# Patient Record
Sex: MALE | Race: WHITE | NOT HISPANIC OR LATINO | URBAN - METROPOLITAN AREA
[De-identification: names, ages, dates, MRNs, and addresses within clinical notes are randomized per-mention and may not be internally consistent; named-entity substitution may affect disease eponyms.]

---

## 2018-06-21 ENCOUNTER — HOSPITAL ENCOUNTER (EMERGENCY)
Facility: HOSPITAL | Age: 63
Discharge: HOME | End: 2018-06-21
Attending: EMERGENCY MEDICINE
Payer: MEDICAID

## 2018-06-21 VITALS
HEART RATE: 90 BPM | HEIGHT: 72 IN | OXYGEN SATURATION: 97 % | BODY MASS INDEX: 27.09 KG/M2 | TEMPERATURE: 98 F | WEIGHT: 200 LBS | DIASTOLIC BLOOD PRESSURE: 69 MMHG | SYSTOLIC BLOOD PRESSURE: 128 MMHG | RESPIRATION RATE: 18 BRPM

## 2018-06-21 DIAGNOSIS — D64.9 ANEMIA, UNSPECIFIED TYPE: ICD-10-CM

## 2018-06-21 DIAGNOSIS — N17.9 ACUTE KIDNEY INJURY: ICD-10-CM

## 2018-06-21 DIAGNOSIS — K92.2 GASTROINTESTINAL HEMORRHAGE, UNSPECIFIED GASTROINTESTINAL HEMORRHAGE TYPE: ICD-10-CM

## 2018-06-21 DIAGNOSIS — J40 BRONCHITIS: ICD-10-CM

## 2018-06-21 DIAGNOSIS — R42 DIZZINESS: ICD-10-CM

## 2018-06-21 DIAGNOSIS — I95.1 ORTHOSTATIC HYPOTENSION: Primary | ICD-10-CM

## 2018-06-21 DIAGNOSIS — Z72.0 TOBACCO ABUSE: ICD-10-CM

## 2018-06-21 DIAGNOSIS — R07.9 CHEST PAIN: ICD-10-CM

## 2018-06-21 LAB
ALBUMIN SERPL BCP-MCNC: 2.9 G/DL
ALP SERPL-CCNC: 90 U/L
ALT SERPL W/O P-5'-P-CCNC: 18 U/L
ANION GAP SERPL CALC-SCNC: 10 MMOL/L
AST SERPL-CCNC: 41 U/L
BASOPHILS # BLD AUTO: 0.05 K/UL
BASOPHILS NFR BLD: 0.9 %
BILIRUB SERPL-MCNC: 2.3 MG/DL
BILIRUB UR QL STRIP: NEGATIVE
BNP SERPL-MCNC: 222 PG/ML
BUN SERPL-MCNC: 22 MG/DL
CALCIUM SERPL-MCNC: 8.9 MG/DL
CHLORIDE SERPL-SCNC: 105 MMOL/L
CLARITY UR: CLEAR
CO2 SERPL-SCNC: 19 MMOL/L
COLOR UR: ABNORMAL
CREAT SERPL-MCNC: 2 MG/DL
DIFFERENTIAL METHOD: ABNORMAL
EOSINOPHIL # BLD AUTO: 0.1 K/UL
EOSINOPHIL NFR BLD: 2.2 %
ERYTHROCYTE [DISTWIDTH] IN BLOOD BY AUTOMATED COUNT: 19.4 %
EST. GFR  (AFRICAN AMERICAN): 40 ML/MIN/1.73 M^2
EST. GFR  (NON AFRICAN AMERICAN): 34 ML/MIN/1.73 M^2
GLUCOSE SERPL-MCNC: 100 MG/DL
GLUCOSE UR QL STRIP: NEGATIVE
HCT VFR BLD AUTO: 30.7 %
HGB BLD-MCNC: 9.8 G/DL
HGB UR QL STRIP: NEGATIVE
KETONES UR QL STRIP: NEGATIVE
LEUKOCYTE ESTERASE UR QL STRIP: NEGATIVE
LYMPHOCYTES # BLD AUTO: 0.9 K/UL
LYMPHOCYTES NFR BLD: 16.5 %
MCH RBC QN AUTO: 26.6 PG
MCHC RBC AUTO-ENTMCNC: 31.9 G/DL
MCV RBC AUTO: 83 FL
MONOCYTES # BLD AUTO: 0.9 K/UL
MONOCYTES NFR BLD: 16.2 %
NEUTROPHILS # BLD AUTO: 3.5 K/UL
NEUTROPHILS NFR BLD: 64.2 %
NITRITE UR QL STRIP: NEGATIVE
PH UR STRIP: 6 [PH] (ref 5–8)
PLATELET # BLD AUTO: ABNORMAL K/UL
PMV BLD AUTO: ABNORMAL FL
POTASSIUM SERPL-SCNC: 4.1 MMOL/L
PROT SERPL-MCNC: 7.9 G/DL
PROT UR QL STRIP: NEGATIVE
RBC # BLD AUTO: 3.68 M/UL
SODIUM SERPL-SCNC: 134 MMOL/L
SP GR UR STRIP: 1.01 (ref 1–1.03)
TROPONIN I SERPL DL<=0.01 NG/ML-MCNC: 0.03 NG/ML
URN SPEC COLLECT METH UR: ABNORMAL
UROBILINOGEN UR STRIP-ACNC: ABNORMAL EU/DL
WBC # BLD AUTO: 5.51 K/UL

## 2018-06-21 PROCEDURE — 82272 OCCULT BLD FECES 1-3 TESTS: CPT

## 2018-06-21 PROCEDURE — 96360 HYDRATION IV INFUSION INIT: CPT

## 2018-06-21 PROCEDURE — 94640 AIRWAY INHALATION TREATMENT: CPT

## 2018-06-21 PROCEDURE — 99284 EMERGENCY DEPT VISIT MOD MDM: CPT | Mod: 25

## 2018-06-21 PROCEDURE — 80053 COMPREHEN METABOLIC PANEL: CPT

## 2018-06-21 PROCEDURE — 25000242 PHARM REV CODE 250 ALT 637 W/ HCPCS: Performed by: EMERGENCY MEDICINE

## 2018-06-21 PROCEDURE — 85025 COMPLETE CBC W/AUTO DIFF WBC: CPT

## 2018-06-21 PROCEDURE — 81003 URINALYSIS AUTO W/O SCOPE: CPT

## 2018-06-21 PROCEDURE — 96361 HYDRATE IV INFUSION ADD-ON: CPT

## 2018-06-21 PROCEDURE — 93010 ELECTROCARDIOGRAM REPORT: CPT | Mod: ,,, | Performed by: INTERNAL MEDICINE

## 2018-06-21 PROCEDURE — 25000003 PHARM REV CODE 250: Performed by: EMERGENCY MEDICINE

## 2018-06-21 PROCEDURE — 83880 ASSAY OF NATRIURETIC PEPTIDE: CPT

## 2018-06-21 PROCEDURE — 94761 N-INVAS EAR/PLS OXIMETRY MLT: CPT

## 2018-06-21 PROCEDURE — 84484 ASSAY OF TROPONIN QUANT: CPT

## 2018-06-21 RX ORDER — ASPIRIN 325 MG
325 TABLET ORAL
Status: DISCONTINUED | OUTPATIENT
Start: 2018-06-21 | End: 2018-06-21

## 2018-06-21 RX ORDER — TAMSULOSIN HYDROCHLORIDE 0.4 MG/1
0.4 CAPSULE ORAL DAILY
COMMUNITY
End: 2021-10-28

## 2018-06-21 RX ORDER — LISINOPRIL 20 MG/1
20 TABLET ORAL DAILY
COMMUNITY
End: 2018-06-21 | Stop reason: HOSPADM

## 2018-06-21 RX ORDER — IPRATROPIUM BROMIDE AND ALBUTEROL SULFATE 2.5; .5 MG/3ML; MG/3ML
3 SOLUTION RESPIRATORY (INHALATION)
Status: COMPLETED | OUTPATIENT
Start: 2018-06-21 | End: 2018-06-21

## 2018-06-21 RX ORDER — ACAMPROSATE CALCIUM 333 MG/1
666 TABLET, DELAYED RELEASE ORAL 3 TIMES DAILY
COMMUNITY
End: 2018-09-14 | Stop reason: ALTCHOICE

## 2018-06-21 RX ADMIN — SODIUM CHLORIDE 1000 ML: 0.9 INJECTION, SOLUTION INTRAVENOUS at 10:06

## 2018-06-21 RX ADMIN — IPRATROPIUM BROMIDE AND ALBUTEROL SULFATE 3 ML: .5; 2.5 SOLUTION RESPIRATORY (INHALATION) at 10:06

## 2018-06-21 RX ADMIN — SODIUM CHLORIDE 1000 ML: 0.9 INJECTION, SOLUTION INTRAVENOUS at 11:06

## 2018-06-21 NOTE — ED NOTES
"Pt ambulated in hallway without assistance with steady gait; pt stated "I feel so much better now."  "

## 2018-06-21 NOTE — ED TRIAGE NOTES
"Pt arrived via personal transportation from home. CC of SOB. Pt stated "My blood pressure medication was recently changed and I have been feeling bad since."pt presents with dizziness, and shortness of breath since Tuesday. Pt reports lisinopril dosage was recently increased to 20 mg daily and since he has increased the dosage, he has felt short of breath and dizzy. Pt reports he was previously on 10mg daily and his pharmacy called him and told him he was actually prescribed 20mg. Pt denies N/V/F/D/SOB. NAD at this time  "

## 2018-06-21 NOTE — ED PROVIDER NOTES
Encounter Date: 6/21/2018    SCRIBE #1 NOTE: I, Meghan Gould, am scribing for, and in the presence of, Gael Riojas MD.      This is an initial triage evaluation of Fransico Montalvo, a 62 y.o., male  that presents to the Emergency Department with c/o weak and dizzy.      Pertinent exam findings:  Pale, diaphoretic, 95/64    Orders Pending : iv/ fluids/ cardiac labs    Destination: [facility]    I have evaluated and provided a medical screening exam with initial orders placed, if indicated, to expedite care. The patient is stable to return to the waiting area and will be placed in a treatment area when one is available. Care will be transferred to an alternate provider when patient is roomed from the lobby for full assessment including: history, physical exam, additional orders, and final disposition.      BALTAZAR Fonseca-JARRET     History     Chief Complaint   Patient presents with    Shortness of Breath     dizzy and light headed started yesterday worsened this morning started a new BP medication on 6/19/18      CC: Shortness of Breath    HPI: This 62 y.o male, smoker, who has HTN, BPH, and Alcohol abuse presents to the ED for an evaluation of acute onset, constant dizziness and lightheadedness that began yesterday.  Patient also reports of mild shortness of breath that began today with standing. Patient denies fever, chills, chest pain, abdominal pain, cough, nausea, emesis, diarrhea, hematuria, or any other associated symptoms.  Patient reports he was placed on Lisinopril 2 days ago and reports starting Flomax 6 days ago.  No prior tx.  No alleviating factors.       The history is provided by the patient. No  was used.     Review of patient's allergies indicates:  Allergies not on file  No past medical history on file.  No past surgical history on file.  No family history on file.  Social History   Substance Use Topics    Smoking status: Not on file    Smokeless tobacco: Not on file    Alcohol use  Not on file     Review of Systems   Constitutional: Negative for chills and fever.   HENT: Negative for ear pain and sore throat.    Eyes: Negative for pain.   Respiratory: Positive for shortness of breath. Negative for cough.    Cardiovascular: Negative for chest pain.   Gastrointestinal: Negative for abdominal pain, diarrhea, nausea and vomiting.   Genitourinary: Negative for dysuria.   Musculoskeletal: Negative for back pain.        (-) arm or leg problems   Skin: Negative for rash.   Neurological: Positive for dizziness and light-headedness. Negative for headaches.       Physical Exam     Initial Vitals [06/21/18 0943]   BP Pulse Resp Temp SpO2   95/64 88 (!) 22 98.6 °F (37 °C) 98 %      MAP       --         Physical Exam    Nursing note and vitals reviewed.  Constitutional: Vital signs are normal. He appears well-developed and well-nourished. He is active.  Non-toxic appearance. No distress.   HENT:   Head: Normocephalic and atraumatic.   Eyes: EOM are normal.   Neck: Trachea normal. Neck supple.   Cardiovascular: Normal rate and regular rhythm.   Pulmonary/Chest: No respiratory distress. He has wheezes (minimal; end expiratory wheezes at bilateral bases).   Abdominal: Soft. Normal appearance and bowel sounds are normal. He exhibits no distension. There is no tenderness.   Musculoskeletal: Normal range of motion. He exhibits no edema.   Neurological: He is alert and oriented to person, place, and time. He has normal strength. No cranial nerve deficit or sensory deficit.   Skin: Skin is warm, dry and intact. No rash noted.   Psychiatric: He has a normal mood and affect.         ED Course   Procedures  Labs Reviewed   CBC W/ AUTO DIFFERENTIAL - Abnormal; Notable for the following:        Result Value    RBC 3.68 (*)     Hemoglobin 9.8 (*)     Hematocrit 30.7 (*)     MCH 26.6 (*)     MCHC 31.9 (*)     RDW 19.4 (*)     Lymph # 0.9 (*)     Lymph% 16.5 (*)     Mono% 16.2 (*)     All other components within normal  limits   COMPREHENSIVE METABOLIC PANEL - Abnormal; Notable for the following:     Sodium 134 (*)     CO2 19 (*)     Creatinine 2.0 (*)     Albumin 2.9 (*)     Total Bilirubin 2.3 (*)     AST 41 (*)     eGFR if  40 (*)     eGFR if non  34 (*)     All other components within normal limits   B-TYPE NATRIURETIC PEPTIDE - Abnormal; Notable for the following:      (*)     All other components within normal limits   URINALYSIS - Abnormal; Notable for the following:     Urobilinogen, UA 4.0-6.0 (*)     All other components within normal limits   TROPONIN I     EKG Readings: (Independently Interpreted)   Initial Reading: No STEMI. Rhythm: Normal Sinus Rhythm. Heart Rate: 84. Ectopy: PVCs. Clinical Impression: Normal Sinus Rhythm       Imaging Results          X-Ray Chest AP Portable (Final result)  Result time 06/21/18 10:28:55    Final result by Elmer Giron MD (06/21/18 10:28:55)                 Impression:      No acute chest disease identified.      Electronically signed by: Elmer Giron MD  Date:    06/21/2018  Time:    10:28             Narrative:    EXAMINATION:  XR CHEST AP PORTABLE    CLINICAL HISTORY:  Chest Pain;    TECHNIQUE:  Single frontal view of the chest was performed.    COMPARISON:  None    FINDINGS:  The heart is not enlarged.  Superior mediastinal structures are unremarkable.  Pulmonary vasculature is within normal limits.  The lungs are well aerated and free of focal consolidations.  There is no evidence for pneumothorax or large pleural effusions.  Bony structures appear intact.                                 Medical Decision Making:   History:   Old Medical Records: I decided to obtain old medical records.  Initial Assessment:   62 y.o male, smoker, who has HTN, BPH, and Alcohol abuse presents to the ED for an evaluation of acute onset, constant dizziness and lightheadedness that began yesterday.  Patient also reports of mild shortness of breath that began  today with standing.   Clinical Tests:   Lab Tests: Ordered and Reviewed  Radiological Study: Ordered and Reviewed  Medical Tests: Ordered  ED Management:  1152: labs reveal [place] and moderate anemia.  Rectal exam shows brown stool faintly guiac positive.  History of etoh abuse now in rehab and currently sober.  Patient was profound orthostatic since starting lisinopril.  Pt also on flomax which commonly causes orthostasis however pt has significant BPH types sxs and prefers not to stop this.  Lisinopril was started Tuesday that when dizziness started sxs worse today.  Patient hydrated with 2L NS.  Patient will need repeat Cr and H&H.  Recommend stay hydrated, stop lisinopril, follow up with PCP for repeat labs.            Scribe Attestation:   Scribe #1: I performed the above scribed service and the documentation accurately describes the services I performed. I attest to the accuracy of the note.    Attending Attestation:           Physician Attestation for Scribe:  Physician Attestation Statement for Scribe #1: I, Gael Riojas MD, reviewed documentation, as scribed by Meghan Gould in my presence, and it is both accurate and complete.                    Clinical Impression:   The primary encounter diagnosis was Orthostatic hypotension. Diagnoses of Chest pain, Dizziness, Bronchitis, Tobacco abuse, Acute kidney injury, Anemia, unspecified type, and Gastrointestinal hemorrhage, unspecified gastrointestinal hemorrhage type were also pertinent to this visit.                             Gael Riojas MD  06/21/18 6020       Gael Riojas MD  06/21/18 2517

## 2018-06-25 ENCOUNTER — CLINICAL SUPPORT (OUTPATIENT)
Dept: OCCUPATIONAL MEDICINE | Facility: CLINIC | Age: 63
End: 2018-06-25

## 2018-06-25 DIAGNOSIS — Z11.1 PPD SCREENING TEST: Primary | ICD-10-CM

## 2018-06-25 PROCEDURE — 86580 TB INTRADERMAL TEST: CPT | Mod: S$GLB,,, | Performed by: FAMILY MEDICINE

## 2018-06-27 LAB
TB INDURATION - 48 HR READ: 0 MM
TB INDURATION - 72 HR READ: NORMAL MM
TB SKIN TEST - 48 HR READ: NEGATIVE
TB SKIN TEST - 72 HR READ: NORMAL

## 2018-06-29 ENCOUNTER — HOSPITAL ENCOUNTER (EMERGENCY)
Facility: HOSPITAL | Age: 63
Discharge: HOME | End: 2018-06-29
Attending: EMERGENCY MEDICINE
Payer: MEDICAID

## 2018-06-29 VITALS
OXYGEN SATURATION: 97 % | SYSTOLIC BLOOD PRESSURE: 164 MMHG | HEIGHT: 72 IN | RESPIRATION RATE: 18 BRPM | BODY MASS INDEX: 27.09 KG/M2 | TEMPERATURE: 98 F | HEART RATE: 80 BPM | DIASTOLIC BLOOD PRESSURE: 89 MMHG | WEIGHT: 200 LBS

## 2018-06-29 DIAGNOSIS — R10.9 ABDOMINAL PAIN, UNSPECIFIED ABDOMINAL LOCATION: ICD-10-CM

## 2018-06-29 DIAGNOSIS — L28.2 PRURITIC RASH: Primary | ICD-10-CM

## 2018-06-29 DIAGNOSIS — K80.20 CALCULUS OF GALLBLADDER WITHOUT CHOLECYSTITIS WITHOUT OBSTRUCTION: ICD-10-CM

## 2018-06-29 DIAGNOSIS — R91.1 PULMONARY NODULE, LEFT: ICD-10-CM

## 2018-06-29 LAB
ALBUMIN SERPL BCP-MCNC: 3 G/DL
ALP SERPL-CCNC: 116 U/L
ALT SERPL W/O P-5'-P-CCNC: 42 U/L
ANION GAP SERPL CALC-SCNC: 10 MMOL/L
AST SERPL-CCNC: 91 U/L
BASOPHILS # BLD AUTO: 0.05 K/UL
BASOPHILS NFR BLD: 0.7 %
BILIRUB SERPL-MCNC: 2.1 MG/DL
BILIRUB UR QL STRIP: NEGATIVE
BUN SERPL-MCNC: 14 MG/DL
CALCIUM SERPL-MCNC: 9.1 MG/DL
CHLORIDE SERPL-SCNC: 102 MMOL/L
CLARITY UR: CLEAR
CO2 SERPL-SCNC: 20 MMOL/L
COLOR UR: YELLOW
CREAT SERPL-MCNC: 1.6 MG/DL
DIFFERENTIAL METHOD: ABNORMAL
EOSINOPHIL # BLD AUTO: 0.2 K/UL
EOSINOPHIL NFR BLD: 2.7 %
ERYTHROCYTE [DISTWIDTH] IN BLOOD BY AUTOMATED COUNT: 18.4 %
EST. GFR  (AFRICAN AMERICAN): 52 ML/MIN/1.73 M^2
EST. GFR  (NON AFRICAN AMERICAN): 45 ML/MIN/1.73 M^2
ETHANOL SERPL-MCNC: <10 MG/DL
GLUCOSE SERPL-MCNC: 113 MG/DL
GLUCOSE UR QL STRIP: NEGATIVE
HCT VFR BLD AUTO: 32.7 %
HGB BLD-MCNC: 10.6 G/DL
HGB UR QL STRIP: NEGATIVE
KETONES UR QL STRIP: NEGATIVE
LEUKOCYTE ESTERASE UR QL STRIP: NEGATIVE
LIPASE SERPL-CCNC: 37 U/L
LYMPHOCYTES # BLD AUTO: 0.7 K/UL
LYMPHOCYTES NFR BLD: 10 %
MCH RBC QN AUTO: 27.1 PG
MCHC RBC AUTO-ENTMCNC: 32.4 G/DL
MCV RBC AUTO: 84 FL
MONOCYTES # BLD AUTO: 0.5 K/UL
MONOCYTES NFR BLD: 6.7 %
NEUTROPHILS # BLD AUTO: 5.8 K/UL
NEUTROPHILS NFR BLD: 79.8 %
NITRITE UR QL STRIP: NEGATIVE
PH UR STRIP: 5 [PH] (ref 5–8)
PLATELET # BLD AUTO: 68 K/UL
PMV BLD AUTO: 11.6 FL
POTASSIUM SERPL-SCNC: 3.9 MMOL/L
PROT SERPL-MCNC: 8.1 G/DL
PROT UR QL STRIP: NEGATIVE
RBC # BLD AUTO: 3.91 M/UL
SODIUM SERPL-SCNC: 132 MMOL/L
SP GR UR STRIP: 1.01 (ref 1–1.03)
URN SPEC COLLECT METH UR: ABNORMAL
UROBILINOGEN UR STRIP-ACNC: ABNORMAL EU/DL
WBC # BLD AUTO: 7.32 K/UL

## 2018-06-29 PROCEDURE — 83690 ASSAY OF LIPASE: CPT

## 2018-06-29 PROCEDURE — 81003 URINALYSIS AUTO W/O SCOPE: CPT

## 2018-06-29 PROCEDURE — 96360 HYDRATION IV INFUSION INIT: CPT

## 2018-06-29 PROCEDURE — 80320 DRUG SCREEN QUANTALCOHOLS: CPT

## 2018-06-29 PROCEDURE — 80053 COMPREHEN METABOLIC PANEL: CPT

## 2018-06-29 PROCEDURE — 85025 COMPLETE CBC W/AUTO DIFF WBC: CPT

## 2018-06-29 PROCEDURE — 99285 EMERGENCY DEPT VISIT HI MDM: CPT | Mod: 25

## 2018-06-29 PROCEDURE — 86592 SYPHILIS TEST NON-TREP QUAL: CPT

## 2018-06-29 PROCEDURE — 25000003 PHARM REV CODE 250: Performed by: EMERGENCY MEDICINE

## 2018-06-29 RX ORDER — FAMOTIDINE 20 MG/1
20 TABLET, FILM COATED ORAL
Status: COMPLETED | OUTPATIENT
Start: 2018-06-29 | End: 2018-06-29

## 2018-06-29 RX ORDER — FAMOTIDINE 20 MG/1
20 TABLET, FILM COATED ORAL 2 TIMES DAILY
Qty: 60 TABLET | Refills: 11 | Status: SHIPPED | OUTPATIENT
Start: 2018-06-29 | End: 2018-08-09 | Stop reason: DRUGHIGH

## 2018-06-29 RX ORDER — SUCRALFATE 1 G/10ML
1 SUSPENSION ORAL 4 TIMES DAILY
Qty: 414 ML | Refills: 1 | Status: ON HOLD | OUTPATIENT
Start: 2018-06-29 | End: 2019-08-02 | Stop reason: HOSPADM

## 2018-06-29 RX ORDER — TRIAMCINOLONE ACETONIDE 1 MG/G
CREAM TOPICAL 2 TIMES DAILY
Qty: 45 G | Refills: 0 | Status: SHIPPED | OUTPATIENT
Start: 2018-06-29 | End: 2018-09-14 | Stop reason: ALTCHOICE

## 2018-06-29 RX ADMIN — SODIUM CHLORIDE 1000 ML: 0.9 INJECTION, SOLUTION INTRAVENOUS at 09:06

## 2018-06-29 RX ADMIN — LIDOCAINE HYDROCHLORIDE: 20 SOLUTION ORAL; TOPICAL at 09:06

## 2018-06-29 RX ADMIN — FAMOTIDINE 20 MG: 20 TABLET ORAL at 09:06

## 2018-06-29 NOTE — ED PROVIDER NOTES
"Encounter Date: 6/29/2018    SCRIBE #1 NOTE: I, Indio Hollins, am scribing for, and in the presence of,  Anders Wilson MD. I have scribed the following portions of the note - Other sections scribed: HPI, ROS, PE.       History     Chief Complaint   Patient presents with    Abdominal Pain     Pt. arrives to ED via EMS, reports abdominal pain for x3 days pt. states "my stomach is killing me, feels like my stomach is on fire." Denies vomiting or diarrhea, reports nausea. Pt. also complains of rash on arms and hands, rates pain 9/10    Rash     CC: Abdominal Pain    HPI: 63 y/o male with medical hx of alcohol abuse, BPH, and HTN presents to the ED via EMS transportation from his rehab facility c/o acute, intermittent, severe (9/10) epigastric abdominal pain that began x3 days ago. Pt describes his symptoms as "sharp stomach" pain. Pt reports that x3 days ago he drank some milk and attributed his symptoms to potentially being lactose intolerant. However, upon ingesting anything since then causes him pain. Pt notes that attempting to belch, vomit, or flatulate alleviates his pain. Pt is also c/o constipation. He reports that his BMs have been "weird drips" requiring a lot of straining to produce "a little solid stool".     Pt is lastly c/o x3 day hx of bilateral rash to the palm and fingers. He reports taking Cortizone 10 with, which worsened the rash. Pt denies tenderness and discharge. Pt denies N/V/D, chest pain, SOB, HA, weakness, numbness, or any other associated symptoms. No modifying factors or tx PTA.       The history is provided by the patient. No  was used.     Review of patient's allergies indicates:  No Known Allergies  Past Medical History:   Diagnosis Date    Alcohol abuse     BPH (benign prostatic hyperplasia)     Cirrhosis 06/21/2018    pt states that he was diagnosed a week ago during his last hospital visit    Hypertension     Renal disorder      History reviewed. No pertinent " surgical history.  History reviewed. No pertinent family history.  Social History   Substance Use Topics    Smoking status: Current Every Day Smoker     Packs/day: 1.00     Types: Cigarettes    Smokeless tobacco: Never Used    Alcohol use No      Comment: former drinker     Review of Systems   Constitutional: Negative for chills and fever.   HENT: Negative for congestion, ear pain, rhinorrhea and sore throat.    Eyes: Negative for pain and redness.   Respiratory: Negative for cough and shortness of breath.    Cardiovascular: Negative for chest pain.   Gastrointestinal: Positive for abdominal pain and constipation. Negative for diarrhea, nausea and vomiting.   Genitourinary: Negative for dysuria, flank pain, frequency, hematuria and urgency.   Musculoskeletal: Negative for back pain and neck pain.   Skin: Negative for rash.   Neurological: Negative for weakness, light-headedness, numbness and headaches.   All other systems reviewed and are negative.      Physical Exam     Initial Vitals [06/29/18 0922]   BP Pulse Resp Temp SpO2   113/76 92 18 98.2 °F (36.8 °C) 99 %      MAP       --         Physical Exam    Nursing note and vitals reviewed.  Constitutional: He appears well-developed and well-nourished. He is not diaphoretic. No distress.   HENT:   Head: Normocephalic and atraumatic.   Nose: Nose normal.   Eyes: Conjunctivae and EOM are normal. Pupils are equal, round, and reactive to light.   Neck: Normal range of motion. Neck supple.   Cardiovascular: Normal rate, regular rhythm and normal heart sounds. Exam reveals no gallop and no friction rub.    No murmur heard.  Pulmonary/Chest: Breath sounds normal. No stridor. No respiratory distress. He has no wheezes. He has no rhonchi. He has no rales.   Abdominal: Soft. Bowel sounds are normal. There is tenderness (Pt has mild epigastric tenderness.). There is no rebound and no guarding.   Musculoskeletal: Normal range of motion. He exhibits no edema or tenderness.    Neurological: He is alert and oriented to person, place, and time.   Skin: Skin is warm and dry. Rash (Bilateral, small, clear, cirrcular rash on the palm and fingers. No tenderness or dischrage. Appears to be herpetic xander. ) noted. No erythema.   Psychiatric: He has a normal mood and affect.         ED Course   Procedures  Labs Reviewed   CBC W/ AUTO DIFFERENTIAL - Abnormal; Notable for the following:        Result Value    RBC 3.91 (*)     Hemoglobin 10.6 (*)     Hematocrit 32.7 (*)     RDW 18.4 (*)     Platelets 68 (*)     Lymph # 0.7 (*)     Gran% 79.8 (*)     Lymph% 10.0 (*)     All other components within normal limits   COMPREHENSIVE METABOLIC PANEL - Abnormal; Notable for the following:     Sodium 132 (*)     CO2 20 (*)     Glucose 113 (*)     Creatinine 1.6 (*)     Albumin 3.0 (*)     Total Bilirubin 2.1 (*)     AST 91 (*)     eGFR if  52 (*)     eGFR if non  45 (*)     All other components within normal limits   URINALYSIS - Abnormal; Notable for the following:     Urobilinogen, UA 2.0-3.0 (*)     All other components within normal limits   LIPASE   ALCOHOL,MEDICAL (ETHANOL)   RPR        Imaging Results          CT Abdomen Pelvis  Without Contrast (Final result)  Result time 06/29/18 11:27:24   Procedure changed from CT Abdomen Pelvis With Contrast     Final result by Timi Macias MD (06/29/18 11:27:24)                 Impression:      1. Suspected cirrhosis with splenomegaly.  2. Cholelithiasis.  3. Cystic structure along the duodenum, possibly duplication cyst.  Consider further evaluation with endoscopic ultrasound.  4. Nodular opacity in the left lower lobe.  Recommend further evaluation with nonemergent CT chest.  5. Punctate nonobstructing left renal calculi.  6. Healing fracture of left posterior 9th rib.      Electronically signed by: Timi Macias MD  Date:    06/29/2018  Time:    11:27             Narrative:    EXAMINATION:  CT ABDOMEN PELVIS WITHOUT  CONTRAST    CLINICAL HISTORY:  Abdominal distension;    TECHNIQUE:  Low dose axial images, sagittal and coronal reformations were obtained from the lung bases to the pubic symphysis.  Oral contrast was not administered.    COMPARISON:  None    FINDINGS:  There is a 1.4 cm nodular opacity in the left lower lobe (series 2, image 7).  No pericardial or pleural effusion.  Note made of healing fracture of the left posterior 9th rib.  Coronary artery calcifications are present.    Liver demonstrates a nodular contour suspicious for cirrhosis.  Calcified stones are seen within the gallbladder.  No biliary dilatation.  Pancreas and adrenal glands are unremarkable.  Spleen is enlarged, measuring 15.7 cm AP.    GI tract demonstrates no evidence for obstruction or inflammation.  Note made of 4.9 x 2.4 x 2.8 cm cystic structure along the medial aspect of the proximal duodenum.    There are bilateral renal cysts.  There is no hydronephrosis.  Several punctate nonobstructing calculi are noted in the left lower pole.    Prostate is enlarged.  Urinary bladder is unremarkable.    No retroperitoneal lymphadenopathy.  There is trace ascites.  Abdominal aorta is normal caliber noting severe calcified plaque.    Regional osseous structures demonstrate no aggressive appearing lytic or blastic lesions.                                 Medical Decision Making:   Clinical Tests:   Lab Tests: Ordered  Radiological Study: Ordered  ED Management:  62-year-old male alcoholic, currently in rehab for 2 weeks complaint of epigastric pain for the past 3 days worsening with eating, and improving with both being gagging and passing gas.  My abdominal epigastric tenderness on exam with no rebound or guarding, nontoxic appearance.  With a basic abdominal labs, abdominal pain treated with GI cocktail Pepcid.  No acute finding on CT and labs.  CT show cholelithiasis without cholecystitis, or biliary dilatation.  Bilirubin is decreased 2.1 today from 2.3 1  week ago.  None specific nodule opacity in left lower lobe recommend nonemergent CT chest follow-up.  Patient also complained of bilateral pleuritic papular vesicular hand rash that started 3 days ago, RPR sent, unlikely hyperbaric with low due to bilateral and generalized involvement.  Topical triamcinolone started and number to refer symptomatology.  Patient stable for discharge            Scribe Attestation:   Scribe #1: I performed the above scribed service and the documentation accurately describes the services I performed. I attest to the accuracy of the note.    Attending Attestation:           Physician Attestation for Scribe:  Physician Attestation Statement for Scribe #1: I, Anders Wilson MD, reviewed documentation, as scribed by Indio Hollins in my presence, and it is both accurate and complete.                    Clinical Impression:   The primary encounter diagnosis was Pruritic rash. Diagnoses of Abdominal pain, unspecified abdominal location, Calculus of gallbladder without cholecystitis without obstruction, and Pulmonary nodule, left were also pertinent to this visit.      Disposition:   Disposition: Discharged                        Anders Wilson MD  06/29/18 [id number]

## 2018-06-29 NOTE — DISCHARGE INSTRUCTIONS
Follow up with PCP in 2-3 days.  Seek medical care if you have fever of 100.4°F (38°C) or higher, your abdominal pain worsens, pain with passing urine; blood in the urine; black or tarry stools, severe bad back, side, chest, or belly pain; severe dizziness or passing out, upset stomach with throwing up, of if you have any concerns.  Also follow up with your PCP for Outpatient ultrasound for evaluation of your abdominal pain. Also inform you PCP to do a nonemergent CT chest for further evaluation of the pulmonary nodules on the left side fell on CT today..  Follow-up with Dermatology for your rash as instructed

## 2018-06-29 NOTE — ED NOTES
Pt states that abdominal pain has decreased to a 6 or 7 since GI cocktail. Patient states that pain is currently intermittent

## 2018-06-29 NOTE — ED TRIAGE NOTES
Pt arrived via EMS with complaint of abdominal pain that began 2 days ago. Abdominal area is tender upon palpation. Patient also reports constipation and dizziness. Patient has rash on hands that has been present for 2 days. No other complaints reported.

## 2018-06-30 LAB — RPR SER QL: NORMAL

## 2018-07-26 ENCOUNTER — HOSPITAL ENCOUNTER (EMERGENCY)
Facility: HOSPITAL | Age: 63
Discharge: HOME | End: 2018-07-26
Attending: EMERGENCY MEDICINE
Payer: MEDICAID

## 2018-07-26 VITALS
WEIGHT: 195 LBS | OXYGEN SATURATION: 100 % | HEART RATE: 104 BPM | TEMPERATURE: 98 F | SYSTOLIC BLOOD PRESSURE: 97 MMHG | DIASTOLIC BLOOD PRESSURE: 62 MMHG | BODY MASS INDEX: 26.41 KG/M2 | RESPIRATION RATE: 20 BRPM | HEIGHT: 72 IN

## 2018-07-26 DIAGNOSIS — T46.4X1A: ICD-10-CM

## 2018-07-26 DIAGNOSIS — I95.9 HYPOTENSION, UNSPECIFIED HYPOTENSION TYPE: Primary | ICD-10-CM

## 2018-07-26 LAB
ALBUMIN SERPL BCP-MCNC: 2.9 G/DL
ALP SERPL-CCNC: 120 U/L
ALT SERPL W/O P-5'-P-CCNC: 12 U/L
ANION GAP SERPL CALC-SCNC: 13 MMOL/L
AST SERPL-CCNC: 29 U/L
BASOPHILS # BLD AUTO: 0.04 K/UL
BASOPHILS NFR BLD: 0.9 %
BILIRUB SERPL-MCNC: 1.3 MG/DL
BILIRUB UR QL STRIP: NEGATIVE
BUN SERPL-MCNC: 14 MG/DL
CALCIUM SERPL-MCNC: 9 MG/DL
CHLORIDE SERPL-SCNC: 102 MMOL/L
CLARITY UR: CLEAR
CO2 SERPL-SCNC: 21 MMOL/L
COLOR UR: YELLOW
CREAT SERPL-MCNC: 2.1 MG/DL
DIFFERENTIAL METHOD: ABNORMAL
EOSINOPHIL # BLD AUTO: 0.2 K/UL
EOSINOPHIL NFR BLD: 4.9 %
ERYTHROCYTE [DISTWIDTH] IN BLOOD BY AUTOMATED COUNT: 17.1 %
EST. GFR  (AFRICAN AMERICAN): 38 ML/MIN/1.73 M^2
EST. GFR  (NON AFRICAN AMERICAN): 33 ML/MIN/1.73 M^2
GLUCOSE SERPL-MCNC: 105 MG/DL
GLUCOSE UR QL STRIP: NEGATIVE
HCT VFR BLD AUTO: 30.4 %
HGB BLD-MCNC: 9.8 G/DL
HGB UR QL STRIP: NEGATIVE
KETONES UR QL STRIP: NEGATIVE
LEUKOCYTE ESTERASE UR QL STRIP: NEGATIVE
LYMPHOCYTES # BLD AUTO: 0.9 K/UL
LYMPHOCYTES NFR BLD: 19.5 %
MAGNESIUM SERPL-MCNC: 1.6 MG/DL
MCH RBC QN AUTO: 25.6 PG
MCHC RBC AUTO-ENTMCNC: 32.2 G/DL
MCV RBC AUTO: 79 FL
MONOCYTES # BLD AUTO: 0.5 K/UL
MONOCYTES NFR BLD: 10.4 %
NEUTROPHILS # BLD AUTO: 2.9 K/UL
NEUTROPHILS NFR BLD: 64.3 %
NITRITE UR QL STRIP: NEGATIVE
PH UR STRIP: 6 [PH] (ref 5–8)
PLATELET # BLD AUTO: 57 K/UL
PMV BLD AUTO: 11.1 FL
POTASSIUM SERPL-SCNC: 3.2 MMOL/L
PROT SERPL-MCNC: 7.8 G/DL
PROT UR QL STRIP: NEGATIVE
RBC # BLD AUTO: 3.83 M/UL
SODIUM SERPL-SCNC: 136 MMOL/L
SP GR UR STRIP: 1 (ref 1–1.03)
URN SPEC COLLECT METH UR: NORMAL
UROBILINOGEN UR STRIP-ACNC: NEGATIVE EU/DL
WBC # BLD AUTO: 4.51 K/UL

## 2018-07-26 PROCEDURE — 80053 COMPREHEN METABOLIC PANEL: CPT

## 2018-07-26 PROCEDURE — 25000003 PHARM REV CODE 250: Performed by: EMERGENCY MEDICINE

## 2018-07-26 PROCEDURE — 93005 ELECTROCARDIOGRAM TRACING: CPT

## 2018-07-26 PROCEDURE — 93010 ELECTROCARDIOGRAM REPORT: CPT | Mod: ,,, | Performed by: INTERNAL MEDICINE

## 2018-07-26 PROCEDURE — 85025 COMPLETE CBC W/AUTO DIFF WBC: CPT

## 2018-07-26 PROCEDURE — 96361 HYDRATE IV INFUSION ADD-ON: CPT

## 2018-07-26 PROCEDURE — 99284 EMERGENCY DEPT VISIT MOD MDM: CPT | Mod: 25

## 2018-07-26 PROCEDURE — 96360 HYDRATION IV INFUSION INIT: CPT

## 2018-07-26 PROCEDURE — 83735 ASSAY OF MAGNESIUM: CPT

## 2018-07-26 PROCEDURE — 81003 URINALYSIS AUTO W/O SCOPE: CPT

## 2018-07-26 RX ORDER — ZOLPIDEM TARTRATE 5 MG/1
5 TABLET ORAL NIGHTLY PRN
Qty: 20 TABLET | Refills: 0 | Status: SHIPPED | OUTPATIENT
Start: 2018-07-26 | End: 2018-07-29

## 2018-07-26 RX ORDER — LISINOPRIL 20 MG/1
20 TABLET ORAL DAILY
COMMUNITY
End: 2018-07-26 | Stop reason: ALTCHOICE

## 2018-07-26 RX ORDER — POTASSIUM CHLORIDE 20 MEQ/15ML
40 SOLUTION ORAL ONCE
Status: COMPLETED | OUTPATIENT
Start: 2018-07-26 | End: 2018-07-26

## 2018-07-26 RX ADMIN — SODIUM CHLORIDE 2000 ML: 0.9 INJECTION, SOLUTION INTRAVENOUS at 09:07

## 2018-07-26 RX ADMIN — POTASSIUM CHLORIDE 40 MEQ: 20 SOLUTION ORAL at 11:07

## 2018-07-26 NOTE — ED PROVIDER NOTES
"Encounter Date: 7/26/2018    SCRIBE #1 NOTE: I, Марина Mora, am scribing for, and in the presence of,  José Holland MD. I have scribed the following portions of the note - Other sections scribed: HPI, ROS.       History     Chief Complaint   Patient presents with    Dizziness     Pt. reports feeling dizzy since this morning, states "At one point it felt I was about to pass out, I got dazed for a second." Pt. reports taking 40mg of lisinopril this morning, reports not suppose to be taking medication any more but was anxious and decided to take medication. Pt. AAOx3, not complaining of any pain in NAD.    Hypotension     CC: Lightheadedness    63 y/o male with BPH, cirrhosis, HTN, and renal disorder presents to the ED via EMS for emergent evaluation of lightheadedness that he noticed this morning. The patient states that for the past 4 days he hasn't been able to sleep. He's also noncompliant with his HTN medication, Lisinopril, for the past 3 wks. The patient usually takes 20 mg lisinopril daily; however, he took 40 mg yesterday because he thought it would lower his blood pressure and allow him to sleep. Unfortunately, he states it didn't help. He reports grabbing coffee this morning; however, he suddenly felt lightheaded and dropped the coffee cup. The patient is also c/o weakness and rash to LLE and back with associated itchiness. He thinks the rash is due to mosquito bites. The patient is prescribed lisinopril and flomax. The patient presented to this facility on 6/29/18, where he was dx with cholelithiasis. He reports endoscopy since then and has another appt with GI on 8/13/18. The patient smokes cigarettes but denies drinking EtOH or illicit drug abuse. The patient denies hx of surgeries. The patient denies fever, chills, HA, sore throat, otalgia, dysuria, or arm/leg trouble. No other symptoms reported.      The history is provided by the patient. No  was used.     Review of " patient's allergies indicates:  No Known Allergies  Past Medical History:   Diagnosis Date    Alcohol abuse     BPH (benign prostatic hyperplasia)     Cirrhosis 06/21/2018    pt states that he was diagnosed a week ago during his last hospital visit    Hypertension     Renal disorder      History reviewed. No pertinent surgical history.  Family History   Problem Relation Age of Onset    Hypertension Father     Heart disease Father     Heart disease Brother      Social History   Substance Use Topics    Smoking status: Current Every Day Smoker     Packs/day: 1.00     Types: Cigarettes    Smokeless tobacco: Never Used    Alcohol use No      Comment: former drinker     Review of Systems   Constitutional: Negative for chills and fever.   HENT: Negative for congestion, ear pain, rhinorrhea and sore throat.    Eyes: Negative for redness.   Respiratory: Negative for cough and shortness of breath.    Cardiovascular: Negative for chest pain.   Gastrointestinal: Negative for abdominal pain, diarrhea, nausea and vomiting.   Genitourinary: Negative for decreased urine volume, difficulty urinating, dysuria, frequency, hematuria and urgency.   Musculoskeletal: Negative for back pain and neck pain.        (-) arm/leg trouble   Skin: Positive for rash (to LLE and back with associated itchiness).   Neurological: Positive for weakness and light-headedness. Negative for headaches.       Physical Exam     Initial Vitals [07/26/18 0904]   BP Pulse Resp Temp SpO2   (!) 71/48 93 18 98.1 °F (36.7 °C) 97 %      MAP       --         Physical Exam  The patient was examined specifically for the following:   General:No significant distress, Good color, Warm and dry. Head and neck:Scalp atraumatic, Neck supple. Neurological:Appropriate conversation, Gross motor deficits. Eyes:Conjugate gaze, Clear corneas. ENT: No epistaxis. Cardiac: Regular rate and rhythm, Grossly normal heart tones. Pulmonary: Wheezing, Rales. Gastrointestinal:  Abdominal tenderness, Abdominal distention. Musculoskeletal: Extremity deformity, Apparent pain with range of motion of the joints. Skin: Rash.   The findings on examination were normal except for the following:  Patient's blood pressure 71/48.  The heart rate is 93.  The lungs are clear.  The heart tones are normal.  The abdomen is soft.  The patient has some nonspecific excoriated skin lesions on the back and on the left lower extremity distally.  I see no significant petechiae or purpura.  ED Course   Procedures  Labs Reviewed   COMPREHENSIVE METABOLIC PANEL - Abnormal; Notable for the following:        Result Value    Potassium 3.2 (*)     CO2 21 (*)     Creatinine 2.1 (*)     Albumin 2.9 (*)     Total Bilirubin 1.3 (*)     eGFR if  38 (*)     eGFR if non  33 (*)     All other components within normal limits   CBC W/ AUTO DIFFERENTIAL - Abnormal; Notable for the following:     RBC 3.83 (*)     Hemoglobin 9.8 (*)     Hematocrit 30.4 (*)     MCV 79 (*)     MCH 25.6 (*)     RDW 17.1 (*)     Platelets 57 (*)     Lymph # 0.9 (*)     All other components within normal limits   MAGNESIUM   URINALYSIS, REFLEX TO URINE CULTURE    Narrative:     Preferred Collection Type->Urine, Clean Catch     EKG Readings: (Independently Interpreted)   This patient is in a sinus rhythm with a heart rate of 94.  The p.r. and QRS intervals are normal.  The patient has incomplete right bundle branch block.  There are nonspecific ST segment and T-wave changes.  There is poor R-wave progression across precordium.  There is no definite evidence of acute myocardial infarction or malignant arrhythmia.       Imaging Results    None       Medical decision making:  This patient double up on his blood pressure medicine last night.  He had not taken it for weeks.  He presents this morning with weakness and dizziness in the low blood pressure.  The patient was treated with IV fluid in the emergency room.  He was  treated with 2 L of crystalloid and made about 500 cc in urine.  His supine systolic blood pressure edith to 127.  The patient feels well with standing now.  I offered the patient admission to the hospital for observation.  He declines he understands that we would try to wait for his blood pressure to recover as his medicines wear off.  I did consider sepsis GI bleeding.  The patient has a stable chronic anemia.  He does have some renal insufficiency this is a little  worse than his last visit.  The patient has 130 appointment with his dentist.  He wishes to be discharged.  The risks and benefits were discussed in detail.  The patient seems awake alert and capable of making intelligent decisions.                Scribe Attestation:   Scribe #1: I performed the above scribed service and the documentation accurately describes the services I performed. I attest to the accuracy of the note.    Attending Attestation:           Physician Attestation for Scribe:  Physician Attestation Statement for Scribe #1: I, José Holland MD, reviewed documentation, as scribed by Марина Mora in my presence, and it is both accurate and complete.                    Clinical Impression:   The primary encounter diagnosis was Hypotension, unspecified hypotension type. A diagnosis of Accidental lisinopril ingestion, initial encounter was also pertinent to this visit.                             José Munoz MD  07/26/18 [id number]

## 2018-07-26 NOTE — ED TRIAGE NOTES
Patient  took a double dose of his blood pressure medication Lisinopril last night. Now today, patient  felt dizzy like he was about to pass out.  has not slept for four days.

## 2018-07-26 NOTE — DISCHARGE INSTRUCTIONS
Lots of liquids.  Regular meals.  Rest today.   Please return to the emergency room if you get week dizzy or if new problems develop.  Please follow-up with her primary care doctor this week.  Please stop all of your blood pressure medicines.

## 2018-07-29 ENCOUNTER — HOSPITAL ENCOUNTER (OUTPATIENT)
Facility: HOSPITAL | Age: 63
Discharge: HOME | End: 2018-07-30
Attending: EMERGENCY MEDICINE | Admitting: EMERGENCY MEDICINE
Payer: MEDICAID

## 2018-07-29 DIAGNOSIS — E86.0 DEHYDRATION: ICD-10-CM

## 2018-07-29 DIAGNOSIS — N18.30 CKD (CHRONIC KIDNEY DISEASE) STAGE 3, GFR 30-59 ML/MIN: Chronic | ICD-10-CM

## 2018-07-29 DIAGNOSIS — N40.0 BENIGN PROSTATIC HYPERPLASIA, UNSPECIFIED WHETHER LOWER URINARY TRACT SYMPTOMS PRESENT: Chronic | ICD-10-CM

## 2018-07-29 DIAGNOSIS — R10.13 EPIGASTRIC PAIN: ICD-10-CM

## 2018-07-29 DIAGNOSIS — K29.70 GASTRITIS, PRESENCE OF BLEEDING UNSPECIFIED, UNSPECIFIED CHRONICITY, UNSPECIFIED GASTRITIS TYPE: Primary | ICD-10-CM

## 2018-07-29 DIAGNOSIS — R79.89 ELEVATED TROPONIN: ICD-10-CM

## 2018-07-29 DIAGNOSIS — R10.9 ABDOMINAL PAIN: ICD-10-CM

## 2018-07-29 DIAGNOSIS — R07.9 ACUTE CHEST PAIN: ICD-10-CM

## 2018-07-29 DIAGNOSIS — I35.9 NONRHEUMATIC AORTIC VALVE DISORDER: ICD-10-CM

## 2018-07-29 DIAGNOSIS — Z72.0 TOBACCO ABUSE: Chronic | ICD-10-CM

## 2018-07-29 LAB
ALBUMIN SERPL BCP-MCNC: 2.9 G/DL
ALP SERPL-CCNC: 122 U/L
ALT SERPL W/O P-5'-P-CCNC: 10 U/L
ANION GAP SERPL CALC-SCNC: 11 MMOL/L
AST SERPL-CCNC: 25 U/L
BASOPHILS # BLD AUTO: 0.03 K/UL
BASOPHILS NFR BLD: 0.8 %
BILIRUB SERPL-MCNC: 1.6 MG/DL
BILIRUB UR QL STRIP: NEGATIVE
BUN SERPL-MCNC: 11 MG/DL
CALCIUM SERPL-MCNC: 8.7 MG/DL
CHLORIDE SERPL-SCNC: 102 MMOL/L
CLARITY UR: ABNORMAL
CO2 SERPL-SCNC: 23 MMOL/L
COLOR UR: YELLOW
CREAT SERPL-MCNC: 1.5 MG/DL
DIFFERENTIAL METHOD: ABNORMAL
EOSINOPHIL # BLD AUTO: 0.1 K/UL
EOSINOPHIL NFR BLD: 2.8 %
ERYTHROCYTE [DISTWIDTH] IN BLOOD BY AUTOMATED COUNT: 16.9 %
EST. GFR  (AFRICAN AMERICAN): 56 ML/MIN/1.73 M^2
EST. GFR  (NON AFRICAN AMERICAN): 49 ML/MIN/1.73 M^2
GLUCOSE SERPL-MCNC: 117 MG/DL
GLUCOSE UR QL STRIP: NEGATIVE
HCT VFR BLD AUTO: 30.1 %
HGB BLD-MCNC: 9.8 G/DL
HGB UR QL STRIP: NEGATIVE
KETONES UR QL STRIP: NEGATIVE
LEUKOCYTE ESTERASE UR QL STRIP: NEGATIVE
LIPASE SERPL-CCNC: 40 U/L
LYMPHOCYTES # BLD AUTO: 0.6 K/UL
LYMPHOCYTES NFR BLD: 15.9 %
MCH RBC QN AUTO: 25.6 PG
MCHC RBC AUTO-ENTMCNC: 32.6 G/DL
MCV RBC AUTO: 79 FL
MONOCYTES # BLD AUTO: 0.4 K/UL
MONOCYTES NFR BLD: 9.3 %
NEUTROPHILS # BLD AUTO: 2.8 K/UL
NEUTROPHILS NFR BLD: 70.9 %
NITRITE UR QL STRIP: NEGATIVE
PH UR STRIP: 7 [PH] (ref 5–8)
PLATELET # BLD AUTO: 60 K/UL
PMV BLD AUTO: ABNORMAL FL
POTASSIUM SERPL-SCNC: 3.1 MMOL/L
PROT SERPL-MCNC: 8 G/DL
PROT UR QL STRIP: NEGATIVE
RBC # BLD AUTO: 3.83 M/UL
SODIUM SERPL-SCNC: 136 MMOL/L
SP GR UR STRIP: 1.01 (ref 1–1.03)
TROPONIN I SERPL DL<=0.01 NG/ML-MCNC: 0.03 NG/ML
URN SPEC COLLECT METH UR: ABNORMAL
UROBILINOGEN UR STRIP-ACNC: NEGATIVE EU/DL
WBC # BLD AUTO: 3.89 K/UL

## 2018-07-29 PROCEDURE — 96375 TX/PRO/DX INJ NEW DRUG ADDON: CPT

## 2018-07-29 PROCEDURE — G0378 HOSPITAL OBSERVATION PER HR: HCPCS

## 2018-07-29 PROCEDURE — 83690 ASSAY OF LIPASE: CPT

## 2018-07-29 PROCEDURE — 85025 COMPLETE CBC W/AUTO DIFF WBC: CPT

## 2018-07-29 PROCEDURE — 25000003 PHARM REV CODE 250: Performed by: EMERGENCY MEDICINE

## 2018-07-29 PROCEDURE — 93005 ELECTROCARDIOGRAM TRACING: CPT

## 2018-07-29 PROCEDURE — 80053 COMPREHEN METABOLIC PANEL: CPT

## 2018-07-29 PROCEDURE — 96361 HYDRATE IV INFUSION ADD-ON: CPT

## 2018-07-29 PROCEDURE — 81003 URINALYSIS AUTO W/O SCOPE: CPT

## 2018-07-29 PROCEDURE — 93010 ELECTROCARDIOGRAM REPORT: CPT | Mod: ,,, | Performed by: INTERNAL MEDICINE

## 2018-07-29 PROCEDURE — 96374 THER/PROPH/DIAG INJ IV PUSH: CPT

## 2018-07-29 PROCEDURE — 63600175 PHARM REV CODE 636 W HCPCS: Performed by: EMERGENCY MEDICINE

## 2018-07-29 PROCEDURE — 99285 EMERGENCY DEPT VISIT HI MDM: CPT | Mod: 25

## 2018-07-29 PROCEDURE — 84484 ASSAY OF TROPONIN QUANT: CPT

## 2018-07-29 RX ORDER — NAPROXEN SODIUM 220 MG/1
162 TABLET, FILM COATED ORAL
Status: COMPLETED | OUTPATIENT
Start: 2018-07-29 | End: 2018-07-29

## 2018-07-29 RX ORDER — METOCLOPRAMIDE HYDROCHLORIDE 5 MG/ML
10 INJECTION INTRAMUSCULAR; INTRAVENOUS
Status: COMPLETED | OUTPATIENT
Start: 2018-07-29 | End: 2018-07-29

## 2018-07-29 RX ORDER — SODIUM CHLORIDE, SODIUM LACTATE, POTASSIUM CHLORIDE, CALCIUM CHLORIDE 600; 310; 30; 20 MG/100ML; MG/100ML; MG/100ML; MG/100ML
INJECTION, SOLUTION INTRAVENOUS CONTINUOUS
Status: DISCONTINUED | OUTPATIENT
Start: 2018-07-29 | End: 2018-07-30

## 2018-07-29 RX ORDER — ONDANSETRON 2 MG/ML
4 INJECTION INTRAMUSCULAR; INTRAVENOUS
Status: COMPLETED | OUTPATIENT
Start: 2018-07-29 | End: 2018-07-29

## 2018-07-29 RX ADMIN — ASPIRIN 81 MG 162 MG: 81 TABLET ORAL at 07:07

## 2018-07-29 RX ADMIN — METOCLOPRAMIDE 10 MG: 5 INJECTION, SOLUTION INTRAMUSCULAR; INTRAVENOUS at 05:07

## 2018-07-29 RX ADMIN — SODIUM CHLORIDE 1000 ML: 0.9 INJECTION, SOLUTION INTRAVENOUS at 05:07

## 2018-07-29 RX ADMIN — LIDOCAINE HYDROCHLORIDE: 20 SOLUTION ORAL; TOPICAL at 05:07

## 2018-07-29 RX ADMIN — ONDANSETRON 4 MG: 2 INJECTION INTRAMUSCULAR; INTRAVENOUS at 05:07

## 2018-07-29 RX ADMIN — SODIUM CHLORIDE, SODIUM LACTATE, POTASSIUM CHLORIDE, AND CALCIUM CHLORIDE: .6; .31; .03; .02 INJECTION, SOLUTION INTRAVENOUS at 09:07

## 2018-07-29 NOTE — ED TRIAGE NOTES
"Pt c/o n/v/d with epigastric abd pain. Pt states "I was feeling constipated so I took a laxative last night, and then started with diarrhea. Today I started having stomach pains and it makes me throw up." abd pain is constant and described as sharp and twisting. Denies any other symptoms. States he has an appt with GI in 2 weeks  "

## 2018-07-29 NOTE — ED PROVIDER NOTES
"Encounter Date: 7/29/2018       History     Chief Complaint   Patient presents with    Abdominal Pain     Pt arrived via ems c/o epigastric "sharp" abdominal pain x1 mos. Worsened the past 2 days with N/V/D. Pt reports he has an appt with GI on Aug 13th. EMS reports pt also currently in rehab for ETOH. OTC meds w/o relief     61 yo male pmh etoh abuse, gastritis, cirrhosis, htn, renal disorder presenting 2/2 "I have gastritis". States it gets flared up sometimes and exactly like his gastritis before. No fevers/chills. Having nausea. Took a laxative last night and had bowel movement today. Denies any urinary complaints, testicular pain or swelling, discharge. No bleeding. Has vomited due to him gagging himself to get rid of the stomach acid. No chest pain or sob. Hasn't been taking any of his medications. No other complaints. Has Gi appointment next month. No bleeding per patient in vomiting or bowel movements    ROS: A 10 point review of systems was performed and reviewed and otherwise negative unless stated in HPI.             Review of patient's allergies indicates:  No Known Allergies  Past Medical History:   Diagnosis Date    Alcohol abuse     BPH (benign prostatic hyperplasia)     Cirrhosis 06/21/2018    pt states that he was diagnosed a week ago during his last hospital visit    Hypertension     Renal disorder      History reviewed. No pertinent surgical history.  Family History   Problem Relation Age of Onset    Hypertension Father     Heart disease Father     Heart disease Brother      Social History   Substance Use Topics    Smoking status: Current Every Day Smoker     Packs/day: 1.00     Types: Cigarettes    Smokeless tobacco: Never Used    Alcohol use No      Comment: former drinker     Review of Systems    Physical Exam     Initial Vitals [07/29/18 1715]   BP Pulse Resp Temp SpO2   (!) 181/93 102 16 97.9 °F (36.6 °C) 100 %      MAP       --         Physical Exam    Nursing note and vitals " reviewed.  Constitutional: He appears well-developed and well-nourished.   HENT:   Head: Normocephalic and atraumatic.   Tongue dry   Eyes: EOM are normal. Pupils are equal, round, and reactive to light.   Neck: Normal range of motion.   Cardiovascular: Normal rate and regular rhythm.   Pulmonary/Chest: Breath sounds normal. No stridor. No respiratory distress. He has no wheezes.   Abdominal: Soft. Bowel sounds are normal. He exhibits no distension. There is no tenderness. There is no rebound.   No tenderness of epigastrum on distraction   Musculoskeletal: Normal range of motion. He exhibits no edema or tenderness.   Neurological: He is alert and oriented to person, place, and time.   Skin: Skin is warm and dry. Capillary refill takes less than 2 seconds.   Scratch marks on torso, arms and legs. States he scratches hard with a back scratcher   Psychiatric: He has a normal mood and affect. Thought content normal.         ED Course   Procedures  Labs Reviewed   CBC W/ AUTO DIFFERENTIAL   COMPREHENSIVE METABOLIC PANEL   LIPASE   URINALYSIS   TROPONIN I     EKG Readings: (Independently Interpreted)   Ekg.   17:25  nsr with sinus arrhythmia and pvc  Lad, lafb, iRBB, no major t wave abnormalities. Similar to previous       Imaging Results    None          Medical Decision Making:   History:   Old Medical Records: I decided to obtain old medical records.  Old Records Summarized: records from clinic visits.       <> Summary of Records: Diagnosed with gastritis. Recent evaluation for taking too much of his lisinopril and feeling weak  Clinical Tests:   Lab Tests: Reviewed and Ordered  ED Management:  62 year old patient presenting 2/2 abdominal pain likely 2/2 gastritis. Patient is non toxic in appearance with normal vitals. Pain is able to be controlled with ed medications and abdominal exam is not impressive. Clinical some dehydration. Giving reglan, IV fluids, zofran, gi cocktail for symptoms. Htn likely 2/2 non  compliance with medications and pain. Less likely htn emergency    Also considered but less likely:     AAA: no pulsatile massess  Cholecystitis: location inconsistent, negative feliz's  SBO: bm today. Vomiting only due to patient causing himself to vomit  Appendicitis: location inconsistent, no fever, no rebound/guarding  Mesenteric ischemia: HPI inconsistent, other dx more likely  Kidney stone: no radiation to back or cva tenderness, no dysuria, no hematuria  Pyelonephritis: no cva tenderness, no dysuria, no fever  Pancreatitis: ordered lipase. Abdominal exam not impressive  Diverticulitis: location not most common, no history of diverticulitis, no fever,  Epididymitis: no reported testicular swelling or redness  UTI: UA negative, no dysuria or increased frequency of urination  Testicular torsion: no testicular pain/swelling.   Acs: ekg unchanged. Pending troponin  Pneumonia: exam and hpi less likely    Labs showed slightly elevated troponin (compared to previous) and bili (has elevated bili in past). cxr looking for any free air/pneumonia/pneumothorax showed nothing acute. Symptoms improved with interventions in ed. Patient not taking his home medications. Due to intermittent epigastric pain, htn, smoking hx has higher heart score of 4. Will admit for stress test and observation. Patient agreeable to plan.     7:52 PM  Spoke with Dr Mora and admitted to Dr Schroeder. Trending troponins.   Catarino Peter                        Clinical Impression:   The primary encounter diagnosis was Gastritis, presence of bleeding unspecified, unspecified chronicity, unspecified gastritis type. Diagnoses of Abdominal pain, Epigastric pain, Dehydration, and Elevated troponin were also pertinent to this visit.                             Catarino Peter MD  07/29/18 1953

## 2018-07-30 VITALS
OXYGEN SATURATION: 97 % | TEMPERATURE: 98 F | RESPIRATION RATE: 18 BRPM | WEIGHT: 195.31 LBS | HEART RATE: 124 BPM | HEIGHT: 72 IN | SYSTOLIC BLOOD PRESSURE: 149 MMHG | BODY MASS INDEX: 26.45 KG/M2 | DIASTOLIC BLOOD PRESSURE: 90 MMHG

## 2018-07-30 PROBLEM — D63.8 ANEMIA OF CHRONIC DISEASE: Chronic | Status: ACTIVE | Noted: 2018-07-30

## 2018-07-30 PROBLEM — Z72.0 TOBACCO ABUSE: Chronic | Status: ACTIVE | Noted: 2018-07-30

## 2018-07-30 PROBLEM — R79.89 ELEVATED TROPONIN: Status: ACTIVE | Noted: 2018-07-29

## 2018-07-30 PROBLEM — N18.30 CKD (CHRONIC KIDNEY DISEASE) STAGE 3, GFR 30-59 ML/MIN: Chronic | Status: ACTIVE | Noted: 2018-07-30

## 2018-07-30 LAB
ALBUMIN SERPL BCP-MCNC: 2.6 G/DL
ALP SERPL-CCNC: 110 U/L
ALT SERPL W/O P-5'-P-CCNC: 8 U/L
ANION GAP SERPL CALC-SCNC: 8 MMOL/L
AST SERPL-CCNC: 22 U/L
BASOPHILS # BLD AUTO: 0.02 K/UL
BASOPHILS NFR BLD: 0.4 %
BILIRUB SERPL-MCNC: 1.1 MG/DL
BUN SERPL-MCNC: 11 MG/DL
CALCIUM SERPL-MCNC: 8.5 MG/DL
CHLORIDE SERPL-SCNC: 106 MMOL/L
CHOLEST SERPL-MCNC: 102 MG/DL
CHOLEST/HDLC SERPL: 4.4 {RATIO}
CO2 SERPL-SCNC: 23 MMOL/L
CREAT SERPL-MCNC: 1.4 MG/DL
DIASTOLIC DYSFUNCTION: NO
DIASTOLIC DYSFUNCTION: NO
DIFFERENTIAL METHOD: ABNORMAL
EOSINOPHIL # BLD AUTO: 0.2 K/UL
EOSINOPHIL NFR BLD: 5.2 %
ERYTHROCYTE [DISTWIDTH] IN BLOOD BY AUTOMATED COUNT: 16.9 %
EST. GFR  (AFRICAN AMERICAN): >60 ML/MIN/1.73 M^2
EST. GFR  (NON AFRICAN AMERICAN): 53 ML/MIN/1.73 M^2
ESTIMATED PA SYSTOLIC PRESSURE: 29.21
GLUCOSE SERPL-MCNC: 92 MG/DL
HCT VFR BLD AUTO: 27.7 %
HDLC SERPL-MCNC: 23 MG/DL
HDLC SERPL: 22.5 %
HGB BLD-MCNC: 9 G/DL
LDLC SERPL CALC-MCNC: 65.4 MG/DL
LYMPHOCYTES # BLD AUTO: 1 K/UL
LYMPHOCYTES NFR BLD: 22.3 %
MCH RBC QN AUTO: 25.8 PG
MCHC RBC AUTO-ENTMCNC: 32.5 G/DL
MCV RBC AUTO: 79 FL
MITRAL VALVE REGURGITATION: NORMAL
MONOCYTES # BLD AUTO: 0.4 K/UL
MONOCYTES NFR BLD: 9.6 %
NEUTROPHILS # BLD AUTO: 2.9 K/UL
NEUTROPHILS NFR BLD: 62.5 %
NONHDLC SERPL-MCNC: 79 MG/DL
PLATELET # BLD AUTO: 155 K/UL
PLATELET BLD QL SMEAR: ABNORMAL
PMV BLD AUTO: 9.9 FL
POTASSIUM SERPL-SCNC: 3.4 MMOL/L
PROT SERPL-MCNC: 7.1 G/DL
RBC # BLD AUTO: 3.49 M/UL
RETIRED EF AND QEF - SEE NOTES: 55 (ref 55–65)
SODIUM SERPL-SCNC: 137 MMOL/L
TRICUSPID VALVE REGURGITATION: NORMAL
TRIGL SERPL-MCNC: 68 MG/DL
TROPONIN I SERPL DL<=0.01 NG/ML-MCNC: 0.09 NG/ML
TROPONIN I SERPL DL<=0.01 NG/ML-MCNC: 0.09 NG/ML
WBC # BLD AUTO: 4.58 K/UL

## 2018-07-30 PROCEDURE — 84484 ASSAY OF TROPONIN QUANT: CPT

## 2018-07-30 PROCEDURE — 93306 TTE W/DOPPLER COMPLETE: CPT | Mod: 26,,, | Performed by: INTERNAL MEDICINE

## 2018-07-30 PROCEDURE — 93010 ELECTROCARDIOGRAM REPORT: CPT | Mod: ,,, | Performed by: INTERNAL MEDICINE

## 2018-07-30 PROCEDURE — 25000003 PHARM REV CODE 250: Performed by: INTERNAL MEDICINE

## 2018-07-30 PROCEDURE — 93005 ELECTROCARDIOGRAM TRACING: CPT | Mod: 59

## 2018-07-30 PROCEDURE — 78452 HT MUSCLE IMAGE SPECT MULT: CPT | Mod: 26,,, | Performed by: INTERNAL MEDICINE

## 2018-07-30 PROCEDURE — S4991 NICOTINE PATCH NONLEGEND: HCPCS | Performed by: INTERNAL MEDICINE

## 2018-07-30 PROCEDURE — 93306 TTE W/DOPPLER COMPLETE: CPT

## 2018-07-30 PROCEDURE — 93016 CV STRESS TEST SUPVJ ONLY: CPT | Mod: ,,, | Performed by: INTERNAL MEDICINE

## 2018-07-30 PROCEDURE — 80053 COMPREHEN METABOLIC PANEL: CPT

## 2018-07-30 PROCEDURE — 63600175 PHARM REV CODE 636 W HCPCS

## 2018-07-30 PROCEDURE — 85025 COMPLETE CBC W/AUTO DIFF WBC: CPT

## 2018-07-30 PROCEDURE — G0378 HOSPITAL OBSERVATION PER HR: HCPCS

## 2018-07-30 PROCEDURE — 36415 COLL VENOUS BLD VENIPUNCTURE: CPT

## 2018-07-30 PROCEDURE — 99220 PR INITIAL OBSERVATION CARE,LEVL III: CPT | Mod: 25,,, | Performed by: INTERNAL MEDICINE

## 2018-07-30 PROCEDURE — 80061 LIPID PANEL: CPT

## 2018-07-30 PROCEDURE — 93018 CV STRESS TEST I&R ONLY: CPT | Mod: ,,, | Performed by: INTERNAL MEDICINE

## 2018-07-30 PROCEDURE — 93017 CV STRESS TEST TRACING ONLY: CPT

## 2018-07-30 RX ORDER — ASPIRIN 325 MG
325 TABLET ORAL DAILY
Status: DISCONTINUED | OUTPATIENT
Start: 2018-07-30 | End: 2018-07-30 | Stop reason: HOSPADM

## 2018-07-30 RX ORDER — IBUPROFEN 200 MG
1 TABLET ORAL DAILY
Status: DISCONTINUED | OUTPATIENT
Start: 2018-07-30 | End: 2018-07-30 | Stop reason: HOSPADM

## 2018-07-30 RX ORDER — RAMELTEON 8 MG/1
8 TABLET ORAL NIGHTLY PRN
Status: DISCONTINUED | OUTPATIENT
Start: 2018-07-30 | End: 2018-07-30 | Stop reason: HOSPADM

## 2018-07-30 RX ORDER — ONDANSETRON 2 MG/ML
8 INJECTION INTRAMUSCULAR; INTRAVENOUS EVERY 8 HOURS PRN
Status: DISCONTINUED | OUTPATIENT
Start: 2018-07-30 | End: 2018-07-30 | Stop reason: HOSPADM

## 2018-07-30 RX ORDER — ENOXAPARIN SODIUM 100 MG/ML
40 INJECTION SUBCUTANEOUS EVERY 24 HOURS
Status: DISCONTINUED | OUTPATIENT
Start: 2018-07-30 | End: 2018-07-30 | Stop reason: HOSPADM

## 2018-07-30 RX ORDER — TAMSULOSIN HYDROCHLORIDE 0.4 MG/1
0.4 CAPSULE ORAL DAILY
Status: DISCONTINUED | OUTPATIENT
Start: 2018-07-30 | End: 2018-07-30 | Stop reason: HOSPADM

## 2018-07-30 RX ORDER — CLONIDINE HYDROCHLORIDE 0.1 MG/1
0.1 TABLET ORAL 3 TIMES DAILY PRN
Status: DISCONTINUED | OUTPATIENT
Start: 2018-07-30 | End: 2018-07-30 | Stop reason: HOSPADM

## 2018-07-30 RX ORDER — FAMOTIDINE 20 MG/1
20 TABLET, FILM COATED ORAL DAILY
Status: DISCONTINUED | OUTPATIENT
Start: 2018-07-30 | End: 2018-07-30 | Stop reason: HOSPADM

## 2018-07-30 RX ORDER — FAMOTIDINE 20 MG/1
20 TABLET, FILM COATED ORAL 2 TIMES DAILY
Status: DISCONTINUED | OUTPATIENT
Start: 2018-07-30 | End: 2018-07-30

## 2018-07-30 RX ORDER — REGADENOSON 0.08 MG/ML
INJECTION, SOLUTION INTRAVENOUS
Status: DISCONTINUED
Start: 2018-07-30 | End: 2018-07-30 | Stop reason: HOSPADM

## 2018-07-30 RX ORDER — AMOXICILLIN 250 MG
1 CAPSULE ORAL 2 TIMES DAILY PRN
Status: DISCONTINUED | OUTPATIENT
Start: 2018-07-30 | End: 2018-07-30 | Stop reason: HOSPADM

## 2018-07-30 RX ORDER — ACETAMINOPHEN 500 MG
500 TABLET ORAL EVERY 6 HOURS PRN
Status: DISCONTINUED | OUTPATIENT
Start: 2018-07-30 | End: 2018-07-30 | Stop reason: HOSPADM

## 2018-07-30 RX ADMIN — ASPIRIN 325 MG ORAL TABLET 325 MG: 325 PILL ORAL at 12:07

## 2018-07-30 RX ADMIN — NICOTINE 1 PATCH: 21 PATCH, EXTENDED RELEASE TRANSDERMAL at 12:07

## 2018-07-30 RX ADMIN — FAMOTIDINE 20 MG: 20 TABLET ORAL at 12:07

## 2018-07-30 RX ADMIN — TAMSULOSIN HYDROCHLORIDE 0.4 MG: 0.4 CAPSULE ORAL at 12:07

## 2018-07-30 NOTE — HPI
HPI: Mr. Fransico Montalvo is a 62 y.o. male with BPH, anemia of chronic disease, and tobacco abuse who presents to [facility] ED with complaints of abdominal pain for two days.  This pain has been intermittent for the past six months but has worsened in the last couple days.  The pain is mid-epigastric, is crampy in quality, and is 7/10 in severity at its worst.  He does get nauseous for which he induced vomiting to relieve the pain.  He feels as though he is constipated and would get some temporary relief after taking laxatives.  He denies any fevers, chills, diarrhea, hematochezia, nor any melena.  He also denies any chest pain, shortness of breath, nor any palpitations.    Denies CP or prior CAD  EKG NSR prolonged QT. No acute STT changes  Troponin peak 0.08

## 2018-07-30 NOTE — PLAN OF CARE
Problem: Fall Risk (Adult)  Intervention: Reduce Risk/Promote Restraint Free Environment   18   Safety Interventions   Environmental Safety Modification clutter free environment maintained;lighting adjusted;room near unit station;room organization consistent   Prevent [place] Drop/Fall   Safety/Security Measures bed alarm set     Intervention: Review Medications/Identify Contributors to Fall Risk   18   Safety Interventions   Medication Review/Management medications reviewed     Intervention: Patient Rounds   18   Safety Interventions   Patient Rounds bed in low position;bed wheels locked;call light in reach;clutter free environment maintained;ID band on;placement of personal items at bedside;toileting offered;visualized patient     Intervention: Safety Promotion/Fall Prevention   18   Safety Interventions   Safety Promotion/Fall Prevention bed alarm set;Fall Risk reviewed with patient/family;lighting adjusted;medications reviewed;room near unit station;side rails raised x 2       Goal: Identify Related Risk Factors and Signs and Symptoms  Related risk factors and signs and symptoms are identified upon initiation of Human Response Clinical Practice Guideline (CPG)    18   Fall Risk   Related Risk Factors (Fall Risk) gait/mobility problems   Signs and Symptoms (Fall Risk) presence of risk factors     Goal: Absence of Falls  Patient will demonstrate the desired outcomes by discharge/transition of care.    18   Fall Risk (Adult)   Absence of Falls making progress toward outcome       Problem: Patient Care Overview  Goal: Plan of Care Review   18   Coping/Psychosocial   Plan Of Care Reviewed With patient       Problem: Pain, Acute (Adult)  Intervention: Monitor/Manage Analgesia   18   Safety Interventions   Medication Review/Management medications reviewed     Intervention: Mutually Develop/Implement Acute Pain Management Plan    07/30/18 0625   Pain/Comfort/Sleep Interventions   Pain Management Interventions pain management plan reviewed with patient/caregiver   Cognitive Interventions   Sensory Stimulation Regulation care clustered;lighting decreased;music/television provided for relaxation     Intervention: Support/Optimize Psychosocial Response to Acute Pain   07/30/18 0625   Coping/Psychosocial Interventions   Supportive Measures verbalization of feelings encouraged   Psychosocial Support   Family/Support System Care involvement promoted;presence promoted;self-care encouraged   Trust Relationship/Rapport care explained;choices provided;questions answered;questions encouraged;thoughts/feelings acknowledged       Goal: Identify Related Risk Factors and Signs and Symptoms  Related risk factors and signs and symptoms are identified upon initiation of Human Response Clinical Practice Guideline (CPG)   07/30/18 0625   Pain, Acute   Signs and Symptoms (Acute Pain) verbalization of pain descriptors     Goal: Acceptable Pain Control/Comfort Level  Patient will demonstrate the desired outcomes by discharge/transition of care.   07/30/18 0625   Pain, Acute (Adult)   Acceptable Pain Control/Comfort Level making progress toward outcome

## 2018-07-30 NOTE — PLAN OF CARE
"SW met with patient to complete discharge needs assessment. SW reviewed with patient contents of "[facility] Packet" including "help at home", "things patient responsible for to manage his health at home" and "preferences". Patient was able to verbalize his help at home is limited due to being at [facility], counselors and transportation available to provide some help. FERNANDO discussed with patient the things he's responsible for to manage his health at home would be by going on doctor appointments, taking medications as prescribed, and getting prescriptions filled. SW wrote name and phone number on white communication board. Patient prefers mid-morning doctor appointments. Patient reports he's about to graduate from [facility] this Saturday, patient [place] until he start receiving disability benefits and will then stay in hotels until he can find a place to stay. Patient reports his PCP is Dr. Pope with [facility] in [place]. Patient reports his psychiatrist also with [facility] and already scheduled for an appointment August 10th. FERNANDO spoke to Belen with [facility] that reports patient will need to walk-in [facility] 1 to 2 days after being discharged from the hospital and will need to bring discharge paperwork. Belen confirmed patient scheduled to see Dr. Juany Rice (psychiatry) with [facility] on 8/10/18 @ 2:00pm.         07/30/18 1254   Discharge Assessment   Assessment Type Discharge Planning Assessment   Confirmed/corrected address and phone number on facesheet? Yes   Assessment information obtained from? Patient   Communicated expected length of stay with patient/caregiver yes   Prior to hospitilization cognitive status: Alert/Oriented;No Deficits   Prior to hospitalization functional status: Independent   Current cognitive status: Alert/Oriented;No Deficits   Current Functional Status: Independent   Facility Arrived From: Home "   Lives With other (see comments)  (Roommates)   Able to Return to Prior Arrangements yes   Is patient able to care for self after discharge? Yes   Who are your caregiver(s) and their phone number(s)? Ina Ingrid (friend) [phone], staff at [facility]   Patient's perception of discharge disposition other (comments)   Readmission Within The Last 30 Days no previous admission in last 30 days   Patient currently being followed by outpatient case management? No   Patient currently receives any other outside agency services? Yes   Name and contact number of agency or person providing outside services ([facility] [phone])   Is it the patient/care giver preference to resume care with the current outside agency? Yes   Equipment Currently Used at Home none   Do you have any problems affording any of your prescribed medications? Yes   If yes, what medications? (Patient currently without any income)   Is the patient taking medications as prescribed? (Questionable if patient compliant with medications)   Does the patient have transportation home? No  (CM will have to arrange transportation)   Does the patient receive services at the [facility]? No   Discharge Plan A Other  (Return to [facility])   Discharge Plan B Other   Patient/Family In Agreement With Plan yes   Does the patient have transportation to healthcare appointments? No

## 2018-07-30 NOTE — HPI
Mr. Fransico Montalvo is a 62 y.o. male with BPH, anemia of chronic disease, and tobacco abuse who presents to [facility] ED with complaints of abdominal pain for two days.  This pain has been intermittent for the past six months but has worsened in the last couple days.  The pain is mid-epigastric, is crampy in quality, and is 7/10 in severity at its worst.  He does get nauseous for which he induced vomiting to relieve the pain.  He feels as though he is constipated and would get some temporary relief after taking laxatives.  He denies any fevers, chills, diarrhea, hematochezia, nor any melena.  He also denies any chest pain, shortness of breath, nor any palpitations.

## 2018-07-30 NOTE — PLAN OF CARE
SW attempted to complete discharge needs assessment, patient off unit for stress test.       07/30/18 1130   Discharge Assessment   Assessment Type Discharge Planning Assessment

## 2018-07-30 NOTE — SUBJECTIVE & OBJECTIVE
Past Medical History:   Diagnosis Date    Alcohol abuse     BPH (benign prostatic hyperplasia)     Cirrhosis 06/21/2018    pt states that he was diagnosed a week ago during his last hospital visit    Hypertension     Renal disorder        History reviewed. No pertinent surgical history.    Review of patient's allergies indicates:  No Known Allergies    No current facility-administered medications on file prior to encounter.      Current Outpatient Prescriptions on File Prior to Encounter   Medication Sig    acamprosate (CAMPRAL) 333 mg tablet Take 666 mg by mouth 3 (three) times daily.    famotidine (PEPCID) 20 MG tablet Take 1 tablet (20 mg total) by mouth 2 (two) times daily.    sucralfate (CARAFATE) 100 mg/mL suspension Take 10 mLs (1 g total) by mouth 4 (four) times daily.    tamsulosin (FLOMAX) 0.4 mg Cp24 Take 0.4 mg by mouth once daily.    triamcinolone acetonide 0.1% (KENALOG) 0.1 % cream Apply topically 2 (two) times daily. Apply topically twice a day to hand/affected area 10 days. for 10 days     Family History     Problem Relation (Age of Onset)    Heart disease Father, Brother    Hypertension Father        Social History Main Topics    Smoking status: Current Every Day Smoker     Packs/day: 1.00     Types: Cigarettes    Smokeless tobacco: Never Used    Alcohol use No      Comment: former drinker; 2months sober    Drug use: No    Sexual activity: Not on file     Review of Systems   Constitutional: Negative for activity change, appetite change, chills, diaphoresis, fatigue, fever and unexpected weight change.   HENT: Negative.    Eyes: Negative.    Respiratory: Negative for cough, chest tightness, shortness of breath and wheezing.    Cardiovascular: Negative for chest pain, palpitations and leg swelling.   Gastrointestinal: Positive for abdominal pain, constipation, nausea and vomiting. Negative for abdominal distention, blood in stool and diarrhea.   Genitourinary: Negative for dysuria and  hematuria.   Musculoskeletal: Negative.    Skin: Negative.    Neurological: Negative for dizziness, seizures, syncope, weakness and light-headedness.   Psychiatric/Behavioral: Negative.      Objective:     Vital Signs (Most Recent):  Temp: 99.1 °F (37.3 °C) (07/30/18 0409)  Pulse: 96 (07/30/18 0514)  Resp: 18 (07/30/18 0409)  BP: (!) 156/95 (07/30/18 0514)  SpO2: 96 % (07/30/18 0409) Vital Signs (24h Range):  Temp:  [97.9 °F (36.6 °C)-99.1 °F (37.3 °C)] 99.1 °F (37.3 °C)  Pulse:  [] 96  Resp:  [14-18] 18  SpO2:  [95 %-100 %] 96 %  BP: (128-185)/() 156/95     Weight: 88.6 kg (195 lb 5.2 oz)  Body mass index is 26.49 kg/m².    Physical Exam   Constitutional: He is oriented to person, place, and time. He appears well-developed and well-nourished. No distress.   HENT:   Head: Normocephalic and atraumatic.   Right Ear: External ear normal.   Left Ear: External ear normal.   Nose: Nose normal.   Eyes: Right eye exhibits no discharge. Left eye exhibits no discharge.   Neck: Normal range of motion.   Cardiovascular: Normal rate, regular rhythm, normal heart sounds and intact distal pulses.  Exam reveals no gallop and no friction rub.    No murmur heard.  Pulmonary/Chest: Effort normal and breath sounds normal. No respiratory distress. He has no wheezes. He has no rales. He exhibits no tenderness.   Abdominal: Soft. Bowel sounds are normal. He exhibits no distension. There is no tenderness. There is no rebound and no guarding.   Musculoskeletal: Normal range of motion. He exhibits no edema.   Neurological: He is alert and oriented to person, place, and time.   Skin: Skin is warm and dry. He is not diaphoretic. No erythema.   Psychiatric: He has a normal mood and affect. His behavior is normal. Judgment and thought content normal.   Nursing note and vitals reviewed.          Significant Labs: All pertinent labs within the past 24 hours have been reviewed.    Significant Imaging: I have reviewed and interpreted all  pertinent imaging results/findings within the past 24 hours.

## 2018-07-30 NOTE — PROGRESS NOTES
Follow-up Information     Aman Olson MD In 2 weeks.    Specialty:  Cardiology  Why:  Call the office to schedule appointment, For outpatient follow-up/post hospitalizaton, If symptoms worsen  Contact information:  [address]  [address]  [place] [address]  [phone]             Go to [facility].    Specialties:  Behavioral Health, Psychiatry, Psychology  Why:  Outpatient Services, PCP Follow-up Appointment, Please walk-in clinic within 1-2 days from discharge, Please bring ID, medication bottles, discharge paperwork  Contact information:  [address]  [address]  [place] [address]  [phone]             [facility]. Go on 8/10/2018.    Specialties:  Behavioral Health, Psychiatry, Psychology  Why:  Outpatient Services, Psychiatry Appointment, Please arrive to clinic for 2:00PM, You will see Dr. Juany Rice  Contact information:  [address]  [address]  [place] [address]  [phone]                   [facility]    WRITTEN HEALTHCARE AND DISCHARGE INFORMATION                        Help at Home           [phone]  After discharge for assistance [facility] On Call Nurse Care Line 24/7  Assistance    Things You are responsible For To Manage Your Care At Home:  1.    Getting your prescriptions filled   2.    Taking your medications as directed, DO NOT MISS ANY DOSES!  3.    Going to your follow-up doctor appointment. This is important because it  allow the doctor to monitor your progress and determine if  any changes need to made to your treatment plan.     Thank you for choosing [facility] for your care.  Please answer any calls you may receive from [facility] we want to continue to support you as you manage your healthcare needs. [facility] is happy to have the opportunity to serve you.     Sincerely,  Your [facility] Team,  [facility]   II  [phone]

## 2018-07-30 NOTE — PROGRESS NOTES
FERNANDO contacted [facility] @ [phone] to schedule cardiology F/U, FERNANDO spoke to Karishma that due to patient having medicaid patient can have 1 follow-up appointment which will have to be a month out. Patient will see Dr. Olson on 9/14/18 @ 1:00pm.

## 2018-07-30 NOTE — CONSULTS
[facility]  Cardiology  Consult Note    Patient Name: Fransico Montalvo  MRN: [id number]  Admission Date: 7/29/2018  Hospital Length of Stay: 0 days  Code Status: Full Code   Attending Provider: Hawk Schroeder MD   Consulting Provider: Letty Olson MD  Primary Care Physician: To Obtain Unable  Principal Problem:Elevated troponin    Patient information was obtained from patient and ER records.     Inpatient consult to Cardiology  Consult performed by: LETTY OLSON  Consult ordered by: SIS ESTES  Reason for consult: elevated troponin             Subjective:     Chief Complaint:        HPI: Mr. Fransico Montalvo is a 62 y.o. male with BPH, anemia of chronic disease, and tobacco abuse who presents to [facility] ED with complaints of abdominal pain for two days.  This pain has been intermittent for the past six months but has worsened in the last couple days.  The pain is mid-epigastric, is crampy in quality, and is 7/10 in severity at its worst.  He does get nauseous for which he induced vomiting to relieve the pain.  He feels as though he is constipated and would get some temporary relief after taking laxatives.  He denies any fevers, chills, diarrhea, hematochezia, nor any melena.  He also denies any chest pain, shortness of breath, nor any palpitations.    Denies CP or prior CAD  EKG NSR prolonged QT. No acute STT changes  Troponin peak 0.08    Past Medical History:   Diagnosis Date    Alcohol abuse     BPH (benign prostatic hyperplasia)     Cirrhosis 06/21/2018    pt states that he was diagnosed a week ago during his last hospital visit    Hypertension     Renal disorder        History reviewed. No pertinent surgical history.    Review of patient's allergies indicates:  No Known Allergies    No current facility-administered medications on file prior to encounter.      Current Outpatient Prescriptions on File Prior to Encounter   Medication Sig    acamprosate (CAMPRAL) 333 mg tablet Take  666 mg by mouth 3 (three) times daily.    famotidine (PEPCID) 20 MG tablet Take 1 tablet (20 mg total) by mouth 2 (two) times daily.    sucralfate (CARAFATE) 100 mg/mL suspension Take 10 mLs (1 g total) by mouth 4 (four) times daily.    tamsulosin (FLOMAX) 0.4 mg Cp24 Take 0.4 mg by mouth once daily.    triamcinolone acetonide 0.1% (KENALOG) 0.1 % cream Apply topically 2 (two) times daily. Apply topically twice a day to hand/affected area 10 days. for 10 days     Family History     Problem Relation (Age of Onset)    Heart disease Father, Brother    Hypertension Father        Social History Main Topics    Smoking status: Current Every Day Smoker     Packs/day: 1.00     Types: Cigarettes    Smokeless tobacco: Never Used    Alcohol use No      Comment: former drinker; 2months sober    Drug use: No    Sexual activity: Not on file     Review of Systems   Constitution: Negative for decreased appetite.   HENT: Negative for ear discharge.    Eyes: Negative for blurred vision.   Endocrine: Negative for polyphagia.   Skin: Negative for nail changes.   Neurological: Negative for aphonia.   Psychiatric/Behavioral: Negative for hallucinations.     Objective:     Vital Signs (Most Recent):  Temp: 99.1 °F (37.3 °C) (07/30/18 0709)  Pulse: 97 (07/30/18 0709)  Resp: 20 (07/30/18 0709)  BP: (!) 146/83 (07/30/18 0709)  SpO2: (!) 94 % (07/30/18 0709) Vital Signs (24h Range):  Temp:  [97.9 °F (36.6 °C)-99.1 °F (37.3 °C)] 99.1 °F (37.3 °C)  Pulse:  [] 97  Resp:  [14-20] 20  SpO2:  [94 %-100 %] 94 %  BP: (128-185)/() 146/83     Weight: 88.6 kg (195 lb 5.2 oz)  Body mass index is 26.49 kg/m².    SpO2: (!) 94 %  O2 Device (Oxygen Therapy): room air      Intake/Output Summary (Last 24 hours) at 07/30/18 0927  Last data filed at 07/30/18 0600   Gross per 24 hour   Intake             1480 ml   Output              750 ml   Net              730 ml       Lines/Drains/Airways     Peripheral Intravenous Line                  Peripheral IV - Single Lumen 07/29/18 1718 Left Forearm less than 1 day                Physical Exam   Constitutional: He is oriented to person, place, and time. He appears well-developed and well-nourished.   HENT:   Head: Normocephalic and atraumatic.   Eyes: Conjunctivae are normal. Pupils are equal, round, and reactive to light.   Neck: Normal range of motion. Neck supple.   Cardiovascular: Normal rate, normal heart sounds and intact distal pulses.    Pulmonary/Chest: Effort normal and breath sounds normal.   Abdominal: Soft. Bowel sounds are normal.   Musculoskeletal: Normal range of motion.   Neurological: He is alert and oriented to person, place, and time.   Skin: Skin is warm and dry.       Significant Labs: All pertinent lab results from the last 24 hours have been reviewed.    Significant Imaging: Echocardiogram: 2D echo with color flow doppler: No results found for this or any previous visit.    Assessment and Plan:     * Elevated troponin    Denies CP. EKG without acute changes. Doubt ACS. Echo and stress test today - ok for d/c if negative        Gastritis    Per primary        CKD Stage 3             Anemia of chronic disease             Tobacco abuse    counseled            VTE Risk Mitigation         Ordered     enoxaparin injection 40 mg  Daily      07/30/18 8100          Thank you for your consult. I will follow-up with patient. Please contact us if you have any additional questions.    Aman Olson MD  Cardiology   [facility]

## 2018-07-30 NOTE — H&P
[facility] Hospital Medicine  History & Physical    Patient Name: Fransico Montalvo  MRN: [id number]  Admission Date: 7/29/2018  Attending Physician: Hawk Schroeder MD   Primary Care Provider: To Obtain Unable         Patient information was obtained from patient.     Subjective:     Principal Problem:Elevated troponin    Chief Complaint: Abdominal pain for two days.    HPI: Mr. Fransico Montalvo is a 62 y.o. male with BPH, anemia of chronic disease, and tobacco abuse who presents to [facility] ED with complaints of abdominal pain for two days.  This pain has been intermittent for the past six months but has worsened in the last couple days.  The pain is mid-epigastric, is crampy in quality, and is 7/10 in severity at its worst.  He does get nauseous for which he induced vomiting to relieve the pain.  He feels as though he is constipated and would get some temporary relief after taking laxatives.  He denies any fevers, chills, diarrhea, hematochezia, nor any melena.  He also denies any chest pain, shortness of breath, nor any palpitations.    Chart Review:  Patient has not had any recent hospitalizations or outpatient clinic visits within the system.    Past Medical History:   Diagnosis Date    Alcohol abuse     BPH (benign prostatic hyperplasia)     Cirrhosis 06/21/2018    pt states that he was diagnosed a week ago during his last hospital visit    Hypertension     Renal disorder        History reviewed. No pertinent surgical history.    Review of patient's allergies indicates:  No Known Allergies    No current facility-administered medications on file prior to encounter.      Current Outpatient Prescriptions on File Prior to Encounter   Medication Sig    acamprosate (CAMPRAL) 333 mg tablet Take 666 mg by mouth 3 (three) times daily.    famotidine (PEPCID) 20 MG tablet Take 1 tablet (20 mg total) by mouth 2 (two) times daily.    sucralfate (CARAFATE) 100 mg/mL suspension Take 10 mLs (1 g total) by  mouth 4 (four) times daily.    tamsulosin (FLOMAX) 0.4 mg Cp24 Take 0.4 mg by mouth once daily.    triamcinolone acetonide 0.1% (KENALOG) 0.1 % cream Apply topically 2 (two) times daily. Apply topically twice a day to hand/affected area 10 days. for 10 days     Family History     Problem Relation (Age of Onset)    Heart disease Father, Brother    Hypertension Father        Social History Main Topics    Smoking status: Current Every Day Smoker     Packs/day: 1.00     Types: Cigarettes    Smokeless tobacco: Never Used    Alcohol use No      Comment: former drinker; 2months sober    Drug use: No    Sexual activity: Not on file     Review of Systems   Constitutional: Negative for activity change, appetite change, chills, diaphoresis, fatigue, fever and unexpected weight change.   HENT: Negative.    Eyes: Negative.    Respiratory: Negative for cough, chest tightness, shortness of breath and wheezing.    Cardiovascular: Negative for chest pain, palpitations and leg swelling.   Gastrointestinal: Positive for abdominal pain, constipation, nausea and vomiting. Negative for abdominal distention, blood in stool and diarrhea.   Genitourinary: Negative for dysuria and hematuria.   Musculoskeletal: Negative.    Skin: Negative.    Neurological: Negative for dizziness, seizures, syncope, weakness and light-headedness.   Psychiatric/Behavioral: Negative.      Objective:     Vital Signs (Most Recent):  Temp: 99.1 °F (37.3 °C) (07/30/18 0409)  Pulse: 96 (07/30/18 0514)  Resp: 18 (07/30/18 0409)  BP: (!) 156/95 (07/30/18 0514)  SpO2: 96 % (07/30/18 0409) Vital Signs (24h Range):  Temp:  [97.9 °F (36.6 °C)-99.1 °F (37.3 °C)] 99.1 °F (37.3 °C)  Pulse:  [] 96  Resp:  [14-18] 18  SpO2:  [95 %-100 %] 96 %  BP: (128-185)/() 156/95     Weight: 88.6 kg (195 lb 5.2 oz)  Body mass index is 26.49 kg/m².    Physical Exam   Constitutional: He is oriented to person, place, and time. He appears well-developed and well-nourished. No  distress.   HENT:   Head: Normocephalic and atraumatic.   Right Ear: External ear normal.   Left Ear: External ear normal.   Nose: Nose normal.   Eyes: Right eye exhibits no discharge. Left eye exhibits no discharge.   Neck: Normal range of motion.   Cardiovascular: Normal rate, regular rhythm, normal heart sounds and intact distal pulses.  Exam reveals no gallop and no friction rub.    No murmur heard.  Pulmonary/Chest: Effort normal and breath sounds normal. No respiratory distress. He has no wheezes. He has no rales. He exhibits no tenderness.   Abdominal: Soft. Bowel sounds are normal. He exhibits no distension. There is no tenderness. There is no rebound and no guarding.   Musculoskeletal: Normal range of motion. He exhibits no edema.   Neurological: He is alert and oriented to person, place, and time.   Skin: Skin is warm and dry. He is not diaphoretic. No erythema.   Psychiatric: He has a normal mood and affect. His behavior is normal. Judgment and thought content normal.   Nursing note and vitals reviewed.          Significant Labs: All pertinent labs within the past 24 hours have been reviewed.    Significant Imaging: I have reviewed and interpreted all pertinent imaging results/findings within the past 24 hours.    Assessment/Plan:     * Elevated troponin    He never had chest pain but did have some gastric symptoms--it's unclear if this is related but doubtful given the progression.  His initial troponin was minimally elevated at 0.031 with a repeat of 0.088.  I have reviewed the EKG and it reveals normal sinus rhythm without evidence of acute ischemia.  I have reviewed the chest X-ray and it reveals no focal infiltrates or pleural effusions.  Will monitor on the Observation Unit with serial cardiac markers and telemetry monitoring.        CKD Stage 3    His renal function appears to be at his baseline; there are no acute issues.        BPH (benign prostatic hyperplasia)    Stable; will continue his home  regimen of tamsulosin.        Anemia of chronic disease    The patient's H/H is stable and consistent with previous laboratory measurements, and the patient exhibits no signs or symptoms of acute bleeding; there is no indication for transfusion.  Will continue to monitor.        Tobacco abuse    Patient was counseled on smoking cessation and he will be provided a nicotine transdermal patch applied while inpatient.  Will provide additional smoking cessation counseling prior to discharge.          VTE Risk Mitigation         Ordered     IP VTE LOW RISK PATIENT  Once      07/29/18 8217           Juan Francisco Mora M.D.  Staff [facility] Hospitalist  Department of [facility] Medicine  [facility]  Pager: [phone]

## 2018-07-30 NOTE — HOSPITAL COURSE
Patient placed in observation for evaluation of chest pain. Patient had bump in troponin 1 levels (peaked 0.88); EKG is non ischemic. BNP normal. CXR unimpressive. 2D Echo performed showing LVEF 55-60% % without DD . Due to risk factors cardiology consulted and NST performed and is without evidence of myocardial ischemia. Cardiology consulted and after evaluation has cleared the patient for discharge from a CV standpoint. Symptoms likely related to chronic gastritis. Stable for discharge.

## 2018-07-30 NOTE — PROGRESS NOTES
Follow-up Information     Go to [facility].    Specialties:  Behavioral Health, Psychiatry, Psychology  Why:  Outpatient Services, PCP Follow-up Appointment, Please walk-in clinic within 1-2 days from discharge, Please bring ID, medication bottles, discharge paperwork  Contact information:  [address]  [address]  [place] [address]  [phone]             [facility]. Go on 8/10/2018.    Specialties:  Behavioral Health, Psychiatry, Psychology  Why:  Outpatient Services, Psychiatry Appointment, Please arrive to clinic for 2:00PM, You will see Dr. Juany Rice  Contact information:  [address]  [address]  [place] [address]  [phone]             Aman Olson MD. Go on 9/14/2018.    Specialty:  Cardiology  Why:  Outpatient Services, Cardiology Follow-up Appointment, Please arrive to clinic for 1:00PM  Contact information:  [address]  [address]  [place] [address]  [phone]                   [facility]    WRITTEN HEALTHCARE AND DISCHARGE INFORMATION                        Help at Home           [phone]  After discharge for assistance [facility] On Call Nurse Care Line 24/7  Assistance    Things You are responsible For To Manage Your Care At Home:  1.    Getting your prescriptions filled   2.    Taking your medications as directed, DO NOT MISS ANY DOSES!  3.    Going to your follow-up doctor appointment. This is important because it  allow the doctor to monitor your progress and determine if  any changes need to made to your treatment plan.     Thank you for choosing [facility] for your care.  Please answer any calls you may receive from [facility] we want to continue to support you as you manage your healthcare needs. [facility] is happy to have the opportunity to serve you.     Sincerely,  Your [facility] Team,  [facility]   II  [phone]

## 2018-07-30 NOTE — ASSESSMENT & PLAN NOTE
Denies CP. EKG without acute changes. Doubt ACS. Echo and stress test today - ok for d/c if negative

## 2018-07-30 NOTE — DISCHARGE SUMMARY
[facility] Hospital Medicine  Discharge Summary      Patient Name: Fransico Montalvo  MRN: [id number]  Admission Date: 7/29/2018  Hospital Length of Stay: 0 days  Discharge Date and Time:  07/30/2018 12:04 PM  Attending Physician: Hawk Schroeder MD   Discharging Provider: BALTAZAR Santiago  Primary Care Provider: To Obtain Unable      HPI:   Mr. Fransico Montalvo is a 62 y.o. male with BPH, anemia of chronic disease, and tobacco abuse who presents to [facility] ED with complaints of abdominal pain for two days.  This pain has been intermittent for the past six months but has worsened in the last couple days.  The pain is mid-epigastric, is crampy in quality, and is 7/10 in severity at its worst.  He does get nauseous for which he induced vomiting to relieve the pain.  He feels as though he is constipated and would get some temporary relief after taking laxatives.  He denies any fevers, chills, diarrhea, hematochezia, nor any melena.  He also denies any chest pain, shortness of breath, nor any palpitations.    * No surgery found *      Hospital Course:   Patient placed in observation for evaluation of chest pain. Patient had bump in troponin 1 levels (peaked 0.88); EKG is non ischemic. BNP normal. CXR unimpressive. 2D Echo performed showing LVEF XXX% with/without? DD . Due to risk factors cardiology consulted and NST performed and is without evidence of myocardial ischemia. Cardiology consulted and after evaluation has cleared the patient for discharge from a CV standpoint. Symptoms likely related to chronic gastritis. Stable for discharge.     Consults:   Consults         Status Ordering Provider     Inpatient consult to Cardiology  Once     Provider:  (Not yet assigned)    Completed SIS ESTES          No new Assessment & Plan notes have been filed under this hospital service since the last note was generated.  Service: Hospital Medicine    Final Active Diagnoses:    Diagnosis Date Noted POA     PRINCIPAL PROBLEM:  Elevated troponin [R74.8] 07/29/2018 Yes    Tobacco abuse [Z72.0] 07/30/2018 Yes     Chronic    Anemia of chronic disease [D63.8] 07/30/2018 Yes     Chronic    CKD Stage 3 [N18.3] 07/30/2018 Yes     Chronic    Gastritis [K29.70]  Yes    BPH (benign prostatic hyperplasia) [N40.0]  Yes     Chronic      Problems Resolved During this Admission:    Diagnosis Date Noted Date Resolved POA       Discharged Condition: stable    Disposition: Home or Self Care    Follow Up:  Follow-up Information     Aman Olson MD In 2 weeks.    Specialty:  Cardiology  Why:  Call the office to schedule appointment, For outpatient follow-up/post hospitalizaton, If symptoms worsen  Contact information:  [address] [place], [place] [id number] [phone]                 Patient Instructions:     Diet Cardiac     Activity as tolerated         Significant Diagnostic Studies: Labs:   CMP   Recent Labs  Lab 07/29/18  1801 07/30/18  0431    137   K 3.1* 3.4*    106   CO2 23 23   * 92   BUN 11 11   CREATININE 1.5* 1.4   CALCIUM 8.7 8.5*   PROT 8.0 7.1   ALBUMIN 2.9* 2.6*   BILITOT 1.6* 1.1*   ALKPHOS 122 110   AST 25 22   ALT 10 8*   ANIONGAP 11 8   ESTGFRAFRICA 56* >60   EGFRNONAA 49* 53*   , CBC   Recent Labs  Lab 07/29/18  1801 07/30/18  0431   WBC 3.89* 4.58   HGB 9.8* 9.0*   HCT 30.1* 27.7*   PLT 60* 155   , INR No results found for: INR, PROTIME, Lipid Panel   Lab Results   Component Value Date    CHOL 102 (L) 07/30/2018    HDL 23 (L) 07/30/2018    LDLCALC 65.4 07/30/2018    TRIG 68 07/30/2018    CHOLHDL 22.5 07/30/2018   , Troponin   Recent Labs  Lab 07/30/18  0022   TROPONINI 0.088*  0.088*    and A1C: No results for input(s): HGBA1C in the last 4320 hours.  Medications:  Reconciled Home Medications:      Medication List      CONTINUE taking these medications    acamprosate 333 mg tablet  Commonly known as:  CAMPRAL  Take 666 mg by mouth 3 (three) times daily.     famotidine 20 MG  tablet  Commonly known as:  PEPCID  Take 1 tablet (20 mg total) by mouth 2 (two) times daily.     FLOMAX 0.4 mg Cap  Generic drug:  tamsulosin  Take 0.4 mg by mouth once daily.     sucralfate 100 mg/mL suspension  Commonly known as:  CARAFATE  Take 10 mLs (1 g total) by mouth 4 (four) times daily.     triamcinolone acetonide 0.1% 0.1 % cream  Commonly known as:  KENALOG  Apply topically 2 (two) times daily. Apply topically twice a day to hand/affected area 10 days. for 10 days            Indwelling Lines/Drains at time of discharge:   Lines/Drains/Airways          No matching active lines, drains, or airways          Time spent on the discharge of patient: 35 minutes  Patient was seen and examined on the date of discharge and determined to be suitable for discharge.         MAGDIEL Easton, FNP-C  Hospitalist - Department of Hospital Medicine  [address] [place] [address]  Office [phone]; Pager [phone]

## 2018-07-30 NOTE — ED NOTES
Bedside report by JYOTI Zepeda. Pt resting in bed. AAOx4. Pleasant mood. Denies any pain or discomfort. Only requesting a sleeping aid. MD made aware by day nurse. Updated on plan of care. VSS. SR x 2. No acute distress apparent.    @2112 - sandwich tray provided.

## 2018-07-30 NOTE — PLAN OF CARE
"SW met with patient to provide discharge follow-up instructions. SW reviewed with patient contents of "[facility]" including "help at home", "things patient responsible for to manage his health at home" and "preferences". Patient was able to verbalize how he will manage his health at home is by going on his doctor appointments.  FERNANDO informed nurse Jackson bach completed all discharge planning for patient and she could complete her nursing education discharge with patient.     Addendum: FERNANDO contacted [facility] and spoke to [employer] to report patient is ready to be picked up from hospital.           07/30/18 1436   Final Note   Assessment Type Final Discharge Note   Discharge Disposition Home  ([facility] )   Hospital Follow Up  Appt(s) scheduled? Yes   Right Care Referral Info   Post Acute Recommendation No Care     "

## 2018-07-30 NOTE — MEDICAL/APP STUDENT
"[facility]  History & Physical    Subjective:      Chief Complaint/Reason for Admission: "stomach pain"    Fransico Montalvo is a 62 y.o. male with PMHx of alcohol abuse, cirrhosis, HTN, and BPH, who presents to the ED via ambulance around 1700 7/29/18 with c/o severe "stomach pain" that states is "in the middle of his chest" and describes as a constant, sharp, pressure like pain that began one month ago and has been intermittent since. Pt reports high fat diet, though he reports only having ate 1/2 a subway sandwich earlier that morning. Pt reports smoking 1.5 packs of cigarettes per day "all his life," and reports medication compliance. Pt endorses associated symptoms of nausea, self-induced vomiting, weakness, and mild headache. Denies chest tightness, SOB, diaphoresis, shoulder pain, numbness/tingling, dizziness, dysuria, hematochezia/ melena, hematemesis.    Past Medical History:   Diagnosis Date    Alcohol abuse     BPH (benign prostatic hyperplasia)     Cirrhosis 06/21/2018    pt states that he was diagnosed a week ago during his last hospital visit    Hypertension     Renal disorder      History reviewed. No pertinent surgical history.  Family History   Problem Relation Age of Onset    Hypertension Father     Heart disease Father     Heart disease Brother      Social History   Substance Use Topics    Smoking status: Current Every Day Smoker     Packs/day: 1.00     Types: Cigarettes    Smokeless tobacco: Never Used    Alcohol use No      Comment: former drinker; 2months sober       PTA Medications   Medication Sig    acamprosate (CAMPRAL) 333 mg tablet Take 666 mg by mouth 3 (three) times daily.    famotidine (PEPCID) 20 MG tablet Take 1 tablet (20 mg total) by mouth 2 (two) times daily.    sucralfate (CARAFATE) 100 mg/mL suspension Take 10 mLs (1 g total) by mouth 4 (four) times daily.    tamsulosin (FLOMAX) 0.4 mg Cp24 Take 0.4 mg by mouth once daily.    triamcinolone " acetonide 0.1% (KENALOG) 0.1 % cream Apply topically 2 (two) times daily. Apply topically twice a day to hand/affected area 10 days. for 10 days     Review of patient's allergies indicates:  No Known Allergies     Review of Systems   Constitutional: Positive for malaise/fatigue. Negative for chills and fever.   Eyes: Negative for blurred vision and double vision.   Respiratory: Negative for shortness of breath.    Cardiovascular: Negative for chest pain and palpitations.   Gastrointestinal: Positive for abdominal pain and nausea.        Abdominal pain around epigastric area   Genitourinary: Positive for frequency and urgency. Negative for dysuria and hematuria.   Musculoskeletal: Negative for myalgias and neck pain.   Skin: Positive for itching.        Excoriations noted to BLLE. Pt states he has had rash for years, and scratches excessively.    Neurological: Positive for headaches. Negative for dizziness.       Objective:      Vital Signs (Most Recent)  Temp: 99.1 °F (37.3 °C) (07/30/18 0409)  Pulse: 96 (07/30/18 0514)  Resp: 18 (07/30/18 0409)  BP: (!) 156/95 (07/30/18 0514)  SpO2: 96 % (07/30/18 0409)    Vital Signs Range (Last 24H):  Temp:  [97.9 °F (36.6 °C)-99.1 °F (37.3 °C)]   Pulse:  []   Resp:  [14-18]   BP: (128-185)/()   SpO2:  [95 %-100 %]     Physical Exam   Constitutional: He is oriented to person, place, and time. He appears well-developed and well-nourished.   HENT:   Head: Normocephalic and atraumatic.   Mouth/Throat: Oropharynx is clear and moist.   Eyes: Conjunctivae and EOM are normal. Pupils are equal, round, and reactive to light. No scleral icterus.   Neck: Neck supple. No JVD present.   Cardiovascular: Normal rate, regular rhythm, normal heart sounds and intact distal pulses.    No palpable heaves or thrills noted to precordium  No tenderness noted to chest are   Pulmonary/Chest: Effort normal and breath sounds normal.   Abdominal: Soft. Bowel sounds are normal. There is no  tenderness. There is no rebound and no guarding.   Musculoskeletal: He exhibits no edema.   Neurological: He is alert and oriented to person, place, and time. No cranial nerve deficit.   Skin: Skin is warm and dry. He is not diaphoretic.       Data Review:    CBC:   Lab Results   Component Value Date    WBC 3.89 (L) 07/29/2018    RBC 3.83 (L) 07/29/2018    HGB 9.8 (L) 07/29/2018    HCT 30.1 (L) 07/29/2018    PLT 60 (L) 07/29/2018     BMP:   Lab Results   Component Value Date    GLU 92 07/30/2018     07/30/2018    K 3.4 (L) 07/30/2018     07/30/2018    CO2 23 07/30/2018    BUN 11 07/30/2018    CREATININE 1.4 07/30/2018    CALCIUM 8.5 (L) 07/30/2018        Ref. Range 6/21/2018 09:56 7/29/2018 18:01 7/30/2018 00:22 7/30/2018 00:22   Troponin I Latest Ref Range: 0.000 - 0.026 ng/mL 0.026 0.031 (H) 0.088 (H) 0.088 (H)     ECG: reviewed    Assessment:      Active Hospital Problems    Diagnosis  POA    *Elevated troponin [R74.8]  Yes    Tobacco abuse [Z72.0]  Yes     Chronic    Anemia of chronic disease [D63.8]  Yes     Chronic    CKD Stage 3 [N18.3]  Yes     Chronic    BPH (benign prostatic hyperplasia) [N40.0]  Yes     Chronic      Resolved Hospital Problems    Diagnosis Date Resolved POA   No resolved problems to display.       Plan:      Stomach Pain  - likely d/t chronic gastritis hx    Elevated troponins  - reorder q4hrs  - schedule stress EKG to R/O unstable angina, NSTEMI, or STEMI  - aspirin QD    Anemia  -  Baseline H&H 9.30 and 30.7  - 7/30/18 h&H 9.0, 27.7, MCV 79  - microcytic, normochromic  -may be Fe deficiency, anemia of chronic disease  -Order Iron studies: serum Fe, transferrin, TIBC, ferritin  - consider iron supplementation     HTN  -  patient on importance of medication compliance, diet, exercise   - encourage smoking cessation   - encourage continuance of alcohol consumption cessation

## 2018-07-30 NOTE — SUBJECTIVE & OBJECTIVE
Past Medical History:   Diagnosis Date    Alcohol abuse     BPH (benign prostatic hyperplasia)     Cirrhosis 06/21/2018    pt states that he was diagnosed a week ago during his last hospital visit    Hypertension     Renal disorder        History reviewed. No pertinent surgical history.    Review of patient's allergies indicates:  No Known Allergies    No current facility-administered medications on file prior to encounter.      Current Outpatient Prescriptions on File Prior to Encounter   Medication Sig    acamprosate (CAMPRAL) 333 mg tablet Take 666 mg by mouth 3 (three) times daily.    famotidine (PEPCID) 20 MG tablet Take 1 tablet (20 mg total) by mouth 2 (two) times daily.    sucralfate (CARAFATE) 100 mg/mL suspension Take 10 mLs (1 g total) by mouth 4 (four) times daily.    tamsulosin (FLOMAX) 0.4 mg Cp24 Take 0.4 mg by mouth once daily.    triamcinolone acetonide 0.1% (KENALOG) 0.1 % cream Apply topically 2 (two) times daily. Apply topically twice a day to hand/affected area 10 days. for 10 days     Family History     Problem Relation (Age of Onset)    Heart disease Father, Brother    Hypertension Father        Social History Main Topics    Smoking status: Current Every Day Smoker     Packs/day: 1.00     Types: Cigarettes    Smokeless tobacco: Never Used    Alcohol use No      Comment: former drinker; 2months sober    Drug use: No    Sexual activity: Not on file     Review of Systems   Constitution: Negative for decreased appetite.   HENT: Negative for ear discharge.    Eyes: Negative for blurred vision.   Endocrine: Negative for polyphagia.   Skin: Negative for nail changes.   Neurological: Negative for aphonia.   Psychiatric/Behavioral: Negative for hallucinations.     Objective:     Vital Signs (Most Recent):  Temp: 99.1 °F (37.3 °C) (07/30/18 0709)  Pulse: 97 (07/30/18 0709)  Resp: 20 (07/30/18 0709)  BP: (!) 146/83 (07/30/18 0709)  SpO2: (!) 94 % (07/30/18 0709) Vital Signs (24h  Range):  Temp:  [97.9 °F (36.6 °C)-99.1 °F (37.3 °C)] 99.1 °F (37.3 °C)  Pulse:  [] 97  Resp:  [14-20] 20  SpO2:  [94 %-100 %] 94 %  BP: (128-185)/() 146/83     Weight: 88.6 kg (195 lb 5.2 oz)  Body mass index is 26.49 kg/m².    SpO2: (!) 94 %  O2 Device (Oxygen Therapy): room air      Intake/Output Summary (Last 24 hours) at 07/30/18 0927  Last data filed at 07/30/18 0600   Gross per 24 hour   Intake             1480 ml   Output              750 ml   Net              730 ml       Lines/Drains/Airways     Peripheral Intravenous Line                 Peripheral IV - Single Lumen 07/29/18 1718 Left Forearm less than 1 day                Physical Exam   Constitutional: He is oriented to person, place, and time. He appears well-developed and well-nourished.   HENT:   Head: Normocephalic and atraumatic.   Eyes: Conjunctivae are normal. Pupils are equal, round, and reactive to light.   Neck: Normal range of motion. Neck supple.   Cardiovascular: Normal rate, normal heart sounds and intact distal pulses.    Pulmonary/Chest: Effort normal and breath sounds normal.   Abdominal: Soft. Bowel sounds are normal.   Musculoskeletal: Normal range of motion.   Neurological: He is alert and oriented to person, place, and time.   Skin: Skin is warm and dry.       Significant Labs: All pertinent lab results from the last 24 hours have been reviewed.    Significant Imaging: Echocardiogram: 2D echo with color flow doppler: No results found for this or any previous visit.

## 2018-07-30 NOTE — NURSING
Patient arrived to the unit via stretcher accompanied by transport personnel with cardiac monitoring in place. Patient is AAOx4. Patient assisted to bed from stretcher and noted to be wobbly. Vital signs found in flow sheets with complete patient assessment. Skin dry with a 20g LFA PIV with lactated ringers noted infusing. Patient looks unkempt. No complaints of pain and no signs of respiratory distress noted. Patient updated on plan of care and verbalized understanding. Call light in reach and patient instructed to inform the nurse if anything is needed. Bed alarm activated to maintain patient safety. Patient stable and will continue to be monitored.

## 2018-07-31 PROBLEM — R19.06 EPIGASTRIC MASS: Status: ACTIVE | Noted: 2018-07-31

## 2018-07-31 PROBLEM — D69.6 THROMBOCYTOPENIA: Chronic | Status: ACTIVE | Noted: 2018-07-31

## 2018-07-31 PROBLEM — K70.30 ALCOHOLIC CIRRHOSIS: Chronic | Status: ACTIVE | Noted: 2018-07-31

## 2018-08-09 ENCOUNTER — HOSPITAL ENCOUNTER (EMERGENCY)
Facility: HOSPITAL | Age: 63
Discharge: HOME | End: 2018-08-09
Attending: EMERGENCY MEDICINE
Payer: MEDICAID

## 2018-08-09 VITALS
OXYGEN SATURATION: 98 % | HEART RATE: 85 BPM | SYSTOLIC BLOOD PRESSURE: 135 MMHG | BODY MASS INDEX: 26.45 KG/M2 | RESPIRATION RATE: 20 BRPM | DIASTOLIC BLOOD PRESSURE: 80 MMHG | TEMPERATURE: 98 F | WEIGHT: 195 LBS

## 2018-08-09 DIAGNOSIS — K86.3 PANCREATIC PSEUDOCYST: Primary | ICD-10-CM

## 2018-08-09 DIAGNOSIS — R10.9 ABDOMINAL PAIN, UNSPECIFIED ABDOMINAL LOCATION: ICD-10-CM

## 2018-08-09 LAB
ALBUMIN SERPL BCP-MCNC: 3.1 G/DL
ALP SERPL-CCNC: 148 U/L
ALT SERPL W/O P-5'-P-CCNC: 11 U/L
ANION GAP SERPL CALC-SCNC: 9 MMOL/L
ANISOCYTOSIS BLD QL SMEAR: SLIGHT
AST SERPL-CCNC: 25 U/L
BASOPHILS # BLD AUTO: 0.04 K/UL
BASOPHILS NFR BLD: 1 %
BILIRUB SERPL-MCNC: 1.8 MG/DL
BUN SERPL-MCNC: 18 MG/DL
CALCIUM SERPL-MCNC: 9 MG/DL
CHLORIDE SERPL-SCNC: 105 MMOL/L
CO2 SERPL-SCNC: 22 MMOL/L
CREAT SERPL-MCNC: 1.8 MG/DL
DIFFERENTIAL METHOD: ABNORMAL
EOSINOPHIL # BLD AUTO: 0.1 K/UL
EOSINOPHIL NFR BLD: 2.4 %
ERYTHROCYTE [DISTWIDTH] IN BLOOD BY AUTOMATED COUNT: 17 %
EST. GFR  (AFRICAN AMERICAN): 45.3 ML/MIN/1.73 M^2
EST. GFR  (NON AFRICAN AMERICAN): 39.2 ML/MIN/1.73 M^2
GLUCOSE SERPL-MCNC: 117 MG/DL
HCT VFR BLD AUTO: 30.7 %
HGB BLD-MCNC: 9.3 G/DL
IMM GRANULOCYTES # BLD AUTO: 0.02 K/UL
IMM GRANULOCYTES NFR BLD AUTO: 0.5 %
LIPASE SERPL-CCNC: 42 U/L
LYMPHOCYTES # BLD AUTO: 0.9 K/UL
LYMPHOCYTES NFR BLD: 21.7 %
MCH RBC QN AUTO: 24.5 PG
MCHC RBC AUTO-ENTMCNC: 30.3 G/DL
MCV RBC AUTO: 81 FL
MONOCYTES # BLD AUTO: 0.4 K/UL
MONOCYTES NFR BLD: 9.5 %
NEUTROPHILS # BLD AUTO: 2.7 K/UL
NEUTROPHILS NFR BLD: 64.9 %
NRBC BLD-RTO: 0 /100 WBC
PLATELET # BLD AUTO: ABNORMAL K/UL
PLATELET BLD QL SMEAR: ABNORMAL
PMV BLD AUTO: ABNORMAL FL
POTASSIUM SERPL-SCNC: 3.5 MMOL/L
PROT SERPL-MCNC: 8.1 G/DL
RBC # BLD AUTO: 3.8 M/UL
SODIUM SERPL-SCNC: 136 MMOL/L
WBC # BLD AUTO: 4.11 K/UL

## 2018-08-09 PROCEDURE — 96375 TX/PRO/DX INJ NEW DRUG ADDON: CPT

## 2018-08-09 PROCEDURE — 80053 COMPREHEN METABOLIC PANEL: CPT

## 2018-08-09 PROCEDURE — 99284 EMERGENCY DEPT VISIT MOD MDM: CPT | Mod: 25

## 2018-08-09 PROCEDURE — 83690 ASSAY OF LIPASE: CPT

## 2018-08-09 PROCEDURE — 85025 COMPLETE CBC W/AUTO DIFF WBC: CPT

## 2018-08-09 PROCEDURE — 99285 EMERGENCY DEPT VISIT HI MDM: CPT | Mod: ,,, | Performed by: EMERGENCY MEDICINE

## 2018-08-09 PROCEDURE — 96374 THER/PROPH/DIAG INJ IV PUSH: CPT

## 2018-08-09 PROCEDURE — 63600175 PHARM REV CODE 636 W HCPCS: Performed by: STUDENT IN AN ORGANIZED HEALTH CARE EDUCATION/TRAINING PROGRAM

## 2018-08-09 PROCEDURE — 25000003 PHARM REV CODE 250: Performed by: STUDENT IN AN ORGANIZED HEALTH CARE EDUCATION/TRAINING PROGRAM

## 2018-08-09 RX ORDER — HYDROCODONE BITARTRATE AND ACETAMINOPHEN 10; 325 MG/1; MG/1
1 TABLET ORAL
Status: COMPLETED | OUTPATIENT
Start: 2018-08-09 | End: 2018-08-09

## 2018-08-09 RX ORDER — HYDROCODONE BITARTRATE AND ACETAMINOPHEN 10; 325 MG/1; MG/1
1 TABLET ORAL
COMMUNITY
End: 2018-08-09 | Stop reason: DRUGHIGH

## 2018-08-09 RX ORDER — FAMOTIDINE 20 MG/1
20 TABLET, FILM COATED ORAL 2 TIMES DAILY
Qty: 30 TABLET | Refills: 0 | Status: ON HOLD | OUTPATIENT
Start: 2018-08-09 | End: 2019-08-02 | Stop reason: HOSPADM

## 2018-08-09 RX ORDER — ONDANSETRON 2 MG/ML
4 INJECTION INTRAMUSCULAR; INTRAVENOUS
Status: COMPLETED | OUTPATIENT
Start: 2018-08-09 | End: 2018-08-09

## 2018-08-09 RX ORDER — HYDROMORPHONE HYDROCHLORIDE 1 MG/ML
0.5 INJECTION, SOLUTION INTRAMUSCULAR; INTRAVENOUS; SUBCUTANEOUS
Status: DISCONTINUED | OUTPATIENT
Start: 2018-08-09 | End: 2018-08-09

## 2018-08-09 RX ORDER — HYDROCODONE BITARTRATE AND ACETAMINOPHEN 5; 325 MG/1; MG/1
1 TABLET ORAL EVERY 4 HOURS PRN
Qty: 18 TABLET | Refills: 0 | OUTPATIENT
Start: 2018-08-09 | End: 2019-01-30

## 2018-08-09 RX ORDER — ONDANSETRON 4 MG/1
4 TABLET, FILM COATED ORAL EVERY 6 HOURS
Qty: 12 TABLET | Refills: 0 | Status: SHIPPED | OUTPATIENT
Start: 2018-08-09 | End: 2018-09-14 | Stop reason: ALTCHOICE

## 2018-08-09 RX ORDER — HYDROMORPHONE HYDROCHLORIDE 1 MG/ML
1 INJECTION, SOLUTION INTRAMUSCULAR; INTRAVENOUS; SUBCUTANEOUS
Status: COMPLETED | OUTPATIENT
Start: 2018-08-09 | End: 2018-08-09

## 2018-08-09 RX ADMIN — HYDROMORPHONE HYDROCHLORIDE 1 MG: 1 INJECTION, SOLUTION INTRAMUSCULAR; INTRAVENOUS; SUBCUTANEOUS at 11:08

## 2018-08-09 RX ADMIN — HYDROCODONE BITARTRATE AND ACETAMINOPHEN 1 TABLET: 10; 325 TABLET ORAL at 11:08

## 2018-08-09 RX ADMIN — ONDANSETRON 4 MG: 2 INJECTION, SOLUTION INTRAMUSCULAR; INTRAVENOUS at 11:08

## 2018-08-09 RX ADMIN — ALUMINUM HYDROXIDE, MAGNESIUM HYDROXIDE, AND SIMETHICONE 50 ML: 200; 200; 20 SUSPENSION ORAL at 11:08

## 2018-08-09 NOTE — ED PROVIDER NOTES
Encounter Date: 8/9/2018    SCRIBE #1 NOTE: I, Ismael Mora, am scribing for, and in the presence of,  Dr. Farrell. I have scribed the following portions of the note - the Resident attestation.   SCRIBE #2 NOTE: I, José Givens, am scribing for, and in the presence of,  Jamie Farrell MD. I have scribed the remaining portions of the note not scribed by Scribe #1.     History     Chief Complaint   Patient presents with    Abdominal Pain     admitted twice recently for same reason; n/v      Mr Montalvo is 62 y.o male with pmx of pancreatic pseudocyst, Alcoholic Cirrhosis, and CKD III present to ED with 8/10 mid epigastric abdominal pain. Patient had recent admission due to abdominal pain which was diagnosed with pancreatic pseudocyst. He is scheduled for drainage of pseudocyst on the 23rd of August. He described waxing and waning sharp pain at his epigastric area. He was discharged with Spur but since then he ran out of Spur and he doesn't have any medication to control his pain. Patient denied fever, chill, diaphoresis, vomiting, constipation, and diarrhea.           Review of patient's allergies indicates:  No Known Allergies  Past Medical History:   Diagnosis Date    Alcohol abuse     BPH (benign prostatic hyperplasia)     Cirrhosis 06/21/2018    pt states that he was diagnosed a week ago during his last hospital visit    Gastritis     Hypertension     Renal disorder      History reviewed. No pertinent surgical history.  Family History   Problem Relation Age of Onset    Hypertension Father     Heart disease Father     Heart disease Brother      Social History   Substance Use Topics    Smoking status: Current Every Day Smoker     Packs/day: 1.00     Types: Cigarettes    Smokeless tobacco: Never Used    Alcohol use No      Comment: former drinker; 2months sober     Review of Systems   Constitutional: Negative for activity change, chills, diaphoresis and fever.   Respiratory: Negative for cough, chest  tightness and shortness of breath.    Cardiovascular: Negative for chest pain and palpitations.   Gastrointestinal: Positive for abdominal pain and nausea. Negative for abdominal distention, blood in stool, constipation, diarrhea and vomiting.   Genitourinary: Negative for difficulty urinating and dysuria.   Musculoskeletal: Negative for back pain and myalgias.   Neurological: Negative for dizziness, weakness, light-headedness, numbness and headaches.   Psychiatric/Behavioral: Negative for agitation. The patient is not nervous/anxious.        Physical Exam     Initial Vitals [08/09/18 0930]   BP Pulse Resp Temp SpO2   (!) 165/81 100 18 98.2 °F (36.8 °C) 100 %      MAP       --         Physical Exam    Constitutional: He appears well-developed and well-nourished. No distress.   HENT:   Head: Normocephalic and atraumatic.   Eyes: EOM are normal. Pupils are equal, round, and reactive to light.   Neck: Normal range of motion. Neck supple.   Cardiovascular: Normal rate, regular rhythm, normal heart sounds and intact distal pulses.   No murmur heard.  Pulmonary/Chest: Breath sounds normal. No respiratory distress. He has no wheezes.   Abdominal: Soft. He exhibits mass. He exhibits no distension. There is tenderness. There is no rebound and no guarding.   Hyperactive bowel sound noticed, 8/10 pain at mid epigastric pain   Musculoskeletal: Normal range of motion.   Neurological: He is alert and oriented to person, place, and time.   Skin: Skin is warm and dry. Capillary refill takes less than 2 seconds.   Psychiatric: He has a normal mood and affect. His behavior is normal. Judgment and thought content normal.         ED Course   Procedures  Labs Reviewed   CBC W/ AUTO DIFFERENTIAL - Abnormal; Notable for the following:        Result Value    RBC 3.80 (*)     Hemoglobin 9.3 (*)     Hematocrit 30.7 (*)     MCV 81 (*)     MCH 24.5 (*)     MCHC 30.3 (*)     RDW 17.0 (*)     Lymph # 0.9 (*)     Platelet Estimate Clumped (*)      All other components within normal limits   COMPREHENSIVE METABOLIC PANEL - Abnormal; Notable for the following:     CO2 22 (*)     Glucose 117 (*)     Creatinine 1.8 (*)     Albumin 3.1 (*)     Total Bilirubin 1.8 (*)     Alkaline Phosphatase 148 (*)     eGFR if  45.3 (*)     eGFR if non  39.2 (*)     All other components within normal limits   LIPASE          Imaging Results    None          Medical Decision Making:   Initial Assessment:   Mr Montalvo is 62 y.o male with pmx of pancreatic pseudocyst, Alcoholic Cirrhosis, and CKD III present to ED with 8/10 mid epigastric abdominal pain. We ordered CBC, CMP and Lipase. Our ddx are but not limited to acute exacerbation of pancreatic pseudocyst, cholelithiasis, and SBP.  ED Management:  2:17 PM  - CBC showed no elevated white count.   - CMP showed Cr level of 1.8, elevated total chan of 1.8. But this is his baseline.  - Normal lipase level of 42 was reported.   - Patient had discussion with Dr. Farrell about his condition and understood.   - Patient was discharged with 3 days of Norco, Zofran and Pepcid.   - Patient was asked to follow up with GI and Surgeon to drain the pseudocyst.             Scribe Attestation:   Scribe #1: I performed the above scribed service and the documentation accurately describes the services I performed. I attest to the accuracy of the note.    Attending Attestation:   Physician Attestation Statement for Resident:  As the supervising MD   Physician Attestation Statement: I have personally seen and examined this patient.   I agree with the above history. -: 62 y.o. male with recent diagnosis of pancreatic pseudocyst presents with abdominal pain similar in quality to similar episode but he ran out of Norco provided at discharge on August 2nd. He has since seen his PCP but they wouldn't refill his medication. I had discussion at length with patient about opioid use and symptom control. He expressed significant  relief of symptoms with medicines in the department. He was provided with a short course of analgesics, antimetics and H2 blockers. Provided with outpatient follow up as scheduled. He voices compliance of plan.  Given soft abdomen, improvement with conservative management, in addition to reassuring blood work, I doubt pancreatic pseudocyst rupture or other intra-abdominal complication.   As the supervising MD I agree with the above PE.    As the supervising MD I agree with the above treatment, course, plan, and disposition.          Physician Attestation for Scribe:      Comments: I, Dr. Jamie Farrell, personally performed the services described in this documentation. All medical record entries made by the scribe were at my direction and in my presence.  I have reviewed the chart and agree that the record reflects my personal performance and is accurate and complete. Jamie Farrell MD.  2:41 PM 08/09/2018                 Clinical Impression:   The primary encounter diagnosis was Pancreatic pseudocyst. A diagnosis of Abdominal pain, unspecified abdominal location was also pertinent to this visit.                             Angy Glasgow MD  Resident  08/09/18 1428       Jamie Farrell MD  08/09/18 0487

## 2018-08-09 NOTE — ED NOTES
Patient identifiers verified and correct for Fransico Montalvo.   LOC: The patient is awake, alert and aware of environment with an appropriate affect, the patient is oriented x 3 and speaking appropriately.   APPEARANCE: Patient appears comfortable and in no acute distress, patient is clean and well groomed.  SKIN: The skin is warm and dry, color consistent with ethnicity, patient has normal skin turgor and moist mucus membranes, skin intact, no breakdown or bruising noted.   MUSCULOSKELETAL: Patient moving all extremities spontaneously, no swelling noted.  RESPIRATORY: Airway is open and patent, respirations are spontaneous, patient has a normal effort and rate, no accessory muscle use noted, pt placed on continuous pulse ox with O2 sats noted at 97% on room air.  CARDIAC: Pt placed on cardiac monitor. Patient has a normal rate and regular rhythm, no edema noted, capillary refill < 3 seconds.   GASTRO: Soft and non tender to palpation, no distention noted, normoactive bowel sounds present in all four quadrants. Pt states bowel movements have been regular. Pt reports abdominal pain x10 days.  : Pt denies any pain or frequency with urination.  NEURO: Pt opens eyes spontaneously, behavior appropriate to situation, follows commands, facial expression symmetrical, bilateral hand grasp equal and even, purposeful motor response noted, normal sensation in all extremities when touched with a finger.

## 2018-08-09 NOTE — ED TRIAGE NOTES
Abdominal pain x10 days. Pt went to hospital 7 days ago and was discharged. Pt states abdominal pain has increased since then. Pt denies blood in stool or emesis.

## 2018-08-14 ENCOUNTER — TELEPHONE (OUTPATIENT)
Dept: GASTROENTEROLOGY | Facility: CLINIC | Age: 63
End: 2018-08-14

## 2018-08-14 NOTE — TELEPHONE ENCOUNTER
Patient seen at ED on [facility] [place], [place]. Has appointment with Dr Leiva at [facility]. Provided him with the phone number for that office

## 2018-08-28 ENCOUNTER — HOSPITAL ENCOUNTER (EMERGENCY)
Facility: OTHER | Age: 63
Discharge: HOME | End: 2018-08-28
Attending: EMERGENCY MEDICINE
Payer: MEDICAID

## 2018-08-28 VITALS
TEMPERATURE: 98 F | HEART RATE: 77 BPM | DIASTOLIC BLOOD PRESSURE: 97 MMHG | BODY MASS INDEX: 25.73 KG/M2 | RESPIRATION RATE: 18 BRPM | OXYGEN SATURATION: 99 % | WEIGHT: 190 LBS | HEIGHT: 72 IN | SYSTOLIC BLOOD PRESSURE: 184 MMHG

## 2018-08-28 DIAGNOSIS — S29.9XXA CHEST WALL INJURY, INITIAL ENCOUNTER: Primary | ICD-10-CM

## 2018-08-28 DIAGNOSIS — R10.9 LEFT FLANK PAIN: ICD-10-CM

## 2018-08-28 LAB
BILIRUB UR QL STRIP: NEGATIVE
CLARITY UR: CLEAR
COLOR UR: YELLOW
GLUCOSE UR QL STRIP: NEGATIVE
HGB UR QL STRIP: NEGATIVE
KETONES UR QL STRIP: NEGATIVE
LEUKOCYTE ESTERASE UR QL STRIP: NEGATIVE
NITRITE UR QL STRIP: NEGATIVE
PH UR STRIP: 6 [PH] (ref 5–8)
PROT UR QL STRIP: NEGATIVE
SP GR UR STRIP: 1.02 (ref 1–1.03)
URN SPEC COLLECT METH UR: NORMAL
UROBILINOGEN UR STRIP-ACNC: 1 EU/DL

## 2018-08-28 PROCEDURE — 81003 URINALYSIS AUTO W/O SCOPE: CPT

## 2018-08-28 PROCEDURE — 99284 EMERGENCY DEPT VISIT MOD MDM: CPT | Mod: 25

## 2018-08-28 NOTE — ED TRIAGE NOTES
Pt reports he slipped and fell three days ago while walking down steps. He is c/o left anterior lateral  Rib pain 2/10 that worsens to 5/10 with deep inspiration or palpation.

## 2018-08-28 NOTE — ED PROVIDER NOTES
Encounter Date: 8/28/2018    SCRIBE #1 NOTE: I, Tali Zamora, am scribing for, and in the presence of, Dr. Calvo.       History     Chief Complaint   Patient presents with    Flank Pain     pt with left side reproducable side pain . pt states fell 2 days ago on chain link fence on that side and yesterday climbed up on bunk beds and hwd increased pain today.     Time seen by provider: 12:02 PM    This is a 62 y.o. male who presents with complaint of left sided chest wall pain that began three days ago. The pain is rated a 1/10 and is worsened to a 5/10 with deep breaths. The patient states the pain is indescribable. He reports onset began after a fall. He states he was walking out of a store when he slipped on the wet steps. There was a chain link fence in front of him. He states he reached out to grab the fence but ran into it, hitting the left side of his chest. He reports scratches to left arm and mild SOB. He denies fever, vomiting, abdominal pain, or back pain. He denies any head injury or LOC.      The history is provided by the patient.     Review of patient's allergies indicates:  No Known Allergies  Past Medical History:   Diagnosis Date    Alcohol abuse     BPH (benign prostatic hyperplasia)     Cirrhosis 06/21/2018    pt states that he was diagnosed a week ago during his last hospital visit    Gastritis     Hypertension     Renal disorder      No past surgical history on file.  Family History   Problem Relation Age of Onset    Hypertension Father     Heart disease Father     Heart disease Brother      Social History     Tobacco Use    Smoking status: Current Every Day Smoker     Packs/day: 1.00     Types: Cigarettes    Smokeless tobacco: Never Used   Substance Use Topics    Alcohol use: No     Comment: former drinker; 2months sober    Drug use: No     Review of Systems   Constitutional: Negative for fever.   HENT: Negative for sore throat.    Eyes: Negative for visual disturbance.    Respiratory: Positive for shortness of breath (Secondary to pain).    Cardiovascular: Positive for chest pain. Negative for palpitations.   Gastrointestinal: Negative for abdominal pain and vomiting.   Endocrine: Negative.    Genitourinary: Negative for dysuria.   Musculoskeletal: Negative for back pain and neck pain.        Positive for chest wall pain.   Skin: Positive for wound.   Neurological: Negative for headaches.   Psychiatric/Behavioral: Negative for confusion.       Physical Exam     Initial Vitals [08/28/18 1147]   BP Pulse Resp Temp SpO2   (!) 184/97 77 18 98.3 °F (36.8 °C) 99 %      MAP       --         Physical Exam    Nursing note and vitals reviewed.  Constitutional: He appears well-developed and well-nourished. No distress.   HENT:   Head: Normocephalic and atraumatic.   Right Ear: External ear normal.   Left Ear: External ear normal.   Eyes: Pupils are equal, round, and reactive to light.   Neck: Normal range of motion.   Cardiovascular: Normal rate, regular rhythm and normal heart sounds.   No murmur heard.  Pulmonary/Chest: Breath sounds normal. No respiratory distress. He has no wheezes.   Left anterior lateral chest tender to palpation. No overlying skin changes.   Abdominal: Soft. There is no tenderness.   Musculoskeletal: Normal range of motion.   All other joints were aggressively palpated and ranged without tenderness or decreased ROM except as otherwise mentioned.  There is no midline tenderness of the cervical spine.    There is no midline tenderness of the thoracic spine.    There is no midline tenderness of the lumbar spine.    There is no tenderness over the sacrum.   Neurological: He is alert and oriented to person, place, and time.   Skin: Skin is warm and dry. No rash noted.   Psychiatric: He has a normal mood and affect. His behavior is normal. Judgment and thought content normal.         ED Course   Procedures  Labs Reviewed   URINALYSIS          Imaging Results          X-Ray  Ribs 2 View Left (Final result)  Result time 08/28/18 12:45:59    Final result by Orestes Hurtado MD (08/28/18 12:45:59)                 Impression:      Partially healed posterior left 9th rib fracture, correlating with the 07/31/2018 CT exam.  Osteopenia noted.      Electronically signed by: Orestes Hurtado MD  Date:    08/28/2018  Time:    12:45             Narrative:    EXAMINATION:  XR RIBS 2 VIEW LEFT    CLINICAL HISTORY:  Unspecified injury of thorax, initial encounter    TECHNIQUE:  Two views of the left ribs were performed.    COMPARISON:  07/31/2018 CT exam    FINDINGS:  Partially healed left posterior 9th rib fracture, noting persistent fracture line with callus formation.  Cortical thickening the left lateral 8th rib suggesting old fracture.  Osteopenia noted.  Left lung is clear.                                 Medical Decision Making:   Clinical Tests:   Lab Tests: Ordered and Reviewed  Radiological Study: Ordered and Reviewed  ED Management:  Emergent evaluation of 62-year-old male with complaint of left chest wall pain after a fall 3 days ago.  Vital signs reveal hypertension, afebrile.  Physical exam revealed tenderness of the chest wall without overlying skin change, no decreased breath sounds.  Rib x-rays show old rib fractures but no new, no pneumothorax.  Urinalysis is clear, I do not suspect a renal injury or pyelonephritis.  Patient was discharged in good condition and advised to take over-the-counter ibuprofen or Tylenol and follow up with his PCP or return for new or worsening symptoms.            Scribe Attestation:   Scribe #1: I performed the above scribed service and the documentation accurately describes the services I performed. I attest to the accuracy of the note.    Attending Attestation:           Physician Attestation for Scribe:  Physician Attestation Statement for Scribe #1: I, Dr. Calvo, reviewed documentation, as scribed by Tali Zamora in my presence, and it is both accurate and  complete.                    Clinical Impression:     1. Chest wall injury, initial encounter    2. Left flank pain                                 Martina Calvo MD  08/28/18 3180

## 2018-08-28 NOTE — ED NOTES
Patient Identifiers for Fransico Montalvo checked and correct  LOC: The patient is awake, alert and aware of environment with an appropriate affect, the patient is oriented x 3 and speaking appropriate.  APPEARANCE: Patient resting comfortably and in no acute distress. The patient is clean and well groomed. The patient's clothing is properly fastened.  SKIN: The skin is warm and dry. The patient has normal skin turgor and moist mucus membranes. No rashes or lesions upon observation. Skin Intact , no breakdown noted.  Musculoskeletal :  Normal range of motion noted. Moves all extremities well,left lateral anterior rib pain tender to palpation.   RESPIRATORY: Airway is open and patent, respirations are spontaneous, patient has a normal effort and rate. Breath sounds are clear & equal, bilaterally.  CARDIAC: Patient has a normal rate and rhythm, no peripheral edema noted, capillary refill < 3 seconds.   ABDOMEN: Soft and non tender to palpation, no distention observed. Bowels Sounds are WNL all quads.  PULSES: 2+ radial & pedal pulses, symmetrical in all extremities.  NEUROLOGIC: PERRL, Pupils 3mm and reacts briskly to light. Motor strength 5/5 all extremities.  The pt's facial expression is symmetrical, patient moving all extremities, normal sensation in all extremities when touched with a finger.The patient is awake, alert and cooperative with a calm affect, patient is aware of environment.      Will continue to monitor

## 2018-09-14 ENCOUNTER — OFFICE VISIT (OUTPATIENT)
Dept: CARDIOLOGY | Facility: CLINIC | Age: 63
End: 2018-09-14
Payer: MEDICAID

## 2018-09-14 VITALS
HEIGHT: 72 IN | BODY MASS INDEX: 26.04 KG/M2 | HEART RATE: 76 BPM | DIASTOLIC BLOOD PRESSURE: 74 MMHG | OXYGEN SATURATION: 99 % | WEIGHT: 192.25 LBS | SYSTOLIC BLOOD PRESSURE: 106 MMHG

## 2018-09-14 DIAGNOSIS — K70.30 ALCOHOLIC CIRRHOSIS OF LIVER WITHOUT ASCITES: Chronic | ICD-10-CM

## 2018-09-14 DIAGNOSIS — R19.06 EPIGASTRIC MASS: ICD-10-CM

## 2018-09-14 DIAGNOSIS — R79.89 ELEVATED TROPONIN: Primary | ICD-10-CM

## 2018-09-14 DIAGNOSIS — R06.02 SOB (SHORTNESS OF BREATH): ICD-10-CM

## 2018-09-14 PROCEDURE — 99213 OFFICE O/P EST LOW 20 MIN: CPT | Mod: PBBFAC | Performed by: INTERNAL MEDICINE

## 2018-09-14 PROCEDURE — 99214 OFFICE O/P EST MOD 30 MIN: CPT | Mod: S$PBB,,, | Performed by: INTERNAL MEDICINE

## 2018-09-14 PROCEDURE — 99999 PR PBB SHADOW E&M-EST. PATIENT-LVL III: CPT | Mod: PBBFAC,,, | Performed by: INTERNAL MEDICINE

## 2018-09-14 PROCEDURE — 93005 ELECTROCARDIOGRAM TRACING: CPT | Mod: PBBFAC | Performed by: INTERNAL MEDICINE

## 2018-09-14 PROCEDURE — 93010 ELECTROCARDIOGRAM REPORT: CPT | Mod: S$PBB,,, | Performed by: INTERNAL MEDICINE

## 2018-09-14 NOTE — PROGRESS NOTES
Subjective:    Patient ID:  Fransico Montalvo is a 63 y.o. male who presents for follow-up of Hospital Follow Up      HPI     Admitted 7/30/18  Mr. Montalvo is a 61 yo man with chronic alcohol use (last drink 2 months ago), presumed alcoholic cirrhosis, h/o PUD and esophagitis on EGD 3/2018, thrombocytopenia, tobacco abuse, CKD3, BPH, and anemia of chronic disease who presented for abdominal pain. He reports chronic abdominal pain that began 5-6 months ago. The pain is associated with intermitted constipation and diarrhea. He tries to induce vomiting to alleviate the pain to little avail.   In March of this year he was admitted to [facility] where he was hospitalized for hematemesis. He underwent EGD at that time which revealed the ulcers and esophagitis. He was started on PPI but has been non-compliant. He presented again to our ER one month ago for abdominal pain. CT abdomen at that time showed non-obstructive cholelithiasis and a cyst of the duodenum - he was discharged home from the ER. He returned to our ER again 7/29/2018 for abdominal pain, was found to have a mildly elevated troponin, and was admitted to observation. NST was normal and he was sent home only to return a few hours later for persistent abdominal pain. Upon his return, CT abdomen 7/30 showed the cystic structure had enlarged and appeared to have blood products within the mass. He was admitted to medicine and surgery and GI were consulted.     * Epigastric mass     7.8 x 5.6 cm proximal midline paraduodenal mass. Surgery and GI consulted. His pain has been persistent for months and existed prior to the recently noted changes of the cyst compared to imaging one month ago. I suspect his pain is more related to persistent esophagitis/PUD as he has been non-compliant with his medications and reports relief with GI cocktail given this hospitalization. Resumed PPI and started on sucralfate. The cyst is more likely a  pancreatic pseudocyst given his history of chronic alcoholism and pancreatitis. He needs an EUS in the near future, but was feeling improved and tolerating PO and was stable to discharge with follow up in GI clinic. He was provided a prescription for Vicodin at discharge for the abdominal pain. Per  review he has not fill a prescription for a schedule 2 substance in [place] for at least two years.          Alcoholic cirrhosis     Mr. Montalvo has a history of alcohol abuse and evidence of cirrhosis on imaging and labs. He was reportedly hepatitis negative on a previous admission at an OSH. Hep panel to verify negative.          Thrombocytopenia     Chronic. 2/2 cirrhosis.           CKD Stage 3     @ baseline Cr ~1.5. No acute issue.          Anemia of chronic disease     Stable. No indication for transfusion.          Tobacco abuse     Counseled on tobacco cessation. Nicotine patch offered during admiss     Echo 7/30/18    1 - Normal left ventricular systolic function (EF 55-60%).     2 - Trivial mitral regurgitation.     3 - Trivial tricuspid regurgitation.     Stress test 7/30/18  LVEF: 61 %  Impression: NORMAL MYOCARDIAL PERFUSION  1. The perfusion scan is free of evidence for myocardial ischemia or injury.   2. There is a mild intensity fixed defect in the inferior wall of the left ventricle, secondary to diaphragm attenuation.   3. Resting wall motion is physiologic.   4. Resting LV function is normal.     Scheduled for upcoming EGD  Denies CP or SOB  EKG NSR NSSTT changes - similar to previous EKG          Review of Systems   Constitution: Negative for decreased appetite.   HENT: Negative for ear discharge.    Eyes: Negative for blurred vision.   Endocrine: Negative for polyphagia.   Skin: Negative for nail changes.   Neurological: Negative for aphonia.   Psychiatric/Behavioral: Negative for hallucinations.        Objective:    Physical Exam   Constitutional: He is oriented to person, place, and time. He appears  well-developed and well-nourished.   HENT:   Head: Normocephalic and atraumatic.   Eyes: Conjunctivae are normal. Pupils are equal, round, and reactive to light.   Neck: Normal range of motion. Neck supple.   Cardiovascular: Normal rate, normal heart sounds and intact distal pulses.   Pulmonary/Chest: Effort normal and breath sounds normal.   Abdominal: Soft. Bowel sounds are normal.   Musculoskeletal: Normal range of motion.   Neurological: He is alert and oriented to person, place, and time.   Skin: Skin is warm and dry.         Assessment:       1. Elevated troponin    2. Epigastric mass    3. Alcoholic cirrhosis of liver without ascites         Plan:       Cardiac stable  Will f/u prn

## 2019-01-30 ENCOUNTER — HOSPITAL ENCOUNTER (EMERGENCY)
Facility: HOSPITAL | Age: 64
Discharge: HOME | End: 2019-01-30
Attending: EMERGENCY MEDICINE
Payer: MEDICAID

## 2019-01-30 VITALS
WEIGHT: 195 LBS | DIASTOLIC BLOOD PRESSURE: 87 MMHG | HEIGHT: 72 IN | SYSTOLIC BLOOD PRESSURE: 143 MMHG | RESPIRATION RATE: 20 BRPM | TEMPERATURE: 98 F | HEART RATE: 102 BPM | OXYGEN SATURATION: 98 % | BODY MASS INDEX: 26.41 KG/M2

## 2019-01-30 DIAGNOSIS — S42.255A CLOSED NONDISPLACED FRACTURE OF GREATER TUBEROSITY OF LEFT HUMERUS, INITIAL ENCOUNTER: Primary | ICD-10-CM

## 2019-01-30 DIAGNOSIS — S22.42XA CLOSED FRACTURE OF MULTIPLE RIBS OF LEFT SIDE, INITIAL ENCOUNTER: ICD-10-CM
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
## 2019-01-30 DIAGNOSIS — S22.31XA CLOSED FRACTURE OF ONE RIB OF RIGHT SIDE, INITIAL ENCOUNTER: ICD-10-CM

## 2019-01-30 DIAGNOSIS — S42.215A CLOSED NONDISPLACED FRACTURE OF SURGICAL NECK OF LEFT HUMERUS, UNSPECIFIED FRACTURE MORPHOLOGY, INITIAL ENCOUNTER: ICD-10-CM

## 2019-01-30 LAB
ALBUMIN SERPL BCP-MCNC: 3.7 G/DL
ALP SERPL-CCNC: 138 U/L
ALT SERPL W/O P-5'-P-CCNC: 11 U/L
ANION GAP SERPL CALC-SCNC: 13 MMOL/L
AST SERPL-CCNC: 30 U/L
BASOPHILS # BLD AUTO: 0.03 K/UL
BASOPHILS NFR BLD: 0.6 %
BILIRUB SERPL-MCNC: 2.5 MG/DL
BUN SERPL-MCNC: 30 MG/DL
CALCIUM SERPL-MCNC: 9.3 MG/DL
CHLORIDE SERPL-SCNC: 104 MMOL/L
CO2 SERPL-SCNC: 21 MMOL/L
CREAT SERPL-MCNC: 2.3 MG/DL
DIFFERENTIAL METHOD: ABNORMAL
EOSINOPHIL # BLD AUTO: 0 K/UL
EOSINOPHIL NFR BLD: 0.6 %
ERYTHROCYTE [DISTWIDTH] IN BLOOD BY AUTOMATED COUNT: 20.2 %
EST. GFR  (AFRICAN AMERICAN): 33.4 ML/MIN/1.73 M^2
EST. GFR  (NON AFRICAN AMERICAN): 28.9 ML/MIN/1.73 M^2
GLUCOSE SERPL-MCNC: 100 MG/DL
HCT VFR BLD AUTO: 33.7 %
HGB BLD-MCNC: 10.7 G/DL
IMM GRANULOCYTES # BLD AUTO: 0.04 K/UL
IMM GRANULOCYTES NFR BLD AUTO: 0.8 %
LYMPHOCYTES # BLD AUTO: 0.6 K/UL
LYMPHOCYTES NFR BLD: 10.7 %
MCH RBC QN AUTO: 26 PG
MCHC RBC AUTO-ENTMCNC: 31.8 G/DL
MCV RBC AUTO: 82 FL
MONOCYTES # BLD AUTO: 0.5 K/UL
MONOCYTES NFR BLD: 8.6 %
NEUTROPHILS # BLD AUTO: 4.1 K/UL
NEUTROPHILS NFR BLD: 78.7 %
NRBC BLD-RTO: 0 /100 WBC
PLATELET # BLD AUTO: 96 K/UL
PMV BLD AUTO: 10.7 FL
POTASSIUM SERPL-SCNC: 4.3 MMOL/L
PROT SERPL-MCNC: 9.1 G/DL
RBC # BLD AUTO: 4.11 M/UL
SODIUM SERPL-SCNC: 138 MMOL/L
WBC # BLD AUTO: 5.23 K/UL

## 2019-01-30 PROCEDURE — 94799 UNLISTED PULMONARY SVC/PX: CPT

## 2019-01-30 PROCEDURE — 25000003 PHARM REV CODE 250: Performed by: PHYSICIAN ASSISTANT

## 2019-01-30 PROCEDURE — 99900035 HC TECH TIME PER 15 MIN (STAT)

## 2019-01-30 PROCEDURE — 99285 EMERGENCY DEPT VISIT HI MDM: CPT | Mod: 25

## 2019-01-30 PROCEDURE — 96376 TX/PRO/DX INJ SAME DRUG ADON: CPT

## 2019-01-30 PROCEDURE — 93010 ELECTROCARDIOGRAM REPORT: CPT | Mod: ,,, | Performed by: INTERNAL MEDICINE

## 2019-01-30 PROCEDURE — 63600175 PHARM REV CODE 636 W HCPCS: Performed by: PHYSICIAN ASSISTANT

## 2019-01-30 PROCEDURE — 80053 COMPREHEN METABOLIC PANEL: CPT

## 2019-01-30 PROCEDURE — 96374 THER/PROPH/DIAG INJ IV PUSH: CPT

## 2019-01-30 PROCEDURE — 96375 TX/PRO/DX INJ NEW DRUG ADDON: CPT

## 2019-01-30 PROCEDURE — 85025 COMPLETE CBC W/AUTO DIFF WBC: CPT

## 2019-01-30 PROCEDURE — 99285 PR EMERGENCY DEPT VISIT,LEVEL V: ICD-10-PCS | Mod: ,,, | Performed by: EMERGENCY MEDICINE

## 2019-01-30 PROCEDURE — 99285 EMERGENCY DEPT VISIT HI MDM: CPT | Mod: ,,, | Performed by: EMERGENCY MEDICINE

## 2019-01-30 PROCEDURE — 93010 EKG 12-LEAD: ICD-10-PCS | Mod: ,,, | Performed by: INTERNAL MEDICINE

## 2019-01-30 PROCEDURE — 96361 HYDRATE IV INFUSION ADD-ON: CPT

## 2019-01-30 PROCEDURE — 93005 ELECTROCARDIOGRAM TRACING: CPT

## 2019-01-30 RX ORDER — ONDANSETRON 2 MG/ML
4 INJECTION INTRAMUSCULAR; INTRAVENOUS
Status: COMPLETED | OUTPATIENT
Start: 2019-01-30 | End: 2019-01-30

## 2019-01-30 RX ORDER — HYDROCODONE BITARTRATE AND ACETAMINOPHEN 7.5; 325 MG/1; MG/1
1 TABLET ORAL EVERY 6 HOURS PRN
Qty: 18 TABLET | Refills: 0 | Status: ON HOLD | OUTPATIENT
Start: 2019-01-30 | End: 2019-08-02 | Stop reason: HOSPADM

## 2019-01-30 RX ORDER — ACETAMINOPHEN 325 MG/1
650 TABLET ORAL
Status: COMPLETED | OUTPATIENT
Start: 2019-01-30 | End: 2019-01-30

## 2019-01-30 RX ORDER — MORPHINE SULFATE 4 MG/ML
4 INJECTION, SOLUTION INTRAMUSCULAR; INTRAVENOUS
Status: COMPLETED | OUTPATIENT
Start: 2019-01-30 | End: 2019-01-30

## 2019-01-30 RX ADMIN — ONDANSETRON 4 MG: 2 INJECTION INTRAMUSCULAR; INTRAVENOUS at 11:01

## 2019-01-30 RX ADMIN — SODIUM CHLORIDE 1000 ML: 0.9 INJECTION, SOLUTION INTRAVENOUS at 09:01

## 2019-01-30 RX ADMIN — ACETAMINOPHEN 650 MG: 325 TABLET ORAL at 09:01

## 2019-01-30 RX ADMIN — MORPHINE SULFATE 4 MG: 4 INJECTION INTRAVENOUS at 11:01

## 2019-01-30 RX ADMIN — MORPHINE SULFATE 4 MG: 4 INJECTION INTRAVENOUS at 01:01

## 2019-01-30 NOTE — ED TRIAGE NOTES
Pt was taking a shower this morning when he had an episode of dizziness, fell down and landed on his left side. Pt c/o left sided intercostal pain and left shoulder pain. Pt denies head trauma.

## 2019-01-30 NOTE — ED NOTES
Pt states he took 40 mg of lisinopril this morning which is double what he normally takes. Pt was hypotensive upon EMS arrval.

## 2019-01-30 NOTE — ED NOTES
Bed: 08  Expected date: 1/30/19  Expected time: 10:25 AM  Means of arrival:   Comments:  EMS Stroke

## 2019-01-30 NOTE — ED NOTES
Hourly rounding performed at this time. Patient is in bed awake and alert, resting, reports pain has decreased to 7/10. Assessed for need of repositioning and given urinal for toileting. Provided pillow for comfort.  Updated patient on current status, discussed care plan, patient denies any additional needs at this time and has no complaints or questions. Room assessed for safety measures and cleanliness, no action needed at this time. The bed is in low, locked position with side rails up x 2. Personal belongings and call light in reach. Patient is oriented to call light use. Patient verbalizes will call nurse if assistance is needed. Will continue to monitor.

## 2019-01-30 NOTE — ED PROVIDER NOTES
Encounter Date: 1/30/2019    SCRIBE #1 NOTE: I, Hema Hernández, am scribing for, and in the presence of, Dr. Wilson. the EKG reading and the APC attestation.       History     Chief Complaint   Patient presents with    Fall     Pt slipped and fell in the shower. Pt c/o left shoulder pain and left rib pain. No head trauma reported by pt.      Mr Montalvo is a 64 yo  male patient with PMHx of HTN, alcohol abuse, cirrhosis, BPH, anxiety, depression that presents to the ED with left shoulder pain, bilateral rib pain following a slip and fall in the shower at home. Pt also reports taking double his dose of BP medication this morning and feeling mildly light-headed. Pt attributes the fall to slipping on the bathroom floor because there was no bath mat. Denies any head trauma or LOC. Pt reports hitting his left shoulder and left rib on the toilet during the fall. Pt denies any headache, changes in vision, neck pain, abdominal pain, N/V/D, fevers, chills, myalgias.           Review of patient's allergies indicates:  No Known Allergies  Past Medical History:   Diagnosis Date    Alcohol abuse     BPH (benign prostatic hyperplasia)     Cirrhosis 06/21/2018    pt states that he was diagnosed a week ago during his last hospital visit    Gastritis     Hypertension     Renal disorder      History reviewed. No pertinent surgical history.  Family History   Problem Relation Age of Onset    Hypertension Father     Heart disease Father     Heart disease Brother      Social History     Tobacco Use    Smoking status: Current Every Day Smoker     Packs/day: 1.00     Types: Cigarettes    Smokeless tobacco: Never Used   Substance Use Topics    Alcohol use: No     Comment: former drinker; 2months sober    Drug use: No     Review of Systems   Constitutional: Negative for chills and fever.   HENT: Negative for congestion, rhinorrhea, sinus pressure, sinus pain and sore throat.    Eyes: Negative for pain and visual  disturbance.   Respiratory: Negative for cough and shortness of breath.    Cardiovascular: Positive for chest pain (bilateral rib pain).   Gastrointestinal: Negative for abdominal pain, diarrhea, nausea and vomiting.   Genitourinary: Negative for dysuria, flank pain and frequency.   Musculoskeletal: Positive for arthralgias (left shoulder). Negative for neck pain and neck stiffness.   Skin: Negative for pallor and rash.   Neurological: Positive for light-headedness. Negative for dizziness, syncope, weakness and headaches.   Psychiatric/Behavioral: Negative for confusion.       Physical Exam     Initial Vitals [01/30/19 0925]   BP Pulse Resp Temp SpO2   90/64 100 16 98.2 °F (36.8 °C) 99 %      MAP       --         Physical Exam    Constitutional: He appears well-developed and well-nourished. He is cooperative. He does not appear ill. No distress.   HENT:   Head: Normocephalic and atraumatic. Head is without abrasion, without contusion and without laceration.   Neck: Normal range of motion. Neck supple. No spinous process tenderness and no muscular tenderness present.   Cardiovascular: Normal rate. Exam reveals no gallop and no friction rub.    No murmur heard.  Pulmonary/Chest: Effort normal. No respiratory distress. He has no wheezes. He has no rhonchi. He has no rales. He exhibits tenderness (left ribs, mild tenderness on right side rib).   Abdominal: Soft. There is no tenderness.   Musculoskeletal:        Left shoulder: He exhibits decreased range of motion, tenderness, bony tenderness and pain. He exhibits no swelling and no deformity.   Sensation and distal pulses intact   Neurological: He is alert and oriented to person, place, and time. No cranial nerve deficit or sensory deficit.         ED Course   Procedures  Labs Reviewed   CBC W/ AUTO DIFFERENTIAL - Abnormal; Notable for the following components:       Result Value    RBC 4.11 (*)     Hemoglobin 10.7 (*)     Hematocrit 33.7 (*)     MCH 26.0 (*)     MCHC  31.8 (*)     RDW 20.2 (*)     Platelets 96 (*)     Immature Granulocytes 0.8 (*)     Lymph # 0.6 (*)     Gran% 78.7 (*)     Lymph% 10.7 (*)     All other components within normal limits   COMPREHENSIVE METABOLIC PANEL - Abnormal; Notable for the following components:    CO2 21 (*)     BUN, Bld 30 (*)     Creatinine 2.3 (*)     Total Protein 9.1 (*)     Total Bilirubin 2.5 (*)     Alkaline Phosphatase 138 (*)     eGFR if  33.4 (*)     eGFR if non  28.9 (*)     All other components within normal limits     EKG Readings: (Independently Interpreted)   Initial Reading: No STEMI. Previous EKG: Compared with most recent EKG Previous EKG Date: 9/14/18. Rhythm: Normal Sinus Rhythm. Heart Rate: 95.   Occasional PVC's, mildly prolonged QTC on left axis.  Non specific ST abnormalities.   Compared to previous no significant abnormalities.     ECG Results          EKG 12-lead (Final result)  Result time 01/30/19 14:50:57    Final result by Interface, Lab In [facility] (01/30/19 14:50:57)                 Narrative:    Test Reason : (Not Selected)    Vent. Rate : 095 BPM     Atrial Rate : 095 BPM     P-R Int : 176 ms          QRS Dur : 096 ms      QT Int : 402 ms       P-R-T Axes : 043 -52 070 degrees     QTc Int : 505 ms    Sinus rhythm with occasional Premature ventricular complexes  Left anterior fascicular block  Prolonged QT interval  Abnormal ECG  When compared with ECG of 14-SEP-2018 12:21,  Premature ventricular complexes are now Present  Prolonged QT interval Now present  Confirmed by Jocelyn Hill MD ([id number]) on 1/30/2019 2:50:49 PM    Referred By: [id number]   SELF           Confirmed By:Jocelyn Hill MD                            Imaging Results          CT Head Without Contrast (Final result)  Result time 01/30/19 12:29:49    Final result by Hilario Perez MD (01/30/19 12:29:49)                 Impression:      No acute intracranial abnormality identified.    Generalized cerebral volume loss,  somewhat advanced for age, and supratentorial white matter mild chronic small vessel ischemic change.    Partial empty sella configuration, nonspecific but can be seen with benign intracranial hypertension.      Electronically signed by: Hilario Perez MD  Date:    01/30/2019  Time:    12:29             Narrative:    EXAMINATION:  CT HEAD WITHOUT CONTRAST    CLINICAL HISTORY:  Fall;    TECHNIQUE:  Low dose axial CT images obtained throughout the head without intravenous contrast. Sagittal and coronal reconstructions were performed.    COMPARISON:  None.    FINDINGS:  Intracranial compartment:    Mild to moderate degree of generalized cerebral volume loss, somewhat advanced for age.  Ventricles are midline without distortion by mass effect or acute hydrocephalus.  No extra-axial blood or fluid collections.  Partial empty sella configuration, nonspecific.    Mild degree of periventricular white matter hypoattenuation suggesting sequela of chronic small vessel ischemic change.  No parenchymal mass, hemorrhage, edema or major vascular distribution infarct.  Skull base atherosclerotic vascular calcifications noted.    Skull/extracranial contents (limited evaluation): No fracture. Mastoid air cells and paranasal sinuses are essentially clear.  Visualized portions of the orbits are within normal limits.                               X-Ray Ribs 3 Views Bilateral (Final result)  Result time 01/30/19 10:51:03   Procedure changed from X-Ray Ribs 2 View Left     Final result by Arpit Zambrano III, MD (01/30/19 10:51:03)                 Impression:      See above    Multiple fractures as above.      Electronically signed by: Arpit Zambrano MD  Date:    01/30/2019  Time:    10:51             Narrative:    EXAMINATION:  XR RIBS 3 VIEWS BILATERAL    CLINICAL HISTORY:  Fall;  Unspecified fall, initial encounter    FINDINGS:  Heart size is normal lungs are clear.  There is a fracture of the greater tuberosity left humerus and a  possible fracture of the surgical neck of left humerus.  There are multiple left lower rib fractures.  No pneumothorax pleural fluid or lung contusion seen.  There is also a fracture 1 the right lower ribs.                               X-Ray Shoulder Trauma Left (Final result)  Result time 01/30/19 10:49:52    Final result by Arpit Zambrano III, MD (01/30/19 10:49:52)                 Impression:      See above      Electronically signed by: Arpit Zambrano MD  Date:    01/30/2019  Time:    10:49             Narrative:    EXAMINATION:  XR SHOULDER TRAUMA 3 VIEW LEFT    FINDINGS:  There is a fracture of the greater tuberosity of the humerus and a possible fracture of the surgical neck.  There is baseline DJD.                               X-Ray Chest 1 View (Final result)  Result time 01/30/19 10:49:30   Procedure changed from X-Ray Chest PA And Lateral     Final result by Arpit Zambrano III, MD (01/30/19 10:49:30)                 Impression:      See above    No acute process seen.      Electronically signed by: Arpit Zambrano MD  Date:    01/30/2019  Time:    10:49             Narrative:    EXAMINATION:  XR CHEST 1 VIEW    CLINICAL HISTORY:  Trauma;    FINDINGS:  Heart size is normal.  Lungs are clear and the bones showed DJD.                                 Medical Decision Making:   Initial Assessment:   Mr Montalvo is a 64 yo  male patient with PMHx of HTN, alcohol abuse, cirrhosis, BPH, anxiety, depression that presents to the ED with left shoulder pain, bilateral rib pain following a slip and fall in the shower at home. Pt also reports taking double his dose of BP medication this morning and feeling mildly light-headed. Pt attributes the fall to slipping on the bathroom floor because there was no bath mat. Denies any head trauma or LOC. Pt reports hitting his left shoulder and left rib on the toilet during the fall.  Differential Diagnosis:   Fracture  Dislocation  ICH  MSK strain  Contusion  Clinical  Tests:   Lab Tests: Ordered  Radiological Study: Ordered  ED Management:  Pt hypotensive at 90/64 on arrival to ED, but patient is pleasant, conversational and in no acute distress. 12 lead, labs, Xrays, CT head ordered. Pt given 1 L bolus of NS. Tylenol for pain. Xrays reveal fracture of the greater tuberosity left humerus and a possible fracture of the surgical neck of left humerus.  There are multiple left lower rib fractures. There is also a fracture 1 the right lower ribs. Orthopedic Surgery consulted. Discussed patient with Eva of Ortho Surgery and recommends discharging patient with sling and having him follow up in Orthopedic Clinic in one week. Discussed results and plan with patient who is understanding and agreeable. [facility] has followed up with patient at his home yesterday and will follow up with him after discharge. He was discharged with prescription of Norco.  He will follow up with Ortho Clinic.  All of the patient's questions were answered.  I reviewed the patient's chart, labs, and imaging and discussed the case with my supervising physician.               Attending Attestation:   Physician Attestation Statement for Resident:  As the supervising MD   Physician Attestation Statement: I have personally seen and examined this patient.   I agree with the above history. -:   Physician Attestation Statement for NP/PA:   I have conducted a face to face encounter with this patient in addition to the NP/PA, due to Medical Complexity    Other NP/PA Attestation Additions:    History of Present Illness: Pt currently stable, non toxic appearing, appears comfortable.   Pt lives with roommate but states he does not have social support. Pt suspects he will have difficulties with bathing and cooking, as well as pain control. Will plan to prescribe pt with analgesia, consult social work to see if pt can qualify for assistance. Pt to follow up with orthopedics and PCP.                       Clinical  Impression:         Disposition:   Disposition: Discharged  Condition: Stable                        Janell Wilson DO  01/30/19 1527       Roscoe Sharma PA-C  01/30/19 [id number]

## 2019-01-30 NOTE — ED NOTES
Patient waiting for transportation home in waiting room and approaches me to inform me that he is feeling dizzy and faint every time he stands up. I relayed this to Roscoe the PA that was assuming his care while in the ER. Roscoe came to the waiting room to talk with the patient and the patient informed that he has not eaten all day so Roscoe suggest giving him a sandwhich and juice to see if this resolves his symptoms. Pt provided with sand which and orange juice and will check on patient.

## 2019-01-30 NOTE — ED NOTES
Assumed care, received report from Keli Lebron RN. Patient is in bed awake, alert, and oriented x 4, cooperative, VSS, airway open and patent, respirations spontaneous, even and unlabored, with normal rate and rhythm, skin warm, CDI, moves all extremities well except LUE, weakness observed in LUE, hurts too much to move, isolated in sling. No edema observed. R Forearm PIV, intact, patent, and currently infusing NS Bolus, site is WNL, no erythema, pain or edema to site, dressing is CDI, no pain or apparent distress reported or observed. Assessed for need of repositioning and given urinal for toileting. Provided pillow for comfort.  Updated patient on current status, discussed care plan, patient denies any additional needs at this time and has no complaints or questions. Room assessed for safety measures and cleanliness, no action needed at this time. The bed is in low, locked position with side rails up x 2. Personal belongings and call light in reach. Patient is oriented to call light use. Patient verbalizes will call nurse if assistance is needed. Will continue to monitor.

## 2019-01-30 NOTE — ED NOTES
Hourly rounding performed at this time. Patient is in bed awake and alert, resting, reports pain is still  7/10. Pain med to be admin. Assessed for need of repositioning and given urinal for toileting. Provided pillow for comfort.  Updated patient on current status, discussed care plan, patient denies any additional needs at this time and has no complaints or questions. Room assessed for safety measures and cleanliness, no action needed at this time. The bed is in low, locked position with side rails up x 2. Personal belongings and call light in reach. Patient is oriented to call light use. Patient verbalizes will call nurse if assistance is needed. Will continue to monitor.

## 2019-01-30 NOTE — ED NOTES
Pt's first and last name and birthday confirmed.   LOC: The patient is awake, alert and aware of environment with an appropriate affect, the patient is oriented x 3 and speaking appropriately.  APPEARANCE: Patient resting comfortably and in no acute distress, patient is clean and well groomed.  SKIN: The skin is warm and dry, patient has normal skin turgor and moist mucus membranes, skin intact, no breakdown or brusing noted.  MUSKULOSKELETAL: Patient unable to raise left arm, no obvious swelling or deformities noted. Tenderness and bruising noted to the left rib cage.   RESPIRATORY: Airway is open and patent, respirations are spontaneous, patient has a normal effort and rate. Breath sounds are clear and equal bilaterally.  CARDIAC: Normal heart sounds. No peripheral edema.  ABDOMEN: Soft and non tender to palpation, no distention noted. Bowel sounds present.   NEURO: No neuro deficits, hand grasp equal, no drift noted, no facial droop noted. Speech is clear.

## 2019-07-28 ENCOUNTER — ANESTHESIA EVENT (OUTPATIENT)
Dept: SURGERY | Facility: HOSPITAL | Age: 64
DRG: 481 | End: 2019-07-28
Payer: MEDICAID

## 2019-07-28 ENCOUNTER — HOSPITAL ENCOUNTER (INPATIENT)
Facility: HOSPITAL | Age: 64
LOS: 5 days | Discharge: NURSING, REHAB OR LONG-TERM CARE | DRG: 481 | End: 2019-08-02
Attending: EMERGENCY MEDICINE | Admitting: HOSPITALIST
Payer: MEDICAID

## 2019-07-28 DIAGNOSIS — S72.001A CLOSED FRACTURE OF NECK OF RIGHT FEMUR, INITIAL ENCOUNTER: Primary | ICD-10-CM

## 2019-07-28 DIAGNOSIS — K70.30 ALCOHOLIC CIRRHOSIS OF LIVER WITHOUT ASCITES: Chronic | ICD-10-CM

## 2019-07-28 DIAGNOSIS — S72.001A CLOSED DISPLACED FRACTURE OF RIGHT FEMORAL NECK: ICD-10-CM
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
## 2019-07-28 DIAGNOSIS — E80.6 HYPERBILIRUBINEMIA: ICD-10-CM

## 2019-07-28 DIAGNOSIS — Z74.09 IMPAIRED FUNCTIONAL MOBILITY AND ENDURANCE: ICD-10-CM

## 2019-07-28 DIAGNOSIS — R10.31 GROIN PAIN, RIGHT: ICD-10-CM

## 2019-07-28 DIAGNOSIS — Z01.810 HIGH RISK SURGERY, PRE-OPERATIVE CARDIOVASCULAR EXAMINATION: ICD-10-CM

## 2019-07-28 DIAGNOSIS — S72.001D CLOSED FRACTURE OF NECK OF RIGHT FEMUR WITH ROUTINE HEALING, SUBSEQUENT ENCOUNTER: ICD-10-CM

## 2019-07-28 PROBLEM — D68.9 COAGULOPATHY: Status: ACTIVE | Noted: 2019-07-28

## 2019-07-28 PROBLEM — I13.10 BENIGN HYPERTENSIVE HEART AND KIDNEY DISEASE WITH CKD: Status: ACTIVE | Noted: 2019-07-28

## 2019-07-28 PROBLEM — I10 ESSENTIAL HYPERTENSION: Status: ACTIVE | Noted: 2019-07-28

## 2019-07-28 PROBLEM — F41.9 ANXIETY AND DEPRESSION: Status: ACTIVE | Noted: 2019-07-28

## 2019-07-28 PROBLEM — F32.A ANXIETY AND DEPRESSION: Status: ACTIVE | Noted: 2019-07-28

## 2019-07-28 PROBLEM — I85.00 ESOPHAGEAL VARICES WITHOUT BLEEDING: Status: ACTIVE | Noted: 2019-07-28

## 2019-07-28 PROBLEM — F10.939 ALCOHOL WITHDRAWAL: Status: ACTIVE | Noted: 2019-07-28

## 2019-07-28 LAB
ABO + RH BLD: NORMAL
ALBUMIN SERPL BCP-MCNC: 3.4 G/DL (ref 3.5–5.2)
ALP SERPL-CCNC: 110 U/L (ref 55–135)
ALT SERPL W/O P-5'-P-CCNC: 32 U/L (ref 10–44)
ANION GAP SERPL CALC-SCNC: 14 MMOL/L (ref 8–16)
APTT BLDCRRT: 26.8 SEC (ref 21–32)
AST SERPL-CCNC: 71 U/L (ref 10–40)
BACTERIA #/AREA URNS AUTO: ABNORMAL /HPF
BASOPHILS # BLD AUTO: 0.06 K/UL (ref 0–0.2)
BASOPHILS NFR BLD: 1.1 % (ref 0–1.9)
BILIRUB DIRECT SERPL-MCNC: 2.9 MG/DL (ref 0.1–0.3)
BILIRUB SERPL-MCNC: 4.4 MG/DL (ref 0.1–1)
BILIRUB UR QL STRIP: NEGATIVE
BLD GP AB SCN CELLS X3 SERPL QL: NORMAL
BUN SERPL-MCNC: 24 MG/DL (ref 8–23)
CALCIUM SERPL-MCNC: 9.1 MG/DL (ref 8.7–10.5)
CHLORIDE SERPL-SCNC: 97 MMOL/L (ref 95–110)
CLARITY UR REFRACT.AUTO: CLEAR
CO2 SERPL-SCNC: 23 MMOL/L (ref 23–29)
COLOR UR AUTO: YELLOW
CREAT SERPL-MCNC: 3.8 MG/DL (ref 0.5–1.4)
CREAT UR-MCNC: 104 MG/DL (ref 23–375)
CREAT UR-MCNC: 104 MG/DL (ref 23–375)
DIFFERENTIAL METHOD: ABNORMAL
EOSINOPHIL # BLD AUTO: 0.1 K/UL (ref 0–0.5)
EOSINOPHIL NFR BLD: 2.1 % (ref 0–8)
EOSINOPHIL URNS QL WRIGHT STN: NORMAL
ERYTHROCYTE [DISTWIDTH] IN BLOOD BY AUTOMATED COUNT: 17.5 % (ref 11.5–14.5)
EST. GFR  (AFRICAN AMERICAN): 18.2 ML/MIN/1.73 M^2
EST. GFR  (NON AFRICAN AMERICAN): 15.8 ML/MIN/1.73 M^2
ESTIMATED AVG GLUCOSE: 74 MG/DL (ref 68–131)
GLUCOSE SERPL-MCNC: 94 MG/DL (ref 70–110)
GLUCOSE UR QL STRIP: NEGATIVE
HBA1C MFR BLD HPLC: 4.2 % (ref 4–5.6)
HCT VFR BLD AUTO: 34.8 % (ref 40–54)
HGB BLD-MCNC: 11.8 G/DL (ref 14–18)
HGB UR QL STRIP: ABNORMAL
HYALINE CASTS UR QL AUTO: 1 /LPF
IMM GRANULOCYTES # BLD AUTO: 0.03 K/UL (ref 0–0.04)
IMM GRANULOCYTES NFR BLD AUTO: 0.5 % (ref 0–0.5)
INR PPP: 1.4 (ref 0.8–1.2)
KETONES UR QL STRIP: NEGATIVE
LEUKOCYTE ESTERASE UR QL STRIP: NEGATIVE
LYMPHOCYTES # BLD AUTO: 1.1 K/UL (ref 1–4.8)
LYMPHOCYTES NFR BLD: 18.9 % (ref 18–48)
MCH RBC QN AUTO: 32.6 PG (ref 27–31)
MCHC RBC AUTO-ENTMCNC: 33.9 G/DL (ref 32–36)
MCV RBC AUTO: 96 FL (ref 82–98)
MICROSCOPIC COMMENT: ABNORMAL
MONOCYTES # BLD AUTO: 0.6 K/UL (ref 0.3–1)
MONOCYTES NFR BLD: 9.7 % (ref 4–15)
NEUTROPHILS # BLD AUTO: 3.8 K/UL (ref 1.8–7.7)
NEUTROPHILS NFR BLD: 67.7 % (ref 38–73)
NITRITE UR QL STRIP: NEGATIVE
NRBC BLD-RTO: 0 /100 WBC
PH UR STRIP: 5 [PH] (ref 5–8)
PLATELET # BLD AUTO: 82 K/UL (ref 150–350)
PMV BLD AUTO: 11.7 FL (ref 9.2–12.9)
POTASSIUM SERPL-SCNC: 4.3 MMOL/L (ref 3.5–5.1)
PREALB SERPL-MCNC: 14 MG/DL (ref 20–43)
PROT SERPL-MCNC: 8.2 G/DL (ref 6–8.4)
PROT UR QL STRIP: NEGATIVE
PROT UR-MCNC: 16 MG/DL (ref 0–15)
PROT/CREAT UR: 0.15 MG/G{CREAT} (ref 0–0.2)
PROTHROMBIN TIME: 14 SEC (ref 9–12.5)
RBC # BLD AUTO: 3.62 M/UL (ref 4.6–6.2)
RBC #/AREA URNS AUTO: 5 /HPF (ref 0–4)
SODIUM SERPL-SCNC: 134 MMOL/L (ref 136–145)
SODIUM UR-SCNC: 25 MMOL/L (ref 20–250)
SP GR UR STRIP: 1 (ref 1–1.03)
SQUAMOUS #/AREA URNS AUTO: 0 /HPF
TRANSFERRIN SERPL-MCNC: 222 MG/DL (ref 200–375)
URN SPEC COLLECT METH UR: ABNORMAL
UUN UR-MCNC: 168 MG/DL (ref 140–1050)
WBC # BLD AUTO: 5.67 K/UL (ref 3.9–12.7)

## 2019-07-28 PROCEDURE — 99223 PR INITIAL HOSPITAL CARE,LEVL III: ICD-10-PCS | Mod: ,,, | Performed by: HOSPITALIST

## 2019-07-28 PROCEDURE — 63600175 PHARM REV CODE 636 W HCPCS: Performed by: HOSPITALIST

## 2019-07-28 PROCEDURE — 84134 ASSAY OF PREALBUMIN: CPT

## 2019-07-28 PROCEDURE — 63600175 PHARM REV CODE 636 W HCPCS: Performed by: PHYSICIAN ASSISTANT

## 2019-07-28 PROCEDURE — 93010 ELECTROCARDIOGRAM REPORT: CPT | Mod: ,,, | Performed by: INTERNAL MEDICINE

## 2019-07-28 PROCEDURE — 83036 HEMOGLOBIN GLYCOSYLATED A1C: CPT

## 2019-07-28 PROCEDURE — 99285 PR EMERGENCY DEPT VISIT,LEVEL V: ICD-10-PCS | Mod: ,,, | Performed by: EMERGENCY MEDICINE

## 2019-07-28 PROCEDURE — 99285 EMERGENCY DEPT VISIT HI MDM: CPT

## 2019-07-28 PROCEDURE — 25000003 PHARM REV CODE 250: Performed by: HOSPITALIST

## 2019-07-28 PROCEDURE — 82570 ASSAY OF URINE CREATININE: CPT

## 2019-07-28 PROCEDURE — 84300 ASSAY OF URINE SODIUM: CPT

## 2019-07-28 PROCEDURE — 80053 COMPREHEN METABOLIC PANEL: CPT

## 2019-07-28 PROCEDURE — 85610 PROTHROMBIN TIME: CPT

## 2019-07-28 PROCEDURE — 81001 URINALYSIS AUTO W/SCOPE: CPT

## 2019-07-28 PROCEDURE — 85025 COMPLETE CBC W/AUTO DIFF WBC: CPT

## 2019-07-28 PROCEDURE — 11000001 HC ACUTE MED/SURG PRIVATE ROOM

## 2019-07-28 PROCEDURE — 84466 ASSAY OF TRANSFERRIN: CPT

## 2019-07-28 PROCEDURE — 93010 EKG 12-LEAD: ICD-10-PCS | Mod: ,,, | Performed by: INTERNAL MEDICINE

## 2019-07-28 PROCEDURE — 86850 RBC ANTIBODY SCREEN: CPT

## 2019-07-28 PROCEDURE — 93005 ELECTROCARDIOGRAM TRACING: CPT

## 2019-07-28 PROCEDURE — 99285 EMERGENCY DEPT VISIT HI MDM: CPT | Mod: ,,, | Performed by: EMERGENCY MEDICINE

## 2019-07-28 PROCEDURE — 99223 1ST HOSP IP/OBS HIGH 75: CPT | Mod: ,,, | Performed by: HOSPITALIST

## 2019-07-28 PROCEDURE — 82248 BILIRUBIN DIRECT: CPT

## 2019-07-28 PROCEDURE — 87205 SMEAR GRAM STAIN: CPT

## 2019-07-28 PROCEDURE — 85730 THROMBOPLASTIN TIME PARTIAL: CPT

## 2019-07-28 PROCEDURE — 84540 ASSAY OF URINE/UREA-N: CPT

## 2019-07-28 PROCEDURE — 96360 HYDRATION IV INFUSION INIT: CPT

## 2019-07-28 RX ORDER — BISACODYL 10 MG
10 SUPPOSITORY, RECTAL RECTAL DAILY PRN
Status: DISCONTINUED | OUTPATIENT
Start: 2019-07-28 | End: 2019-08-02 | Stop reason: HOSPADM

## 2019-07-28 RX ORDER — IBUPROFEN 200 MG
24 TABLET ORAL
Status: DISCONTINUED | OUTPATIENT
Start: 2019-07-28 | End: 2019-08-02 | Stop reason: HOSPADM

## 2019-07-28 RX ORDER — OXYCODONE HYDROCHLORIDE 5 MG/1
10 TABLET ORAL
Status: DISCONTINUED | OUTPATIENT
Start: 2019-07-28 | End: 2019-07-28

## 2019-07-28 RX ORDER — GLUCAGON 1 MG
1 KIT INJECTION
Status: DISCONTINUED | OUTPATIENT
Start: 2019-07-28 | End: 2019-08-02 | Stop reason: HOSPADM

## 2019-07-28 RX ORDER — OXYCODONE HYDROCHLORIDE 5 MG/1
5 TABLET ORAL EVERY 6 HOURS PRN
Status: DISCONTINUED | OUTPATIENT
Start: 2019-07-28 | End: 2019-07-29

## 2019-07-28 RX ORDER — SODIUM CHLORIDE 0.9 % (FLUSH) 0.9 %
5 SYRINGE (ML) INJECTION
Status: DISCONTINUED | OUTPATIENT
Start: 2019-07-28 | End: 2019-08-01

## 2019-07-28 RX ORDER — PANTOPRAZOLE SODIUM 40 MG/1
40 TABLET, DELAYED RELEASE ORAL DAILY
Status: DISCONTINUED | OUTPATIENT
Start: 2019-07-29 | End: 2019-08-02 | Stop reason: HOSPADM

## 2019-07-28 RX ORDER — MORPHINE SULFATE 4 MG/ML
4 INJECTION, SOLUTION INTRAMUSCULAR; INTRAVENOUS EVERY 4 HOURS PRN
Status: DISCONTINUED | OUTPATIENT
Start: 2019-07-28 | End: 2019-07-28

## 2019-07-28 RX ORDER — ONDANSETRON 2 MG/ML
4 INJECTION INTRAMUSCULAR; INTRAVENOUS EVERY 12 HOURS PRN
Status: DISCONTINUED | OUTPATIENT
Start: 2019-07-28 | End: 2019-08-02 | Stop reason: HOSPADM

## 2019-07-28 RX ORDER — TAMSULOSIN HYDROCHLORIDE 0.4 MG/1
0.4 CAPSULE ORAL DAILY
Status: DISCONTINUED | OUTPATIENT
Start: 2019-07-29 | End: 2019-08-02 | Stop reason: HOSPADM

## 2019-07-28 RX ORDER — IBUPROFEN 200 MG
16 TABLET ORAL
Status: DISCONTINUED | OUTPATIENT
Start: 2019-07-28 | End: 2019-08-02 | Stop reason: HOSPADM

## 2019-07-28 RX ORDER — SODIUM CHLORIDE 9 MG/ML
INJECTION, SOLUTION INTRAVENOUS CONTINUOUS
Status: DISCONTINUED | OUTPATIENT
Start: 2019-07-28 | End: 2019-07-29

## 2019-07-28 RX ORDER — PROPRANOLOL HYDROCHLORIDE 10 MG/1
10 TABLET ORAL 2 TIMES DAILY
Status: DISCONTINUED | OUTPATIENT
Start: 2019-07-28 | End: 2019-08-01

## 2019-07-28 RX ORDER — NIFEDIPINE 30 MG/1
30 TABLET, EXTENDED RELEASE ORAL DAILY
Status: DISCONTINUED | OUTPATIENT
Start: 2019-07-29 | End: 2019-07-28

## 2019-07-28 RX ORDER — PREGABALIN 75 MG/1
75 CAPSULE ORAL NIGHTLY
Status: DISCONTINUED | OUTPATIENT
Start: 2019-07-28 | End: 2019-08-02 | Stop reason: HOSPADM

## 2019-07-28 RX ORDER — HYDROXYZINE PAMOATE 25 MG/1
25 CAPSULE ORAL 2 TIMES DAILY
Status: DISCONTINUED | OUTPATIENT
Start: 2019-07-28 | End: 2019-07-30

## 2019-07-28 RX ORDER — VENLAFAXINE HYDROCHLORIDE 75 MG/1
CAPSULE, EXTENDED RELEASE ORAL
Status: ON HOLD | COMMUNITY
Start: 2019-06-28 | End: 2019-08-02 | Stop reason: HOSPADM

## 2019-07-28 RX ORDER — OXYCODONE HYDROCHLORIDE 5 MG/1
5 TABLET ORAL
Status: DISCONTINUED | OUTPATIENT
Start: 2019-07-28 | End: 2019-07-28

## 2019-07-28 RX ORDER — MIRTAZAPINE 15 MG/1
TABLET, FILM COATED ORAL NIGHTLY
COMMUNITY
Start: 2019-06-28 | End: 2023-07-19

## 2019-07-28 RX ORDER — SODIUM CHLORIDE 0.9 % (FLUSH) 0.9 %
10 SYRINGE (ML) INJECTION
Status: DISCONTINUED | OUTPATIENT
Start: 2019-07-28 | End: 2019-08-02 | Stop reason: HOSPADM

## 2019-07-28 RX ORDER — HYDROXYZINE PAMOATE 25 MG/1
CAPSULE ORAL 2 TIMES DAILY
Status: ON HOLD | COMMUNITY
Start: 2019-06-28 | End: 2019-08-02 | Stop reason: HOSPADM

## 2019-07-28 RX ORDER — MIRTAZAPINE 15 MG/1
15 TABLET, FILM COATED ORAL NIGHTLY
Status: DISCONTINUED | OUTPATIENT
Start: 2019-07-28 | End: 2019-08-02 | Stop reason: HOSPADM

## 2019-07-28 RX ORDER — FOLIC ACID 1 MG/1
1 TABLET ORAL DAILY
Status: DISCONTINUED | OUTPATIENT
Start: 2019-07-29 | End: 2019-08-02 | Stop reason: HOSPADM

## 2019-07-28 RX ORDER — ACETAMINOPHEN 500 MG
1000 TABLET ORAL EVERY 8 HOURS
Status: DISCONTINUED | OUTPATIENT
Start: 2019-07-28 | End: 2019-07-29

## 2019-07-28 RX ORDER — VENLAFAXINE HYDROCHLORIDE 75 MG/1
75 CAPSULE, EXTENDED RELEASE ORAL DAILY
Status: DISCONTINUED | OUTPATIENT
Start: 2019-07-28 | End: 2019-07-29

## 2019-07-28 RX ORDER — HYDROMORPHONE HYDROCHLORIDE 1 MG/ML
0.5 INJECTION, SOLUTION INTRAMUSCULAR; INTRAVENOUS; SUBCUTANEOUS EVERY 4 HOURS PRN
Status: DISCONTINUED | OUTPATIENT
Start: 2019-07-28 | End: 2019-07-29

## 2019-07-28 RX ORDER — OXYCODONE HYDROCHLORIDE 5 MG/1
10 TABLET ORAL EVERY 6 HOURS PRN
Status: DISCONTINUED | OUTPATIENT
Start: 2019-07-28 | End: 2019-07-29

## 2019-07-28 RX ORDER — DIAZEPAM 5 MG/1
10 TABLET ORAL EVERY 8 HOURS
Status: DISCONTINUED | OUTPATIENT
Start: 2019-07-28 | End: 2019-07-29

## 2019-07-28 RX ADMIN — HYDROXYZINE PAMOATE 25 MG: 25 CAPSULE ORAL at 09:07

## 2019-07-28 RX ADMIN — SODIUM CHLORIDE 1000 ML: 0.9 INJECTION, SOLUTION INTRAVENOUS at 02:07

## 2019-07-28 RX ADMIN — OXYCODONE HYDROCHLORIDE 10 MG: 5 TABLET ORAL at 05:07

## 2019-07-28 RX ADMIN — VENLAFAXINE HYDROCHLORIDE 75 MG: 75 CAPSULE, EXTENDED RELEASE ORAL at 05:07

## 2019-07-28 RX ADMIN — PREGABALIN 75 MG: 75 CAPSULE ORAL at 09:07

## 2019-07-28 RX ADMIN — PROPRANOLOL HYDROCHLORIDE 10 MG: 10 TABLET ORAL at 09:07

## 2019-07-28 RX ADMIN — HYDROMORPHONE HYDROCHLORIDE 0.5 MG: 1 INJECTION, SOLUTION INTRAMUSCULAR; INTRAVENOUS; SUBCUTANEOUS at 11:07

## 2019-07-28 RX ADMIN — SODIUM CHLORIDE: 0.9 INJECTION, SOLUTION INTRAVENOUS at 04:07

## 2019-07-28 RX ADMIN — FOLIC ACID: 5 INJECTION, SOLUTION INTRAMUSCULAR; INTRAVENOUS; SUBCUTANEOUS at 04:07

## 2019-07-28 RX ADMIN — DIAZEPAM 10 MG: 5 TABLET ORAL at 04:07

## 2019-07-28 RX ADMIN — MIRTAZAPINE 15 MG: 15 TABLET, FILM COATED ORAL at 09:07

## 2019-07-28 RX ADMIN — DIAZEPAM 10 MG: 5 TABLET ORAL at 09:07

## 2019-07-28 RX ADMIN — ACETAMINOPHEN 1000 MG: 500 TABLET ORAL at 10:07

## 2019-07-28 RX ADMIN — MORPHINE SULFATE 4 MG: 4 INJECTION INTRAVENOUS at 06:07

## 2019-07-28 NOTE — ED TRIAGE NOTES
"Fransico Montalvo, a 63 y.o. male presents to the ED w/ complaint of groin and L elbow injury with fall yesterday. Denies hitting head or LOC. Pt had low BP in triage and reports feeling "wonky" within the last week after BP dosage change. Pt reports being outside in the heat yesterday.    Triage note:  Chief Complaint   Patient presents with    Fall     Fall yesterday states hurt groin and L elbow     Review of patient's allergies indicates:  No Known Allergies  Past Medical History:   Diagnosis Date    Alcohol abuse     BPH (benign prostatic hyperplasia)     Cirrhosis 06/21/2018    pt states that he was diagnosed a week ago during his last hospital visit    Gastritis     Hypertension     Renal disorder      Adult Physical Assessment  LOC: Fransico Montalvo, 63 y.o. male verified via two identifiers.  The patient is awake, alert, oriented and speaking appropriately at this time.  APPEARANCE: Patient resting comfortably and appears to be in no acute distress at this time. Patient is clean and well groomed, patient's clothing is properly fastened.  SKIN:The skin is warm and dry, color consistent with ethnicity, patient has normal skin turgor and moist mucus membranes, skin intact, no breakdown or brusing noted.  MUSCULOSKELETAL: Patient moving all extremities well, no obvious swelling or deformities noted. Left elbow and right groin pain. Pedal pulses 2+.  RESPIRATORY: Airway is open and patent, respirations are spontaneous, patient has a normal effort and rate, no accessory muscle use noted.  CARDIAC: Patient has a normal rate and rhythm, no periphreal edema noted in any extremity, capillary refill < 3 seconds in all extremities  ABDOMEN: Soft and non tender to palpation, no abdominal distention noted. Bowel sounds present in all four quadrants.  NEUROLOGIC: Eyes open spontaneously, behavior appropriate to situation, follows commands, facial expression symmetrical, bilateral hand grasp equal and even, purposeful motor " response noted, normal sensation in all extremities when touched with a finger.
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
## 2019-07-28 NOTE — ED NOTES
Called war room again to check on tele box. They are still trying to collect unused boxes from the floor. Should be sending soon.
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
## 2019-07-28 NOTE — ED NOTES
"Tele box requested. War room states "it might be a while. Our runner is on the floor trying to collect boxes".  "

## 2019-07-28 NOTE — SUBJECTIVE & OBJECTIVE
Past Medical History:   Diagnosis Date    Alcohol abuse     BPH (benign prostatic hyperplasia)     Cirrhosis 06/21/2018    pt states that he was diagnosed a week ago during his last hospital visit    Gastritis     Hypertension     Renal disorder        History reviewed. No pertinent surgical history.    Review of patient's allergies indicates:  No Known Allergies    Current Facility-Administered Medications   Medication    0.9%  NaCl infusion    acetaminophen tablet 1,000 mg    bisacodyl suppository 10 mg    dextrose 10% (D10W) Bolus    dextrose 10% (D10W) Bolus    diazePAM tablet 10 mg    [START ON 7/29/2019] folic acid tablet 1 mg    glucagon (human recombinant) injection 1 mg    glucose chewable tablet 16 g    glucose chewable tablet 24 g    hydrOXYzine pamoate capsule 25 mg    mirtazapine tablet 15 mg    [START ON 7/29/2019] multivitamin tablet 1 tablet    ondansetron injection 4 mg    oxyCODONE immediate release tablet 10 mg    oxyCODONE immediate release tablet 5 mg    [START ON 7/29/2019] pantoprazole EC tablet 40 mg    pregabalin capsule 75 mg    promethazine (PHENERGAN) 6.25 mg in dextrose 5 % 50 mL IVPB    propranolol tablet 10 mg    sodium chloride 0.9% flush 10 mL    sodium chloride 0.9% flush 5 mL    [START ON 7/29/2019] tamsulosin 24 hr capsule 0.4 mg    venlafaxine 24 hr capsule 75 mg     Current Outpatient Medications   Medication Sig    hydrOXYzine pamoate (VISTARIL) 25 MG Cap 2 (two) times daily. 1 cap daily, and 1 cap nightly with remeron for sleep    mirtazapine (REMERON) 15 MG tablet every evening.     omeprazole (PRILOSEC) 20 MG capsule Take 2 capsules (40 mg total) by mouth once daily. (Patient taking differently: Take 40 mg by mouth as needed. )    polyethylene glycol (GLYCOLAX) 17 gram PwPk Take by mouth daily as needed.    tamsulosin (FLOMAX) 0.4 mg Cap Take 0.4 mg by mouth once daily.    venlafaxine (EFFEXOR-XR) 75 MG 24 hr capsule     famotidine  (PEPCID) 20 MG tablet Take 1 tablet (20 mg total) by mouth 2 (two) times daily.    HYDROcodone-acetaminophen (NORCO) 7.5-325 mg per tablet Take 1 tablet by mouth every 6 (six) hours as needed for Pain.    sucralfate (CARAFATE) 100 mg/mL suspension Take 10 mLs (1 g total) by mouth 4 (four) times daily.     Family History     Problem Relation (Age of Onset)    Heart disease Father, Brother    Hypertension Father        Tobacco Use    Smoking status: Current Every Day Smoker     Packs/day: 1.00     Types: Cigarettes    Smokeless tobacco: Never Used   Substance and Sexual Activity    Alcohol use: Yes     Comment: (former drinker; 2months sober) this visit admits drinking a couple beers today    Drug use: No    Sexual activity: Not on file     Review of Systems     Objective:     Vital Signs (Most Recent):  Temp: 98.4 °F (36.9 °C) (07/28/19 1307)  Pulse: 78 (07/28/19 1655)  Resp: 17 (07/28/19 1655)  BP: (!) 166/97 (07/28/19 1655)  SpO2: 98 % (07/28/19 1655) Vital Signs (24h Range):  Temp:  [98.4 °F (36.9 °C)] 98.4 °F (36.9 °C)  Pulse:  [73-81] 78  Resp:  [15-17] 17  SpO2:  [96 %-100 %] 98 %  BP: ()/() 166/97     Weight: 90.7 kg (200 lb)  Height: 6' (182.9 cm)  Body mass index is 27.12 kg/m².    PE:  Gen:  No acute distress  CV:  Peripherally well-perfused.    Lungs:  Normal respiratory effort.  Head/Neck:  Normocephalic.  Atraumatic.     Ortho/SPM Exam   LLE:  Small abrasion over elbow  No bony TTP   Painless FROM shoulder, elbow and wrist  SILT M/U/R/axillary  Motor intact AIN/PIN/M/U/R   Cap refill < 2s  2+ RP      RLE:  Extremity slightly shortened and externally rotated  Skin intact  No edema/erythema/signs of infection  TTP about R groin/hip  Compartments soft  ROM limited by pain  SILT Sa/Bridges/DP/SP/T  Motor intact EHL/FHL/TA/Gastroc  2+ DP, 2+ PT      All other joints (shoulder/elbow/wrist/hip/knee/ankle) were examined and had full ROM and were non-tender to palpation.      Significant Labs:    CBC:   Recent Labs   Lab 07/28/19  1401   WBC 5.67   HGB 11.8*   HCT 34.8*   PLT 82*     CMP:   Recent Labs   Lab 07/28/19  1401   *   K 4.3   CL 97   CO2 23   GLU 94   BUN 24*   CREATININE 3.8*   CALCIUM 9.1   PROT 8.2   ALBUMIN 3.4*   BILITOT 4.4*   ALKPHOS 110   AST 71*   ALT 32   ANIONGAP 14   EGFRNONAA 15.8*     Coagulation:   Recent Labs   Lab 07/28/19  1406   LABPROT 14.0*   INR 1.4*   APTT 26.8     Prealbumin:   Recent Labs   Lab 07/28/19  1401   PREALBUMIN 14*     Urine Studies:   Recent Labs   Lab 07/28/19  1656 07/28/19  1657   COLORU Yellow  --    APPEARANCEUA Clear  --    PHUR 5.0  --    SPECGRAV 1.005  --    PROTEINUA Negative  --    GLUCUA Negative  --    KETONESU Negative  --    BILIRUBINUA Negative  --    OCCULTUA 1+*  --    NITRITE Negative  --    LEUKOCYTESUR Negative  --    RBCUA  --  5*   BACTERIA  --  Rare   SQUAMEPITHEL  --  0   HYALINECASTS  --  1     All pertinent labs within the past 24 hours have been reviewed.    Significant Imaging: X-Ray: I have reviewed all pertinent results/findings and my personal findings are:  right femoral neck fracture     R valgus impacted FNFx

## 2019-07-28 NOTE — ASSESSMENT & PLAN NOTE
Fransico Montalvo is a 63 y.o. male with a Right femoral neck fracture    - Admit to Medicine hip fracture service for pre-operative clearance and medical optimization  -will plan for operative fixation of R femoral neck fracture with closed reduction and percutaneous pinning  -Consents obtained from patient  -Pre-operative labs and imaging obtained in ED (UA, Type and Cross, PT-INR, CBC, BMP, PreAlbumin, CXR, EKG)   - INR 1.4, Plt 82, Hg 11.8, BUN 24, Cr 3.8  - patient consented for blood products- FFP, pRBC, platelets held   - perioperative ancef ordered  - Patient refused austin   -Bed rest, NPO at midnight      Risks and complications were discussed including but not limited to the risks of anesthetic complications, infection, wound healing complications, non-union, mal-union, hardware failure, pain, stiffness, avascular necrosis of femoral head, need for further surgery,  DVT, pulmonary embolism, perioperative medical risks (cardiac, pulmonary, renal, neurologic), and death among others were discussed. No guarantees were made and the patient and family elect to proceed. They fully understand the reported 30% mortality risk within the first year of surgery.
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
## 2019-07-29 ENCOUNTER — ANESTHESIA (OUTPATIENT)
Dept: SURGERY | Facility: HOSPITAL | Age: 64
DRG: 481 | End: 2019-07-29
Payer: MEDICAID

## 2019-07-29 PROBLEM — K80.20 CHOLELITHIASES: Status: ACTIVE | Noted: 2019-07-29

## 2019-07-29 PROBLEM — R74.01 TRANSAMINITIS: Status: ACTIVE | Noted: 2019-07-29

## 2019-07-29 PROBLEM — D53.9 MACROCYTIC ANEMIA: Status: ACTIVE | Noted: 2019-07-29

## 2019-07-29 PROBLEM — D62 ACUTE BLOOD LOSS ANEMIA: Status: ACTIVE | Noted: 2019-07-29

## 2019-07-29 PROBLEM — N17.9 AKI (ACUTE KIDNEY INJURY): Status: ACTIVE | Noted: 2019-07-29

## 2019-07-29 PROBLEM — R17 ELEVATED BILIRUBIN: Status: ACTIVE | Noted: 2019-07-29

## 2019-07-29 PROBLEM — S72.001A CLOSED FRACTURE OF NECK OF RIGHT FEMUR: Status: ACTIVE | Noted: 2019-07-29

## 2019-07-29 PROBLEM — E83.42 HYPOMAGNESEMIA: Status: ACTIVE | Noted: 2019-07-29

## 2019-07-29 PROBLEM — Q61.02 MULTIPLE RENAL CYSTS: Status: ACTIVE | Noted: 2019-07-29

## 2019-07-29 LAB
ALBUMIN SERPL BCP-MCNC: 2.8 G/DL (ref 3.5–5.2)
ALP SERPL-CCNC: 91 U/L (ref 55–135)
ALT SERPL W/O P-5'-P-CCNC: 25 U/L (ref 10–44)
AMMONIA PLAS-SCNC: 45 UMOL/L (ref 10–50)
ANION GAP SERPL CALC-SCNC: 10 MMOL/L (ref 8–16)
ANION GAP SERPL CALC-SCNC: 8 MMOL/L (ref 8–16)
AST SERPL-CCNC: 54 U/L (ref 10–40)
BASOPHILS # BLD AUTO: 0.04 K/UL (ref 0–0.2)
BASOPHILS # BLD AUTO: 0.04 K/UL (ref 0–0.2)
BASOPHILS # BLD AUTO: 0.05 K/UL (ref 0–0.2)
BASOPHILS NFR BLD: 0.8 % (ref 0–1.9)
BASOPHILS NFR BLD: 0.8 % (ref 0–1.9)
BASOPHILS NFR BLD: 1 % (ref 0–1.9)
BILIRUB SERPL-MCNC: 2.3 MG/DL (ref 0.1–1)
BLD PROD TYP BPU: NORMAL
BLOOD UNIT EXPIRATION DATE: NORMAL
BLOOD UNIT TYPE CODE: 6200
BLOOD UNIT TYPE: NORMAL
BUN SERPL-MCNC: 26 MG/DL (ref 8–23)
BUN SERPL-MCNC: 28 MG/DL (ref 8–23)
CALCIUM SERPL-MCNC: 7.8 MG/DL (ref 8.7–10.5)
CALCIUM SERPL-MCNC: 8 MG/DL (ref 8.7–10.5)
CHLORIDE SERPL-SCNC: 102 MMOL/L (ref 95–110)
CHLORIDE SERPL-SCNC: 104 MMOL/L (ref 95–110)
CO2 SERPL-SCNC: 22 MMOL/L (ref 23–29)
CO2 SERPL-SCNC: 24 MMOL/L (ref 23–29)
CODING SYSTEM: NORMAL
CREAT SERPL-MCNC: 2.9 MG/DL (ref 0.5–1.4)
CREAT SERPL-MCNC: 3.3 MG/DL (ref 0.5–1.4)
DIFFERENTIAL METHOD: ABNORMAL
DISPENSE STATUS: NORMAL
EOSINOPHIL # BLD AUTO: 0.3 K/UL (ref 0–0.5)
EOSINOPHIL NFR BLD: 5 % (ref 0–8)
EOSINOPHIL NFR BLD: 5 % (ref 0–8)
EOSINOPHIL NFR BLD: 5.4 % (ref 0–8)
ERYTHROCYTE [DISTWIDTH] IN BLOOD BY AUTOMATED COUNT: 17.4 % (ref 11.5–14.5)
EST. GFR  (AFRICAN AMERICAN): 21.6 ML/MIN/1.73 M^2
EST. GFR  (AFRICAN AMERICAN): 25.3 ML/MIN/1.73 M^2
EST. GFR  (NON AFRICAN AMERICAN): 18.7 ML/MIN/1.73 M^2
EST. GFR  (NON AFRICAN AMERICAN): 21.9 ML/MIN/1.73 M^2
FERRITIN SERPL-MCNC: 250 NG/ML (ref 20–300)
FOLATE SERPL-MCNC: 5.8 NG/ML (ref 4–24)
GLUCOSE SERPL-MCNC: 93 MG/DL (ref 70–110)
GLUCOSE SERPL-MCNC: 94 MG/DL (ref 70–110)
HAV IGM SERPL QL IA: NEGATIVE
HBV CORE IGM SERPL QL IA: NEGATIVE
HBV SURFACE AG SERPL QL IA: NEGATIVE
HCT VFR BLD AUTO: 30.5 % (ref 40–54)
HCT VFR BLD AUTO: 34.7 % (ref 40–54)
HCT VFR BLD AUTO: 34.7 % (ref 40–54)
HCV AB SERPL QL IA: NEGATIVE
HGB BLD-MCNC: 10.1 G/DL (ref 14–18)
HGB BLD-MCNC: 11.1 G/DL (ref 14–18)
HGB BLD-MCNC: 11.1 G/DL (ref 14–18)
IMM GRANULOCYTES # BLD AUTO: 0.02 K/UL (ref 0–0.04)
IMM GRANULOCYTES NFR BLD AUTO: 0.4 % (ref 0–0.5)
INR PPP: 1.4 (ref 0.8–1.2)
IRON SERPL-MCNC: 73 UG/DL (ref 45–160)
LYMPHOCYTES # BLD AUTO: 1.4 K/UL (ref 1–4.8)
LYMPHOCYTES # BLD AUTO: 1.4 K/UL (ref 1–4.8)
LYMPHOCYTES # BLD AUTO: 1.6 K/UL (ref 1–4.8)
LYMPHOCYTES NFR BLD: 27.3 % (ref 18–48)
LYMPHOCYTES NFR BLD: 27.3 % (ref 18–48)
LYMPHOCYTES NFR BLD: 31.2 % (ref 18–48)
MAGNESIUM SERPL-MCNC: 1.2 MG/DL (ref 1.6–2.6)
MCH RBC QN AUTO: 33 PG (ref 27–31)
MCH RBC QN AUTO: 33.1 PG (ref 27–31)
MCH RBC QN AUTO: 33.1 PG (ref 27–31)
MCHC RBC AUTO-ENTMCNC: 32 G/DL (ref 32–36)
MCHC RBC AUTO-ENTMCNC: 32 G/DL (ref 32–36)
MCHC RBC AUTO-ENTMCNC: 33.1 G/DL (ref 32–36)
MCV RBC AUTO: 100 FL (ref 82–98)
MCV RBC AUTO: 104 FL (ref 82–98)
MCV RBC AUTO: 104 FL (ref 82–98)
MONOCYTES # BLD AUTO: 0.5 K/UL (ref 0.3–1)
MONOCYTES NFR BLD: 9.1 % (ref 4–15)
MONOCYTES NFR BLD: 9.1 % (ref 4–15)
MONOCYTES NFR BLD: 9.9 % (ref 4–15)
NEUTROPHILS # BLD AUTO: 2.7 K/UL (ref 1.8–7.7)
NEUTROPHILS # BLD AUTO: 3 K/UL (ref 1.8–7.7)
NEUTROPHILS # BLD AUTO: 3 K/UL (ref 1.8–7.7)
NEUTROPHILS NFR BLD: 52.1 % (ref 38–73)
NEUTROPHILS NFR BLD: 57.4 % (ref 38–73)
NEUTROPHILS NFR BLD: 57.4 % (ref 38–73)
NRBC BLD-RTO: 0 /100 WBC
PHOSPHATE SERPL-MCNC: 3.3 MG/DL (ref 2.7–4.5)
PLATELET # BLD AUTO: 62 K/UL (ref 150–350)
PLATELET # BLD AUTO: 97 K/UL (ref 150–350)
PLATELET # BLD AUTO: 97 K/UL (ref 150–350)
PMV BLD AUTO: 10.7 FL (ref 9.2–12.9)
PMV BLD AUTO: 10.7 FL (ref 9.2–12.9)
PMV BLD AUTO: 11.4 FL (ref 9.2–12.9)
POTASSIUM SERPL-SCNC: 4.1 MMOL/L (ref 3.5–5.1)
POTASSIUM SERPL-SCNC: 4.6 MMOL/L (ref 3.5–5.1)
PROT SERPL-MCNC: 6.7 G/DL (ref 6–8.4)
PROTHROMBIN TIME: 13.5 SEC (ref 9–12.5)
RBC # BLD AUTO: 3.06 M/UL (ref 4.6–6.2)
RBC # BLD AUTO: 3.35 M/UL (ref 4.6–6.2)
RBC # BLD AUTO: 3.35 M/UL (ref 4.6–6.2)
SATURATED IRON: 26 % (ref 20–50)
SODIUM SERPL-SCNC: 134 MMOL/L (ref 136–145)
SODIUM SERPL-SCNC: 136 MMOL/L (ref 136–145)
TOTAL IRON BINDING CAPACITY: 286 UG/DL (ref 250–450)
TRANS PLATPHERESIS VOL PATIENT: NORMAL ML
TRANSFERRIN SERPL-MCNC: 193 MG/DL (ref 200–375)
VIT B12 SERPL-MCNC: 826 PG/ML (ref 210–950)
WBC # BLD AUTO: 5.16 K/UL (ref 3.9–12.7)

## 2019-07-29 PROCEDURE — 37000008 HC ANESTHESIA 1ST 15 MINUTES: Performed by: ORTHOPAEDIC SURGERY

## 2019-07-29 PROCEDURE — 63600175 PHARM REV CODE 636 W HCPCS: Performed by: STUDENT IN AN ORGANIZED HEALTH CARE EDUCATION/TRAINING PROGRAM

## 2019-07-29 PROCEDURE — 25000003 PHARM REV CODE 250: Performed by: HOSPITALIST

## 2019-07-29 PROCEDURE — D9220A PRA ANESTHESIA: Mod: CRNA,,, | Performed by: NURSE ANESTHETIST, CERTIFIED REGISTERED

## 2019-07-29 PROCEDURE — 25000003 PHARM REV CODE 250: Performed by: ANESTHESIOLOGY

## 2019-07-29 PROCEDURE — 80321 ALCOHOLS BIOMARKERS 1OR 2: CPT

## 2019-07-29 PROCEDURE — 85025 COMPLETE CBC W/AUTO DIFF WBC: CPT

## 2019-07-29 PROCEDURE — 64999 SUPRAINGUINAL FASCIA ILIACA CATHETER: ICD-10-PCS | Mod: 59,RT,, | Performed by: ANESTHESIOLOGY

## 2019-07-29 PROCEDURE — 99233 PR SUBSEQUENT HOSPITAL CARE,LEVL III: ICD-10-PCS | Mod: 57,,, | Performed by: ORTHOPAEDIC SURGERY

## 2019-07-29 PROCEDURE — 82746 ASSAY OF FOLIC ACID SERUM: CPT

## 2019-07-29 PROCEDURE — 99232 SBSQ HOSP IP/OBS MODERATE 35: CPT | Mod: ,,, | Performed by: HOSPITALIST

## 2019-07-29 PROCEDURE — 76942 ECHO GUIDE FOR BIOPSY: CPT | Performed by: STUDENT IN AN ORGANIZED HEALTH CARE EDUCATION/TRAINING PROGRAM

## 2019-07-29 PROCEDURE — 36000710: Performed by: ORTHOPAEDIC SURGERY

## 2019-07-29 PROCEDURE — 27235 PR PERCUT FIX PROX/NECK FEMUR FX: ICD-10-PCS | Mod: RT,,, | Performed by: ORTHOPAEDIC SURGERY

## 2019-07-29 PROCEDURE — 25000003 PHARM REV CODE 250: Performed by: STUDENT IN AN ORGANIZED HEALTH CARE EDUCATION/TRAINING PROGRAM

## 2019-07-29 PROCEDURE — 83540 ASSAY OF IRON: CPT

## 2019-07-29 PROCEDURE — 64999 UNLISTED PX NERVOUS SYSTEM: CPT | Mod: 59,RT,, | Performed by: ANESTHESIOLOGY

## 2019-07-29 PROCEDURE — 99232 PR SUBSEQUENT HOSPITAL CARE,LEVL II: ICD-10-PCS | Mod: ,,, | Performed by: HOSPITALIST

## 2019-07-29 PROCEDURE — D9220A PRA ANESTHESIA: Mod: ANES,,, | Performed by: ANESTHESIOLOGY

## 2019-07-29 PROCEDURE — 27036 PR RELEASE HIP FLEXION CONTRACTURE: ICD-10-PCS | Mod: 51,RT,, | Performed by: ORTHOPAEDIC SURGERY

## 2019-07-29 PROCEDURE — 82140 ASSAY OF AMMONIA: CPT

## 2019-07-29 PROCEDURE — 94761 N-INVAS EAR/PLS OXIMETRY MLT: CPT

## 2019-07-29 PROCEDURE — 80074 ACUTE HEPATITIS PANEL: CPT

## 2019-07-29 PROCEDURE — 99233 SBSQ HOSP IP/OBS HIGH 50: CPT | Mod: 57,,, | Performed by: ORTHOPAEDIC SURGERY

## 2019-07-29 PROCEDURE — 63600175 PHARM REV CODE 636 W HCPCS: Performed by: HOSPITALIST

## 2019-07-29 PROCEDURE — 82607 VITAMIN B-12: CPT

## 2019-07-29 PROCEDURE — 84100 ASSAY OF PHOSPHORUS: CPT

## 2019-07-29 PROCEDURE — 63600175 PHARM REV CODE 636 W HCPCS: Performed by: NURSE ANESTHETIST, CERTIFIED REGISTERED

## 2019-07-29 PROCEDURE — 71000039 HC RECOVERY, EACH ADD'L HOUR: Performed by: ORTHOPAEDIC SURGERY

## 2019-07-29 PROCEDURE — 71000033 HC RECOVERY, INTIAL HOUR: Performed by: ORTHOPAEDIC SURGERY

## 2019-07-29 PROCEDURE — 76942 SUPRAINGUINAL FASCIA ILIACA CATHETER: ICD-10-PCS | Mod: 26,,, | Performed by: ANESTHESIOLOGY

## 2019-07-29 PROCEDURE — 11000001 HC ACUTE MED/SURG PRIVATE ROOM

## 2019-07-29 PROCEDURE — 63600175 PHARM REV CODE 636 W HCPCS: Performed by: PHYSICIAN ASSISTANT

## 2019-07-29 PROCEDURE — 80053 COMPREHEN METABOLIC PANEL: CPT

## 2019-07-29 PROCEDURE — 27000221 HC OXYGEN, UP TO 24 HOURS

## 2019-07-29 PROCEDURE — 76942 ECHO GUIDE FOR BIOPSY: CPT | Mod: 26,,, | Performed by: ANESTHESIOLOGY

## 2019-07-29 PROCEDURE — 36415 COLL VENOUS BLD VENIPUNCTURE: CPT

## 2019-07-29 PROCEDURE — 64448 NJX AA&/STRD FEM NRV NFS IMG: CPT | Performed by: STUDENT IN AN ORGANIZED HEALTH CARE EDUCATION/TRAINING PROGRAM

## 2019-07-29 PROCEDURE — 37000009 HC ANESTHESIA EA ADD 15 MINS: Performed by: ORTHOPAEDIC SURGERY

## 2019-07-29 PROCEDURE — D9220A PRA ANESTHESIA: ICD-10-PCS | Mod: CRNA,,, | Performed by: NURSE ANESTHETIST, CERTIFIED REGISTERED

## 2019-07-29 PROCEDURE — P9035 PLATELET PHERES LEUKOREDUCED: HCPCS

## 2019-07-29 PROCEDURE — D9220A PRA ANESTHESIA: ICD-10-PCS | Mod: ANES,,, | Performed by: ANESTHESIOLOGY

## 2019-07-29 PROCEDURE — 85610 PROTHROMBIN TIME: CPT

## 2019-07-29 PROCEDURE — 94799 UNLISTED PULMONARY SVC/PX: CPT

## 2019-07-29 PROCEDURE — 83735 ASSAY OF MAGNESIUM: CPT

## 2019-07-29 PROCEDURE — C1769 GUIDE WIRE: HCPCS | Performed by: ORTHOPAEDIC SURGERY

## 2019-07-29 PROCEDURE — 82728 ASSAY OF FERRITIN: CPT

## 2019-07-29 PROCEDURE — C1713 ANCHOR/SCREW BN/BN,TIS/BN: HCPCS | Performed by: ORTHOPAEDIC SURGERY

## 2019-07-29 PROCEDURE — 27036 CAPSULECTOMY/CAPSULOTOMY HIP: CPT | Mod: 51,RT,, | Performed by: ORTHOPAEDIC SURGERY

## 2019-07-29 PROCEDURE — 36000711: Performed by: ORTHOPAEDIC SURGERY

## 2019-07-29 PROCEDURE — 25000003 PHARM REV CODE 250: Performed by: NURSE ANESTHETIST, CERTIFIED REGISTERED

## 2019-07-29 PROCEDURE — 27235 TREAT THIGH FRACTURE: CPT | Mod: RT,,, | Performed by: ORTHOPAEDIC SURGERY

## 2019-07-29 PROCEDURE — 80048 BASIC METABOLIC PNL TOTAL CA: CPT

## 2019-07-29 DEVICE — IMPLANTABLE DEVICE: Type: IMPLANTABLE DEVICE | Site: FEMUR | Status: FUNCTIONAL

## 2019-07-29 DEVICE — SCREW CAN 16TH 7.3X100: Type: IMPLANTABLE DEVICE | Site: FEMUR | Status: FUNCTIONAL

## 2019-07-29 DEVICE — WIRE DRILL TIP: Type: IMPLANTABLE DEVICE | Site: FEMUR | Status: FUNCTIONAL

## 2019-07-29 RX ORDER — POLYETHYLENE GLYCOL 3350 17 G/17G
17 POWDER, FOR SOLUTION ORAL DAILY
Status: DISCONTINUED | OUTPATIENT
Start: 2019-07-29 | End: 2019-07-30

## 2019-07-29 RX ORDER — CEFAZOLIN SODIUM 1 G/3ML
2 INJECTION, POWDER, FOR SOLUTION INTRAMUSCULAR; INTRAVENOUS ONCE
Status: COMPLETED | OUTPATIENT
Start: 2019-07-29 | End: 2019-07-29

## 2019-07-29 RX ORDER — ACETAMINOPHEN 10 MG/ML
1000 INJECTION, SOLUTION INTRAVENOUS ONCE
Status: COMPLETED | OUTPATIENT
Start: 2019-07-29 | End: 2019-07-29

## 2019-07-29 RX ORDER — TRANEXAMIC ACID 100 MG/ML
INJECTION, SOLUTION INTRAVENOUS
Status: DISCONTINUED | OUTPATIENT
Start: 2019-07-29 | End: 2019-07-29

## 2019-07-29 RX ORDER — ACETAMINOPHEN 500 MG
1000 TABLET ORAL EVERY 6 HOURS
Status: DISPENSED | OUTPATIENT
Start: 2019-07-29 | End: 2019-07-31

## 2019-07-29 RX ORDER — AMOXICILLIN 250 MG
1 CAPSULE ORAL 2 TIMES DAILY
Status: DISCONTINUED | OUTPATIENT
Start: 2019-07-29 | End: 2019-08-02 | Stop reason: HOSPADM

## 2019-07-29 RX ORDER — MUPIROCIN 20 MG/G
1 OINTMENT TOPICAL 2 TIMES DAILY
Status: DISCONTINUED | OUTPATIENT
Start: 2019-07-29 | End: 2019-08-02 | Stop reason: HOSPADM

## 2019-07-29 RX ORDER — CEFAZOLIN SODIUM 1 G/3ML
2 INJECTION, POWDER, FOR SOLUTION INTRAMUSCULAR; INTRAVENOUS
Status: COMPLETED | OUTPATIENT
Start: 2019-07-29 | End: 2019-07-30

## 2019-07-29 RX ORDER — OXYCODONE HYDROCHLORIDE 5 MG/1
5 TABLET ORAL
Status: DISCONTINUED | OUTPATIENT
Start: 2019-07-29 | End: 2019-07-29

## 2019-07-29 RX ORDER — TRAMADOL HYDROCHLORIDE 50 MG/1
50 TABLET ORAL EVERY 6 HOURS PRN
Status: DISCONTINUED | OUTPATIENT
Start: 2019-07-29 | End: 2019-08-02 | Stop reason: HOSPADM

## 2019-07-29 RX ORDER — GLYCOPYRROLATE 0.2 MG/ML
INJECTION INTRAMUSCULAR; INTRAVENOUS
Status: DISCONTINUED | OUTPATIENT
Start: 2019-07-29 | End: 2019-07-29

## 2019-07-29 RX ORDER — VASOPRESSIN 20 [USP'U]/ML
INJECTION, SOLUTION INTRAMUSCULAR; SUBCUTANEOUS
Status: DISCONTINUED | OUTPATIENT
Start: 2019-07-29 | End: 2019-07-29

## 2019-07-29 RX ORDER — HYDROCODONE BITARTRATE AND ACETAMINOPHEN 500; 5 MG/1; MG/1
TABLET ORAL
Status: DISCONTINUED | OUTPATIENT
Start: 2019-07-29 | End: 2019-08-01

## 2019-07-29 RX ORDER — MAGNESIUM SULFATE HEPTAHYDRATE 40 MG/ML
2 INJECTION, SOLUTION INTRAVENOUS ONCE
Status: COMPLETED | OUTPATIENT
Start: 2019-07-29 | End: 2019-07-29

## 2019-07-29 RX ORDER — NALOXONE HCL 0.4 MG/ML
0.4 VIAL (ML) INJECTION
Status: DISCONTINUED | OUTPATIENT
Start: 2019-07-29 | End: 2019-08-02 | Stop reason: HOSPADM

## 2019-07-29 RX ORDER — OXYCODONE HYDROCHLORIDE 5 MG/1
10 TABLET ORAL
Status: DISCONTINUED | OUTPATIENT
Start: 2019-07-29 | End: 2019-07-29

## 2019-07-29 RX ORDER — PHENYLEPHRINE HYDROCHLORIDE 10 MG/ML
INJECTION INTRAVENOUS
Status: DISCONTINUED | OUTPATIENT
Start: 2019-07-29 | End: 2019-07-29

## 2019-07-29 RX ORDER — POLYETHYLENE GLYCOL 3350 17 G/17G
17 POWDER, FOR SOLUTION ORAL DAILY
Status: DISCONTINUED | OUTPATIENT
Start: 2019-07-29 | End: 2019-07-29

## 2019-07-29 RX ORDER — FENTANYL CITRATE 50 UG/ML
25 INJECTION, SOLUTION INTRAMUSCULAR; INTRAVENOUS EVERY 5 MIN PRN
Status: DISCONTINUED | OUTPATIENT
Start: 2019-07-29 | End: 2019-07-29

## 2019-07-29 RX ORDER — DIAZEPAM 5 MG/1
5 TABLET ORAL EVERY 12 HOURS
Status: DISCONTINUED | OUTPATIENT
Start: 2019-07-29 | End: 2019-07-31

## 2019-07-29 RX ORDER — CEFAZOLIN SODIUM 1 G/3ML
2 INJECTION, POWDER, FOR SOLUTION INTRAMUSCULAR; INTRAVENOUS
Status: DISCONTINUED | OUTPATIENT
Start: 2019-07-29 | End: 2019-07-29

## 2019-07-29 RX ORDER — SODIUM CHLORIDE 9 MG/ML
INJECTION, SOLUTION INTRAVENOUS CONTINUOUS
Status: DISCONTINUED | OUTPATIENT
Start: 2019-07-29 | End: 2019-08-01

## 2019-07-29 RX ORDER — ROPIVACAINE HYDROCHLORIDE 2 MG/ML
10 INJECTION, SOLUTION EPIDURAL; INFILTRATION; PERINEURAL CONTINUOUS
Status: DISCONTINUED | OUTPATIENT
Start: 2019-07-29 | End: 2019-08-01

## 2019-07-29 RX ORDER — DOCUSATE SODIUM 100 MG/1
100 CAPSULE, LIQUID FILLED ORAL 3 TIMES DAILY
Status: DISCONTINUED | OUTPATIENT
Start: 2019-07-29 | End: 2019-07-29

## 2019-07-29 RX ORDER — SODIUM CHLORIDE 9 MG/ML
INJECTION, SOLUTION INTRAVENOUS CONTINUOUS PRN
Status: DISCONTINUED | OUTPATIENT
Start: 2019-07-29 | End: 2019-07-29

## 2019-07-29 RX ORDER — HYDROMORPHONE HYDROCHLORIDE 1 MG/ML
0.5 INJECTION, SOLUTION INTRAMUSCULAR; INTRAVENOUS; SUBCUTANEOUS EVERY 6 HOURS PRN
Status: DISCONTINUED | OUTPATIENT
Start: 2019-07-29 | End: 2019-07-30

## 2019-07-29 RX ORDER — ROCURONIUM BROMIDE 10 MG/ML
INJECTION, SOLUTION INTRAVENOUS
Status: DISCONTINUED | OUTPATIENT
Start: 2019-07-29 | End: 2019-07-29

## 2019-07-29 RX ORDER — LIDOCAINE HYDROCHLORIDE 10 MG/ML
1 INJECTION, SOLUTION EPIDURAL; INFILTRATION; INTRACAUDAL; PERINEURAL
Status: DISCONTINUED | OUTPATIENT
Start: 2019-07-29 | End: 2019-07-29

## 2019-07-29 RX ORDER — ONDANSETRON 2 MG/ML
INJECTION INTRAMUSCULAR; INTRAVENOUS
Status: DISCONTINUED | OUTPATIENT
Start: 2019-07-29 | End: 2019-07-29

## 2019-07-29 RX ORDER — BUPIVACAINE HYDROCHLORIDE AND EPINEPHRINE 2.5; 5 MG/ML; UG/ML
INJECTION, SOLUTION EPIDURAL; INFILTRATION; INTRACAUDAL; PERINEURAL
Status: COMPLETED | OUTPATIENT
Start: 2019-07-29 | End: 2019-07-29

## 2019-07-29 RX ORDER — MUPIROCIN 20 MG/G
OINTMENT TOPICAL 2 TIMES DAILY
Status: DISCONTINUED | OUTPATIENT
Start: 2019-07-29 | End: 2019-07-29

## 2019-07-29 RX ORDER — HYDROMORPHONE HYDROCHLORIDE 1 MG/ML
0.2 INJECTION, SOLUTION INTRAMUSCULAR; INTRAVENOUS; SUBCUTANEOUS EVERY 5 MIN PRN
Status: DISCONTINUED | OUTPATIENT
Start: 2019-07-29 | End: 2019-07-29 | Stop reason: HOSPADM

## 2019-07-29 RX ORDER — METHOCARBAMOL 500 MG/1
500 TABLET, FILM COATED ORAL EVERY 6 HOURS PRN
Status: DISCONTINUED | OUTPATIENT
Start: 2019-07-29 | End: 2019-08-02 | Stop reason: HOSPADM

## 2019-07-29 RX ORDER — CEFAZOLIN SODIUM 1 G/3ML
INJECTION, POWDER, FOR SOLUTION INTRAMUSCULAR; INTRAVENOUS
Status: DISCONTINUED | OUTPATIENT
Start: 2019-07-29 | End: 2019-07-29

## 2019-07-29 RX ORDER — METHOCARBAMOL 500 MG/1
1000 TABLET, FILM COATED ORAL EVERY 6 HOURS PRN
Status: DISCONTINUED | OUTPATIENT
Start: 2019-07-29 | End: 2019-07-29

## 2019-07-29 RX ORDER — HEPARIN SODIUM 5000 [USP'U]/ML
5000 INJECTION, SOLUTION INTRAVENOUS; SUBCUTANEOUS EVERY 8 HOURS
Status: DISCONTINUED | OUTPATIENT
Start: 2019-07-29 | End: 2019-08-02 | Stop reason: HOSPADM

## 2019-07-29 RX ORDER — NEOSTIGMINE METHYLSULFATE 1 MG/ML
INJECTION, SOLUTION INTRAVENOUS
Status: DISCONTINUED | OUTPATIENT
Start: 2019-07-29 | End: 2019-07-29

## 2019-07-29 RX ORDER — MUPIROCIN 20 MG/G
1 OINTMENT TOPICAL
Status: COMPLETED | OUTPATIENT
Start: 2019-07-29 | End: 2019-07-29

## 2019-07-29 RX ORDER — MIDAZOLAM HYDROCHLORIDE 1 MG/ML
1 INJECTION INTRAMUSCULAR; INTRAVENOUS EVERY 5 MIN PRN
Status: DISCONTINUED | OUTPATIENT
Start: 2019-07-29 | End: 2019-07-29

## 2019-07-29 RX ADMIN — ACETAMINOPHEN 1000 MG: 500 TABLET ORAL at 05:07

## 2019-07-29 RX ADMIN — GLYCOPYRROLATE 0.4 MG: 0.2 INJECTION, SOLUTION INTRAMUSCULAR; INTRAVENOUS at 12:07

## 2019-07-29 RX ADMIN — DIAZEPAM 5 MG: 5 TABLET ORAL at 09:07

## 2019-07-29 RX ADMIN — PREGABALIN 75 MG: 75 CAPSULE ORAL at 09:07

## 2019-07-29 RX ADMIN — FENTANYL CITRATE 50 MCG: 50 INJECTION, SOLUTION INTRAMUSCULAR; INTRAVENOUS at 11:07

## 2019-07-29 RX ADMIN — ROCURONIUM BROMIDE 20 MG: 10 INJECTION, SOLUTION INTRAVENOUS at 11:07

## 2019-07-29 RX ADMIN — MUPIROCIN 1 G: 20 OINTMENT TOPICAL at 10:07

## 2019-07-29 RX ADMIN — HYDROMORPHONE HYDROCHLORIDE 0.5 MG: 1 INJECTION, SOLUTION INTRAMUSCULAR; INTRAVENOUS; SUBCUTANEOUS at 04:07

## 2019-07-29 RX ADMIN — ROPIVACAINE HYDROCHLORIDE 10 ML/HR: 2 INJECTION, SOLUTION EPIDURAL; INFILTRATION at 07:07

## 2019-07-29 RX ADMIN — DIAZEPAM 10 MG: 5 TABLET ORAL at 05:07

## 2019-07-29 RX ADMIN — PROPRANOLOL HYDROCHLORIDE 10 MG: 10 TABLET ORAL at 09:07

## 2019-07-29 RX ADMIN — ONDANSETRON 4 MG: 2 INJECTION INTRAMUSCULAR; INTRAVENOUS at 12:07

## 2019-07-29 RX ADMIN — ACETAMINOPHEN 1000 MG: 500 TABLET ORAL at 06:07

## 2019-07-29 RX ADMIN — MUPIROCIN 1 G: 20 OINTMENT TOPICAL at 09:07

## 2019-07-29 RX ADMIN — VASOPRESSIN 1 UNITS: 20 INJECTION INTRAVENOUS at 12:07

## 2019-07-29 RX ADMIN — SENNOSIDES,DOCUSATE SODIUM 1 TABLET: 8.6; 5 TABLET, FILM COATED ORAL at 09:07

## 2019-07-29 RX ADMIN — SENNOSIDES,DOCUSATE SODIUM 1 TABLET: 8.6; 5 TABLET, FILM COATED ORAL at 01:07

## 2019-07-29 RX ADMIN — METHOCARBAMOL TABLETS 500 MG: 500 TABLET, COATED ORAL at 04:07

## 2019-07-29 RX ADMIN — SODIUM CHLORIDE: 0.9 INJECTION, SOLUTION INTRAVENOUS at 06:07

## 2019-07-29 RX ADMIN — HYDROXYZINE PAMOATE 25 MG: 25 CAPSULE ORAL at 09:07

## 2019-07-29 RX ADMIN — CEFAZOLIN 2 G: 1 INJECTION, POWDER, FOR SOLUTION INTRAMUSCULAR; INTRAVENOUS at 05:07

## 2019-07-29 RX ADMIN — HYDROMORPHONE HYDROCHLORIDE 0.5 MG: 1 INJECTION, SOLUTION INTRAMUSCULAR; INTRAVENOUS; SUBCUTANEOUS at 09:07

## 2019-07-29 RX ADMIN — MIRTAZAPINE 15 MG: 15 TABLET, FILM COATED ORAL at 09:07

## 2019-07-29 RX ADMIN — ROPIVACAINE HYDROCHLORIDE 2 ML/HR: 2 INJECTION, SOLUTION EPIDURAL; INFILTRATION at 01:07

## 2019-07-29 RX ADMIN — HEPARIN SODIUM 5000 UNITS: 5000 INJECTION, SOLUTION INTRAVENOUS; SUBCUTANEOUS at 09:07

## 2019-07-29 RX ADMIN — CEFAZOLIN 2 G: 1 INJECTION, POWDER, FOR SOLUTION INTRAMUSCULAR; INTRAVENOUS at 06:07

## 2019-07-29 RX ADMIN — SODIUM CHLORIDE: 0.9 INJECTION, SOLUTION INTRAVENOUS at 11:07

## 2019-07-29 RX ADMIN — ACETAMINOPHEN 1000 MG: 500 TABLET ORAL at 11:07

## 2019-07-29 RX ADMIN — PHENYLEPHRINE HYDROCHLORIDE 200 MCG: 10 INJECTION INTRAVENOUS at 11:07

## 2019-07-29 RX ADMIN — BUPIVACAINE HYDROCHLORIDE AND EPINEPHRINE BITARTRATE 40 ML: 2.5; .0091 INJECTION, SOLUTION EPIDURAL; INFILTRATION; INTRACAUDAL; PERINEURAL at 12:07

## 2019-07-29 RX ADMIN — DIAZEPAM 10 MG: 5 TABLET ORAL at 01:07

## 2019-07-29 RX ADMIN — CEFAZOLIN 2 G: 330 INJECTION, POWDER, FOR SOLUTION INTRAMUSCULAR; INTRAVENOUS at 11:07

## 2019-07-29 RX ADMIN — SODIUM CHLORIDE, SODIUM GLUCONATE, SODIUM ACETATE, POTASSIUM CHLORIDE, MAGNESIUM CHLORIDE, SODIUM PHOSPHATE, DIBASIC, AND POTASSIUM PHOSPHATE: .53; .5; .37; .037; .03; .012; .00082 INJECTION, SOLUTION INTRAVENOUS at 12:07

## 2019-07-29 RX ADMIN — SODIUM CHLORIDE: 0.9 INJECTION, SOLUTION INTRAVENOUS at 03:07

## 2019-07-29 RX ADMIN — HEPARIN SODIUM 5000 UNITS: 5000 INJECTION, SOLUTION INTRAVENOUS; SUBCUTANEOUS at 03:07

## 2019-07-29 RX ADMIN — ACETAMINOPHEN 1000 MG: 10 INJECTION, SOLUTION INTRAVENOUS at 02:07

## 2019-07-29 RX ADMIN — MAGNESIUM SULFATE IN WATER 2 G: 40 INJECTION, SOLUTION INTRAVENOUS at 10:07

## 2019-07-29 RX ADMIN — TRAMADOL HYDROCHLORIDE 50 MG: 50 TABLET ORAL at 03:07

## 2019-07-29 RX ADMIN — POLYETHYLENE GLYCOL 3350 17 G: 17 POWDER, FOR SOLUTION ORAL at 01:07

## 2019-07-29 RX ADMIN — NEOSTIGMINE METHYLSULFATE 5 MG: 1 INJECTION INTRAVENOUS at 12:07

## 2019-07-29 RX ADMIN — TRANEXAMIC ACID 1000 MG: 100 INJECTION, SOLUTION INTRAVENOUS at 11:07

## 2019-07-29 RX ADMIN — MIDAZOLAM HYDROCHLORIDE 2 MG: 1 INJECTION, SOLUTION INTRAMUSCULAR; INTRAVENOUS at 09:07
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
## 2019-07-29 NOTE — SUBJECTIVE & OBJECTIVE
Principal Problem:Fracture of femoral neck, right, closed    Principal Orthopedic Problem: same    Interval History: Patient seen and examined at bedside.  No acute events overnight.  Pain controlled.       Review of patient's allergies indicates:  No Known Allergies    Current Facility-Administered Medications   Medication    0.9%  NaCl infusion    acetaminophen tablet 1,000 mg    bisacodyl suppository 10 mg    dextrose 10% (D10W) Bolus    dextrose 10% (D10W) Bolus    diazePAM tablet 10 mg    fentaNYL injection 25 mcg    folic acid tablet 1 mg    glucagon (human recombinant) injection 1 mg    glucose chewable tablet 16 g    glucose chewable tablet 24 g    HYDROmorphone injection 0.5 mg    hydrOXYzine pamoate capsule 25 mg    lidocaine (PF) 10 mg/ml (1%) injection 10 mg    midazolam (VERSED) 1 mg/mL injection 1 mg    mirtazapine tablet 15 mg    multivitamin tablet 1 tablet    mupirocin 2 % ointment 1 g    ondansetron injection 4 mg    oxyCODONE immediate release tablet 10 mg    oxyCODONE immediate release tablet 5 mg    pantoprazole EC tablet 40 mg    pregabalin capsule 75 mg    promethazine (PHENERGAN) 6.25 mg in dextrose 5 % 50 mL IVPB    propranolol tablet 10 mg    sodium chloride 0.9% flush 10 mL    sodium chloride 0.9% flush 5 mL    tamsulosin 24 hr capsule 0.4 mg    tranexamic acid (CYKLOKAPRON) 3,000 mg in sodium chloride 0.9% 100 mL    venlafaxine 24 hr capsule 75 mg     Objective:     Vital Signs (Most Recent):  Temp: 98 °F (36.7 °C) (07/29/19 0541)  Pulse: 90 (07/29/19 0541)  Resp: 18 (07/29/19 0541)  BP: 115/86 (07/29/19 0541)  SpO2: 98 % (07/29/19 0541) Vital Signs (24h Range):  Temp:  [96.1 °F (35.6 °C)-98.4 °F (36.9 °C)] 98 °F (36.7 °C)  Pulse:  [73-90] 90  Resp:  [15-21] 18  SpO2:  [94 %-100 %] 98 %  BP: ()/() 115/86     Weight: 90.7 kg (200 lb)  Height: 6' (182.9 cm)  Body mass index is 27.12 kg/m².      Intake/Output Summary (Last 24 hours) at 7/29/2019  0614  Last data filed at 7/28/2019 2336  Gross per 24 hour   Intake --   Output 100 ml   Net -100 ml       Ortho/SPM Exam  AAOx4  NAD  Reg rate  No increased WOB  SILT T/SP/DP/Bridges/Sa  Motor intact T/SP/DP  WWP extremities  FCDs in place and functioning    Significant Labs:   CBC:   Recent Labs   Lab 07/28/19  1401 07/29/19  0330   WBC 5.67 5.16   HGB 11.8* 10.1*   HCT 34.8* 30.5*   PLT 82* 62*     CMP:   Recent Labs   Lab 07/28/19  1401 07/29/19  0330   * 134*   K 4.3 4.1   CL 97 102   CO2 23 22*   GLU 94 93   BUN 24* 28*   CREATININE 3.8* 3.3*   CALCIUM 9.1 7.8*   PROT 8.2 6.7   ALBUMIN 3.4* 2.8*   BILITOT 4.4* 2.3*   ALKPHOS 110 91   AST 71* 54*   ALT 32 25   ANIONGAP 14 10   EGFRNONAA 15.8* 18.7*     Coagulation:   Recent Labs   Lab 07/28/19  1406 07/29/19  0330   LABPROT 14.0* 13.5*   INR 1.4* 1.4*   APTT 26.8  --      All pertinent labs within the past 24 hours have been reviewed.    Significant Imaging: I have reviewed all pertinent imaging results/findings.

## 2019-07-29 NOTE — NURSING
Pt currently in room talking calmly on phone, states that pain is 12/10 and requested dilaudid, paged MD.

## 2019-07-29 NOTE — ANESTHESIA POSTPROCEDURE EVALUATION
Anesthesia Post Evaluation    Patient: Fransico Montalvo    Procedure(s) Performed: Procedure(s) (LRB):  PINNING, HIP, PERCUTANEOUS- synthes cannulated screws- hana table- C arm door side- (Right)  CAPSULOTOMY, JOINT (Right)    Final Anesthesia Type: general  Patient location during evaluation: PACU  Patient participation: Yes- Able to Participate  Level of consciousness: awake and alert and oriented  Post-procedure vital signs: reviewed and stable  Pain management: adequate  Airway patency: patent  PONV status at discharge: No PONV  Anesthetic complications: no      Cardiovascular status: blood pressure returned to baseline and hemodynamically stable  Respiratory status: unassisted and spontaneous ventilation  Hydration status: euvolemic  Follow-up not needed.          Vitals Value Taken Time   /82 7/29/2019  1:46 PM   Temp 36.3 °C (97.3 °F) 7/29/2019  1:10 PM   Pulse 81 7/29/2019  1:52 PM   Resp 18 7/29/2019  1:52 PM   SpO2 95 % 7/29/2019  1:52 PM   Vitals shown include unvalidated device data.      No case tracking events are documented in the log.      Pain/Margareth Score: Pain Rating Prior to Med Admin: 10 (7/29/2019  5:37 AM)  Margareth Score: 9 (7/29/2019  1:30 PM)

## 2019-07-29 NOTE — PLAN OF CARE
Problem: Adult Inpatient Plan of Care  Goal: Plan of Care Review  Outcome: Ongoing (interventions implemented as appropriate)  Pt arrived on floor, c/o 3/10 pain, VS as charted, PNC c/d/i, dressing c/d/i, rounding Q2, no falls noted.

## 2019-07-29 NOTE — CONSULTS
Consult acknowledged. Please see full consult note to follow.    Mary Shipley MD  Nephrology PGY-4

## 2019-07-29 NOTE — ASSESSMENT & PLAN NOTE
Fransico Montalvo is a 63 y.o. male with a Right femoral neck fracture    - Admitted to Medicine hip fracture service for pre-operative clearance and medical optimization  -will plan for operative fixation of R femoral neck fracture with closed reduction and percutaneous pinning  -Consents obtained from patient  -Pre-operative labs and imaging obtained in ED (UA, Type and Cross, PT-INR, CBC, BMP, PreAlbumin, CXR, EKG)   - INR 1.4, Plt 82, Hg 11.8, BUN 24, Cr 3.8  - patient consented for blood products- FFP, pRBC, platelets held   - perioperative ancef ordered  - Patient refused austin   -Bed rest, NPO     Risks and complications were discussed including but not limited to the risks of anesthetic complications, infection, wound healing complications, non-union, mal-union, hardware failure, pain, stiffness, avascular necrosis of femoral head, need for further surgery,  DVT, pulmonary embolism, perioperative medical risks (cardiac, pulmonary, renal, neurologic), and death among others were discussed. Patient is at higher risk 2/2 cirrhosis.  No guarantees were made and the patient and family elect to proceed. They fully understand the reported 30% mortality risk within the first year of surgery.

## 2019-07-29 NOTE — PLAN OF CARE
This CM to follow patient post-acute need. Patient in surgery today.       07/29/19 1346   Discharge Assessment   Assessment Type Discharge Planning Assessment     Elke Headley RN/BSN/CM  [facility]  [phone]  Ortho/IMK/IM

## 2019-07-29 NOTE — TRANSFER OF CARE
Anesthesia Transfer of Care Note    Patient: Fransico Montalvo    Procedure(s) Performed: Procedure(s) (LRB):  PINNING, HIP, PERCUTANEOUS- synthes cannulated screws- hana table- C arm door side- (Right)  CAPSULOTOMY, JOINT (Right)    Patient location: PACU    Anesthesia Type: general    Transport from OR: Transported from OR on 6-10 L/min O2 by face mask with adequate spontaneous ventilation    Post pain: adequate analgesia    Post assessment: no apparent anesthetic complications and tolerated procedure well    Post vital signs: stable    Level of consciousness: awake and alert    Nausea/Vomiting: no nausea/vomiting    Complications: none    Transfer of care protocol was followed      Last vitals:   Visit Vitals  BP (!) 136/92   Pulse 91   Temp 36.3 °C (97.3 °F) (Temporal)   Resp 12   Ht 6' (1.829 m)   Wt 90.7 kg (199 lb 15.3 oz)   SpO2 100%   BMI 27.12 kg/m²

## 2019-07-29 NOTE — PLAN OF CARE
07/29/19 1147   Post-Acute Status   Post-Acute Authorization Placement;Other   Other Status See Comments     Patient is having surgery today, will possibly need inpatient rehab placement post discharge. SW sent referrals will continue to follow up with post discharge needs.    Leidy Cifuentes LMSW  [facility]   [id number]

## 2019-07-29 NOTE — ANESTHESIA PREPROCEDURE EVALUATION
"                                                                                                             07/29/2019  Fransico Montalvo is a 63 y.o., male smoker, hx of ETOH abuse, cirrhosis, BPH, HTN here for hip pinning.    Past Medical History:   Diagnosis Date    Alcohol abuse     BPH (benign prostatic hyperplasia)     Cirrhosis 06/21/2018    pt states that he was diagnosed a week ago during his last hospital visit    Gastritis     Hypertension     Renal disorder      Lab Results   Component Value Date    WBC 5.16 07/29/2019    HGB 10.1 (L) 07/29/2019    HCT 30.5 (L) 07/29/2019     (H) 07/29/2019    PLT 62 (L) 07/29/2019       Chemistry        Component Value Date/Time     (L) 07/29/2019 0330    K 4.1 07/29/2019 0330     07/29/2019 0330    CO2 22 (L) 07/29/2019 0330    BUN 28 (H) 07/29/2019 0330    CREATININE 3.3 (H) 07/29/2019 0330    GLU 93 07/29/2019 0330        Component Value Date/Time    CALCIUM 7.8 (L) 07/29/2019 0330    ALKPHOS 91 07/29/2019 0330    AST 54 (H) 07/29/2019 0330    ALT 25 07/29/2019 0330    BILITOT 2.3 (H) 07/29/2019 0330    ESTGFRAFRICA 21.6 (A) 07/29/2019 0330    EGFRNONAA 18.7 (A) 07/29/2019 0330            Anesthesia Evaluation    I have reviewed the Patient Summary Reports.     I have reviewed the Medications.     Review of Systems  Anesthesia Hx:  No problems with previous Anesthesia    Social:  Alcohol Use, Smoker Pt states he drinks " a few days/week" last had 2 beers yesterday   Cardiovascular:   Hypertension  Denies Angina.    Pulmonary:   Denies Shortness of breath.    Renal/:   Chronic Renal Disease, CRI    Hepatic/GI:   Liver Disease,        Physical Exam  General:  Well nourished    Airway/Jaw/Neck:  Airway Findings: Mouth Opening: Normal Tongue: Normal  General Airway Assessment: Adult  Mallampati: II  Jaw/Neck Findings:  Neck ROM: Normal ROM      Dental:  Dental Findings: Upper partial dentures, Periodontal disease, Mild   Chest/Lungs:  Chest/Lungs " Findings: Clear to auscultation, Normal Respiratory Rate     Heart/Vascular:  Heart Findings: Rate: Normal  Rhythm: Regular Rhythm  Sounds: Normal        Mental Status:  Mental Status Findings:  Cooperative, Alert and Oriented         Anesthesia Plan  Type of Anesthesia, risks & benefits discussed:  Anesthesia Type:  general  Patient's Preference:   Intra-op Monitoring Plan: standard ASA monitors  Intra-op Monitoring Plan Comments:   Post Op Pain Control Plan: multimodal analgesia and peripheral nerve block  Post Op Pain Control Plan Comments:   Induction:   IV  Beta Blocker:         Informed Consent: Patient understands risks and agrees with Anesthesia plan.  Questions answered. Anesthesia consent signed with patient.  ASA Score: 2     Day of Surgery Review of History & Physical:    H&P update referred to the surgeon.         Ready For Surgery From Anesthesia Perspective.

## 2019-07-29 NOTE — ANESTHESIA PROCEDURE NOTES
Suprainguinal Fascia Iliaca Catheter    Patient location during procedure: pre-op   Block not for primary anesthetic.  Reason for block: at surgeon's request and post-op pain management   Post-op Pain Location: R hip pain  Start time: 7/29/2019 9:37 AM  Timeout: 7/29/2019 9:36 AM   End time: 7/29/2019 9:55 AM    Staffing  Authorizing Provider: Lisa Lisa MD  Performing Provider: Jerri Davies MD    Preanesthetic Checklist  Completed: patient identified, site marked, surgical consent, pre-op evaluation, timeout performed, IV checked, risks and benefits discussed and monitors and equipment checked  Peripheral Block  Patient position: supine  Prep: ChloraPrep and site prepped and draped  Patient monitoring: heart rate, cardiac monitor, continuous pulse ox, continuous capnometry and frequent blood pressure checks  Block type: fascia iliaca (Suprainguinal fascia iliaca)  Laterality: right  Injection technique: continuous  Needle  Needle type: Tuohy   Needle gauge: 17 G  Needle length: 3.5 in  Needle localization: anatomical landmarks and ultrasound guidance  Catheter type: spring wound  Catheter size: 19 G  Test dose: lidocaine 1.5% with Epi 1-to-200,000 and negative   -ultrasound image captured on disc.  Assessment  Injection assessment: negative aspiration, negative parasthesia and local visualized surrounding nerve  Paresthesia pain: none  Heart rate change: no  Slow fractionated injection: yes  Additional Notes  VSS.  [facility] RN monitoring vitals throughout procedure.  Patient tolerated procedure well.
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
## 2019-07-29 NOTE — PROGRESS NOTES
[facility]  Orthopedics  Progress Note    Patient Name: Fransico Montalvo  MRN: [id number]  Admission Date: 7/28/2019  Hospital Length of Stay: 1 days  Attending Provider: Allison Butterfield MD  Primary Care Provider: Jessica  Follow-up For: Procedure(s) (LRB):  PINNING, HIP, PERCUTANEOUS- synthes cannulated screws- hana table- C arm door side- (Right)    Post-Operative Day:    Subjective:     Principal Problem:Fracture of femoral neck, right, closed    Principal Orthopedic Problem: same    Interval History: Patient seen and examined at bedside.  No acute events overnight.  Pain controlled.       Review of patient's allergies indicates:  No Known Allergies    Current Facility-Administered Medications   Medication    0.9%  NaCl infusion    acetaminophen tablet 1,000 mg    bisacodyl suppository 10 mg    dextrose 10% (D10W) Bolus    dextrose 10% (D10W) Bolus    diazePAM tablet 10 mg    fentaNYL injection 25 mcg    folic acid tablet 1 mg    glucagon (human recombinant) injection 1 mg    glucose chewable tablet 16 g    glucose chewable tablet 24 g    HYDROmorphone injection 0.5 mg    hydrOXYzine pamoate capsule 25 mg    lidocaine (PF) 10 mg/ml (1%) injection 10 mg    midazolam (VERSED) 1 mg/mL injection 1 mg    mirtazapine tablet 15 mg    multivitamin tablet 1 tablet    mupirocin 2 % ointment 1 g    ondansetron injection 4 mg    oxyCODONE immediate release tablet 10 mg    oxyCODONE immediate release tablet 5 mg    pantoprazole EC tablet 40 mg    pregabalin capsule 75 mg    promethazine (PHENERGAN) 6.25 mg in dextrose 5 % 50 mL IVPB    propranolol tablet 10 mg    sodium chloride 0.9% flush 10 mL    sodium chloride 0.9% flush 5 mL    tamsulosin 24 hr capsule 0.4 mg    tranexamic acid (CYKLOKAPRON) 3,000 mg in sodium chloride 0.9% 100 mL    venlafaxine 24 hr capsule 75 mg     Objective:     Vital Signs (Most Recent):  Temp: 98 °F (36.7 °C) (07/29/19 0541)  Pulse: 90 (07/29/19  0541)  Resp: 18 (07/29/19 0541)  BP: 115/86 (07/29/19 0541)  SpO2: 98 % (07/29/19 0541) Vital Signs (24h Range):  Temp:  [96.1 °F (35.6 °C)-98.4 °F (36.9 °C)] 98 °F (36.7 °C)  Pulse:  [73-90] 90  Resp:  [15-21] 18  SpO2:  [94 %-100 %] 98 %  BP: ()/() 115/86     Weight: 90.7 kg (200 lb)  Height: 6' (182.9 cm)  Body mass index is 27.12 kg/m².      Intake/Output Summary (Last 24 hours) at 7/29/2019 0614  Last data filed at 7/28/2019 2336  Gross per 24 hour   Intake --   Output 100 ml   Net -100 ml       Ortho/SPM Exam  AAOx4  NAD  Reg rate  No increased WOB  SILT T/SP/DP/Bridges/Sa  Motor intact T/SP/DP  WWP extremities  FCDs in place and functioning    Significant Labs:   CBC:   Recent Labs   Lab 07/28/19 1401 07/29/19 0330   WBC 5.67 5.16   HGB 11.8* 10.1*   HCT 34.8* 30.5*   PLT 82* 62*     CMP:   Recent Labs   Lab 07/28/19 1401 07/29/19  0330   * 134*   K 4.3 4.1   CL 97 102   CO2 23 22*   GLU 94 93   BUN 24* 28*   CREATININE 3.8* 3.3*   CALCIUM 9.1 7.8*   PROT 8.2 6.7   ALBUMIN 3.4* 2.8*   BILITOT 4.4* 2.3*   ALKPHOS 110 91   AST 71* 54*   ALT 32 25   ANIONGAP 14 10   EGFRNONAA 15.8* 18.7*     Coagulation:   Recent Labs   Lab 07/28/19 1406 07/29/19  0330   LABPROT 14.0* 13.5*   INR 1.4* 1.4*   APTT 26.8  --      All pertinent labs within the past 24 hours have been reviewed.    Significant Imaging: I have reviewed all pertinent imaging results/findings.    Assessment/Plan:     * Fracture of femoral neck, right, closed  Fransico Montalvo is a 63 y.o. male with a Right femoral neck fracture    - Admitted to Medicine hip fracture service for pre-operative clearance and medical optimization - rec proceed without further workup  -will plan for operative fixation of R femoral neck fracture with closed reduction and percutaneous pinning  -Consents obtained from patient  -Pre-operative labs and imaging obtained in ED (UA, Type and Cross, PT-INR, CBC, BMP, PreAlbumin, CXR, EKG)   - INR 1.4, Plt 62, Hg 10.1, Cr  3.3  - patient consented for blood products- FFP, pRBC, platelets held   - perioperative ancef ordered  - Patient refused austin   -Bed rest, NPO     Risks and complications were discussed including but not limited to the risks of anesthetic complications, infection, wound healing complications, non-union, mal-union, hardware failure, pain, stiffness, avascular necrosis of femoral head, need for further surgery,  DVT, pulmonary embolism, perioperative medical risks (cardiac, pulmonary, renal, neurologic), and death among others were discussed. Patient is at higher risk 2/2 cirrhosis, CKD.  No guarantees were made and the patient and family elect to proceed. They fully understand the reported 30% mortality risk within the first year of surgery.           Link Brown MD  Orthopedics  [facility]

## 2019-07-30 PROBLEM — G89.29 CHRONIC PAIN OF RIGHT KNEE: Status: ACTIVE | Noted: 2019-07-30

## 2019-07-30 PROBLEM — M25.561 CHRONIC PAIN OF RIGHT KNEE: Status: ACTIVE | Noted: 2019-07-30

## 2019-07-30 LAB
25(OH)D3+25(OH)D2 SERPL-MCNC: 14 NG/ML (ref 30–96)
ALBUMIN SERPL BCP-MCNC: 2.8 G/DL (ref 3.5–5.2)
ALP SERPL-CCNC: 120 U/L (ref 55–135)
ALT SERPL W/O P-5'-P-CCNC: 19 U/L (ref 10–44)
ANION GAP SERPL CALC-SCNC: 10 MMOL/L (ref 8–16)
ANISOCYTOSIS BLD QL SMEAR: SLIGHT
AST SERPL-CCNC: 56 U/L (ref 10–40)
BASOPHILS # BLD AUTO: 0.09 K/UL (ref 0–0.2)
BASOPHILS NFR BLD: 1.4 % (ref 0–1.9)
BILIRUB SERPL-MCNC: 1.4 MG/DL (ref 0.1–1)
BUN SERPL-MCNC: 29 MG/DL (ref 8–23)
CALCIUM SERPL-MCNC: 7.7 MG/DL (ref 8.7–10.5)
CHLORIDE SERPL-SCNC: 106 MMOL/L (ref 95–110)
CO2 SERPL-SCNC: 20 MMOL/L (ref 23–29)
CREAT SERPL-MCNC: 2.6 MG/DL (ref 0.5–1.4)
DIFFERENTIAL METHOD: ABNORMAL
EOSINOPHIL # BLD AUTO: 0.4 K/UL (ref 0–0.5)
EOSINOPHIL NFR BLD: 5.6 % (ref 0–8)
ERYTHROCYTE [DISTWIDTH] IN BLOOD BY AUTOMATED COUNT: 17.8 % (ref 11.5–14.5)
EST. GFR  (AFRICAN AMERICAN): 28.8 ML/MIN/1.73 M^2
EST. GFR  (NON AFRICAN AMERICAN): 24.9 ML/MIN/1.73 M^2
GLUCOSE SERPL-MCNC: 88 MG/DL (ref 70–110)
HCT VFR BLD AUTO: 29.3 % (ref 40–54)
HGB BLD-MCNC: 9.7 G/DL (ref 14–18)
IMM GRANULOCYTES # BLD AUTO: 0.02 K/UL (ref 0–0.04)
IMM GRANULOCYTES NFR BLD AUTO: 0.3 % (ref 0–0.5)
INR PPP: 1.3 (ref 0.8–1.2)
LYMPHOCYTES # BLD AUTO: 1.8 K/UL (ref 1–4.8)
LYMPHOCYTES NFR BLD: 26.4 % (ref 18–48)
MAGNESIUM SERPL-MCNC: 1.4 MG/DL (ref 1.6–2.6)
MCH RBC QN AUTO: 33.2 PG (ref 27–31)
MCHC RBC AUTO-ENTMCNC: 33.1 G/DL (ref 32–36)
MCV RBC AUTO: 100 FL (ref 82–98)
MONOCYTES # BLD AUTO: 0.7 K/UL (ref 0.3–1)
MONOCYTES NFR BLD: 10.5 % (ref 4–15)
NEUTROPHILS # BLD AUTO: 3.7 K/UL (ref 1.8–7.7)
NEUTROPHILS NFR BLD: 55.8 % (ref 38–73)
NRBC BLD-RTO: 0 /100 WBC
PHOSPHATE SERPL-MCNC: 3.3 MG/DL (ref 2.7–4.5)
PLATELET # BLD AUTO: 82 K/UL (ref 150–350)
PLATELET BLD QL SMEAR: ABNORMAL
PMV BLD AUTO: 11.5 FL (ref 9.2–12.9)
POLYCHROMASIA BLD QL SMEAR: ABNORMAL
POTASSIUM SERPL-SCNC: 4.5 MMOL/L (ref 3.5–5.1)
PROT SERPL-MCNC: 6.9 G/DL (ref 6–8.4)
PROTHROMBIN TIME: 12.9 SEC (ref 9–12.5)
RBC # BLD AUTO: 2.92 M/UL (ref 4.6–6.2)
SODIUM SERPL-SCNC: 136 MMOL/L (ref 136–145)
WBC # BLD AUTO: 6.64 K/UL (ref 3.9–12.7)

## 2019-07-30 PROCEDURE — 85025 COMPLETE CBC W/AUTO DIFF WBC: CPT

## 2019-07-30 PROCEDURE — 97162 PT EVAL MOD COMPLEX 30 MIN: CPT

## 2019-07-30 PROCEDURE — 63600175 PHARM REV CODE 636 W HCPCS: Performed by: PHYSICIAN ASSISTANT

## 2019-07-30 PROCEDURE — 97165 OT EVAL LOW COMPLEX 30 MIN: CPT

## 2019-07-30 PROCEDURE — 99232 SBSQ HOSP IP/OBS MODERATE 35: CPT | Mod: ,,, | Performed by: INTERNAL MEDICINE

## 2019-07-30 PROCEDURE — 97530 THERAPEUTIC ACTIVITIES: CPT

## 2019-07-30 PROCEDURE — 25000003 PHARM REV CODE 250: Performed by: STUDENT IN AN ORGANIZED HEALTH CARE EDUCATION/TRAINING PROGRAM

## 2019-07-30 PROCEDURE — 63600175 PHARM REV CODE 636 W HCPCS: Performed by: STUDENT IN AN ORGANIZED HEALTH CARE EDUCATION/TRAINING PROGRAM

## 2019-07-30 PROCEDURE — 36415 COLL VENOUS BLD VENIPUNCTURE: CPT

## 2019-07-30 PROCEDURE — 82306 VITAMIN D 25 HYDROXY: CPT

## 2019-07-30 PROCEDURE — 11000001 HC ACUTE MED/SURG PRIVATE ROOM

## 2019-07-30 PROCEDURE — 25000003 PHARM REV CODE 250: Performed by: INTERNAL MEDICINE

## 2019-07-30 PROCEDURE — 85610 PROTHROMBIN TIME: CPT

## 2019-07-30 PROCEDURE — 80053 COMPREHEN METABOLIC PANEL: CPT

## 2019-07-30 PROCEDURE — 99232 SBSQ HOSP IP/OBS MODERATE 35: CPT | Mod: ,,, | Performed by: NURSE PRACTITIONER

## 2019-07-30 PROCEDURE — 25000003 PHARM REV CODE 250: Performed by: ANESTHESIOLOGY

## 2019-07-30 PROCEDURE — 25000003 PHARM REV CODE 250: Performed by: HOSPITALIST

## 2019-07-30 PROCEDURE — 63600175 PHARM REV CODE 636 W HCPCS: Performed by: INTERNAL MEDICINE

## 2019-07-30 PROCEDURE — 97116 GAIT TRAINING THERAPY: CPT

## 2019-07-30 PROCEDURE — 99231 SBSQ HOSP IP/OBS SF/LOW 25: CPT | Mod: ,,, | Performed by: ANESTHESIOLOGY

## 2019-07-30 PROCEDURE — 84100 ASSAY OF PHOSPHORUS: CPT

## 2019-07-30 PROCEDURE — 83735 ASSAY OF MAGNESIUM: CPT

## 2019-07-30 PROCEDURE — 99231 PR SUBSEQUENT HOSPITAL CARE,LEVL I: ICD-10-PCS | Mod: ,,, | Performed by: ANESTHESIOLOGY

## 2019-07-30 PROCEDURE — 99232 PR SUBSEQUENT HOSPITAL CARE,LEVL II: ICD-10-PCS | Mod: ,,, | Performed by: INTERNAL MEDICINE

## 2019-07-30 PROCEDURE — 99232 PR SUBSEQUENT HOSPITAL CARE,LEVL II: ICD-10-PCS | Mod: ,,, | Performed by: NURSE PRACTITIONER

## 2019-07-30 PROCEDURE — 63600175 PHARM REV CODE 636 W HCPCS: Performed by: HOSPITALIST

## 2019-07-30 RX ORDER — LIDOCAINE 50 MG/G
2 PATCH TOPICAL
Status: DISCONTINUED | OUTPATIENT
Start: 2019-07-30 | End: 2019-08-02 | Stop reason: HOSPADM

## 2019-07-30 RX ORDER — HYDROXYZINE PAMOATE 25 MG/1
25 CAPSULE ORAL NIGHTLY
Status: DISCONTINUED | OUTPATIENT
Start: 2019-07-31 | End: 2019-08-02 | Stop reason: HOSPADM

## 2019-07-30 RX ORDER — CHOLECALCIFEROL (VITAMIN D3) 25 MCG
2000 TABLET ORAL DAILY
Status: DISCONTINUED | OUTPATIENT
Start: 2019-07-31 | End: 2019-08-02 | Stop reason: HOSPADM

## 2019-07-30 RX ORDER — POLYETHYLENE GLYCOL 3350 17 G/17G
17 POWDER, FOR SOLUTION ORAL 2 TIMES DAILY PRN
Status: DISCONTINUED | OUTPATIENT
Start: 2019-07-30 | End: 2019-08-02 | Stop reason: HOSPADM

## 2019-07-30 RX ORDER — LACTULOSE 10 G/15ML
20 SOLUTION ORAL NIGHTLY
Status: DISCONTINUED | OUTPATIENT
Start: 2019-07-30 | End: 2019-07-31

## 2019-07-30 RX ORDER — MAGNESIUM SULFATE HEPTAHYDRATE 40 MG/ML
2 INJECTION, SOLUTION INTRAVENOUS ONCE
Status: COMPLETED | OUTPATIENT
Start: 2019-07-30 | End: 2019-07-30

## 2019-07-30 RX ORDER — FENTANYL CITRATE 50 UG/ML
INJECTION, SOLUTION INTRAMUSCULAR; INTRAVENOUS
Status: DISCONTINUED | OUTPATIENT
Start: 2019-07-29 | End: 2019-07-30

## 2019-07-30 RX ORDER — SUCRALFATE 1 G/10ML
1 SUSPENSION ORAL 3 TIMES DAILY PRN
Status: DISCONTINUED | OUTPATIENT
Start: 2019-07-30 | End: 2019-08-02 | Stop reason: HOSPADM

## 2019-07-30 RX ORDER — HYDROMORPHONE HYDROCHLORIDE 1 MG/ML
0.2 INJECTION, SOLUTION INTRAMUSCULAR; INTRAVENOUS; SUBCUTANEOUS EVERY 6 HOURS PRN
Status: DISCONTINUED | OUTPATIENT
Start: 2019-07-30 | End: 2019-07-31

## 2019-07-30 RX ADMIN — ROPIVACAINE HYDROCHLORIDE 10 ML/HR: 2 INJECTION, SOLUTION EPIDURAL; INFILTRATION at 11:07

## 2019-07-30 RX ADMIN — MUPIROCIN 1 G: 20 OINTMENT TOPICAL at 09:07

## 2019-07-30 RX ADMIN — TAMSULOSIN HYDROCHLORIDE 0.4 MG: 0.4 CAPSULE ORAL at 09:07

## 2019-07-30 RX ADMIN — DIAZEPAM 5 MG: 5 TABLET ORAL at 08:07

## 2019-07-30 RX ADMIN — TRAMADOL HYDROCHLORIDE 50 MG: 50 TABLET ORAL at 02:07

## 2019-07-30 RX ADMIN — SENNOSIDES,DOCUSATE SODIUM 1 TABLET: 8.6; 5 TABLET, FILM COATED ORAL at 09:07

## 2019-07-30 RX ADMIN — CEFAZOLIN 2 G: 1 INJECTION, POWDER, FOR SOLUTION INTRAMUSCULAR; INTRAVENOUS at 05:07

## 2019-07-30 RX ADMIN — METHOCARBAMOL TABLETS 500 MG: 500 TABLET, COATED ORAL at 06:07

## 2019-07-30 RX ADMIN — ROPIVACAINE HYDROCHLORIDE 10 ML/HR: 2 INJECTION, SOLUTION EPIDURAL; INFILTRATION at 02:07

## 2019-07-30 RX ADMIN — POLYETHYLENE GLYCOL 3350 17 G: 17 POWDER, FOR SOLUTION ORAL at 09:07

## 2019-07-30 RX ADMIN — PROPRANOLOL HYDROCHLORIDE 10 MG: 10 TABLET ORAL at 08:07

## 2019-07-30 RX ADMIN — HEPARIN SODIUM 5000 UNITS: 5000 INJECTION, SOLUTION INTRAVENOUS; SUBCUTANEOUS at 03:07

## 2019-07-30 RX ADMIN — FOLIC ACID 1 MG: 1 TABLET ORAL at 09:07

## 2019-07-30 RX ADMIN — HYDROMORPHONE HYDROCHLORIDE 0.2 MG: 1 INJECTION, SOLUTION INTRAMUSCULAR; INTRAVENOUS; SUBCUTANEOUS at 11:07

## 2019-07-30 RX ADMIN — ROPIVACAINE HYDROCHLORIDE 10 ML/HR: 2 INJECTION, SOLUTION EPIDURAL; INFILTRATION at 07:07

## 2019-07-30 RX ADMIN — SODIUM CHLORIDE: 0.9 INJECTION, SOLUTION INTRAVENOUS at 06:07

## 2019-07-30 RX ADMIN — TRAMADOL HYDROCHLORIDE 50 MG: 50 TABLET ORAL at 08:07

## 2019-07-30 RX ADMIN — ACETAMINOPHEN 1000 MG: 500 TABLET ORAL at 05:07

## 2019-07-30 RX ADMIN — HYDROMORPHONE HYDROCHLORIDE 0.2 MG: 1 INJECTION, SOLUTION INTRAMUSCULAR; INTRAVENOUS; SUBCUTANEOUS at 05:07

## 2019-07-30 RX ADMIN — PANTOPRAZOLE SODIUM 40 MG: 40 TABLET, DELAYED RELEASE ORAL at 09:07

## 2019-07-30 RX ADMIN — MAGNESIUM SULFATE IN WATER 2 G: 40 INJECTION, SOLUTION INTRAVENOUS at 07:07

## 2019-07-30 RX ADMIN — ACETAMINOPHEN 1000 MG: 500 TABLET ORAL at 11:07

## 2019-07-30 RX ADMIN — HYDROXYZINE PAMOATE 25 MG: 25 CAPSULE ORAL at 09:07

## 2019-07-30 RX ADMIN — TRAMADOL HYDROCHLORIDE 50 MG: 50 TABLET ORAL at 03:07

## 2019-07-30 RX ADMIN — HEPARIN SODIUM 5000 UNITS: 5000 INJECTION, SOLUTION INTRAVENOUS; SUBCUTANEOUS at 09:07

## 2019-07-30 RX ADMIN — MIRTAZAPINE 15 MG: 15 TABLET, FILM COATED ORAL at 09:07

## 2019-07-30 RX ADMIN — PREGABALIN 75 MG: 75 CAPSULE ORAL at 09:07

## 2019-07-30 RX ADMIN — LIDOCAINE 2 PATCH: 50 PATCH TOPICAL at 07:07

## 2019-07-30 RX ADMIN — HEPARIN SODIUM 5000 UNITS: 5000 INJECTION, SOLUTION INTRAVENOUS; SUBCUTANEOUS at 05:07

## 2019-07-30 RX ADMIN — SUCRALFATE 1 G: 1 SUSPENSION ORAL at 07:07

## 2019-07-30 RX ADMIN — PROPRANOLOL HYDROCHLORIDE 10 MG: 10 TABLET ORAL at 09:07

## 2019-07-30 RX ADMIN — TRAMADOL HYDROCHLORIDE 50 MG: 50 TABLET ORAL at 09:07

## 2019-07-30 RX ADMIN — THERA TABS 1 TABLET: TAB at 09:07

## 2019-07-30 RX ADMIN — ACETAMINOPHEN 1000 MG: 500 TABLET ORAL at 07:07

## 2019-07-30 RX ADMIN — LACTULOSE 20 G: 20 SOLUTION ORAL at 09:07

## 2019-07-30 RX ADMIN — HYDROMORPHONE HYDROCHLORIDE 0.5 MG: 1 INJECTION, SOLUTION INTRAMUSCULAR; INTRAVENOUS; SUBCUTANEOUS at 02:07

## 2019-07-30 RX ADMIN — SODIUM CHLORIDE: 0.9 INJECTION, SOLUTION INTRAVENOUS at 11:07

## 2019-07-30 RX ADMIN — SODIUM CHLORIDE: 0.9 INJECTION, SOLUTION INTRAVENOUS at 03:07

## 2019-07-30 RX ADMIN — HYDROMORPHONE HYDROCHLORIDE 0.5 MG: 1 INJECTION, SOLUTION INTRAMUSCULAR; INTRAVENOUS; SUBCUTANEOUS at 11:07

## 2019-07-30 RX ADMIN — DIAZEPAM 5 MG: 5 TABLET ORAL at 09:07

## 2019-07-30 RX ADMIN — ACETAMINOPHEN 1000 MG: 500 TABLET ORAL at 03:07

## 2019-07-30 NOTE — PT/OT/SLP EVAL
"Occupational Therapy   Evaluation    Name: Fransico Montalvo  MRN: [id number]  Admitting Diagnosis:  Fracture of femoral neck, right, closed 1 Day Post-Op    Recommendations:     Discharge Recommendations: rehabilitation facility  Discharge Equipment Recommendations:  commode, walker, rolling  Barriers to discharge:  Inaccessible home environment, Decreased caregiver support    Assessment:     Fransico Montalvo is a 63 y.o. male with a medical diagnosis of Fracture of femoral neck, right, closed.  He presents with the following Performance deficits affecting function: weakness, impaired endurance, impaired self care skills, impaired functional mobilty, gait instability, impaired balance, decreased lower extremity function, decreased safety awareness, pain, orthopedic precautions.      Patient agreeable to therapy session today.  He was unable to follow TTWB precaution on R LE when standing. He stated he felt a "crack" when standing but no increase in pain. Pt. Demonstrated a decreased safety awareness throughout therapy session. He would benefit from inpatient rehab at discharge in order to get back to PLOF.     Rehab Prognosis: Good; patient would benefit from acute skilled OT services to address these deficits and reach maximum level of function.       Plan:     Patient to be seen daily to address the above listed problems via self-care/home management, therapeutic activities, therapeutic exercises  · Plan of Care Expires: 08/30/19  · Plan of Care Reviewed with: patient    Subjective     Chief Complaint: pain  Patient/Family Comments/goals: n/a    Occupational Profile:  Living Environment: Pt lives in 2  with 15 steps to go to second floor. Pt.'s bedroom/bathroom is on 2nd floor.  Previous level of function: Independent  Equipment Used at Home:  none  Assistance upon Discharge: Pt. Lives in house with 6 other people. Pt. Will nhave assistance from friend.    Pain/Comfort:  · Pain Rating 1: 10/10  · Location - Side 1: " "Right  · Location 1: hip  · Pain Addressed 1: Pre-medicate for activity, Reposition, Cessation of Activity, Nurse notified    Patients cultural, spiritual, Christianity conflicts given the current situation: no    Objective:     Communicated with: RN prior to session.  Patient found supine with perineural catheter, telemetry, peripheral IV upon OT entry to room.    General Precautions: Standard, fall   Orthopedic Precautions:RLE toe touch weight bearing   Braces:       Occupational Performance:    Bed Mobility:    · Patient completed Supine to Sit with contact guard assistance  · Patient completed Sit to Supine with contact guard assistance    Functional Mobility/Transfers:  · Patient completed Sit <> Stand Transfer with moderate assistance  with  rolling walker   Functional Mobility: Pt was moderate assistance with rolling walker x3 trials from edge of bed with max verbal/tactile cues to maintain TTWB precautions. Patient visibly shaking during standing trials. He was only able to maintain standing ~30 seconds due to feeling "woozy".  ·     Activities of Daily Living:   ·   Cognitive/Visual Perceptual:  Cognitive/Psychosocial Skills:     -       Oriented to: Person, Place, Time and Situation   -       Safety awareness/insight to disability: intact     Physical Exam:  Upper Extremity Range of Motion:     -       Right Upper Extremity: WFL  -       Left Upper Extremity: WFL  Upper Extremity Strength:    -       Right Upper Extremity: WFL  -       Left Upper Extremity: WFL   Strength:    -       Right Upper Extremity: WFL  -       Left Upper Extremity: WFL    AMPAC 6 Click ADL:  AMPAC Total Score: 17    Treatment & Education:  - OT/POC-  - Importance of mobility to maximize recovery.  - safety with transfers and RW    Education:    Patient left supine with all lines intact, call button in reach and RN notified    GOALS:   Multidisciplinary Problems     Occupational Therapy Goals        Problem: Occupational Therapy " Goal    Goal Priority Disciplines Outcome Interventions   Occupational Therapy Goal     OT, PT/OT Ongoing (interventions implemented as appropriate)    Description:  Goals to be met by: 8/30/2019     Patient will increase functional independence with ADLs by performing:    UE Dressing with Stand-by Assistance.  LE Dressing with Stand-by Assistance.  Grooming while standing with Stand-by Assistance.  Toileting from toilet with Stand-by Assistance for hygiene and clothing management.     Mary Matta OT  7/30/2019                        History:     Past Medical History:   Diagnosis Date    Alcohol abuse     BPH (benign prostatic hyperplasia)     Cirrhosis 06/21/2018    pt states that he was diagnosed a week ago during his last hospital visit    Gastritis     Hypertension     Renal disorder        History reviewed. No pertinent surgical history.    Time Tracking:     OT Date of Treatment: 07/30/19  OT Start Time: 0955  OT Stop Time: 1027  OT Total Time (min): 32 min    Billable Minutes:Evaluation 10  Therapeutic Activity 22    Mary Matta OT  7/30/2019

## 2019-07-30 NOTE — ADDENDUM NOTE
Addendum  created 07/30/19 1043 by Thi Ballard MD    Charge Capture section accepted, Cosign clinical note with attestation

## 2019-07-30 NOTE — ASSESSMENT & PLAN NOTE
63 y.o. male POD1 s/p R FN perc screw fixation    Pain control: multimodal  PT/OT: TTWB RLE  DVT PPx: heparin, FCDs at all times when not ambulating  Abx: postop Ancef  Labs: pending  Drain: none  Sauer: none    Dispo: f/u PT recs

## 2019-07-30 NOTE — HOSPITAL COURSE
07/30/2019: Bed mobility CGA.  Sit to stand ModA & RW.  Hopped x 2 trials ModA & RW.  No ADLs.   07/31/2019: Bed mobility SBA .  Sit to stand ModA & RW x 4 trials.  Ambulated 4 small steps MaxA.  LBD SBA.

## 2019-07-30 NOTE — PROGRESS NOTES
[facility] Hospital Medicine  Progress Note    Team: [facility] HOSP MED H   Attending MD: Sabine Sanderson MD  Admit Date: 7/28/2019  ARTURO 8/2/2019  Length of Stay:  LOS: 2 days   Code status: Full Code    Principal Problem: Fracture of femoral neck, right, closed    Interval hx: patient rated pain as 3/10 but then complained of excruciating pain; he does not recall getting IV pain med earlier.  + right knee pain with hx of previous surgery    ROS:  Constitutional: no fever or chills  Respiratory: no cough or shortness of breath  Cardiovascular: no chest pain or palpitations  Gastrointestinal: no nausea or vomiting, no abdominal pain; last BM: 7/28  Genitourinary: no hematuria or dysuria  Integument/Breast: no rash or pruritis  Musculoskeletal: + right knee pain; states right thigh feels numb      Physical Exam  Temp:  [96.6 °F (35.9 °C)-98.6 °F (37 °C)] 97.7 °F (36.5 °C)  Pulse:  [] 90  Resp:  [14-18] 17  SpO2:  [88 %-95 %] 95 %  BP: (109-146)/(60-94) 117/76      Intake/Output Summary (Last 24 hours) at 7/30/2019 1609  Last data filed at 7/30/2019 1517  Gross per 24 hour   Intake 120 ml   Output 625 ml   Net -505 ml       Wt Readings from Last 1 Encounters:   07/29/19 0918 90.7 kg (199 lb 15.3 oz)   07/28/19 1307 90.7 kg (200 lb)        Estimated body mass index is 27.12 kg/m² as calculated from the following:    Height as of this encounter: 6' (1.829 m).    Weight as of this encounter: 90.7 kg (199 lb 15.3 oz).    General: well developed, well nourished, no distress  Lungs:  clear to auscultation bilaterally and normal respiratory effort.  Cardiovascular: Heart: regular rate and rhythm, S1, S2 normal, no murmur, click, rub or gallop. Chest Wall: no tenderness. Extremities: no cyanosis, 1+ right thigh edema, no clubbing.   Abdomen/Rectal: Abdomen: soft, non-tender non-distended; bowel sounds normal; no masses,  no organomegaly.   Skin: Skin color, texture, turgor normal. No rashes or  lesions.  Neurologic: Normal strength and tone. No focal numbness or weakness.  Psych/Behavioral:  Alert and oriented, appropriate affect.      Recent Results (from the past 24 hour(s))   Ferritin    Collection Time: 07/29/19  4:43 PM   Result Value Ref Range    Ferritin 250 20.0 - 300.0 ng/mL   Iron and TIBC    Collection Time: 07/29/19  4:43 PM   Result Value Ref Range    Iron 73 45 - 160 ug/dL    Transferrin 193 (L) 200 - 375 mg/dL    TIBC 286 250 - 450 ug/dL    Saturated Iron 26 20 - 50 %   Vitamin B12    Collection Time: 07/29/19  4:43 PM   Result Value Ref Range    Vitamin B-12 826 210 - 950 pg/mL   Folate    Collection Time: 07/29/19  4:43 PM   Result Value Ref Range    Folate 5.8 4.0 - 24.0 ng/mL   Comprehensive Metabolic Panel (CMP)    Collection Time: 07/30/19  4:08 AM   Result Value Ref Range    Sodium 136 136 - 145 mmol/L    Potassium 4.5 3.5 - 5.1 mmol/L    Chloride 106 95 - 110 mmol/L    CO2 20 (L) 23 - 29 mmol/L    Glucose 88 70 - 110 mg/dL    BUN, Bld 29 (H) 8 - 23 mg/dL    Creatinine 2.6 (H) 0.5 - 1.4 mg/dL    Calcium 7.7 (L) 8.7 - 10.5 mg/dL    Total Protein 6.9 6.0 - 8.4 g/dL    Albumin 2.8 (L) 3.5 - 5.2 g/dL    Total Bilirubin 1.4 (H) 0.1 - 1.0 mg/dL    Alkaline Phosphatase 120 55 - 135 U/L    AST 56 (H) 10 - 40 U/L    ALT 19 10 - 44 U/L    Anion Gap 10 8 - 16 mmol/L    eGFR if African American 28.8 (A) >60 mL/min/1.73 m^2    eGFR if non African American 24.9 (A) >60 mL/min/1.73 m^2   Magnesium    Collection Time: 07/30/19  4:08 AM   Result Value Ref Range    Magnesium 1.4 (L) 1.6 - 2.6 mg/dL   Phosphorus    Collection Time: 07/30/19  4:08 AM   Result Value Ref Range    Phosphorus 3.3 2.7 - 4.5 mg/dL   CBC with Automated Differential    Collection Time: 07/30/19  4:08 AM   Result Value Ref Range    WBC 6.64 3.90 - 12.70 K/uL    RBC 2.92 (L) 4.60 - 6.20 M/uL    Hemoglobin 9.7 (L) 14.0 - 18.0 g/dL    Hematocrit 29.3 (L) 40.0 - 54.0 %    Mean Corpuscular Volume 100 (H) 82 - 98 fL    Mean Corpuscular  Hemoglobin 33.2 (H) 27.0 - 31.0 pg    Mean Corpuscular Hemoglobin Conc 33.1 32.0 - 36.0 g/dL    RDW 17.8 (H) 11.5 - 14.5 %    Platelets 82 (L) 150 - 350 K/uL    MPV 11.5 9.2 - 12.9 fL    Immature Granulocytes 0.3 0.0 - 0.5 %    Gran # (ANC) 3.7 1.8 - 7.7 K/uL    Immature Grans (Abs) 0.02 0.00 - 0.04 K/uL    Lymph # 1.8 1.0 - 4.8 K/uL    Mono # 0.7 0.3 - 1.0 K/uL    Eos # 0.4 0.0 - 0.5 K/uL    Baso # 0.09 0.00 - 0.20 K/uL    nRBC 0 0 /100 WBC    Gran% 55.8 38.0 - 73.0 %    Lymph% 26.4 18.0 - 48.0 %    Mono% 10.5 4.0 - 15.0 %    Eosinophil% 5.6 0.0 - 8.0 %    Basophil% 1.4 0.0 - 1.9 %    Platelet Estimate Decreased (A)     Aniso Slight     Poly Occasional     Differential Method Automated    PT/INR    Collection Time: 07/30/19  4:08 AM   Result Value Ref Range    Prothrombin Time 12.9 (H) 9.0 - 12.5 sec    INR 1.3 (H) 0.8 - 1.2   Vitamin D    Collection Time: 07/30/19  4:08 AM   Result Value Ref Range    Vit D, 25-Hydroxy 14 (L) 30 - 96 ng/mL       Recent Labs   Lab 07/29/19  0330 07/29/19  1328 07/30/19  0408   WBC 5.16 5.16  5.16 6.64   HGB 10.1* 11.1*  11.1* 9.7*   HCT 30.5* 34.7*  34.7* 29.3*   PLT 62* 97*  97* 82*       Recent Labs   Lab 07/29/19  0330 07/29/19  1328 07/30/19  0408   * 136 136   K 4.1 4.6 4.5    104 106   CO2 22* 24 20*   BUN 28* 26* 29*   CREATININE 3.3* 2.9* 2.6*   GLU 93 94 88   CALCIUM 7.8* 8.0* 7.7*   MG 1.2*  --  1.4*   PHOS 3.3  --  3.3       Recent Labs   Lab 07/28/19  1401 07/28/19  1406 07/29/19  0330 07/30/19  0408   ALKPHOS 110  --  91 120   ALT 32  --  25 19   AST 71*  --  54* 56*   ALBUMIN 3.4*  --  2.8* 2.8*   PROT 8.2  --  6.7 6.9   BILITOT 4.4*  --  2.3* 1.4*   INR  --  1.4* 1.4* 1.3*         Hemoglobin A1C   Date Value Ref Range Status   07/28/2019 4.2 4.0 - 5.6 % Final     Comment:     ADA Screening Guidelines:  5.7-6.4%  Consistent with prediabetes  >or=6.5%  Consistent with diabetes  High levels of fetal hemoglobin interfere with the HbA1C  assay. Heterozygous  hemoglobin variants (HbS, HgC, etc)do  not significantly interfere with this assay.   However, presence of multiple variants may affect accuracy.         Scheduled Meds:   acetaminophen  1,000 mg Oral Q6H    diazePAM  5 mg Oral Q12H    folic acid  1 mg Oral Daily    heparin (porcine)  5,000 Units Subcutaneous Q8H    [START ON 7/31/2019] hydrOXYzine pamoate  25 mg Oral QHS    mirtazapine  15 mg Oral QHS    multivitamin  1 tablet Oral Daily    mupirocin  1 g Nasal BID    pantoprazole  40 mg Oral Daily    polyethylene glycol  17 g Oral Daily    pregabalin  75 mg Oral QHS    propranolol  10 mg Oral BID    senna-docusate 8.6-50 mg  1 tablet Oral BID    tamsulosin  0.4 mg Oral Daily       Continuous Infusions:   sodium chloride 0.9% 125 mL/hr at 07/30/19 1517    ropivacaine (PF) 2 mg/ml (0.2%) 10 mL/hr (07/30/19 1129)       As Needed: sodium chloride, sodium chloride, bisacodyl, Dextrose 10% Bolus, Dextrose 10% Bolus, glucagon (human recombinant), glucose, glucose, HYDROmorphone, methocarbamol, naloxone, ondansetron, promethazine (PHENERGAN) IVPB, sodium chloride 0.9%, sodium chloride 0.9%, traMADol    Active Hospital Problems    Diagnosis  POA    *Fracture of femoral neck, right, closed [S72.001A]  Yes    Chronic pain of right knee [M25.561, G89.29]  Yes    Hypomagnesemia [E83.42]  Yes    ISRAEL (acute kidney injury) [N17.9]  Yes    Transaminitis [R74.0]  Yes    Elevated bilirubin [R17]  Yes    Acute blood loss anemia [D62]  Yes    Macrocytic anemia [D53.9]  Yes    Closed fracture of neck of right femur s/p screw fixation on 7/29 by Dr. 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       Overview:    Assessment and Plan    Closed Displaced Fracture of Right Femoral Neck  · Hip Fracture Pathway initiated  · Orthopedics consulted, s/p screw fixation and capsulotomy on 7/29 by Dr. Grossman. Heparin (due to kidney function and ISRAEL) to start 12 hours post op for 28 days.  · Toe touch weight beraing for 4-6 weeks - patient attempting to comply  · PT/OT to start on POD 1, will consider inpatient rehab as has no SNF or  benefits. HG 11 and BP stable post op. Transfused platelets, FFP in surgery  · Sauer to be removed on POD 1  · Pain control:  Pregabalin, Oxy, and scheduled Tylenol (2 grams maximum due to liver issues) - monitor with high MELD  · Patient has refused oxycodone yesterday and morphine relative contraindication with renal issues, anesthesia ordered tramadol prn and night coverage added low dose dilaudid for pain control; anesthesia pain service to be called by nursing for pain control issues  · Vitamin D 14 so supplement ordered        Alcoholic Cirrhosis  MELD-Na score: 21 at 7/30/2019  4:08 AM  MELD score: 20 at 7/30/2019  4:08 AM  Calculated from:  Serum Creatinine: 2.6 mg/dL at 7/30/2019  4:08 AM  Serum Sodium: 136 mmol/L at 7/30/2019  4:08 AM  Total Bilirubin: 1.4 mg/dL at 7/30/2019  4:08 AM  INR(ratio): 1.3 at 7/30/2019  4:08 AM  · Age: 63 years  · Reports last drink the day of admission  · PETH pending Hep panel, Ammonia 45 - start chronic lactulose for constipation  · Liver U/S showing fatty liver and likely cirrhosis, cholelithiasis as well as sluggish portal flow  · Stigmata of cirrhosis including  thrombocytopenia,  elevated tbili, liver enzymes, elevated INR.  · Hepatology consult discontinued as patient has had improvement in labs today- no signs of alcoholic hepatitis now, ASTS 71-->54, Tbili 4.4-->2.3 and Cr also improving. They advise to continue meds on now- PPI, propanolol, and avoid diuretics based on chart check. I agree as patient peripherally down with ISRAEL and I don't need hepatology consultants at this time to assist as has improved and not decompensated after surgery. Will re-consult hepatology for any worsening.     Alcohol Withdrawal  · Last drink on 7/28  · MVI, thiamine, folic acid  · No signs of withdrawal now, he denies history of this. Is oversedated with valium 10 and vitals have been stable. Change from valium 10 mg q8 to 5 mg q12 now. Monitor for signs of withdrawal. peth pending as above - patient admits to drinking up to day of admit        Essential HTN  · Chronic issue  · Reports has been on Lisinopril 20mg previously - will hold with CKD  · Propranolol 10mg PO BID started on admit. BP stable post op, trend now post op and consider norvasc or hydralazine if needed for management consider kidney injury these should be okay and not worsen Cr  · Pain control as above     CKD Stage 3  · Creatinine 3.8 on admit, baseline unknown - as renal function has been continuing to worsen but in the past has been between 1.5 and 2.2  · Possibly a component of ATN from fluctuating BP (reports BP drops quickly, has been on Lisinopril) vs worsening CKD as well as volume down  · He states he been hydrating with beer not water, that seems likely to be a big part of it  · Cr improved to 3.3, urine Na low,  1gram protein and improving with volume resuscitation. Will give gentle IVF in post op period also. U/S showing complicated renal cysts- will need close monitoring as outpatient which will probably be difficult given noncompliance  · Can discontinue nephro consult since his Cr is improving and making urine. If worsens  will reconsult     Benign Hypertensive Heart and Kidney Disease with CKD  · As above     Thrombocytopenia  · 2/2 liver disease  · Monitor for bleeding  · Platelets 82 today. Given platelets and FFP prior to surgery to prevent bleeding.     Esophageal Varices  · Seen on EGD in Care Everywhere  · S/p banding March 2018  · Propranolol as above with PPI     Anemia of Chronic Disease  · Hemoglobin 11. Ferrtin/b12/folate pending. Post op Hg stable  · Monitor for bleeding     Coagulopathy  · 2/2 liver disease, INR 1.4 given FFP pre op for bleeding risk.        Anxiety and Depression  · Chronic and stable  · Continue Effexor 75mg PO daily  · Continue Remeron 15mg PO qHS  · Continue Atarax QHS for sleep     Hypomagnesemia  · Supplemented; monitor K+ for hyperkalemia with CKD    Chronic right knee pain  Start lidoderm patch to right knee    Diet:  Renal diet until kidneys recover  GI PPx:  pantoprazole  DVT PPx: heparin  Goals of Care:  Full Code    High Risk Conditions  Patient is currently receiving parenteral controlled substances: Dilaudid    Diet: Diet renal   GI PPx: pantoprazole  DVT PPx:   VTE Risk Mitigation (From admission, onward)        Ordered     heparin (porcine) injection 5,000 Units  Every 8 hours      07/29/19 1258     IP VTE LOW RISK PATIENT  Once      07/28/19 1554        L/D/A: PNC; PIV  Wounds: right hip  Goals of Care: curative, full code  Discharge plan: inpatient rehab      Sabnie Sanderson MD  Staff Hospitalist  [phone]

## 2019-07-30 NOTE — PLAN OF CARE
POD # 3 s/p operative fixation with IMN. This CM met with patient at bedside. Patient states he has no family and he lives with 6 other resident in a home. States it is not a group home and there is no director over the home, says a lady allow them to stay there.  Discharge plan Rehab vs outpatient therapy, patient has Medicaid.  Patient will need transport when discharge.    PCP [facility]  [address]  [facility] [address]  [phone]  Patient receives his medication at [facility] pharmacy.    Payor: MEDICAID / Plan: [facility] COMMUNITY PLAN [place] MEDICA[place] / Product Type: Managed Medicaid /         07/30/19 0907   Discharge Assessment   Assessment Type Discharge Planning Assessment   Confirmed/corrected address and phone number on facesheet? Yes   Assessment information obtained from? Patient   Expected Length of Stay (days) 3   Communicated expected length of stay with patient/caregiver yes   Prior to hospitilization cognitive status: Alert/Oriented   Prior to hospitalization functional status: Independent   Current cognitive status: Alert/Oriented   Current Functional Status: Assistive Equipment   Facility Arrived From:   (brought to hospital by friend)   Lives With other (see comments)   Able to Return to Prior Arrangements yes   Is patient able to care for self after discharge? Yes   Who are your caregiver(s) and their phone number(s)?   (friend Ina [phone])   Patient's perception of discharge disposition home or selfcare   Readmission Within the Last 30 Days no previous admission in last 30 days   Patient currently being followed by outpatient case management? No   Patient currently receives any other outside agency services? No   Equipment Currently Used at Home none   Do you have any problems affording any of your prescribed medications? No   Is the patient taking medications as prescribed? yes   Does the patient have transportation home? Yes   Transportation Anticipated family or  friend will provide   Does the patient receive services at the [facility]mad[facility] Clinic? No   Discharge Plan A Rehab   DME Needed Upon Discharge  none   Patient/Family in Agreement with Plan yes     Elke Headley RN/BSN/CM  [facility]  [phone]  Ortho/[facility]K/[facility]

## 2019-07-30 NOTE — CONSULTS
Inpatient consult to Physical Medicine Rehab  Consult performed by: Britney Merchant NP  Consult ordered by: Sabine Sanderson MD  Reason for consult: assess rehab needs        Reviewed patient history and current admission.  Rehab team following.  Full consult to follow.    MAGDIEL Lockett, FNP-C  Physical Medicine & Rehabilitation   07/30/2019

## 2019-07-30 NOTE — ANESTHESIA POST-OP PAIN MANAGEMENT
Acute Pain Service Progress Note    Fransico Montalvo is a 63 y.o., male, [id number].    Surgery:    Follow-up For: Procedure(s) (LRB):  PINNING, HIP, PERCUTANEOUS- synthes cannulated screws- hana table- C arm door side- (Right)  CAPSULOTOMY, JOINT (Right)    Post Op Day #: 1    Catheter type: perineural  SIFI    Infusion type: Ropivacaine 0.2%  2 basal, 10 IB    Problem List:    Active Hospital Problems    Diagnosis  POA    *Fracture of femoral neck, right, closed [S72.001A]  Yes    Hypomagnesemia [E83.42]  Yes    ISRAEL (acute kidney injury) [N17.9]  Yes    Transaminitis [R74.0]  Yes    Elevated bilirubin [R17]  Yes    Acute blood loss anemia [D62]  Yes    Macrocytic anemia [D53.9]  Yes    Closed fracture of neck of right femur [S72.001A]  Yes    Multiple renal cysts [Q61.02]  Not Applicable    Cholelithiases [K80.20]  Yes    Fall [W19.XXXA]  Yes    Coagulopathy [D68.9]  Yes    Anxiety and depression [F41.9, F32.9]  Yes    Essential hypertension [I10]  Yes    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       Subjective:     General appearance of alert, oriented, no complaints   Pain with rest: 2    Numbers   Pain with movement: 9    Numbers   Side Effects    1. Pruritis No    2. Nausea No    3. Motor Blockade No, 0=Ability to raise lower extremities off bed    4. Sedation No, 1=awake and alert    Objective:     Catheter site clean, dry, intact    Vitals   Vitals:    07/30/19 0800   BP:    Pulse: 98   Resp:    Temp:         Labs    Admission on 07/28/2019   No results displayed  because visit has over 200 results.           Meds   Current Facility-Administered Medications   Medication Dose Route Frequency Provider Last Rate Last Dose    0.9%  NaCl infusion (for blood administration)   Intravenous Q24H PRN Link Brown MD        0.9%  NaCl infusion (for blood administration)   Intravenous Q24H PRN Link Brown MD        0.9%  NaCl infusion   Intravenous Continuous Allison Butterfield  mL/hr at 07/30/19 0606      acetaminophen tablet 1,000 mg  1,000 mg Oral Q6H Link Brown MD   1,000 mg at 07/30/19 0554    bisacodyl suppository 10 mg  10 mg Rectal Daily PRN Link Brown MD        dextrose 10% (D10W) Bolus  12.5 g Intravenous PRN Link Brown MD        dextrose 10% (D10W) Bolus  25 g Intravenous PRN Link Brown MD        diazePAM tablet 5 mg  5 mg Oral Q12H Allison Butterfield MD   5 mg at 07/29/19 2113    folic acid tablet 1 mg  1 mg Oral Daily Link Brown MD        glucagon (human recombinant) injection 1 mg  1 mg Intramuscular PRN Link Brown MD        glucose chewable tablet 16 g  16 g Oral PRN Link Brown MD        glucose chewable tablet 24 g  24 g Oral PRN Link Brown MD        heparin (porcine) injection 5,000 Units  5,000 Units Subcutaneous Q8H Link Brown MD   5,000 Units at 07/30/19 0555    HYDROmorphone injection 0.5 mg  0.5 mg Intravenous Q6H PRN Samy Larsen PA-C   0.5 mg at 07/30/19 0252    hydrOXYzine pamoate capsule 25 mg  25 mg Oral BID Link Brown MD   25 mg at 07/29/19 2113    methocarbamol tablet 500 mg  500 mg Oral Q6H PRN Erica Camp MD   500 mg at 07/30/19 0604    mirtazapine tablet 15 mg  15 mg Oral QHS Link Brown MD   15 mg at 07/29/19 2113    multivitamin tablet 1 tablet  1 tablet Oral Daily Link Brown MD        mupirocin 2 % ointment 1 g  1 g Nasal BID Link Brown MD   1 g at 07/29/19 2113    naloxone 0.4 mg/mL injection 0.4  mg  0.4 mg Intravenous PRN Samy Larsen PA-C        ondansetron injection 4 mg  4 mg Intravenous Q12H PRN Link Brown MD        pantoprazole EC tablet 40 mg  40 mg Oral Daily Link Brown MD        polyethylene glycol packet 17 g  17 g Oral Daily Link Brown MD   17 g at 07/29/19 1300    pregabalin capsule 75 mg  75 mg Oral QHS Link Brown MD   75 mg at 07/29/19 2112    promethazine (PHENERGAN) 6.25 mg in dextrose 5 % 50 mL IVPB  6.25 mg Intravenous Q6H PRN Link Brown MD        propranolol tablet 10 mg  10 mg Oral BID Link Brown MD   10 mg at 07/29/19 2113    ropivacaine (PF) 2 mg/ml (0.2%) infusion  10 mL/hr Perineural Continuous Link Brown MD 10 mL/hr at 07/30/19 0205 10 mL/hr at 07/30/19 0205    senna-docusate 8.6-50 mg per tablet 1 tablet  1 tablet Oral BID Link Brown MD   1 tablet at 07/29/19 2113    sodium chloride 0.9% flush 10 mL  10 mL Intravenous PRN Link Brown MD        sodium chloride 0.9% flush 5 mL  5 mL Intravenous PRN Link Brown MD        tamsulosin 24 hr capsule 0.4 mg  0.4 mg Oral Daily Link Brown MD        traMADol tablet 50 mg  50 mg Oral Q6H PRN Erica Camp MD   50 mg at 07/30/19 0210       Assessment:     Pain control adequate    Plan:     Patient doing well, continue present treatment.     Patient required tramadol and dilaudid x2 overnight/this AM. States pain is well controlled and comfortable as long as he doesn't move. Will continue PNC for now and pause and pull prior to discharge. Dispo pending work with PT    Evaluator Erica Camp

## 2019-07-30 NOTE — SUBJECTIVE & OBJECTIVE
Principal Problem:Fracture of femoral neck, right, closed    Principal Orthopedic Problem: same    Interval History: Patient seen and examined at bedside.  No acute events overnight.  Pain much improved.  Surgery yesterday.      Review of patient's allergies indicates:  No Known Allergies    Current Facility-Administered Medications   Medication    0.9%  NaCl infusion (for blood administration)    0.9%  NaCl infusion (for blood administration)    0.9%  NaCl infusion    acetaminophen tablet 1,000 mg    bisacodyl suppository 10 mg    ceFAZolin injection 2 g    dextrose 10% (D10W) Bolus    dextrose 10% (D10W) Bolus    diazePAM tablet 5 mg    folic acid tablet 1 mg    glucagon (human recombinant) injection 1 mg    glucose chewable tablet 16 g    glucose chewable tablet 24 g    heparin (porcine) injection 5,000 Units    HYDROmorphone injection 0.5 mg    hydrOXYzine pamoate capsule 25 mg    methocarbamol tablet 500 mg    mirtazapine tablet 15 mg    multivitamin tablet 1 tablet    mupirocin 2 % ointment 1 g    naloxone 0.4 mg/mL injection 0.4 mg    ondansetron injection 4 mg    pantoprazole EC tablet 40 mg    polyethylene glycol packet 17 g    pregabalin capsule 75 mg    promethazine (PHENERGAN) 6.25 mg in dextrose 5 % 50 mL IVPB    propranolol tablet 10 mg    ropivacaine (PF) 2 mg/ml (0.2%) infusion    senna-docusate 8.6-50 mg per tablet 1 tablet    sodium chloride 0.9% flush 10 mL    sodium chloride 0.9% flush 5 mL    tamsulosin 24 hr capsule 0.4 mg    traMADol tablet 50 mg     Objective:     Vital Signs (Most Recent):  Temp: 97.5 °F (36.4 °C) (07/30/19 0456)  Pulse: 101 (07/30/19 0456)  Resp: 16 (07/30/19 0456)  BP: 109/67 (07/30/19 0456)  SpO2: (!) 93 % (07/30/19 0456) Vital Signs (24h Range):  Temp:  [97.2 °F (36.2 °C)-98.6 °F (37 °C)] 97.5 °F (36.4 °C)  Pulse:  [] 101  Resp:  [10-22] 16  SpO2:  [89 %-100 %] 93 %  BP: ()/() 109/67     Weight: 90.7 kg (199 lb 15.3  oz)  Height: 6' (182.9 cm)  Body mass index is 27.12 kg/m².      Intake/Output Summary (Last 24 hours) at 7/30/2019 0543  Last data filed at 7/30/2019 0217  Gross per 24 hour   Intake 1366 ml   Output 425 ml   Net 941 ml       Ortho/SPM Exam    AAOx4  NAD  Reg rate  No increased WOB  Dressing c/d/i  SILT T/SP/DP/Bridges/Sa  Motor intact T/SP/DP  WWP extremities  FCDs in place and functioning    Significant Labs:   CBC:   Recent Labs   Lab 07/28/19  1401 07/29/19  0330 07/29/19  1328   WBC 5.67 5.16 5.16  5.16   HGB 11.8* 10.1* 11.1*  11.1*   HCT 34.8* 30.5* 34.7*  34.7*   PLT 82* 62* 97*  97*     CMP:   Recent Labs   Lab 07/28/19  1401 07/29/19  0330 07/29/19  1328   * 134* 136   K 4.3 4.1 4.6   CL 97 102 104   CO2 23 22* 24   GLU 94 93 94   BUN 24* 28* 26*   CREATININE 3.8* 3.3* 2.9*   CALCIUM 9.1 7.8* 8.0*   PROT 8.2 6.7  --    ALBUMIN 3.4* 2.8*  --    BILITOT 4.4* 2.3*  --    ALKPHOS 110 91  --    AST 71* 54*  --    ALT 32 25  --    ANIONGAP 14 10 8   EGFRNONAA 15.8* 18.7* 21.9*     Coagulation:   Recent Labs   Lab 07/28/19  1406 07/29/19  0330   LABPROT 14.0* 13.5*   INR 1.4* 1.4*   APTT 26.8  --      All pertinent labs within the past 24 hours have been reviewed.    Significant Imaging: I have reviewed all pertinent imaging results/findings.
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
## 2019-07-30 NOTE — NURSING
Pt denies recieving documented 1118 dose of hydromorphone.  Pt seems to be confused.  MD is aware.

## 2019-07-30 NOTE — NURSING
Pt is AAOx4 and PERRLA was noted.  Pt seems slightly anxious but is calm and cooperative and follows commands.  Will continue to monitor.
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
## 2019-07-30 NOTE — PLAN OF CARE
"Problem: Occupational Therapy Goal  Goal: Occupational Therapy Goal  Goals to be met by: 8/30/2019     Patient will increase functional independence with ADLs by performing:    UE Dressing with Stand-by Assistance.  LE Dressing with Stand-by Assistance.  Grooming while standing with Stand-by Assistance.  Toileting from toilet with Stand-by Assistance for hygiene and clothing management.     Mary Matta, OT  7/30/2019      Outcome: Ongoing (interventions implemented as appropriate)  Acute OT eval complete. Goals established. Pt was unable to follow TTWB precaution on R LE when standing. He stated he heard a "snap" when standing but did not report an increase in pain. He would benefit from inpatient rehab at d/c in order to get back to PLOF.     Mary Matta, OT  7/30/2019        "

## 2019-07-30 NOTE — PLAN OF CARE
"Problem: Physical Therapy Goal  Goal: Physical Therapy Goal  Goals to be met by: 19    Patient will increase functional independence with mobility by performin. Supine to sit with Modified [place]  2. Sit to stand transfer with Stand-by Assistance  3. Bed to chair transfer with Stand-by Assistance using Rolling Walker  4. Gait x20 feet with Stand-by Assistance using Rolling Walker  5. Ascend/descend 5 stairs with bilateral Handrails Minimal Assistance   6. Lower extremity exercise program x10 reps per handout, with supervision      Patient tolerated PT evaluation fairly today. He was unable to follow TTWB precaution on R LE when standing. He stated he felt a "crack" when standing but no increase in pain. He would benefit from inpatient rehab at discharge in order to get back to PLOF.       "

## 2019-07-30 NOTE — PT/OT/SLP EVAL
"Physical Therapy Evaluation and Treatment    Patient Name:  Fransico Montalvo   MRN:  [id number]    Recommendations:     Discharge Recommendations:  rehabilitation facility   Discharge Equipment Recommendations: commode, walker, rolling   Barriers to discharge: Inaccessible home (bedroom on 2nd floor with 15 steps and left handrail) and Decreased caregiver support (lives with 5 roommates who do not assist patient)    Assessment:     Fransico Montalvo is a 63 y.o. male admitted with a medical diagnosis of Fracture of femoral neck, right, closed.  He presents with the following impairments/functional limitations:  weakness, impaired functional mobilty, gait instability, impaired balance, decreased lower extremity function, decreased ROM, impaired joint extensibility, pain, impaired skin, orthopedic precautions. Patient tolerated PT evaluation fairly today. He was unable to follow TTWB precaution on R LE when standing and needed constant verbal/tactile cues to attempt to maintain for all functional mobility. He stated he felt a "crack" when standing but no increase in pain. MD and nurse notified. Patient unsafe throughout PT evaluation and needed verbal/tactile cues for hand placement with standing trials and RW management. He would benefit from inpatient rehab at discharge in order to get back to OF.     Rehab Prognosis: Good; patient would benefit from acute skilled PT services to address these deficits and reach maximum level of function.    Recent Surgery: Procedure(s) (LRB):  PINNING, HIP, PERCUTANEOUS- synthes cannulated screws- hana table- C arm door side- (Right)  CAPSULOTOMY, JOINT (Right) 1 Day Post-Op    Plan:     During this hospitalization, patient to be seen daily to address the identified rehab impairments via gait training, therapeutic activities, therapeutic exercises and progress toward the following goals:    · Plan of Care Expires:  08/30/19    Subjective     Chief Complaint: Pain in right hip.   Patient/Family " "Comments/goals: To get back to being independent.   Pain/Comfort:  · Pain Rating 1: 10/10  · Location - Side 1: Right  · Location 1: hip  · Pain Addressed 1: Pre-medicate for activity, Reposition, Cessation of Activity, Nurse notified  · Pain Rating Post-Intervention 1: (12/10)    Living Environment:  Patient lives in a house with 5 roommates. There is 1 step in and 15 steps with a left handrail to get to 2nd floor bedroom. Prior to admission, patients level of function was independent.  Equipment used at home: none. Upon discharge, patient will have assistance from friend.    Objective:     Communicated with RN prior to session.  Patient found supine with perineural catheter, telemetry, peripheral IV  upon PT entry to room.    General Precautions: Standard, fall   Orthopedic Precautions:RLE toe touch weight bearing   Braces: N/A     Exams:  · Cognitive Exam:  Patient is oriented to Person, Place, Time and Situation  · Sensation:    · -       Intact  · RLE ROM: WFL except limited at hip due to pain  · RLE Strength: WFL except limited at hip due to pain  · LLE ROM: WFL  · LLE Strength: WFL    Functional Mobility:  · Bed Mobility:     · Scooting: contact guard assistance  · Supine to Sit: contact guard assistance  · Sit to Supine: contact guard assistance  · Transfers:     · Sit to Stand:  moderate assistance with rolling walker x3 trials from edge of bed with constant verbal/tactile cues to maintain TTWB precautions. Patient visibly shaking during standing trials. He was only able to maintain standing ~30 seconds due to feeling "woozy".  · Gait:   Patient able to hop x2 trials along edge of bed with RW and moderate assistance. Patient unable to follow TTWB precautions despite constant verbal/tactile cues.       Therapeutic Activities and Exercises:  Patient educated in:  -PT role and POC  -safety with transfers including hand placement  -gait sequencing and RW management  -OOB activity to maximize recovery   -TTWB " precautions    AM-PAC 6 CLICK MOBILITY  Total Score:9     Patient left supine with all lines intact, call button in reach and RN notified.    GOALS:   Multidisciplinary Problems     Physical Therapy Goals        Problem: Physical Therapy Goal    Goal Priority Disciplines Outcome Goal Variances Interventions   Physical Therapy Goal     PT, PT/OT      Description:  Goals to be met by: 19    Patient will increase functional independence with mobility by performin. Supine to sit with Modified [place]  2. Sit to stand transfer with Stand-by Assistance  3. Bed to chair transfer with Stand-by Assistance using Rolling Walker  4. Gait x20 feet with Stand-by Assistance using Rolling Walker  5. Ascend/descend 5 stairs with bilateral Handrails Minimal Assistance   6. Lower extremity exercise program x10 reps per handout, with supervision                      History:     Past Medical History:   Diagnosis Date    Alcohol abuse     BPH (benign prostatic hyperplasia)     Cirrhosis 2018    pt states that he was diagnosed a week ago during his last hospital visit    Gastritis     Hypertension     Renal disorder        History reviewed. No pertinent surgical history.    Time Tracking:     PT Received On: 19  PT Start Time: 0954     PT Stop Time: 1027  PT Total Time (min): 33 min     Billable Minutes: Evaluation 10 and Gait Training 23    Cheryle Gillis, PT  2019

## 2019-07-30 NOTE — ASSESSMENT & PLAN NOTE
-Hospital medicine consulted  - last drink 7/28,  statse not withdrawing but oversedated with Valium with recs to decrease Valium 10 mg TID to 5 mg BID   -Hepatology consulted canceled 2/2 improvement in labs

## 2019-07-30 NOTE — PROGRESS NOTES
[facility]  Orthopedics  Progress Note    Patient Name: Fransico Montalvo  MRN: [id number]  Admission Date: 7/28/2019  Hospital Length of Stay: 2 days  Attending Provider: Allison Butterfield MD  Primary Care Provider: Jessica  Follow-up For: Procedure(s) (LRB):  PINNING, HIP, PERCUTANEOUS- synthes cannulated screws- hana table- C arm door side- (Right)  CAPSULOTOMY, JOINT (Right)    Post-Operative Day: 1 Day Post-Op  Subjective:     Principal Problem:Fracture of femoral neck, right, closed    Principal Orthopedic Problem: same    Interval History: Patient seen and examined at bedside.  No acute events overnight.  Pain much improved.  Surgery yesterday.      Review of patient's allergies indicates:  No Known Allergies    Current Facility-Administered Medications   Medication    0.9%  NaCl infusion (for blood administration)    0.9%  NaCl infusion (for blood administration)    0.9%  NaCl infusion    acetaminophen tablet 1,000 mg    bisacodyl suppository 10 mg    ceFAZolin injection 2 g    dextrose 10% (D10W) Bolus    dextrose 10% (D10W) Bolus    diazePAM tablet 5 mg    folic acid tablet 1 mg    glucagon (human recombinant) injection 1 mg    glucose chewable tablet 16 g    glucose chewable tablet 24 g    heparin (porcine) injection 5,000 Units    HYDROmorphone injection 0.5 mg    hydrOXYzine pamoate capsule 25 mg    methocarbamol tablet 500 mg    mirtazapine tablet 15 mg    multivitamin tablet 1 tablet    mupirocin 2 % ointment 1 g    naloxone 0.4 mg/mL injection 0.4 mg    ondansetron injection 4 mg    pantoprazole EC tablet 40 mg    polyethylene glycol packet 17 g    pregabalin capsule 75 mg    promethazine (PHENERGAN) 6.25 mg in dextrose 5 % 50 mL IVPB    propranolol tablet 10 mg    ropivacaine (PF) 2 mg/ml (0.2%) infusion    senna-docusate 8.6-50 mg per tablet 1 tablet    sodium chloride 0.9% flush 10 mL    sodium chloride 0.9% flush 5 mL    tamsulosin 24 hr capsule 0.4 mg     traMADol tablet 50 mg     Objective:     Vital Signs (Most Recent):  Temp: 97.5 °F (36.4 °C) (07/30/19 0456)  Pulse: 101 (07/30/19 0456)  Resp: 16 (07/30/19 0456)  BP: 109/67 (07/30/19 0456)  SpO2: (!) 93 % (07/30/19 0456) Vital Signs (24h Range):  Temp:  [97.2 °F (36.2 °C)-98.6 °F (37 °C)] 97.5 °F (36.4 °C)  Pulse:  [] 101  Resp:  [10-22] 16  SpO2:  [89 %-100 %] 93 %  BP: ()/() 109/67     Weight: 90.7 kg (199 lb 15.3 oz)  Height: 6' (182.9 cm)  Body mass index is 27.12 kg/m².      Intake/Output Summary (Last 24 hours) at 7/30/2019 0543  Last data filed at 7/30/2019 0217  Gross per 24 hour   Intake 1366 ml   Output 425 ml   Net 941 ml       Ortho/SPM Exam    AAOx4  NAD  Reg rate  No increased WOB  Dressing c/d/i  SILT T/SP/DP/Bridges/Sa  Motor intact T/SP/DP  WWP extremities  FCDs in place and functioning    Significant Labs:   CBC:   Recent Labs   Lab 07/28/19  1401 07/29/19  0330 07/29/19  1328   WBC 5.67 5.16 5.16  5.16   HGB 11.8* 10.1* 11.1*  11.1*   HCT 34.8* 30.5* 34.7*  34.7*   PLT 82* 62* 97*  97*     CMP:   Recent Labs   Lab 07/28/19  1401 07/29/19  0330 07/29/19  1328   * 134* 136   K 4.3 4.1 4.6   CL 97 102 104   CO2 23 22* 24   GLU 94 93 94   BUN 24* 28* 26*   CREATININE 3.8* 3.3* 2.9*   CALCIUM 9.1 7.8* 8.0*   PROT 8.2 6.7  --    ALBUMIN 3.4* 2.8*  --    BILITOT 4.4* 2.3*  --    ALKPHOS 110 91  --    AST 71* 54*  --    ALT 32 25  --    ANIONGAP 14 10 8   EGFRNONAA 15.8* 18.7* 21.9*     Coagulation:   Recent Labs   Lab 07/28/19  1406 07/29/19  0330   LABPROT 14.0* 13.5*   INR 1.4* 1.4*   APTT 26.8  --      All pertinent labs within the past 24 hours have been reviewed.    Significant Imaging: I have reviewed all pertinent imaging results/findings.    Assessment/Plan:     * Fracture of femoral neck, right, closed  63 y.o. male POD1 s/p R FN perc screw fixation    Pain control: multimodal  PT/OT: TTWB RLE  DVT PPx: heparin, FCDs at all times when not ambulating  Abx: postop  Ancef  Labs: pending  Drain: none  [place]: none    Dispo: f/u PT recs          Link Brown MD  Orthopedics  [facility]

## 2019-07-30 NOTE — ADDENDUM NOTE
Addendum  created 07/30/19 1048 by Geno Carlisle CRNA    Intraprocedure Meds edited, Orders acknowledged in Narrator
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
## 2019-07-30 NOTE — SUBJECTIVE & OBJECTIVE
Past Medical History:   Diagnosis Date    Alcohol abuse     BPH (benign prostatic hyperplasia)     Cirrhosis 06/21/2018    pt states that he was diagnosed a week ago during his last hospital visit    Gastritis     Hypertension     Renal disorder      History reviewed. No pertinent surgical history.  Review of patient's allergies indicates:  No Known Allergies    Scheduled Medications:    acetaminophen  1,000 mg Oral Q6H    diazePAM  5 mg Oral Q12H    folic acid  1 mg Oral Daily    heparin (porcine)  5,000 Units Subcutaneous Q8H    hydrOXYzine pamoate  25 mg Oral BID    mirtazapine  15 mg Oral QHS    multivitamin  1 tablet Oral Daily    mupirocin  1 g Nasal BID    pantoprazole  40 mg Oral Daily    polyethylene glycol  17 g Oral Daily    pregabalin  75 mg Oral QHS    propranolol  10 mg Oral BID    senna-docusate 8.6-50 mg  1 tablet Oral BID    tamsulosin  0.4 mg Oral Daily       PRN Medications: sodium chloride, sodium chloride, bisacodyl, Dextrose 10% Bolus, Dextrose 10% Bolus, glucagon (human recombinant), glucose, glucose, HYDROmorphone, methocarbamol, naloxone, ondansetron, promethazine (PHENERGAN) IVPB, sodium chloride 0.9%, sodium chloride 0.9%, traMADol    Family History     Problem Relation (Age of Onset)    Heart disease Father, Brother    Hypertension Father        Tobacco Use    Smoking status: Current Every Day Smoker     Packs/day: 1.00     Types: Cigarettes    Smokeless tobacco: Never Used   Substance and Sexual Activity    Alcohol use: Yes     Comment: (former drinker; 2months sober) this visit admits drinking a couple beers today    Drug use: No    Sexual activity: Not on file     Review of Systems   Constitutional: Positive for activity change. Negative for fatigue and fever.   HENT: Negative for trouble swallowing and voice change.    Eyes: Negative for photophobia and visual disturbance.   Respiratory: Negative for cough and shortness of breath.    Cardiovascular: Negative  for chest pain and palpitations.   Gastrointestinal: Negative for abdominal distention, nausea and vomiting.   Genitourinary: Negative for difficulty urinating and flank pain.   Musculoskeletal: Positive for arthralgias, gait problem and myalgias.   Skin: Positive for wound. Negative for color change.   Neurological: Positive for weakness. Negative for numbness.   Psychiatric/Behavioral: Negative for agitation and confusion.     Objective:     Vital Signs (Most Recent):  Temp: 96.6 °F (35.9 °C) (07/30/19 0739)  Pulse: 93 (07/30/19 1154)  Resp: 17 (07/30/19 0739)  BP: 133/60 (07/30/19 1154)  SpO2: (!) 88 % (07/30/19 1154)    Vital Signs (24h Range):  Temp:  [96.6 °F (35.9 °C)-98.6 °F (37 °C)] 96.6 °F (35.9 °C)  Pulse:  [] 93  Resp:  [10-22] 17  SpO2:  [88 %-100 %] 88 %  BP: ()/(60-99) 133/60     Body mass index is 27.12 kg/m².    Physical Exam   Constitutional: He is oriented to person, place, and time. He appears well-developed and well-nourished.   HENT:   Head: Normocephalic and atraumatic.   Eyes: Right eye exhibits no discharge. Left eye exhibits no discharge.   Neck: Neck supple.   Cardiovascular: Normal rate and intact distal pulses.   Pulmonary/Chest: Effort normal. No respiratory distress.   Abdominal: Soft. He exhibits no distension.   Musculoskeletal: He exhibits no edema or deformity.        Right hip: He exhibits decreased range of motion, decreased strength and tenderness.        Right knee: Tenderness found.   Neurological: He is alert and oriented to person, place, and time.   Follows commands    Skin: Skin is warm and dry.   Psychiatric: He has a normal mood and affect. His behavior is normal.     NEUROLOGICAL EXAMINATION:     MENTAL STATUS   Oriented to person, place, and time.       Diagnostic Results:   Labs: Reviewed  ECG: Reviewed  X-Ray: Reviewed

## 2019-07-31 LAB
ALBUMIN SERPL BCP-MCNC: 2.9 G/DL (ref 3.5–5.2)
ALP SERPL-CCNC: 116 U/L (ref 55–135)
ALT SERPL W/O P-5'-P-CCNC: 10 U/L (ref 10–44)
ANION GAP SERPL CALC-SCNC: 10 MMOL/L (ref 8–16)
AST SERPL-CCNC: 70 U/L (ref 10–40)
BASOPHILS # BLD AUTO: 0.06 K/UL (ref 0–0.2)
BASOPHILS NFR BLD: 0.9 % (ref 0–1.9)
BILIRUB SERPL-MCNC: 1.6 MG/DL (ref 0.1–1)
BLD PROD TYP BPU: NORMAL
BLD PROD TYP BPU: NORMAL
BLOOD UNIT EXPIRATION DATE: NORMAL
BLOOD UNIT EXPIRATION DATE: NORMAL
BLOOD UNIT TYPE CODE: 5100
BLOOD UNIT TYPE CODE: 5100
BLOOD UNIT TYPE: NORMAL
BLOOD UNIT TYPE: NORMAL
BUN SERPL-MCNC: 34 MG/DL (ref 8–23)
CALCIUM SERPL-MCNC: 8.5 MG/DL (ref 8.7–10.5)
CHLORIDE SERPL-SCNC: 108 MMOL/L (ref 95–110)
CO2 SERPL-SCNC: 19 MMOL/L (ref 23–29)
CODING SYSTEM: NORMAL
CODING SYSTEM: NORMAL
CREAT SERPL-MCNC: 2.5 MG/DL (ref 0.5–1.4)
DIFFERENTIAL METHOD: ABNORMAL
DISPENSE STATUS: NORMAL
DISPENSE STATUS: NORMAL
EOSINOPHIL # BLD AUTO: 0.2 K/UL (ref 0–0.5)
EOSINOPHIL NFR BLD: 3.2 % (ref 0–8)
ERYTHROCYTE [DISTWIDTH] IN BLOOD BY AUTOMATED COUNT: 18.2 % (ref 11.5–14.5)
EST. GFR  (AFRICAN AMERICAN): 30.2 ML/MIN/1.73 M^2
EST. GFR  (NON AFRICAN AMERICAN): 26.2 ML/MIN/1.73 M^2
GLUCOSE SERPL-MCNC: 98 MG/DL (ref 70–110)
HCT VFR BLD AUTO: 28.9 % (ref 40–54)
HGB BLD-MCNC: 9.3 G/DL (ref 14–18)
IMM GRANULOCYTES # BLD AUTO: 0.03 K/UL (ref 0–0.04)
IMM GRANULOCYTES NFR BLD AUTO: 0.5 % (ref 0–0.5)
INR PPP: 1.3 (ref 0.8–1.2)
LYMPHOCYTES # BLD AUTO: 1.8 K/UL (ref 1–4.8)
LYMPHOCYTES NFR BLD: 27 % (ref 18–48)
MAGNESIUM SERPL-MCNC: 1.9 MG/DL (ref 1.6–2.6)
MCH RBC QN AUTO: 33.1 PG (ref 27–31)
MCHC RBC AUTO-ENTMCNC: 32.2 G/DL (ref 32–36)
MCV RBC AUTO: 103 FL (ref 82–98)
MONOCYTES # BLD AUTO: 0.9 K/UL (ref 0.3–1)
MONOCYTES NFR BLD: 12.8 % (ref 4–15)
NEUTROPHILS # BLD AUTO: 3.7 K/UL (ref 1.8–7.7)
NEUTROPHILS NFR BLD: 55.6 % (ref 38–73)
NRBC BLD-RTO: 0 /100 WBC
NUM UNITS TRANS FFP: NORMAL
NUM UNITS TRANS FFP: NORMAL
PHOSPHATE SERPL-MCNC: 3.4 MG/DL (ref 2.7–4.5)
PLATELET # BLD AUTO: 76 K/UL (ref 150–350)
PLATELET BLD QL SMEAR: ABNORMAL
PMV BLD AUTO: 11.8 FL (ref 9.2–12.9)
POTASSIUM SERPL-SCNC: 4.6 MMOL/L (ref 3.5–5.1)
POTASSIUM SERPL-SCNC: 5.4 MMOL/L (ref 3.5–5.1)
PROT SERPL-MCNC: 7.2 G/DL (ref 6–8.4)
PROTHROMBIN TIME: 12.7 SEC (ref 9–12.5)
RBC # BLD AUTO: 2.81 M/UL (ref 4.6–6.2)
SODIUM SERPL-SCNC: 137 MMOL/L (ref 136–145)
WBC # BLD AUTO: 6.62 K/UL (ref 3.9–12.7)

## 2019-07-31 PROCEDURE — 25000003 PHARM REV CODE 250: Performed by: ANESTHESIOLOGY

## 2019-07-31 PROCEDURE — 25000003 PHARM REV CODE 250: Performed by: STUDENT IN AN ORGANIZED HEALTH CARE EDUCATION/TRAINING PROGRAM

## 2019-07-31 PROCEDURE — 97116 GAIT TRAINING THERAPY: CPT

## 2019-07-31 PROCEDURE — 99232 SBSQ HOSP IP/OBS MODERATE 35: CPT | Mod: ,,, | Performed by: INTERNAL MEDICINE

## 2019-07-31 PROCEDURE — 25000003 PHARM REV CODE 250: Performed by: INTERNAL MEDICINE

## 2019-07-31 PROCEDURE — 63600175 PHARM REV CODE 636 W HCPCS: Performed by: STUDENT IN AN ORGANIZED HEALTH CARE EDUCATION/TRAINING PROGRAM

## 2019-07-31 PROCEDURE — 11000001 HC ACUTE MED/SURG PRIVATE ROOM

## 2019-07-31 PROCEDURE — 63600175 PHARM REV CODE 636 W HCPCS: Performed by: INTERNAL MEDICINE

## 2019-07-31 PROCEDURE — 84132 ASSAY OF SERUM POTASSIUM: CPT

## 2019-07-31 PROCEDURE — 99231 PR SUBSEQUENT HOSPITAL CARE,LEVL I: ICD-10-PCS | Mod: ,,, | Performed by: ANESTHESIOLOGY

## 2019-07-31 PROCEDURE — 36415 COLL VENOUS BLD VENIPUNCTURE: CPT

## 2019-07-31 PROCEDURE — 97530 THERAPEUTIC ACTIVITIES: CPT

## 2019-07-31 PROCEDURE — 85025 COMPLETE CBC W/AUTO DIFF WBC: CPT

## 2019-07-31 PROCEDURE — 63600175 PHARM REV CODE 636 W HCPCS: Performed by: HOSPITALIST

## 2019-07-31 PROCEDURE — 99232 PR SUBSEQUENT HOSPITAL CARE,LEVL II: ICD-10-PCS | Mod: ,,, | Performed by: INTERNAL MEDICINE

## 2019-07-31 PROCEDURE — 80053 COMPREHEN METABOLIC PANEL: CPT

## 2019-07-31 PROCEDURE — 99231 SBSQ HOSP IP/OBS SF/LOW 25: CPT | Mod: ,,, | Performed by: ANESTHESIOLOGY

## 2019-07-31 PROCEDURE — 84100 ASSAY OF PHOSPHORUS: CPT

## 2019-07-31 PROCEDURE — 25000003 PHARM REV CODE 250: Performed by: HOSPITALIST

## 2019-07-31 PROCEDURE — 83735 ASSAY OF MAGNESIUM: CPT

## 2019-07-31 PROCEDURE — 85610 PROTHROMBIN TIME: CPT

## 2019-07-31 RX ORDER — LACTULOSE 10 G/15ML
20 SOLUTION ORAL 2 TIMES DAILY
Status: DISCONTINUED | OUTPATIENT
Start: 2019-07-31 | End: 2019-08-01

## 2019-07-31 RX ORDER — DIAZEPAM 2 MG/1
2 TABLET ORAL EVERY 12 HOURS
Status: DISCONTINUED | OUTPATIENT
Start: 2019-07-31 | End: 2019-08-02 | Stop reason: HOSPADM

## 2019-07-31 RX ORDER — HYDROXYZINE HYDROCHLORIDE 25 MG/1
25 TABLET, FILM COATED ORAL 2 TIMES DAILY PRN
Status: DISCONTINUED | OUTPATIENT
Start: 2019-07-31 | End: 2019-08-02 | Stop reason: HOSPADM

## 2019-07-31 RX ADMIN — MUPIROCIN 1 G: 20 OINTMENT TOPICAL at 08:07

## 2019-07-31 RX ADMIN — HYDROXYZINE PAMOATE 25 MG: 25 CAPSULE ORAL at 08:07

## 2019-07-31 RX ADMIN — PREGABALIN 75 MG: 75 CAPSULE ORAL at 08:07

## 2019-07-31 RX ADMIN — LACTULOSE 20 G: 20 SOLUTION ORAL at 08:07

## 2019-07-31 RX ADMIN — SENNOSIDES,DOCUSATE SODIUM 1 TABLET: 8.6; 5 TABLET, FILM COATED ORAL at 08:07

## 2019-07-31 RX ADMIN — TRAMADOL HYDROCHLORIDE 50 MG: 50 TABLET ORAL at 08:07

## 2019-07-31 RX ADMIN — METHOCARBAMOL TABLETS 500 MG: 500 TABLET, COATED ORAL at 01:07

## 2019-07-31 RX ADMIN — HEPARIN SODIUM 5000 UNITS: 5000 INJECTION, SOLUTION INTRAVENOUS; SUBCUTANEOUS at 01:07

## 2019-07-31 RX ADMIN — DIAZEPAM 5 MG: 5 TABLET ORAL at 08:07

## 2019-07-31 RX ADMIN — TAMSULOSIN HYDROCHLORIDE 0.4 MG: 0.4 CAPSULE ORAL at 08:07

## 2019-07-31 RX ADMIN — FOLIC ACID 1 MG: 1 TABLET ORAL at 08:07

## 2019-07-31 RX ADMIN — HYDROMORPHONE HYDROCHLORIDE 0.2 MG: 1 INJECTION, SOLUTION INTRAMUSCULAR; INTRAVENOUS; SUBCUTANEOUS at 05:07

## 2019-07-31 RX ADMIN — MIRTAZAPINE 15 MG: 15 TABLET, FILM COATED ORAL at 08:07

## 2019-07-31 RX ADMIN — HEPARIN SODIUM 5000 UNITS: 5000 INJECTION, SOLUTION INTRAVENOUS; SUBCUTANEOUS at 09:07

## 2019-07-31 RX ADMIN — LIDOCAINE 2 PATCH: 50 PATCH TOPICAL at 08:07

## 2019-07-31 RX ADMIN — PROPRANOLOL HYDROCHLORIDE 10 MG: 10 TABLET ORAL at 08:07

## 2019-07-31 RX ADMIN — ROPIVACAINE HYDROCHLORIDE 10 ML/HR: 2 INJECTION, SOLUTION EPIDURAL; INFILTRATION at 03:07

## 2019-07-31 RX ADMIN — TRAMADOL HYDROCHLORIDE 50 MG: 50 TABLET ORAL at 03:07

## 2019-07-31 RX ADMIN — HEPARIN SODIUM 5000 UNITS: 5000 INJECTION, SOLUTION INTRAVENOUS; SUBCUTANEOUS at 05:07

## 2019-07-31 RX ADMIN — ROPIVACAINE HYDROCHLORIDE 10 ML/HR: 2 INJECTION, SOLUTION EPIDURAL; INFILTRATION at 01:07

## 2019-07-31 RX ADMIN — THERA TABS 1 TABLET: TAB at 08:07

## 2019-07-31 RX ADMIN — SODIUM CHLORIDE: 0.9 INJECTION, SOLUTION INTRAVENOUS at 09:07

## 2019-07-31 RX ADMIN — DIAZEPAM 2 MG: 2 TABLET ORAL at 08:07

## 2019-07-31 RX ADMIN — TRAMADOL HYDROCHLORIDE 50 MG: 50 TABLET ORAL at 01:07

## 2019-07-31 RX ADMIN — PANTOPRAZOLE SODIUM 40 MG: 40 TABLET, DELAYED RELEASE ORAL at 08:07

## 2019-07-31 RX ADMIN — MELATONIN 2000 UNITS: at 08:07

## 2019-07-31 RX ADMIN — ROPIVACAINE HYDROCHLORIDE 10 ML/HR: 2 INJECTION, SOLUTION EPIDURAL; INFILTRATION at 08:07

## 2019-07-31 NOTE — PT/OT/SLP PROGRESS
Occupational Therapy   Treatment    Name: Fransico Montalvo  MRN: [id number]  Admitting Diagnosis:  Fracture of femoral neck, right, closed  2 Days Post-Op    Recommendations:     Discharge Recommendations: rehabilitation facility  Discharge Equipment Recommendations:  commode, walker, rolling  Barriers to discharge:  Inaccessible home environment, Decreased caregiver support    Assessment:     Fransico Montalvo is a 63 y.o. male with a medical diagnosis of Fracture of femoral neck, right, closed.  He presents with the following Performance deficits affecting function are weakness, impaired endurance, impaired self care skills, gait instability, impaired functional mobilty, impaired balance, decreased coordination, decreased lower extremity function, decreased safety awareness, pain, orthopedic precautions.     Pt. Agreeable to therapy session today. Pt. Is SBA in bed mobility and mod assist in sit<>stand x 4 trials. Pt. Able to complete 4 side steps with Max A. Pt. Requires Mod v/c's for weightbearing status with decreased balance and safety awareness throughout session. Pt. Stated that he attempted to stand up earlier and fell back into bed. Nursing notified. Pt. Not safe to leave up in chair at this time.     Rehab Prognosis:  Good; patient would benefit from acute skilled OT services to address these deficits and reach maximum level of function.       Plan:     Patient to be seen daily to address the above listed problems via self-care/home management, therapeutic activities, therapeutic exercises  · Plan of Care Expires: 08/30/19  · Plan of Care Reviewed with: patient    Subjective     Pain/Comfort:  · Pain Rating 1: 0/10    Objective:     Communicated with: RN prior to session.  Patient found supine with perineural catheter, peripheral IV upon OT entry to room.    General Precautions: Standard, fall   Orthopedic Precautions:RLE toe touch weight bearing   Braces:       Occupational Performance:     Bed Mobility:    · Patient  completed Rolling/Turning to Left with  stand by assistance  · Patient completed Supine to Sit with stand by assistance     Functional Mobility/Transfers:  · Patient completed Sit <> Stand Transfer with moderate assistance  with  rolling walker x 4 trials  · Functional Mobility: Pt. Performed sit<>stand x4 trials at Mod A. Pt. Able to side step 4 steps with Max A and v/c's for weightbearing status.     Activities of Daily Living:  · Lower Body Dressing: stand by assistance marina patel      [facility] 6 Click ADL: 17    Treatment & Education:  - OT/POC-  - Importance of mobility to maximize recovery.  TTWB precautions  - Call for assistance if wanting to get out of bed      Patient left supine with all lines intact, call button in reach and RN notifiedEducation:      GOALS:   Multidisciplinary Problems     Occupational Therapy Goals        Problem: Occupational Therapy Goal    Goal Priority Disciplines Outcome Interventions   Occupational Therapy Goal     OT, PT/OT Ongoing (interventions implemented as appropriate)    Description:  Goals to be met by: 8/30/2019     Patient will increase functional independence with ADLs by performing:    UE Dressing with Stand-by Assistance.  LE Dressing with Stand-by Assistance.  Grooming while standing with Stand-by Assistance.  Toileting from toilet with Stand-by Assistance for hygiene and clothing management.     Mary Matta OT  7/30/2019                        Time Tracking:     OT Date of Treatment: 07/31/19  OT Start Time: 1407  OT Stop Time: 1435  OT Total Time (min): 28 min    Billable Minutes:Therapeutic Activity 28    Mary Matta OT  7/31/2019

## 2019-07-31 NOTE — PLAN OF CARE
Problem: Occupational Therapy Goal  Goal: Occupational Therapy Goal  Goals to be met by: 8/30/2019     Patient will increase functional independence with ADLs by performing:    UE Dressing with Stand-by Assistance.  LE Dressing with Stand-by Assistance.  Grooming while standing with Stand-by Assistance.  Toileting from toilet with Stand-by Assistance for hygiene and clothing management.     Mary Matta OT  7/30/2019       Outcome: Ongoing (interventions implemented as appropriate)  Pt. Agreeable to therapy session today. Pt. Is SBA in bed mobility and mod assist in sit<>stand x 4 trials. Pt. Able to complete 4 side steps with Max A. Pt. Requires Mod v/c's for weightbearing status with decreased balance and safety awareness throughout session. Pt. Stated that he attempted to stand up earlier and fell back into bed. Nursing notified. Pt. Not safe to leave up in chair at this time. Continue OT POC.    Mary Matta OT  7/31/2019

## 2019-07-31 NOTE — SUBJECTIVE & OBJECTIVE
Principal Problem:Fracture of femoral neck, right, closed    Principal Orthopedic Problem: same    Interval History: Patient seen and examined at bedside.  No acute events overnight.  Pain much improved.  Transfers with PT yesterday.      Review of patient's allergies indicates:  No Known Allergies    Current Facility-Administered Medications   Medication    0.9%  NaCl infusion (for blood administration)    0.9%  NaCl infusion (for blood administration)    0.9%  NaCl infusion    acetaminophen tablet 1,000 mg    bisacodyl suppository 10 mg    dextrose 10% (D10W) Bolus    dextrose 10% (D10W) Bolus    diazePAM tablet 5 mg    folic acid tablet 1 mg    glucagon (human recombinant) injection 1 mg    glucose chewable tablet 16 g    glucose chewable tablet 24 g    heparin (porcine) injection 5,000 Units    HYDROmorphone injection 0.2 mg    hydrOXYzine pamoate capsule 25 mg    lactulose 20 gram/30 mL solution Soln 20 g    lidocaine 5 % patch 2 patch    methocarbamol tablet 500 mg    mirtazapine tablet 15 mg    multivitamin tablet 1 tablet    mupirocin 2 % ointment 1 g    naloxone 0.4 mg/mL injection 0.4 mg    ondansetron injection 4 mg    pantoprazole EC tablet 40 mg    polyethylene glycol packet 17 g    pregabalin capsule 75 mg    promethazine (PHENERGAN) 6.25 mg in dextrose 5 % 50 mL IVPB    propranolol tablet 10 mg    ropivacaine (PF) 2 mg/ml (0.2%) infusion    senna-docusate 8.6-50 mg per tablet 1 tablet    sodium chloride 0.9% flush 10 mL    sodium chloride 0.9% flush 5 mL    sucralfate 100 mg/mL suspension 1 g    tamsulosin 24 hr capsule 0.4 mg    traMADol tablet 50 mg    vitamin D 1000 units tablet 2,000 Units     Objective:     Vital Signs (Most Recent):  Temp: 96.2 °F (35.7 °C) (07/31/19 0453)  Pulse: 89 (07/31/19 0453)  Resp: 18 (07/31/19 0453)  BP: 124/80 (07/31/19 0453)  SpO2: (!) 92 % (07/31/19 0453) Vital Signs (24h Range):  Temp:  [96.2 °F (35.7 °C)-98.2 °F (36.8 °C)] 96.2  °F (35.7 °C)  Pulse:  [83-98] 89  Resp:  [14-18] 18  SpO2:  [70 %-95 %] 92 %  BP: (101-133)/(60-80) 124/80     Weight: 90.7 kg (199 lb 15.3 oz)  Height: 6' (182.9 cm)  Body mass index is 27.12 kg/m².      Intake/Output Summary (Last 24 hours) at 7/31/2019 0546  Last data filed at 7/30/2019 1517  Gross per 24 hour   Intake --   Output 200 ml   Net -200 ml       Ortho/SPM Exam    AAOx4  NAD  Reg rate  No increased WOB  Dressing c/d/i  SILT T/SP/DP/Bridges/Sa  Motor intact T/SP/DP  WWP extremities  FCDs in place and functioning    Significant Labs:   CBC:   Recent Labs   Lab 07/29/19  1328 07/30/19 0408   WBC 5.16  5.16 6.64   HGB 11.1*  11.1* 9.7*   HCT 34.7*  34.7* 29.3*   PLT 97*  97* 82*     CMP:   Recent Labs   Lab 07/29/19  1328 07/30/19  0408 07/31/19 0347    136 137   K 4.6 4.5 5.4*    106 108   CO2 24 20* 19*   GLU 94 88 98   BUN 26* 29* 34*   CREATININE 2.9* 2.6* 2.5*   CALCIUM 8.0* 7.7* 8.5*   PROT  --  6.9 7.2   ALBUMIN  --  2.8* 2.9*   BILITOT  --  1.4* 1.6*   ALKPHOS  --  120 116   AST  --  56* 70*   ALT  --  19 10   ANIONGAP 8 10 10   EGFRNONAA 21.9* 24.9* 26.2*     Coagulation:   Recent Labs   Lab 07/30/19  0408 07/31/19 0347   LABPROT 12.9* 12.7*   INR 1.3* 1.3*     All pertinent labs within the past 24 hours have been reviewed.    Significant Imaging: I have reviewed all pertinent imaging results/findings.

## 2019-07-31 NOTE — PT/OT/SLP PROGRESS
Physical Therapy Treatment    Patient Name:  Fransico Montalvo   MRN:  [id number]    Recommendations:     Discharge Recommendations:  rehabilitation facility   Discharge Equipment Recommendations: commode, walker, rolling   Barriers to discharge: Inaccessible home and decreased caregiver support    Assessment:     Fransico Montalvo is a 63 y.o. male admitted with a medical diagnosis of Fracture of femoral neck, right, closed.  He presents with the following impairments/functional limitations:  weakness, impaired endurance, impaired functional mobilty, gait instability, impaired balance, decreased lower extremity function, orthopedic precautions, impaired skin, pain, impaired joint extensibility, decreased safety awareness. Patient tolerated PT session fairly today. He performed 4 standing trials with mod A and RW. He was visibly fatigued after all 4 standing trials and needed verbal cues for pursed lip breathing and increased time to recover. Patient unsafe throughout standing trials and was unable to follow TTWB on R LE. Per patient, he attempted to stand on the side of the bed this AM without a walker or assistance this and fell back into the bed. RN notified.     Rehab Prognosis: Good; patient would benefit from acute skilled PT services to address these deficits and reach maximum level of function.    Recent Surgery: Procedure(s) (LRB):  PINNING, HIP, PERCUTANEOUS- synthes cannulated screws- hana table- C arm door side- (Right)  CAPSULOTOMY, JOINT (Right) 2 Days Post-Op    Plan:     During this hospitalization, patient to be seen daily to address the identified rehab impairments via gait training, therapeutic activities, therapeutic exercises and progress toward the following goals:    · Plan of Care Expires:  08/30/19    Subjective     Chief Complaint: Depressed that he is not doing better.   Patient/Family Comments/goals: To get back to being independent.   Pain/Comfort:  · Pain Rating 1: (did not rate)  · Location - Side  1: Right  · Location 1: hip  · Pain Addressed 1: Pre-medicate for activity, Reposition, Cessation of Activity, Nurse notified      Objective:     Communicated with RN prior to session.  Patient found supine with perineural catheter, peripheral IV upon PT entry to room.     General Precautions: Standard, fall   Orthopedic Precautions:RLE toe touch weight bearing   Braces: N/A     Functional Mobility:  · Bed Mobility:     · Scooting: stand by assistance  · Rolling left: stand by assistance  · Supine to Sit: stand by assistance  · Sit to Supine: stand by assistance  · Transfers:     · Sit to Stand:  moderate assistance with rolling walker x4 trials with verbal cues for technique and safety  · Gait: Patient able to take 4 small side steps with maximal assistance and rolling walker. Patient visibly shaking in B UE's throughout ambulation trial. Patient needed constant verbal/tactile cues to try and maintain TTWB to R LE however he was unsuccessful and put increased weight on R LE.       AM-PAC 6 CLICK MOBILITY  Turning over in bed (including adjusting bedclothes, sheets and blankets)?: 2  Sitting down on and standing up from a chair with arms (e.g., wheelchair, bedside commode, etc.): 2  Moving from lying on back to sitting on the side of the bed?: 2  Moving to and from a bed to a chair (including a wheelchair)?: 1  Need to walk in hospital room?: 1  Climbing 3-5 steps with a railing?: 1  Basic Mobility Total Score: 9       Therapeutic Activities and Exercises:  Patient tolerated sitting on edge of bed ~23 minutes with supervision.     Patient educated in:  -PT role and POC  -safety with transfers including hand placement  -gait sequencing and RW management  -TTWB on R LE       Patient left supine with all lines intact, call button in reach, bed alarm on and RN notified.    GOALS:   Multidisciplinary Problems     Physical Therapy Goals        Problem: Physical Therapy Goal    Goal Priority Disciplines Outcome Goal  Variances Interventions   Physical Therapy Goal     PT, PT/OT Ongoing (interventions implemented as appropriate)     Description:  Goals to be met by: 19    Patient will increase functional independence with mobility by performin. Supine to sit with Modified [place]  2. Sit to stand transfer with Stand-by Assistance  3. Bed to chair transfer with Stand-by Assistance using Rolling Walker  4. Gait x20 feet with Stand-by Assistance using Rolling Walker  5. Ascend/descend 5 stairs with bilateral Handrails Minimal Assistance   6. Lower extremity exercise program x10 reps per handout, with supervision                      Time Tracking:     PT Received On: 19  PT Start Time: 1407     PT Stop Time: 1435  PT Total Time (min): 28 min     Billable Minutes: Gait Training 28    Treatment Type: Treatment  PT/PTA: PT           Cheryle Gillis, PT  2019

## 2019-07-31 NOTE — ANESTHESIA POST-OP PAIN MANAGEMENT
Acute Pain Service Progress Note    Fransico Montalvo is a 63 y.o., male, [id number].    Surgery:    Follow-up For: Procedure(s) (LRB):  PINNING, HIP, PERCUTANEOUS- synthes cannulated screws- hana table- C arm door side- (Right)  CAPSULOTOMY, JOINT (Right)     Post Op Day #: 2     Catheter type: perineural  SIFI     Infusion type: Ropivacaine 0.2%  2 basal, 10 IB    Problem List:    Active Hospital Problems    Diagnosis  POA    *Fracture of femoral neck, right, closed [S72.001A]  Yes    Chronic pain of right knee [M25.561, G89.29]  Yes    Hypomagnesemia [E83.42]  Yes    [place] (acute kidney injury) [N17.9]  Yes    Transaminitis [R74.0]  Yes    Elevated bilirubin [R17]  Yes    Acute blood loss anemia [D62]  Yes    Macrocytic anemia [D53.9]  Yes    Closed fracture of neck of right femur s/p screw fixation on 7/29 by Dr. 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       Subjective:                 General appearance of alert, oriented, no complaints              Pain with rest: 2    Numbers              Pain with movement: 9    Numbers              Side Effects                          1. Pruritis No                          2. Nausea No                          3. Motor Blockade No, 0=Ability to raise lower extremities off bed                           4. Sedation No, 1=awake and alert     Objective:                 Catheter site clean, dry, intact       Vitals   Vitals:    07/31/19 0754   BP:    Pulse: 83   Resp:    Temp:         Labs    Admission on 07/28/2019   No results displayed because visit has over 200 results.           Meds   Current Facility-Administered Medications   Medication Dose Route Frequency Provider Last Rate Last Dose    0.9%  NaCl infusion (for blood administration)   Intravenous Q24H PRN Link Brown MD        0.9%  NaCl infusion (for blood administration)   Intravenous Q24H PRN Link Brown MD        0.9%  NaCl infusion   Intravenous Continuous Allison Butterfield  mL/hr at 07/30/19 2326      acetaminophen tablet 1,000 mg  1,000 mg Oral Q6H Link Brown MD   1,000 mg at 07/30/19 2313    bisacodyl suppository 10 mg  10 mg Rectal Daily PRN Link Brown MD        dextrose 10% (D10W) Bolus  12.5 g Intravenous PRN Link Brown MD        dextrose 10% (D10W) Bolus  25 g Intravenous PRN Link Brown MD        diazePAM tablet 5 mg  5 mg Oral Q12H Allison Butterfield MD   5 mg at 07/30/19 2111    folic acid tablet 1 mg  1 mg Oral Daily Link Brown MD   1 mg at 07/30/19 0903    glucagon (human recombinant) injection 1 mg  1 mg Intramuscular PRN Link Brown MD        glucose chewable tablet 16 g  16 g Oral PRN Link Brown MD        glucose chewable tablet 24 g  24 g Oral PRN Link Brown MD        heparin (porcine) injection 5,000 Units  5,000 Units Subcutaneous Q8H Link Brown MD   5,000 Units at 07/31/19 0547    hydrOXYzine pamoate capsule 25 mg  25 mg Oral QHS Sabine Sanderson MD        lactulose 20 gram/30 mL solution Soln 20 g  20 g Oral QHS Sabine Sanderson MD   20 g at 07/30/19 2111    lidocaine 5 % patch 2 patch  2 patch Transdermal Q24H Sabine Sanderson MD   2 patch at 07/30/19 1906    methocarbamol tablet 500 mg  500 mg Oral Q6H PRN Erica  Kassy Camp MD   500 mg at 07/30/19 0604    mirtazapine tablet 15 mg  15 mg Oral QHS Link Brown MD   15 mg at 07/30/19 2111    multivitamin tablet 1 tablet  1 tablet Oral Daily Link Brown MD   1 tablet at 07/30/19 0903    mupirocin 2 % ointment 1 g  1 g Nasal BID Link Brown MD   1 g at 07/30/19 2111    naloxone 0.4 mg/mL injection 0.4 mg  0.4 mg Intravenous PRN Samy Larsen PA-C        ondansetron injection 4 mg  4 mg Intravenous Q12H PRN Link Brown MD        pantoprazole EC tablet 40 mg  40 mg Oral Daily Link Brown MD   40 mg at 07/30/19 0901    polyethylene glycol packet 17 g  17 g Oral BID PRN Sabine Sanderson MD        pregabalin capsule 75 mg  75 mg Oral QHS Link Brown MD   75 mg at 07/30/19 2111    promethazine (PHENERGAN) 6.25 mg in dextrose 5 % 50 mL IVPB  6.25 mg Intravenous Q6H PRN Link Brown MD        propranolol tablet 10 mg  10 mg Oral BID Link Brown MD   10 mg at 07/30/19 2111    ropivacaine (PF) 2 mg/ml (0.2%) infusion  10 mL/hr Perineural Continuous Link Brown MD 10 mL/hr at 07/31/19 0356 10 mL/hr at 07/31/19 0356    senna-docusate 8.6-50 mg per tablet 1 tablet  1 tablet Oral BID Link Brown MD   1 tablet at 07/30/19 2111    sodium chloride 0.9% flush 10 mL  10 mL Intravenous PRN Link Brown MD        sodium chloride 0.9% flush 5 mL  5 mL Intravenous PRN Link Brown MD        sucralfate 100 mg/mL suspension 1 g  1 g Oral TID PRN Sabine Sanderson MD   1 g at 07/30/19 1904    tamsulosin 24 hr capsule 0.4 mg  0.4 mg Oral Daily Link Brown MD   0.4 mg at 07/30/19 0903    traMADol tablet 50 mg  50 mg Oral Q6H PRN Erica Camp MD   50 mg at 07/31/19 0330    vitamin D 1000 units tablet 2,000 Units  2,000 Units Oral Daily Sabine Sanderson MD         .    Assessment:     Pain control adequate    Plan:     Patient doing well, continue present treatment.     Patient with increased  confusion yesterday afternoon and this morning, likely multifactorial. IV Hydromorphone discontinued. Would recommend delirium precautions and CIWA monitoring for withdrawal. Pain controlled at this time. Will plan to continue PNC today, possibly pause and pull tomorrow (POD 3) pending dispo.    Evaluator Erica Camp

## 2019-07-31 NOTE — PROGRESS NOTES
[facility]  Orthopedics  Progress Note    Patient Name: Fransico Montalvo  MRN: [id number]  Admission Date: 7/28/2019  Hospital Length of Stay: 3 days  Attending Provider: Sabine Sanderson MD  Primary Care Provider: Jessica  Follow-up For: Procedure(s) (LRB):  PINNING, HIP, PERCUTANEOUS- synthes cannulated screws- hana table- C arm door side- (Right)  CAPSULOTOMY, JOINT (Right)    Post-Operative Day: 2 Days Post-Op  Subjective:     Principal Problem:Fracture of femoral neck, right, closed    Principal Orthopedic Problem: same    Interval History: Patient seen and examined at bedside.  No acute events overnight.  Pain controlled.  Transfers with PT yesterday.      Review of patient's allergies indicates:  No Known Allergies    Current Facility-Administered Medications   Medication    0.9%  NaCl infusion (for blood administration)    0.9%  NaCl infusion (for blood administration)    0.9%  NaCl infusion    acetaminophen tablet 1,000 mg    bisacodyl suppository 10 mg    dextrose 10% (D10W) Bolus    dextrose 10% (D10W) Bolus    diazePAM tablet 5 mg    folic acid tablet 1 mg    glucagon (human recombinant) injection 1 mg    glucose chewable tablet 16 g    glucose chewable tablet 24 g    heparin (porcine) injection 5,000 Units    HYDROmorphone injection 0.2 mg    hydrOXYzine pamoate capsule 25 mg    lactulose 20 gram/30 mL solution Soln 20 g    lidocaine 5 % patch 2 patch    methocarbamol tablet 500 mg    mirtazapine tablet 15 mg    multivitamin tablet 1 tablet    mupirocin 2 % ointment 1 g    naloxone 0.4 mg/mL injection 0.4 mg    ondansetron injection 4 mg    pantoprazole EC tablet 40 mg    polyethylene glycol packet 17 g    pregabalin capsule 75 mg    promethazine (PHENERGAN) 6.25 mg in dextrose 5 % 50 mL IVPB    propranolol tablet 10 mg    ropivacaine (PF) 2 mg/ml (0.2%) infusion    senna-docusate 8.6-50 mg per tablet 1 tablet    sodium chloride 0.9% flush 10 mL    sodium  chloride 0.9% flush 5 mL    sucralfate 100 mg/mL suspension 1 g    tamsulosin 24 hr capsule 0.4 mg    traMADol tablet 50 mg    vitamin D 1000 units tablet 2,000 Units     Objective:     Vital Signs (Most Recent):  Temp: 96.2 °F (35.7 °C) (07/31/19 0453)  Pulse: 89 (07/31/19 0453)  Resp: 18 (07/31/19 0453)  BP: 124/80 (07/31/19 0453)  SpO2: (!) 92 % (07/31/19 0453) Vital Signs (24h Range):  Temp:  [96.2 °F (35.7 °C)-98.2 °F (36.8 °C)] 96.2 °F (35.7 °C)  Pulse:  [83-98] 89  Resp:  [14-18] 18  SpO2:  [70 %-95 %] 92 %  BP: (101-133)/(60-80) 124/80     Weight: 90.7 kg (199 lb 15.3 oz)  Height: 6' (182.9 cm)  Body mass index is 27.12 kg/m².      Intake/Output Summary (Last 24 hours) at 7/31/2019 0546  Last data filed at 7/30/2019 1517  Gross per 24 hour   Intake --   Output 200 ml   Net -200 ml       Ortho/SPM Exam    AAOx4  NAD  Reg rate  No increased WOB  Dressing c/d/i  SILT T/SP/DP/Bridges/Sa  Motor intact T/SP/DP  WWP extremities  FCDs in place and functioning    Significant Labs:   CBC:   Recent Labs   Lab 07/29/19  1328 07/30/19  0408   WBC 5.16  5.16 6.64   HGB 11.1*  11.1* 9.7*   HCT 34.7*  34.7* 29.3*   PLT 97*  97* 82*     CMP:   Recent Labs   Lab 07/29/19  1328 07/30/19  0408 07/31/19  0347    136 137   K 4.6 4.5 5.4*    106 108   CO2 24 20* 19*   GLU 94 88 98   BUN 26* 29* 34*   CREATININE 2.9* 2.6* 2.5*   CALCIUM 8.0* 7.7* 8.5*   PROT  --  6.9 7.2   ALBUMIN  --  2.8* 2.9*   BILITOT  --  1.4* 1.6*   ALKPHOS  --  120 116   AST  --  56* 70*   ALT  --  19 10   ANIONGAP 8 10 10   EGFRNONAA 21.9* 24.9* 26.2*     Coagulation:   Recent Labs   Lab 07/30/19  0408 07/31/19  0347   LABPROT 12.9* 12.7*   INR 1.3* 1.3*     All pertinent labs within the past 24 hours have been reviewed.    Significant Imaging: I have reviewed all pertinent imaging results/findings.    Assessment/Plan:     * Fracture of femoral neck, right, closed  63 y.o. male POD2 s/p R FN perc screw fixation    Pain control:  multimodal  PT/OT: TTWB RLE  DVT PPx: heparin, FCDs at all times when not ambulating  Abx: postop Ancef complete  Labs: CBC pending; INR 1.3, BG 98, Cr 2.5, K 5.4  Drain: none  Sauer: none    Dispo: rehab facility when accepted; OK for DC from orthopedic standpoint          Link Brown MD  Orthopedics  [facility]    Attg Note:  I agree with the resident's assessment and plan.    Raj Grossman MD

## 2019-07-31 NOTE — ADDENDUM NOTE
Addendum  created 07/31/19 1022 by Thi Ballard MD    Charge Capture section accepted, Cosign clinical note with attestation

## 2019-07-31 NOTE — PLAN OF CARE
Problem: Adult Inpatient Plan of Care  Goal: Plan of Care Review  Outcome: Ongoing (interventions implemented as appropriate)  PT resting in bed. IV site intact, clean, dry. fall precautions maintained no falls noted, call light in reach, bed locked, non skid socks on while out of bed pt instructed to call for assistance, skin integrity maintained pt independent with positioning, c/o pain managed with prn meds. Purpose and function of PNC pump explained to pt. no other complaints or concerns, will cont to follow careplan

## 2019-07-31 NOTE — PLAN OF CARE
Problem: Physical Therapy Goal  Goal: Physical Therapy Goal  Goals to be met by: 19    Patient will increase functional independence with mobility by performin. Supine to sit with Modified [place]  2. Sit to stand transfer with Stand-by Assistance  3. Bed to chair transfer with Stand-by Assistance using Rolling Walker  4. Gait x20 feet with Stand-by Assistance using Rolling Walker  5. Ascend/descend 5 stairs with bilateral Handrails Minimal Assistance   6. Lower extremity exercise program x10 reps per handout, with supervision     Outcome: Ongoing (interventions implemented as appropriate)    Patient tolerated PT session fairly today. He performed 4 standing trials with mod A and RW. He was visibly fatigued after all 4 standing trials and needed verbal cues for pursed lip breathing and increased time to recover.

## 2019-07-31 NOTE — ASSESSMENT & PLAN NOTE
63 y.o. male POD2 s/p R FN perc screw fixation    Pain control: multimodal  PT/OT: TTWB RLE  DVT PPx: heparin, FCDs at all times when not ambulating  Abx: postop Ancef complete  Labs: CBC pending; INR 1.3, BG 98, Cr 2.5, K 5.4  Drain: none  Sauer: none    Dispo: rehab facility when accepted; OK for DC from orthopedic standpoint

## 2019-07-31 NOTE — PROGRESS NOTES
[facility] Hospital Medicine  Progress Note    Team: [facility] HOSP MED H   Attending MD: Sabine Sanderson MD  Admit Date: 7/28/2019  ARTURO 8/2/2019  Length of Stay:  LOS: 3 days   Code status: Full Code    Principal Problem: Fracture of femoral neck, right, closed    Interval hx: knee not hurting today but complains of more right hip pain at 7/10; refuses suppository/enema for BM    ROS:  Constitutional: no fever or chills  Respiratory: no cough or shortness of breath  Cardiovascular: no chest pain or palpitations  Gastrointestinal: no nausea or vomiting, no abdominal pain; last BM: 7/28  Genitourinary: no hematuria or dysuria  Integument/Breast: no rash or pruritis  Musculoskeletal: + right knee pain; states right thigh feels numb      Physical Exam  Temp:  [96.2 °F (35.7 °C)-98.6 °F (37 °C)] 97.5 °F (36.4 °C)  Pulse:  [81-98] 81  Resp:  [14-18] 18  SpO2:  [92 %-95 %] 92 %  BP: (109-151)/(66-82) 118/72    No intake or output data in the 24 hours ending 07/31/19 1857    Wt Readings from Last 1 Encounters:   07/29/19 0918 90.7 kg (199 lb 15.3 oz)   07/28/19 1307 90.7 kg (200 lb)        Estimated body mass index is 27.12 kg/m² as calculated from the following:    Height as of this encounter: 6' (1.829 m).    Weight as of this encounter: 90.7 kg (199 lb 15.3 oz).    General: well developed, well nourished, no distress  Lungs:  clear to auscultation bilaterally and normal respiratory effort.  Cardiovascular: Heart: regular rate and rhythm, S1, S2 normal, no murmur, click, rub or gallop. Chest Wall: no tenderness. Extremities: no cyanosis, 1+ right thigh edema, no clubbing.   Abdomen/Rectal: Abdomen: soft, non-tender non-distended; bowel sounds normal; no masses,  no organomegaly.   Skin: Skin color, texture, turgor normal. No rashes or lesions.  Neurologic: Normal strength and tone. No focal numbness or weakness.  Psych/Behavioral:  Alert and oriented, appropriate affect.      Recent Results (from the  past 24 hour(s))   Comprehensive Metabolic Panel (CMP)    Collection Time: 07/31/19  3:47 AM   Result Value Ref Range    Sodium 137 136 - 145 mmol/L    Potassium 5.4 (H) 3.5 - 5.1 mmol/L    Chloride 108 95 - 110 mmol/L    CO2 19 (L) 23 - 29 mmol/L    Glucose 98 70 - 110 mg/dL    BUN, Bld 34 (H) 8 - 23 mg/dL    Creatinine 2.5 (H) 0.5 - 1.4 mg/dL    Calcium 8.5 (L) 8.7 - 10.5 mg/dL    Total Protein 7.2 6.0 - 8.4 g/dL    Albumin 2.9 (L) 3.5 - 5.2 g/dL    Total Bilirubin 1.6 (H) 0.1 - 1.0 mg/dL    Alkaline Phosphatase 116 55 - 135 U/L    AST 70 (H) 10 - 40 U/L    ALT 10 10 - 44 U/L    Anion Gap 10 8 - 16 mmol/L    eGFR if African American 30.2 (A) >60 mL/min/1.73 m^2    eGFR if non  26.2 (A) >60 mL/min/1.73 m^2   Magnesium    Collection Time: 07/31/19  3:47 AM   Result Value Ref Range    Magnesium 1.9 1.6 - 2.6 mg/dL   Phosphorus    Collection Time: 07/31/19  3:47 AM   Result Value Ref Range    Phosphorus 3.4 2.7 - 4.5 mg/dL   CBC with Automated Differential    Collection Time: 07/31/19  3:47 AM   Result Value Ref Range    WBC 6.62 3.90 - 12.70 K/uL    RBC 2.81 (L) 4.60 - 6.20 M/uL    Hemoglobin 9.3 (L) 14.0 - 18.0 g/dL    Hematocrit 28.9 (L) 40.0 - 54.0 %    Mean Corpuscular Volume 103 (H) 82 - 98 fL    Mean Corpuscular Hemoglobin 33.1 (H) 27.0 - 31.0 pg    Mean Corpuscular Hemoglobin Conc 32.2 32.0 - 36.0 g/dL    RDW 18.2 (H) 11.5 - 14.5 %    Platelets 76 (L) 150 - 350 K/uL    MPV 11.8 9.2 - 12.9 fL    Immature Granulocytes 0.5 0.0 - 0.5 %    Gran # (ANC) 3.7 1.8 - 7.7 K/uL    Immature Grans (Abs) 0.03 0.00 - 0.04 K/uL    Lymph # 1.8 1.0 - 4.8 K/uL    Mono # 0.9 0.3 - 1.0 K/uL    Eos # 0.2 0.0 - 0.5 K/uL    Baso # 0.06 0.00 - 0.20 K/uL    nRBC 0 0 /100 WBC    Gran% 55.6 38.0 - 73.0 %    Lymph% 27.0 18.0 - 48.0 %    Mono% 12.8 4.0 - 15.0 %    Eosinophil% 3.2 0.0 - 8.0 %    Basophil% 0.9 0.0 - 1.9 %    Platelet Estimate Decreased (A)     Differential Method Automated    PT/INR    Collection Time:  07/31/19  3:47 AM   Result Value Ref Range    Prothrombin Time 12.7 (H) 9.0 - 12.5 sec    INR 1.3 (H) 0.8 - 1.2   Potassium    Collection Time: 07/31/19  9:39 AM   Result Value Ref Range    Potassium 4.6 3.5 - 5.1 mmol/L       Recent Labs   Lab 07/29/19  1328 07/30/19  0408 07/31/19  0347   WBC 5.16  5.16 6.64 6.62   HGB 11.1*  11.1* 9.7* 9.3*   HCT 34.7*  34.7* 29.3* 28.9*   PLT 97*  97* 82* 76*       Recent Labs   Lab 07/29/19  0330 07/29/19  1328 07/30/19  0408 07/31/19  0347 07/31/19  0939   * 136 136 137  --    K 4.1 4.6 4.5 5.4* 4.6    104 106 108  --    CO2 22* 24 20* 19*  --    BUN 28* 26* 29* 34*  --    CREATININE 3.3* 2.9* 2.6* 2.5*  --    GLU 93 94 88 98  --    CALCIUM 7.8* 8.0* 7.7* 8.5*  --    MG 1.2*  --  1.4* 1.9  --    PHOS 3.3  --  3.3 3.4  --        Recent Labs   Lab 07/29/19  0330 07/30/19  0408 07/31/19  0347   ALKPHOS 91 120 116   ALT 25 19 10   AST 54* 56* 70*   ALBUMIN 2.8* 2.8* 2.9*   PROT 6.7 6.9 7.2   BILITOT 2.3* 1.4* 1.6*   INR 1.4* 1.3* 1.3*         Hemoglobin A1C   Date Value Ref Range Status   07/28/2019 4.2 4.0 - 5.6 % Final     Comment:     ADA Screening Guidelines:  5.7-6.4%  Consistent with prediabetes  >or=6.5%  Consistent with diabetes  High levels of fetal hemoglobin interfere with the HbA1C  assay. Heterozygous hemoglobin variants (HbS, HgC, etc)do  not significantly interfere with this assay.   However, presence of multiple variants may affect accuracy.         Scheduled Meds:   diazePAM  5 mg Oral Q12H    folic acid  1 mg Oral Daily    heparin (porcine)  5,000 Units Subcutaneous Q8H    hydrOXYzine pamoate  25 mg Oral QHS    lactulose  20 g Oral QHS    lidocaine  2 patch Transdermal Q24H    mirtazapine  15 mg Oral QHS    multivitamin  1 tablet Oral Daily    mupirocin  1 g Nasal BID    pantoprazole  40 mg Oral Daily    pregabalin  75 mg Oral QHS    propranolol  10 mg Oral BID    senna-docusate 8.6-50 mg  1 tablet Oral BID    tamsulosin  0.4 mg Oral  Daily    vitamin D  2,000 Units Oral Daily       Continuous Infusions:   sodium chloride 0.9% 125 mL/hr at 07/30/19 2326    ropivacaine (PF) 2 mg/ml (0.2%) 10 mL/hr (07/31/19 1304)       As Needed: sodium chloride, sodium chloride, bisacodyl, Dextrose 10% Bolus, Dextrose 10% Bolus, glucagon (human recombinant), glucose, glucose, methocarbamol, naloxone, ondansetron, polyethylene glycol, promethazine (PHENERGAN) IVPB, sodium chloride 0.9%, sodium chloride 0.9%, sucralfate, traMADol    Active Hospital Problems    Diagnosis  POA    *Fracture of femoral neck, right, closed [S72.001A]  Yes    Chronic pain of right knee [M25.561, G89.29]  Yes    Hypomagnesemia [E83.42]  Yes    ISRAEL (acute kidney injury) [N17.9]  Yes    Transaminitis [R74.0]  Yes    Elevated bilirubin [R17]  Yes    Acute blood loss anemia [D62]  Yes    Macrocytic anemia [D53.9]  Yes    Closed fracture of neck of right femur s/p screw fixation on 7/29 by Dr. 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       Overview:    Assessment and Plan    Closed Displaced Fracture of Right Femoral Neck  · Hip Fracture Pathway initiated  · Orthopedics consulted, s/p screw fixation and capsulotomy on 7/29 by Dr. Grossman. Heparin (due to kidney function and ISRAEL) to start 12 hours post  op for 28 days.  · Toe touch weight bearing for 4-6 weeks - patient attempting to comply  · PT/OT to start on POD 1, will consider inpatient rehab as has no SNF or  benefits. HG 11 and BP stable post op. Transfused platelets, FFP in surgery  · Sauer to be removed on POD 1  · Pain control:  Pregabalin, Oxy, and scheduled Tylenol (2 grams maximum due to liver issues) - monitor with high MELD  · Patient has refused oxycodone yesterday and morphine relative contraindication with renal issues, anesthesia ordered tramadol prn and night coverage added low dose dilaudid for pain control; anesthesia pain service to be called by nursing for pain control issues  · Vitamin D 14 so supplement ordered          Alcoholic Cirrhosis  MELD-Na score: 20 at 7/31/2019  3:47 AM  MELD score: 20 at 7/31/2019  3:47 AM  Calculated from:  Serum Creatinine: 2.5 mg/dL at 7/31/2019  3:47 AM  Serum Sodium: 137 mmol/L at 7/31/2019  3:47 AM  Total Bilirubin: 1.6 mg/dL at 7/31/2019  3:47 AM  INR(ratio): 1.3 at 7/31/2019  3:47 AM  Age: 63 years  · Reports last drink the day of admission  · PETH pending Hep panel, Ammonia 45 - start chronic lactulose for constipation  · Liver U/S showing fatty liver and likely cirrhosis, cholelithiasis as well as sluggish portal flow  · Stigmata of cirrhosis including  thrombocytopenia, elevated tbili, liver enzymes, elevated INR.  · Hepatology consult discontinued as patient has had improvement in labs today- no signs of alcoholic hepatitis now, ASTS 71-->54, Tbili 4.4-->2.3 and Cr also improving. They advise to continue meds on now- PPI, propanolol, and avoid diuretics based on chart check. I agree as patient peripherally down with ISRAEL and I don't need hepatology consultants at this time to assist as has improved and not decompensated after surgery. Will re-consult hepatology for any worsening.     Alcohol Withdrawal  · Last drink on 7/28  · MVI, thiamine, folic acid  · No signs of withdrawal now, he denies  history of this. Is oversedated with valium 10 and vitals have been stable. Change from valium 10 mg q8 to 5 mg q12 now. Monitor for signs of withdrawal. peth pending as above - patient admits to drinking up to day of admit  · Wean valium as contributing to confusion        Essential HTN  · Chronic issue  · Reports has been on Lisinopril 20mg previously - will hold with CKD  · Propranolol 10mg PO BID started on admit. BP stable post op, trend now post op and consider norvasc or hydralazine if needed for management consider kidney injury these should be okay and not worsen Cr  · Pain control as above     CKD Stage 3  · Creatinine 3.8 on admit, baseline unknown - as renal function has been continuing to worsen but in the past has been between 1.5 and 2.2  · Possibly a component of ATN from fluctuating BP (reports BP drops quickly, has been on Lisinopril) vs worsening CKD as well as volume down  · He states he been hydrating with beer not water, that seems likely to be a big part of it  · Cr improving to 2.5, urine Na low,  1gram protein and improving with volume resuscitation. Will give gentle IVF in post op period also. U/S showing complicated renal cysts- will need close monitoring as outpatient which will probably be difficult given noncompliance  · Can discontinue nephro consult since his Cr is improving and making urine. If worsens will reconsult     Benign Hypertensive Heart and Kidney Disease with CKD  · As above     Thrombocytopenia  · 2/2 liver disease  · Monitor for bleeding  · Platelets 76 today. Given platelets and FFP prior to surgery to prevent bleeding.     Esophageal Varices  · Seen on EGD in Care Everywhere  · S/p banding March 2018  · Propranolol as above with PPI     Anemia of Chronic Disease  · Hemoglobin 11. Ferrtin/b12/folate pending. Post op Hg stable  · Monitor for bleeding     Coagulopathy  · 2/2 liver disease, INR 1.4 given FFP pre op for bleeding risk.        Anxiety and  Depression  · Chronic and stable  · Continue Effexor 75mg PO daily  · Continue Remeron 15mg PO qHS  · Continue Atarax QHS for sleep and prn anxiety     Hypomagnesemia  · Supplemented; monitor K+ for hyperkalemia with CKD    Chronic right knee pain  Start lidoderm patch to right knee    Diet:  Renal diet until kidneys recover  GI PPx:  pantoprazole  DVT PPx: heparin  Goals of Care:  Full Code    High Risk Conditions  Patient is currently receiving parenteral controlled substances: Dilaudid    Diet: Diet renal   GI PPx: pantoprazole  DVT PPx:   VTE Risk Mitigation (From admission, onward)        Ordered     heparin (porcine) injection 5,000 Units  Every 8 hours      07/29/19 1258     IP VTE LOW RISK PATIENT  Once      07/28/19 1554        L/D/A: PNC; PIV  Wounds: right hip  Goals of Care: curative, full code  Discharge plan: inpatient rehab      Sabine Sanderson MD  Staff Hospitalist  [phone]

## 2019-08-01 PROBLEM — Z74.09 IMPAIRED FUNCTIONAL MOBILITY AND ENDURANCE: Status: ACTIVE | Noted: 2019-08-01

## 2019-08-01 LAB
ALBUMIN SERPL BCP-MCNC: 2.9 G/DL (ref 3.5–5.2)
ALP SERPL-CCNC: 139 U/L (ref 55–135)
ALT SERPL W/O P-5'-P-CCNC: 8 U/L (ref 10–44)
ANION GAP SERPL CALC-SCNC: 8 MMOL/L (ref 8–16)
ANISOCYTOSIS BLD QL SMEAR: SLIGHT
AST SERPL-CCNC: 72 U/L (ref 10–40)
BASOPHILS # BLD AUTO: 0.04 K/UL (ref 0–0.2)
BASOPHILS NFR BLD: 0.9 % (ref 0–1.9)
BILIRUB SERPL-MCNC: 1.4 MG/DL (ref 0.1–1)
BUN SERPL-MCNC: 27 MG/DL (ref 8–23)
CALCIUM SERPL-MCNC: 8.3 MG/DL (ref 8.7–10.5)
CHLORIDE SERPL-SCNC: 109 MMOL/L (ref 95–110)
CO2 SERPL-SCNC: 18 MMOL/L (ref 23–29)
CREAT SERPL-MCNC: 1.8 MG/DL (ref 0.5–1.4)
DIFFERENTIAL METHOD: ABNORMAL
EOSINOPHIL # BLD AUTO: 0.2 K/UL (ref 0–0.5)
EOSINOPHIL NFR BLD: 3.7 % (ref 0–8)
ERYTHROCYTE [DISTWIDTH] IN BLOOD BY AUTOMATED COUNT: 18.6 % (ref 11.5–14.5)
EST. GFR  (AFRICAN AMERICAN): 45 ML/MIN/1.73 M^2
EST. GFR  (NON AFRICAN AMERICAN): 38.9 ML/MIN/1.73 M^2
GLUCOSE SERPL-MCNC: 91 MG/DL (ref 70–110)
HCT VFR BLD AUTO: 27.3 % (ref 40–54)
HGB BLD-MCNC: 8.9 G/DL (ref 14–18)
HYPOCHROMIA BLD QL SMEAR: ABNORMAL
IMM GRANULOCYTES # BLD AUTO: 0.01 K/UL (ref 0–0.04)
IMM GRANULOCYTES NFR BLD AUTO: 0.2 % (ref 0–0.5)
INR PPP: 1.3 (ref 0.8–1.2)
LYMPHOCYTES # BLD AUTO: 1.5 K/UL (ref 1–4.8)
LYMPHOCYTES NFR BLD: 32.8 % (ref 18–48)
MAGNESIUM SERPL-MCNC: 1.5 MG/DL (ref 1.6–2.6)
MCH RBC QN AUTO: 33.2 PG (ref 27–31)
MCHC RBC AUTO-ENTMCNC: 32.6 G/DL (ref 32–36)
MCV RBC AUTO: 102 FL (ref 82–98)
MONOCYTES # BLD AUTO: 0.6 K/UL (ref 0.3–1)
MONOCYTES NFR BLD: 12.5 % (ref 4–15)
NEUTROPHILS # BLD AUTO: 2.3 K/UL (ref 1.8–7.7)
NEUTROPHILS NFR BLD: 49.9 % (ref 38–73)
NRBC BLD-RTO: 0 /100 WBC
PHOSPHATE SERPL-MCNC: 2.1 MG/DL (ref 2.7–4.5)
PLATELET # BLD AUTO: 72 K/UL (ref 150–350)
PLATELET BLD QL SMEAR: ABNORMAL
PMV BLD AUTO: 11.8 FL (ref 9.2–12.9)
POTASSIUM SERPL-SCNC: 4.8 MMOL/L (ref 3.5–5.1)
PROT SERPL-MCNC: 7.1 G/DL (ref 6–8.4)
PROTHROMBIN TIME: 13 SEC (ref 9–12.5)
RBC # BLD AUTO: 2.68 M/UL (ref 4.6–6.2)
SODIUM SERPL-SCNC: 135 MMOL/L (ref 136–145)
WBC # BLD AUTO: 4.64 K/UL (ref 3.9–12.7)

## 2019-08-01 PROCEDURE — 84100 ASSAY OF PHOSPHORUS: CPT

## 2019-08-01 PROCEDURE — 97530 THERAPEUTIC ACTIVITIES: CPT

## 2019-08-01 PROCEDURE — 97535 SELF CARE MNGMENT TRAINING: CPT

## 2019-08-01 PROCEDURE — 63600175 PHARM REV CODE 636 W HCPCS: Performed by: INTERNAL MEDICINE

## 2019-08-01 PROCEDURE — 99232 PR SUBSEQUENT HOSPITAL CARE,LEVL II: ICD-10-PCS | Mod: ,,, | Performed by: INTERNAL MEDICINE

## 2019-08-01 PROCEDURE — 63600175 PHARM REV CODE 636 W HCPCS: Performed by: STUDENT IN AN ORGANIZED HEALTH CARE EDUCATION/TRAINING PROGRAM

## 2019-08-01 PROCEDURE — 25000003 PHARM REV CODE 250: Performed by: STUDENT IN AN ORGANIZED HEALTH CARE EDUCATION/TRAINING PROGRAM

## 2019-08-01 PROCEDURE — 36415 COLL VENOUS BLD VENIPUNCTURE: CPT

## 2019-08-01 PROCEDURE — 25000003 PHARM REV CODE 250: Performed by: ANESTHESIOLOGY

## 2019-08-01 PROCEDURE — 85025 COMPLETE CBC W/AUTO DIFF WBC: CPT

## 2019-08-01 PROCEDURE — 97116 GAIT TRAINING THERAPY: CPT

## 2019-08-01 PROCEDURE — 25000242 PHARM REV CODE 250 ALT 637 W/ HCPCS: Performed by: INTERNAL MEDICINE

## 2019-08-01 PROCEDURE — 97110 THERAPEUTIC EXERCISES: CPT

## 2019-08-01 PROCEDURE — 99232 PR SUBSEQUENT HOSPITAL CARE,LEVL II: ICD-10-PCS | Mod: ,,, | Performed by: NURSE PRACTITIONER

## 2019-08-01 PROCEDURE — 99232 SBSQ HOSP IP/OBS MODERATE 35: CPT | Mod: ,,, | Performed by: INTERNAL MEDICINE

## 2019-08-01 PROCEDURE — 94799 UNLISTED PULMONARY SVC/PX: CPT

## 2019-08-01 PROCEDURE — 94761 N-INVAS EAR/PLS OXIMETRY MLT: CPT

## 2019-08-01 PROCEDURE — 94640 AIRWAY INHALATION TREATMENT: CPT

## 2019-08-01 PROCEDURE — 99232 SBSQ HOSP IP/OBS MODERATE 35: CPT | Mod: ,,, | Performed by: NURSE PRACTITIONER

## 2019-08-01 PROCEDURE — 11000001 HC ACUTE MED/SURG PRIVATE ROOM

## 2019-08-01 PROCEDURE — 85610 PROTHROMBIN TIME: CPT

## 2019-08-01 PROCEDURE — 25000003 PHARM REV CODE 250: Performed by: INTERNAL MEDICINE

## 2019-08-01 PROCEDURE — 63600175 PHARM REV CODE 636 W HCPCS: Performed by: HOSPITALIST

## 2019-08-01 PROCEDURE — 80053 COMPREHEN METABOLIC PANEL: CPT

## 2019-08-01 PROCEDURE — 83735 ASSAY OF MAGNESIUM: CPT

## 2019-08-01 RX ORDER — PROPRANOLOL HYDROCHLORIDE 20 MG/1
40 TABLET ORAL 2 TIMES DAILY
Status: DISCONTINUED | OUTPATIENT
Start: 2019-08-01 | End: 2019-08-02 | Stop reason: HOSPADM

## 2019-08-01 RX ORDER — LACTULOSE 10 G/15ML
20 SOLUTION ORAL 3 TIMES DAILY
Status: DISCONTINUED | OUTPATIENT
Start: 2019-08-01 | End: 2019-08-02

## 2019-08-01 RX ORDER — THIAMINE HCL 100 MG
100 TABLET ORAL DAILY
Status: DISCONTINUED | OUTPATIENT
Start: 2019-08-01 | End: 2019-08-02 | Stop reason: HOSPADM

## 2019-08-01 RX ORDER — HYDRALAZINE HYDROCHLORIDE 25 MG/1
25 TABLET, FILM COATED ORAL EVERY 8 HOURS PRN
Status: DISCONTINUED | OUTPATIENT
Start: 2019-08-01 | End: 2019-08-02 | Stop reason: HOSPADM

## 2019-08-01 RX ORDER — ALBUTEROL SULFATE 2.5 MG/.5ML
2.5 SOLUTION RESPIRATORY (INHALATION)
Status: DISCONTINUED | OUTPATIENT
Start: 2019-08-01 | End: 2019-08-02 | Stop reason: HOSPADM

## 2019-08-01 RX ADMIN — MELATONIN 2000 UNITS: at 10:08

## 2019-08-01 RX ADMIN — MUPIROCIN 1 G: 20 OINTMENT TOPICAL at 09:08

## 2019-08-01 RX ADMIN — TRAMADOL HYDROCHLORIDE 50 MG: 50 TABLET ORAL at 01:08

## 2019-08-01 RX ADMIN — PREGABALIN 75 MG: 75 CAPSULE ORAL at 09:08

## 2019-08-01 RX ADMIN — SENNOSIDES,DOCUSATE SODIUM 1 TABLET: 8.6; 5 TABLET, FILM COATED ORAL at 10:08

## 2019-08-01 RX ADMIN — HYDROXYZINE HYDROCHLORIDE 25 MG: 25 TABLET, FILM COATED ORAL at 04:08

## 2019-08-01 RX ADMIN — LIDOCAINE 2 PATCH: 50 PATCH TOPICAL at 06:08

## 2019-08-01 RX ADMIN — METHOCARBAMOL TABLETS 500 MG: 500 TABLET, COATED ORAL at 10:08

## 2019-08-01 RX ADMIN — THERA TABS 1 TABLET: TAB at 10:08

## 2019-08-01 RX ADMIN — HEPARIN SODIUM 5000 UNITS: 5000 INJECTION, SOLUTION INTRAVENOUS; SUBCUTANEOUS at 02:08

## 2019-08-01 RX ADMIN — ALBUTEROL SULFATE 2.5 MG: 2.5 SOLUTION RESPIRATORY (INHALATION) at 11:08

## 2019-08-01 RX ADMIN — SODIUM CHLORIDE: 0.9 INJECTION, SOLUTION INTRAVENOUS at 05:08

## 2019-08-01 RX ADMIN — MUPIROCIN 1 G: 20 OINTMENT TOPICAL at 10:08

## 2019-08-01 RX ADMIN — PROPRANOLOL HYDROCHLORIDE 40 MG: 20 TABLET ORAL at 09:08

## 2019-08-01 RX ADMIN — TRAMADOL HYDROCHLORIDE 50 MG: 50 TABLET ORAL at 05:08

## 2019-08-01 RX ADMIN — MIRTAZAPINE 15 MG: 15 TABLET, FILM COATED ORAL at 09:08

## 2019-08-01 RX ADMIN — TAMSULOSIN HYDROCHLORIDE 0.4 MG: 0.4 CAPSULE ORAL at 09:08

## 2019-08-01 RX ADMIN — FOLIC ACID 1 MG: 1 TABLET ORAL at 10:08

## 2019-08-01 RX ADMIN — ROPIVACAINE HYDROCHLORIDE 10 ML/HR: 2 INJECTION, SOLUTION EPIDURAL; INFILTRATION at 05:08

## 2019-08-01 RX ADMIN — SENNOSIDES,DOCUSATE SODIUM 1 TABLET: 8.6; 5 TABLET, FILM COATED ORAL at 09:08

## 2019-08-01 RX ADMIN — LACTULOSE 20 G: 20 SOLUTION ORAL at 03:08

## 2019-08-01 RX ADMIN — HYDROXYZINE PAMOATE 25 MG: 25 CAPSULE ORAL at 09:08

## 2019-08-01 RX ADMIN — DIAZEPAM 2 MG: 2 TABLET ORAL at 09:08

## 2019-08-01 RX ADMIN — PANTOPRAZOLE SODIUM 40 MG: 40 TABLET, DELAYED RELEASE ORAL at 10:08

## 2019-08-01 RX ADMIN — HYDRALAZINE HYDROCHLORIDE 25 MG: 25 TABLET, FILM COATED ORAL at 04:08

## 2019-08-01 RX ADMIN — HEPARIN SODIUM 5000 UNITS: 5000 INJECTION, SOLUTION INTRAVENOUS; SUBCUTANEOUS at 10:08

## 2019-08-01 RX ADMIN — METHOCARBAMOL TABLETS 500 MG: 500 TABLET, COATED ORAL at 04:08

## 2019-08-01 RX ADMIN — DIAZEPAM 2 MG: 2 TABLET ORAL at 10:08

## 2019-08-01 RX ADMIN — HEPARIN SODIUM 5000 UNITS: 5000 INJECTION, SOLUTION INTRAVENOUS; SUBCUTANEOUS at 05:08

## 2019-08-01 RX ADMIN — HYDRALAZINE HYDROCHLORIDE 25 MG: 25 TABLET, FILM COATED ORAL at 11:08

## 2019-08-01 RX ADMIN — ALBUTEROL SULFATE 2.5 MG: 2.5 SOLUTION RESPIRATORY (INHALATION) at 07:08

## 2019-08-01 RX ADMIN — LACTULOSE 20 G: 20 SOLUTION ORAL at 10:08

## 2019-08-01 RX ADMIN — PROPRANOLOL HYDROCHLORIDE 10 MG: 10 TABLET ORAL at 10:08

## 2019-08-01 RX ADMIN — LACTULOSE 20 G: 20 SOLUTION ORAL at 09:08

## 2019-08-01 RX ADMIN — TRAMADOL HYDROCHLORIDE 50 MG: 50 TABLET ORAL at 07:08

## 2019-08-01 RX ADMIN — Medication 100 MG: at 03:08

## 2019-08-01 RX ADMIN — SODIUM PHOSPHATE, MONOBASIC, MONOHYDRATE 20.01 MMOL: 276; 142 INJECTION, SOLUTION INTRAVENOUS at 03:08

## 2019-08-01 NOTE — ASSESSMENT & PLAN NOTE
63 y.o. male POD3 s/p R FN perc screw fixation    Pain control: multimodal  PT/OT: TTWB RLE  DVT PPx: heparin, FCDs at all times when not ambulating  Abx: postop Ancef complete  Labs: Hb 8.9, Plt 72, INR 1.3, BG 91, Cr 1.8  Drain: none  Sauer: none    Dispo: rehab facility when accepted; OK for DC from orthopedic standpoint

## 2019-08-01 NOTE — PLAN OF CARE
Patient medically ready for placement. Patient has been accepted by [facility] pending auth.       08/01/19 125   Discharge Reassessment   Assessment Type Discharge Planning Reassessment   Provided patient/caregiver education on the expected discharge date and the discharge plan No   Do you have any problems affording any of your prescribed medications? No   Discharge Plan A Rehab   DME Needed Upon Discharge  none   Patient choice form signed by patient/caregiver No   Anticipated Discharge Disposition Rehab   Can the patient answer the patient profile reliably? Yes, cognitively intact   How does the patient rate their overall health at the present time? Good   Describe the patient's ability to walk at the present time. Walks with the help of equipment   How often would a person be available to care for the patient? Never   Number of comorbid conditions (as recorded on the chart) Three   During the past month, has the patient often been bothered by feeling down, depressed or hopeless? No   During the past month, has the patient often been bothered by little interest or pleasure in doing things? No   Post-Acute Status   Post-Acute Authorization Placement   Post-Acute Placement Status Pending Post-Acute Medical Review     Elke Headley RN/BSN/DEBBIE  [facility]  [phone]  Ortho/IMK/IM

## 2019-08-01 NOTE — PLAN OF CARE
Problem: Occupational Therapy Goal  Goal: Occupational Therapy Goal  Goals to be met by: 8/30/2019     Patient will increase functional independence with ADLs by performing:    UE Dressing with Stand-by Assistance.  LE Dressing with Stand-by Assistance.  Grooming while standing with Stand-by Assistance.  Toileting from toilet with Stand-by Assistance for hygiene and clothing management.     Mary Matta OT  7/30/2019       Outcome: Ongoing (interventions implemented as appropriate)  Pt. Tolerated therapy session well. Pt. Is SBA in bed mobility and Mod A with sit<>stand x 3 trials. Pt. Required Max A v/c's to side step and move RW. Pt. Wheezing upon activity. Nursing notified.  Continue OT POC.    Mary Matta OT  8/1/2019

## 2019-08-01 NOTE — ADDENDUM NOTE
Addendum  created 08/01/19 1238 by Monet Randolph RN    Intraprocedure Event edited, LDA properties accepted, Order list changed, Sign clinical note

## 2019-08-01 NOTE — ADDENDUM NOTE
Addendum  created 08/01/19 1035 by Thi Ballard MD    Charge Capture section accepted, Sign clinical note

## 2019-08-01 NOTE — PLAN OF CARE
Problem: Physical Therapy Goal  Goal: Physical Therapy Goal  Goals to be met by: 19    Patient will increase functional independence with mobility by performin. Supine to sit with Modified [place]  2. Sit to stand transfer with Stand-by Assistance  3. Bed to chair transfer with Stand-by Assistance using Rolling Walker  4. Gait x20 feet with Stand-by Assistance using Rolling Walker  5. Ascend/descend 5 stairs with bilateral Handrails Minimal Assistance   6. Lower extremity exercise program x10 reps per handout, with supervision     Outcome: Ongoing (interventions implemented as appropriate)    Patient tolerated PT session fairly today. He demonstrated increased ability to maintain R LE TTWB in standing with RW. He was able to take 6 side steps then 4 side steps with maximal assistance and RW.

## 2019-08-01 NOTE — PLAN OF CARE
08/01/19 1223   Post-Acute Status   Post-Acute Authorization Placement   Post-Acute Placement Status Pending Payor Review     FERNANDO spoke with José with [facility] and he reports that patient is accepted and insurance AUTH is pending. FERNANDO will continue to follow up.    Leidy Cifuentes LMSW  [facility]   [id number]

## 2019-08-01 NOTE — PT/OT/SLP PROGRESS
Physical Therapy Treatment    Patient Name:  Fransico Montalvo   MRN:  [id number]    Recommendations:     Discharge Recommendations:  rehabilitation facility   Discharge Equipment Recommendations: walker, rolling, commode   Barriers to discharge: Inaccessible home and Decreased caregiver support    Assessment:     Fransico Montalvo is a 63 y.o. male admitted with a medical diagnosis of Fracture of femoral neck, right, closed.  He presents with the following impairments/functional limitations:  weakness, impaired endurance, impaired functional mobilty, gait instability, impaired balance, decreased lower extremity function, impaired cognition, pain, impaired joint extensibility, decreased ROM, orthopedic precautions, impaired skin, decreased safety awareness. Patient tolerated PT session fairly today. He demonstrated increased ability to maintain R LE TTWB in standing with RW. He was able to take 6 side steps then 4 side steps with maximal assistance and RW.     Rehab Prognosis: Good; patient would benefit from acute skilled PT services to address these deficits and reach maximum level of function.    Recent Surgery: Procedure(s) (LRB):  PINNING, HIP, PERCUTANEOUS- synthes cannulated screws- hana table- C arm door side- (Right)  CAPSULOTOMY, JOINT (Right) 3 Days Post-Op    Plan:     During this hospitalization, patient to be seen daily to address the identified rehab impairments via gait training, therapeutic activities, therapeutic exercises and progress toward the following goals:    · Plan of Care Expires:  08/30/19    Subjective     Chief Complaint: Cannot believe he broke his hip.   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   Pain/Comfort:  · Pain Rating 1: (did not rate)  · Location - Side 1: Right  · Location 1: hip  · Pain Addressed 1: Pre-medicate for activity, Reposition, Cessation of Activity, Nurse notified      Objective:     Communicated with Rn prior to session.  Patient found supine with  peripheral IV upon PT entry to room.     General Precautions: Standard, fall   Orthopedic Precautions:RLE toe touch weight bearing   Braces: N/A     Functional Mobility: Wheezing noted throughout functional mobility.   · Bed Mobility:     · Scooting: stand by assistance  · Supine to Sit: stand by assistance  · Sit to Supine: stand by assistance  · Transfers:     · Sit to Stand:  moderate assistance with rolling walker x3 trials from edge of bed with verbal/tactile cues for RW management, TTWB technique, and safety   · Gait:  On second trial patient able to take 6 side steps and on third trial he was able to take 4 side steps along edge of bed with maximal assistance and rolling walker. He needed verbal/tactile cues for RW management, TTWB technique, and safety. He was limited in ambulation distance by B UE shaking and fatigue.       AM-PAC 6 CLICK MOBILITY  Turning over in bed (including adjusting bedclothes, sheets and blankets)?: 3  Sitting down on and standing up from a chair with arms (e.g., wheelchair, bedside commode, etc.): 2  Moving from lying on back to sitting on the side of the bed?: 3  Moving to and from a bed to a chair (including a wheelchair)?: 2  Need to walk in hospital room?: 2  Climbing 3-5 steps with a railing?: 1  Basic Mobility Total Score: 13       Therapeutic Activities and Exercises:  Patient educated in and performed R LE exercises x10 reps for A/P, quad set, and glute set. Patient provided with handout for HEP at home.     He tolerated sitting on edge of bed ~20 minutes with stand by assistance.       Patient educated in:  -PT role and POC  -safety with transfers including hand placement  -gait sequencing and RW management  -OOB activity to maximize recovery   -HEP for therex at home      Patient left supine with all lines intact, call button in reach, RN notified and [employer].    GOALS:   Multidisciplinary Problems     Physical Therapy Goals        Problem: Physical Therapy Goal    Goal  Priority Disciplines Outcome Goal Variances Interventions   Physical Therapy Goal     PT, PT/OT Ongoing (interventions implemented as appropriate)     Description:  Goals to be met by: 19    Patient will increase functional independence with mobility by performin. Supine to sit with Modified Rowe  2. Sit to stand transfer with Stand-by Assistance  3. Bed to chair transfer with Stand-by Assistance using Rolling Walker  4. Gait x20 feet with Stand-by Assistance using Rolling Walker  5. Ascend/descend 5 stairs with bilateral Handrails Minimal Assistance   6. Lower extremity exercise program x10 reps per handout, with supervision                      Time Tracking:     PT Received On: 19  PT Start Time: 1535     PT Stop Time: 1604  PT Total Time (min): 29 min     Billable Minutes: Gait Training 19 and Therapeutic Exercise 10    Treatment Type: Treatment  PT/PTA: PT       Cheryle Gillis, PT  2019

## 2019-08-01 NOTE — SUBJECTIVE & OBJECTIVE
Principal Problem:Fracture of femoral neck, right, closed    Principal Orthopedic Problem: same    Interval History: Patient seen and examined at bedside.  No acute events overnight.  Pain controlled.  Walked 4 steps with PT yesterday.      Review of patient's allergies indicates:  No Known Allergies    Current Facility-Administered Medications   Medication    0.9%  NaCl infusion (for blood administration)    0.9%  NaCl infusion (for blood administration)    0.9%  NaCl infusion    bisacodyl suppository 10 mg    dextrose 10% (D10W) Bolus    dextrose 10% (D10W) Bolus    diazePAM tablet 2 mg    folic acid tablet 1 mg    glucagon (human recombinant) injection 1 mg    glucose chewable tablet 16 g    glucose chewable tablet 24 g    heparin (porcine) injection 5,000 Units    hydrOXYzine HCl tablet 25 mg    hydrOXYzine pamoate capsule 25 mg    lactulose 20 gram/30 mL solution Soln 20 g    lidocaine 5 % patch 2 patch    methocarbamol tablet 500 mg    mirtazapine tablet 15 mg    multivitamin tablet 1 tablet    mupirocin 2 % ointment 1 g    naloxone 0.4 mg/mL injection 0.4 mg    ondansetron injection 4 mg    pantoprazole EC tablet 40 mg    polyethylene glycol packet 17 g    pregabalin capsule 75 mg    promethazine (PHENERGAN) 6.25 mg in dextrose 5 % 50 mL IVPB    propranolol tablet 10 mg    ropivacaine (PF) 2 mg/ml (0.2%) infusion    senna-docusate 8.6-50 mg per tablet 1 tablet    sodium chloride 0.9% flush 10 mL    sodium chloride 0.9% flush 5 mL    sucralfate 100 mg/mL suspension 1 g    tamsulosin 24 hr capsule 0.4 mg    traMADol tablet 50 mg    vitamin D 1000 units tablet 2,000 Units     Objective:     Vital Signs (Most Recent):  Temp: 98.1 °F (36.7 °C) (08/01/19 0504)  Pulse: 80 (08/01/19 0504)  Resp: 18 (08/01/19 0504)  BP: (!) 155/93 (08/01/19 0504)  SpO2: 97 % (08/01/19 0504) Vital Signs (24h Range):  Temp:  [97.2 °F (36.2 °C)-98.9 °F (37.2 °C)] 98.1 °F (36.7 °C)  Pulse:  [78-90]  80  Resp:  [14-19] 18  SpO2:  [92 %-98 %] 97 %  BP: (118-155)/(66-93) 155/93     Weight: 90.7 kg (199 lb 15.3 oz)  Height: 6' (182.9 cm)  Body mass index is 27.12 kg/m².        Ortho/SPM Exam    AAOx4  NAD  Reg rate  No increased WOB  Dressing c/d/i  SILT T/SP/DP/Bridges/Sa  Motor intact T/SP/DP  WWP extremities  FCDs in place and functioning    Significant Labs:   CBC:   Recent Labs   Lab 07/31/19 0347 08/01/19 0353   WBC 6.62 4.64   HGB 9.3* 8.9*   HCT 28.9* 27.3*   PLT 76* 72*     CMP:   Recent Labs   Lab 07/31/19 0347 07/31/19  0939 08/01/19 0353     --  135*   K 5.4* 4.6 4.8     --  109   CO2 19*  --  18*   GLU 98  --  91   BUN 34*  --  27*   CREATININE 2.5*  --  1.8*   CALCIUM 8.5*  --  8.3*   PROT 7.2  --  7.1   ALBUMIN 2.9*  --  2.9*   BILITOT 1.6*  --  1.4*   ALKPHOS 116  --  139*   AST 70*  --  72*   ALT 10  --  8*   ANIONGAP 10  --  8   EGFRNONAA 26.2*  --  38.9*     Coagulation:   Recent Labs   Lab 07/31/19 0347 08/01/19 0353   LABPROT 12.7* 13.0*   INR 1.3* 1.3*     All pertinent labs within the past 24 hours have been reviewed.    Significant Imaging: I have reviewed all pertinent imaging results/findings.

## 2019-08-01 NOTE — SUBJECTIVE & OBJECTIVE
Interval History 8/1/2019:  Patient is seen for follow-up rehab evaluation and recommendations: Audible wheezing on exam. Pain still moderate-severe. PNA catheter infusing.    HPI, Past Medical, Family, and Social History remains the same as documented in the initial encounter.    Scheduled Medications:    diazePAM  2 mg Oral Q12H    folic acid  1 mg Oral Daily    heparin (porcine)  5,000 Units Subcutaneous Q8H    hydrOXYzine pamoate  25 mg Oral QHS    lactulose  20 g Oral BID    lidocaine  2 patch Transdermal Q24H    mirtazapine  15 mg Oral QHS    multivitamin  1 tablet Oral Daily    mupirocin  1 g Nasal BID    pantoprazole  40 mg Oral Daily    pregabalin  75 mg Oral QHS    propranolol  10 mg Oral BID    senna-docusate 8.6-50 mg  1 tablet Oral BID    tamsulosin  0.4 mg Oral Daily    vitamin D  2,000 Units Oral Daily       Diagnostic Results: Labs: Reviewed    PRN Medications: sodium chloride, sodium chloride, bisacodyl, Dextrose 10% Bolus, Dextrose 10% Bolus, glucagon (human recombinant), glucose, glucose, hydrOXYzine HCl, methocarbamol, naloxone, ondansetron, polyethylene glycol, promethazine (PHENERGAN) IVPB, sodium chloride 0.9%, sodium chloride 0.9%, sucralfate, traMADol    Review of Systems   Constitutional: Positive for activity change. Negative for fatigue and fever.   HENT: Negative for trouble swallowing and voice change.    Respiratory: Positive for cough and wheezing. Negative for shortness of breath.    Cardiovascular: Negative for chest pain and palpitations.   Gastrointestinal: Positive for constipation. Negative for nausea and vomiting.   Musculoskeletal: Positive for arthralgias and gait problem.   Skin: Positive for wound. Negative for color change.   Neurological: Positive for weakness. Negative for numbness.   Psychiatric/Behavioral: Positive for confusion. Negative for agitation.     Objective:     Vital Signs (Most Recent):  Temp: 98.2 °F (36.8 °C) (08/01/19 0749)  Pulse: 80  (08/01/19 0749)  Resp: 17 (08/01/19 0749)  BP: (!) 141/96 (08/01/19 0749)  SpO2: 96 % (08/01/19 0749)    Vital Signs (24h Range):  Temp:  [97.2 °F (36.2 °C)-98.9 °F (37.2 °C)] 98.2 °F (36.8 °C)  Pulse:  [76-90] 80  Resp:  [14-19] 17  SpO2:  [92 %-98 %] 96 %  BP: (118-155)/(66-96) 141/96     Physical Exam   Constitutional: He is oriented to person, place, and time. He appears well-developed and well-nourished.   HENT:   Head: Normocephalic and atraumatic.   Eyes: Right eye exhibits no discharge. Left eye exhibits no discharge.   Neck: Neck supple.   Cardiovascular: Normal rate and intact distal pulses.   Pulmonary/Chest:   Audible wheezing, on RA    Abdominal: Soft. He exhibits no distension.   Musculoskeletal: He exhibits no edema or deformity.        Right hip: He exhibits decreased range of motion, decreased strength and tenderness.        Right knee: Tenderness found.   Neurological: He is alert and oriented to person, place, and time.   Follows commands   Confused    Skin: Skin is warm and dry.   Psychiatric: He has a normal mood and affect. His behavior is normal.     NEUROLOGICAL EXAMINATION:     MENTAL STATUS   Oriented to person, place, and time.

## 2019-08-01 NOTE — PROGRESS NOTES
Pt resting comfortably.  Right SIFI PNC has been paused. Dressing CDI.  Catheter discontinued, tip intact.  Pt tolerated well.  Educated regarding continued pain management.  Understanding verbalized.
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
## 2019-08-01 NOTE — ASSESSMENT & PLAN NOTE
-Hospital medicine consulted  - last drink 7/28, HM stated not withdrawing but oversedated with Valium with recs to decrease Valium 10 mg TID to 5 mg BID   -Hepatology consulted canceled 2/2 improvement in labs  -confused on exam with camera sitter at bedside

## 2019-08-01 NOTE — PROGRESS NOTES
[facility] Hospital Medicine  Progress Note    Team: [facility]   Attending MD: Sabine Sanderson MD  Admit Date: 7/28/2019  ARTURO 8/2/2019  Length of Stay:  LOS: 4 days   Code status: Full Code    Principal Problem: Fracture of femoral neck, right, closed    Interval hx:  Acknowledging being confused after tramadol and methocarbamol; scheduled lactulose until BM as refuses suppository and enema    ROS:  Constitutional: no fever or chills  Respiratory: no cough or shortness of breath  Cardiovascular: no chest pain or palpitations  Gastrointestinal: no nausea or vomiting, no abdominal pain; last BM: 7/28  Genitourinary: no hematuria or dysuria  Integument/Breast: no rash or pruritis  Musculoskeletal: + right knee pain      Physical Exam  Temp:  [97.5 °F (36.4 °C)-98.9 °F (37.2 °C)] 98.3 °F (36.8 °C)  Pulse:  [76-90] 82  Resp:  [14-19] 18  SpO2:  [92 %-98 %] 98 %  BP: (118-173)/(72-96) 173/89      Intake/Output Summary (Last 24 hours) at 8/1/2019 1434  Last data filed at 8/1/2019 1407  Gross per 24 hour   Intake --   Output 240 ml   Net -240 ml       Wt Readings from Last 1 Encounters:   07/29/19 0918 90.7 kg (199 lb 15.3 oz)   07/28/19 1307 90.7 kg (200 lb)        Estimated body mass index is 27.12 kg/m² as calculated from the following:    Height as of this encounter: 6' (1.829 m).    Weight as of this encounter: 90.7 kg (199 lb 15.3 oz).    General: well developed, well nourished, no distress  Lungs:  clear to auscultation bilaterally and normal respiratory effort.  Cardiovascular: Heart: regular rate and rhythm, S1, S2 normal, no murmur, click, rub or gallop. Chest Wall: no tenderness. Extremities: no cyanosis, 1+ right thigh edema, no clubbing.   Abdomen/Rectal: Abdomen: soft, non-tender non-distended; bowel sounds normal; no masses,  no organomegaly.   Skin: Skin color, texture, turgor normal. No rashes or lesions.  Neurologic: Normal strength and tone. No focal numbness or  weakness.  Psych/Behavioral:  Alert and oriented to person, place, appropriate affect.      Recent Results (from the past 24 hour(s))   Comprehensive Metabolic Panel (CMP)    Collection Time: 08/01/19  3:53 AM   Result Value Ref Range    Sodium 135 (L) 136 - 145 mmol/L    Potassium 4.8 3.5 - 5.1 mmol/L    Chloride 109 95 - 110 mmol/L    CO2 18 (L) 23 - 29 mmol/L    Glucose 91 70 - 110 mg/dL    BUN, Bld 27 (H) 8 - 23 mg/dL    Creatinine 1.8 (H) 0.5 - 1.4 mg/dL    Calcium 8.3 (L) 8.7 - 10.5 mg/dL    Total Protein 7.1 6.0 - 8.4 g/dL    Albumin 2.9 (L) 3.5 - 5.2 g/dL    Total Bilirubin 1.4 (H) 0.1 - 1.0 mg/dL    Alkaline Phosphatase 139 (H) 55 - 135 U/L    AST 72 (H) 10 - 40 U/L    ALT 8 (L) 10 - 44 U/L    Anion Gap 8 8 - 16 mmol/L    eGFR if African American 45.0 (A) >60 mL/min/1.73 m^2    eGFR if non  38.9 (A) >60 mL/min/1.73 m^2   Magnesium    Collection Time: 08/01/19  3:53 AM   Result Value Ref Range    Magnesium 1.5 (L) 1.6 - 2.6 mg/dL   Phosphorus    Collection Time: 08/01/19  3:53 AM   Result Value Ref Range    Phosphorus 2.1 (L) 2.7 - 4.5 mg/dL   CBC with Automated Differential    Collection Time: 08/01/19  3:53 AM   Result Value Ref Range    WBC 4.64 3.90 - 12.70 K/uL    RBC 2.68 (L) 4.60 - 6.20 M/uL    Hemoglobin 8.9 (L) 14.0 - 18.0 g/dL    Hematocrit 27.3 (L) 40.0 - 54.0 %    Mean Corpuscular Volume 102 (H) 82 - 98 fL    Mean Corpuscular Hemoglobin 33.2 (H) 27.0 - 31.0 pg    Mean Corpuscular Hemoglobin Conc 32.6 32.0 - 36.0 g/dL    RDW 18.6 (H) 11.5 - 14.5 %    Platelets 72 (L) 150 - 350 K/uL    MPV 11.8 9.2 - 12.9 fL    Immature Granulocytes 0.2 0.0 - 0.5 %    Gran # (ANC) 2.3 1.8 - 7.7 K/uL    Immature Grans (Abs) 0.01 0.00 - 0.04 K/uL    Lymph # 1.5 1.0 - 4.8 K/uL    Mono # 0.6 0.3 - 1.0 K/uL    Eos # 0.2 0.0 - 0.5 K/uL    Baso # 0.04 0.00 - 0.20 K/uL    nRBC 0 0 /100 WBC    Gran% 49.9 38.0 - 73.0 %    Lymph% 32.8 18.0 - 48.0 %    Mono% 12.5 4.0 - 15.0 %    Eosinophil% 3.7 0.0 - 8.0 %     Basophil% 0.9 0.0 - 1.9 %    Platelet Estimate Decreased (A)     Aniso Slight     Hypo Occasional     Differential Method Automated    PT/INR    Collection Time: 08/01/19  3:53 AM   Result Value Ref Range    Prothrombin Time 13.0 (H) 9.0 - 12.5 sec    INR 1.3 (H) 0.8 - 1.2       Recent Labs   Lab 07/30/19 0408 07/31/19 0347 08/01/19  0353   WBC 6.64 6.62 4.64   HGB 9.7* 9.3* 8.9*   HCT 29.3* 28.9* 27.3*   PLT 82* 76* 72*       Recent Labs   Lab 07/30/19 0408 07/31/19 0347 07/31/19  0939 08/01/19 0353    137  --  135*   K 4.5 5.4* 4.6 4.8    108  --  109   CO2 20* 19*  --  18*   BUN 29* 34*  --  27*   CREATININE 2.6* 2.5*  --  1.8*   GLU 88 98  --  91   CALCIUM 7.7* 8.5*  --  8.3*   MG 1.4* 1.9  --  1.5*   PHOS 3.3 3.4  --  2.1*       Recent Labs   Lab 07/30/19 0408 07/31/19 0347 08/01/19  0353   ALKPHOS 120 116 139*   ALT 19 10 8*   AST 56* 70* 72*   ALBUMIN 2.8* 2.9* 2.9*   PROT 6.9 7.2 7.1   BILITOT 1.4* 1.6* 1.4*   INR 1.3* 1.3* 1.3*         Hemoglobin A1C   Date Value Ref Range Status   07/28/2019 4.2 4.0 - 5.6 % Final     Comment:     ADA Screening Guidelines:  5.7-6.4%  Consistent with prediabetes  >or=6.5%  Consistent with diabetes  High levels of fetal hemoglobin interfere with the HbA1C  assay. Heterozygous hemoglobin variants (HbS, HgC, etc)do  not significantly interfere with this assay.   However, presence of multiple variants may affect accuracy.         Scheduled Meds:   albuterol sulfate  2.5 mg Nebulization Q6H WAKE    diazePAM  2 mg Oral Q12H    folic acid  1 mg Oral Daily    heparin (porcine)  5,000 Units Subcutaneous Q8H    hydrOXYzine pamoate  25 mg Oral QHS    lactulose  20 g Oral BID    lidocaine  2 patch Transdermal Q24H    mirtazapine  15 mg Oral QHS    multivitamin  1 tablet Oral Daily    mupirocin  1 g Nasal BID    pantoprazole  40 mg Oral Daily    pregabalin  75 mg Oral QHS    propranolol  10 mg Oral BID    senna-docusate 8.6-50 mg  1 tablet Oral BID     tamsulosin  0.4 mg Oral Daily    vitamin D  2,000 Units Oral Daily       Continuous Infusions:      As Needed: sodium chloride, sodium chloride, bisacodyl, Dextrose 10% Bolus, Dextrose 10% Bolus, glucagon (human recombinant), glucose, glucose, hydrOXYzine HCl, methocarbamol, naloxone, ondansetron, polyethylene glycol, promethazine (PHENERGAN) IVPB, sodium chloride 0.9%, sodium chloride 0.9%, sucralfate, traMADol    Active Hospital Problems    Diagnosis  POA    *Fracture of femoral neck, right, closed [S72.001A]  Yes    Impaired functional mobility and endurance [Z74.09]  Unknown    Chronic pain of right knee [M25.561, G89.29]  Yes    Hypomagnesemia [E83.42]  Yes    ISRAEL (acute kidney injury) [N17.9]  Yes    Transaminitis [R74.0]  Yes    Elevated bilirubin [R17]  Yes    Acute blood loss anemia [D62]  Yes    Macrocytic anemia [D53.9]  Yes    Closed fracture of neck of right femur s/p screw fixation on 7/29 by Dr. 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       Overview:    Assessment and Plan    Closed Displaced Fracture of Right Femoral Neck  · Hip Fracture Pathway initiated  · Orthopedics consulted, s/p screw fixation and capsulotomy on 7/29 by Dr. Grossman. Heparin (due to kidney function and ISRAEL) to start 12 hours post op for  28 days.  · Toe touch weight bearing for 4-6 weeks - patient attempting to comply  · PT/OT to start on POD 1, will consider inpatient rehab as has no SNF or  benefits. HG 11 and BP stable post op. Transfused platelets, FFP in surgery  · Sauer to be removed on POD 1  · Pain control:  Pregabalin, Oxy, and scheduled Tylenol (2 grams maximum due to liver issues) - monitor with high MELD  · Patient has refused oxycodone yesterday and morphine relative contraindication with renal issues, anesthesia ordered tramadol prn and night coverage added low dose dilaudid for pain control; anesthesia pain service to be called by nursing for pain control issues  · Vitamin D is 14 so supplement ordered  · Rehab has accepted awaiting insurance approval         Alcoholic Cirrhosis  MELD-Na score: 18 at 8/1/2019  3:53 AM  MELD score: 16 at 8/1/2019  3:53 AM  Calculated from:  Serum Creatinine: 1.8 mg/dL at 8/1/2019  3:53 AM  Serum Sodium: 135 mmol/L at 8/1/2019  3:53 AM  Total Bilirubin: 1.4 mg/dL at 8/1/2019  3:53 AM  INR(ratio): 1.3 at 8/1/2019  3:53 AM  Age: 63 years  · Reports last drink the day of admission  · PETH pending Hep panel, Ammonia 45 - start chronic lactulose for constipation  · Liver U/S showing fatty liver and likely cirrhosis, cholelithiasis as well as sluggish portal flow  · Stigmata of cirrhosis including  thrombocytopenia, elevated tbili, liver enzymes, elevated INR.  · Hepatology consult discontinued as patient has had improvement in labs today- no signs of alcoholic hepatitis now, ASTS 71-->54, Tbili 4.4-->2.3 and Cr also improving. They advise to continue meds on now- PPI, propanolol, and avoid diuretics based on chart check. I agree as patient peripherally down with ISRAEL and I don't need hepatology consultants at this time to assist as has improved and not decompensated after surgery. Will re-consult hepatology for any worsening.     Alcohol Withdrawal  · Last drink on 7/28  · MVI, thiamine, folic acid  · No  signs of withdrawal now, he denies history of this. Is oversedated with valium 10 and vitals have been stable. Change from valium 10 mg q8 to 5 mg q12 now. Monitor for signs of withdrawal. peth pending as above - patient admits to drinking up to day of admit  · Wean valium as contributing to confusion; suspect component of baseline cognitive decline from chronic ETOH use        Essential HTN  · Chronic issue  · Reports has been on Lisinopril 20 mg previously - will hold with CKD  · Propranolol 10mg PO BID started on admit. BP stable post op, trend now post op and consider norvasc or hydralazine if needed for management consider kidney injury these should be okay and not worsen Cr  · Pain control as above  · 7/1: increase propranolol due to rising BP; will add nifedipine if remains elecated; hydralazine prn SBP > 160     CKD Stage 3  · Creatinine 3.8 on admit, baseline unknown - as renal function has been continuing to worsen but in the past has been between 1.5 and 2.2  · Possibly a component of ATN from fluctuating BP (reports BP drops quickly, has been on Lisinopril) vs worsening CKD as well as volume down  · He states he been hydrating with beer not water, that seems likely to be a big part of it  ·  urine Na low, 1gram protein and improving with volume resuscitation. Will give gentle IVF in post op period also. U/S showing complicated renal cysts- will need close monitoring as outpatient which will probably be difficult given noncompliance  · Can discontinue nephro consult since his Cr is improving and making urine. If worsens will reconsult  · Cr improving to 1.8 which is his baseline    Benign Hypertensive Heart and Kidney Disease with CKD  · As above     Thrombocytopenia  · 2/2 liver disease  · Monitor for bleeding  · Platelets 72  today. Given platelets and FFP prior to surgery to prevent bleeding.     Esophageal Varices  · Seen on EGD in Care Everywhere  · S/p banding March 2018  · Propranolol as above with  PPI     Anemia of Chronic Disease  · Hemoglobin 11. Ferrtin/b12/folate WNL. Post op Hg stable  · Monitor for bleeding     Coagulopathy  · 2/2 liver disease, INR 1.4 given FFP pre op for bleeding risk.     Anxiety and Depression  · Chronic and stable  · Continue Effexor 75mg PO daily  · Continue Remeron 15mg PO qHS  · Continue Atarax QHS for sleep and prn anxiety     Hypomagnesemia  hypophosphatemia  · Supplemented Mg with subsequent hyperkalemia; monitor K+ for hyperkalemia with CKD  · Replete phos with supplement for goal of 3 - discontinue renal diet    Chronic right knee pain  Start lidoderm patch to right knee      Diet: Diet Low Sodium, 2gm [facility]; Low Potassium   GI PPx: pantoprazole  DVT PPx:   VTE Risk Mitigation (From admission, onward)        Ordered     heparin (porcine) injection 5,000 Units  Every 8 hours      07/29/19 1258     IP VTE LOW RISK PATIENT  Once      07/28/19 1554        L/D/A:  PIV  Wounds: right hip  Goals of Care: curative, full code  Discharge plan: inpatient rehab      Sabine Sanderson MD  Staff Hospitalist  [phone]

## 2019-08-01 NOTE — PROGRESS NOTES
[facility]  Orthopedics  Progress Note    Patient Name: Fransico Montalvo  MRN: [id number]  Admission Date: 7/28/2019  Hospital Length of Stay: 4 days  Attending Provider: Sabine Sanderson MD  Primary Care Provider: Jessica  Follow-up For: Procedure(s) (LRB):  PINNING, HIP, PERCUTANEOUS- synthes cannulated screws- hana table- C arm door side- (Right)  CAPSULOTOMY, JOINT (Right)    Post-Operative Day: 3 Days Post-Op  Subjective:     Principal Problem:Fracture of femoral neck, right, closed    Principal Orthopedic Problem: same    Interval History: Patient seen and examined at bedside.  No acute events overnight.  Pain controlled.  Walked 4 steps with PT yesterday.      Review of patient's allergies indicates:  No Known Allergies    Current Facility-Administered Medications   Medication    0.9%  NaCl infusion (for blood administration)    0.9%  NaCl infusion (for blood administration)    0.9%  NaCl infusion    bisacodyl suppository 10 mg    dextrose 10% (D10W) Bolus    dextrose 10% (D10W) Bolus    diazePAM tablet 2 mg    folic acid tablet 1 mg    glucagon (human recombinant) injection 1 mg    glucose chewable tablet 16 g    glucose chewable tablet 24 g    heparin (porcine) injection 5,000 Units    hydrOXYzine HCl tablet 25 mg    hydrOXYzine pamoate capsule 25 mg    lactulose 20 gram/30 mL solution Soln 20 g    lidocaine 5 % patch 2 patch    methocarbamol tablet 500 mg    mirtazapine tablet 15 mg    multivitamin tablet 1 tablet    mupirocin 2 % ointment 1 g    naloxone 0.4 mg/mL injection 0.4 mg    ondansetron injection 4 mg    pantoprazole EC tablet 40 mg    polyethylene glycol packet 17 g    pregabalin capsule 75 mg    promethazine (PHENERGAN) 6.25 mg in dextrose 5 % 50 mL IVPB    propranolol tablet 10 mg    ropivacaine (PF) 2 mg/ml (0.2%) infusion    senna-docusate 8.6-50 mg per tablet 1 tablet    sodium chloride 0.9% flush 10 mL    sodium chloride 0.9% flush 5 mL     sucralfate 100 mg/mL suspension 1 g    tamsulosin 24 hr capsule 0.4 mg    traMADol tablet 50 mg    vitamin D 1000 units tablet 2,000 Units     Objective:     Vital Signs (Most Recent):  Temp: 98.1 °F (36.7 °C) (08/01/19 0504)  Pulse: 80 (08/01/19 0504)  Resp: 18 (08/01/19 0504)  BP: (!) 155/93 (08/01/19 0504)  SpO2: 97 % (08/01/19 0504) Vital Signs (24h Range):  Temp:  [97.2 °F (36.2 °C)-98.9 °F (37.2 °C)] 98.1 °F (36.7 °C)  Pulse:  [78-90] 80  Resp:  [14-19] 18  SpO2:  [92 %-98 %] 97 %  BP: (118-155)/(66-93) 155/93     Weight: 90.7 kg (199 lb 15.3 oz)  Height: 6' (182.9 cm)  Body mass index is 27.12 kg/m².        Ortho/SPM Exam    AAOx4  NAD  Reg rate  No increased WOB  Dressing c/d/i  SILT T/SP/DP/Bridges/Sa  Motor intact T/SP/DP  WWP extremities  FCDs in place and functioning    Significant Labs:   CBC:   Recent Labs   Lab 07/31/19 0347 08/01/19 0353   WBC 6.62 4.64   HGB 9.3* 8.9*   HCT 28.9* 27.3*   PLT 76* 72*     CMP:   Recent Labs   Lab 07/31/19 0347 07/31/19  0939 08/01/19 0353     --  135*   K 5.4* 4.6 4.8     --  109   CO2 19*  --  18*   GLU 98  --  91   BUN 34*  --  27*   CREATININE 2.5*  --  1.8*   CALCIUM 8.5*  --  8.3*   PROT 7.2  --  7.1   ALBUMIN 2.9*  --  2.9*   BILITOT 1.6*  --  1.4*   ALKPHOS 116  --  139*   AST 70*  --  72*   ALT 10  --  8*   ANIONGAP 10  --  8   EGFRNONAA 26.2*  --  38.9*     Coagulation:   Recent Labs   Lab 07/31/19  0347 08/01/19  0353   LABPROT 12.7* 13.0*   INR 1.3* 1.3*     All pertinent labs within the past 24 hours have been reviewed.    Significant Imaging: I have reviewed all pertinent imaging results/findings.    Assessment/Plan:     * Fracture of femoral neck, right, closed  63 y.o. male POD3 s/p R FN perc screw fixation    Pain control: multimodal  PT/OT: TTWB RLE  DVT PPx: heparin, FCDs at all times when not ambulating  Abx: postop Ancef complete  Labs: Hb 8.9, Plt 72, INR 1.3, BG 91, Cr 1.8  Drain: none  Sauer: none    Dispo: rehab facility when  accepted; OK for DC from orthopedic standpoint          Link Brown MD  Orthopedics  [facility] Note:  I agree with the resident's assessment and plan.    Raj Grossman MD

## 2019-08-01 NOTE — PT/OT/SLP PROGRESS
Occupational Therapy   Treatment    Name: Fransico Montalvo  MRN: [id number]  Admitting Diagnosis:  Fracture of femoral neck, right, closed  3 Days Post-Op    Recommendations:     Discharge Recommendations: rehabilitation facility  Discharge Equipment Recommendations:  commode, walker, rolling  Barriers to discharge:  Inaccessible home environment, Decreased caregiver support    Assessment:     Fransico Montalvo is a 63 y.o. male with a medical diagnosis of Fracture of femoral neck, right, closed.  He presents with the following Performance deficits affecting function are weakness, impaired endurance, impaired self care skills, impaired functional mobilty, impaired balance, impaired cognition, decreased lower extremity function, decreased safety awareness, pain.     Pt. Tolerated therapy session well. Pt. Is SBA in bed mobility and requires Mod A sit<>stand x3 trials. Pt. Required Max A with v/cs for side steps and RW management. Pt. Required seated rest breaks between trials due to SOB and fatigue, Pt. Wheezing upon activity. Nursing notified.        Rehab Prognosis:  Good; patient would benefit from acute skilled OT services to address these deficits and reach maximum level of function.       Plan:     Patient to be seen daily to address the above listed problems via self-care/home management, therapeutic activities, therapeutic exercises  · Plan of Care Expires: 08/30/19  · Plan of Care Reviewed with: patient    Subjective     Pain/Comfort:  · Pain Rating 1: 0/10    Objective:     Communicated with: Rn prior to session.  Patient found supine with peripheral IV upon OT entry to room.    General Precautions: Standard, fall   Orthopedic Precautions:RLE toe touch weight bearing   Braces:       Occupational Performance:     Bed Mobility:    · Patient completed Rolling/Turning to Left with  stand by assistance  · Patient completed Supine to Sit with stand by assistance     Functional Mobility/Transfers:  · Patient completed Sit <>  Stand Transfer with moderate assistance  with  rolling walker   · Functional Mobility: Pt. Is Mod A sit<>stand x3 trials and Max A for side steps and rolling walker management.    Activities of Daily Living:  · Grooming: stand by assistance teeth sitting EOB with setup      [facility] 6 Click ADL: 17    Treatment & Education:  - OT/POC-  - Importance of mobility to maximize recovery.  - Pt. Requires Mod v/c's for TTWB precautions.  - Pt. Advised to call nursing for assistance      Patient left supine with all lines intact, call button in reach and RN notifiedEducation:      GOALS:   Multidisciplinary Problems     Occupational Therapy Goals        Problem: Occupational Therapy Goal    Goal Priority Disciplines Outcome Interventions   Occupational Therapy Goal     OT, PT/OT Ongoing (interventions implemented as appropriate)    Description:  Goals to be met by: 8/30/2019     Patient will increase functional independence with ADLs by performing:    UE Dressing with Stand-by Assistance.  LE Dressing with Stand-by Assistance.  Grooming while standing with Stand-by Assistance.  Toileting from toilet with Stand-by Assistance for hygiene and clothing management.     Mary Matta OT  7/30/2019                        Time Tracking:     OT Date of Treatment: 08/01/19  OT Start Time: 1535  OT Stop Time: 1604  OT Total Time (min): 29 min    Billable Minutes:Self Care/Home Management 15  Therapeutic Activity 14    Mary Matta OT  8/1/2019

## 2019-08-02 VITALS
HEART RATE: 74 BPM | OXYGEN SATURATION: 98 % | WEIGHT: 199.94 LBS | RESPIRATION RATE: 17 BRPM | DIASTOLIC BLOOD PRESSURE: 89 MMHG | HEIGHT: 72 IN | BODY MASS INDEX: 27.08 KG/M2 | SYSTOLIC BLOOD PRESSURE: 173 MMHG | TEMPERATURE: 100 F

## 2019-08-02 LAB
ALBUMIN SERPL BCP-MCNC: 2.6 G/DL (ref 3.5–5.2)
ALP SERPL-CCNC: 121 U/L (ref 55–135)
ALT SERPL W/O P-5'-P-CCNC: 7 U/L (ref 10–44)
ANION GAP SERPL CALC-SCNC: 7 MMOL/L (ref 8–16)
AST SERPL-CCNC: 61 U/L (ref 10–40)
BASOPHILS # BLD AUTO: 0.04 K/UL (ref 0–0.2)
BASOPHILS NFR BLD: 1.1 % (ref 0–1.9)
BILIRUB SERPL-MCNC: 1.5 MG/DL (ref 0.1–1)
BUN SERPL-MCNC: 20 MG/DL (ref 8–23)
CALCIUM SERPL-MCNC: 8.4 MG/DL (ref 8.7–10.5)
CHLORIDE SERPL-SCNC: 110 MMOL/L (ref 95–110)
CO2 SERPL-SCNC: 20 MMOL/L (ref 23–29)
CREAT SERPL-MCNC: 1.5 MG/DL (ref 0.5–1.4)
DIFFERENTIAL METHOD: ABNORMAL
EOSINOPHIL # BLD AUTO: 0.1 K/UL (ref 0–0.5)
EOSINOPHIL NFR BLD: 3.8 % (ref 0–8)
ERYTHROCYTE [DISTWIDTH] IN BLOOD BY AUTOMATED COUNT: 19.2 % (ref 11.5–14.5)
EST. GFR  (AFRICAN AMERICAN): 56.1 ML/MIN/1.73 M^2
EST. GFR  (NON AFRICAN AMERICAN): 48.5 ML/MIN/1.73 M^2
GLUCOSE SERPL-MCNC: 94 MG/DL (ref 70–110)
HCT VFR BLD AUTO: 28.2 % (ref 40–54)
HGB BLD-MCNC: 9 G/DL (ref 14–18)
IMM GRANULOCYTES # BLD AUTO: 0.02 K/UL (ref 0–0.04)
IMM GRANULOCYTES NFR BLD AUTO: 0.5 % (ref 0–0.5)
INR PPP: 1.2 (ref 0.8–1.2)
LYMPHOCYTES # BLD AUTO: 1.1 K/UL (ref 1–4.8)
LYMPHOCYTES NFR BLD: 29.9 % (ref 18–48)
MAGNESIUM SERPL-MCNC: 1.2 MG/DL (ref 1.6–2.6)
MCH RBC QN AUTO: 33.1 PG (ref 27–31)
MCHC RBC AUTO-ENTMCNC: 31.9 G/DL (ref 32–36)
MCV RBC AUTO: 104 FL (ref 82–98)
MONOCYTES # BLD AUTO: 0.5 K/UL (ref 0.3–1)
MONOCYTES NFR BLD: 12.9 % (ref 4–15)
NEUTROPHILS # BLD AUTO: 1.9 K/UL (ref 1.8–7.7)
NEUTROPHILS NFR BLD: 51.8 % (ref 38–73)
NRBC BLD-RTO: 0 /100 WBC
PHOSPHATE SERPL-MCNC: 2.5 MG/DL (ref 2.7–4.5)
PHOSPHATIDYLETHANOL (PETH): 577 NG/ML
PLATELET # BLD AUTO: 70 K/UL (ref 150–350)
PMV BLD AUTO: 11.9 FL (ref 9.2–12.9)
POTASSIUM SERPL-SCNC: 4.4 MMOL/L (ref 3.5–5.1)
PROT SERPL-MCNC: 6.7 G/DL (ref 6–8.4)
PROTHROMBIN TIME: 12.4 SEC (ref 9–12.5)
RBC # BLD AUTO: 2.72 M/UL (ref 4.6–6.2)
SODIUM SERPL-SCNC: 137 MMOL/L (ref 136–145)
WBC # BLD AUTO: 3.64 K/UL (ref 3.9–12.7)

## 2019-08-02 PROCEDURE — 25000003 PHARM REV CODE 250: Performed by: ANESTHESIOLOGY

## 2019-08-02 PROCEDURE — 85025 COMPLETE CBC W/AUTO DIFF WBC: CPT

## 2019-08-02 PROCEDURE — 97530 THERAPEUTIC ACTIVITIES: CPT

## 2019-08-02 PROCEDURE — 85610 PROTHROMBIN TIME: CPT

## 2019-08-02 PROCEDURE — 99239 HOSP IP/OBS DSCHRG MGMT >30: CPT | Mod: ,,, | Performed by: INTERNAL MEDICINE

## 2019-08-02 PROCEDURE — 63600175 PHARM REV CODE 636 W HCPCS: Performed by: STUDENT IN AN ORGANIZED HEALTH CARE EDUCATION/TRAINING PROGRAM

## 2019-08-02 PROCEDURE — 25000242 PHARM REV CODE 250 ALT 637 W/ HCPCS: Performed by: INTERNAL MEDICINE

## 2019-08-02 PROCEDURE — 84100 ASSAY OF PHOSPHORUS: CPT

## 2019-08-02 PROCEDURE — 94640 AIRWAY INHALATION TREATMENT: CPT

## 2019-08-02 PROCEDURE — 27000221 HC OXYGEN, UP TO 24 HOURS

## 2019-08-02 PROCEDURE — 83735 ASSAY OF MAGNESIUM: CPT

## 2019-08-02 PROCEDURE — 25000003 PHARM REV CODE 250: Performed by: INTERNAL MEDICINE

## 2019-08-02 PROCEDURE — 36415 COLL VENOUS BLD VENIPUNCTURE: CPT

## 2019-08-02 PROCEDURE — 94761 N-INVAS EAR/PLS OXIMETRY MLT: CPT

## 2019-08-02 PROCEDURE — 99239 PR HOSPITAL DISCHARGE DAY,>30 MIN: ICD-10-PCS | Mod: ,,, | Performed by: INTERNAL MEDICINE

## 2019-08-02 PROCEDURE — 25000003 PHARM REV CODE 250: Performed by: STUDENT IN AN ORGANIZED HEALTH CARE EDUCATION/TRAINING PROGRAM

## 2019-08-02 PROCEDURE — 80053 COMPREHEN METABOLIC PANEL: CPT

## 2019-08-02 RX ORDER — CHOLECALCIFEROL (VITAMIN D3) 50 MCG
2000 TABLET ORAL DAILY
COMMUNITY
Start: 2019-08-03 | End: 2023-07-19

## 2019-08-02 RX ORDER — PROPRANOLOL HYDROCHLORIDE 40 MG/1
20 TABLET ORAL 2 TIMES DAILY
Qty: 60 TABLET | Refills: 11
Start: 2019-08-02 | End: 2023-07-19

## 2019-08-02 RX ORDER — HYDROXYZINE HYDROCHLORIDE 25 MG/1
25 TABLET, FILM COATED ORAL 2 TIMES DAILY PRN
Status: ON HOLD
Start: 2019-08-02 | End: 2022-11-03 | Stop reason: HOSPADM

## 2019-08-02 RX ORDER — TRAMADOL HYDROCHLORIDE 50 MG/1
50 TABLET ORAL EVERY 6 HOURS PRN
Qty: 30 TABLET | Refills: 0 | Status: ON HOLD | OUTPATIENT
Start: 2019-08-02 | End: 2022-11-03 | Stop reason: HOSPADM

## 2019-08-02 RX ORDER — ALBUTEROL SULFATE 2.5 MG/.5ML
2.5 SOLUTION RESPIRATORY (INHALATION)
Qty: 225 MG | Refills: 11
Start: 2019-08-02 | End: 2023-07-19

## 2019-08-02 RX ORDER — HYDRALAZINE HYDROCHLORIDE 25 MG/1
25 TABLET, FILM COATED ORAL EVERY 8 HOURS PRN
Start: 2019-08-02 | End: 2023-07-19

## 2019-08-02 RX ORDER — LIDOCAINE 50 MG/G
2 PATCH TOPICAL DAILY
Refills: 0
Start: 2019-08-02 | End: 2023-07-19

## 2019-08-02 RX ORDER — PREGABALIN 75 MG/1
75 CAPSULE ORAL NIGHTLY
Qty: 30 CAPSULE | Refills: 0 | Status: SHIPPED | OUTPATIENT
Start: 2019-08-02 | End: 2019-09-01

## 2019-08-02 RX ORDER — HYDROXYZINE PAMOATE 25 MG/1
25 CAPSULE ORAL NIGHTLY
Qty: 30 CAPSULE | Refills: 0 | Status: SHIPPED | OUTPATIENT
Start: 2019-08-02 | End: 2023-07-19

## 2019-08-02 RX ORDER — METHOCARBAMOL 500 MG/1
500 TABLET, FILM COATED ORAL EVERY 6 HOURS PRN
Qty: 20 TABLET | Refills: 0 | Status: SHIPPED | OUTPATIENT
Start: 2019-08-02 | End: 2019-08-12

## 2019-08-02 RX ORDER — LACTULOSE 10 G/15ML
20 SOLUTION ORAL DAILY
Status: DISCONTINUED | OUTPATIENT
Start: 2019-08-03 | End: 2019-08-02 | Stop reason: HOSPADM

## 2019-08-02 RX ORDER — LANOLIN ALCOHOL/MO/W.PET/CERES
400 CREAM (GRAM) TOPICAL 2 TIMES DAILY
Status: DISCONTINUED | OUTPATIENT
Start: 2019-08-02 | End: 2019-08-02 | Stop reason: HOSPADM

## 2019-08-02 RX ORDER — FOLIC ACID 1 MG/1
1 TABLET ORAL DAILY
Refills: 0
Start: 2019-08-03 | End: 2023-07-19

## 2019-08-02 RX ORDER — POLYETHYLENE GLYCOL 3350 17 G/17G
17 POWDER, FOR SOLUTION ORAL 2 TIMES DAILY PRN
Refills: 0 | Status: ON HOLD
Start: 2019-08-02 | End: 2023-10-25 | Stop reason: ALTCHOICE

## 2019-08-02 RX ORDER — AMOXICILLIN 250 MG
1 CAPSULE ORAL 2 TIMES DAILY
COMMUNITY
Start: 2019-08-02 | End: 2023-07-19

## 2019-08-02 RX ORDER — ENOXAPARIN SODIUM 100 MG/ML
40 INJECTION SUBCUTANEOUS DAILY
Qty: 9.6 ML | Refills: 0
Start: 2019-08-02 | End: 2019-08-26

## 2019-08-02 RX ORDER — LACTULOSE 10 G/15ML
20 SOLUTION ORAL DAILY
Qty: 300 ML | Refills: 0
Start: 2019-08-03 | End: 2019-08-13

## 2019-08-02 RX ORDER — LANOLIN ALCOHOL/MO/W.PET/CERES
400 CREAM (GRAM) TOPICAL 2 TIMES DAILY
Refills: 0 | COMMUNITY
Start: 2019-08-02 | End: 2019-08-06

## 2019-08-02 RX ORDER — LANOLIN ALCOHOL/MO/W.PET/CERES
100 CREAM (GRAM) TOPICAL DAILY
COMMUNITY
Start: 2019-08-03 | End: 2023-07-19

## 2019-08-02 RX ORDER — SUCRALFATE 1 G/10ML
1 SUSPENSION ORAL 3 TIMES DAILY PRN
Start: 2019-08-02 | End: 2023-07-19

## 2019-08-02 RX ORDER — BISACODYL 10 MG
10 SUPPOSITORY, RECTAL RECTAL DAILY PRN
Refills: 0
Start: 2019-08-02 | End: 2023-07-19

## 2019-08-02 RX ADMIN — METHOCARBAMOL TABLETS 500 MG: 500 TABLET, COATED ORAL at 09:08

## 2019-08-02 RX ADMIN — HEPARIN SODIUM 5000 UNITS: 5000 INJECTION, SOLUTION INTRAVENOUS; SUBCUTANEOUS at 06:08

## 2019-08-02 RX ADMIN — PANTOPRAZOLE SODIUM 40 MG: 40 TABLET, DELAYED RELEASE ORAL at 09:08

## 2019-08-02 RX ADMIN — THERA TABS 1 TABLET: TAB at 09:08

## 2019-08-02 RX ADMIN — SENNOSIDES,DOCUSATE SODIUM 1 TABLET: 8.6; 5 TABLET, FILM COATED ORAL at 09:08

## 2019-08-02 RX ADMIN — MAGNESIUM OXIDE TAB 400 MG (241.3 MG ELEMENTAL MG) 400 MG: 400 (241.3 MG) TAB at 09:08

## 2019-08-02 RX ADMIN — TRAMADOL HYDROCHLORIDE 50 MG: 50 TABLET ORAL at 02:08

## 2019-08-02 RX ADMIN — DIAZEPAM 2 MG: 2 TABLET ORAL at 09:08

## 2019-08-02 RX ADMIN — LACTULOSE 20 G: 20 SOLUTION ORAL at 09:08

## 2019-08-02 RX ADMIN — TAMSULOSIN HYDROCHLORIDE 0.4 MG: 0.4 CAPSULE ORAL at 09:08

## 2019-08-02 RX ADMIN — ALBUTEROL SULFATE 2.5 MG: 2.5 SOLUTION RESPIRATORY (INHALATION) at 08:08

## 2019-08-02 RX ADMIN — MELATONIN 2000 UNITS: at 09:08

## 2019-08-02 RX ADMIN — PROPRANOLOL HYDROCHLORIDE 40 MG: 20 TABLET ORAL at 09:08

## 2019-08-02 RX ADMIN — TRAMADOL HYDROCHLORIDE 50 MG: 50 TABLET ORAL at 05:08

## 2019-08-02 RX ADMIN — FOLIC ACID 1 MG: 1 TABLET ORAL at 09:08

## 2019-08-02 RX ADMIN — HEPARIN SODIUM 5000 UNITS: 5000 INJECTION, SOLUTION INTRAVENOUS; SUBCUTANEOUS at 02:08

## 2019-08-02 RX ADMIN — MUPIROCIN 1 G: 20 OINTMENT TOPICAL at 09:08

## 2019-08-02 RX ADMIN — Medication 100 MG: at 09:08

## 2019-08-02 NOTE — PLAN OF CARE
Patient to discharge to [facility] on today.   Future Appointments   Date Time Provider Department Center   8/12/2019 11:00 AM Lianna Smith PA-C [facility]     Payor: MEDICAID / Plan: [facility] COMMUNITY PLAN [facility] ([place] MEDICAID) / Product Type: Managed Medicaid /         08/02/19 1501   Final Note   Assessment Type Final Discharge Note   Anticipated Discharge Disposition Rehab   What phone number can be called within the next 1-3 days to see how you are doing after discharge?   (friend Ina [phone])   Hospital Follow Up  Appt(s) scheduled? Yes   Discharge plans and expectations educations in teach back method with documentation complete? Yes  (per staff nurse)   Right Care Referral Info   Post Acute Recommendation SNF / Sub-Acute Rehab   Referral Type   (rehab)   Facility Name   ([facility])     Elke Headley RN/BSN/CM  [facility]  [phone]  [facility]/IM

## 2019-08-02 NOTE — PLAN OF CARE
Problem: Occupational Therapy Goal  Goal: Occupational Therapy Goal  Goals to be met by: 8/30/2019     Patient will increase functional independence with ADLs by performing:    UE Dressing with Stand-by Assistance.  LE Dressing with Stand-by Assistance.  Grooming while standing with Stand-by Assistance.  Toileting from toilet with Stand-by Assistance for hygiene and clothing management.     Mary Matta OT  7/30/2019       Outcome: Ongoing (interventions implemented as appropriate)  Pt. Tolerated therapy session well today. Pt. With noted improvement in disposition while in conversation. Pt. Is SBA in bed mobility and Min A in sit<>stand. Pt. Required Mod A  With tactile/verbal cues for side steps. Pt. Able to take 4 side steps, turn and sit in chair with Mod A. Pt. Required seated rest breaks between trials due to fatigue and SOB. Continue OT POC.    Mary Matta OT  8/2/2019

## 2019-08-02 NOTE — PT/OT/SLP PROGRESS
Occupational Therapy   Treatment    Name: Fransico Montalvo  MRN: [id number]  Admitting Diagnosis:  Fracture of femoral neck, right, closed  4 Days Post-Op    Recommendations:     Discharge Recommendations: rehabilitation facility  Discharge Equipment Recommendations:  walker, rolling, commode  Barriers to discharge:  Inaccessible home environment, Decreased caregiver support    Assessment:     Fransico Montalvo is a 63 y.o. male with a medical diagnosis of Fracture of femoral neck, right, closed.  He presents with the following Performance deficits affecting function are weakness, impaired endurance, impaired self care skills, gait instability, impaired functional mobilty, impaired balance, decreased safety awareness, pain.     Pt. Tolerated therapy session well today. Pt. With noted improvement in disposition while in conversation. Pt. Is SBA in bed mobility and Min A in sit<>stand. Pt. Required Mod A  With tactile/verbal cues for side steps and TTWB precautions. Pt. Able to take 4 side steps, turn and sit in chair with Mod A. Pt. Required seated rest breaks between trials due to fatigue and SOB.     Rehab Prognosis:  Good; patient would benefit from acute skilled OT services to address these deficits and reach maximum level of function.       Plan:     Patient to be seen daily to address the above listed problems via self-care/home management, therapeutic activities, therapeutic exercises  · Plan of Care Expires: 08/30/19  · Plan of Care Reviewed with: patient    Subjective     Pain/Comfort:  · Pain Rating 1: 0/10    Objective:     Communicated with: RN prior to session.  Patient found supine with peripheral IV, SCD upon OT entry to room.    General Precautions: Standard, fall   Orthopedic Precautions:RLE toe touch weight bearing   Braces:       Occupational Performance:     Bed Mobility:    · Patient completed Rolling/Turning to Left with  stand by assistance     Functional Mobility/Transfers:  · Patient completed Sit <>  Stand Transfer with moderate assistance  with  rolling walker   · Patient completed Bed <> Chair Transfer using Step Transfer technique with moderate assistance with rolling walker  Functional Mobility: Pt. Is SBA in bed mobility and Min A in sit<>stand. Pt. Required Mod A  With tactile/verbal cues for side steps and TTWB. Pt. Able to take 4 side steps, turn and sit in chair with Mod A. Pt. Required seated rest breaks between trials due to fatigue and SOB.      Activities of Daily Living:  · Grooming: stand by assistance seated      [facility] 6 Click ADL: 17    Treatment & Education:  - OT/POC-  - Importance of mobility to maximize recovery.  -Requires Mod v/c's for TTWB precautions.  - Frequent reminders to call nurse for assistance        Patient left up in chair with all lines intact, call button in reach and RN notifiedEducation:      GOALS:   Multidisciplinary Problems     Occupational Therapy Goals        Problem: Occupational Therapy Goal    Goal Priority Disciplines Outcome Interventions   Occupational Therapy Goal     OT, PT/OT Ongoing (interventions implemented as appropriate)    Description:  Goals to be met by: 8/30/2019     Patient will increase functional independence with ADLs by performing:    UE Dressing with Stand-by Assistance.  LE Dressing with Stand-by Assistance.  Grooming while standing with Stand-by Assistance.  Toileting from toilet with Stand-by Assistance for hygiene and clothing management.     Mary Matta OT  7/30/2019                        Time Tracking:     OT Date of Treatment: 08/02/19  OT Start Time: 1156  OT Stop Time: 1231  OT Total Time (min): 35 min    Billable Minutes:Therapeutic Activity 35    Mary Matta OT  8/2/2019

## 2019-08-02 NOTE — PLAN OF CARE
Problem: Physical Therapy Goal  Goal: Physical Therapy Goal  Goals to be met by: 19    Patient will increase functional independence with mobility by performin. Supine to sit with Modified [place]  2. Sit to stand transfer with Stand-by Assistance  3. Bed to chair transfer with Stand-by Assistance using Rolling Walker  4. Gait x20 feet with Stand-by Assistance using Rolling Walker  5. Ascend/descend 5 stairs with bilateral Handrails Minimal Assistance   6. Lower extremity exercise program x10 reps per handout, with supervision     Outcome: Ongoing (interventions implemented as appropriate)    Patient mod A to take 6 steps from bedside chair to bed with RW. He needed constant verbal/tactile cues to maintain TTWB on R LE.

## 2019-08-02 NOTE — PROGRESS NOTES
[facility]  Orthopedics  Progress Note    Patient Name: Fransico Montalvo  MRN: [id number]  Admission Date: 7/28/2019  Hospital Length of Stay: 5 days  Attending Provider: Sabine Sanderson MD  Primary Care Provider: Jessica  Follow-up For: Procedure(s) (LRB):  PINNING, HIP, PERCUTANEOUS- synthes cannulated screws- hana table- C arm door side- (Right)  CAPSULOTOMY, JOINT (Right)    Post-Operative Day: 4 Days Post-Op  Subjective:     Principal Problem:Fracture of femoral neck, right, closed    Principal Orthopedic Problem: same    Interval History: Patient seen and examined at bedside.  No acute events overnight.  Pain controlled.  Walked 10 steps with PT yesterday.  Accepted by IPR.  Auth pending.      Review of patient's allergies indicates:  No Known Allergies    Current Facility-Administered Medications   Medication    albuterol sulfate nebulizer solution 2.5 mg    bisacodyl suppository 10 mg    dextrose 10% (D10W) Bolus    dextrose 10% (D10W) Bolus    diazePAM tablet 2 mg    folic acid tablet 1 mg    glucagon (human recombinant) injection 1 mg    glucose chewable tablet 16 g    glucose chewable tablet 24 g    heparin (porcine) injection 5,000 Units    hydrALAZINE tablet 25 mg    hydrOXYzine HCl tablet 25 mg    hydrOXYzine pamoate capsule 25 mg    lactulose 20 gram/30 mL solution Soln 20 g    lidocaine 5 % patch 2 patch    methocarbamol tablet 500 mg    mirtazapine tablet 15 mg    multivitamin tablet 1 tablet    mupirocin 2 % ointment 1 g    naloxone 0.4 mg/mL injection 0.4 mg    ondansetron injection 4 mg    pantoprazole EC tablet 40 mg    polyethylene glycol packet 17 g    pregabalin capsule 75 mg    promethazine (PHENERGAN) 6.25 mg in dextrose 5 % 50 mL IVPB    propranolol tablet 40 mg    senna-docusate 8.6-50 mg per tablet 1 tablet    sodium chloride 0.9% flush 10 mL    sucralfate 100 mg/mL suspension 1 g    tamsulosin 24 hr capsule 0.4 mg    thiamine tablet 100 mg     traMADol tablet 50 mg    vitamin D 1000 units tablet 2,000 Units     Objective:     Vital Signs (Most Recent):  Temp: 97.6 °F (36.4 °C) (08/02/19 0417)  Pulse: 75 (08/02/19 0417)  Resp: 18 (08/02/19 0417)  BP: (!) 158/89 (08/02/19 0417)  SpO2: 97 % (08/02/19 0417) Vital Signs (24h Range):  Temp:  [97.6 °F (36.4 °C)-98.5 °F (36.9 °C)] 97.6 °F (36.4 °C)  Pulse:  [73-93] 75  Resp:  [14-18] 18  SpO2:  [92 %-100 %] 97 %  BP: (138-177)/(76-96) 158/89     Weight: 90.7 kg (199 lb 15.3 oz)  Height: 6' (182.9 cm)  Body mass index is 27.12 kg/m².        Ortho/SPM Exam    AAOx4  NAD  Reg rate  No increased WOB  Dressing c/d/i  SILT T/SP/DP/Bridges/Sa  Motor intact T/SP/DP  WWP extremities  FCDs in place and functioning    Significant Labs:   CBC:   Recent Labs   Lab 08/01/19 0353 08/02/19 0434   WBC 4.64 3.64*   HGB 8.9* 9.0*   HCT 27.3* 28.2*   PLT 72* 70*     CMP:   Recent Labs   Lab 07/31/19  0939 08/01/19 0353   NA  --  135*   K 4.6 4.8   CL  --  109   CO2  --  18*   GLU  --  91   BUN  --  27*   CREATININE  --  1.8*   CALCIUM  --  8.3*   PROT  --  7.1   ALBUMIN  --  2.9*   BILITOT  --  1.4*   ALKPHOS  --  139*   AST  --  72*   ALT  --  8*   ANIONGAP  --  8   EGFRNONAA  --  38.9*     Coagulation:   Recent Labs   Lab 08/01/19 0353 08/02/19 0434   LABPROT 13.0* 12.4   INR 1.3* 1.2     All pertinent labs within the past 24 hours have been reviewed.    Significant Imaging: I have reviewed all pertinent imaging results/findings.    Assessment/Plan:     * Fracture of femoral neck, right, closed  63 y.o. male POD4 s/p R FN perc screw fixation    Pain control: multimodal  PT/OT: TTWB RLE  DVT PPx: heparin, FCDs at all times when not ambulating  Abx: postop Ancef complete  Labs: Hb 9, Plt 70, INR 1.2, CMP pending  Drain: none  Sauer: none    Dispo: rehab facility when insurance approved; OK for DC from orthopedic standpoint          Link Brown MD  Orthopedics  [facility]    Attg Note:  I agree with the  resident's assessment and plan.    Raj Grossman MD

## 2019-08-02 NOTE — SUBJECTIVE & OBJECTIVE
Principal Problem:Fracture of femoral neck, right, closed    Principal Orthopedic Problem: same    Interval History: Patient seen and examined at bedside.  No acute events overnight.  Pain controlled.  Walked 10 steps with PT yesterday.  Accepted by IPR.  Auth pending.      Review of patient's allergies indicates:  No Known Allergies    Current Facility-Administered Medications   Medication    albuterol sulfate nebulizer solution 2.5 mg    bisacodyl suppository 10 mg    dextrose 10% (D10W) Bolus    dextrose 10% (D10W) Bolus    diazePAM tablet 2 mg    folic acid tablet 1 mg    glucagon (human recombinant) injection 1 mg    glucose chewable tablet 16 g    glucose chewable tablet 24 g    heparin (porcine) injection 5,000 Units    hydrALAZINE tablet 25 mg    hydrOXYzine HCl tablet 25 mg    hydrOXYzine pamoate capsule 25 mg    lactulose 20 gram/30 mL solution Soln 20 g    lidocaine 5 % patch 2 patch    methocarbamol tablet 500 mg    mirtazapine tablet 15 mg    multivitamin tablet 1 tablet    mupirocin 2 % ointment 1 g    naloxone 0.4 mg/mL injection 0.4 mg    ondansetron injection 4 mg    pantoprazole EC tablet 40 mg    polyethylene glycol packet 17 g    pregabalin capsule 75 mg    promethazine (PHENERGAN) 6.25 mg in dextrose 5 % 50 mL IVPB    propranolol tablet 40 mg    senna-docusate 8.6-50 mg per tablet 1 tablet    sodium chloride 0.9% flush 10 mL    sucralfate 100 mg/mL suspension 1 g    tamsulosin 24 hr capsule 0.4 mg    thiamine tablet 100 mg    traMADol tablet 50 mg    vitamin D 1000 units tablet 2,000 Units     Objective:     Vital Signs (Most Recent):  Temp: 97.6 °F (36.4 °C) (08/02/19 0417)  Pulse: 75 (08/02/19 0417)  Resp: 18 (08/02/19 0417)  BP: (!) 158/89 (08/02/19 0417)  SpO2: 97 % (08/02/19 0417) Vital Signs (24h Range):  Temp:  [97.6 °F (36.4 °C)-98.5 °F (36.9 °C)] 97.6 °F (36.4 °C)  Pulse:  [73-93] 75  Resp:  [14-18] 18  SpO2:  [92 %-100 %] 97 %  BP: (138-177)/(76-96)  158/89     Weight: 90.7 kg (199 lb 15.3 oz)  Height: 6' (182.9 cm)  Body mass index is 27.12 kg/m².        Ortho/SPM Exam    AAOx4  NAD  Reg rate  No increased WOB  Dressing c/d/i  SILT T/SP/DP/Bridges/Sa  Motor intact T/SP/DP  WWP extremities  FCDs in place and functioning    Significant Labs:   CBC:   Recent Labs   Lab 08/01/19 0353 08/02/19 0434   WBC 4.64 3.64*   HGB 8.9* 9.0*   HCT 27.3* 28.2*   PLT 72* 70*     CMP:   Recent Labs   Lab 07/31/19  0939 08/01/19 0353   NA  --  135*   K 4.6 4.8   CL  --  109   CO2  --  18*   GLU  --  91   BUN  --  27*   CREATININE  --  1.8*   CALCIUM  --  8.3*   PROT  --  7.1   ALBUMIN  --  2.9*   BILITOT  --  1.4*   ALKPHOS  --  139*   AST  --  72*   ALT  --  8*   ANIONGAP  --  8   EGFRNONAA  --  38.9*     Coagulation:   Recent Labs   Lab 08/01/19 0353 08/02/19 0434   LABPROT 13.0* 12.4   INR 1.3* 1.2     All pertinent labs within the past 24 hours have been reviewed.    Significant Imaging: I have reviewed all pertinent imaging results/findings.

## 2019-08-02 NOTE — ASSESSMENT & PLAN NOTE
63 y.o. male POD4 s/p R FN perc screw fixation    Pain control: multimodal  PT/OT: TTWB RLE  DVT PPx: heparin, FCDs at all times when not ambulating  Abx: postop Ancef complete  Labs: Hb 9, Plt 70, INR 1.2, CMP pending  Drain: none  Sauer: none    Dispo: rehab facility when insurance approved; OK for DC from orthopedic standpoint

## 2019-08-02 NOTE — PT/OT/SLP PROGRESS
Physical Therapy Treatment    Patient Name:  Fransico Montalvo   MRN:  [id number]    Recommendations:     Discharge Recommendations:  rehabilitation facility   Discharge Equipment Recommendations: commode, walker, rolling   Barriers to discharge: Inaccessible home and Decreased caregiver support    Assessment:     Fransico Montalvo is a 63 y.o. male admitted with a medical diagnosis of Fracture of femoral neck, right, closed.  He presents with the following impairments/functional limitations:  weakness, impaired endurance, impaired functional mobilty, gait instability, impaired balance, decreased lower extremity function, orthopedic precautions, impaired skin, pain, impaired joint extensibility.  Patient was able to tolerate sitting in bedside chair ~1.5 hours today. He was mod A to take 6 steps from bedside chair to bed with RW. He needed constant verbal/tactile cues to maintain TTWB on R LE. He was limited in ambulation today due to R LE pain and fatigue from sitting in bedside chair.     Rehab Prognosis: Good; patient would benefit from acute skilled PT services to address these deficits and reach maximum level of function.    Recent Surgery: Procedure(s) (LRB):  PINNING, HIP, PERCUTANEOUS- synthes cannulated screws- hana table- C arm door side- (Right)  CAPSULOTOMY, JOINT (Right) 4 Days Post-Op    Plan:     During this hospitalization, patient to be seen 6 x/week to address the identified rehab impairments via gait training, therapeutic activities, therapeutic exercises and progress toward the following goals:    · Plan of Care Expires:  08/30/19    Subjective     Chief Complaint: Needs to get back in bed.   Patient/Family Comments/goals: To be independent.   Pain/Comfort:  · Pain Rating 1: 9/10  · Location - Side 1: Left  · Location 1: hip  · Pain Addressed 1: Pre-medicate for activity, Cessation of Activity, Nurse notified  · Pain Rating Post-Intervention 1: 10/10      Objective:     Communicated with RN prior to session.   Patient found up in chair with peripheral IV upon PT entry to room.     General Precautions: Standard, fall   Orthopedic Precautions:RLE toe touch weight bearing   Braces: N/A     Functional Mobility:  · Bed Mobility:     · Scooting: stand by assistance  · Sit to Supine: stand by assistance  · Transfers:     · Sit to Stand:  moderate assistance of 2 persons with rolling walker from low bedside chair with verbal/tactile cues for technique and safety   · Gait:  Patient able to take 6 steps from bedside chair to bed with rolling walker and moderate assistance. He needed constant verbal/tactile cues to maintain TTWB on R LE.       AM-PAC 6 CLICK MOBILITY  Turning over in bed (including adjusting bedclothes, sheets and blankets)?: 3  Sitting down on and standing up from a chair with arms (e.g., wheelchair, bedside commode, etc.): 2  Moving from lying on back to sitting on the side of the bed?: 3  Moving to and from a bed to a chair (including a wheelchair)?: 2  Need to walk in hospital room?: 2  Climbing 3-5 steps with a railing?: 1  Basic Mobility Total Score: 13       Therapeutic Activities and Exercises:   Patient educated in:  -PT role and POC  -safety with transfers including hand placement  -gait sequencing and RW management  -OOB activity to maximize recovery     Patient left supine with all lines intact, call button in reach, bed alarm on, RN notified and [employer].    GOALS:   Multidisciplinary Problems     Physical Therapy Goals        Problem: Physical Therapy Goal    Goal Priority Disciplines Outcome Goal Variances Interventions   Physical Therapy Goal     PT, PT/OT Ongoing (interventions implemented as appropriate)     Description:  Goals to be met by: 19    Patient will increase functional independence with mobility by performin. Supine to sit with Modified [place]  2. Sit to stand transfer with Stand-by Assistance  3. Bed to chair transfer with Stand-by Assistance using Rolling Walker  4.  Gait x20 feet with Stand-by Assistance using Rolling Walker  5. Ascend/descend 5 stairs with bilateral Handrails Minimal Assistance   6. Lower extremity exercise program x10 reps per handout, with supervision                      Time Tracking:     PT Received On: 08/02/19  PT Start Time: 1400     PT Stop Time: 1413  PT Total Time (min): 13 min     Billable Minutes: Therapeutic Activity 13    Treatment Type: Treatment  PT/PTA: PT       Cheryle Gillis, PT  08/02/2019

## 2019-08-02 NOTE — PLAN OF CARE
08/02/19 1607   Post-Acute Status   Post-Acute Authorization Placement   Post-Acute Placement Status Set-up Complete     Patient is discharging to [facility] today. SW set up transportation via [facility] and patient will transport via wheelchair. Nurse call report to [phone].    Leidy Cifuentes LMSW  [facility]   [phone]

## 2019-08-02 NOTE — PLAN OF CARE
08/02/19 0852   Post-Acute Status   Post-Acute Authorization Placement;Other   Other Status See Comments     SW left a voicemail with admissions and will follow up.    Leidy Cifuentes LMSW  [facility]   [phone]

## 2019-08-02 NOTE — PLAN OF CARE
08/02/19 1500   Post-Acute Status   Post-Acute Authorization Placement   Post-Acute Placement Status Authorization Obtained     Insurance Auth obtained, patient will discharge to [facility] today.

## 2019-08-02 NOTE — PLAN OF CARE
[facility] Facility Transfer Orders        Admit to: Inpatient rehab    Diagnoses:   Active Hospital Problems    Diagnosis  POA    *Fracture of femoral neck, right, closed [S72.001A]  Yes    Impaired functional mobility and endurance [Z74.09]  Unknown    Chronic pain of right knee [M25.561, G89.29]  Yes    Hypomagnesemia [E83.42]  Yes    [place] (acute kidney injury) [N17.9]  Yes    Transaminitis [R74.0]  Yes    Elevated bilirubin [R17]  Yes    Acute blood loss anemia [D62]  Yes    Macrocytic anemia [D53.9]  Yes    Closed fracture of neck of right femur s/p screw fixation on 7/29 by Dr. 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     Allergies: Review of patient's allergies indicates:  No Known Allergies    Code Status: full    Vitals: Routine       Diet: 2 gram sodium diet and low potassium diet    Activity: Activity as tolerated and Weight bearing status: toe touch weight bearing: right leg    Nursing Precautions: Aspiration , Fall and Pressure ulcer prevention      Consults: PT to evaluate and treat   OT to evaluate and treat  Nutrition to evaluate and recommend diet            Labs: CBC, CMP, Mg, Phos twice weekly  Pending Diagnostic Studies:     None          Miscellaneous Care:   Wound Care: keep dressing  in place to right hip; Ortho will remove on discharge       Medications: Discontinue all previous medication orders, if any. See new list below.  Current Discharge Medication List      START taking these medications    Details   albuterol sulfate 2.5 mg/0.5 mL Nebu Take 2.5 mg by nebulization every 6 (six) hours while awake. Rescue  Qty: 225 mg, Refills: 11      bisacodyl (DULCOLAX) 10 mg Supp Place 1 suppository (10 mg total) rectally daily as needed (Until bowel movement if patient has no bowel movement for 2 days).  Refills: 0      enoxaparin (LOVENOX) 40 mg/0.4 mL Syrg Inject 0.4 mLs (40 mg total) into the skin once daily. for 24 days  Qty: 9.6 mL, Refills: 0      folic acid (FOLVITE) 1 MG tablet Take 1 tablet (1 mg total) by mouth once daily.  Refills: 0      hydrALAZINE (APRESOLINE) 25 MG tablet Take 1 tablet (25 mg total) by mouth every 8 (eight) hours as needed (SBP > 160).      hydrOXYzine HCl (ATARAX) 25 MG tablet Take 1 tablet (25 mg total) by mouth 2 (two) times daily as needed for Anxiety.      lactulose (CHRONULAC) 20 gram/30 mL Soln Take 30 mLs (20 g total) by mouth once daily. for 10 days  Qty: 300 mL, Refills: 0      lidocaine (LIDODERM) 5 % Place 2 patches onto the skin once daily. Remove & Discard patch within 12 hours or as directed by MD; apply to right knee  Refills: 0      magnesium oxide (MAG-OX) 400 mg (241.3 mg magnesium) tablet Take 1 tablet (400 mg total) by mouth 2 (two) times daily. for 4 days  Refills: 0      methocarbamol (ROBAXIN) 500 MG Tab Take 1 tablet (500 mg total) by mouth every 6 (six) hours as needed (muscle spasms; pain 1-4/10 pain scale).  Qty: 20 tablet, Refills: 0      multivitamin (THERAGRAN) tablet Take 1 tablet by mouth once daily.      pregabalin (LYRICA) 75 MG capsule Take 1 capsule (75 mg total) by mouth every evening.  Qty: 30 capsule, Refills: 0      propranolol (INDERAL) 40 MG tablet Take 0.5 tablets (20 mg total) by mouth 2 (two) times daily.  Qty: 60 tablet,  Refills: 11      senna-docusate 8.6-50 mg (PERICOLACE) 8.6-50 mg per tablet Take 1 tablet by mouth 2 (two) times daily.      thiamine 100 MG tablet Take 1 tablet (100 mg total) by mouth once daily.      traMADol (ULTRAM) 50 mg tablet Take 1 tablet (50 mg total) by mouth every 6 (six) hours as needed for Pain.  Qty: 30 tablet, Refills: 0      vitamin D (VITAMIN D3) 2,000 unit Tab Take 1 tablet (2,000 Units total) by mouth once daily.         CONTINUE these medications which have CHANGED    Details   hydrOXYzine pamoate (VISTARIL) 25 MG Cap Take 1 capsule (25 mg total) by mouth every evening.  Qty: 30 capsule, Refills: 0      polyethylene glycol (GLYCOLAX) 17 gram PwPk Take 17 g by mouth 2 (two) times daily as needed (constipation).  Refills: 0      sucralfate (CARAFATE) 100 mg/mL suspension Take 10 mLs (1 g total) by mouth 3 (three) times daily as needed (indigestion; heartburn).         CONTINUE these medications which have NOT CHANGED    Details   mirtazapine (REMERON) 15 MG tablet every evening.       omeprazole (PRILOSEC) 20 MG capsule Take 2 capsules (40 mg total) by mouth once daily.  Qty: 30 capsule, Refills: 1      tamsulosin (FLOMAX) 0.4 mg Cap Take 0.4 mg by mouth once daily.         STOP taking these medications       venlafaxine (EFFEXOR-XR) 75 MG 24 hr capsule Comments:   Reason for Stopping:         famotidine (PEPCID) 20 MG tablet Comments:   Reason for Stopping:         HYDROcodone-acetaminophen (NORCO) 7.5-325 mg per tablet Comments:   Reason for Stopping:             Follow up:    Future Appointments   Date Time Provider Department Center   8/12/2019 11:00 AM Lianna Smith PA-C [facility]       Sabine Sanderson MD  [facility] Medicine  [phone]

## 2019-08-02 NOTE — PLAN OF CARE
08/02/19 1443   Post-Acute Status   Post-Acute Authorization Placement   Post-Acute Placement Status Pending Payor Review     FERNANDO contacted admissions department with [facility] and insurance AUTH is still pending at this time. FERNANDO will continue to follow up.    Leidy Cifuentes LMSW  [facility]   [phone]
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
## 2019-08-15 ENCOUNTER — TELEPHONE (OUTPATIENT)
Dept: ORTHOPEDICS | Facility: CLINIC | Age: 64
End: 2019-08-15

## 2019-08-15 NOTE — TELEPHONE ENCOUNTER
Spoke with Sheila at [facility] [phone].   Gave verbal orders for Chastity Geller NP to evaluate and perform suture removal if appropriate.   Pt missed his 2 wk post op appointment for suture removal due to being inpatient at [facility].   Sheila verbalized understanding and will given order to Chastity Geller NP.    Sheila will call me back with any questions or concerns.   Pt scheduled for a 6 wk post op appointment with Lianna Smith PA-C.

## 2019-09-09 ENCOUNTER — HOSPITAL ENCOUNTER (OUTPATIENT)
Dept: RADIOLOGY | Facility: HOSPITAL | Age: 64
Discharge: HOME | End: 2019-09-09
Attending: PHYSICIAN ASSISTANT
Payer: MEDICARE

## 2019-09-09 ENCOUNTER — OFFICE VISIT (OUTPATIENT)
Dept: ORTHOPEDICS | Facility: CLINIC | Age: 64
End: 2019-09-09
Payer: MEDICAID

## 2019-09-09 VITALS — HEART RATE: 66 BPM | RESPIRATION RATE: 18 BRPM | TEMPERATURE: 98 F

## 2019-09-09 DIAGNOSIS — S72.001D CLOSED FRACTURE OF NECK OF RIGHT FEMUR WITH ROUTINE HEALING, SUBSEQUENT ENCOUNTER: ICD-10-CM

## 2019-09-09 DIAGNOSIS — S72.001D CLOSED FRACTURE OF NECK OF RIGHT FEMUR WITH ROUTINE HEALING, SUBSEQUENT ENCOUNTER: Primary | ICD-10-CM

## 2019-09-09 PROCEDURE — 99215 OFFICE O/P EST HI 40 MIN: CPT | Mod: PBBFAC,25 | Performed by: PHYSICIAN ASSISTANT

## 2019-09-09 PROCEDURE — 99024 POSTOP FOLLOW-UP VISIT: CPT | Mod: ,,, | Performed by: PHYSICIAN ASSISTANT

## 2019-09-09 PROCEDURE — 99999 PR PBB SHADOW E&M-EST. PATIENT-LVL V: ICD-10-PCS | Mod: PBBFAC,,, | Performed by: PHYSICIAN ASSISTANT

## 2019-09-09 PROCEDURE — 99024 PR POST-OP FOLLOW-UP VISIT: ICD-10-PCS | Mod: ,,, | Performed by: PHYSICIAN ASSISTANT

## 2019-09-09 PROCEDURE — 73502 XR HIP 2 VIEW RIGHT: ICD-10-PCS | Mod: 26,RT,, | Performed by: INTERNAL MEDICINE

## 2019-09-09 PROCEDURE — 73502 X-RAY EXAM HIP UNI 2-3 VIEWS: CPT | Mod: 26,RT,, | Performed by: INTERNAL MEDICINE

## 2019-09-09 PROCEDURE — 73502 X-RAY EXAM HIP UNI 2-3 VIEWS: CPT | Mod: TC,RT

## 2019-09-09 PROCEDURE — 99999 PR PBB SHADOW E&M-EST. PATIENT-LVL V: CPT | Mod: PBBFAC,,, | Performed by: PHYSICIAN ASSISTANT

## 2019-09-09 RX ORDER — OXYCODONE AND ACETAMINOPHEN 5; 325 MG/1; MG/1
1 TABLET ORAL
Qty: 42 TABLET | Refills: 0 | Status: SHIPPED | OUTPATIENT
Start: 2019-09-09 | End: 2019-09-09

## 2019-09-09 RX ORDER — OXYCODONE AND ACETAMINOPHEN 5; 325 MG/1; MG/1
1 TABLET ORAL
Qty: 42 TABLET | Refills: 0 | Status: SHIPPED | OUTPATIENT
Start: 2019-09-09 | End: 2023-07-22 | Stop reason: SDUPTHER
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
## 2019-09-18 DIAGNOSIS — S72.001D CLOSED FRACTURE OF NECK OF RIGHT FEMUR WITH ROUTINE HEALING, SUBSEQUENT ENCOUNTER: Primary | ICD-10-CM

## 2019-09-23 ENCOUNTER — HOSPITAL ENCOUNTER (OUTPATIENT)
Dept: RADIOLOGY | Facility: HOSPITAL | Age: 64
Discharge: HOME | End: 2019-09-23
Attending: PHYSICIAN ASSISTANT
Payer: MEDICARE

## 2019-09-23 ENCOUNTER — OFFICE VISIT (OUTPATIENT)
Dept: ORTHOPEDICS | Facility: CLINIC | Age: 64
End: 2019-09-23
Payer: MEDICAID

## 2019-09-23 VITALS — HEIGHT: 72 IN | WEIGHT: 199.94 LBS | BODY MASS INDEX: 27.08 KG/M2

## 2019-09-23 DIAGNOSIS — S72.001D CLOSED FRACTURE OF NECK OF RIGHT FEMUR WITH ROUTINE HEALING, SUBSEQUENT ENCOUNTER: ICD-10-CM

## 2019-09-23 DIAGNOSIS — S72.001D CLOSED FRACTURE OF NECK OF RIGHT FEMUR WITH ROUTINE HEALING, SUBSEQUENT ENCOUNTER: Primary | ICD-10-CM

## 2019-09-23 PROCEDURE — 99214 OFFICE O/P EST MOD 30 MIN: CPT | Mod: PBBFAC,25 | Performed by: PHYSICIAN ASSISTANT

## 2019-09-23 PROCEDURE — 99999 PR PBB SHADOW E&M-EST. PATIENT-LVL IV: CPT | Mod: PBBFAC,,, | Performed by: PHYSICIAN ASSISTANT

## 2019-09-23 PROCEDURE — 99024 POSTOP FOLLOW-UP VISIT: CPT | Mod: ,,, | Performed by: PHYSICIAN ASSISTANT

## 2019-09-23 PROCEDURE — 73502 X-RAY EXAM HIP UNI 2-3 VIEWS: CPT | Mod: 26,RT,, | Performed by: RADIOLOGY

## 2019-09-23 PROCEDURE — 99024 PR POST-OP FOLLOW-UP VISIT: ICD-10-PCS | Mod: ,,, | Performed by: PHYSICIAN ASSISTANT

## 2019-09-23 PROCEDURE — 73502 X-RAY EXAM HIP UNI 2-3 VIEWS: CPT | Mod: TC,RT

## 2019-09-23 PROCEDURE — 73502 XR HIP 2 VIEW RIGHT: ICD-10-PCS | Mod: 26,RT,, | Performed by: RADIOLOGY

## 2019-09-23 PROCEDURE — 99999 PR PBB SHADOW E&M-EST. PATIENT-LVL IV: ICD-10-PCS | Mod: PBBFAC,,, | Performed by: PHYSICIAN ASSISTANT

## 2019-09-23 RX ORDER — HYDROCODONE BITARTRATE AND ACETAMINOPHEN 10; 325 MG/1; MG/1
1 TABLET ORAL
Qty: 42 TABLET | Refills: 0 | OUTPATIENT
Start: 2019-09-23 | End: 2019-10-09

## 2019-09-24 ENCOUNTER — TELEPHONE (OUTPATIENT)
Dept: ORTHOPEDICS | Facility: CLINIC | Age: 64
End: 2019-09-24
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
## 2019-09-24 NOTE — TELEPHONE ENCOUNTER
Left message for patient to return call.  Regarding pt insurance. Pt need to call his health insurance, and ask about home health and therapy locations.

## 2019-09-25 ENCOUNTER — TELEPHONE (OUTPATIENT)
Dept: ORTHOPEDICS | Facility: CLINIC | Age: 64
End: 2019-09-25

## 2019-09-25 NOTE — TELEPHONE ENCOUNTER
Unable to reach pt. Left voice message on [phone]; regarding Home Health Orders. Provider need to know, who will be coming out to pt home. So home health orders can be faxed.

## 2019-10-09 ENCOUNTER — HOSPITAL ENCOUNTER (EMERGENCY)
Facility: HOSPITAL | Age: 64
Discharge: HOME | End: 2019-10-09
Attending: EMERGENCY MEDICINE
Payer: MEDICARE

## 2019-10-09 VITALS
HEART RATE: 92 BPM | BODY MASS INDEX: 27.09 KG/M2 | OXYGEN SATURATION: 98 % | WEIGHT: 200 LBS | DIASTOLIC BLOOD PRESSURE: 100 MMHG | HEIGHT: 72 IN | SYSTOLIC BLOOD PRESSURE: 159 MMHG | RESPIRATION RATE: 16 BRPM | TEMPERATURE: 98 F

## 2019-10-09 DIAGNOSIS — R10.9 ABDOMINAL PAIN: ICD-10-CM

## 2019-10-09 DIAGNOSIS — R10.13 EPIGASTRIC PAIN: ICD-10-CM

## 2019-10-09 DIAGNOSIS — K85.20 ALCOHOL-INDUCED ACUTE PANCREATITIS, UNSPECIFIED COMPLICATION STATUS: Primary | ICD-10-CM

## 2019-10-09 DIAGNOSIS — F10.10 ALCOHOL ABUSE: ICD-10-CM

## 2019-10-09 LAB
ALBUMIN SERPL BCP-MCNC: 4 G/DL (ref 3.5–5.2)
ALP SERPL-CCNC: 133 U/L (ref 55–135)
ALT SERPL W/O P-5'-P-CCNC: 22 U/L (ref 10–44)
ANION GAP SERPL CALC-SCNC: 14 MMOL/L (ref 8–16)
AST SERPL-CCNC: 77 U/L (ref 10–40)
BACTERIA #/AREA URNS AUTO: NORMAL /HPF
BASOPHILS # BLD AUTO: 0.05 K/UL (ref 0–0.2)
BASOPHILS NFR BLD: 0.6 % (ref 0–1.9)
BILIRUB SERPL-MCNC: 1.7 MG/DL (ref 0.1–1)
BILIRUB UR QL STRIP: NEGATIVE
BUN SERPL-MCNC: 10 MG/DL (ref 8–23)
CALCIUM SERPL-MCNC: 10.1 MG/DL (ref 8.7–10.5)
CHLORIDE SERPL-SCNC: 99 MMOL/L (ref 95–110)
CLARITY UR REFRACT.AUTO: CLEAR
CO2 SERPL-SCNC: 24 MMOL/L (ref 23–29)
COLOR UR AUTO: YELLOW
CREAT SERPL-MCNC: 1.4 MG/DL (ref 0.5–1.4)
DIFFERENTIAL METHOD: ABNORMAL
EOSINOPHIL # BLD AUTO: 0 K/UL (ref 0–0.5)
EOSINOPHIL NFR BLD: 0.5 % (ref 0–8)
ERYTHROCYTE [DISTWIDTH] IN BLOOD BY AUTOMATED COUNT: 16.7 % (ref 11.5–14.5)
EST. GFR  (AFRICAN AMERICAN): >60 ML/MIN/1.73 M^2
EST. GFR  (NON AFRICAN AMERICAN): 52.4 ML/MIN/1.73 M^2
GLUCOSE SERPL-MCNC: 109 MG/DL (ref 70–110)
GLUCOSE UR QL STRIP: NEGATIVE
HCT VFR BLD AUTO: 40.4 % (ref 40–54)
HGB BLD-MCNC: 12.3 G/DL (ref 14–18)
HGB UR QL STRIP: ABNORMAL
IMM GRANULOCYTES # BLD AUTO: 0.03 K/UL (ref 0–0.04)
IMM GRANULOCYTES NFR BLD AUTO: 0.4 % (ref 0–0.5)
KETONES UR QL STRIP: NEGATIVE
LACTATE SERPL-SCNC: 2.9 MMOL/L (ref 0.5–2.2)
LEUKOCYTE ESTERASE UR QL STRIP: NEGATIVE
LIPASE SERPL-CCNC: 310 U/L (ref 4–60)
LYMPHOCYTES # BLD AUTO: 0.8 K/UL (ref 1–4.8)
LYMPHOCYTES NFR BLD: 10.2 % (ref 18–48)
MCH RBC QN AUTO: 28.3 PG (ref 27–31)
MCHC RBC AUTO-ENTMCNC: 30.4 G/DL (ref 32–36)
MCV RBC AUTO: 93 FL (ref 82–98)
MICROSCOPIC COMMENT: NORMAL
MONOCYTES # BLD AUTO: 0.6 K/UL (ref 0.3–1)
MONOCYTES NFR BLD: 7.2 % (ref 4–15)
NEUTROPHILS # BLD AUTO: 6.3 K/UL (ref 1.8–7.7)
NEUTROPHILS NFR BLD: 81.1 % (ref 38–73)
NITRITE UR QL STRIP: NEGATIVE
NRBC BLD-RTO: 0 /100 WBC
PH UR STRIP: 6 [PH] (ref 5–8)
PLATELET # BLD AUTO: 43 K/UL (ref 150–350)
PMV BLD AUTO: 12.6 FL (ref 9.2–12.9)
POTASSIUM SERPL-SCNC: 3.5 MMOL/L (ref 3.5–5.1)
PROT SERPL-MCNC: 9.5 G/DL (ref 6–8.4)
PROT UR QL STRIP: NEGATIVE
RBC # BLD AUTO: 4.35 M/UL (ref 4.6–6.2)
RBC #/AREA URNS AUTO: 2 /HPF (ref 0–4)
SODIUM SERPL-SCNC: 137 MMOL/L (ref 136–145)
SP GR UR STRIP: 1.01 (ref 1–1.03)
SQUAMOUS #/AREA URNS AUTO: 0 /HPF
TROPONIN I SERPL DL<=0.01 NG/ML-MCNC: 0.01 NG/ML (ref 0–0.03)
URN SPEC COLLECT METH UR: ABNORMAL
WBC # BLD AUTO: 7.77 K/UL (ref 3.9–12.7)
WBC #/AREA URNS AUTO: 0 /HPF (ref 0–5)

## 2019-10-09 PROCEDURE — 93010 ELECTROCARDIOGRAM REPORT: CPT | Mod: HCNC,,, | Performed by: INTERNAL MEDICINE

## 2019-10-09 PROCEDURE — 83605 ASSAY OF LACTIC ACID: CPT | Mod: HCNC

## 2019-10-09 PROCEDURE — 96374 THER/PROPH/DIAG INJ IV PUSH: CPT | Mod: HCNC,59

## 2019-10-09 PROCEDURE — 99285 PR EMERGENCY DEPT VISIT,LEVEL V: ICD-10-PCS | Mod: ,,, | Performed by: EMERGENCY MEDICINE

## 2019-10-09 PROCEDURE — 85025 COMPLETE CBC W/AUTO DIFF WBC: CPT | Mod: HCNC

## 2019-10-09 PROCEDURE — 96375 TX/PRO/DX INJ NEW DRUG ADDON: CPT | Mod: HCNC

## 2019-10-09 PROCEDURE — 96361 HYDRATE IV INFUSION ADD-ON: CPT | Mod: HCNC

## 2019-10-09 PROCEDURE — 99285 EMERGENCY DEPT VISIT HI MDM: CPT | Mod: ,,, | Performed by: EMERGENCY MEDICINE

## 2019-10-09 PROCEDURE — 80053 COMPREHEN METABOLIC PANEL: CPT | Mod: HCNC

## 2019-10-09 PROCEDURE — 25000003 PHARM REV CODE 250: Mod: HCNC | Performed by: EMERGENCY MEDICINE

## 2019-10-09 PROCEDURE — 25500020 PHARM REV CODE 255: Mod: HCNC | Performed by: EMERGENCY MEDICINE

## 2019-10-09 PROCEDURE — 83690 ASSAY OF LIPASE: CPT | Mod: HCNC

## 2019-10-09 PROCEDURE — 93005 ELECTROCARDIOGRAM TRACING: CPT | Mod: HCNC

## 2019-10-09 PROCEDURE — 63600175 PHARM REV CODE 636 W HCPCS: Mod: HCNC | Performed by: EMERGENCY MEDICINE

## 2019-10-09 PROCEDURE — 84484 ASSAY OF TROPONIN QUANT: CPT | Mod: HCNC

## 2019-10-09 PROCEDURE — 81001 URINALYSIS AUTO W/SCOPE: CPT | Mod: HCNC

## 2019-10-09 PROCEDURE — 93010 EKG 12-LEAD: ICD-10-PCS | Mod: HCNC,,, | Performed by: INTERNAL MEDICINE

## 2019-10-09 PROCEDURE — 99285 EMERGENCY DEPT VISIT HI MDM: CPT | Mod: 25,HCNC

## 2019-10-09 RX ORDER — ONDANSETRON 2 MG/ML
4 INJECTION INTRAMUSCULAR; INTRAVENOUS
Status: COMPLETED | OUTPATIENT
Start: 2019-10-09 | End: 2019-10-09

## 2019-10-09 RX ORDER — HYDROCODONE BITARTRATE AND ACETAMINOPHEN 7.5; 325 MG/1; MG/1
1 TABLET ORAL EVERY 6 HOURS PRN
Qty: 12 TABLET | Refills: 0 | Status: ON HOLD | OUTPATIENT
Start: 2019-10-09 | End: 2022-11-03 | Stop reason: HOSPADM

## 2019-10-09 RX ORDER — ONDANSETRON 4 MG/1
4 TABLET, ORALLY DISINTEGRATING ORAL EVERY 6 HOURS PRN
Qty: 12 TABLET | Refills: 0 | Status: SHIPPED | OUTPATIENT
Start: 2019-10-09 | End: 2023-07-19

## 2019-10-09 RX ORDER — MORPHINE SULFATE 4 MG/ML
4 INJECTION, SOLUTION INTRAMUSCULAR; INTRAVENOUS
Status: COMPLETED | OUTPATIENT
Start: 2019-10-09 | End: 2019-10-09

## 2019-10-09 RX ORDER — FAMOTIDINE 20 MG/1
20 TABLET, FILM COATED ORAL 2 TIMES DAILY PRN
Qty: 20 TABLET | Refills: 0 | Status: SHIPPED | OUTPATIENT
Start: 2019-10-09 | End: 2023-07-19

## 2019-10-09 RX ADMIN — ALUMINUM HYDROXIDE, MAGNESIUM HYDROXIDE, AND SIMETHICONE 50 ML: 200; 200; 20 SUSPENSION ORAL at 09:10

## 2019-10-09 RX ADMIN — SODIUM CHLORIDE 1000 ML: 0.9 INJECTION, SOLUTION INTRAVENOUS at 09:10

## 2019-10-09 RX ADMIN — IOHEXOL 100 ML: 350 INJECTION, SOLUTION INTRAVENOUS at 11:10

## 2019-10-09 RX ADMIN — ONDANSETRON 4 MG: 2 INJECTION INTRAMUSCULAR; INTRAVENOUS at 09:10

## 2019-10-09 RX ADMIN — MORPHINE SULFATE 4 MG: 4 INJECTION, SOLUTION INTRAMUSCULAR; INTRAVENOUS at 09:10

## 2019-10-09 NOTE — ED PROVIDER NOTES
"Encounter Date: 10/9/2019    SCRIBE #1 NOTE: I, Hema Hernández, am scribing for, and in the presence of, Dr. Hua.       History     Chief Complaint   Patient presents with    Abdominal Pain     epigastric pain since last night, got worse at 4am today. Hx of pancretitis. Last drink last night.      9:17am    64 y.o. Man with PMHx of HTN and alcohol abuse presents with a chief complaint of abdominal pain. Pt states since 3:00am he has had the worst abdominal pain he's ever had. Pt states the pain is in the middle of his abdomen, non radiating. He states he's felt this pain before with pancreatitis. Pt endorses drinking and states he drank last night. Pt reports he also might have overeaten rice and beans last night. States he feels "clogged up". Pt had a bowel movement yesterday morning. Pt has not taken any medication for it. Denies vomiting but states he is nauseous. Pt states a year ago he was in the hospital for gastritis. Pain is non radiating. Denies blood in the urine.         Review of patient's allergies indicates:  No Known Allergies  Past Medical History:   Diagnosis Date    Alcohol abuse     BPH (benign prostatic hyperplasia)     Cirrhosis 06/21/2018    pt states that he was diagnosed a week ago during his last hospital visit    Gastritis     Hypertension     Renal disorder      Past Surgical History:   Procedure Laterality Date    CAPSULOTOMY OF JOINT Right 7/29/2019    Procedure: CAPSULOTOMY, JOINT;  Surgeon: Raj Grossman MD;  Location: [address];  Service: Orthopedics;  Laterality: Right;    PERCUTANEOUS PINNING OF HIP Right 7/29/2019    Procedure: PINNING, H[facility] PERCUTANEOUS[facility];  Surgeon: Raj Grossman MD;  Location: [address];  Service: Orthopedics;  Laterality: Right;     Family History   Problem Relation Age of Onset    Hypertension Father     Heart disease Father     Heart disease Brother      Social History "     Tobacco Use    Smoking status: Current Every Day Smoker     Packs/day: 1.00     Types: Cigarettes    Smokeless tobacco: Never Used   Substance Use Topics    Alcohol use: Yes     Comment: (former drinker; 2months sober) this visit admits drinking a couple beers today    Drug use: No     Review of Systems   Constitutional: Negative for fatigue and fever.   HENT: Negative for sore throat.    Respiratory: Negative for shortness of breath.    Cardiovascular: Negative for chest pain.   Gastrointestinal: Positive for abdominal pain and nausea. Negative for vomiting.   Genitourinary: Negative for dysuria.   Musculoskeletal: Negative for back pain.   Skin: Negative for rash.   Neurological: Negative for weakness.   Hematological: Does not bruise/bleed easily.   All other systems reviewed and are negative.      Physical Exam     Initial Vitals [10/09/19 0844]   BP Pulse Resp Temp SpO2   (!) 160/90 82 17 98.4 °F (36.9 °C) 98 %      MAP       --         Vitals:    10/09/19 0844 10/09/19 1157 10/09/19 1233   BP: (!) 160/90 (!) 155/93 (!) 159/100   Pulse: 82 95 92   Resp: 17 16 16   Temp: 98.4 °F (36.9 °C)     TempSrc: Oral     SpO2: 98% 97% 98%   Weight: 90.7 kg (200 lb)     Height: 6' (1.829 m)       Physical Exam    Nursing note and vitals reviewed.  Constitutional: He appears well-developed and well-nourished.   HENT:   Head: Normocephalic and atraumatic.   Mouth/Throat: Oropharynx is clear and moist.   Eyes: EOM are normal. Pupils are equal, round, and reactive to light.   Neck: Normal range of motion. Neck supple.   Cardiovascular: Normal rate, regular rhythm, normal heart sounds and intact distal pulses.   Pulmonary/Chest: Breath sounds normal. No respiratory distress. He has no wheezes. He has no rhonchi.   Abdominal: Soft. Bowel sounds are normal. There is tenderness in the epigastric area. There is no rigidity, no rebound, no guarding and negative Herman's sign.   Mild epigastric tenderness to palpation.    Musculoskeletal: Normal range of motion. He exhibits no edema.   Neurological: He is alert and oriented to person, place, and time. He has normal strength. No cranial nerve deficit or sensory deficit.   Skin: Skin is warm and dry. Capillary refill takes less than 2 seconds. No rash noted.   Psychiatric: He has a normal mood and affect. His behavior is normal. Judgment and thought content normal.         ED Course   Procedures  Labs Reviewed   CBC W/ AUTO DIFFERENTIAL - Abnormal; Notable for the following components:       Result Value    RBC 4.35 (*)     Hemoglobin 12.3 (*)     Mean Corpuscular Hemoglobin Conc 30.4 (*)     RDW 16.7 (*)     Platelets 43 (*)     Lymph # 0.8 (*)     Gran% 81.1 (*)     Lymph% 10.2 (*)     All other components within normal limits   COMPREHENSIVE METABOLIC PANEL - Abnormal; Notable for the following components:    Total Protein 9.5 (*)     Total Bilirubin 1.7 (*)     AST 77 (*)     eGFR if non  52.4 (*)     All other components within normal limits   LIPASE - Abnormal; Notable for the following components:    Lipase 310 (*)     All other components within normal limits   URINALYSIS, REFLEX TO URINE CULTURE - Abnormal; Notable for the following components:    Occult Blood UA 1+ (*)     All other components within normal limits    Narrative:     Preferred Collection Type->Urine, Clean Catch   LACTIC ACID, PLASMA - Abnormal; Notable for the following components:    Lactate (Lactic Acid) 2.9 (*)     All other components within normal limits   TROPONIN I   URINALYSIS MICROSCOPIC    Narrative:     Preferred Collection Type->Urine, Clean Catch     EKG Readings: (Independently Interpreted)   Rhythm: Normal Sinus Rhythm.   NSR at 99 with occasional PVC's. Incomplete RBBB. Non specific ST abnormalities. No STEMI.     ECG Results          EKG 12-lead (Final result)  Result time 10/09/19 13:20:47    Final result by Interface, Lab In [facility] (10/09/19 13:20:47)                  Narrative:    Test Reason : R10.9,    Vent. Rate : 099 BPM     Atrial Rate : 099 BPM     P-R Int : 184 ms          QRS Dur : 096 ms      QT Int : 354 ms       P-R-T Axes : 067 -66 089 degrees     QTc Int : 454 ms    Sinus rhythm with occasional Premature ventricular complexes  Incomplete right bundle branch block  Left anterior fascicular block  Nonspecific ST and T wave abnormality  Abnormal ECG  When compared with ECG of 28-JUL-2019 18:00,  Premature ventricular complexes are now Present  T wave amplitude has decreased in Anterior leads  Confirmed by RANGEL FRASER MD ([id number]) on 10/9/2019 1:20:38 PM    Referred By: [email or web address]   SELF           Confirmed By:RANGEL FRASER MD                            Imaging Results          CT Abdomen Pelvis With Contrast (Final result)  Result time 10/09/19 11:56:28    Final result by Connor Almaguer MD (10/09/19 11:56:28)                 Impression:      No focal fluid collections in the abdomen or pelvis to suggest abscess.    Cholelithiasis.  No CT evidence of acute cholecystitis.    Findings suggest hepatic steatosis.  There is also mild splenomegaly.    Stable trace free fluid adjacent to the right hepatic lobe with small amount of nonspecific dependent fluid layering within the pelvis.    T12 compression fracture with more height loss when compared to prior study of 2018.  Postoperative changes of right hip.      Electronically signed by: Connor Almaguer MD  Date:    10/09/2019  Time:    11:56             Narrative:    EXAMINATION:  CT ABDOMEN PELVIS WITH CONTRAST    CLINICAL HISTORY:  Abd pain, fever, abscess suspected;    TECHNIQUE:  Low dose axial images, sagittal and coronal reformations were obtained from the lung bases to the pubic symphysis following the IV administration of 100 mL of Omnipaque 350 .  Oral contrast was not given.    COMPARISON:  07/31/2018    FINDINGS:  There is extensive coronary atherosclerosis.  Heart is not enlarged.  Minimal bandlike atelectasis at  the left lung base adjacent to the aorta.  Otherwise, lung bases demonstrate no significant abnormalities.    Diffuse low-attenuation of the liver could represent hepatic steatosis.  Subcentimeter enhancing focus along the periphery of the right hepatic lobe segment 5 could represent a small area of arterial portal shunting or a flash fill hemangioma.  Otherwise, liver demonstrates no focal lesions.  There is trace free fluid adjacent to the right hepatic lobe.  There is cholelithiasis.  No gallbladder wall thickening.  Portal vein is patent.  No intrahepatic biliary ductal dilatation.  Common bile duct is normal in caliber.    Stomach, pancreas and adrenal glands demonstrate no significant abnormality.  Spleen is mildly enlarged at 15 cm.    ParaDuodenal cystic mass noted on prior exam no longer visualized.    Kidneys show multiple hypodensities likely representing cysts.  No hydronephrosis.  Urinary bladder is incompletely distended.  Prostate gland is unremarkable.    Small and large bowel show no evidence of obstruction.  No free air.  Appendix not definitely visualized.  No inflammatory changes in the region of the appendix.    Extensive atherosclerosis of the aorta and branch vessels.  Aorta is normal in caliber.  Bones demonstrate degenerative changes and postoperative changes of the right hip.  There is a T12 compression fracture which demonstrates more height loss when compared to prior exam of 07/31/2018.  Remote left rib fracture.                               X-Ray Abdomen Flat And Erect (Final result)  Result time 10/09/19 10:16:07    Final result by Felice Narvaez Jr., MD (10/09/19 10:16:07)                 Impression:      Nonobstructive bowel gas pattern.      Electronically signed by: Felice Narvaez MD  Date:    10/09/2019  Time:    10:16             Narrative:    EXAMINATION:  XR ABDOMEN FLAT AND ERECT    CLINICAL HISTORY:  Abdominal Pain;    TECHNIQUE:  Flat and erect AP views of the abdomen were  performed.    COMPARISON:  None    FINDINGS:  Scattered stool and bowel gas in both small and large bowel.  Healing left rib fractures.  Postop change right femur.  Vascular calcifications.                                 Medical Decision Making:   Clinical Tests:   Lab Tests: Ordered and Reviewed       <> Summary of Lab: Elevated Lipase c/w pancreatitis  Radiological Study: Ordered and Reviewed  ED Management:  12:19pm    Pt has pancreatitis, states he feels much better after tx in ED. He is ready to leave and does not want any further eval/tx (including admission to hospital) today despite elevated lipase and sx/s of pancreatitis. He would like to go try some home medication and get some rest. He states he will stop drinking.  He will return to the ED for any worsening sx/s.    I discussed with patient and/or family/caretaker that evaluation in the ED does not suggest any emergent or life threatening medical conditions requiring immediate intervention beyond what was provided in the ED, and I believe patient is safe for discharge.  Regardless, an unremarkable evaluation in the ED does not preclude the development or presence of a serious of life threatening condition. As such, patient was instructed to return immediately for any worsening or change in current symptoms.            Scribe Attestation:   Scribe #1: I performed the above scribed service and the documentation accurately describes the services I performed. I attest to the accuracy of the note.               Clinical Impression:       ICD-10-CM ICD-9-CM   1. Alcohol-induced acute pancreatitis, unspecified complication status K85.20 577.0   2. Abdominal pain R10.9 789.00   3. Epigastric pain R10.13 789.06   4. Alcohol abuse F10.10 305.00         Disposition:   Disposition: Discharged  Condition: Stable                        Santy Hua MD  10/11/19 [id number]

## 2019-10-09 NOTE — ED NOTES
Patient identifiers verified and correct for Fransico Montalvo  LOC: The patient is awake, alert and aware of environment with an appropriate affect, the patient is oriented x 3 and speaking appropriately.   APPEARANCE: Patient appears comfortable and in no acute distress, patient is clean and well groomed.  SKIN: The skin is warm and dry, color consistent with ethnicity, patient has normal skin turgor and moist mucus membranes, skin intact, no breakdown or bruising noted.   MUSCULOSKELETAL: Patient moving all extremities spontaneously, no swelling noted.  RESPIRATORY: Airway is open and patent, respirations are spontaneous, patient has a normal effort and rate, O2 sats noted at 98% on room air.  CARDIAC: Denies chest pain capillary refill < 3 seconds.   GASTRO: middle abdominal pain nausea and vomiting  : Pt denies any pain or frequency with urination.  NEURO: Pt opens eyes spontaneously, behavior appropriate to situation, follows commands, facial expression symmetrical, bilateral hand grasp equal and even, purposeful motor response noted, normal sensation in all extremities when touched with a finger.

## 2019-10-09 NOTE — ED TRIAGE NOTES
Patient reports to the ED today with reports of abdominal pain onset this morning. Patient endorses nausea and vomiting.

## 2019-11-04 ENCOUNTER — HOSPITAL ENCOUNTER (OUTPATIENT)
Dept: RADIOLOGY | Facility: HOSPITAL | Age: 64
Discharge: HOME | End: 2019-11-04
Attending: PHYSICIAN ASSISTANT
Payer: MEDICARE

## 2019-11-04 ENCOUNTER — OFFICE VISIT (OUTPATIENT)
Dept: ORTHOPEDICS | Facility: CLINIC | Age: 64
End: 2019-11-04
Payer: MEDICARE

## 2019-11-04 VITALS
HEART RATE: 96 BPM | RESPIRATION RATE: 18 BRPM | DIASTOLIC BLOOD PRESSURE: 96 MMHG | SYSTOLIC BLOOD PRESSURE: 158 MMHG | TEMPERATURE: 97 F

## 2019-11-04 DIAGNOSIS — S72.001D CLOSED FRACTURE OF NECK OF RIGHT FEMUR WITH ROUTINE HEALING, SUBSEQUENT ENCOUNTER: ICD-10-CM

## 2019-11-04 DIAGNOSIS — S72.001D CLOSED FRACTURE OF NECK OF RIGHT FEMUR WITH ROUTINE HEALING, SUBSEQUENT ENCOUNTER: Primary | ICD-10-CM

## 2019-11-04 PROCEDURE — 99213 OFFICE O/P EST LOW 20 MIN: CPT | Mod: S$GLB,,, | Performed by: PHYSICIAN ASSISTANT

## 2019-11-04 PROCEDURE — 73502 XR HIP 2 VIEW RIGHT: ICD-10-PCS | Mod: 26,HCNC,RT, | Performed by: RADIOLOGY

## 2019-11-04 PROCEDURE — 99999 PR PBB SHADOW E&M-EST. PATIENT-LVL V: CPT | Mod: PBBFAC,,, | Performed by: PHYSICIAN ASSISTANT

## 2019-11-04 PROCEDURE — 99213 PR OFFICE/OUTPT VISIT, EST, LEVL III, 20-29 MIN: ICD-10-PCS | Mod: S$GLB,,, | Performed by: PHYSICIAN ASSISTANT

## 2019-11-04 PROCEDURE — 73502 X-RAY EXAM HIP UNI 2-3 VIEWS: CPT | Mod: 26,HCNC,RT, | Performed by: RADIOLOGY

## 2019-11-04 PROCEDURE — 99999 PR PBB SHADOW E&M-EST. PATIENT-LVL V: ICD-10-PCS | Mod: PBBFAC,,, | Performed by: PHYSICIAN ASSISTANT

## 2019-11-04 PROCEDURE — 73502 X-RAY EXAM HIP UNI 2-3 VIEWS: CPT | Mod: TC,HCNC,RT

## 2019-11-04 PROCEDURE — 99215 OFFICE O/P EST HI 40 MIN: CPT | Mod: PBBFAC,25 | Performed by: PHYSICIAN ASSISTANT

## 2020-01-27 ENCOUNTER — HOSPITAL ENCOUNTER (OUTPATIENT)
Dept: RADIOLOGY | Facility: HOSPITAL | Age: 65
Discharge: HOME | End: 2020-01-27
Attending: PHYSICIAN ASSISTANT
Payer: MEDICARE

## 2020-01-27 ENCOUNTER — OFFICE VISIT (OUTPATIENT)
Dept: ORTHOPEDICS | Facility: CLINIC | Age: 65
End: 2020-01-27
Payer: MEDICARE

## 2020-01-27 VITALS — BODY MASS INDEX: 25.56 KG/M2 | HEIGHT: 72 IN | WEIGHT: 188.69 LBS

## 2020-01-27 DIAGNOSIS — M25.551 RIGHT HIP PAIN: Primary | ICD-10-CM

## 2020-01-27 DIAGNOSIS — M25.551 RIGHT HIP PAIN: ICD-10-CM

## 2020-01-27 DIAGNOSIS — S72.001D CLOSED FRACTURE OF NECK OF RIGHT FEMUR WITH ROUTINE HEALING, SUBSEQUENT ENCOUNTER: ICD-10-CM

## 2020-01-27 PROCEDURE — 73502 X-RAY EXAM HIP UNI 2-3 VIEWS: CPT | Mod: TC,HCNC,RT

## 2020-01-27 PROCEDURE — 3008F PR BODY MASS INDEX (BMI) DOCUMENTED: ICD-10-PCS | Mod: HCNC,CPTII,S$GLB, | Performed by: PHYSICIAN ASSISTANT

## 2020-01-27 PROCEDURE — 3008F BODY MASS INDEX DOCD: CPT | Mod: HCNC,CPTII,S$GLB, | Performed by: PHYSICIAN ASSISTANT

## 2020-01-27 PROCEDURE — 99999 PR PBB SHADOW E&M-EST. PATIENT-LVL V: CPT | Mod: PBBFAC,HCNC,, | Performed by: PHYSICIAN ASSISTANT

## 2020-01-27 PROCEDURE — 73502 XR HIP 2 VIEW RIGHT: ICD-10-PCS | Mod: 26,HCNC,RT, | Performed by: RADIOLOGY

## 2020-01-27 PROCEDURE — 73502 X-RAY EXAM HIP UNI 2-3 VIEWS: CPT | Mod: 26,HCNC,RT, | Performed by: RADIOLOGY

## 2020-01-27 PROCEDURE — 99999 PR PBB SHADOW E&M-EST. PATIENT-LVL V: ICD-10-PCS | Mod: PBBFAC,HCNC,, | Performed by: PHYSICIAN ASSISTANT

## 2020-01-27 PROCEDURE — 99213 OFFICE O/P EST LOW 20 MIN: CPT | Mod: HCNC,S$GLB,, | Performed by: PHYSICIAN ASSISTANT

## 2020-01-27 PROCEDURE — 99213 PR OFFICE/OUTPT VISIT, EST, LEVL III, 20-29 MIN: ICD-10-PCS | Mod: HCNC,S$GLB,, | Performed by: PHYSICIAN ASSISTANT

## 2020-01-27 RX ORDER — MELOXICAM 15 MG/1
15 TABLET ORAL DAILY
Qty: 30 TABLET | Refills: 0 | Status: SHIPPED | OUTPATIENT
Start: 2020-01-27 | End: 2020-01-27

## 2020-01-27 RX ORDER — MELOXICAM 15 MG/1
15 TABLET ORAL DAILY
Qty: 30 TABLET | Refills: 0 | Status: SHIPPED | OUTPATIENT
Start: 2020-01-27 | End: 2020-02-26

## 2020-01-31 NOTE — PROGRESS NOTES
Principal Orthopedic Problem: Minimally displaced right femoral neck fracture     Relevant Medical History: HTN, CKD, alcoholic cirrhosis, left proximal humerus fx treated nonop in Jan 2018    Not on anticoagulation. Community ambulator at baseline.    HPI: Mr. Montalvo is 63 year old male who presented to the ED with right hip pain s/p fall. RADS: right femoral neck fracture.  Patient was treated with percutaneous screw fixation on 07/29/2019    Mr. Montalvo is here today for a post-operative visit after a     1.  Closed reduction and percutaneous screw fixation right femoral neck fracture    2.  Capsulotomy right hip joint  by Dr. Grossman on 07/29/2019.    Implants:Synthes 7.3 mm cannulated screws x3    Interval History:  he reports that he is doing ok.   he is at home. he is participating in PT/OT.    He has been weight bearing as tolerated. He reports that he has a achy throbbing pain mainly in his groin area. He also reports some leg weakness.   Pain is controlled.  he is  taking pain medication.    he denies fever, chills, and sweats since the time of the surgery.     Physical exam:  Walked into clinic unassisted  full range of motion, some pain with end points of motion. Increased pain, pulling in his groin area with full extension.     RADS: reviewed by myself   Postoperative changes of internal fixation of the right hip, pinning of the right femoral neck fracture identified as before. Stable compared to previous exams, though has had some collapse.     Assessment:  Post-op visit ( 26 weeks)    Plan:  Current care, treatment plan, precautions, activity level/ modifications, limitations, rehabilitation exercises and proposed future treatment were discussed with the patient. We discussed the need to monitor for changes in symptoms and condition and report them to the physician.  Discussed importance of compliance with all appointments and follow up examinations.   -PT/OT, [facility] , Patient is responsible to  establish and continue care   -range of motion as tolerated    - weight bearing as tolerated,   - he understands importance of PT and stretching to regain function  - He understands that his previous non-compliance can lead to further surgical intervention. He stated that he is not interested in any surgery at this time. Explained that this may me required and concern for AVN.   - pain medication:  Not refilled.    Pain medication refill policy provided to patient for review.   - Patient is to return to clinic in 6 weeks with   At time x-ray of his hip is needed     If there are any questions prior to scheduled follow up, the patient was instructed to contact the office

## 2020-03-23 ENCOUNTER — TELEPHONE (OUTPATIENT)
Dept: ORTHOPEDICS | Facility: CLINIC | Age: 65
End: 2020-03-23

## 2020-03-23 NOTE — TELEPHONE ENCOUNTER
"Unable to reach pt regarding rescheduling his appointment with Dr Grossman for 3/24/2020 due to the current COVID-19 situation.   Recording states " call cannot be completed at this time."  "

## 2020-05-14 ENCOUNTER — TELEPHONE (OUTPATIENT)
Dept: ORTHOPEDICS | Facility: CLINIC | Age: 65
End: 2020-05-14

## 2020-05-14 DIAGNOSIS — S72.001D CLOSED FRACTURE OF NECK OF RIGHT FEMUR WITH ROUTINE HEALING, SUBSEQUENT ENCOUNTER: Primary | ICD-10-CM

## 2020-05-14 NOTE — TELEPHONE ENCOUNTER
Called and Informed patient of appointment on 5/19/20 at 8:15 am and mailed.  Patient states verbal understanding and has no further questions.

## 2020-05-14 NOTE — TELEPHONE ENCOUNTER
----- Message from Siddharth Sifuentes sent at 5/14/2020  2:02 PM CDT -----  Contact: Patient  Patient called in and wanted to speak with the office regarding scheduling an appointment. He would like a call back from the office and can be reached at    [phone]

## 2020-06-01 ENCOUNTER — TELEPHONE (OUTPATIENT)
Dept: ORTHOPEDICS | Facility: CLINIC | Age: 65
End: 2020-06-01

## 2020-06-01 DIAGNOSIS — M25.511 ACUTE PAIN OF RIGHT SHOULDER: Primary | ICD-10-CM

## 2020-06-01 NOTE — TELEPHONE ENCOUNTER
----- Message from Anjelica Shaw sent at 5/29/2020  5:54 PM CDT -----  Contact: Pt   Pt is requesting a call back to get an order submitted to have an x-ray completed on right shoulder   Pt stated he fell out of his wheelchair     Pt can be reached at [phone]

## 2020-06-16 ENCOUNTER — HOSPITAL ENCOUNTER (OUTPATIENT)
Dept: RADIOLOGY | Facility: HOSPITAL | Age: 65
Discharge: HOME | End: 2020-06-16
Attending: PHYSICIAN ASSISTANT
Payer: MEDICARE

## 2020-06-16 ENCOUNTER — OFFICE VISIT (OUTPATIENT)
Dept: ORTHOPEDICS | Facility: CLINIC | Age: 65
End: 2020-06-16
Payer: MEDICARE

## 2020-06-16 VITALS
WEIGHT: 193.31 LBS | DIASTOLIC BLOOD PRESSURE: 87 MMHG | BODY MASS INDEX: 26.18 KG/M2 | SYSTOLIC BLOOD PRESSURE: 138 MMHG | HEART RATE: 95 BPM | HEIGHT: 72 IN

## 2020-06-16 DIAGNOSIS — M25.511 ACUTE PAIN OF RIGHT SHOULDER: Primary | ICD-10-CM

## 2020-06-16 DIAGNOSIS — M25.551 PAIN OF RIGHT HIP JOINT: ICD-10-CM

## 2020-06-16 DIAGNOSIS — M25.511 ACUTE PAIN OF RIGHT SHOULDER: ICD-10-CM

## 2020-06-16 PROCEDURE — 99999 PR PBB SHADOW E&M-EST. PATIENT-LVL V: ICD-10-PCS | Mod: PBBFAC,HCNC,, | Performed by: PHYSICIAN ASSISTANT

## 2020-06-16 PROCEDURE — 1101F PT FALLS ASSESS-DOCD LE1/YR: CPT | Mod: HCNC,CPTII,S$GLB, | Performed by: PHYSICIAN ASSISTANT

## 2020-06-16 PROCEDURE — 99213 PR OFFICE/OUTPT VISIT, EST, LEVL III, 20-29 MIN: ICD-10-PCS | Mod: HCNC,S$GLB,, | Performed by: PHYSICIAN ASSISTANT

## 2020-06-16 PROCEDURE — 73030 X-RAY EXAM OF SHOULDER: CPT | Mod: 26,HCNC,RT, | Performed by: RADIOLOGY

## 2020-06-16 PROCEDURE — 99499 RISK ADDL DX/OHS AUDIT: ICD-10-PCS | Mod: S$GLB,,, | Performed by: PHYSICIAN ASSISTANT

## 2020-06-16 PROCEDURE — 3008F BODY MASS INDEX DOCD: CPT | Mod: HCNC,CPTII,S$GLB, | Performed by: PHYSICIAN ASSISTANT

## 2020-06-16 PROCEDURE — 1101F PR PT FALLS ASSESS DOC 0-1 FALLS W/OUT INJ PAST YR: ICD-10-PCS | Mod: HCNC,CPTII,S$GLB, | Performed by: PHYSICIAN ASSISTANT

## 2020-06-16 PROCEDURE — 73030 XR SHOULDER TRAUMA 3 VIEW RIGHT: ICD-10-PCS | Mod: 26,HCNC,RT, | Performed by: RADIOLOGY

## 2020-06-16 PROCEDURE — 73502 X-RAY EXAM HIP UNI 2-3 VIEWS: CPT | Mod: 26,HCNC,RT, | Performed by: RADIOLOGY

## 2020-06-16 PROCEDURE — 99213 OFFICE O/P EST LOW 20 MIN: CPT | Mod: HCNC,S$GLB,, | Performed by: PHYSICIAN ASSISTANT

## 2020-06-16 PROCEDURE — 3008F PR BODY MASS INDEX (BMI) DOCUMENTED: ICD-10-PCS | Mod: HCNC,CPTII,S$GLB, | Performed by: PHYSICIAN ASSISTANT

## 2020-06-16 PROCEDURE — 73502 X-RAY EXAM HIP UNI 2-3 VIEWS: CPT | Mod: TC,HCNC,RT

## 2020-06-16 PROCEDURE — 99999 PR PBB SHADOW E&M-EST. PATIENT-LVL V: CPT | Mod: PBBFAC,HCNC,, | Performed by: PHYSICIAN ASSISTANT

## 2020-06-16 PROCEDURE — 99499 UNLISTED E&M SERVICE: CPT | Mod: S$GLB,,, | Performed by: PHYSICIAN ASSISTANT

## 2020-06-16 PROCEDURE — 73030 X-RAY EXAM OF SHOULDER: CPT | Mod: TC,HCNC,RT

## 2020-06-16 PROCEDURE — 73502 XR HIP 2 VIEW RIGHT: ICD-10-PCS | Mod: 26,HCNC,RT, | Performed by: RADIOLOGY

## 2020-06-16 NOTE — PROGRESS NOTES
SUBJECTIVE:     Chief Complaint & History of Present Illness:  Fransico Montalvo is a Established patient 64 y.o. male who is seen here today with a complaint of  right hip and shoulder pain .  He has patient well-known to us is status post right femoral neck fracture as well as right clavicle fracture over 2 years ago.  Was last seen treated the clinic for these conditions 01/27/2020.  Returns today with concerns he may have re-injured his right hip and or shoulder following a few falls at his home he is having some decreased range of motion of the shoulder and is complaining of intermittent paresthesias in the bilateral feet and occasionally hands  On a scale of 1-10, with 10 being worst pain imaginable, he rates this pain as 2 on good days and 5 on bad days.  he describes the pain as tender and sore.      Past Medical History:   Diagnosis Date    Alcohol abuse     BPH (benign prostatic hyperplasia)     Cirrhosis 06/21/2018    pt states that he was diagnosed a week ago during his last hospital visit    Gastritis     Hypertension     Renal disorder        Past Surgical History:   Procedure Laterality Date    CAPSULOTOMY OF JOINT Right 7/29/2019    Procedure: CAPSULOTOMY, JOINT;  Surgeon: Raj Grossman MD;  Location: [facility] OR [address] [place] [address];  Service: Orthopedics;  Laterality: Right;    PERCUTANEOUS PINNING OF HIP Right 7/29/2019    Procedure: PINNING, HIP, PERCUTANEOUS- synthes cannulated screws- hana table- C arm door side-;  Surgeon: Raj Grossman MD;  Location: [facility] OR [address] [place] [address];  Service: Orthopedics;  Laterality: Right;       Family History   Problem Relation Age of Onset    Hypertension Father     Heart disease Father     Heart disease Brother        Review of patient's allergies indicates:  No Known Allergies      Current Outpatient Medications:     albuterol sulfate 2.5 mg/0.5 mL Nebu, Take 2.5 mg by nebulization every 6 (six) hours while awake. Rescue, Disp: 225 mg, Rfl: 11    bisacodyl (DULCOLAX)  10 mg Supp, Place 1 suppository (10 mg total) rectally daily as needed (Until bowel movement if patient has no bowel movement for 2 days)., Disp: , Rfl: 0    famotidine (PEPCID) 20 MG tablet, Take 1 tablet (20 mg total) by mouth 2 (two) times daily as needed for Heartburn., Disp: 20 tablet, Rfl: 0    folic acid (FOLVITE) 1 MG tablet, Take 1 tablet (1 mg total) by mouth once daily., Disp: , Rfl: 0    hydrALAZINE (APRESOLINE) 25 MG tablet, Take 1 tablet (25 mg total) by mouth every 8 (eight) hours as needed (SBP > 160)., Disp: , Rfl:     HYDROcodone-acetaminophen (NORCO) 7.5-325 mg per tablet, Take 1 tablet by mouth every 6 (six) hours as needed for Pain., Disp: 12 tablet, Rfl: 0    hydrOXYzine HCl (ATARAX) 25 MG tablet, Take 1 tablet (25 mg total) by mouth 2 (two) times daily as needed for Anxiety., Disp: , Rfl:     hydrOXYzine pamoate (VISTARIL) 25 MG Cap, Take 1 capsule (25 mg total) by mouth every evening., Disp: 30 capsule, Rfl: 0    lidocaine (LIDODERM) 5 %, Place 2 patches onto the skin once daily. Remove & Discard patch within 12 hours or as directed by MD; apply to right knee, Disp: , Rfl: 0    mirtazapine (REMERON) 15 MG tablet, every evening. , Disp: , Rfl:     multivitamin (THERAGRAN) tablet, Take 1 tablet by mouth once daily., Disp: , Rfl:     omeprazole (PRILOSEC) 20 MG capsule, Take 2 capsules (40 mg total) by mouth once daily. (Patient taking differently: Take 40 mg by mouth as needed. ), Disp: 30 capsule, Rfl: 1    ondansetron (ZOFRAN-ODT) 4 MG TbDL, Take 1 tablet (4 mg total) by mouth every 6 (six) hours as needed (nausea)., Disp: 12 tablet, Rfl: 0    oxyCODONE-acetaminophen (PERCOCET) 5-325 mg per tablet, Take 1 tablet by mouth every 4 to 6 hours as needed for Pain., Disp: 42 tablet, Rfl: 0    polyethylene glycol (GLYCOLAX) 17 gram PwPk, Take 17 g by mouth 2 (two) times daily as needed (constipation)., Disp: , Rfl: 0    pregabalin (LYRICA) 75 MG capsule, Take 1 capsule by mouth every  evening, Disp: 30 capsule, Rfl: 0    propranolol (INDERAL) 40 MG tablet, Take 0.5 tablets (20 mg total) by mouth 2 (two) times daily., Disp: 60 tablet, Rfl: 11    senna-docusate 8.6-50 mg (PERICOLACE) 8.6-50 mg per tablet, Take 1 tablet by mouth 2 (two) times daily., Disp: , Rfl:     sucralfate (CARAFATE) 100 mg/mL suspension, Take 10 mLs (1 g total) by mouth 3 (three) times daily as needed (indigestion; heartburn)., Disp: , Rfl:     tamsulosin (FLOMAX) 0.4 mg Cap, Take 0.4 mg by mouth once daily., Disp: , Rfl:     thiamine 100 MG tablet, Take 1 tablet (100 mg total) by mouth once daily., Disp: , Rfl:     traMADol (ULTRAM) 50 mg tablet, Take 1 tablet (50 mg total) by mouth every 6 (six) hours as needed for Pain., Disp: 30 tablet, Rfl: 0    traMADol (ULTRAM) 50 mg tablet, Take 1 tablet by mouth every 6 hours as needed, Disp: 30 tablet, Rfl: 0    vitamin D (VITAMIN D3) 2,000 unit Tab, Take 1 tablet (2,000 Units total) by mouth once daily., Disp: , Rfl:     Review of Systems:  ROS:  Constitutional: no fever or chills  Eyes: no visual changes  ENT: no nasal congestion or sore throat  Respiratory: no cough or shortness of breath  Cardiovascular: no chest pain or palpitations  Gastrointestinal: no nausea or vomiting, tolerating diet, Positive for alcoholic cirrhosis, gastritis, epigastric mass, cholelithiasis  Genitourinary: no hematuria or dysuria, CKD stage 3, hypo magnesium  Integument/Breast: no rash or pruritis  Hematologic/Lymphatic: no easy bruising or lymphadenopathy, Positive thrombocytopenia, coagulopathy  Musculoskeletal: no arthralgias or myalgias, History of femoral neck fracture, clavicle fracture chronic right knee pain  Neurological: no seizures or tremors  Behavioral/Psych: no auditory or visual hallucinations, Positive for anxiety and depression, alcohol abuse  Endocrine: no heat or cold intolerance      PE:  There were no vitals taken for this visit.  General: Pleasant, cooperative, NAD   HEENT:  NCAT, sclera nonicteric   Lungs: Respirations are equal and unlabored.   Abdomen: Soft and non-tender.  CV: 2+ bilateral upper and lower extremity pulses.   Skin: Intact throughout LE with no rashes, erythema, or lesions  Extremities: No LE edema, NVI lower extremities        Hip Exam:   rightpositives: decreased abduction and decreased flexion and negatives: no pain with heel impact  pulses full    90 degrees flexion   0 degrees extension   15 degrees internal rotation  10 degrees external rotation  25 degrees abduction  0 degrees adduction   0 flexion contracture      RADIOGRAPHS:  X-rays the hip taken today films reviewed by me demonstrate well-fixed well-aligned screws within the hip unchanged from previous x-rays joint spaces are maintained  X-rays the shoulder taken today films reviewed by me demonstrate good callus formation at the fracture site at the distal clavicle on change in angulation or rotation    ASSESSMENT/PLAN:       ICD-10-CM ICD-9-CM   1. Acute pain of right shoulder  M25.511 719.41   2. Pain of right hip joint  M25.551 719.45       Plan:  Patient is very relieved after review of x-rays he has no new mechanical issues in or about the shoulder or the hip.  His concerns regarding the neuropathy is any extremities he was deferred to his primary care for further evaluation is also showing some early signs of possible venous stasis changes in lower extremities as well as dry skin

## 2021-03-09 ENCOUNTER — PES CALL (OUTPATIENT)
Dept: ADMINISTRATIVE | Facility: CLINIC | Age: 66
End: 2021-03-09

## 2021-04-05 ENCOUNTER — PES CALL (OUTPATIENT)
Dept: ADMINISTRATIVE | Facility: CLINIC | Age: 66
End: 2021-04-05

## 2021-10-28 ENCOUNTER — HOSPITAL ENCOUNTER (EMERGENCY)
Facility: HOSPITAL | Age: 66
Discharge: HOME | End: 2021-10-28
Attending: EMERGENCY MEDICINE
Payer: MEDICARE

## 2021-10-28 VITALS
WEIGHT: 185 LBS | RESPIRATION RATE: 18 BRPM | HEIGHT: 72 IN | OXYGEN SATURATION: 99 % | HEART RATE: 89 BPM | BODY MASS INDEX: 25.06 KG/M2 | DIASTOLIC BLOOD PRESSURE: 56 MMHG | SYSTOLIC BLOOD PRESSURE: 108 MMHG | TEMPERATURE: 98 F

## 2021-10-28 DIAGNOSIS — N40.0 BENIGN PROSTATIC HYPERPLASIA, UNSPECIFIED WHETHER LOWER URINARY TRACT SYMPTOMS PRESENT: Primary | ICD-10-CM

## 2021-10-28 DIAGNOSIS — R39.198 DIFFICULTY URINATING: ICD-10-CM

## 2021-10-28 LAB
ALBUMIN SERPL BCP-MCNC: 3.5 G/DL (ref 3.5–5.2)
ALP SERPL-CCNC: 92 U/L (ref 55–135)
ALT SERPL W/O P-5'-P-CCNC: 9 U/L (ref 10–44)
ANION GAP SERPL CALC-SCNC: 14 MMOL/L (ref 8–16)
AST SERPL-CCNC: 30 U/L (ref 10–40)
BASOPHILS # BLD AUTO: 0.06 K/UL (ref 0–0.2)
BASOPHILS NFR BLD: 1.2 % (ref 0–1.9)
BILIRUB SERPL-MCNC: 1.4 MG/DL (ref 0.1–1)
BILIRUB UR QL STRIP: NEGATIVE
BUN SERPL-MCNC: 18 MG/DL (ref 8–23)
CALCIUM SERPL-MCNC: 10.2 MG/DL (ref 8.7–10.5)
CHLORIDE SERPL-SCNC: 104 MMOL/L (ref 95–110)
CLARITY UR REFRACT.AUTO: CLEAR
CO2 SERPL-SCNC: 21 MMOL/L (ref 23–29)
COLOR UR AUTO: YELLOW
CREAT SERPL-MCNC: 1.4 MG/DL (ref 0.5–1.4)
DIFFERENTIAL METHOD: ABNORMAL
EOSINOPHIL # BLD AUTO: 0.2 K/UL (ref 0–0.5)
EOSINOPHIL NFR BLD: 3.7 % (ref 0–8)
ERYTHROCYTE [DISTWIDTH] IN BLOOD BY AUTOMATED COUNT: 14.2 % (ref 11.5–14.5)
EST. GFR  (AFRICAN AMERICAN): 59.7 ML/MIN/1.73 M^2
EST. GFR  (NON AFRICAN AMERICAN): 51.6 ML/MIN/1.73 M^2
GLUCOSE SERPL-MCNC: 82 MG/DL (ref 70–110)
GLUCOSE UR QL STRIP: NEGATIVE
HCT VFR BLD AUTO: 40.4 % (ref 40–54)
HGB BLD-MCNC: 13.4 G/DL (ref 14–18)
HGB UR QL STRIP: NEGATIVE
IMM GRANULOCYTES # BLD AUTO: 0 K/UL (ref 0–0.04)
IMM GRANULOCYTES NFR BLD AUTO: 0 % (ref 0–0.5)
KETONES UR QL STRIP: NEGATIVE
LACTATE SERPL-SCNC: 1.9 MMOL/L (ref 0.5–2.2)
LEUKOCYTE ESTERASE UR QL STRIP: NEGATIVE
LYMPHOCYTES # BLD AUTO: 1.4 K/UL (ref 1–4.8)
LYMPHOCYTES NFR BLD: 27.1 % (ref 18–48)
MCH RBC QN AUTO: 33.6 PG (ref 27–31)
MCHC RBC AUTO-ENTMCNC: 33.2 G/DL (ref 32–36)
MCV RBC AUTO: 101 FL (ref 82–98)
MONOCYTES # BLD AUTO: 0.4 K/UL (ref 0.3–1)
MONOCYTES NFR BLD: 7.3 % (ref 4–15)
NEUTROPHILS # BLD AUTO: 3.1 K/UL (ref 1.8–7.7)
NEUTROPHILS NFR BLD: 60.7 % (ref 38–73)
NITRITE UR QL STRIP: NEGATIVE
NRBC BLD-RTO: 0 /100 WBC
PH UR STRIP: 5 [PH] (ref 5–8)
PLATELET # BLD AUTO: 45 K/UL (ref 150–450)
PMV BLD AUTO: 12.7 FL (ref 9.2–12.9)
POTASSIUM SERPL-SCNC: 4.1 MMOL/L (ref 3.5–5.1)
PROT SERPL-MCNC: 8.8 G/DL (ref 6–8.4)
PROT UR QL STRIP: NEGATIVE
RBC # BLD AUTO: 3.99 M/UL (ref 4.6–6.2)
SODIUM SERPL-SCNC: 139 MMOL/L (ref 136–145)
SP GR UR STRIP: 1.01 (ref 1–1.03)
URN SPEC COLLECT METH UR: NORMAL
WBC # BLD AUTO: 5.1 K/UL (ref 3.9–12.7)

## 2021-10-28 PROCEDURE — 99284 PR EMERGENCY DEPT VISIT,LEVEL IV: ICD-10-PCS | Mod: ,,, | Performed by: PHYSICIAN ASSISTANT

## 2021-10-28 PROCEDURE — 81003 URINALYSIS AUTO W/O SCOPE: CPT | Mod: HCNC | Performed by: PHYSICIAN ASSISTANT

## 2021-10-28 PROCEDURE — 83605 ASSAY OF LACTIC ACID: CPT | Mod: HCNC | Performed by: PHYSICIAN ASSISTANT

## 2021-10-28 PROCEDURE — 96361 HYDRATE IV INFUSION ADD-ON: CPT | Mod: HCNC

## 2021-10-28 PROCEDURE — 96360 HYDRATION IV INFUSION INIT: CPT | Mod: HCNC

## 2021-10-28 PROCEDURE — 99284 EMERGENCY DEPT VISIT MOD MDM: CPT | Mod: ,,, | Performed by: PHYSICIAN ASSISTANT

## 2021-10-28 PROCEDURE — 85025 COMPLETE CBC W/AUTO DIFF WBC: CPT | Mod: HCNC | Performed by: PHYSICIAN ASSISTANT

## 2021-10-28 PROCEDURE — 63600175 PHARM REV CODE 636 W HCPCS: Mod: HCNC | Performed by: PHYSICIAN ASSISTANT

## 2021-10-28 PROCEDURE — 80053 COMPREHEN METABOLIC PANEL: CPT | Mod: HCNC | Performed by: PHYSICIAN ASSISTANT

## 2021-10-28 PROCEDURE — 99284 EMERGENCY DEPT VISIT MOD MDM: CPT | Mod: 25,HCNC

## 2021-10-28 RX ORDER — TAMSULOSIN HYDROCHLORIDE 0.4 MG/1
0.4 CAPSULE ORAL DAILY
Qty: 30 CAPSULE | Refills: 0 | Status: ON HOLD | OUTPATIENT
Start: 2021-10-28 | End: 2021-11-27

## 2021-10-28 RX ORDER — TAMSULOSIN HYDROCHLORIDE 0.4 MG/1
0.4 CAPSULE ORAL DAILY
Qty: 30 CAPSULE | Refills: 0 | Status: SHIPPED | OUTPATIENT
Start: 2021-10-28 | End: 2021-10-28 | Stop reason: SDUPTHER

## 2021-10-28 RX ADMIN — SODIUM CHLORIDE, SODIUM LACTATE, POTASSIUM CHLORIDE, AND CALCIUM CHLORIDE 1000 ML: .6; .31; .03; .02 INJECTION, SOLUTION INTRAVENOUS at 02:10

## 2022-04-21 ENCOUNTER — PES CALL (OUTPATIENT)
Dept: ADMINISTRATIVE | Facility: CLINIC | Age: 67
End: 2022-04-21
Payer: MEDICARE

## 2022-09-21 ENCOUNTER — PATIENT OUTREACH (OUTPATIENT)
Dept: ADMINISTRATIVE | Facility: HOSPITAL | Age: 67
End: 2022-09-21
Payer: MEDICARE

## 2022-11-02 ENCOUNTER — HOSPITAL ENCOUNTER (OUTPATIENT)
Facility: HOSPITAL | Age: 67
Discharge: HOME | End: 2022-11-03
Attending: EMERGENCY MEDICINE | Admitting: STUDENT IN AN ORGANIZED HEALTH CARE EDUCATION/TRAINING PROGRAM
Payer: MEDICARE

## 2022-11-02 DIAGNOSIS — R07.9 CHEST PAIN: ICD-10-CM

## 2022-11-02 DIAGNOSIS — R55 VASOVAGAL SYNCOPE: ICD-10-CM

## 2022-11-02 DIAGNOSIS — R55 SYNCOPE: Primary | ICD-10-CM

## 2022-11-02 LAB
ALBUMIN SERPL BCP-MCNC: 3.6 G/DL (ref 3.5–5.2)
ALP SERPL-CCNC: 84 U/L (ref 55–135)
ALT SERPL W/O P-5'-P-CCNC: 28 U/L (ref 10–44)
ANION GAP SERPL CALC-SCNC: 13 MMOL/L (ref 8–16)
AST SERPL-CCNC: 59 U/L (ref 10–40)
BASOPHILS # BLD AUTO: 0.05 K/UL (ref 0–0.2)
BASOPHILS NFR BLD: 1 % (ref 0–1.9)
BILIRUB SERPL-MCNC: 1 MG/DL (ref 0.1–1)
BUN SERPL-MCNC: 17 MG/DL (ref 8–23)
CALCIUM SERPL-MCNC: 8.9 MG/DL (ref 8.7–10.5)
CHLORIDE SERPL-SCNC: 105 MMOL/L (ref 95–110)
CO2 SERPL-SCNC: 18 MMOL/L (ref 23–29)
CREAT SERPL-MCNC: 1.4 MG/DL (ref 0.5–1.4)
DIFFERENTIAL METHOD: ABNORMAL
EOSINOPHIL # BLD AUTO: 0.1 K/UL (ref 0–0.5)
EOSINOPHIL NFR BLD: 1.9 % (ref 0–8)
ERYTHROCYTE [DISTWIDTH] IN BLOOD BY AUTOMATED COUNT: 19.3 % (ref 11.5–14.5)
EST. GFR  (NO RACE VARIABLE): 54.7 ML/MIN/1.73 M^2
GLUCOSE SERPL-MCNC: 94 MG/DL (ref 70–110)
HCT VFR BLD AUTO: 34.8 % (ref 40–54)
HGB BLD-MCNC: 11.1 G/DL (ref 14–18)
HIV 1+2 AB+HIV1 P24 AG SERPL QL IA: NORMAL
IMM GRANULOCYTES # BLD AUTO: 0.01 K/UL (ref 0–0.04)
IMM GRANULOCYTES NFR BLD AUTO: 0.2 % (ref 0–0.5)
LYMPHOCYTES # BLD AUTO: 1.1 K/UL (ref 1–4.8)
LYMPHOCYTES NFR BLD: 20.6 % (ref 18–48)
MCH RBC QN AUTO: 27.8 PG (ref 27–31)
MCHC RBC AUTO-ENTMCNC: 31.9 G/DL (ref 32–36)
MCV RBC AUTO: 87 FL (ref 82–98)
MONOCYTES # BLD AUTO: 0.6 K/UL (ref 0.3–1)
MONOCYTES NFR BLD: 10.7 % (ref 4–15)
NEUTROPHILS # BLD AUTO: 3.4 K/UL (ref 1.8–7.7)
NEUTROPHILS NFR BLD: 65.6 % (ref 38–73)
NRBC BLD-RTO: 0 /100 WBC
PLATELET # BLD AUTO: 100 K/UL (ref 150–450)
PMV BLD AUTO: 11.4 FL (ref 9.2–12.9)
POTASSIUM SERPL-SCNC: 3.9 MMOL/L (ref 3.5–5.1)
PROT SERPL-MCNC: 8.2 G/DL (ref 6–8.4)
RBC # BLD AUTO: 3.99 M/UL (ref 4.6–6.2)
SODIUM SERPL-SCNC: 136 MMOL/L (ref 136–145)
WBC # BLD AUTO: 5.24 K/UL (ref 3.9–12.7)

## 2022-11-02 PROCEDURE — 93010 ELECTROCARDIOGRAM REPORT: CPT | Mod: ,,, | Performed by: INTERNAL MEDICINE

## 2022-11-02 PROCEDURE — 99284 PR EMERGENCY DEPT VISIT,LEVEL IV: ICD-10-PCS | Mod: GC,,, | Performed by: EMERGENCY MEDICINE

## 2022-11-02 PROCEDURE — 93005 ELECTROCARDIOGRAM TRACING: CPT

## 2022-11-02 PROCEDURE — 99285 EMERGENCY DEPT VISIT HI MDM: CPT | Mod: 25

## 2022-11-02 PROCEDURE — 80053 COMPREHEN METABOLIC PANEL: CPT | Performed by: EMERGENCY MEDICINE

## 2022-11-02 PROCEDURE — 85025 COMPLETE CBC W/AUTO DIFF WBC: CPT | Performed by: EMERGENCY MEDICINE

## 2022-11-02 PROCEDURE — 87389 HIV-1 AG W/HIV-1&-2 AB AG IA: CPT | Performed by: PHYSICIAN ASSISTANT

## 2022-11-02 PROCEDURE — 86803 HEPATITIS C AB TEST: CPT | Performed by: PHYSICIAN ASSISTANT

## 2022-11-02 PROCEDURE — 99284 EMERGENCY DEPT VISIT MOD MDM: CPT | Mod: GC,,, | Performed by: EMERGENCY MEDICINE

## 2022-11-02 PROCEDURE — 93010 EKG 12-LEAD: ICD-10-PCS | Mod: ,,, | Performed by: INTERNAL MEDICINE

## 2022-11-02 RX ORDER — ACETAMINOPHEN 325 MG/1
650 TABLET ORAL
Status: COMPLETED | OUTPATIENT
Start: 2022-11-03 | End: 2022-11-03

## 2022-11-02 RX ORDER — LISINOPRIL 20 MG/1
20 TABLET ORAL
Status: COMPLETED | OUTPATIENT
Start: 2022-11-03 | End: 2022-11-03

## 2022-11-03 VITALS
DIASTOLIC BLOOD PRESSURE: 92 MMHG | WEIGHT: 178 LBS | HEIGHT: 72 IN | BODY MASS INDEX: 24.11 KG/M2 | OXYGEN SATURATION: 96 % | HEART RATE: 71 BPM | RESPIRATION RATE: 18 BRPM | SYSTOLIC BLOOD PRESSURE: 142 MMHG | TEMPERATURE: 98 F

## 2022-11-03 PROBLEM — I10 ESSENTIAL HYPERTENSION: Chronic | Status: ACTIVE | Noted: 2019-07-28

## 2022-11-03 PROBLEM — S22.49XA RIB FRACTURES: Status: ACTIVE | Noted: 2022-11-03

## 2022-11-03 PROBLEM — R55 SYNCOPE: Status: ACTIVE | Noted: 2022-11-03

## 2022-11-03 PROBLEM — F32.A ANXIETY AND DEPRESSION: Chronic | Status: ACTIVE | Noted: 2019-07-28

## 2022-11-03 PROBLEM — F41.9 ANXIETY AND DEPRESSION: Chronic | Status: ACTIVE | Noted: 2019-07-28

## 2022-11-03 LAB
ALBUMIN SERPL BCP-MCNC: 3.8 G/DL (ref 3.5–5.2)
ALP SERPL-CCNC: 91 U/L (ref 55–135)
ALT SERPL W/O P-5'-P-CCNC: 29 U/L (ref 10–44)
AMPHET+METHAMPHET UR QL: NEGATIVE
ANION GAP SERPL CALC-SCNC: 14 MMOL/L (ref 8–16)
AST SERPL-CCNC: 47 U/L (ref 10–40)
AV INDEX (PROSTH): 0.6
AV MEAN GRADIENT: 3 MMHG
AV PEAK GRADIENT: 5 MMHG
AV VALVE AREA: 2.58 CM2
AV VELOCITY RATIO: 0.65
BARBITURATES UR QL SCN>200 NG/ML: NEGATIVE
BASOPHILS # BLD AUTO: 0.05 K/UL (ref 0–0.2)
BASOPHILS # BLD AUTO: 0.06 K/UL (ref 0–0.2)
BASOPHILS NFR BLD: 1.2 % (ref 0–1.9)
BASOPHILS NFR BLD: 1.3 % (ref 0–1.9)
BENZODIAZ UR QL SCN>200 NG/ML: NEGATIVE
BILIRUB SERPL-MCNC: 1.2 MG/DL (ref 0.1–1)
BILIRUB UR QL STRIP: NEGATIVE
BSA FOR ECHO PROCEDURE: 2.03 M2
BUN SERPL-MCNC: 19 MG/DL (ref 8–23)
BZE UR QL SCN: NEGATIVE
CALCIUM SERPL-MCNC: 9.6 MG/DL (ref 8.7–10.5)
CANNABINOIDS UR QL SCN: ABNORMAL
CHLORIDE SERPL-SCNC: 104 MMOL/L (ref 95–110)
CK SERPL-CCNC: 112 U/L (ref 20–200)
CLARITY UR REFRACT.AUTO: CLEAR
CO2 SERPL-SCNC: 19 MMOL/L (ref 23–29)
COLOR UR AUTO: YELLOW
CREAT SERPL-MCNC: 1.5 MG/DL (ref 0.5–1.4)
CREAT UR-MCNC: 155 MG/DL (ref 23–375)
CV ECHO LV RWT: 0.33 CM
DIFFERENTIAL METHOD: ABNORMAL
DIFFERENTIAL METHOD: ABNORMAL
DOP CALC AO PEAK VEL: 1.15 M/S
DOP CALC AO VTI: 19.09 CM
DOP CALC LVOT AREA: 4.3 CM2
DOP CALC LVOT DIAMETER: 2.34 CM
DOP CALC LVOT PEAK VEL: 0.75 M/S
DOP CALC LVOT STROKE VOLUME: 49.22 CM3
DOP CALCLVOT PEAK VEL VTI: 11.45 CM
E WAVE DECELERATION TIME: 285.49 MSEC
E/A RATIO: 0.58
E/E' RATIO: 5.82 M/S
ECHO LV POSTERIOR WALL: 0.88 CM (ref 0.6–1.1)
EJECTION FRACTION: 60 %
EOSINOPHIL # BLD AUTO: 0.1 K/UL (ref 0–0.5)
EOSINOPHIL # BLD AUTO: 0.1 K/UL (ref 0–0.5)
EOSINOPHIL NFR BLD: 1.8 % (ref 0–8)
EOSINOPHIL NFR BLD: 2 % (ref 0–8)
ERYTHROCYTE [DISTWIDTH] IN BLOOD BY AUTOMATED COUNT: 19.1 % (ref 11.5–14.5)
ERYTHROCYTE [DISTWIDTH] IN BLOOD BY AUTOMATED COUNT: 19.4 % (ref 11.5–14.5)
EST. GFR  (NO RACE VARIABLE): 50.4 ML/MIN/1.73 M^2
ETHANOL SERPL-MCNC: <10 MG/DL
FRACTIONAL SHORTENING: 32 % (ref 28–44)
GLUCOSE SERPL-MCNC: 97 MG/DL (ref 70–110)
GLUCOSE UR QL STRIP: NEGATIVE
HCT VFR BLD AUTO: 35.6 % (ref 40–54)
HCT VFR BLD AUTO: 39.9 % (ref 40–54)
HCV AB SERPL QL IA: NORMAL
HGB BLD-MCNC: 11.2 G/DL (ref 14–18)
HGB BLD-MCNC: 12.7 G/DL (ref 14–18)
HGB UR QL STRIP: ABNORMAL
IMM GRANULOCYTES # BLD AUTO: 0.01 K/UL (ref 0–0.04)
IMM GRANULOCYTES # BLD AUTO: 0.04 K/UL (ref 0–0.04)
IMM GRANULOCYTES NFR BLD AUTO: 0.3 % (ref 0–0.5)
IMM GRANULOCYTES NFR BLD AUTO: 0.8 % (ref 0–0.5)
INR PPP: 1.1 (ref 0.8–1.2)
INTERVENTRICULAR SEPTUM: 1.03 CM (ref 0.6–1.1)
KETONES UR QL STRIP: ABNORMAL
LA MAJOR: 5.19 CM
LA MINOR: 5.61 CM
LA WIDTH: 4.04 CM
LACTATE SERPL-SCNC: 1 MMOL/L (ref 0.5–2.2)
LEFT ATRIUM SIZE: 4.05 CM
LEFT ATRIUM VOLUME INDEX MOD: 31.7 ML/M2
LEFT ATRIUM VOLUME INDEX: 36.9 ML/M2
LEFT ATRIUM VOLUME MOD: 64.33 CM3
LEFT ATRIUM VOLUME: 74.99 CM3
LEFT INTERNAL DIMENSION IN SYSTOLE: 3.65 CM (ref 2.1–4)
LEFT VENTRICLE DIASTOLIC VOLUME INDEX: 74.38 ML/M2
LEFT VENTRICLE DIASTOLIC VOLUME: 150.99 ML
LEFT VENTRICLE MASS INDEX: 96 G/M2
LEFT VENTRICLE SYSTOLIC VOLUME INDEX: 27.8 ML/M2
LEFT VENTRICLE SYSTOLIC VOLUME: 56.36 ML
LEFT VENTRICULAR INTERNAL DIMENSION IN DIASTOLE: 5.4 CM (ref 3.5–6)
LEFT VENTRICULAR MASS: 194.58 G
LEUKOCYTE ESTERASE UR QL STRIP: NEGATIVE
LV LATERAL E/E' RATIO: 5.33 M/S
LV SEPTAL E/E' RATIO: 6.4 M/S
LYMPHOCYTES # BLD AUTO: 1 K/UL (ref 1–4.8)
LYMPHOCYTES # BLD AUTO: 1.4 K/UL (ref 1–4.8)
LYMPHOCYTES NFR BLD: 25.5 % (ref 18–48)
LYMPHOCYTES NFR BLD: 28.7 % (ref 18–48)
MAGNESIUM SERPL-MCNC: 1.5 MG/DL (ref 1.6–2.6)
MAGNESIUM SERPL-MCNC: 2.1 MG/DL (ref 1.6–2.6)
MCH RBC QN AUTO: 27.9 PG (ref 27–31)
MCH RBC QN AUTO: 28.1 PG (ref 27–31)
MCHC RBC AUTO-ENTMCNC: 31.5 G/DL (ref 32–36)
MCHC RBC AUTO-ENTMCNC: 31.8 G/DL (ref 32–36)
MCV RBC AUTO: 88 FL (ref 82–98)
MCV RBC AUTO: 89 FL (ref 82–98)
METHADONE UR QL SCN>300 NG/ML: NEGATIVE
MONOCYTES # BLD AUTO: 0.5 K/UL (ref 0.3–1)
MONOCYTES # BLD AUTO: 0.6 K/UL (ref 0.3–1)
MONOCYTES NFR BLD: 10.6 % (ref 4–15)
MONOCYTES NFR BLD: 14.4 % (ref 4–15)
MV PEAK A VEL: 0.55 M/S
MV PEAK E VEL: 0.32 M/S
MV STENOSIS PRESSURE HALF TIME: 82.79 MS
MV VALVE AREA P 1/2 METHOD: 2.66 CM2
NEUTROPHILS # BLD AUTO: 2.2 K/UL (ref 1.8–7.7)
NEUTROPHILS # BLD AUTO: 2.8 K/UL (ref 1.8–7.7)
NEUTROPHILS NFR BLD: 56.5 % (ref 38–73)
NEUTROPHILS NFR BLD: 56.9 % (ref 38–73)
NITRITE UR QL STRIP: NEGATIVE
NRBC BLD-RTO: 0 /100 WBC
NRBC BLD-RTO: 0 /100 WBC
OPIATES UR QL SCN: NEGATIVE
PCP UR QL SCN>25 NG/ML: NEGATIVE
PH UR STRIP: 6 [PH] (ref 5–8)
PHOSPHATE SERPL-MCNC: 4.1 MG/DL (ref 2.7–4.5)
PISA TR MAX VEL: 2.33 M/S
PLATELET # BLD AUTO: 120 K/UL (ref 150–450)
PLATELET # BLD AUTO: ABNORMAL K/UL (ref 150–450)
PLATELET BLD QL SMEAR: ABNORMAL
PMV BLD AUTO: 11.8 FL (ref 9.2–12.9)
PMV BLD AUTO: ABNORMAL FL (ref 9.2–12.9)
POTASSIUM SERPL-SCNC: 3.7 MMOL/L (ref 3.5–5.1)
PROT SERPL-MCNC: 8.5 G/DL (ref 6–8.4)
PROT UR QL STRIP: ABNORMAL
PROTHROMBIN TIME: 11.5 SEC (ref 9–12.5)
RA MAJOR: 4.39 CM
RA PRESSURE: 3 MMHG
RA WIDTH: 3.47 CM
RBC # BLD AUTO: 3.98 M/UL (ref 4.6–6.2)
RBC # BLD AUTO: 4.55 M/UL (ref 4.6–6.2)
RIGHT VENTRICULAR END-DIASTOLIC DIMENSION: 2.99 CM
SODIUM SERPL-SCNC: 137 MMOL/L (ref 136–145)
SP GR UR STRIP: 1.02 (ref 1–1.03)
TDI LATERAL: 0.06 M/S
TDI SEPTAL: 0.05 M/S
TDI: 0.06 M/S
TOXICOLOGY INFORMATION: ABNORMAL
TR MAX PG: 22 MMHG
TROPONIN I SERPL DL<=0.01 NG/ML-MCNC: 0.01 NG/ML (ref 0–0.03)
TROPONIN I SERPL DL<=0.01 NG/ML-MCNC: <0.006 NG/ML (ref 0–0.03)
TV REST PULMONARY ARTERY PRESSURE: 25 MMHG
URN SPEC COLLECT METH UR: ABNORMAL
WBC # BLD AUTO: 3.96 K/UL (ref 3.9–12.7)
WBC # BLD AUTO: 4.91 K/UL (ref 3.9–12.7)

## 2022-11-03 PROCEDURE — 84100 ASSAY OF PHOSPHORUS: CPT

## 2022-11-03 PROCEDURE — 36415 COLL VENOUS BLD VENIPUNCTURE: CPT | Performed by: STUDENT IN AN ORGANIZED HEALTH CARE EDUCATION/TRAINING PROGRAM

## 2022-11-03 PROCEDURE — 83735 ASSAY OF MAGNESIUM: CPT | Performed by: EMERGENCY MEDICINE

## 2022-11-03 PROCEDURE — 82550 ASSAY OF CK (CPK): CPT

## 2022-11-03 PROCEDURE — 83605 ASSAY OF LACTIC ACID: CPT

## 2022-11-03 PROCEDURE — 80053 COMPREHEN METABOLIC PANEL: CPT

## 2022-11-03 PROCEDURE — 25000003 PHARM REV CODE 250

## 2022-11-03 PROCEDURE — G0378 HOSPITAL OBSERVATION PER HR: HCPCS

## 2022-11-03 PROCEDURE — 82077 ASSAY SPEC XCP UR&BREATH IA: CPT | Performed by: EMERGENCY MEDICINE

## 2022-11-03 PROCEDURE — 63600175 PHARM REV CODE 636 W HCPCS

## 2022-11-03 PROCEDURE — 96365 THER/PROPH/DIAG IV INF INIT: CPT

## 2022-11-03 PROCEDURE — 25000003 PHARM REV CODE 250: Performed by: STUDENT IN AN ORGANIZED HEALTH CARE EDUCATION/TRAINING PROGRAM

## 2022-11-03 PROCEDURE — 85610 PROTHROMBIN TIME: CPT

## 2022-11-03 PROCEDURE — 85025 COMPLETE CBC W/AUTO DIFF WBC: CPT | Mod: 91 | Performed by: STUDENT IN AN ORGANIZED HEALTH CARE EDUCATION/TRAINING PROGRAM

## 2022-11-03 PROCEDURE — 94761 N-INVAS EAR/PLS OXIMETRY MLT: CPT

## 2022-11-03 PROCEDURE — 85025 COMPLETE CBC W/AUTO DIFF WBC: CPT

## 2022-11-03 PROCEDURE — 84484 ASSAY OF TROPONIN QUANT: CPT | Performed by: EMERGENCY MEDICINE

## 2022-11-03 PROCEDURE — 96361 HYDRATE IV INFUSION ADD-ON: CPT

## 2022-11-03 PROCEDURE — 63600175 PHARM REV CODE 636 W HCPCS: Performed by: EMERGENCY MEDICINE

## 2022-11-03 PROCEDURE — 99220 PR INITIAL OBSERVATION CARE,LEVL III: ICD-10-PCS | Mod: GC,,, | Performed by: STUDENT IN AN ORGANIZED HEALTH CARE EDUCATION/TRAINING PROGRAM

## 2022-11-03 PROCEDURE — 80307 DRUG TEST PRSMV CHEM ANLYZR: CPT

## 2022-11-03 PROCEDURE — 99220 PR INITIAL OBSERVATION CARE,LEVL III: CPT | Mod: GC,,, | Performed by: STUDENT IN AN ORGANIZED HEALTH CARE EDUCATION/TRAINING PROGRAM

## 2022-11-03 PROCEDURE — 81003 URINALYSIS AUTO W/O SCOPE: CPT | Mod: 59

## 2022-11-03 PROCEDURE — 25500020 PHARM REV CODE 255: Performed by: INTERNAL MEDICINE

## 2022-11-03 PROCEDURE — 84484 ASSAY OF TROPONIN QUANT: CPT | Mod: 91

## 2022-11-03 PROCEDURE — 96366 THER/PROPH/DIAG IV INF ADDON: CPT

## 2022-11-03 PROCEDURE — 83735 ASSAY OF MAGNESIUM: CPT | Mod: 91

## 2022-11-03 RX ORDER — LISINOPRIL 20 MG/1
20 TABLET ORAL DAILY
Status: DISCONTINUED | OUTPATIENT
Start: 2022-11-03 | End: 2022-11-03

## 2022-11-03 RX ORDER — IBUPROFEN 200 MG
1 TABLET ORAL DAILY PRN
Status: DISCONTINUED | OUTPATIENT
Start: 2022-11-03 | End: 2022-11-03 | Stop reason: HOSPADM

## 2022-11-03 RX ORDER — TAMSULOSIN HYDROCHLORIDE 0.4 MG/1
CAPSULE ORAL DAILY
Status: ON HOLD | COMMUNITY
End: 2022-11-03 | Stop reason: HOSPADM

## 2022-11-03 RX ORDER — SODIUM CHLORIDE 0.9 % (FLUSH) 0.9 %
10 SYRINGE (ML) INJECTION
Status: CANCELLED | OUTPATIENT
Start: 2022-11-03

## 2022-11-03 RX ORDER — MIRTAZAPINE 15 MG/1
15 TABLET, FILM COATED ORAL NIGHTLY
Status: DISCONTINUED | OUTPATIENT
Start: 2022-11-03 | End: 2022-11-03 | Stop reason: HOSPADM

## 2022-11-03 RX ORDER — IBUPROFEN 200 MG
16 TABLET ORAL
Status: DISCONTINUED | OUTPATIENT
Start: 2022-11-03 | End: 2022-11-03 | Stop reason: HOSPADM

## 2022-11-03 RX ORDER — LISINOPRIL 20 MG/1
20 TABLET ORAL DAILY
Qty: 30 TABLET | Refills: 0 | Status: SHIPPED | OUTPATIENT
Start: 2022-11-03 | End: 2022-11-03 | Stop reason: SDUPTHER

## 2022-11-03 RX ORDER — TALC
6 POWDER (GRAM) TOPICAL NIGHTLY PRN
Status: DISCONTINUED | OUTPATIENT
Start: 2022-11-03 | End: 2022-11-03 | Stop reason: HOSPADM

## 2022-11-03 RX ORDER — MAGNESIUM SULFATE HEPTAHYDRATE 40 MG/ML
2 INJECTION, SOLUTION INTRAVENOUS ONCE
Status: COMPLETED | OUTPATIENT
Start: 2022-11-03 | End: 2022-11-03

## 2022-11-03 RX ORDER — THIAMINE HCL 100 MG
100 TABLET ORAL DAILY
Status: DISCONTINUED | OUTPATIENT
Start: 2022-11-03 | End: 2022-11-03 | Stop reason: HOSPADM

## 2022-11-03 RX ORDER — TAMSULOSIN HYDROCHLORIDE 0.4 MG/1
0.4 CAPSULE ORAL DAILY
Status: DISCONTINUED | OUTPATIENT
Start: 2022-11-03 | End: 2022-11-03

## 2022-11-03 RX ORDER — ACETAMINOPHEN 325 MG/1
650 TABLET ORAL EVERY 4 HOURS PRN
Status: DISCONTINUED | OUTPATIENT
Start: 2022-11-03 | End: 2022-11-03 | Stop reason: HOSPADM

## 2022-11-03 RX ORDER — IBUPROFEN 200 MG
1 TABLET ORAL DAILY
Status: DISCONTINUED | OUTPATIENT
Start: 2022-11-03 | End: 2022-11-03

## 2022-11-03 RX ORDER — PROPRANOLOL HYDROCHLORIDE 10 MG/1
20 TABLET ORAL 2 TIMES DAILY
Status: DISCONTINUED | OUTPATIENT
Start: 2022-11-03 | End: 2022-11-03 | Stop reason: HOSPADM

## 2022-11-03 RX ORDER — ONDANSETRON 8 MG/1
8 TABLET, ORALLY DISINTEGRATING ORAL EVERY 8 HOURS PRN
Status: DISCONTINUED | OUTPATIENT
Start: 2022-11-03 | End: 2022-11-03 | Stop reason: HOSPADM

## 2022-11-03 RX ORDER — TAMSULOSIN HYDROCHLORIDE 0.4 MG/1
0.4 CAPSULE ORAL DAILY
Qty: 30 CAPSULE | Refills: 0 | Status: SHIPPED | OUTPATIENT
Start: 2022-11-03 | End: 2023-07-19

## 2022-11-03 RX ORDER — HYDROXYZINE HYDROCHLORIDE 25 MG/1
25 TABLET, FILM COATED ORAL 2 TIMES DAILY PRN
Status: DISCONTINUED | OUTPATIENT
Start: 2022-11-03 | End: 2022-11-03 | Stop reason: HOSPADM

## 2022-11-03 RX ORDER — NALOXONE HCL 0.4 MG/ML
0.02 VIAL (ML) INJECTION
Status: DISCONTINUED | OUTPATIENT
Start: 2022-11-03 | End: 2022-11-03 | Stop reason: HOSPADM

## 2022-11-03 RX ORDER — LISINOPRIL 20 MG/1
20 TABLET ORAL DAILY
Qty: 30 TABLET | Refills: 0 | Status: SHIPPED | OUTPATIENT
Start: 2022-11-03 | End: 2023-07-22 | Stop reason: SDUPTHER

## 2022-11-03 RX ORDER — POLYETHYLENE GLYCOL 3350 17 G/17G
17 POWDER, FOR SOLUTION ORAL 2 TIMES DAILY PRN
Status: DISCONTINUED | OUTPATIENT
Start: 2022-11-03 | End: 2022-11-03 | Stop reason: HOSPADM

## 2022-11-03 RX ORDER — LISINOPRIL 20 MG/1
20 TABLET ORAL DAILY
Status: ON HOLD | COMMUNITY
End: 2022-11-03 | Stop reason: SDUPTHER

## 2022-11-03 RX ORDER — FOLIC ACID 1 MG/1
1 TABLET ORAL DAILY
Status: DISCONTINUED | OUTPATIENT
Start: 2022-11-03 | End: 2022-11-03 | Stop reason: HOSPADM

## 2022-11-03 RX ORDER — SODIUM CHLORIDE 0.9 % (FLUSH) 0.9 %
10 SYRINGE (ML) INJECTION EVERY 12 HOURS PRN
Status: DISCONTINUED | OUTPATIENT
Start: 2022-11-03 | End: 2022-11-03 | Stop reason: HOSPADM

## 2022-11-03 RX ORDER — POTASSIUM CHLORIDE 20 MEQ/1
40 TABLET, EXTENDED RELEASE ORAL ONCE
Status: COMPLETED | OUTPATIENT
Start: 2022-11-03 | End: 2022-11-03

## 2022-11-03 RX ORDER — GLUCAGON 1 MG
1 KIT INJECTION
Status: DISCONTINUED | OUTPATIENT
Start: 2022-11-03 | End: 2022-11-03 | Stop reason: HOSPADM

## 2022-11-03 RX ORDER — IBUPROFEN 200 MG
24 TABLET ORAL
Status: DISCONTINUED | OUTPATIENT
Start: 2022-11-03 | End: 2022-11-03 | Stop reason: HOSPADM

## 2022-11-03 RX ORDER — TALC
6 POWDER (GRAM) TOPICAL NIGHTLY PRN
Status: CANCELLED | OUTPATIENT
Start: 2022-11-03

## 2022-11-03 RX ORDER — ENOXAPARIN SODIUM 100 MG/ML
40 INJECTION SUBCUTANEOUS EVERY 24 HOURS
Status: DISCONTINUED | OUTPATIENT
Start: 2022-11-03 | End: 2022-11-03 | Stop reason: HOSPADM

## 2022-11-03 RX ADMIN — THERA TABS 1 TABLET: TAB at 08:11

## 2022-11-03 RX ADMIN — Medication 100 MG: at 08:11

## 2022-11-03 RX ADMIN — HUMAN ALBUMIN MICROSPHERES AND PERFLUTREN 0.66 MG: 10; .22 INJECTION, SOLUTION INTRAVENOUS at 08:11

## 2022-11-03 RX ADMIN — TAMSULOSIN HYDROCHLORIDE 0.4 MG: 0.4 CAPSULE ORAL at 08:11

## 2022-11-03 RX ADMIN — FOLIC ACID 1 MG: 1 TABLET ORAL at 08:11

## 2022-11-03 RX ADMIN — MAGNESIUM SULFATE 2 G: 2 INJECTION INTRAVENOUS at 02:11

## 2022-11-03 RX ADMIN — POTASSIUM CHLORIDE 40 MEQ: 1500 TABLET, EXTENDED RELEASE ORAL at 08:11

## 2022-11-03 RX ADMIN — SODIUM CHLORIDE, SODIUM LACTATE, POTASSIUM CHLORIDE, AND CALCIUM CHLORIDE 1000 ML: .6; .31; .03; .02 INJECTION, SOLUTION INTRAVENOUS at 08:11

## 2022-11-03 RX ADMIN — ACETAMINOPHEN 650 MG: 325 TABLET ORAL at 12:11

## 2022-11-03 RX ADMIN — LISINOPRIL 20 MG: 20 TABLET ORAL at 12:11

## 2022-11-03 RX ADMIN — PROPRANOLOL HYDROCHLORIDE 20 MG: 10 TABLET ORAL at 08:11

## 2022-11-03 NOTE — NURSING
Tele monitor [id number] removed from patient, PIV removed x1 patient tolerated, tip intact pressure dressing applied. Patient assisted to elevators after insisting to walk self out. Safety measures discussed and verbalized understanding.

## 2022-11-03 NOTE — HPI
67 y.o M with alcoholic cirhhosis c/b esophageal varices s/p banding and alcoholic pancreatitis (with ?pseudocyst), CKD 3, polysubstance abuse (Tobacco + THC), BPH, fracture of R femoral neck s/p screw fixation presenting with syncope    Patient reports earlier in the evening on day of presentation he was sitting at the bar with his girlfriend having a drink and eating when he started to feel progressively worsening dizziness. He states he put his head down to rest then subsequently syncopized and fell to the floor hitting his L forehead. He came to while he was on the floor. Per his girlfriend he was out for 5 seconds before he regained consciousness. There was no gaze deviation, tongue biting or clonus during the episode but he did have urinary incontinence. There was no confusion directly after, he was able to appropriately conversate and was oriented x3. No palpitations prior. He reports he did have one prior episode of syncope in his 20s that occured during micturition. Otherwise, he frequently experienced dizziness with quick postural changes (standing from sitting positions) but never actually passed out. He has no family history of syncope or sudden death. Regarding his alcohol use disorder, he reports that he significantly cut down his drinking in the past two years, now drinking an average of 10 mixed drinks per week, prior to that he was drinking one fifth of vodka daily. He endorses THC use but denied all other recreational drug use (smoked 1/2 joint day of presentation). He does endorse that he is chronically dehydrated and will usually only drink 1-2 glasses of water per day. Otherwise denied fevers, chills, chest pain, dyspnea, n/v, urinary and bowel symptoms    On arrival to ED, he was hypertensive but otherwise vital signs WNL and saturating well on RA. EKG showed sinus rhythm with frequent PVCs and left anterior fascicular block. Labs largely unremarkable -- no leukocytosis, anemia and TCP that  improved from prior, NAGMA CO2 18, AST mildly elevated at 59, remainder of LFTs WNL, PT-INR WNL. LA WNL. Negative troponin. Negative ethanol. Hypomagnesemia at 1.5. UDS positive for THC. UA showed trace protein/ketones/occult blood. CXR showed fracture of the 7th rib on the right posteriorly with minimal displacement and questionable small nondisplaced fracture of the 8th rib on the right laterally. CTH and CT C-spine were negative for hemmorrhage and fractures (but did show incidental 3mm AYLIN pulmonary nodule that will need follow up)    Patient admitted to hospital medicine for evaluation and management of syncope.

## 2022-11-03 NOTE — CARE UPDATE
RAPID RESPONSE NURSE CHART REVIEW        Chart Reviewed: 11/03/2022, 6:55 AM    MRN: [id number]  Bed: 731/731 A    Dx: Syncope    Fransico Montalvo has a past medical history of Alcohol abuse, BPH (benign prostatic hyperplasia), Cirrhosis, Gastritis, Hypertension, and Renal disorder.    Last VS: BP (!) 70/50 (BP Location: Right arm, Patient Position: Standing)   Pulse 107   Temp 97.9 °F (36.6 °C) (Oral)   Resp 20   Ht 6' (1.829 m)   Wt 81.1 kg (178 lb 12.8 oz)   SpO2 95%   BMI 24.25 kg/m²     24H Vital Sign Range:  Temp:  [97.9 °F (36.6 °C)-98.1 °F (36.7 °C)]   Pulse:  []   Resp:  [16-20]   BP: ()/()   SpO2:  [95 %-100 %]     Level of Consciousness (AVPU): alert    Recent Labs     11/02/22 2223 11/03/22  0523   WBC 5.24 4.91   HGB 11.1* 12.7*   HCT 34.8* 39.9*   *  --        Recent Labs     11/02/22 2223 11/03/22  0040 11/03/22  0523     --  137   K 3.9  --  3.7     --  104   CO2 18*  --  19*   CREATININE 1.4  --  1.5*   GLU 94  --  97   PHOS  --   --  4.1   MG  --  1.5* 2.1        No results for input(s): PH, PCO2, PO2, HCO3, POCSATURATED, BE in the last 72 hours.     OXYGEN:        O2 Device (Oxygen Therapy): room air    MEWS score: 1    Charge RN, Magdalena  contacted for hypotension. RN reports that hypotension was related to nurse completing orthostatic vital signs. Patient to receive 1L LR bolus. No additional concerns verbalized at this time. Instructed to call [facility] for further concerns or assistance.    Zayda Alanis RN

## 2022-11-03 NOTE — HOSPITAL COURSE
Admitted to hospital medicine for syncopal workup. His TTE was negative for acute issue. He was monitored on telemetry without any arrhythmias identified. CTH was negative, and CXR identified rib fractures which were old in nature. His syncope was likely orthostatic and he had positive orthostatic vitals in the hospital. He admitted to being dehydrated and not drinking water while consuming alcoholic beverages. Discussed importance of adequate hydration and cessation of ETOH given his cirrhosis.   Vitals:    11/03/22 1238   BP: (!) 142/92   Pulse: 71   Resp: 18   Temp: 98.2 °F (36.8 °C)

## 2022-11-03 NOTE — ASSESSMENT & PLAN NOTE
Hx of CKD  Labs on arrival c/w patients baseline (Cr 1.4-1.6), no concern for [place] on CKD at this time    Renal function panel daily  Strict I&Os  Avoid nephrotoxic agents  Renally adjust medications  Goal MAP > 65  Optimal BP control

## 2022-11-03 NOTE — PLAN OF CARE
Patient AAO calm and cooperative, denies any SOB or chest pain. No needs or concerns voiced during this time. Medication compliant and tolerated well. VSS,ZARA. POC and discharge instructions discussed, verbalized understanding. Patient belongings at bedside, notified case management of needing assistance with a ride home

## 2022-11-03 NOTE — ED NOTES
Telemetry Verification   Patient placed on Telemetry Box  Verified with War Room  Tech    Box # [id number]   Rate 76    Rhythm NSR

## 2022-11-03 NOTE — ED NOTES
Patient identifiers for Fransico Montalvo 67 y.o. male checked and correct.  Chief Complaint   Patient presents with    Fall     Syncopal episode and fell at bar. Pt reports half an alcoholic beverage. GCS 15.      Past Medical History:   Diagnosis Date    Alcohol abuse     BPH (benign prostatic hyperplasia)     Cirrhosis 06/21/2018    pt states that he was diagnosed a week ago during his last hospital visit    Gastritis     Hypertension     Renal disorder      Allergies reported: Review of patient's allergies indicates:  No Known Allergies      LOC: Patient is awake, alert, and aware of environment with an appropriate affect. Patient is oriented x 4 and speaking appropriately.  APPEARANCE: Patient resting comfortably and in no acute distress. Patient is clean and well groomed, patient's clothing is properly fastened.  HEENT: Pt presents with surgical mask on. Pt reports a headache and sinus pain, 3-4/10 pain.   SKIN: The skin is warm and dry. Patient has normal skin turgor and moist mucus membranes. Pt has a raised hematoma to the left side of his forehead.   MUSKULOSKELETAL: Patient is moving all extremities well, no obvious deformities noted. Pulses intact.   RESPIRATORY: Airway is open and patent. Respirations are spontaneous and non-labored with normal effort and rate.  CARDIAC: Patient has a normal rate and rhythm. 81 on cardiac monitor. No peripheral edema noted. Pt is hypertensive.  ABDOMEN: No distention noted. Soft and non-tender upon palpation. Pt reports 1 occurrence of emesis. Pt denies nausea at present.   NEUROLOGICAL: pupils 2mm, PERRL. Facial expression is symmetrical. Hand grasps are equal bilaterally. Normal sensation in all extremities when touched with finger however reports neuropathy to his feet but reports this has been present for a while.

## 2022-11-03 NOTE — ASSESSMENT & PLAN NOTE
67 y.o M with alcoholic cirhhosis c/b esophageal varices s/p banding and alcoholic pancreatitis (with ?pseudocyst), CKD 3, polysubstance abuse (Tobacco + THC), BPH, fracture of R femoral neck s/p screw fixation presenting with syncope with preceding dizziness. +urinary incontinence during episode but otherwise no clonus, gaze deviation, tongue biting or postictal confusion to support seizures. EKG showed frequent PVCs and left anterior fascicular block, so possibly arrhythmogenic, however the dizziness prodrome makes it less likely. No family hx of sudden death to suggest HOCM. Given his only prior syncopal episode occurred during micturition, and this occurred during eating/drinking my highest suspicion is for vasovagal syncope.     -2/2 to orthostatic hypotension in the setting of dehydration  -hold flomax  -fu with PCP  -TTE wnl

## 2022-11-03 NOTE — PLAN OF CARE
[facility] - Observation  Discharge Final Note    Primary Care Provider: [facility]    Expected Discharge Date: 11/3/2022    Final Discharge Note (most recent)       Final Note - 11/03/22 1602          Final Note    Assessment Type Final Discharge Note     Anticipated Discharge Disposition Home or Self Care     What phone number can be called within the next 1-3 days to see how you are doing after discharge? [phone]     Hospital Resources/Appts/Education Provided Provided patient/caregiver with written discharge plan information;Community resources provided        Post-Acute Status    Discharge Delays None known at this time                   Pt discharging home. Pt goes to [facility] for his primary care, SW unable to make appointments at [facility], information in his AVS. SW set up Ly[employer] transport home for this patient.     Pt has no other post acute needs and is cleared for discharge from a case management standpoint.     CHARLES Bah, LMSW [facility]  [id number]

## 2022-11-03 NOTE — ED PROVIDER NOTES
"Encounter Date: 11/2/2022       History     Chief Complaint   Patient presents with    Fall     Syncopal episode and fell at bar. Pt reports half an alcoholic beverage. GCS 15.      Pt is a 66 yo M with a PMH of HTN, alcohol abuse, cirrhosis, BPH who presents after concern for a fall. The patient states that he was at a bar and had half his drink, took one bite of his calzone when he began to feel nauseous and dizzy. The patient states that his head felt heavy, he placed his head on the bar and then passed out. The patient states that he woke up on the floor, and then had an episode of non bloody non bilious vomiting with diaphoresis. The patient denies bladder incontinence, tongue biting, prolonged confusion. He says that he was only "out for a couple seconds and woke up wondering why he was on the floor".    Denies fevers, chills, diarrhea, blood in his urine or stool. Denies palpitations.     Pt states that he has had episodes of passing out in the past, including when he was young. States that this hasn't happened for a couple years.      The history is provided by the patient and medical records.   Review of patient's allergies indicates:  No Known Allergies  Past Medical History:   Diagnosis Date    Alcohol abuse     BPH (benign prostatic hyperplasia)     Cirrhosis 06/21/2018    pt states that he was diagnosed a week ago during his last hospital visit    Gastritis     Hypertension     Renal disorder      Past Surgical History:   Procedure Laterality Date    CAPSULOTOMY OF JOINT Right 7/29/2019    Procedure: CAPSULOTOMY, JOINT;  Surgeon: Raj Grossman MD;  Location: [address];  Service: Orthopedics;  Laterality: Right;    PERCUTANEOUS PINNING OF HIP Right 7/29/2019    Procedure: PINNING, HIP, PERCUTANEOUS- synthes cannulated screws- [facility] table- C arm door side-;  Surgeon: Raj Grossman MD;  Location: [facility] OR [address] [place] [address];  Service: Orthopedics;  Laterality: Right;     Family History   Problem Relation " Age of Onset    Hypertension Father     Heart disease Father     Heart disease Brother      Social History     Tobacco Use    Smoking status: Every Day     Packs/day: 1.00     Types: Cigarettes    Smokeless tobacco: Never   Substance Use Topics    Alcohol use: Yes     Comment: (former drinker; 2months sober) this visit admits drinking a couple beers today    Drug use: No     Review of Systems   Constitutional:  Positive for diaphoresis. Negative for activity change, chills, fatigue and fever.   HENT:  Negative for ear pain, facial swelling, hearing loss, trouble swallowing and voice change.    Eyes:  Negative for redness and visual disturbance.   Respiratory:  Negative for cough, chest tightness and shortness of breath.    Cardiovascular:  Negative for chest pain, palpitations and leg swelling.   Gastrointestinal:  Positive for constipation, nausea and vomiting. Negative for abdominal distention, abdominal pain, blood in stool and diarrhea.   Genitourinary:  Negative for difficulty urinating, dysuria and hematuria.   Musculoskeletal:  Negative for back pain, neck pain and neck stiffness.   Skin:  Positive for wound. Negative for rash.   Neurological:  Positive for numbness (toes). Negative for dizziness, tremors, seizures, facial asymmetry, speech difficulty, weakness, light-headedness and headaches.   Psychiatric/Behavioral:  Negative for agitation, behavioral problems, confusion and decreased concentration.      Physical Exam     Initial Vitals [11/02/22 2112]   BP Pulse Resp Temp SpO2   (!) 164/99 81 16 98.1 °F (36.7 °C) 100 %      MAP       --         Physical Exam    Nursing note and vitals reviewed.  Constitutional: He appears well-developed and well-nourished. He is not diaphoretic. No distress.   HENT:   Head: Normocephalic. Head is with contusion.       Right Ear: External ear normal.   Left Ear: External ear normal.   Eyes: Conjunctivae and EOM are normal.   Neck: Neck supple.   Normal range of  motion.  Cardiovascular:  Normal rate, regular rhythm and normal heart sounds.           Pulmonary/Chest: Breath sounds normal. No respiratory distress. He has no rales. He exhibits no tenderness.   Abdominal: Abdomen is soft. Bowel sounds are normal. He exhibits no distension. There is no abdominal tenderness.   Musculoskeletal:         General: No tenderness or edema.      Cervical back: Normal range of motion and neck supple.     Neurological: He is alert and oriented to person, place, and time.   Psychiatric: He has a normal mood and affect. Thought content normal.       ED Course   Procedures  Labs Reviewed   CBC W/ AUTO DIFFERENTIAL - Abnormal; Notable for the following components:       Result Value    RBC 3.99 (*)     Hemoglobin 11.1 (*)     Hematocrit 34.8 (*)     MCHC 31.9 (*)     RDW 19.3 (*)     Platelets 100 (*)     All other components within normal limits   COMPREHENSIVE METABOLIC PANEL - Abnormal; Notable for the following components:    CO2 18 (*)     AST 59 (*)     eGFR 54.7 (*)     All other components within normal limits   HIV 1 / 2 ANTIBODY    Narrative:     Release to patient->Immediate   HEPATITIS C ANTIBODY   DRUG SCREEN PANEL, URINE EMERGENCY   URINALYSIS, REFLEX TO URINE CULTURE   TROPONIN I   ALCOHOL,MEDICAL (ETHANOL)   MAGNESIUM          Imaging Results              X-Ray Chest AP Portable (In process)  Result time 11/03/22 00:16:57                     Medications   acetaminophen tablet 650 mg (has no administration in time range)   lisinopriL tablet 20 mg (has no administration in time range)     Medical Decision Making:   Initial Assessment:   Pt is a 68 yo M with a PMH of HTN, alcohol abuse, cirrhosis, BPH who presents after concern for a fall. The patient states that he was at a bar and had half his drink, took one bite of his calzone with subsequent LOC, vomiting, and diaphoresis. Previous history of passing out.     Denies fevers, chills, diarrhea, blood in his urine or stool.  Denies palpitations.     Hematoma noted on left side of his forehead. Pt with abrasion of his left forearm.      Differential Diagnosis:   Vasovagal syncope   Arrhythmia  Alcohol intoxication   Seizure      Clinical Tests:   Lab Tests: Ordered  Radiological Study: Ordered  Medical Tests: Ordered  ED Management:  CBC and CMP without acute concern, minor anemia not severe enough to explain syncopal episode. Bicarb of 18 concerning for acidosis. EKG with sinus rhythm with frequent PVCs.     CTH and CT C spine pending. Will obtain troponin, ethanol level, magnesium, UA, Urine drug screen, and CXR.            ED Course as of 11/03/22 0106   u Nov 03, 2022   0037 X-Ray Chest AP Portable(!)  Evidence of rib fracture, pt endorses history of right sided rib fracture, no pain at this time, no tenderness to palpation, able to take deep breaths, likely old fracture [HM]      ED Course User Index  [HM] Cira Vegas MD                 Clinical Impression:   Final diagnoses:  [R55] Syncope (Primary)  [R55] Vasovagal syncope               Cira Vegas MD  Resident  11/03/22 0106

## 2022-11-03 NOTE — ASSESSMENT & PLAN NOTE
Cirrhosis complicated by EV s/p banding and pancreatitis with possible pseudocyst  LFTs and PT/INR WNL on arrival  Denies hemoptysis, hematemesis, melena, hematochezia  No ascites on exam  Compensated    Patient was not taking Propranolol at home  I explained the reasoning behind it including prevention of further variceal bleeding and will aid with his BP  Patient amenable to initiate, will re-start.

## 2022-11-03 NOTE — ASSESSMENT & PLAN NOTE
Patient with long term smoking history, reports 1/2-1 PPD for > 20 years.   Patient was counseled on smoking cessation  PRN nicotine patch  3mm AYLIN pulmonary nodule found incidentally on CT C-Spine, will need to be followed up by PCP

## 2022-11-03 NOTE — H&P
[facility] Medicine  History & Physical    Patient Name: Fransico Montalvo  MRN: [id number]  Patient Class: OP- Observation  Admission Date: 11/2/2022  Attending Physician: Chris Bennett MD   Primary Care Provider: Josemichelle         Patient information was obtained from patient, past medical records and ER records.     Subjective:     Principal Problem:Syncope    Chief Complaint:   Chief Complaint   Patient presents with    Fall     Syncopal episode and fell at bar. Pt reports half an alcoholic beverage. GCS 15.         HPI: 67 y.o M with alcoholic cirhhosis c/b esophageal varices s/p banding and alcoholic pancreatitis (with ?pseudocyst), CKD 3, polysubstance abuse (Tobacco + THC), BPH, fracture of R femoral neck s/p screw fixation presenting with syncope    Patient reports earlier in the evening on day of presentation he was sitting at the bar with his girlfriend having a drink and eating when he started to feel progressively worsening dizziness. He states he put his head down to rest then subsequently syncopized and fell to the floor hitting his L forehead. He came to while he was on the floor. Per his girlfriend he was out for 5 seconds before he regained consciousness. There was no gaze deviation, tongue biting or clonus during the episode but he did have urinary incontinence. There was no confusion directly after, he was able to appropriately conversate and was oriented x3. No palpitations prior. He reports he did have one prior episode of syncope in his 20s that occured during micturition. Otherwise, he frequently experienced dizziness with quick postural changes (standing from sitting positions) but never actually passed out. He has no family history of syncope or sudden death. Regarding his alcohol use disorder, he reports that he significantly cut down his drinking in the past two years, now drinking an average of 10 mixed drinks per week, prior to that he was drinking one fifth of vodka  daily. He endorses THC use but denied all other recreational drug use (smoked 1/2 joint day of presentation). He does endorse that he is chronically dehydrated and will usually only drink 1-2 glasses of water per day. Otherwise denied fevers, chills, chest pain, dyspnea, n/v, urinary and bowel symptoms    On arrival to ED, he was hypertensive but otherwise vital signs WNL and saturating well on RA. EKG showed sinus rhythm with frequent PVCs and left anterior fascicular block. Labs largely unremarkable -- no leukocytosis, anemia and TCP that improved from prior, NAGMA CO2 18, AST mildly elevated at 59, remainder of LFTs WNL, PT-INR WNL. LA WNL. Negative troponin. Negative ethanol. Hypomagnesemia at 1.5. UDS positive for THC. UA showed trace protein/ketones/occult blood. CXR showed fracture of the 7th rib on the right posteriorly with minimal displacement and questionable small nondisplaced fracture of the 8th rib on the right laterally. CTH and CT C-spine were negative for hemmorrhage and fractures (but did show incidental 3mm AYLIN pulmonary nodule that will need follow up)    Patient admitted to hospital medicine for evaluation and management of syncope.          Past Medical History:   Diagnosis Date    Alcohol abuse     BPH (benign prostatic hyperplasia)     Cirrhosis 06/21/2018    pt states that he was diagnosed a week ago during his last hospital visit    Gastritis     Hypertension     Renal disorder        Past Surgical History:   Procedure Laterality Date    CAPSULOTOMY OF JOINT Right 7/29/2019    Procedure: CAPSULOTOMY, JOINT;  Surgeon: Raj Grossman MD;  Location: [facility] OR [address] [place] [address];  Service: Orthopedics;  Laterality: Right;    PERCUTANEOUS PINNING OF HIP Right 7/29/2019    Procedure: PINNING, HIP, PERCUTANEOUS- synthes cannulated screws- [facility] table- C arm door side-;  Surgeon: Raj Grossman MD;  Location: [facility] OR [address] [place] [address];  Service: Orthopedics;  Laterality: Right;       Review of patient's  allergies indicates:  No Known Allergies    No current facility-administered medications on file prior to encounter.     Current Outpatient Medications on File Prior to Encounter   Medication Sig    omeprazole (PRILOSEC) 20 MG capsule Take 2 capsules (40 mg total) by mouth once daily. (Patient taking differently: Take 40 mg by mouth as needed.)    albuterol sulfate 2.5 mg/0.5 mL Nebu Take 2.5 mg by nebulization every 6 (six) hours while awake. Rescue    bisacodyl (DULCOLAX) 10 mg Supp Place 1 suppository (10 mg total) rectally daily as needed (Until bowel movement if patient has no bowel movement for 2 days).    famotidine (PEPCID) 20 MG tablet Take 1 tablet (20 mg total) by mouth 2 (two) times daily as needed for Heartburn.    folic acid (FOLVITE) 1 MG tablet Take 1 tablet (1 mg total) by mouth once daily.    hydrALAZINE (APRESOLINE) 25 MG tablet Take 1 tablet (25 mg total) by mouth every 8 (eight) hours as needed (SBP > 160).    HYDROcodone-acetaminophen (NORCO) 7.5-325 mg per tablet Take 1 tablet by mouth every 6 (six) hours as needed for Pain.    hydrOXYzine HCl (ATARAX) 25 MG tablet Take 1 tablet (25 mg total) by mouth 2 (two) times daily as needed for Anxiety.    hydrOXYzine pamoate (VISTARIL) 25 MG Cap Take 1 capsule (25 mg total) by mouth every evening.    lidocaine (LIDODERM) 5 % Place 2 patches onto the skin once daily. Remove & Discard patch within 12 hours or as directed by MD; apply to right knee    lisinopriL (PRINIVIL,ZESTRIL) 20 MG tablet Take 20 mg by mouth once daily.    mirtazapine (REMERON) 15 MG tablet every evening.     multivitamin (THERAGRAN) tablet Take 1 tablet by mouth once daily.    ondansetron (ZOFRAN-ODT) 4 MG TbDL Take 1 tablet (4 mg total) by mouth every 6 (six) hours as needed (nausea).    oxyCODONE-acetaminophen (PERCOCET) 5-325 mg per tablet Take 1 tablet by mouth every 4 to 6 hours as needed for Pain.    polyethylene glycol (GLYCOLAX) 17 gram PwPk Take 17 g by  mouth 2 (two) times daily as needed (constipation).    pregabalin (LYRICA) 75 MG capsule Take 1 capsule by mouth every evening    propranolol (INDERAL) 40 MG tablet Take 0.5 tablets (20 mg total) by mouth 2 (two) times daily.    senna-docusate 8.6-50 mg (PERICOLACE) 8.6-50 mg per tablet Take 1 tablet by mouth 2 (two) times daily.    sucralfate (CARAFATE) 100 mg/mL suspension Take 10 mLs (1 g total) by mouth 3 (three) times daily as needed (indigestion; heartburn).    tamsulosin (FLOMAX) 0.4 mg Cap Take by mouth once daily.    thiamine 100 MG tablet Take 1 tablet (100 mg total) by mouth once daily.    traMADol (ULTRAM) 50 mg tablet Take 1 tablet (50 mg total) by mouth every 6 (six) hours as needed for Pain.    traMADol (ULTRAM) 50 mg tablet Take 1 tablet by mouth every 6 hours as needed    vitamin D (VITAMIN D3) 2,000 unit Tab Take 1 tablet (2,000 Units total) by mouth once daily.     Family History       Problem Relation (Age of Onset)    Heart disease Father, Brother    Hypertension Father          Tobacco Use    Smoking status: Every Day     Packs/day: 1.00     Types: Cigarettes    Smokeless tobacco: Never   Substance and Sexual Activity    Alcohol use: Yes     Comment: (former drinker; 2months sober) this visit admits drinking a couple beers today    Drug use: No    Sexual activity: Not on file     Review of Systems   Constitutional:  Negative for appetite change, chills, fatigue and fever.   HENT:  Positive for congestion and rhinorrhea.    Eyes:  Negative for pain and visual disturbance.   Respiratory:  Negative for cough and shortness of breath.    Cardiovascular:  Negative for chest pain, palpitations and leg swelling.   Gastrointestinal:  Positive for constipation. Negative for abdominal pain, anal bleeding, blood in stool, diarrhea, nausea and vomiting.   Genitourinary:  Negative for dysuria, hematuria and urgency.   Musculoskeletal:  Negative for myalgias.   Skin:  Negative for rash and  wound.   Allergic/Immunologic: Positive for environmental allergies.   Neurological:  Positive for dizziness, syncope and light-headedness. Negative for weakness and headaches.   Psychiatric/Behavioral:  Negative for confusion, decreased concentration and dysphoric mood.    Objective:     Vital Signs (Most Recent):  Temp: 97.9 °F (36.6 °C) (11/03/22 0531)  Pulse: 75 (11/03/22 0531)  Resp: 20 (11/03/22 0531)  BP: (!) 141/78 (11/03/22 0531)  SpO2: 95 % (11/03/22 0531)   Vital Signs (24h Range):  Temp:  [97.9 °F (36.6 °C)-98.1 °F (36.7 °C)] 97.9 °F (36.6 °C)  Pulse:  [74-82] 75  Resp:  [16-20] 20  SpO2:  [95 %-100 %] 95 %  BP: (128-188)/() 141/78     Weight: 81.1 kg (178 lb 12.8 oz)  Body mass index is 24.25 kg/m².    Physical Exam  Constitutional:       Appearance: Normal appearance. He is not ill-appearing.   HENT:      Head: Normocephalic and atraumatic.      Nose: Congestion present.      Mouth/Throat:      Mouth: Mucous membranes are dry.      Pharynx: Oropharynx is clear.   Eyes:      Extraocular Movements: Extraocular movements intact.      Conjunctiva/sclera: Conjunctivae normal.   Cardiovascular:      Rate and Rhythm: Normal rate. Rhythm irregular.      Pulses: Normal pulses.      Heart sounds: Normal heart sounds.   Pulmonary:      Effort: Pulmonary effort is normal. No respiratory distress.      Breath sounds: Normal breath sounds. No wheezing or rales.   Abdominal:      General: Abdomen is flat. Bowel sounds are normal. There is no distension.      Palpations: Abdomen is soft.      Tenderness: There is no abdominal tenderness.   Musculoskeletal:         General: Normal range of motion.      Right lower leg: No edema.      Left lower leg: No edema.      Comments: +keratotic toe nails, poor foot hygiene   Skin:     General: Skin is warm and dry.      Capillary Refill: Capillary refill takes less than 2 seconds.      Comments: Decreased skin turgor   Neurological:      General: No focal deficit present.       Mental Status: He is alert and oriented to person, place, and time. Mental status is at baseline.   Psychiatric:         Mood and Affect: Mood normal.         Behavior: Behavior normal.         Thought Content: Thought content normal.         Judgment: Judgment normal.           Significant Labs: All pertinent labs within the past 24 hours have been reviewed.  CBC:   Recent Labs   Lab 11/02/22 2223 11/03/22 0523   WBC 5.24 4.91   HGB 11.1* 12.7*   HCT 34.8* 39.9*   *  --      CMP:   Recent Labs   Lab 11/02/22 2223      K 3.9      CO2 18*   GLU 94   BUN 17   CREATININE 1.4   CALCIUM 8.9   PROT 8.2   ALBUMIN 3.6   BILITOT 1.0   ALKPHOS 84   AST 59*   ALT 28   ANIONGAP 13     Lactic Acid:   Recent Labs   Lab 11/03/22 0523   LACTATE 1.0     Troponin:   Recent Labs   Lab 11/03/22  0040   TROPONINI <0.006     Urine Studies:   Recent Labs   Lab 11/03/22  0215   COLORU Yellow   APPEARANCEUA Clear   PHUR 6.0   SPECGRAV 1.020   PROTEINUA Trace*   GLUCUA Negative   KETONESU Trace*   BILIRUBINUA Negative   OCCULTUA Trace*   NITRITE Negative   LEUKOCYTESUR Negative       Significant Imaging: I have reviewed all pertinent imaging results/findings within the past 24 hours.  CT Cervical Spine Without Contrast   Final Result      No acute fracture or dislocation.      Severe degenerative changes throughout the entire cervical spine, significant for severe bilateral neural foraminal stenosis.      Yellow Pulmonary Nodule 2017 Alert: 0.3 centimeter solid pulmonary nodule in left upper lobe.      Yellow Actionable Finding (Radiology: Volume 284: Number 1-July 2017)      Fleischner Guidelines do not apply in patients younger than 35 years, immunocompromised patients or patients with cancer. Risk assignment based on ACCP with low risk being less than 5% and high risk combining intermediate and high risk categories.      UNEXPECTED FINDINGS:  Incidental Pulmonary Nodule Single Solid <6mm (1)       RECOMMENDATIONS:  If Low Risk No routine follow up, if High Risk optional CT Chest without contrast 12 months      Electronically signed by resident: Avel Reyes   Date:    11/03/2022   Time:    02:02      Electronically signed by: Papito Milligan MD   Date:    11/03/2022   Time:    02:25      CT Head Without Contrast   Final Result      Chronic changes of the brain parenchyma.      No evidence of acute intracranial pathology.      Electronically signed by resident: Avel Reyes   Date:    11/03/2022   Time:    01:56      Electronically signed by: Papito Milligan MD   Date:    11/03/2022   Time:    02:16      X-Ray Chest AP Portable   Final Result   Abnormal      1. Fracture of the 7th rib on the right posteriorly with minimal displacement.  This could be acute.  Recommend clinical correlation.   2. Questionable small nondisplaced fracture of the 8th rib on the right laterally also.  Recommend clinical correlation.   3.  This report was flagged in Epic as abnormal.         Electronically signed by: Maksim Kang   Date:    11/03/2022   Time:    00:22            Assessment/Plan:     * Syncope  67 y.o M with alcoholic cirhhosis c/b esophageal varices s/p banding and alcoholic pancreatitis (with ?pseudocyst), CKD 3, polysubstance abuse (Tobacco + THC), BPH, fracture of R femoral neck s/p screw fixation presenting with syncope with preceding dizziness. +urinary incontinence during episode but otherwise no clonus, gaze deviation, tongue biting or postictal confusion to support seizures. EKG showed frequent PVCs and left anterior fascicular block, so possibly arrhythmogenic, however the dizziness prodrome makes it less likely. No family hx of sudden death to suggest HOCM. Given his only prior syncopal episode occurred during micturition, and this occurred during eating/drinking my highest suspicion is for vasovagal syncope.     - F/u Orthostatic vitals  - Patient dry on exam, will give 1L fluids (RN aware to administer  after orthostatics obtained)  - Continuous telemetry to monitor for any possible arrhythmias  - TTE to evaluate valvular function  - Spot EEG   - Fall, Aspiration, Seizure precautions  - If arrhythmia seen, consider EP consult      Rib fractures  CXR on arrival showed:  1. Fracture of the 7th rib on the right posteriorly with minimal displacement.  This could be acute.    2. Questionable small nondisplaced fracture of the 8th rib on the right laterally also.      Ice area, PRN NSAIDs/Tylenol    Essential hypertension  Continue home Lisinopril 20mg  Restarted Propranolol 20mg BID      Anxiety and depression  Denies depressive symptoms, in remission  Continue Mirtazapine qhs      Alcoholic cirrhosis  Cirrhosis complicated by EV s/p banding and pancreatitis with possible pseudocyst  LFTs and PT/INR WNL on arrival  Denies hemoptysis, hematemesis, melena, hematochezia  No ascites on exam  Compensated    Patient was not taking Propranolol at home  I explained the reasoning behind it including prevention of further variceal bleeding and will aid with his BP  Patient amenable to initiate, will re-start.          Thrombocytopenia  Secondary to alcohol use disorder/cirrhosis  Stable  Patient denies overt bleeding episodes  No indication for transfusion      CKD Stage 3  Hx of CKD  Labs on arrival c/w patients baseline (Cr 1.4-1.6), no concern for [place] on CKD at this time    Renal function panel daily  Strict I&Os  Avoid nephrotoxic agents  Renally adjust medications  Goal MAP > 65  Optimal BP control        Anemia of chronic disease  Chronic, stable  Continue to monitor with daily CBCs      Tobacco abuse  Patient with long term smoking history, reports 1/2-1 PPD for > 20 years.   Patient was counseled on smoking cessation  PRN nicotine patch  3mm AYLIN pulmonary nodule found incidentally on CT C-Spine, will need to be followed up by PCP      BPH (benign prostatic hyperplasia)  Chronic, stable  Continue FloMax        VTE Risk  Mitigation (From admission, onward)         Ordered     IP VTE LOW RISK PATIENT  Once         11/03/22 0408     Place sequential compression device  Until discontinued         11/03/22 0408                   Ana Montejo MD  Department of Hospital Medicine   [facility] - Observation

## 2022-11-03 NOTE — ASSESSMENT & PLAN NOTE
CXR on arrival showed:  1. Fracture of the 7th rib on the right posteriorly with minimal displacement.  This could be acute.    2. Questionable small nondisplaced fracture of the 8th rib on the right laterally also.      Ice area, PRN NSAIDs/Tylenol

## 2022-11-03 NOTE — ASSESSMENT & PLAN NOTE
Secondary to alcohol use disorder/cirrhosis  Stable  Patient denies overt bleeding episodes  No indication for transfusion

## 2022-11-03 NOTE — ED TRIAGE NOTES
The pt is a 67-year-old male who presents to the ED from home via EMS. The pt states that while at dinner, he had fainted and woke up in his vomit and had hit his head. Now is reporting a headache. Denies the use of blood thinners. States his LOC was for a few seconds.

## 2022-11-03 NOTE — SUBJECTIVE & OBJECTIVE
Past Medical History:   Diagnosis Date    Alcohol abuse     BPH (benign prostatic hyperplasia)     Cirrhosis 06/21/2018    pt states that he was diagnosed a week ago during his last hospital visit    Gastritis     Hypertension     Renal disorder        Past Surgical History:   Procedure Laterality Date    CAPSULOTOMY OF JOINT Right 7/29/2019    Procedure: CAPSULOTOMY, JOINT;  Surgeon: Raj Grossman MD;  Location: [address];  Service: Orthopedics;  Laterality: Right;    PERCUTANEOUS PINNING OF HIP Right 7/29/2019    Procedure: PINNING, HIP, PERCUTANEOUS- synthes cannulated screws- hana table- C arm door side-;  Surgeon: Raj Grossman MD;  Location: [address];  Service: Orthopedics;  Laterality: Right;       Review of patient's allergies indicates:  No Known Allergies    No current facility-administered medications on file prior to encounter.     Current Outpatient Medications on File Prior to Encounter   Medication Sig    omeprazole (PRILOSEC) 20 MG capsule Take 2 capsules (40 mg total) by mouth once daily. (Patient taking differently: Take 40 mg by mouth as needed.)    albuterol sulfate 2.5 mg/0.5 mL Nebu Take 2.5 mg by nebulization every 6 (six) hours while awake. Rescue    bisacodyl (DULCOLAX) 10 mg Supp Place 1 suppository (10 mg total) rectally daily as needed (Until bowel movement if patient has no bowel movement for 2 days).    famotidine (PEPCID) 20 MG tablet Take 1 tablet (20 mg total) by mouth 2 (two) times daily as needed for Heartburn.    folic acid (FOLVITE) 1 MG tablet Take 1 tablet (1 mg total) by mouth once daily.    hydrALAZINE (APRESOLINE) 25 MG tablet Take 1 tablet (25 mg total) by mouth every 8 (eight) hours as needed (SBP > 160).    HYDROcodone-acetaminophen (NORCO) 7.5-325 mg per tablet Take 1 tablet by mouth every 6 (six) hours as needed for Pain.    hydrOXYzine HCl (ATARAX) 25 MG tablet Take 1 tablet (25 mg total) by mouth 2 (two) times daily as needed for Anxiety.     hydrOXYzine pamoate (VISTARIL) 25 MG Cap Take 1 capsule (25 mg total) by mouth every evening.    lidocaine (LIDODERM) 5 % Place 2 patches onto the skin once daily. Remove & Discard patch within 12 hours or as directed by MD; apply to right knee    lisinopriL (PRINIVIL,ZESTRIL) 20 MG tablet Take 20 mg by mouth once daily.    mirtazapine (REMERON) 15 MG tablet every evening.     multivitamin (THERAGRAN) tablet Take 1 tablet by mouth once daily.    ondansetron (ZOFRAN-ODT) 4 MG TbDL Take 1 tablet (4 mg total) by mouth every 6 (six) hours as needed (nausea).    oxyCODONE-acetaminophen (PERCOCET) 5-325 mg per tablet Take 1 tablet by mouth every 4 to 6 hours as needed for Pain.    polyethylene glycol (GLYCOLAX) 17 gram PwPk Take 17 g by mouth 2 (two) times daily as needed (constipation).    pregabalin (LYRICA) 75 MG capsule Take 1 capsule by mouth every evening    propranolol (INDERAL) 40 MG tablet Take 0.5 tablets (20 mg total) by mouth 2 (two) times daily.    senna-docusate 8.6-50 mg (PERICOLACE) 8.6-50 mg per tablet Take 1 tablet by mouth 2 (two) times daily.    sucralfate (CARAFATE) 100 mg/mL suspension Take 10 mLs (1 g total) by mouth 3 (three) times daily as needed (indigestion; heartburn).    tamsulosin (FLOMAX) 0.4 mg Cap Take by mouth once daily.    thiamine 100 MG tablet Take 1 tablet (100 mg total) by mouth once daily.    traMADol (ULTRAM) 50 mg tablet Take 1 tablet (50 mg total) by mouth every 6 (six) hours as needed for Pain.    traMADol (ULTRAM) 50 mg tablet Take 1 tablet by mouth every 6 hours as needed    vitamin D (VITAMIN D3) 2,000 unit Tab Take 1 tablet (2,000 Units total) by mouth once daily.     Family History       Problem Relation (Age of Onset)    Heart disease Father, Brother    Hypertension Father          Tobacco Use    Smoking status: Every Day     Packs/day: 1.00     Types: Cigarettes    Smokeless tobacco: Never   Substance and Sexual Activity    Alcohol use: Yes     Comment: (former drinker;  2months sober) this visit admits drinking a couple beers today    Drug use: No    Sexual activity: Not on file     Review of Systems   Constitutional:  Negative for appetite change, chills, fatigue and fever.   HENT:  Positive for congestion and rhinorrhea.    Eyes:  Negative for pain and visual disturbance.   Respiratory:  Negative for cough and shortness of breath.    Cardiovascular:  Negative for chest pain, palpitations and leg swelling.   Gastrointestinal:  Positive for constipation. Negative for abdominal pain, anal bleeding, blood in stool, diarrhea, nausea and vomiting.   Genitourinary:  Negative for dysuria, hematuria and urgency.   Musculoskeletal:  Negative for myalgias.   Skin:  Negative for rash and wound.   Allergic/Immunologic: Positive for environmental allergies.   Neurological:  Positive for dizziness, syncope and light-headedness. Negative for weakness and headaches.   Psychiatric/Behavioral:  Negative for confusion, decreased concentration and dysphoric mood.    Objective:     Vital Signs (Most Recent):  Temp: 97.9 °F (36.6 °C) (11/03/22 0531)  Pulse: 75 (11/03/22 0531)  Resp: 20 (11/03/22 0531)  BP: (!) 141/78 (11/03/22 0531)  SpO2: 95 % (11/03/22 0531)   Vital Signs (24h Range):  Temp:  [97.9 °F (36.6 °C)-98.1 °F (36.7 °C)] 97.9 °F (36.6 °C)  Pulse:  [74-82] 75  Resp:  [16-20] 20  SpO2:  [95 %-100 %] 95 %  BP: (128-188)/() 141/78     Weight: 81.1 kg (178 lb 12.8 oz)  Body mass index is 24.25 kg/m².    Physical Exam  Constitutional:       Appearance: Normal appearance. He is not ill-appearing.   HENT:      Head: Normocephalic and atraumatic.      Nose: Congestion present.      Mouth/Throat:      Mouth: Mucous membranes are dry.      Pharynx: Oropharynx is clear.   Eyes:      Extraocular Movements: Extraocular movements intact.      Conjunctiva/sclera: Conjunctivae normal.   Cardiovascular:      Rate and Rhythm: Normal rate. Rhythm irregular.      Pulses: Normal pulses.      Heart sounds:  Normal heart sounds.   Pulmonary:      Effort: Pulmonary effort is normal. No respiratory distress.      Breath sounds: Normal breath sounds. No wheezing or rales.   Abdominal:      General: Abdomen is flat. Bowel sounds are normal. There is no distension.      Palpations: Abdomen is soft.      Tenderness: There is no abdominal tenderness.   Musculoskeletal:         General: Normal range of motion.      Right lower leg: No edema.      Left lower leg: No edema.      Comments: +keratotic toe nails, poor foot hygiene   Skin:     General: Skin is warm and dry.      Capillary Refill: Capillary refill takes less than 2 seconds.      Comments: Decreased skin turgor   Neurological:      General: No focal deficit present.      Mental Status: He is alert and oriented to person, place, and time. Mental status is at baseline.   Psychiatric:         Mood and Affect: Mood normal.         Behavior: Behavior normal.         Thought Content: Thought content normal.         Judgment: Judgment normal.           Significant Labs: All pertinent labs within the past 24 hours have been reviewed.  CBC:   Recent Labs   Lab 11/02/22 2223 11/03/22  0523   WBC 5.24 4.91   HGB 11.1* 12.7*   HCT 34.8* 39.9*   *  --      CMP:   Recent Labs   Lab 11/02/22  2223      K 3.9      CO2 18*   GLU 94   BUN 17   CREATININE 1.4   CALCIUM 8.9   PROT 8.2   ALBUMIN 3.6   BILITOT 1.0   ALKPHOS 84   AST 59*   ALT 28   ANIONGAP 13     Lactic Acid:   Recent Labs   Lab 11/03/22  0523   LACTATE 1.0     Troponin:   Recent Labs   Lab 11/03/22  0040   TROPONINI <0.006     Urine Studies:   Recent Labs   Lab 11/03/22  0215   COLORU Yellow   APPEARANCEUA Clear   PHUR 6.0   SPECGRAV 1.020   PROTEINUA Trace*   GLUCUA Negative   KETONESU Trace*   BILIRUBINUA Negative   OCCULTUA Trace*   NITRITE Negative   LEUKOCYTESUR Negative       Significant Imaging: I have reviewed all pertinent imaging results/findings within the past 24 hours.  CT Cervical Spine  Without Contrast   Final Result      No acute fracture or dislocation.      Severe degenerative changes throughout the entire cervical spine, significant for severe bilateral neural foraminal stenosis.      Yellow Pulmonary Nodule 2017 Alert: 0.3 centimeter solid pulmonary nodule in left upper lobe.      Yellow Actionable Finding (Radiology: Volume 284: Number 1-July 2017)      Fleischner Guidelines do not apply in patients younger than 35 years, immunocompromised patients or patients with cancer. Risk assignment based on ACCP with low risk being less than 5% and high risk combining intermediate and high risk categories.      UNEXPECTED FINDINGS:  Incidental Pulmonary Nodule Single Solid <6mm (1)      RECOMMENDATIONS:  If Low Risk No routine follow up, if High Risk optional CT Chest without contrast 12 months      Electronically signed by resident: Avel Reyes   Date:    11/03/2022   Time:    02:02      Electronically signed by: Papito Milligan MD   Date:    11/03/2022   Time:    02:25      CT Head Without Contrast   Final Result      Chronic changes of the brain parenchyma.      No evidence of acute intracranial pathology.      Electronically signed by resident: Avel Reyes   Date:    11/03/2022   Time:    01:56      Electronically signed by: Papito Milligan MD   Date:    11/03/2022   Time:    02:16      X-Ray Chest AP Portable   Final Result   Abnormal      1. Fracture of the 7th rib on the right posteriorly with minimal displacement.  This could be acute.  Recommend clinical correlation.   2. Questionable small nondisplaced fracture of the 8th rib on the right laterally also.  Recommend clinical correlation.   3.  This report was flagged in Epic as abnormal.         Electronically signed by: Maksim Kang   Date:    11/03/2022   Time:    00:22

## 2022-11-03 NOTE — ED TRIAGE NOTES
Pt states that he was ay a bar with a friend. He had ordered a martini and a calzone. Pt states that he drank half his martini and took two bites of his calzone and became dizzy. Pt states that he put his head on the bar and then had a syncopal episode and fell to floor. Pt states he was awake immediately after falling. Pt with hematoma to forehead.

## 2022-11-03 NOTE — PLAN OF CARE
[facility] - Observation  Discharge Assessment    Primary Care Provider: Josemichelle     Discharge Assessment (most recent)       BRIEF DISCHARGE ASSESSMENT - 11/03/22 1443          Discharge Planning    Assessment Type Discharge Planning Brief Assessment     Resource/Environmental Concerns none     Support Systems Friends/neighbors     Equipment Currently Used at Home none     Current Living Arrangements home/apartment/condo     Patient/Family Anticipates Transition to home     Patient/Family Anticipated Services at Transition none     DME Needed Upon Discharge  none     Discharge Plan A Home     Discharge Plan B Home                   Pt lives in a one story home with 5STE. Pt was independent prior to admission, uses no DME, is not on dialysis or Coumadin.     SW will follow for discharge needs.     CHARLES Bah, ADELAIDA  [facility]  [id number]

## 2022-11-03 NOTE — ASSESSMENT & PLAN NOTE
67 y.o M with alcoholic cirhhosis c/b esophageal varices s/p banding and alcoholic pancreatitis (with ?pseudocyst), CKD 3, polysubstance abuse (Tobacco + THC), BPH, fracture of R femoral neck s/p screw fixation presenting with syncope with preceding dizziness. +urinary incontinence during episode but otherwise no clonus, gaze deviation, tongue biting or postictal confusion to support seizures. EKG showed frequent PVCs and left anterior fascicular block, so possibly arrhythmogenic, however the dizziness prodrome makes it less likely. No family hx of sudden death to suggest HOCM. Given his only prior syncopal episode occurred during micturition, and this occurred during eating/drinking my highest suspicion is for vasovagal syncope.     - F/u Orthostatic vitals  - Patient dry on exam, will give 1L fluids (RN aware to administer after orthostatics obtained)  - Continuous telemetry to monitor for any possible arrhythmias  - TTE to evaluate valvular function  - Spot EEG   - Fall, Aspiration, Seizure precautions  - If arrhythmia seen, consider EP consult

## 2023-01-31 ENCOUNTER — HOSPITAL ENCOUNTER (EMERGENCY)
Facility: OTHER | Age: 68
Discharge: HOME | End: 2023-02-01
Attending: EMERGENCY MEDICINE
Payer: MEDICARE

## 2023-01-31 DIAGNOSIS — E83.42 HYPOMAGNESEMIA: ICD-10-CM

## 2023-01-31 DIAGNOSIS — R42 LIGHTHEADED: ICD-10-CM

## 2023-01-31 DIAGNOSIS — E86.0 DEHYDRATION: Primary | ICD-10-CM

## 2023-01-31 LAB
ALBUMIN SERPL BCP-MCNC: 3.6 G/DL (ref 3.5–5.2)
ALP SERPL-CCNC: 74 U/L (ref 55–135)
ALT SERPL W/O P-5'-P-CCNC: 23 U/L (ref 10–44)
ANION GAP SERPL CALC-SCNC: 11 MMOL/L (ref 8–16)
AST SERPL-CCNC: 45 U/L (ref 10–40)
BASOPHILS # BLD AUTO: 0.04 K/UL (ref 0–0.2)
BASOPHILS NFR BLD: 0.6 % (ref 0–1.9)
BILIRUB SERPL-MCNC: 2 MG/DL (ref 0.1–1)
BILIRUB UR QL STRIP: NEGATIVE
BNP SERPL-MCNC: 122 PG/ML (ref 0–99)
BUN SERPL-MCNC: 20 MG/DL (ref 8–23)
CALCIUM SERPL-MCNC: 8.4 MG/DL (ref 8.7–10.5)
CHLORIDE SERPL-SCNC: 105 MMOL/L (ref 95–110)
CLARITY UR: CLEAR
CO2 SERPL-SCNC: 20 MMOL/L (ref 23–29)
COLOR UR: YELLOW
CREAT SERPL-MCNC: 1.6 MG/DL (ref 0.5–1.4)
DIFFERENTIAL METHOD: ABNORMAL
EOSINOPHIL # BLD AUTO: 0 K/UL (ref 0–0.5)
EOSINOPHIL NFR BLD: 0.5 % (ref 0–8)
ERYTHROCYTE [DISTWIDTH] IN BLOOD BY AUTOMATED COUNT: 18.2 % (ref 11.5–14.5)
EST. GFR  (NO RACE VARIABLE): 47 ML/MIN/1.73 M^2
ETHANOL SERPL-MCNC: <10 MG/DL
GLUCOSE SERPL-MCNC: 93 MG/DL (ref 70–110)
GLUCOSE UR QL STRIP: NEGATIVE
HCT VFR BLD AUTO: 38.1 % (ref 40–54)
HGB BLD-MCNC: 12.2 G/DL (ref 14–18)
HGB UR QL STRIP: ABNORMAL
IMM GRANULOCYTES # BLD AUTO: 0.01 K/UL (ref 0–0.04)
IMM GRANULOCYTES NFR BLD AUTO: 0.2 % (ref 0–0.5)
KETONES UR QL STRIP: ABNORMAL
LEUKOCYTE ESTERASE UR QL STRIP: NEGATIVE
LIPASE SERPL-CCNC: 25 U/L (ref 4–60)
LYMPHOCYTES # BLD AUTO: 0.8 K/UL (ref 1–4.8)
LYMPHOCYTES NFR BLD: 12.7 % (ref 18–48)
MCH RBC QN AUTO: 30 PG (ref 27–31)
MCHC RBC AUTO-ENTMCNC: 32 G/DL (ref 32–36)
MCV RBC AUTO: 94 FL (ref 82–98)
MONOCYTES # BLD AUTO: 0.7 K/UL (ref 0.3–1)
MONOCYTES NFR BLD: 10.4 % (ref 4–15)
NEUTROPHILS # BLD AUTO: 5 K/UL (ref 1.8–7.7)
NEUTROPHILS NFR BLD: 75.6 % (ref 38–73)
NITRITE UR QL STRIP: NEGATIVE
NRBC BLD-RTO: 0 /100 WBC
PH UR STRIP: 6 [PH] (ref 5–8)
PLATELET # BLD AUTO: 101 K/UL (ref 150–450)
PMV BLD AUTO: 12.6 FL (ref 9.2–12.9)
POCT GLUCOSE: 91 MG/DL (ref 70–110)
POTASSIUM SERPL-SCNC: 3.9 MMOL/L (ref 3.5–5.1)
PROT SERPL-MCNC: 8.2 G/DL (ref 6–8.4)
PROT UR QL STRIP: ABNORMAL
RBC # BLD AUTO: 4.07 M/UL (ref 4.6–6.2)
SODIUM SERPL-SCNC: 136 MMOL/L (ref 136–145)
SP GR UR STRIP: 1.01 (ref 1–1.03)
TROPONIN I SERPL DL<=0.01 NG/ML-MCNC: 0.02 NG/ML (ref 0–0.03)
URN SPEC COLLECT METH UR: ABNORMAL
UROBILINOGEN UR STRIP-ACNC: 1 EU/DL
WBC # BLD AUTO: 6.61 K/UL (ref 3.9–12.7)

## 2023-01-31 PROCEDURE — 85025 COMPLETE CBC W/AUTO DIFF WBC: CPT | Performed by: EMERGENCY MEDICINE

## 2023-01-31 PROCEDURE — 83880 ASSAY OF NATRIURETIC PEPTIDE: CPT | Performed by: EMERGENCY MEDICINE

## 2023-01-31 PROCEDURE — 82962 GLUCOSE BLOOD TEST: CPT

## 2023-01-31 PROCEDURE — 93005 ELECTROCARDIOGRAM TRACING: CPT

## 2023-01-31 PROCEDURE — 99284 EMERGENCY DEPT VISIT MOD MDM: CPT | Mod: 25

## 2023-01-31 PROCEDURE — 84484 ASSAY OF TROPONIN QUANT: CPT | Performed by: EMERGENCY MEDICINE

## 2023-01-31 PROCEDURE — 93010 EKG 12-LEAD: ICD-10-PCS | Mod: ,,, | Performed by: INTERNAL MEDICINE

## 2023-01-31 PROCEDURE — 25000003 PHARM REV CODE 250: Performed by: EMERGENCY MEDICINE

## 2023-01-31 PROCEDURE — 80307 DRUG TEST PRSMV CHEM ANLYZR: CPT | Performed by: EMERGENCY MEDICINE

## 2023-01-31 PROCEDURE — 82077 ASSAY SPEC XCP UR&BREATH IA: CPT | Performed by: EMERGENCY MEDICINE

## 2023-01-31 PROCEDURE — 81003 URINALYSIS AUTO W/O SCOPE: CPT | Mod: 59 | Performed by: EMERGENCY MEDICINE

## 2023-01-31 PROCEDURE — 83690 ASSAY OF LIPASE: CPT | Performed by: EMERGENCY MEDICINE

## 2023-01-31 PROCEDURE — 83735 ASSAY OF MAGNESIUM: CPT | Performed by: EMERGENCY MEDICINE

## 2023-01-31 PROCEDURE — 93010 ELECTROCARDIOGRAM REPORT: CPT | Mod: ,,, | Performed by: INTERNAL MEDICINE

## 2023-01-31 PROCEDURE — 96360 HYDRATION IV INFUSION INIT: CPT

## 2023-01-31 PROCEDURE — 80053 COMPREHEN METABOLIC PANEL: CPT | Performed by: EMERGENCY MEDICINE

## 2023-01-31 RX ADMIN — SODIUM CHLORIDE 1000 ML: 0.9 INJECTION, SOLUTION INTRAVENOUS at 09:01

## 2023-02-01 VITALS
HEIGHT: 68 IN | RESPIRATION RATE: 17 BRPM | HEART RATE: 98 BPM | TEMPERATURE: 98 F | SYSTOLIC BLOOD PRESSURE: 168 MMHG | WEIGHT: 180 LBS | DIASTOLIC BLOOD PRESSURE: 102 MMHG | BODY MASS INDEX: 27.28 KG/M2 | OXYGEN SATURATION: 98 %

## 2023-02-01 LAB
AMPHET+METHAMPHET UR QL: NEGATIVE
BARBITURATES UR QL SCN>200 NG/ML: NEGATIVE
BENZODIAZ UR QL SCN>200 NG/ML: NEGATIVE
BZE UR QL SCN: NEGATIVE
CANNABINOIDS UR QL SCN: NEGATIVE
CREAT UR-MCNC: 69.8 MG/DL (ref 23–375)
MAGNESIUM SERPL-MCNC: 1.2 MG/DL (ref 1.6–2.6)
METHADONE UR QL SCN>300 NG/ML: NEGATIVE
OPIATES UR QL SCN: NEGATIVE
PCP UR QL SCN>25 NG/ML: NEGATIVE
TOXICOLOGY INFORMATION: NORMAL

## 2023-02-01 RX ORDER — VITS A,C,E/LUTEIN/MINERALS 300MCG-200
1 TABLET ORAL DAILY
Qty: 60 TABLET | Refills: 0 | Status: SHIPPED | OUTPATIENT
Start: 2023-02-01 | End: 2023-07-19

## 2023-02-01 NOTE — ED TRIAGE NOTES
"Pt presents to ER via ambulance. Pt states that he was having dinner tonight and started to "feel bad" pt reports that the room was spinning and he felt light headed. Pt denies CP/ SOB. Denies N/V. Pt laid on the restaurant's floor and placed his feet on a chair. Pt reports that he felt better immediately.   "

## 2023-02-01 NOTE — ED PROVIDER NOTES
Encounter Date: 1/31/2023       History     Chief Complaint   Patient presents with    Dizziness       C/o of dizziness that started approximately 1 hour ago while smoking outside.  911 activated and transported by [facility]     (-)chest pain, sob, abd pain, weakness and NVD  (-)NEURO deficits  (-)alcohol/drug usage       67-year-old pleasant gentleman that presents for evaluation of lightheadedness and weakness similar to a previous episode he had suffered during which he passed out entirely this time because he began to feel unwell he sat down and tried to rest, thereafter as he continued to have worsening lightheadedness he laid on the ground and prompted his legs up which did seem to improve his symptoms.  He was admitted previously for the episode and underwent testing was told that he likely was dehydrated at that time and has done okay since but notes that over last few days he had felt some worsening fatigue.  Denies any fevers, chills, injuries elsewhere focal numbness or weakness at this time.    Review of patient's allergies indicates:  No Known Allergies  Past Medical History:   Diagnosis Date    Alcohol abuse     BPH (benign prostatic hyperplasia)     Cirrhosis 06/21/2018    pt states that he was diagnosed a week ago during his last hospital visit    Gastritis     Hypertension     Renal disorder      Past Surgical History:   Procedure Laterality Date    CAPSULOTOMY OF JOINT Right 7/29/2019    Procedure: CAPSULOTOMY, JOINT;  Surgeon: Raj Grossman MD;  Location: [facility] OR [address] [place] [address];  Service: Orthopedics;  Laterality: Right;    PERCUTANEOUS PINNING OF HIP Right 7/29/2019    Procedure: PINNING, HIP, PERCUTANEOUS- synthes cannulated screws- hana table- C arm door side-;  Surgeon: Raj Grossman MD;  Location: [facility] OR [address] [place] [address];  Service: Orthopedics;  Laterality: Right;     Family History   Problem Relation Age of Onset    Hypertension Father     Heart disease Father     Heart disease Brother      Social  History     Tobacco Use    Smoking status: Every Day     Packs/day: 1.00     Types: Cigarettes    Smokeless tobacco: Never   Substance Use Topics    Alcohol use: Yes     Comment: (former drinker; 2months sober) this visit admits drinking a couple beers today    Drug use: No     Review of Systems  Constitutional-no fever positive fatigue  HEENT-no congestion  Eyes-no redness  Respiratory-no shortness of breath  Cardio-no chest pain  GI-no abdominal pain  Endocrine-no cold intolerance  -no difficulty urinating  MSK-no myalgias  Skin-no rashes  Allergy-no environmental allergy  Neurologic-, no headache positive dizziness  Hematology-no swollen nodes  Behavioral-no confusion  Physical Exam     Initial Vitals [01/31/23 2034]   BP Pulse Resp Temp SpO2   103/71 98 17 98.3 °F (36.8 °C) 100 %      MAP       --         Physical Exam  Constitutional:  Mildly ill-appearing 67-year-old man  Eyes: Conjunctivae normal.  ENT       Head: Normocephalic, atraumatic.       Nose: Normal external appearance        Mouth/Throat: no strigulous respirations dried cracked lips mild odor of ketones on breath  Hematological/Lymphatic/Immunilogical: no visible lymphadenopathy   Cardiovascular: Normal rate,   Respiratory: Normal respiratory effort.   Gastrointestinal: non distended   Musculoskeletal: Normal range of motion in all extremities. No obvious deformities or swelling.  Neurologic: Alert, oriented. Normal speech and language. No gross focal neurologic deficits are appreciated.  NIH 0  GCS- 15  CN2-12 intact  5/5 strength all 4 extremities  Normal gait  Negative rhomberg  No appreciable drift  Skin: Skin is warm, dry. No rash noted.  Psychiatric: Mood and affect are normal.   ED Course   Procedures  Labs Reviewed   CBC W/ AUTO DIFFERENTIAL - Abnormal; Notable for the following components:       Result Value    RBC 4.07 (*)     Hemoglobin 12.2 (*)     Hematocrit 38.1 (*)     RDW 18.2 (*)     Platelets 101 (*)     Lymph # 0.8 (*)      Gran % 75.6 (*)     Lymph % 12.7 (*)     All other components within normal limits   COMPREHENSIVE METABOLIC PANEL - Abnormal; Notable for the following components:    CO2 20 (*)     Creatinine 1.6 (*)     Calcium 8.4 (*)     Total Bilirubin 2.0 (*)     AST 45 (*)     eGFR 47 (*)     All other components within normal limits   URINALYSIS, REFLEX TO URINE CULTURE - Abnormal; Notable for the following components:    Protein, UA Trace (*)     Ketones, UA Trace (*)     Occult Blood UA Trace (*)     All other components within normal limits    Narrative:     Specimen Source->Urine   B-TYPE NATRIURETIC PEPTIDE - Abnormal; Notable for the following components:     (*)     All other components within normal limits   MAGNESIUM - Abnormal; Notable for the following components:    Magnesium 1.2 (*)     All other components within normal limits   LIPASE   TROPONIN I   ALCOHOL,MEDICAL (ETHANOL)   MAGNESIUM   URINALYSIS, REFLEX TO URINE CULTURE   DRUG SCREEN PANEL, URINE EMERGENCY   POCT GLUCOSE   POCT GLUCOSE MONITORING CONTINUOUS        ECG Results              EKG 12-lead (Preliminary result)  Result time 01/31/23 23:10:22      ED Interpretation by Sloan Villa MD (01/31/23 23:10:22, [facility] Emergency Dept, Emergency Medicine)    My EKG interpretation, sinus rhythm, 88 beats per minute, left axis deviation, Q-wave 3, AVF, occasional PVC, when compared to previous EKG 11/02/2022 PVCs less frequent                                  Imaging Results    None          Medications   sodium chloride 0.9% bolus 1,000 mL 1,000 mL (0 mLs Intravenous Stopped 1/31/23 1255)     Medical Decision Making:   History:   I obtained history from: EMS provider.  Old Medical Records: I decided to obtain old medical records.  Old Records Summarized: records from clinic visits and records from previous admission(s).  Differential Diagnosis:   Dehydration, arrhythmia, CVA, anemia, renal failure, electrolyte derangement  Independently  Interpreted Test(s):   I have ordered and independently interpreted EKG Reading(s) - see prior notes  Clinical Tests:   Lab Tests: Ordered and Reviewed  Medical Tests: Ordered and Reviewed  ED Management:  This is a gentleman with underlying chronic medical comorbid conditions with an acute exacerbation.  Seeming at significant risk for serious complications at this time.    Had a concern for potential CVA that was NIH right now is 0, appears to be orthostatic in nature suggesting potential dehydration as an underlying issue.    EKG is reassuring, labs relatively unrevealing overall though he does have hypomagnesemia.  Following administration of IV fluids in the emergency department he had improvement in his symptoms.    Did discuss with this patient the potential need for hospitalization and did consider hospitalization for him though he was previously hospitalized for dehydration and orthostasis we have discussed at length the potential underlying etiologies in the need for closer attention to his underlying symptoms.  Will forego hospitalization and observation at this time and will plan for outpatient treatment of his symptoms.  Have encouraged returning case of any worsening of his symptoms.                        Clinical Impression:   Final diagnoses:  [R42] Lightheaded  [E86.0] Dehydration (Primary)  [E83.42] Hypomagnesemia        ED Disposition Condition    Discharge Stable          ED Prescriptions       Medication Sig Dispense Start Date End Date Auth. Provider    magnesium oxide 200 mg magnesium Tab Take 1 tablet by mouth once daily. 60 tablet 2/1/2023 -- Sloan Villa MD          Follow-up Information       Follow up With Specialties Details Why Contact Info    [facility] Behavioral Health, Psychiatry, Psychology Call  As needed, For a follow up visit about today [id number]  [address]  [facility] [address]  [phone]               Sloan Villa MD  02/01/23 0157

## 2023-02-01 NOTE — DISCHARGE INSTRUCTIONS
Mr. Montalvo,    Thank you for letting me care for you today! It was nice meeting you, and I hope you feel better soon.   If you would like access to your chart and what was done today please utilize the [facility].   Please come back to [facility] for all of your future medical needs.    Our goal in the emergency department is to always give you outstanding care and exceptional service. You may receive a survey by mail or e-mail in the next week regarding your experience in our ED. We would greatly appreciate you completing and returning the survey. Your feedback provides us with a way to recognize our staff who give very good care and it helps us learn how to improve when your experience was below our aspiration of excellence.     Sincerely,    Sloan Villa MD  Board Certified Emergency Physician

## 2023-07-19 ENCOUNTER — HOSPITAL ENCOUNTER (EMERGENCY)
Facility: HOSPITAL | Age: 68
Discharge: HOME | End: 2023-07-19
Attending: EMERGENCY MEDICINE
Payer: MEDICARE

## 2023-07-19 VITALS
RESPIRATION RATE: 18 BRPM | OXYGEN SATURATION: 92 % | BODY MASS INDEX: 27.28 KG/M2 | TEMPERATURE: 98 F | DIASTOLIC BLOOD PRESSURE: 97 MMHG | HEART RATE: 92 BPM | WEIGHT: 180 LBS | SYSTOLIC BLOOD PRESSURE: 183 MMHG | HEIGHT: 68 IN

## 2023-07-19 DIAGNOSIS — S52.572A OTHER CLOSED INTRA-ARTICULAR FRACTURE OF DISTAL END OF LEFT RADIUS, INITIAL ENCOUNTER: Primary | ICD-10-CM

## 2023-07-19 DIAGNOSIS — S52.692A OTHER CLOSED FRACTURE OF DISTAL END OF LEFT ULNA, INITIAL ENCOUNTER: ICD-10-CM
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
## 2023-07-19 PROBLEM — S52.502A CLOSED FRACTURE OF LEFT DISTAL RADIUS: Status: ACTIVE | Noted: 2023-07-19

## 2023-07-19 LAB
ALBUMIN SERPL BCP-MCNC: 3.4 G/DL (ref 3.5–5.2)
ALP SERPL-CCNC: 93 U/L (ref 55–135)
ALT SERPL W/O P-5'-P-CCNC: 11 U/L (ref 10–44)
ANION GAP SERPL CALC-SCNC: 17 MMOL/L (ref 8–16)
AST SERPL-CCNC: 32 U/L (ref 10–40)
BASOPHILS # BLD AUTO: 0.05 K/UL (ref 0–0.2)
BASOPHILS NFR BLD: 1 % (ref 0–1.9)
BILIRUB SERPL-MCNC: 0.6 MG/DL (ref 0.1–1)
BUN SERPL-MCNC: 11 MG/DL (ref 8–23)
CALCIUM SERPL-MCNC: 8.5 MG/DL (ref 8.7–10.5)
CHLORIDE SERPL-SCNC: 104 MMOL/L (ref 95–110)
CO2 SERPL-SCNC: 19 MMOL/L (ref 23–29)
CREAT SERPL-MCNC: 1.2 MG/DL (ref 0.5–1.4)
DIFFERENTIAL METHOD: ABNORMAL
EOSINOPHIL # BLD AUTO: 0 K/UL (ref 0–0.5)
EOSINOPHIL NFR BLD: 0.8 % (ref 0–8)
ERYTHROCYTE [DISTWIDTH] IN BLOOD BY AUTOMATED COUNT: 16.7 % (ref 11.5–14.5)
EST. GFR  (NO RACE VARIABLE): >60 ML/MIN/1.73 M^2
GLUCOSE SERPL-MCNC: 103 MG/DL (ref 70–110)
HCT VFR BLD AUTO: 34.3 % (ref 40–54)
HGB BLD-MCNC: 11.1 G/DL (ref 14–18)
IMM GRANULOCYTES # BLD AUTO: 0 K/UL (ref 0–0.04)
IMM GRANULOCYTES NFR BLD AUTO: 0 % (ref 0–0.5)
INR PPP: 1.1 (ref 0.8–1.2)
LYMPHOCYTES # BLD AUTO: 1.3 K/UL (ref 1–4.8)
LYMPHOCYTES NFR BLD: 24.6 % (ref 18–48)
MCH RBC QN AUTO: 30.9 PG (ref 27–31)
MCHC RBC AUTO-ENTMCNC: 32.4 G/DL (ref 32–36)
MCV RBC AUTO: 96 FL (ref 82–98)
MONOCYTES # BLD AUTO: 0.5 K/UL (ref 0.3–1)
MONOCYTES NFR BLD: 9 % (ref 4–15)
NEUTROPHILS # BLD AUTO: 3.4 K/UL (ref 1.8–7.7)
NEUTROPHILS NFR BLD: 64.6 % (ref 38–73)
NRBC BLD-RTO: 0 /100 WBC
PLATELET # BLD AUTO: 83 K/UL (ref 150–450)
PMV BLD AUTO: 11.7 FL (ref 9.2–12.9)
POTASSIUM SERPL-SCNC: 3.9 MMOL/L (ref 3.5–5.1)
PROT SERPL-MCNC: 8.1 G/DL (ref 6–8.4)
PROTHROMBIN TIME: 11.9 SEC (ref 9–12.5)
RBC # BLD AUTO: 3.59 M/UL (ref 4.6–6.2)
SODIUM SERPL-SCNC: 140 MMOL/L (ref 136–145)
WBC # BLD AUTO: 5.25 K/UL (ref 3.9–12.7)

## 2023-07-19 PROCEDURE — 25000003 PHARM REV CODE 250

## 2023-07-19 PROCEDURE — 93005 ELECTROCARDIOGRAM TRACING: CPT

## 2023-07-19 PROCEDURE — 93010 EKG 12-LEAD: ICD-10-PCS | Mod: ,,, | Performed by: INTERNAL MEDICINE

## 2023-07-19 PROCEDURE — 93010 ELECTROCARDIOGRAM REPORT: CPT | Mod: ,,, | Performed by: INTERNAL MEDICINE

## 2023-07-19 PROCEDURE — 63600175 PHARM REV CODE 636 W HCPCS: Performed by: EMERGENCY MEDICINE

## 2023-07-19 PROCEDURE — 85610 PROTHROMBIN TIME: CPT | Performed by: EMERGENCY MEDICINE

## 2023-07-19 PROCEDURE — 96374 THER/PROPH/DIAG INJ IV PUSH: CPT

## 2023-07-19 PROCEDURE — 80053 COMPREHEN METABOLIC PANEL: CPT | Performed by: EMERGENCY MEDICINE

## 2023-07-19 PROCEDURE — 85025 COMPLETE CBC W/AUTO DIFF WBC: CPT | Performed by: EMERGENCY MEDICINE

## 2023-07-19 PROCEDURE — 96376 TX/PRO/DX INJ SAME DRUG ADON: CPT | Mod: 59

## 2023-07-19 PROCEDURE — 96375 TX/PRO/DX INJ NEW DRUG ADDON: CPT

## 2023-07-19 PROCEDURE — 99285 EMERGENCY DEPT VISIT HI MDM: CPT | Mod: 25

## 2023-07-19 PROCEDURE — 25000003 PHARM REV CODE 250: Performed by: EMERGENCY MEDICINE

## 2023-07-19 PROCEDURE — 63600175 PHARM REV CODE 636 W HCPCS

## 2023-07-19 PROCEDURE — 25605 CLTX DST RDL FX/EPHYS SEP W/: CPT | Mod: LT

## 2023-07-19 RX ORDER — NAPROXEN SODIUM 220 MG
440 TABLET ORAL DAILY PRN
Status: ON HOLD | COMMUNITY
End: 2023-10-30

## 2023-07-19 RX ORDER — OXYCODONE HYDROCHLORIDE 5 MG/1
5 TABLET ORAL EVERY 8 HOURS PRN
Qty: 6 TABLET | Refills: 0 | Status: ON HOLD | OUTPATIENT
Start: 2023-07-19 | End: 2023-10-25 | Stop reason: ALTCHOICE

## 2023-07-19 RX ORDER — ONDANSETRON 2 MG/ML
4 INJECTION INTRAMUSCULAR; INTRAVENOUS
Status: COMPLETED | OUTPATIENT
Start: 2023-07-19 | End: 2023-07-19

## 2023-07-19 RX ORDER — MORPHINE SULFATE 4 MG/ML
4 INJECTION, SOLUTION INTRAMUSCULAR; INTRAVENOUS
Status: DISCONTINUED | OUTPATIENT
Start: 2023-07-19 | End: 2023-07-19 | Stop reason: HOSPADM

## 2023-07-19 RX ORDER — LIDOCAINE HYDROCHLORIDE 10 MG/ML
10 INJECTION, SOLUTION EPIDURAL; INFILTRATION; INTRACAUDAL; PERINEURAL
Status: DISCONTINUED | OUTPATIENT
Start: 2023-07-19 | End: 2023-07-19

## 2023-07-19 RX ORDER — MORPHINE SULFATE 4 MG/ML
4 INJECTION, SOLUTION INTRAMUSCULAR; INTRAVENOUS
Status: COMPLETED | OUTPATIENT
Start: 2023-07-19 | End: 2023-07-19

## 2023-07-19 RX ORDER — SODIUM CHLORIDE 9 MG/ML
1000 INJECTION, SOLUTION INTRAVENOUS
Status: COMPLETED | OUTPATIENT
Start: 2023-07-19 | End: 2023-07-19

## 2023-07-19 RX ORDER — LIDOCAINE HYDROCHLORIDE 10 MG/ML
20 INJECTION INFILTRATION; PERINEURAL ONCE
Status: COMPLETED | OUTPATIENT
Start: 2023-07-19 | End: 2023-07-19

## 2023-07-19 RX ADMIN — MORPHINE SULFATE 4 MG: 4 INJECTION INTRAVENOUS at 09:07

## 2023-07-19 RX ADMIN — SODIUM CHLORIDE 1000 ML: 9 INJECTION, SOLUTION INTRAVENOUS at 09:07

## 2023-07-19 RX ADMIN — MORPHINE SULFATE 4 MG: 4 INJECTION INTRAVENOUS at 10:07

## 2023-07-19 RX ADMIN — ONDANSETRON 4 MG: 2 INJECTION INTRAMUSCULAR; INTRAVENOUS at 09:07

## 2023-07-19 RX ADMIN — LIDOCAINE HYDROCHLORIDE 20 ML: 10 INJECTION, SOLUTION INFILTRATION; PERINEURAL at 10:07

## 2023-07-19 NOTE — SUBJECTIVE & OBJECTIVE
Past Medical History:   Diagnosis Date    Alcohol abuse     BPH (benign prostatic hyperplasia)     Cirrhosis 06/21/2018    pt states that he was diagnosed a week ago during his last hospital visit    Gastritis     Hypertension     Renal disorder        Past Surgical History:   Procedure Laterality Date    CAPSULOTOMY OF JOINT Right 7/29/2019    Procedure: CAPSULOTOMY, JOINT;  Surgeon: Raj Grossman MD;  Location: [address];  Service: Orthopedics;  Laterality: Right;    PERCUTANEOUS PINNING OF HIP Right 7/29/2019    Procedure: PINNING, HIP, PERCUTANEOUS- synthes cannulated screws- hana table- C arm door side-;  Surgeon: Raj Grossman MD;  Location: [address];  Service: Orthopedics;  Laterality: Right;       Review of patient's allergies indicates:  No Known Allergies    Current Facility-Administered Medications   Medication    morphine injection 4 mg     Current Outpatient Medications   Medication Sig    albuterol sulfate 2.5 mg/0.5 mL Nebu Take 2.5 mg by nebulization every 6 (six) hours while awake. Rescue    bisacodyl (DULCOLAX) 10 mg Supp Place 1 suppository (10 mg total) rectally daily as needed (Until bowel movement if patient has no bowel movement for 2 days).    famotidine (PEPCID) 20 MG tablet Take 1 tablet (20 mg total) by mouth 2 (two) times daily as needed for Heartburn.    folic acid (FOLVITE) 1 MG tablet Take 1 tablet (1 mg total) by mouth once daily.    hydrALAZINE (APRESOLINE) 25 MG tablet Take 1 tablet (25 mg total) by mouth every 8 (eight) hours as needed (SBP > 160).    hydrOXYzine pamoate (VISTARIL) 25 MG Cap Take 1 capsule (25 mg total) by mouth every evening.    lidocaine (LIDODERM) 5 % Place 2 patches onto the skin once daily. Remove & Discard patch within 12 hours or as directed by MD; apply to right knee    lisinopriL (PRINIVIL,ZESTRIL) 20 MG tablet Take 1 tablet (20 mg total) by mouth once daily.    magnesium oxide 200 mg magnesium Tab Take 1 tablet by mouth once daily.     mirtazapine (REMERON) 15 MG tablet every evening.     multivitamin (THERAGRAN) tablet Take 1 tablet by mouth once daily.    omeprazole (PRILOSEC) 20 MG capsule Take 2 capsules (40 mg total) by mouth once daily.    ondansetron (ZOFRAN-ODT) 4 MG TbDL Take 1 tablet (4 mg total) by mouth every 6 (six) hours as needed (nausea).    oxyCODONE-acetaminophen (PERCOCET) 5-325 mg per tablet Take 1 tablet by mouth every 4 to 6 hours as needed for Pain.    polyethylene glycol (GLYCOLAX) 17 gram PwPk Take 17 g by mouth 2 (two) times daily as needed (constipation).    pregabalin (LYRICA) 75 MG capsule Take 1 capsule by mouth every evening    propranolol (INDERAL) 40 MG tablet Take 0.5 tablets (20 mg total) by mouth 2 (two) times daily.    senna-docusate 8.6-50 mg (PERICOLACE) 8.6-50 mg per tablet Take 1 tablet by mouth 2 (two) times daily.    sucralfate (CARAFATE) 100 mg/mL suspension Take 10 mLs (1 g total) by mouth 3 (three) times daily as needed (indigestion; heartburn).    tamsulosin (FLOMAX) 0.4 mg Cap Take 1 capsule (0.4 mg total) by mouth once daily. HOLD until PCP follow up    thiamine 100 MG tablet Take 1 tablet (100 mg total) by mouth once daily.    vitamin D (VITAMIN D3) 2,000 unit Tab Take 1 tablet (2,000 Units total) by mouth once daily.     Family History       Problem Relation (Age of Onset)    Heart disease Father, Brother    Hypertension Father          Tobacco Use    Smoking status: Every Day     Packs/day: 1.00     Types: Cigarettes    Smokeless tobacco: Never   Substance and Sexual Activity    Alcohol use: Yes     Comment: (former drinker; 2months sober) this visit admits drinking a couple beers today    Drug use: No    Sexual activity: Not on file     ROS  Constitutional: Denies fever/chills   Neurological: Denies numbness/tingling (any exceptions noted in orthopaedic exam)    Psychiatric/Behavioral: Denies change in normal mood   Eyes: Denies change in vision   Cardiovascular: Denies chest pain   Respiratory:  "Denies shortness of breath   Hematologic/Lymphatic: Denies easy bleeding/bruising    Skin: Denies new rash or skin lesions    Gastrointestinal: Denies nausea/vomitting/diarrhea, change in bowel habits, abdominal pain    Allergic/Immunologic: Denies adverse reactions to current medications   Musculoskeletal: see HPI     Objective:     Vital Signs (Most Recent):  Temp: 98.2 °F (36.8 °C) (07/19/23 0840)  Pulse: 110 (07/19/23 0840)  Resp: 18 (07/19/23 1029)  BP: (!) 138/90 (07/19/23 0840)  SpO2: 98 % (07/19/23 0840) Vital Signs (24h Range):  Temp:  [98.2 °F (36.8 °C)] 98.2 °F (36.8 °C)  Pulse:  [110] 110  Resp:  [16-20] 18  SpO2:  [98 %] 98 %  BP: (138)/(90) 138/90     Weight: 81.6 kg (180 lb)  Height: 5' 8" (172.7 cm)  Body mass index is 27.37 kg/m².    Ortho/SPM Exam  General: no acute distress, appears stated age     Neuro: alert and oriented x3   Psych: normal mood   Head: normocephalic, atraumatic.    Eyes: no scleral icterus   Mouth: moist mucous membranes   Cardiovascular: extremities warm and well perfused   Lungs: breathing comfortably, equal chest rise bilat   Skin: clean, dry, intact (any exceptions noted in below musculoskeletal exam)    Musculoskeletal:  LUE:  - Skin intact throughout, no open wounds  - Significant edema and ecchymoses around the wrist  - TTP around the wrist  - ROM wrist not tested due to known fracture  - AROM and PROM of the shoulder, elbow, and hand intact without pain  - Axillary/AIN/PIN/Radial/Median/Ulnar nerves assessed in isolation and are intact  - SILT throughout  - Compartments soft  - 2+ radial artery pulse  - Capillary Refill < 2 sec    RUE:  - Skin intact throughout, no open wounds  - No swelling  - No ecchymosis, erythema, or signs of cellulitis  - NonTTP throughout  - AROM and PROM of the shoulder, elbow, wrist, and hand intact without pain  - Axillary/AIN/PIN/Radial/Median/Ulnar nerves assessed in isolation and are without deficit  - SILT throughout  - Compartments soft  - " 2+ radial artery pulse  - Capillary Refill < 2 sec    LLE:  - Skin intact throughout, no open wounds  - No swelling  - No ecchymosis, erythema, or signs of cellulitis  - NonTTP throughout  - AROM and PROM of the hip, knee, ankle, and foot intact without pain  - TA/EHL/Gastroc/FHL assessed in isolation and are without deficit  - SILT throughout  - Compartments soft  - 2+ DP and PT pulses  - Capillary Refill < 2 sec  - Negative Log roll    RLE:  - Skin intact throughout, no open wounds  - No swelling  - No ecchymosis, erythema, or signs of cellulitis  - NonTTP throughout  - AROM and PROM of the hip, knee, ankle, and foot intact without pain  - TA/EHL/Gastroc/FHL assessed in isolation and are without deficit  - SILT throughout  - Compartments soft  - 2+ DP and PT pulses  - Capillary Refill < 2 sec  - Negative Log roll    Spine/pelvis/axial body:  No tenderness to palpation of cervical, thoracic, or lumbar spine  No pain with compression of pelvis     Significant Labs: All pertinent labs within the past 24 hours have been reviewed.    Significant Imaging: I have reviewed and interpreted all pertinent imaging results/findings.  X-ray left hand and forearm with a comminuted intra-articular distal radius fracture with dorsal angulation and some shortening

## 2023-07-19 NOTE — PHARMACY MED REC
"  Admission Medication History     The home medication history was taken by Hamilton Watt.    You may go to "Admission" then "Reconcile Home Medications" tabs to review and/or act upon these items.     The home medication list has been updated by the Pharmacy department.   Please read ALL comments highlighted in yellow.   Please address this information as you see fit.    Feel free to contact us if you have any questions or require assistance.      The medications listed below were removed from the home medication list. Please reorder if appropriate:  Patient reports no longer taking the following medication(s):  ALBUTEROL SULFATE 2.5 MG/0.5 ML NEB  BISACODYL 10 MG SUPP  FAMOTIDINE 20 MG  FOLIC ACID 1 MG  HYDRALAZINE 25 MG  HYDROXYZINE TRISTAN 25 MG  LIDOCAINE 5 % PATCH  MAGNESIUM OXIDE 200 MG  MIRTAZAPINE 15 MG  MULTIVITAMIN TAB  ONDANSETRON ODT 4 MG  PROPRANOLOL 40 MG  SENNA-DOCUSATE 8.6-50 MG   SUCRALFATE 100 MG/ML SUSP  TAMSULOSIN 0.4 MG  THIAMINE 100 MG  VITAMIN D3 2000 UNIT    Medications listed below were obtained from: Patient  Current Outpatient Medications on File Prior to Encounter   Medication Sig    naproxen sodium (ALEVE) 220 MG tablet   Take 440 mg by mouth daily as needed (Pain).    omeprazole (PRILOSEC) 20 MG capsule   Take 20 mg by mouth once a week.      polyethylene glycol (GLYCOLAX) 17 gram PwPk   Take 17 g by mouth 2 (two) times daily as needed (constipation).    tetrahydrozoline HCl (VISINE OPHT)   Place 2 drops into both eyes daily as needed (Irritation).    lisinopriL (PRINIVIL,ZESTRIL) 20 MG tablet   Take 1 tablet (20 mg total) by mouth once daily.    pregabalin (LYRICA) 75 MG capsule   Take 1 capsule by mouth every evening     Potential issues to be addressed PRIOR TO DISCHARGE  Patient reported not taking the following medications: (PERCOCET). These medications remain on the home medication list. Please address accordingly.     Patient requested refills for the following medications: (LYRICA, " LISINOPRIL)    Patient requested prescriptions to treat athlete's foot and toenail fungus            Hamilton Watt  [phone]                .

## 2023-07-19 NOTE — HPI
Fransico Montalvo is a 67 y.o. male with history of hypertension, alcohol use disorder, alcohol induced cirrhosis, and prior right femoral neck fracture s/p screw fixation presenting to the ED for evaluation of left wrist pain after a fall. The patient reports that he fell after leaving a party while intoxicated between 3:00 and 4:00 AM.  He reports hitting his left wrist during the fall.  He does not remember other events surrounding the fall.  When he awoke this morning he noticed that he was having significant pain and deformity to his left wrist.  He then presented to the ED for further evaluation.  X-rays in the ED showed a left distal radius fracture.    Orthopedics was consulted for further evaluation.  The patient denies numbness and tingling in his left upper extremity.  He denies pain outside of his left wrist.  He ambulates without assistive devices.  He does not take anticoagulation.

## 2023-07-19 NOTE — ED NOTES
Pt states fell this am while intoxicated. Left wrist injury/deformity. Denies any other injuries. Alert and oriented x 4 at this time.

## 2023-07-19 NOTE — DISCHARGE INSTRUCTIONS
The orthopedic clinic will contact you to arrange follow-up     Return to the emergency department if you experience numbness, change in the color of your fingers, increased uncontrolled pain, or any new or concerning symptoms as this may represent a dangerous increase in the pressure in your wrist and hand.    Take Tylenol 500 mg every 6 hours as needed for pain     I provided you with a couple of pain pills to take she would your pain not be controlled with Tylenol.  However, you must be careful with your drinking and use of pain medication.

## 2023-07-19 NOTE — CONSULTS
[facility] - Emergency Dept  Orthopedics  Consult Note    Patient Name: Fransico Montalvo  MRN: [id number]  Admission Date: 7/19/2023  Hospital Length of Stay: 0 days  Attending Provider: No att. providers found  Primary Care Provider: Jessica    Patient information was obtained from patient, past medical records and ER records.     Inpatient consult to Orthopedic Surgery  Consult performed by: Liu Baird MD  Consult ordered by: Felice iTjerina MD        Subjective:     Principal Problem:Closed fracture of left distal radius    Chief Complaint:   Chief Complaint   Patient presents with    Wrist Injury     EMS reports obvious deformity left wrist/ states patient fell last night        HPI: Fransico Montalvo is a 67 y.o. male with history of hypertension, alcohol use disorder, alcohol induced cirrhosis, and prior right femoral neck fracture s/p screw fixation presenting to the ED for evaluation of left wrist pain after a fall. The patient reports that he fell after leaving a party while intoxicated between 3:00 and 4:00 AM.  He reports hitting his left wrist during the fall.  He does not remember other events surrounding the fall.  When he awoke this morning he noticed that he was having significant pain and deformity to his left wrist.  He then presented to the ED for further evaluation.  X-rays in the ED showed a left distal radius fracture.    Orthopedics was consulted for further evaluation.  The patient denies numbness and tingling in his left upper extremity.  He denies pain outside of his left wrist.  He ambulates without assistive devices.  He does not take anticoagulation.       Past Medical History:   Diagnosis Date    Alcohol abuse     BPH (benign prostatic hyperplasia)     Cirrhosis 06/21/2018    pt states that he was diagnosed a week ago during his last hospital visit    Gastritis     Hypertension     Renal disorder        Past Surgical History:   Procedure Laterality Date    CAPSULOTOMY OF JOINT Right  7/29/2019    Procedure: CAPSULOTOMY, JOINT;  Surgeon: Raj Grossman MD;  Location: [facility] OR [address] [place] [address];  Service: Orthopedics;  Laterality: Right;    PERCUTANEOUS PINNING OF HIP Right 7/29/2019    Procedure: PINNING, HIP, PERCUTANEOUS- synthes cannulated screws- hana table- C arm door side-;  Surgeon: Raj Grossman MD;  Location: [facility] OR [address] [place] [address];  Service: Orthopedics;  Laterality: Right;       Review of patient's allergies indicates:  No Known Allergies    Current Facility-Administered Medications   Medication    morphine injection 4 mg     Current Outpatient Medications   Medication Sig    albuterol sulfate 2.5 mg/0.5 mL Nebu Take 2.5 mg by nebulization every 6 (six) hours while awake. Rescue    bisacodyl (DULCOLAX) 10 mg Supp Place 1 suppository (10 mg total) rectally daily as needed (Until bowel movement if patient has no bowel movement for 2 days).    famotidine (PEPCID) 20 MG tablet Take 1 tablet (20 mg total) by mouth 2 (two) times daily as needed for Heartburn.    folic acid (FOLVITE) 1 MG tablet Take 1 tablet (1 mg total) by mouth once daily.    hydrALAZINE (APRESOLINE) 25 MG tablet Take 1 tablet (25 mg total) by mouth every 8 (eight) hours as needed (SBP > 160).    hydrOXYzine pamoate (VISTARIL) 25 MG Cap Take 1 capsule (25 mg total) by mouth every evening.    lidocaine (LIDODERM) 5 % Place 2 patches onto the skin once daily. Remove & Discard patch within 12 hours or as directed by MD; apply to right knee    lisinopriL (PRINIVIL,ZESTRIL) 20 MG tablet Take 1 tablet (20 mg total) by mouth once daily.    magnesium oxide 200 mg magnesium Tab Take 1 tablet by mouth once daily.    mirtazapine (REMERON) 15 MG tablet every evening.     multivitamin (THERAGRAN) tablet Take 1 tablet by mouth once daily.    omeprazole (PRILOSEC) 20 MG capsule Take 2 capsules (40 mg total) by mouth once daily.    ondansetron (ZOFRAN-ODT) 4 MG TbDL Take 1 tablet (4 mg total) by mouth every 6 (six) hours as needed  (nausea).    oxyCODONE-acetaminophen (PERCOCET) 5-325 mg per tablet Take 1 tablet by mouth every 4 to 6 hours as needed for Pain.    polyethylene glycol (GLYCOLAX) 17 gram PwPk Take 17 g by mouth 2 (two) times daily as needed (constipation).    pregabalin (LYRICA) 75 MG capsule Take 1 capsule by mouth every evening    propranolol (INDERAL) 40 MG tablet Take 0.5 tablets (20 mg total) by mouth 2 (two) times daily.    senna-docusate 8.6-50 mg (PERICOLACE) 8.6-50 mg per tablet Take 1 tablet by mouth 2 (two) times daily.    sucralfate (CARAFATE) 100 mg/mL suspension Take 10 mLs (1 g total) by mouth 3 (three) times daily as needed (indigestion; heartburn).    tamsulosin (FLOMAX) 0.4 mg Cap Take 1 capsule (0.4 mg total) by mouth once daily. HOLD until PCP follow up    thiamine 100 MG tablet Take 1 tablet (100 mg total) by mouth once daily.    vitamin D (VITAMIN D3) 2,000 unit Tab Take 1 tablet (2,000 Units total) by mouth once daily.     Family History       Problem Relation (Age of Onset)    Heart disease Father, Brother    Hypertension Father          Tobacco Use    Smoking status: Every Day     Packs/day: 1.00     Types: Cigarettes    Smokeless tobacco: Never   Substance and Sexual Activity    Alcohol use: Yes     Comment: (former drinker; 2months sober) this visit admits drinking a couple beers today    Drug use: No    Sexual activity: Not on file     ROS  Constitutional: Denies fever/chills   Neurological: Denies numbness/tingling (any exceptions noted in orthopaedic exam)    Psychiatric/Behavioral: Denies change in normal mood   Eyes: Denies change in vision   Cardiovascular: Denies chest pain   Respiratory: Denies shortness of breath   Hematologic/Lymphatic: Denies easy bleeding/bruising    Skin: Denies new rash or skin lesions    Gastrointestinal: Denies nausea/vomitting/diarrhea, change in bowel habits, abdominal pain    Allergic/Immunologic: Denies adverse reactions to current medications  "  Musculoskeletal: see HPI     Objective:     Vital Signs (Most Recent):  Temp: 98.2 °F (36.8 °C) (07/19/23 0840)  Pulse: 110 (07/19/23 0840)  Resp: 18 (07/19/23 1029)  BP: (!) 138/90 (07/19/23 0840)  SpO2: 98 % (07/19/23 0840) Vital Signs (24h Range):  Temp:  [98.2 °F (36.8 °C)] 98.2 °F (36.8 °C)  Pulse:  [110] 110  Resp:  [16-20] 18  SpO2:  [98 %] 98 %  BP: (138)/(90) 138/90     Weight: 81.6 kg (180 lb)  Height: 5' 8" (172.7 cm)  Body mass index is 27.37 kg/m².    Ortho/SPM Exam  General: no acute distress, appears stated age     Neuro: alert and oriented x3   Psych: normal mood   Head: normocephalic, atraumatic.    Eyes: no scleral icterus   Mouth: moist mucous membranes   Cardiovascular: extremities warm and well perfused   Lungs: breathing comfortably, equal chest rise bilat   Skin: clean, dry, intact (any exceptions noted in below musculoskeletal exam)    Musculoskeletal:  LUE:  - Skin intact throughout, no open wounds  - Significant edema and ecchymoses around the wrist  - TTP around the wrist  - ROM wrist not tested due to known fracture  - AROM and PROM of the shoulder, elbow, and hand intact without pain  - Axillary/AIN/PIN/Radial/Median/Ulnar nerves assessed in isolation and are intact  - SILT throughout  - Compartments soft  - 2+ radial artery pulse  - Capillary Refill < 2 sec    RUE:  - Skin intact throughout, no open wounds  - No swelling  - No ecchymosis, erythema, or signs of cellulitis  - NonTTP throughout  - AROM and PROM of the shoulder, elbow, wrist, and hand intact without pain  - Axillary/AIN/PIN/Radial/Median/Ulnar nerves assessed in isolation and are without deficit  - SILT throughout  - Compartments soft  - 2+ radial artery pulse  - Capillary Refill < 2 sec    LLE:  - Skin intact throughout, no open wounds  - No swelling  - No ecchymosis, erythema, or signs of cellulitis  - NonTTP throughout  - AROM and PROM of the hip, knee, ankle, and foot intact without pain  - TA/EHL/Gastroc/FHL assessed " in isolation and are without deficit  - SILT throughout  - Compartments soft  - 2+ DP and PT pulses  - Capillary Refill < 2 sec  - Negative Log roll    RLE:  - Skin intact throughout, no open wounds  - No swelling  - No ecchymosis, erythema, or signs of cellulitis  - NonTTP throughout  - AROM and PROM of the hip, knee, ankle, and foot intact without pain  - TA/EHL/Gastroc/FHL assessed in isolation and are without deficit  - SILT throughout  - Compartments soft  - 2+ DP and PT pulses  - Capillary Refill < 2 sec  - Negative Log roll    Spine/pelvis/axial body:  No tenderness to palpation of cervical, thoracic, or lumbar spine  No pain with compression of pelvis     Significant Labs: All pertinent labs within the past 24 hours have been reviewed.    Significant Imaging: I have reviewed and interpreted all pertinent imaging results/findings.  X-ray left hand and forearm with a comminuted intra-articular distal radius fracture with dorsal angulation and some shortening    Assessment/Plan:     * Closed fracture of left distal radius  Fransico Montalvo is a 67 y.o. male with a left distal radius fracture. He is closed and neurovascularly intact. He underwent closed reduction and splinting in the ED.  See procedure note below for additional details.  The patient developed mild paresthesias in his fingertips during the reduction, likely due to pressure from the finger traps and pressure from moulding the splint.  These resolved in the ED prior to discharge.  No concern for acute carpal tunnel syndrome.  He will likely require surgery for this injury, but further discussions can be had on an outpatient basis.    Plan:  - NWB LUE  - Keep splint clean, dry, and intact  - Sling for comfort  - Elevation to help with swelling  - Pain control per ER  - Patient educated on the signs and symptoms of Compartment Syndrome and Acute Carpal Tunnel Syndrome and instructed to return to the hospital immediately if these symptoms arise  - No acute  orthopedic intervention at this time.   - Follow-up with Ortho Clinic within 1 week (patient will be contacted with appointment details)    Procedure note: left distal radius reduction  After time out was performed and patient ID, side, and site were verified, the area was sterilly prepped in the standard fashion. A 22-gauge needle was introduced into the fracture site without complication with aspiration of hematoma for confirmation. 10 mL of 1% lidocaine was then injected to the fracture site without difficulty. After adequate analgesia, the fracture was closed reduced and an adequate reduction was obtained. A sugar tong splint was applied and then post-reduction films were obtained which verified maintenance of the reduction. The patient tolerated the procedure well with no complications. Blood loss was minimal.        Liu Baird MD  Orthopedics  [facility] - Emergency Dept

## 2023-07-19 NOTE — ASSESSMENT & PLAN NOTE
Fransico Montalvo is a 67 y.o. male with a left distal radius fracture. He is closed and neurovascularly intact. He underwent closed reduction and splinting in the ED.  See procedure note below for additional details.  The patient developed mild paresthesias in his fingertips during the reduction, likely due to pressure from the finger traps and pressure from moulding the splint.  These resolved in the ED prior to discharge.  No concern for acute carpal tunnel syndrome.  He will likely require surgery for this injury, but further discussions can be had on an outpatient basis.    Plan:  - NWB LUE  - Keep splint clean, dry, and intact  - Sling for comfort  - Elevation to help with swelling  - Pain control per ER  - Patient educated on the signs and symptoms of Compartment Syndrome and Acute Carpal Tunnel Syndrome and instructed to return to the hospital immediately if these symptoms arise  - No acute orthopedic intervention at this time.   - Follow-up with Ortho Clinic within 1 week (patient will be contacted with appointment details)    Procedure note: left distal radius reduction  After time out was performed and patient ID, side, and site were verified, the area was sterilly prepped in the standard fashion. A 22-gauge needle was introduced into the fracture site without complication with aspiration of hematoma for confirmation. 10 mL of 1% lidocaine was then injected to the fracture site without difficulty. After adequate analgesia, the fracture was closed reduced and an adequate reduction was obtained. A sugar tong splint was applied and then post-reduction films were obtained which verified maintenance of the reduction. The patient tolerated the procedure well with no complications. Blood loss was minimal.

## 2023-07-19 NOTE — ED PROVIDER NOTES
Encounter Date: 7/19/2023       History     Chief Complaint   Patient presents with    Wrist Injury     EMS reports obvious deformity left wrist/ states patient fell last night     59-year-old male with history of hypertension, alcohol use disorder, alcohol induced cirrhosis.  Patient presents after a fall while intoxicated between 3 and 4:00 a.m..  He reports he was at a party when he fell walking up the steps.  When he awoke this morning he noticed that he was having significant pain and deformity to his left wrist.  He denies other injury.  He denies any somatic complaint.  He denies numbness or weakness of the affected extremity.    Review of patient's allergies indicates:  No Known Allergies  Past Medical History:   Diagnosis Date    Alcohol abuse     BPH (benign prostatic hyperplasia)     Cirrhosis 06/21/2018    pt states that he was diagnosed a week ago during his last hospital visit    Closed fracture of left distal radius 7/19/2023    Gastritis     Hypertension     Renal disorder      Past Surgical History:   Procedure Laterality Date    CAPSULOTOMY OF JOINT Right 7/29/2019    Procedure: CAPSULOTOMY, JOINT;  Surgeon: Raj Grossman MD;  Location: [facility] OR [address] [place] [address];  Service: Orthopedics;  Laterality: Right;    PERCUTANEOUS PINNING OF HIP Right 7/29/2019    Procedure: PINNING, HIP, PERCUTANEOUS- synthes cannulated screws- hana table- C arm door side-;  Surgeon: Raj Grossman MD;  Location: [facility] OR [address] [place] [address];  Service: Orthopedics;  Laterality: Right;     Family History   Problem Relation Age of Onset    Hypertension Father     Heart disease Father     Heart disease Brother      Social History     Tobacco Use    Smoking status: Every Day     Packs/day: 1.00     Types: Cigarettes    Smokeless tobacco: Never   Substance Use Topics    Alcohol use: Yes     Comment: (former drinker; 2months sober) this visit admits drinking a couple beers today    Drug use: No     Review of Systems  All other systems reviewed  and negative except as noted in HPI    Physical Exam     Initial Vitals [07/19/23 0840]   BP Pulse Resp Temp SpO2   (!) 138/90 110 16 98.2 °F (36.8 °C) 98 %      MAP       --         Physical Exam    Nursing note and vitals reviewed.  Constitutional: He appears well-developed and well-nourished.   HENT:   Head: Normocephalic and atraumatic.   Eyes: EOM are normal. Pupils are equal, round, and reactive to light.   Neck: Neck supple.   Nontender in midline   Normal range of motion.  Cardiovascular:  Normal rate and regular rhythm.           Pulmonary/Chest: Breath sounds normal. No respiratory distress.   Abdominal: Abdomen is soft.   Musculoskeletal:      Cervical back: Normal range of motion and neck supple.      Comments: Dinner fork deformity to left wrist with significant surrounding edema and induration.  Limitation of all movements at left wrist and all digits of left hand due to pain.  No proximal forearm tenderness to palpation.  No limitation of range of motion at elbow or shoulder on the left.     Neurological: He has normal strength. No sensory deficit. GCS score is 15. GCS eye subscore is 4. GCS verbal subscore is 5. GCS motor subscore is 6.   Skin: Skin is warm. Capillary refill takes less than 2 seconds.       ED Course   Procedures  Labs Reviewed   CBC W/ AUTO DIFFERENTIAL - Abnormal; Notable for the following components:       Result Value    RBC 3.59 (*)     Hemoglobin 11.1 (*)     Hematocrit 34.3 (*)     RDW 16.7 (*)     Platelets 83 (*)     All other components within normal limits   COMPREHENSIVE METABOLIC PANEL - Abnormal; Notable for the following components:    CO2 19 (*)     Calcium 8.5 (*)     Albumin 3.4 (*)     Anion Gap 17 (*)     All other components within normal limits   PROTIME-INR     EKG Readings: (Independently Interpreted)   Initial Reading: No STEMI. Previous EKG: Compared with most recent EKG Rhythm: Normal Sinus Rhythm.   ECG Results              EKG 12-lead (Final result)   Result time 07/19/23 13:59:13      Final result by Interface, Lab In [facility] (07/19/23 13:59:13)                   Narrative:    Test Reason : W19.XXXA,    Vent. Rate : 084 BPM     Atrial Rate : 084 BPM     P-R Int : 182 ms          QRS Dur : 096 ms      QT Int : 408 ms       P-R-T Axes : 060 -59 052 degrees     QTc Int : 482 ms    Normal sinus rhythm  Left anterior fascicular block  Incomplete right bundle branch block  Nonspecific T wave abnormality  Abnormal ECG  When compared with ECG of 31-JAN-2023 22:37,  Premature ventricular complexes are no longer Present  Confirmed by Colin Rosenbaum MD ([id number]) on 7/19/2023 1:59:04 PM    Referred By: [email or web address]   SELF           Confirmed By:Colin Rosenbaum MD                                  Imaging Results              CT Wrist Without Contrast Left (Final result)  Result time 07/19/23 16:33:21      Final result by Jake Prasad Jr., MD (07/19/23 16:33:21)                   Impression:      Comminuted intra-articular fractures involving the distal radius and ulna as above.    Wrist joint effusion and adjacent soft tissue swelling.    Electronically signed by resident: Johnson Shafer  Date:    07/19/2023  Time:    13:48    Electronically signed by: Jake De La Garza Jr  Date:    07/19/2023  Time:    16:33               Narrative:    EXAMINATION:  CT WRIST WITHOUT CONTRAST LEFT    CLINICAL HISTORY:  Fracture, wrist;    TECHNIQUE:  Axial 0.625-mm images of the left wrist obtained without intravenous contrast.  Data submitted for coronal and sagittal reformats. 3D reconstructed images were generated by post processing on an independent workstation and archived for permanent record.    COMPARISON:  XR left extremity 07/19/2023    FINDINGS:  BONE: Comminuted intra-articular fracture involving the proximal radius with dorsal displacement of the distal fracture fragments by approximately one shaft width.  There is a compartment 2 tendon in close proximity to a dorsal fracture  fragment without evidence of entrapment.  Comminuted intra-articular fracture of the ulnar head including the styloid process with minimal impaction and dorsal displacement.  Small avulsion fracture of the dorsal triquetrum.  No underlying blastic or lytic osseous lesion.  Bony mineralization is preserved.    JOINT: Joint spaces are maintained without evidence of dislocation.    SOFT TISSUE: Modest wrist joint effusion with adjacent soft tissue stranding and edema about the wrist.                                       X-Ray Wrist Complete Left (Final result)  Result time 07/19/23 11:38:58      Final result by José Latif MD (07/19/23 11:38:58)                   Impression:      As above      Electronically signed by: José Latif MD  Date:    07/19/2023  Time:    11:38               Narrative:    EXAMINATION:  XR WRIST COMPLETE 3 VIEWS LEFT    CLINICAL HISTORY:  Post reduction;    TECHNIQUE:  Four views of the left wrist were obtained.    COMPARISON:  Comparison is made to the examination of the left hand dated 07/19/2023 at 09:24.    FINDINGS:  Overlying cast material obscures osseous detail to some extent.  Note is again made of a recent comminuted intra-articular fracture of the distal left radius.  Some impaction at the fracture site persists, but improved position/alignment of the major fragments since the examination referenced above, with reduced posterior displacement of the distal radial fragment and anterior angulation of the fracture apex, is now seen.  No detrimental change.  The ulnar styloid fracture is not well demonstrated on the current exam.                                       CT Head Without Contrast (Final result)  Result time 07/19/23 10:16:38      Final result by Connor Almaguer MD (07/19/23 10:16:38)                   Impression:      No acute intracranial abnormalities, specifically no acute intracranial hemorrhage in patient with history of trauma.      Electronically signed by: Connor  MD Rosina  Date:    07/19/2023  Time:    10:16               Narrative:    EXAMINATION:  CT HEAD WITHOUT CONTRAST    CLINICAL HISTORY:  Head trauma, minor (Age >= 65y);    TECHNIQUE:  Low dose axial images were obtained through the head.  Coronal and sagittal reformations were also performed. Contrast was not administered.    COMPARISON:  11/03/2022    FINDINGS:  Mild generalized cerebral volume loss with prominence of the sulci, cisterns and ventricles.  Mild chronic microvascular ischemic changes.  No midline shift, hydrocephalus or mass effect.  No acute intracranial hemorrhage or acute major vascular territory infarct.  No abnormal extra-axial fluid collections.  No displaced calvarial fractures.  Mild deformity of the nasal bones similar to prior suggest remote fractures.  There is leftward nasal septal deviation with a nasal contact spur.  Mild patchy paranasal sinus disease.  Trace mastoid fluid on the right.  Otherwise, mastoid air cells are essentially clear.  There are skull base vascular calcifications.                                       CT Cervical Spine Without Contrast (Final result)  Result time 07/19/23 11:26:32      Final result by Connor Almaguer MD (07/19/23 11:26:32)                   Impression:      No acute displaced fracture.  No traumatic malalignment.    Advanced multilevel degenerative changes of the cervical spine resulting in multilevel neural foraminal and areas of mild/moderate central canal stenosis..    Electronically signed by resident: Johnson Shafer  Date:    07/19/2023  Time:    10:08    Electronically signed by: Connor Almaguer MD  Date:    07/19/2023  Time:    11:26               Narrative:    EXAMINATION:  CT CERVICAL SPINE WITHOUT CONTRAST    CLINICAL HISTORY:  Neck trauma (Age >= 65y);    TECHNIQUE:  Low dose axial images, sagittal and coronal reformations were performed though the cervical spine.  Contrast was not administered.    COMPARISON:  CT head same date; CT C-spine  11/03/2022    FINDINGS:  Skull base and craniocervical junction (partially imaged): No significant abnormality.  Refer to separately dictated CT head from same date for further evaluation.    Spinal alignment: Stable stepwise 2-3 mm anterolisthesis involving C3-C4 through C4-C5 likely degenerative.  There is reversal of the normal cervical lordosis.    Vertebrae: Anterior and posterior arches of C1 are normal.  The C1 lateral masses and odontoid process are intact.  Vertebral body heights are well maintained. No acute fracture or dislocation. No osseous destructive lesion.    Discs: Multilevel degenerative disc height loss and chronic endplate changes, most pronounced at C5-C6 and C6-C7.    Soft tissues: No prevertebral soft tissue swelling.  Small nuchal ligament calcification noted.    Miscellaneous: Calcifications at the bilateral carotid bifurcations and aortic arch.  Lung apices are clear.  No pneumothorax.    Degenerative changes: Advanced degenerative changes involving the cervical spine including multilevel posterior disc osteophyte complex, uncovertebral joint spurring, and severe facet arthropathy.  Findings contribute to multilevel bilateral severe neural foraminal narrowing.  There is moderate central canal stenosis at C5-6 and mild central canal stenosis at C3-4 and C4-5.                                       X-Ray Hand 3 View Left (Final result)  Result time 07/19/23 09:52:15      Final result by José Latif MD (07/19/23 09:52:15)                   Impression:      Abnormal examination demonstrating recent fractures of the distal left radius and of the left ulnar styloid.      Electronically signed by: José Latif MD  Date:    07/19/2023  Time:    09:52               Narrative:    EXAMINATION:  XR HAND COMPLETE 3 VIEW LEFT    CLINICAL HISTORY:  fall;    TECHNIQUE:  Three views of the left hand were obtained, with PA, lateral, and oblique projections submitted.    COMPARISON:  No relevant comparison  examinations are currently available.  Clinical information of recent trauma is obtained from the electronic medical record.    FINDINGS:  There is a recent, minimally comminuted fracture of the distal left radial metaphysis, with one of the fracture lines extending to the articular surface of the distal radius.  There is some impaction at this fracture site and anterior angulation of the fracture apex.  There is also a nondisplaced/nonangulated fracture of the ulnar styloid noted.  There is a large amount of soft tissue swelling about the wrist in association with these fractures.  Remaining osseous structures appear intact, with no additional areas suspicious for recent fracture or other significant abnormality identified.                                       X-Ray Forearm Left (Final result)  Result time 07/19/23 09:54:46      Final result by José Latif MD (07/19/23 09:54:46)                   Impression:      Recent comminuted fracture of the distal left radius.      Electronically signed by: José Latif MD  Date:    07/19/2023  Time:    09:54               Narrative:    EXAMINATION:  XR FOREARM LEFT    TECHNIQUE:  Two views of the left forearm were obtained, with AP and lateral projections submitted.    COMPARISON:  No relevant comparison examinations are currently available.  Clinical information of recent trauma.    FINDINGS:  As documented on a concurrently obtained examination of the left hand, there is a recent, comminuted intraarticular fracture of the distal left radius.  An associated ulnar styloid fracture seen on that exam is not well delineated on the forearm study.  Remaining osseous structures appear intact, with no additional areas suspicious for recent fracture or other significant abnormality identified.  Considerable soft tissue swelling about the wrist is seen.                                       Medications   0.9%  NaCl infusion (1,000 mLs Intravenous New Bag 7/19/23 0937)   morphine  injection 4 mg (4 mg Intravenous Given 7/19/23 0940)   ondansetron injection 4 mg (4 mg Intravenous Given 7/19/23 0940)   LIDOcaine HCL 10 mg/ml (1%) injection 20 mL (20 mLs Other Given 7/19/23 1029)     Medical Decision Making:   History:   I obtained history from: EMS provider.       <> Summary of History: No blood noted at seen   Patient with poor short-term memory at seen  Old Medical Records: I decided to obtain old medical records.  Old Records Summarized: records from clinic visits, records from previous admission(s), other records and records from another hospital.       <> Summary of Records: Recent diagnosis of cirrhosis  Initial Assessment:   Will obtain imaging of brain and C-spine due to distracting injury, recent intoxication, and concern for possible coagulopathy secondary to alcoholic liver disease.  Will obtain imaging of left forearm, wrist, hand.  Suspect distal radius fracture if not distal radius and ulna fracture of left wrist.  Will obtain labs and EKG for preoperative clearance.  Independently Interpreted Test(s):   I have ordered and independently interpreted X-rays - see prior notes.  I have ordered and independently interpreted EKG Reading(s) - see prior notes  Clinical Tests:   Lab Tests: Ordered and Reviewed  Radiological Study: Ordered and Reviewed  Medical Tests: Reviewed and Ordered  ED Management:  Complicated both-bone fracture of left distal forearm and wrist.  Evaluated by Orthopedic at bedside.  Patient remained neurovascularly intact.  Imaging of brain and cervical spine without acute traumatic abnormality.  Patient did not develop significant alcohol withdrawal during his stay.  Reduction and splinting in the emergency department by Orthopedic surgery with my supervision.  No evidence of compartment syndrome during stay.  CT obtained to guide operative planning.  Likely operative repair next week.  Counseled patient on appropriate use of pain medication given his chronic  alcohol use disorder without plan to quit drinking.  Strict return precautions anticipatory guidance given, especially for development of compartment syndrome.           ED Course as of 07/20/23 0626 Wed Jul 19, 2023   0937 Independent review of x-ray of left hand, forearm, wrist shows distal left ulna and radius fractures with dorsal angulation of distal fragment. [DS]      ED Course User Index  [DS] Felice Tijerina MD                 Clinical Impression:   Final diagnoses:  [W19.XXXA] Fall  [S52.572A] Other closed intra-articular fracture of distal end of left radius, initial encounter (Primary)  [S52.692A] Other closed fracture of distal end of left ulna, initial encounter        ED Disposition Condition    Discharge Stable          ED Prescriptions       Medication Sig Dispense Start Date End Date Auth. Provider    oxyCODONE (ROXICODONE) 5 MG immediate release tablet Take 1 tablet (5 mg total) by mouth every 8 (eight) hours as needed for Pain (Not relieved by Tylenol). 6 tablet 7/19/2023 -- Felice Tijerina MD          Follow-up Information       Follow up With Specialties Details Why Contact Info Additional Information    [facility] Schedule an appointment as soon as possible for a visit   [address]  [facility] [address] [phone] [facility] - Main Building, 5th Floor Please park in [facility] and take Atrium elevator             Felice Tijerina MD  07/20/23 0604

## 2023-07-20 ENCOUNTER — TELEPHONE (OUTPATIENT)
Dept: ORTHOPEDICS | Facility: CLINIC | Age: 68
End: 2023-07-20
Payer: MEDICARE

## 2023-07-20 ENCOUNTER — NURSE TRIAGE (OUTPATIENT)
Dept: ADMINISTRATIVE | Facility: CLINIC | Age: 68
End: 2023-07-20
Payer: MEDICARE

## 2023-07-20 NOTE — TELEPHONE ENCOUNTER
[place]    PCP:  Jessica    He saw Dr. Hatfield with Orthopedic Surgery in the ED.  S/P injury with broken Lt wrist.  C/O swelling, fingers are purple/bruising, in a cast.  Dr. Hatfield wants him to come in on Monday or Tuesday for to evaluate for surgery.  Denies fingers are cold and fever.  He does report that he loosened the cast bc it was on too tight.  He is not able to get to the pharmacy to pickup pain meds and is asking if pain med can be delivered.  Per protocol, care advised is go to the office now.  NT unable to schedule with Speciality.  Advised [facility] VV/UCC/ED if unable to be seen by Ortho.  Pt VU.  Advised to see if someone can pick the pain med up for him from the pharmacy.  Advised to call for worsening/questions/concerns.  VU.  Message routed high priority to Ortho.    Reason for Disposition   SEVERE pain (e.g., excruciating, unable to use hand at all)    Additional Information   Negative: Similar pain previously and it was from 'heart attack'   Negative: Similar pain previously from 'angina' and not relieved by nitroglycerin   Negative: Sounds like a life-threatening emergency to the triager   Negative: Fever and red area (or area very tender to touch)   Negative: Swollen joint and fever   Negative: Patient sounds very sick or weak to the triager    Protocols used: Hand and Wrist Pain-A-[place]

## 2023-07-20 NOTE — TELEPHONE ENCOUNTER
M for pt to schedule.    Ashly Rodriguez MS, OTC  Clinical & OR Assistant to [facility]  [phone]        ----- Message from Luis Roberts MD sent at 7/20/2023  8:02 AM CDT -----  Regarding: RE: Distal Radius - Operative  Get setup Mon/Tues with John Paul to plan for surgery Wed/Fri please.    Thx  RD    ----- Message -----  From: Ashly Rodriguez  Sent: 7/20/2023   7:58 AM CDT  To: Luis Roberts MD  Subject: FW: Distal Radius - Operative                      ----- Message -----  From: Bindu Slaughter  Sent: 7/19/2023   3:18 PM CDT  To: Ashly Rodriguez  Subject: FW: Distal Radius - Operative                    Can you schedule this patient? Dr Roberts is next in the rotation      Thank you,    Bindu Slaughter ATC,OTC  Clinical/OR Assistant to Lisa Shin MD  [facility]  [phone] o.  [phone] fax   ----- Message -----  From: Liu Baird MD  Sent: 7/19/2023   2:24 PM CDT  To: Bindu Slaughter  Subject: Distal Radius - Operative                        Shana Bindu. Can you please get some follow up arranged for this patient to be seen for his left distal radius fracture with whoever is up next? He can be seen on Monday or Tuesday. Thanks!

## 2023-07-22 ENCOUNTER — HOSPITAL ENCOUNTER (EMERGENCY)
Facility: HOSPITAL | Age: 68
Discharge: HOME | End: 2023-07-22
Attending: EMERGENCY MEDICINE
Payer: MEDICARE

## 2023-07-22 VITALS
TEMPERATURE: 98 F | HEART RATE: 86 BPM | SYSTOLIC BLOOD PRESSURE: 158 MMHG | OXYGEN SATURATION: 96 % | BODY MASS INDEX: 28.04 KG/M2 | RESPIRATION RATE: 16 BRPM | WEIGHT: 185 LBS | HEIGHT: 68 IN | DIASTOLIC BLOOD PRESSURE: 88 MMHG

## 2023-07-22 DIAGNOSIS — S62.102D CLOSED FRACTURE OF LEFT WRIST WITH ROUTINE HEALING, SUBSEQUENT ENCOUNTER: Primary | ICD-10-CM

## 2023-07-22 DIAGNOSIS — S72.001D CLOSED FRACTURE OF NECK OF RIGHT FEMUR WITH ROUTINE HEALING, SUBSEQUENT ENCOUNTER: ICD-10-CM

## 2023-07-22 DIAGNOSIS — M79.602 LEFT ARM PAIN: ICD-10-CM

## 2023-07-22 PROCEDURE — 25605 CLTX DST RDL FX/EPHYS SEP W/: CPT | Mod: LT

## 2023-07-22 PROCEDURE — 96374 THER/PROPH/DIAG INJ IV PUSH: CPT

## 2023-07-22 PROCEDURE — 96375 TX/PRO/DX INJ NEW DRUG ADDON: CPT

## 2023-07-22 PROCEDURE — 63600175 PHARM REV CODE 636 W HCPCS

## 2023-07-22 PROCEDURE — 25000003 PHARM REV CODE 250: Performed by: EMERGENCY MEDICINE

## 2023-07-22 PROCEDURE — 99285 EMERGENCY DEPT VISIT HI MDM: CPT | Mod: 25

## 2023-07-22 PROCEDURE — 63600175 PHARM REV CODE 636 W HCPCS: Performed by: EMERGENCY MEDICINE

## 2023-07-22 RX ORDER — OXYCODONE AND ACETAMINOPHEN 5; 325 MG/1; MG/1
2 TABLET ORAL
Status: COMPLETED | OUTPATIENT
Start: 2023-07-22 | End: 2023-07-22

## 2023-07-22 RX ORDER — LISINOPRIL 20 MG/1
20 TABLET ORAL DAILY
Qty: 30 TABLET | Refills: 0 | Status: ON HOLD | OUTPATIENT
Start: 2023-07-22 | End: 2023-10-30

## 2023-07-22 RX ORDER — OXYCODONE AND ACETAMINOPHEN 5; 325 MG/1; MG/1
1 TABLET ORAL
Status: COMPLETED | OUTPATIENT
Start: 2023-07-22 | End: 2023-07-22

## 2023-07-22 RX ORDER — OXYCODONE AND ACETAMINOPHEN 5; 325 MG/1; MG/1
1 TABLET ORAL
Qty: 15 TABLET | Refills: 0 | Status: ON HOLD | OUTPATIENT
Start: 2023-07-22 | End: 2023-10-25 | Stop reason: ALTCHOICE

## 2023-07-22 RX ORDER — MORPHINE SULFATE 4 MG/ML
4 INJECTION, SOLUTION INTRAMUSCULAR; INTRAVENOUS
Status: COMPLETED | OUTPATIENT
Start: 2023-07-22 | End: 2023-07-22

## 2023-07-22 RX ORDER — ACETAMINOPHEN 325 MG/1
650 TABLET ORAL
Status: COMPLETED | OUTPATIENT
Start: 2023-07-22 | End: 2023-07-22

## 2023-07-22 RX ORDER — HYDROMORPHONE HYDROCHLORIDE 1 MG/ML
1 INJECTION, SOLUTION INTRAMUSCULAR; INTRAVENOUS; SUBCUTANEOUS ONCE
Status: COMPLETED | OUTPATIENT
Start: 2023-07-22 | End: 2023-07-22

## 2023-07-22 RX ADMIN — ACETAMINOPHEN 650 MG: 325 TABLET ORAL at 10:07

## 2023-07-22 RX ADMIN — OXYCODONE HYDROCHLORIDE AND ACETAMINOPHEN 1 TABLET: 5; 325 TABLET ORAL at 10:07

## 2023-07-22 RX ADMIN — OXYCODONE HYDROCHLORIDE AND ACETAMINOPHEN 2 TABLET: 5; 325 TABLET ORAL at 05:07

## 2023-07-22 RX ADMIN — MORPHINE SULFATE 4 MG: 4 INJECTION INTRAVENOUS at 02:07

## 2023-07-22 RX ADMIN — HYDROMORPHONE HYDROCHLORIDE 1 MG: 1 INJECTION, SOLUTION INTRAMUSCULAR; INTRAVENOUS; SUBCUTANEOUS at 07:07

## 2023-07-22 NOTE — ED PROVIDER NOTES
Encounter Date: 7/22/2023       History     Chief Complaint   Patient presents with    Arm Pain     EMS reports left wrist pain from previous injury (seen and treated Wednesday)     External sources of information: records in [facility] from visits external to today's    This is a 66 yo male, right hand dominant, who has a h/o htn, alcohol use disorder, and R fem neck fx with screw fixation who was here in our ED three days ago after a fall at which time he was diagnosed with a distal radius fx (I have independently reviewed and interpreted these films and radiology has interpreted them as well - radius fx is comminuted). He was placed in a sugar tong splint and discharged. He arrives today stating that he is having increased discomfort and swelling of his left hand. He does state that he has not been taking his pain medication. It has also been causing him distress due to the fact that he has been itchy and has been using things to stick under the splint to scratch his arm. He believes that the area of superficial skin breakdown on the dorsum of the left hand was actually due to repetitive scratching underneath his splint (he was using things like rulers to scratch down inside the splint). The splint is in bad shape upon arrival - much of the overlying ace wrap distally has been removed and much of the cotton padding has been pulled apart. He states that is because he was trying to loosen it up when it was bothering him and he noticed his hand was more swollen. He was trying to alleviate some of the tightness of the splint. He has had no change in sensation or motor distally. He has had no chest pain or shortness of breath. He has had no fever. No other complaints today. No dizziness, no other falls, no v/d/abdominal pain, no neck or back pain, and no other extremity complaints.     Review of patient's allergies indicates:  No Known Allergies  Past Medical History:   Diagnosis Date    Alcohol abuse     BPH (benign  prostatic hyperplasia)     Cirrhosis 06/21/2018    pt states that he was diagnosed a week ago during his last hospital visit    Closed fracture of left distal radius 7/19/2023    Gastritis     Hypertension     Renal disorder      Past Surgical History:   Procedure Laterality Date    CAPSULOTOMY OF JOINT Right 7/29/2019    Procedure: CAPSULOTOMY, JOINT;  Surgeon: Raj Grossman MD;  Location: [address];  Service: Orthopedics;  Laterality: Right;    PERCUTANEOUS PINNING OF HIP Right 7/29/2019    Procedure: PINNING, HIP, PERCUTANEOUS- synthes cannulated screws- hana table- C arm door side-;  Surgeon: Raj Grossman MD;  Location: [address];  Service: Orthopedics;  Laterality: Right;     Family History   Problem Relation Age of Onset    Hypertension Father     Heart disease Father     Heart disease Brother      Social History     Tobacco Use    Smoking status: Every Day     Packs/day: 1.00     Types: Cigarettes    Smokeless tobacco: Never   Substance Use Topics    Alcohol use: Yes     Comment: (former drinker; 2months sober) this visit admits drinking a couple beers today    Drug use: No     Review of Systems   Constitutional:  Negative for chills and fever.   HENT:  Negative for sore throat.    Eyes:  Negative for visual disturbance.   Respiratory:  Negative for cough and shortness of breath.    Cardiovascular:  Negative for chest pain.   Gastrointestinal:  Negative for abdominal pain, diarrhea, nausea and vomiting.   Genitourinary:  Negative for dysuria.   Musculoskeletal:  Negative for back pain.        Arm swelling on the left as noted in the above HPI. Discomfort in that distal arm as well.   Skin:  Positive for wound. Negative for rash.        Left arm skin changes and swelling/bruising.   Neurological:  Negative for dizziness, weakness and numbness.   Hematological:  Does not bruise/bleed easily.   Psychiatric/Behavioral:  Negative for confusion.      Physical Exam     Initial Vitals [07/22/23  1304]   BP Pulse Resp Temp SpO2   (!) 158/88 86 16 98.1 °F (36.7 °C) 96 %      MAP       --         Physical Exam  Consititutional: Pt is awake, alert, and oriented x 4. He is disheveled. His left arm is in a tattered splint.   HEENT: PERRL; EOMI; nares patent; op clear; mmm without lesions.  Neck: Supple with good ROM.  CV: Normal rate; regular rhythm; no mrg. Heart sounds normal. No peripheral edema. 2+ radials bilateral and symmetric.  Respiratory: CTA bilaterally with no focal rales, ronchi, or wheezes.  GI: Abdomen soft, NTND. No rebound. No guarding. BS normal.  MSK: Focusing on the patient's left arm, the patient has a tattered sugar tong splint about senior living still intact to the left forearm. The hand is swollen and there is dependent bruising there. The compartments feel soft though. There are some old chronic hypopigmented skin changes to the extensor surface of the left forearm that the patient states are unchanged. The only other new skin finding other than the aforementioned bruising and swelling of the hand is a dime sized area to the radial aspect dorsum of the hand (1st / 2nd webspace dorsally) that has some superficial skin/soft tissue that is abraded off. No jermaine laceration though. The patient still has tenderness about the left wrist. Distally he has sensation intact and motor intact to median, radial, and ulnar nerves. His fingers are warm and well perfused. He has 2+ radials bilaterally and symmetric. He has no swelling up the proximal forearm or the rest of the more proximal arm.   Neuro: As above.  Skin: As above on hand/forearm on the left.   ED Course   Procedures  Labs Reviewed - No data to display       Imaging Results              X-Ray Wrist Complete Left (Final result)  Result time 07/22/23 21:58:00      Final result by Papito Milligan MD (07/22/23 21:58:00)                   Impression:      As above.      Electronically signed by: Papito Milligan  MD  Date:    07/22/2023  Time:    21:58               Narrative:    EXAMINATION:  XR WRIST COMPLETE 3 VIEWS LEFT    CLINICAL HISTORY:  pain;    TECHNIQUE:  PA, lateral, and oblique views of the left wrist were performed.    COMPARISON:  07/22/2023 16:42 hours.    FINDINGS:  Mildly improved position and alignment of previously described comminuted intra-articular fractures of the distal radius and ulna following closed reduction and splinting.  Mild improvement in degree of posterior displacement of the distal radial fracture fragments following closed reduction.                                       X-Ray Forearm Left (Final result)  Result time 07/22/23 17:17:36      Final result by Papito Milligan MD (07/22/23 17:17:36)                   Impression:      Previously described recent comminuted intra-articular fractures of the distal left radius and ulna with posterior displacement of the distal radial fracture fragment appears similar to prior study.    No additional interval new fracture of the proximal to mid forearm.    No dislocation.      Electronically signed by: Papito Milligan MD  Date:    07/22/2023  Time:    17:17               Narrative:    EXAMINATION:  XR WRIST COMPLETE 3 VIEWS LEFT; XR FOREARM LEFT    CLINICAL HISTORY:  Pain in left arm    TECHNIQUE:  PA, lateral, and oblique views of the left wrist were performed.    COMPARISON:  X-rays 07/19/2023. CT wrist 07/19/2023.    FINDINGS:  Previously described recent comminuted intra-articular fractures of the distal left radius and ulna with posterior displacement of the distal radial fracture fragment appears similar to prior study.  No additional interval new fracture of the proximal to mid forearm.  No dislocation.  Soft tissue swelling about the wrist.                                       X-Ray Wrist Complete Left (Final result)  Result time 07/22/23 17:17:36      Final result by Papito Milligan MD (07/22/23 17:17:36)                    Impression:      Previously described recent comminuted intra-articular fractures of the distal left radius and ulna with posterior displacement of the distal radial fracture fragment appears similar to prior study.    No additional interval new fracture of the proximal to mid forearm.    No dislocation.      Electronically signed by: Papito Milligan MD  Date:    07/22/2023  Time:    17:17               Narrative:    EXAMINATION:  XR WRIST COMPLETE 3 VIEWS LEFT; XR FOREARM LEFT    CLINICAL HISTORY:  Pain in left arm    TECHNIQUE:  PA, lateral, and oblique views of the left wrist were performed.    COMPARISON:  X-rays 07/19/2023. CT wrist 07/19/2023.    FINDINGS:  Previously described recent comminuted intra-articular fractures of the distal left radius and ulna with posterior displacement of the distal radial fracture fragment appears similar to prior study.  No additional interval new fracture of the proximal to mid forearm.  No dislocation.  Soft tissue swelling about the wrist.                                       US Upper Extremity Arteries Left (Final result)  Result time 07/22/23 15:36:10      Final result by Timi Macias MD (07/22/23 15:36:10)                   Impression:      No evidence for hemodynamically significant arterial stenosis or occlusion within the left upper extremity.    Biphasic and monophasic waveforms as noted above, suggesting atherosclerotic disease.    Electronically signed by resident: Luke Kinney  Date:    07/22/2023  Time:    15:27    Electronically signed by: Timi Macias MD  Date:    07/22/2023  Time:    15:36               Narrative:    EXAMINATION:  US UPPER EXTREMITY ARTERIES LEFT    CLINICAL HISTORY:  Pain in left arm    TECHNIQUE:  Real-time sonographic evaluation was performed of the left upper extremity arteries.  Color flow, Doppler, and grayscale spot images were obtained.    COMPARISON:  None    FINDINGS:  Arterial waveforms and peak systolic velocities in cm/s were  obtained as detailed below:    Left subclavian artery: Biphasic, 86 centimeters/second    Left axillary artery: Triphasic, 65 centimeters/second    Left brachial artery proximal: Triphasic, 75 centimeters/second    Left brachial artery mid: Monophasic, 62 centimeters/second    Left brachial artery distal: Monophasic, 55 centimeters/second    Left radial artery proximal: Monophasic, 86 centimeters/second    Left radial artery distal: Monophasic, 64 centimeters/second    Left ulnar artery proximal: Monophasic, 48 centimeters/second    Left ulnar artery distal: Monophasic, 76 centimeters/second    There is no elevated peak systolic velocities or doubling of velocities between adjacent segments.    Surrounding soft tissues are unremarkable.                                       Medications   morphine injection 4 mg (4 mg Intravenous Given 7/22/23 1438)   oxyCODONE-acetaminophen 5-325 mg per tablet 2 tablet (2 tablets Oral Given 7/22/23 1742)   HYDROmorphone injection 1 mg (1 mg Intravenous Given 7/22/23 1920)   oxyCODONE-acetaminophen 5-325 mg per tablet 1 tablet (1 tablet Oral Given 7/22/23 2252)   acetaminophen tablet 650 mg (650 mg Oral Given 7/22/23 2252)     Medical Decision Making:   History:   Old Medical Records: I decided to obtain old medical records.  Old Records Summarized: records from clinic visits.       <> Summary of Records: As above - in particular reviewed his previous ED visit from 3 days prior in detail.   Initial Assessment:   68 yo male with known distal radius fracture on the left, now back due to increased swelling and discomfort of the left arm.   Differential Diagnosis:   I took the splint down as it was already quite tattered. I am shooting repeat films to assess the alignment of that fracture since the splint has been in bad shape. He has swelling of the hand and distal forearm but no findings of jermaine compartment syndrome. I am also obtaining US of the LUE to assure that he hasn't developed  a DVT. He of note has no SOB or CP. The area of skin breakdown is not something we can repair. It looks like it is in the area where he was trying to scratch under the splint with a ruler and is likely trauma from that.   Clinical Tests:   Radiological Study: Ordered  ED Management:  I have contacted ortho as a consultant early in his care given the fact that his splint has probably not been immobilizing his fracture well. I wanted them to assess alignment, assure that he did not need any further manipulation/reduction, and give us some guidance on follow up plans. I administered parenteral pain medication as well for the patient with IV Morphine. At time of sign out of care, we were awaiting orthopaedics guidance and the images to return. The patient is in stable condition at this juncture with plans at sign out to follow up those images and orthopaedics recommendations.   ED Diagnosis:  1. Left distal radius fracture with acute splint malfunction, acute LUE swelling, and acute exacerbation of pain.   2. Above diagnoses complicated by known alcohol use disorder and htn.   Other:   I have discussed this case with another health care provider.       <> Summary of the Discussion: Discussed with orthopaedics.                         Clinical Impression:   Final diagnoses:  [M79.602] Left arm pain  [S62.102D] Closed fracture of left wrist with routine healing, subsequent encounter (Primary)        ED Disposition Condition    Discharge Stable          ED Prescriptions       Medication Sig Dispense Start Date End Date Auth. Provider    oxyCODONE-acetaminophen (PERCOCET) 5-325 mg per tablet Take 1 tablet by mouth every 4 to 6 hours as needed for Pain. 15 tablet 7/22/2023 -- Zayda Davis MD    lisinopriL (PRINIVIL,ZESTRIL) 20 MG tablet Take 1 tablet (20 mg total) by mouth once daily. 30 tablet 7/22/2023 -- Zayda Davis MD          Follow-up Information       Follow up With Specialties Details Why Contact Info  Additional Information    [facility]   [address], [address]  [facility] [address] [phone] [facility], 9th Floor Please park in [address] and use [address] elevators             Adriana Sanford MD  07/24/23 5117

## 2023-07-22 NOTE — PROVIDER PROGRESS NOTES - EMERGENCY DEPT.
Encounter Date: 7/22/2023    ED Physician Progress Notes        Physician Note:   -I received sign-out at 3:00 p.m. regarding Fransico Montalvo  -Patient presented to the ED for recent wrist fracture, presenting for worsening pain, swelling, cast fell off    -The following medications had been given:  Medications  morphine injection 4 mg (4 mg Intravenous Given 7/22/23 1438)  oxyCODONE-acetaminophen 5-325 mg per tablet 2 tablet (2 tablets Oral Given 7/22/23 2203)    -At the time of sign-out, the following labs/tests/imaging were still pending:  X-rays, upper extremity ultrasound to rule out DVT, ortho evaluation  -The plan is for ortho eval, repeat splinting, discharge home with close follow-up  -Clinical reassessment:  Well-appearing clinically, imaging studies are unremarkable other than showing persistent fracture.  I have discussed the case with the orthopedic surgeon on-call and they have not evaluated the patient yet but they are planning to come and place a splint on.

## 2023-07-23 NOTE — CONSULTS
[facility] - Emergency Dept  Orthopedics  Consult Note    Patient Name: Fransico Montalvo  MRN: [id number]  Admission Date: 7/22/2023  Hospital Length of Stay: 0 days  Attending Provider: No att. providers found  Primary Care Provider: Jessica      Inpatient consult to Orthopedic Surgery  Consult performed by: Viktor Toney MD  Consult ordered by: Adriana Sanford MD        Subjective:     Principal Problem:Closed fracture of left distal radius    Chief Complaint:   Chief Complaint   Patient presents with    Arm Pain     EMS reports left wrist pain from previous injury (seen and treated Wednesday)        HPI: Fransico Montalvo is a 67 y.o. male with PMH significant for HTN, alcohol use d/o, cirrhosis, R femoral neck fx s/p screw fixation presenting with left distal radius fracture 3 days ago after a fall while intoxicated reduced with sugar tong splint placement on 7/19 here at [facility] presenting with worsening left wrist pain. He reports significant itching and discomfort with his splint and subsequently loosened it in the last few days. Splint was removed in the ED and displayed a swollen and deformed left wrist with ecchymoses. He denies any trauma to the left wrist since his injury 3 days ago. Patient denies any head trauma or LOC.  Patient denies numbness and tingling. Walks w/out assisted devices at baseline. Doesn't take any anticoagulation at baseline. He denies IV drug use. He endorses smoking 1 pack of cigarettes per day. He does have a history alcohol abuse. He is not immunosuppressant medications.      Past Medical History:   Diagnosis Date    Alcohol abuse     BPH (benign prostatic hyperplasia)     Cirrhosis 06/21/2018    pt states that he was diagnosed a week ago during his last hospital visit    Closed fracture of left distal radius 7/19/2023    Gastritis     Hypertension     Renal disorder        Past Surgical History:   Procedure Laterality Date    CAPSULOTOMY OF JOINT Right 7/29/2019    Procedure:  CAPSULOTOMY, JOINT;  Surgeon: Raj Grossman MD;  Location: [facility] OR [address] [place] [address];  Service: Orthopedics;  Laterality: Right;    PERCUTANEOUS PINNING OF HIP Right 7/29/2019    Procedure: PINNING, HIP, PERCUTANEOUS- synthes cannulated screws- hana table- C arm door side-;  Surgeon: Raj Grossman MD;  Location: [facility] OR [address] [place] [address];  Service: Orthopedics;  Laterality: Right;       Review of patient's allergies indicates:  No Known Allergies    No current facility-administered medications for this encounter.     Current Outpatient Medications   Medication Sig    lisinopriL (PRINIVIL,ZESTRIL) 20 MG tablet Take 1 tablet (20 mg total) by mouth once daily.    naproxen sodium (ALEVE) 220 MG tablet Take 440 mg by mouth daily as needed (Pain).    omeprazole (PRILOSEC) 20 MG capsule Take 2 capsules (40 mg total) by mouth once daily. (Patient taking differently: Take 20 mg by mouth once a week.)    oxyCODONE (ROXICODONE) 5 MG immediate release tablet Take 1 tablet (5 mg total) by mouth every 8 (eight) hours as needed for Pain (Not relieved by Tylenol).    oxyCODONE-acetaminophen (PERCOCET) 5-325 mg per tablet Take 1 tablet by mouth every 4 to 6 hours as needed for Pain.    polyethylene glycol (GLYCOLAX) 17 gram PwPk Take 17 g by mouth 2 (two) times daily as needed (constipation).    pregabalin (LYRICA) 75 MG capsule Take 1 capsule by mouth every evening    tetrahydrozoline HCl (VISINE OPHT) Place 2 drops into both eyes daily as needed (Irritation).     Family History       Problem Relation (Age of Onset)    Heart disease Father, Brother    Hypertension Father          Tobacco Use    Smoking status: Every Day     Packs/day: 1.00     Types: Cigarettes    Smokeless tobacco: Never   Substance and Sexual Activity    Alcohol use: Yes     Comment: (former drinker; 2months sober) this visit admits drinking a couple beers today    Drug use: No    Sexual activity: Not on file     ROS    Constitutional: negative for  "fevers  Eyes: negative visual changes  ENT: negative for hearing loss  Respiratory: negative for dyspnea  Cardiovascular: negative for chest pain  Gastrointestinal: negative for abdominal pain  Genitourinary: negative for dysuria  Neurological: negative for headaches  Behavioral/Psych: negative for hallucinations  Endocrine: negative for temperature intolerance      Objective:     Vital Signs (Most Recent):  Temp: 98.1 °F (36.7 °C) (07/22/23 1304)  Pulse: 86 (07/22/23 1304)  Resp: 16 (07/22/23 2252)  BP: (!) 158/88 (07/22/23 1304)  SpO2: 96 % (07/22/23 1304) Vital Signs (24h Range):  Temp:  [98.1 °F (36.7 °C)] 98.1 °F (36.7 °C)  Pulse:  [86] 86  Resp:  [16-18] 16  SpO2:  [96 %] 96 %  BP: (158)/(88) 158/88     Weight: 83.9 kg (185 lb)  Height: 5' 8" (172.7 cm)  Body mass index is 28.13 kg/m².    No intake or output data in the 24 hours ending 07/22/23 2320     Ortho/SPM Exam     Gen:  No acute distress, well-developed, well nourished.  CV:  Peripherally well-perfused. 2+ radial pulses, symmetric.  Respiratory:  Normal respiratory effort. No accessory muscle use.   Head/Neck:  Normocephalic.  Atraumatic. Sclera anicteric. TM. Neck supple.  Neuro: No FND. Awake. Alert. Oriented to person, place, time, and situation.   Abdomen: Soft, NTND.      MSK:  Right Upper Extremity  Inspection  - Skin intact throughout, small abrasion over 1st webspace left hand  - swelling and ecchymosis surrounding the wrist, extend to the mid forearm  Palpation  - tenderness to palpation throughout the wrist  Range of motion  - AROM and PROM of the shoulder, elbow, and hand intact  - active and passive range of motion of the wrist limited due to pain  Stability  - No evidence of joint dislocation or abnormal laxity  Neurovascular  - AIN/PIN/Radial/Median/Ulnar Nerves assessed in isolation without deficit  - Able to give thumbs up, make "OK" sign, cross IF/LF, abduct/adduct fingers, make fist  - SILT throughout  - Compartments soft  - Radial " artery palpated  - Capillary Refill <3s  - Muscle tone normal      Significant Labs: All pertinent labs within the past 24 hours have been reviewed.    Significant Imaging: X-Ray: I have reviewed all pertinent results/findings and my personal findings are:  X-ray right distal radius shows dorsally displaced, impacted distal radius fracture with similar or worse angulation than prior injury 3 days ago.  I have reviewed and interpreted all pertinent imaging results/findings.    Assessment/Plan:     * Closed fracture of left distal radius  Fransico Montalvo is a 67 y.o. male with PMH significant for HTN, alcohol use d/o, cirrhosis, R femoral neck fx s/p screw fixation presenting with left distal radius fracture 3 days ago after a fall while intoxicated who presents for re-displacement of known left distal radius fracture.  Vital signs stable afebrile, neurovascularly intact.  Patient undergoes reduction at bedside and placed in sugar-tong splint.  See procedure note below.    Procedure Note:  Left distal radius reduction  Patient was explained risks, benefits, and alternatives to treatment and verbalized consent to proceed. Time out was performed and patient name, , site, and procedure were confirmed. 10 cc of 1% lidocaine in 25 gauge needle was used for hematoma block. Fracture was reduced under fluoroscopy.  Sugar-tong splint was applied in typical fashion. Post-reduction films were performed and confirmed adequate reduction. Patient tolerated the procedure well.     - patient educated on signs and symptoms of compartment syndrome at this time.  He is encouraged to return to the emergency department should any of these develop.    [facility] reach out to schedule appropriate follow up for definitive fixation of left distal radius.            CHANELLE BAH MD  Orthopedics  [facility] - Emergency Dept

## 2023-07-23 NOTE — MEDICAL/APP STUDENT
Fransico Montalvo is a 67 y.o. male with PMH significant for HTN, alcohol use d/o, cirrhosis, R femoral neck fx s/p screw fixation presenting with left distal radius fracture 3 days ago after a fall while intoxicated with sugar tong splint placement on 7/19 here at [facility] presenting with worsening left wrist pain. He reports significant pain and discomfort with his splint and subsequently loosened it in the last few days. Splint was removed in the ED and displayed a swollen and deformed left wrist with ecchymoses. He denies any trauma to the left wrist since his injury 3 days ago. Patient denies any head trauma or LOC.  Patient denies numbness and tingling. Walks w/out assisted devices at baseline. Doesn't take any anticoagulation at baseline. He denies IV drug use. He endorses smoking 1 pack of cigarettes per day. He does have a history alcohol abuse. He is not immunosuppressant medications.    Mik Forte, MS4

## 2023-07-23 NOTE — ASSESSMENT & PLAN NOTE
Fransico Montalvo is a 67 y.o. male with PMH significant for HTN, alcohol use d/o, cirrhosis, R femoral neck fx s/p screw fixation presenting with left distal radius fracture 3 days ago after a fall while intoxicated who presents for re-displacement of known left distal radius fracture.  Vital signs stable afebrile, neurovascularly intact.  Patient undergoes reduction at bedside and placed in sugar-tong splint.  See procedure note below.    Procedure Note:  Left distal radius reduction  Patient was explained risks, benefits, and alternatives to treatment and verbalized consent to proceed. Time out was performed and patient name, , site, and procedure were confirmed. 10 cc of 1% lidocaine in 25 gauge needle was used for hematoma block. Fracture was reduced under fluoroscopy.  Sugar-tong splint was applied in typical fashion. Post-reduction films were performed and confirmed adequate reduction. Patient tolerated the procedure well.     - patient educated on signs and symptoms of compartment syndrome at this time.  He is encouraged to return to the emergency department should any of these develop.    [facility] reach out to schedule appropriate follow up for definitive fixation of left distal radius.

## 2023-07-23 NOTE — SUBJECTIVE & OBJECTIVE
Past Medical History:   Diagnosis Date    Alcohol abuse     BPH (benign prostatic hyperplasia)     Cirrhosis 06/21/2018    pt states that he was diagnosed a week ago during his last hospital visit    Closed fracture of left distal radius 7/19/2023    Gastritis     Hypertension     Renal disorder        Past Surgical History:   Procedure Laterality Date    CAPSULOTOMY OF JOINT Right 7/29/2019    Procedure: CAPSULOTOMY, JOINT;  Surgeon: Raj Grossman MD;  Location: [facility] OR [address] [place] [address];  Service: Orthopedics;  Laterality: Right;    PERCUTANEOUS PINNING OF HIP Right 7/29/2019    Procedure: PINNING, HIP, PERCUTANEOUS- synthes cannulated screws- hana table- C arm door side-;  Surgeon: Raj Grossman MD;  Location: [facility] OR [address] [place] [address];  Service: Orthopedics;  Laterality: Right;       Review of patient's allergies indicates:  No Known Allergies    No current facility-administered medications for this encounter.     Current Outpatient Medications   Medication Sig    lisinopriL (PRINIVIL,ZESTRIL) 20 MG tablet Take 1 tablet (20 mg total) by mouth once daily.    naproxen sodium (ALEVE) 220 MG tablet Take 440 mg by mouth daily as needed (Pain).    omeprazole (PRILOSEC) 20 MG capsule Take 2 capsules (40 mg total) by mouth once daily. (Patient taking differently: Take 20 mg by mouth once a week.)    oxyCODONE (ROXICODONE) 5 MG immediate release tablet Take 1 tablet (5 mg total) by mouth every 8 (eight) hours as needed for Pain (Not relieved by Tylenol).    oxyCODONE-acetaminophen (PERCOCET) 5-325 mg per tablet Take 1 tablet by mouth every 4 to 6 hours as needed for Pain.    polyethylene glycol (GLYCOLAX) 17 gram PwPk Take 17 g by mouth 2 (two) times daily as needed (constipation).    pregabalin (LYRICA) 75 MG capsule Take 1 capsule by mouth every evening    tetrahydrozoline HCl (VISINE OPHT) Place 2 drops into both eyes daily as needed (Irritation).     Family History       Problem Relation (Age of Onset)    Heart disease Father,  "Brother    Hypertension Father          Tobacco Use    Smoking status: Every Day     Packs/day: 1.00     Types: Cigarettes    Smokeless tobacco: Never   Substance and Sexual Activity    Alcohol use: Yes     Comment: (former drinker; 2months sober) this visit admits drinking a couple beers today    Drug use: No    Sexual activity: Not on file     ROS    Constitutional: negative for fevers  Eyes: negative visual changes  ENT: negative for hearing loss  Respiratory: negative for dyspnea  Cardiovascular: negative for chest pain  Gastrointestinal: negative for abdominal pain  Genitourinary: negative for dysuria  Neurological: negative for headaches  Behavioral/Psych: negative for hallucinations  Endocrine: negative for temperature intolerance      Objective:     Vital Signs (Most Recent):  Temp: 98.1 °F (36.7 °C) (07/22/23 1304)  Pulse: 86 (07/22/23 1304)  Resp: 16 (07/22/23 2252)  BP: (!) 158/88 (07/22/23 1304)  SpO2: 96 % (07/22/23 1304) Vital Signs (24h Range):  Temp:  [98.1 °F (36.7 °C)] 98.1 °F (36.7 °C)  Pulse:  [86] 86  Resp:  [16-18] 16  SpO2:  [96 %] 96 %  BP: (158)/(88) 158/88     Weight: 83.9 kg (185 lb)  Height: 5' 8" (172.7 cm)  Body mass index is 28.13 kg/m².    No intake or output data in the 24 hours ending 07/22/23 2320     Ortho/SPM Exam     Gen:  No acute distress, well-developed, well nourished.  CV:  Peripherally well-perfused. 2+ radial pulses, symmetric.  Respiratory:  Normal respiratory effort. No accessory muscle use.   Head/Neck:  Normocephalic.  Atraumatic. Sclera anicteric. TM. Neck supple.  Neuro: No FND. Awake. Alert. Oriented to person, place, time, and situation.   Abdomen: Soft, NTND.      MSK:  Right Upper Extremity  Inspection  - Skin intact throughout, small abrasion over 1st webspace left hand  - swelling and ecchymosis surrounding the wrist, extend to the mid forearm  Palpation  - tenderness to palpation throughout the wrist  Range of motion  - AROM and PROM of the shoulder, elbow, " "and hand intact  - active and passive range of motion of the wrist limited due to pain  Stability  - No evidence of joint dislocation or abnormal laxity  Neurovascular  - AIN/PIN/Radial/Median/Ulnar Nerves assessed in isolation without deficit  - Able to give thumbs up, make "OK" sign, cross IF/LF, abduct/adduct fingers, make fist  - SILT throughout  - Compartments soft  - Radial artery palpated  - Capillary Refill <3s  - Muscle tone normal      Significant Labs: All pertinent labs within the past 24 hours have been reviewed.    Significant Imaging: X-Ray: I have reviewed all pertinent results/findings and my personal findings are:  X-ray right distal radius shows dorsally displaced, impacted distal radius fracture with similar or worse angulation than prior injury 3 days ago.  I have reviewed and interpreted all pertinent imaging results/findings.  "

## 2023-07-23 NOTE — HPI
Fransico Montalvo is a 67 y.o. male with PMH significant for HTN, alcohol use d/o, cirrhosis, R femoral neck fx s/p screw fixation presenting with left distal radius fracture 3 days ago after a fall while intoxicated reduced with sugar tong splint placement on 7/19 here at [facility] presenting with worsening left wrist pain. He reports significant itching and discomfort with his splint and subsequently loosened it in the last few days. Splint was removed in the ED and displayed a swollen and deformed left wrist with ecchymoses. He denies any trauma to the left wrist since his injury 3 days ago. Patient denies any head trauma or LOC.  Patient denies numbness and tingling. Walks w/out assisted devices at baseline. Doesn't take any anticoagulation at baseline. He denies IV drug use. He endorses smoking 1 pack of cigarettes per day. He does have a history alcohol abuse. He is not immunosuppressant medications.

## 2023-07-24 ENCOUNTER — TELEPHONE (OUTPATIENT)
Dept: ORTHOPEDICS | Facility: CLINIC | Age: 68
End: 2023-07-24
Payer: MEDICARE

## 2023-07-24 NOTE — TELEPHONE ENCOUNTER
----- Message from Janet Marquez sent at 7/24/2023  9:58 AM CDT -----  Regarding: pt advice  Contact: [phone]  Pt requesting next step in care. Pt has a fractured left wrist, was told by ED doctor he will need surgery. Scheduled the earliest appt but not till Aug 23rd. Pt would like to know how should he care for his wrist till his appt. Pt would like to know if it's ok to wait to see the doctor with just a soft cast for the next month. Pls call to discuss.

## 2023-07-24 NOTE — TELEPHONE ENCOUNTER
M for patient offering appointment today or tomorrow.    Ashly Rodriguez MS, OTC  Clinical & OR Assistant to Dr. Luis Roberts  [facility]  [phone]      ----- Message from Geno Patel LPN sent at 2023 10:35 AM CDT -----  Regardin  2nd attempt    Hi  Patient is for surgery with Dr Roberts ,  I will book him an appt with John Paul as requested by Ashly   Ill give him a call also      Thanks, Fouzia sol [phone]

## 2023-07-26 ENCOUNTER — TELEPHONE (OUTPATIENT)
Dept: ORTHOPEDICS | Facility: CLINIC | Age: 68
End: 2023-07-26
Payer: MEDICARE

## 2023-07-26 NOTE — TELEPHONE ENCOUNTER
"Spoke with the patient. He says he is unable to get transportation within 3-4 days due to his insurance. He is unable to find a ride with a friend or family member and "has no money for uber or public transport". His only option would be to take an ambulance. I provided him with appointment details for tomorrow and advised he attempt to find transportation as this injury is severe and he should receive care ASAP before Dr. Roberts leaves [facility].    I advised him to clear his VM box and he said he does not have access to this.     Ashly Rodriguez MS, OTC  Clinical & OR Assistant to Dr. Luis Roberts  [facility]  [phone]    "

## 2023-07-26 NOTE — TELEPHONE ENCOUNTER
5th attempt. LVM for patient to schedule for distal radius fx.    Ashly Rodriguez MS, OTC  Clinical & OR Assistant to Dr. Ross Dunbar [facility]  [phone]

## 2023-07-26 NOTE — TELEPHONE ENCOUNTER
4th attempt - LVM for patient to schedule appointment for wrist injury this week. Advised Dr. Roberts leaving [facility] this weekend.     Ashly Rodriguze MS, OTC  Clinical & OR Assistant to Dr. Luis Roberts  [facility]  [phone]

## 2023-07-26 NOTE — TELEPHONE ENCOUNTER
Returned patient's call 2x - no answer, mailbox full.     Ashly Rodriguez MS, OTC  Clinical & OR Assistant to Dr. Ross Dunbar [facility]  [phone]      ----- Message from Murali Mejias sent at 7/26/2023  9:34 AM CDT -----  Type:  Patient Returning Call    Who Called:GILLIAN MENA [[id number]]  Who Left Message for Patient: Lida  Does the patient know what this is regarding?:YES  Would the patient rather a call back or a response via [facility]? NO   Best Call Back Number:.Telephone Information:  Mobile          [phone]      Additional Information: Pt returning call

## 2023-07-26 NOTE — TELEPHONE ENCOUNTER
Spoke with the patient. Provided information for [facility] and advised to call to arrange follow up within 1 week for this injury.     [facility] [phone]  [address], [address]th floor  [place], [place]  [id number] [place] [address] hand clinic fax number [phone]     All questions answered. Patient verbalized understanding and was thankful.     Ashly Rodriguez MS, OTC  Clinical & OR Assistant to Dr. Ross Dunbar [facility]  [phone]

## 2023-07-26 NOTE — TELEPHONE ENCOUNTER
"3rd attempt - see note from "Orthopedics" on   Me     AD    10:41 AM  Note  LVM for patient offering appointment today or tomorrow.     Ashly Rodriguez MS, OTC  Clinical & OR Assistant to Dr. Ross Dunbar [facility]  [phone]        ----- Message from Geno Patel LPN sent at 2023 10:35 AM CDT -----  Regardin  Hi  Patient is for surgery with Dr Roberts ,  I will book him an appt with John Paul as requested by Ashly   Ill give him a call also      Thanks, Fouzia sol [phone]         "

## 2023-07-26 NOTE — TELEPHONE ENCOUNTER
LVM for emergency contact #1 in attempt to schedule.    Emergency contact #2 phone not accepting calls at this time.    Ashly Rodriguez MS, OTC  Clinical & OR Assistant to Dr. Ross Dunbar [facility]  [phone]

## 2023-08-16 NOTE — PROGRESS NOTES
Principal Orthopedic Problem: Minimally displaced right femoral neck fracture     Relevant Medical History: HTN, CKD, alcoholic cirrhosis, left proximal humerus fx treated nonop in Jan 2018    Not on anticoagulation. Community ambulator at baseline.    HPI: Mr. Montalvo is 63 year old male who presented to the ED with right hip pain s/p fall. RADS: right femoral neck fracture.  Patient was treated with percutaneous screw fixation on 07/29/2019    Mr. Montalvo is here today for a post-operative visit after a     1.  Closed reduction and percutaneous screw fixation right femoral neck fracture    2.  Capsulotomy right hip joint  by Dr. Grossman on 07/29/2019.    Implants:Synthes 7.3 mm cannulated screws x3    Interval History:  he reports that he is doing ok.   he is at home. he is not participating in PT/OT.    He has been weight bearing as tolerated.  He is using a rollator to assist with ambulation. He reports that he has a achy throbbing pain mainly in his groin area.   Pain is controlled.  he is  taking pain medication.    he denies fever, chills, and sweats since the time of the surgery.     Physical exam:  Walked into clinic with Rolator hunched forward.  full range of motion, some pain with end points of motion. Increased pain, pulling in his groin area with full extension.     RADS: reviewed by myself   Postoperative changes of internal fixation of the right hip, pinning of the right femoral neck fracture identified as before. Stable compared to previous exams, though has had some collapse.     Assessment:  Post-op visit ( 14 weeks)    Plan:  Current care, treatment plan, precautions, activity level/ modifications, limitations, rehabilitation exercises and proposed future treatment were discussed with the patient. We discussed the need to monitor for changes in symptoms and condition and report them to the physician.  Discussed importance of compliance with all appointments and follow up examinations.   -PT/OT, PT  solutions , Patient is responsible to establish and continue care   -range of motion as tolerated    - weight bearing as tolerated,   - he understands importance of PT and stretching to regain function  - He understands that his previous non-compliance can lead to further surgical intervention.   - pain medication:  Not refilled.    Pain medication refill policy provided to patient for review.   - Patient is to return to clinic in 12 weeks  At time x-ray of his hip is needed     If there are any questions prior to scheduled follow up, the patient was instructed to contact the office       47 M w/PMHx of rheumatoid arthritis , previously on Humira , fibromyalgia, sjogrens disease presents for diarrhea x 2 days

## 2023-10-24 ENCOUNTER — HOSPITAL ENCOUNTER (INPATIENT)
Facility: HOSPITAL | Age: 68
LOS: 6 days | Discharge: HOME HEALTH | DRG: 377 | End: 2023-10-30
Attending: STUDENT IN AN ORGANIZED HEALTH CARE EDUCATION/TRAINING PROGRAM | Admitting: FAMILY MEDICINE
Payer: MEDICARE

## 2023-10-24 DIAGNOSIS — K92.1 GASTROINTESTINAL HEMORRHAGE WITH MELENA: ICD-10-CM

## 2023-10-24 DIAGNOSIS — K86.9 PANCREATIC LESION: ICD-10-CM
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
## 2023-10-24 DIAGNOSIS — R55 SYNCOPE: ICD-10-CM

## 2023-10-24 DIAGNOSIS — K92.2 GI BLEED: Primary | ICD-10-CM

## 2023-10-24 DIAGNOSIS — D64.9 ANEMIA: ICD-10-CM

## 2023-10-24 DIAGNOSIS — K92.2 ACUTE UPPER GI BLEED: ICD-10-CM

## 2023-10-24 LAB
ABO + RH BLD: NORMAL
ALBUMIN SERPL BCP-MCNC: 3.3 G/DL (ref 3.5–5.2)
ALP SERPL-CCNC: 68 U/L (ref 55–135)
ALT SERPL W/O P-5'-P-CCNC: 13 U/L (ref 10–44)
ANION GAP SERPL CALC-SCNC: 12 MMOL/L (ref 8–16)
ANISOCYTOSIS BLD QL SMEAR: SLIGHT
APTT PPP: 22 SEC (ref 21–32)
AST SERPL-CCNC: 42 U/L (ref 10–40)
BASO STIPL BLD QL SMEAR: ABNORMAL
BASOPHILS # BLD AUTO: 0.01 K/UL (ref 0–0.2)
BASOPHILS NFR BLD: 0.2 % (ref 0–1.9)
BILIRUB SERPL-MCNC: 1.6 MG/DL (ref 0.1–1)
BILIRUB UR QL STRIP: NEGATIVE
BLD GP AB SCN CELLS X3 SERPL QL: NORMAL
BLD PROD TYP BPU: NORMAL
BLD PROD TYP BPU: NORMAL
BLOOD UNIT EXPIRATION DATE: NORMAL
BLOOD UNIT EXPIRATION DATE: NORMAL
BLOOD UNIT TYPE CODE: 5100
BLOOD UNIT TYPE CODE: 5100
BLOOD UNIT TYPE: NORMAL
BLOOD UNIT TYPE: NORMAL
BUN SERPL-MCNC: 45 MG/DL (ref 6–30)
BUN SERPL-MCNC: 46 MG/DL (ref 8–23)
BURR CELLS BLD QL SMEAR: ABNORMAL
CALCIUM SERPL-MCNC: 8.6 MG/DL (ref 8.7–10.5)
CHLORIDE SERPL-SCNC: 102 MMOL/L (ref 95–110)
CHLORIDE SERPL-SCNC: 98 MMOL/L (ref 95–110)
CLARITY UR REFRACT.AUTO: CLEAR
CO2 SERPL-SCNC: 17 MMOL/L (ref 23–29)
CODING SYSTEM: NORMAL
CODING SYSTEM: NORMAL
COLOR UR AUTO: YELLOW
CREAT SERPL-MCNC: 2 MG/DL (ref 0.5–1.4)
CREAT SERPL-MCNC: 2.1 MG/DL (ref 0.5–1.4)
CROSSMATCH INTERPRETATION: NORMAL
CROSSMATCH INTERPRETATION: NORMAL
DIFFERENTIAL METHOD: ABNORMAL
DISPENSE STATUS: NORMAL
DISPENSE STATUS: NORMAL
EOSINOPHIL # BLD AUTO: 0 K/UL (ref 0–0.5)
EOSINOPHIL NFR BLD: 0 % (ref 0–8)
ERYTHROCYTE [DISTWIDTH] IN BLOOD BY AUTOMATED COUNT: 20.2 % (ref 11.5–14.5)
EST. GFR  (NO RACE VARIABLE): 33.4 ML/MIN/1.73 M^2
GLUCOSE SERPL-MCNC: 101 MG/DL (ref 70–110)
GLUCOSE SERPL-MCNC: 95 MG/DL (ref 70–110)
GLUCOSE UR QL STRIP: NEGATIVE
HCT VFR BLD AUTO: 15.5 % (ref 40–54)
HCT VFR BLD CALC: 16 %PCV (ref 36–54)
HGB BLD-MCNC: 4.4 G/DL (ref 14–18)
HGB UR QL STRIP: NEGATIVE
HYPOCHROMIA BLD QL SMEAR: ABNORMAL
IMM GRANULOCYTES # BLD AUTO: 0.03 K/UL (ref 0–0.04)
IMM GRANULOCYTES NFR BLD AUTO: 0.5 % (ref 0–0.5)
INR PPP: 1.1 (ref 0.8–1.2)
KETONES UR QL STRIP: ABNORMAL
LEUKOCYTE ESTERASE UR QL STRIP: NEGATIVE
LIPASE SERPL-CCNC: 36 U/L (ref 4–60)
LYMPHOCYTES # BLD AUTO: 1 K/UL (ref 1–4.8)
LYMPHOCYTES NFR BLD: 14.8 % (ref 18–48)
MAGNESIUM SERPL-MCNC: 1.6 MG/DL (ref 1.6–2.6)
MCH RBC QN AUTO: 25.1 PG (ref 27–31)
MCHC RBC AUTO-ENTMCNC: 28.4 G/DL (ref 32–36)
MCV RBC AUTO: 89 FL (ref 82–98)
MONOCYTES # BLD AUTO: 0.8 K/UL (ref 0.3–1)
MONOCYTES NFR BLD: 11.6 % (ref 4–15)
NEUTROPHILS # BLD AUTO: 4.7 K/UL (ref 1.8–7.7)
NEUTROPHILS NFR BLD: 72.9 % (ref 38–73)
NITRITE UR QL STRIP: NEGATIVE
NRBC BLD-RTO: 0 /100 WBC
OVALOCYTES BLD QL SMEAR: ABNORMAL
PH UR STRIP: 5 [PH] (ref 5–8)
PLATELET # BLD AUTO: 67 K/UL (ref 150–450)
PLATELET BLD QL SMEAR: ABNORMAL
PMV BLD AUTO: 11.8 FL (ref 9.2–12.9)
POC IONIZED CALCIUM: 1.11 MMOL/L (ref 1.06–1.42)
POC TCO2 (MEASURED): 19 MMOL/L (ref 23–29)
POIKILOCYTOSIS BLD QL SMEAR: SLIGHT
POLYCHROMASIA BLD QL SMEAR: ABNORMAL
POTASSIUM BLD-SCNC: 3.9 MMOL/L (ref 3.5–5.1)
POTASSIUM SERPL-SCNC: 3.8 MMOL/L (ref 3.5–5.1)
PROT SERPL-MCNC: 6.9 G/DL (ref 6–8.4)
PROT UR QL STRIP: ABNORMAL
PROTHROMBIN TIME: 11.8 SEC (ref 9–12.5)
RBC # BLD AUTO: 1.75 M/UL (ref 4.6–6.2)
SAMPLE: ABNORMAL
SODIUM BLD-SCNC: 131 MMOL/L (ref 136–145)
SODIUM SERPL-SCNC: 131 MMOL/L (ref 136–145)
SP GR UR STRIP: 1.02 (ref 1–1.03)
SPECIMEN OUTDATE: NORMAL
TARGETS BLD QL SMEAR: ABNORMAL
TRANS ERYTHROCYTES VOL PATIENT: NORMAL ML
TRANS ERYTHROCYTES VOL PATIENT: NORMAL ML
TROPONIN I SERPL DL<=0.01 NG/ML-MCNC: 4.11 NG/ML (ref 0–0.03)
TROPONIN I SERPL DL<=0.01 NG/ML-MCNC: 4.69 NG/ML (ref 0–0.03)
URN SPEC COLLECT METH UR: ABNORMAL
WBC # BLD AUTO: 6.48 K/UL (ref 3.9–12.7)

## 2023-10-24 PROCEDURE — 36430 TRANSFUSION BLD/BLD COMPNT: CPT

## 2023-10-24 PROCEDURE — P9021 RED BLOOD CELLS UNIT: HCPCS | Performed by: PHYSICIAN ASSISTANT

## 2023-10-24 PROCEDURE — 81003 URINALYSIS AUTO W/O SCOPE: CPT | Performed by: PHYSICIAN ASSISTANT

## 2023-10-24 PROCEDURE — C9113 INJ PANTOPRAZOLE SODIUM, VIA: HCPCS | Performed by: PHYSICIAN ASSISTANT

## 2023-10-24 PROCEDURE — 93005 ELECTROCARDIOGRAM TRACING: CPT

## 2023-10-24 PROCEDURE — 80053 COMPREHEN METABOLIC PANEL: CPT | Performed by: PHYSICIAN ASSISTANT

## 2023-10-24 PROCEDURE — 20600001 HC STEP DOWN PRIVATE ROOM

## 2023-10-24 PROCEDURE — 25000003 PHARM REV CODE 250: Performed by: FAMILY MEDICINE

## 2023-10-24 PROCEDURE — 25000003 PHARM REV CODE 250: Performed by: PHYSICIAN ASSISTANT

## 2023-10-24 PROCEDURE — 96375 TX/PRO/DX INJ NEW DRUG ADDON: CPT

## 2023-10-24 PROCEDURE — 93010 ELECTROCARDIOGRAM REPORT: CPT | Mod: ,,, | Performed by: INTERNAL MEDICINE

## 2023-10-24 PROCEDURE — 63600175 PHARM REV CODE 636 W HCPCS: Performed by: PHYSICIAN ASSISTANT

## 2023-10-24 PROCEDURE — 96361 HYDRATE IV INFUSION ADD-ON: CPT

## 2023-10-24 PROCEDURE — 85610 PROTHROMBIN TIME: CPT | Performed by: PHYSICIAN ASSISTANT

## 2023-10-24 PROCEDURE — 86850 RBC ANTIBODY SCREEN: CPT | Performed by: PHYSICIAN ASSISTANT

## 2023-10-24 PROCEDURE — 93010 ELECTROCARDIOGRAM REPORT: CPT | Mod: 76,,, | Performed by: INTERNAL MEDICINE

## 2023-10-24 PROCEDURE — 86920 COMPATIBILITY TEST SPIN: CPT | Performed by: PHYSICIAN ASSISTANT

## 2023-10-24 PROCEDURE — 85730 THROMBOPLASTIN TIME PARTIAL: CPT | Performed by: PHYSICIAN ASSISTANT

## 2023-10-24 PROCEDURE — 83690 ASSAY OF LIPASE: CPT | Performed by: PHYSICIAN ASSISTANT

## 2023-10-24 PROCEDURE — 99285 EMERGENCY DEPT VISIT HI MDM: CPT | Mod: 25

## 2023-10-24 PROCEDURE — 85025 COMPLETE CBC W/AUTO DIFF WBC: CPT | Performed by: PHYSICIAN ASSISTANT

## 2023-10-24 PROCEDURE — 83735 ASSAY OF MAGNESIUM: CPT | Performed by: PHYSICIAN ASSISTANT

## 2023-10-24 PROCEDURE — 63600175 PHARM REV CODE 636 W HCPCS: Performed by: FAMILY MEDICINE

## 2023-10-24 PROCEDURE — 84484 ASSAY OF TROPONIN QUANT: CPT | Mod: 91 | Performed by: PHYSICIAN ASSISTANT

## 2023-10-24 PROCEDURE — 96374 THER/PROPH/DIAG INJ IV PUSH: CPT

## 2023-10-24 PROCEDURE — 93010 EKG 12-LEAD: ICD-10-PCS | Mod: ,,, | Performed by: INTERNAL MEDICINE

## 2023-10-24 RX ORDER — ESCITALOPRAM OXALATE 10 MG/1
10 TABLET ORAL DAILY
Status: ON HOLD | COMMUNITY
Start: 2023-08-25

## 2023-10-24 RX ORDER — OXYCODONE HYDROCHLORIDE 5 MG/1
5 TABLET ORAL EVERY 6 HOURS PRN
Status: DISCONTINUED | OUTPATIENT
Start: 2023-10-24 | End: 2023-10-30 | Stop reason: HOSPADM

## 2023-10-24 RX ORDER — MORPHINE SULFATE 4 MG/ML
4 INJECTION, SOLUTION INTRAMUSCULAR; INTRAVENOUS
Status: COMPLETED | OUTPATIENT
Start: 2023-10-24 | End: 2023-10-24

## 2023-10-24 RX ORDER — PANTOPRAZOLE SODIUM 40 MG/10ML
40 INJECTION, POWDER, LYOPHILIZED, FOR SOLUTION INTRAVENOUS 2 TIMES DAILY
Status: DISCONTINUED | OUTPATIENT
Start: 2023-10-25 | End: 2023-10-25

## 2023-10-24 RX ORDER — ONDANSETRON 4 MG/1
4 TABLET, ORALLY DISINTEGRATING ORAL EVERY 6 HOURS PRN
Status: DISCONTINUED | OUTPATIENT
Start: 2023-10-24 | End: 2023-10-30 | Stop reason: HOSPADM

## 2023-10-24 RX ORDER — OCTREOTIDE ACETATE 100 UG/ML
50 INJECTION, SOLUTION INTRAVENOUS; SUBCUTANEOUS ONCE
Status: COMPLETED | OUTPATIENT
Start: 2023-10-24 | End: 2023-10-24

## 2023-10-24 RX ORDER — ESCITALOPRAM OXALATE 5 MG/1
10 TABLET ORAL DAILY
Status: DISCONTINUED | OUTPATIENT
Start: 2023-10-25 | End: 2023-10-30 | Stop reason: HOSPADM

## 2023-10-24 RX ORDER — MUPIROCIN 20 MG/G
OINTMENT TOPICAL 2 TIMES DAILY
Status: DISPENSED | OUTPATIENT
Start: 2023-10-24 | End: 2023-10-29

## 2023-10-24 RX ORDER — ACETAMINOPHEN 500 MG
500 TABLET ORAL EVERY 6 HOURS PRN
Status: DISCONTINUED | OUTPATIENT
Start: 2023-10-24 | End: 2023-10-30 | Stop reason: HOSPADM

## 2023-10-24 RX ORDER — HYDROCODONE BITARTRATE AND ACETAMINOPHEN 500; 5 MG/1; MG/1
TABLET ORAL
Status: DISCONTINUED | OUTPATIENT
Start: 2023-10-24 | End: 2023-10-25

## 2023-10-24 RX ORDER — OXYCODONE HYDROCHLORIDE 10 MG/1
10 TABLET ORAL EVERY 6 HOURS PRN
Status: DISCONTINUED | OUTPATIENT
Start: 2023-10-24 | End: 2023-10-26

## 2023-10-24 RX ORDER — PANTOPRAZOLE SODIUM 40 MG/10ML
80 INJECTION, POWDER, LYOPHILIZED, FOR SOLUTION INTRAVENOUS
Status: COMPLETED | OUTPATIENT
Start: 2023-10-24 | End: 2023-10-24

## 2023-10-24 RX ORDER — OXYCODONE HYDROCHLORIDE 10 MG/1
10 TABLET ORAL ONCE
Status: COMPLETED | OUTPATIENT
Start: 2023-10-24 | End: 2023-10-24

## 2023-10-24 RX ADMIN — OCTREOTIDE ACETATE 50 MCG: 100 INJECTION, SOLUTION INTRAVENOUS; SUBCUTANEOUS at 08:10

## 2023-10-24 RX ADMIN — MORPHINE SULFATE 4 MG: 4 INJECTION INTRAVENOUS at 02:10

## 2023-10-24 RX ADMIN — OXYCODONE HYDROCHLORIDE 10 MG: 10 TABLET ORAL at 08:10

## 2023-10-24 RX ADMIN — SODIUM CHLORIDE 1000 ML: 9 INJECTION, SOLUTION INTRAVENOUS at 02:10

## 2023-10-24 RX ADMIN — CEFTRIAXONE 1 G: 1 INJECTION, POWDER, FOR SOLUTION INTRAMUSCULAR; INTRAVENOUS at 09:10

## 2023-10-24 RX ADMIN — PANTOPRAZOLE SODIUM 80 MG: 40 INJECTION, POWDER, FOR SOLUTION INTRAVENOUS at 03:10

## 2023-10-24 NOTE — ED NOTES
I-STAT Chem-8+ Results:   Value Reference Range   Sodium 131 136-145 mmol/L   Potassium  3.9 3.5-5.1 mmol/L   Chloride 98  mmol/L   Ionized Calcium 1.11 1.06-1.42 mmol/L   CO2 (measured) 19 23-29 mmol/L   Glucose 101  mg/dL   BUN 45 6-30 mg/dL   Creatinine 2.0 0.5-1.4 mg/dL   Hematocrit 16 36-54%

## 2023-10-24 NOTE — ED NOTES
Pt unable to tolerate standing/sitting up to transfer to wheelchair for x-ray. Care team notified via secure chat

## 2023-10-24 NOTE — ED NOTES
SEAN Day gave verbal order to hang 500ml bolus of normal saline for pt's BP. Fluids initiated. BP cycling Q10min.

## 2023-10-24 NOTE — ED PROVIDER NOTES
Encounter Date: 10/24/2023       History     Chief Complaint   Patient presents with    Fall     Pt reports multiple falls over the past few days. Pt detoxing from ETOH with last drink a week ago. Pt reports lightheadedness, weakness, nausea.     68-year-old male with past medical history of alcohol abuse, BPH, cirrhosis, hypertension who presents to the emergency department with chief complaint of syncope, lightheadedness, rib pain. He endorses a syncopal episode that occurred about 10 days ago. He was walking into his backyard, felt lightheaded, and lost consciousness. He was out for a few seconds. His roommate witnessed this. He went inside to get the patient some water. The patient was sitting upright, and had another syncopal episode without any prodromal symptoms. Again, he was out for a few seconds before regaining consciousness. He felt relatively well for the few days following that episode. For the last week, he endorses lightheadedness and dizziness. Feels lightheaded as if he might pass out, but also endorses vertiginous symptoms. His symptoms are worse with movement and ambulation. He had a fall yesterday. He states that he felt weak and lightheaded while walking, he fell to his left side, and struck the left side of his ribs on a nearby chair. He then fell to the ground. Did not hit his head or lose consciousness. He has been unable to walk since yesterday due to the dizziness. He has been crawling around his house since last night. Denies headache, cp, sob, abd pain, diarrhea. Does report nausea and vomiting and black stools over the last four days. Additionally, he broke his wrist a few months ago. He significant decreased his alcohol use since then. Last drink was one week ago. Denies other drug use. Denies other worsening or alleviating factors.       Review of patient's allergies indicates:  No Known Allergies  Past Medical History:   Diagnosis Date    Alcohol abuse     BPH (benign prostatic  hyperplasia)     Cirrhosis 06/21/2018    pt states that he was diagnosed a week ago during his last hospital visit    Closed fracture of left distal radius 7/19/2023    Gastritis     Hypertension     Renal disorder      Past Surgical History:   Procedure Laterality Date    CAPSULOTOMY OF JOINT Right 7/29/2019    Procedure: CAPSULOTOMY, JOINT;  Surgeon: Raj Grossman MD;  Location: [address];  Service: Orthopedics;  Laterality: Right;    PERCUTANEOUS PINNING OF HIP Right 7/29/2019    Procedure: PINNING, HIP, PERCUTANEOUS- synthes cannulated screws- hana table- C arm door side-;  Surgeon: Raj Grossman MD;  Location: [address];  Service: Orthopedics;  Laterality: Right;     Family History   Problem Relation Age of Onset    Hypertension Father     Heart disease Father     Heart disease Brother      Social History     Tobacco Use    Smoking status: Every Day     Current packs/day: 1.00     Types: Cigarettes    Smokeless tobacco: Never   Substance Use Topics    Alcohol use: Yes     Comment: (former drinker; 2months sober) this visit admits drinking a couple beers today    Drug use: No     Review of Systems   Cardiovascular:         Rib pain    Gastrointestinal:  Positive for blood in stool, nausea and vomiting.   Neurological:  Positive for syncope.       Physical Exam     Initial Vitals [10/24/23 1201]   BP Pulse Resp Temp SpO2   100/60 92 18 98.3 °F (36.8 °C) 96 %      MAP       --         Physical Exam    Vitals reviewed.  Constitutional: He is not diaphoretic. No distress.   Chronically ill appearing male, pale, NAD    HENT:   Head: Normocephalic and atraumatic.   Dry mucous membranes    Eyes: EOM are normal. Pupils are equal, round, and reactive to light.   Neck: Neck supple.   Normal range of motion.  Cardiovascular:  Normal rate, regular rhythm, normal heart sounds and intact distal pulses.     Exam reveals no gallop and no friction rub.       No murmur heard.  Pulmonary/Chest: Breath sounds  normal. He has no wheezes. He has no rhonchi. He has no rales. He exhibits tenderness.   Left lateral ribs    Abdominal: Abdomen is soft. Bowel sounds are normal. There is no abdominal tenderness. There is no rebound and no guarding.   Genitourinary:    Genitourinary Comments: Rectal exam performed with PA student as chaperone   Black stool around rectum   Dark brown stool in rectal vault, guaiac positive      Musculoskeletal:         General: Normal range of motion.      Cervical back: Normal range of motion and neck supple.     Neurological: He is alert and oriented to person, place, and time. He has normal strength. GCS score is 15. GCS eye subscore is 4. GCS verbal subscore is 5. GCS motor subscore is 6.   Skin: Skin is warm and dry. Capillary refill takes less than 2 seconds.   Psychiatric: He has a normal mood and affect. His behavior is normal. Judgment and thought content normal.         ED Course   Critical Care    Date/Time: 11/11/2023 2:59 PM    Performed by: Temitope Day PA-C  Authorized by: Loly Nguyen MD  Direct patient critical care time: 10 minutes  Additional history critical care time: 5 minutes  Ordering / reviewing critical care time: 5 minutes  Documentation critical care time: 10 minutes  Consulting other physicians critical care time: 5 minutes  Consult with family critical care time: 0 minutes  Other critical care time: 0 minutes  Total critical care time (exclusive of procedural time) : 35 minutes  Critical care time was exclusive of separately billable procedures and treating other patients and teaching time.  Critical care was necessary to treat or prevent imminent or life-threatening deterioration of the following conditions: cardiac failure, shock and respiratory failure.  Critical care was time spent personally by me on the following activities: development of treatment plan with patient or surrogate, discussions with consultants, interpretation of cardiac output  measurements, evaluation of patient's response to treatment, examination of patient, obtaining history from patient or surrogate, ordering and performing treatments and interventions, ordering and review of laboratory studies, ordering and review of radiographic studies, pulse oximetry, re-evaluation of patient's condition and review of old charts.        Labs Reviewed   CBC W/ AUTO DIFFERENTIAL - Abnormal; Notable for the following components:       Result Value    RBC 1.75 (*)     Hemoglobin 4.4 (*)     Hematocrit 15.5 (*)     MCH 25.1 (*)     MCHC 28.4 (*)     RDW 20.2 (*)     Platelets 67 (*)     Lymph % 14.8 (*)     Platelet Estimate Decreased (*)     All other components within normal limits    Narrative:     HGB/HCT   critical result(s) called and verbal readback obtained from   Deepti Birmingham RN by ZOYA 10/24/2023 16:40   COMPREHENSIVE METABOLIC PANEL - Abnormal; Notable for the following components:    Sodium 131 (*)     CO2 17 (*)     BUN 46 (*)     Creatinine 2.1 (*)     Calcium 8.6 (*)     Albumin 3.3 (*)     Total Bilirubin 1.6 (*)     AST 42 (*)     eGFR 33.4 (*)     All other components within normal limits   TROPONIN I - Abnormal; Notable for the following components:    Troponin I 4.689 (*)     All other components within normal limits   TROPONIN I - Abnormal; Notable for the following components:    Troponin I 4.106 (*)     All other components within normal limits   ISTAT PROCEDURE - Abnormal; Notable for the following components:    POC BUN 45 (*)     POC Creatinine 2.0 (*)     POC Sodium 131 (*)     POC TCO2 (MEASURED) 19 (*)     POC Hematocrit 16 (*)     All other components within normal limits   LIPASE   MAGNESIUM   PROTIME-INR   APTT   URINALYSIS, REFLEX TO URINE CULTURE   TYPE & SCREEN   ISTAT CHEM8   PREPARE RBC SOFT     EKG Readings: (Independently Interpreted)   Initial Reading: No STEMI. Rhythm: Normal Sinus Rhythm. Heart Rate: 97.     ECG Results              EKG 12-lead (Final result)   Result time 10/24/23 18:15:58      Final result by Interface, Lab In [facility] (10/24/23 18:15:58)                   Narrative:    Test Reason : R55,    Vent. Rate : 097 BPM     Atrial Rate : 097 BPM     P-R Int : 200 ms          QRS Dur : 096 ms      QT Int : 350 ms       P-R-T Axes : 068 -15 103 degrees     QTc Int : 444 ms    Normal sinus rhythm  ST and T wave abnormality, consider lateral ischemia  Abnormal ECG  When compared with ECG of 19-JUL-2023 09:09,  The axis Shifted right  ST now depressed in Anterior leads  Confirmed by Mariano MIRANDA MD ([id number]) on 10/24/2023 6:15:50 PM    Referred By: [email or web address]   SELF           Confirmed By:Mariano MIRANDA MD                                  Imaging Results              X-Ray Ribs 2 View Left (Final result)  Result time 10/24/23 17:49:16      Final result by Betito Hein MD (10/24/23 17:49:16)                   Impression:      1. Monitoring leads limit evaluation of the ribs, allowing for this, there appear to be remote appearing injuries of left distal lateral ribs 7, 8 and 9.  Correlation is advised, reimaging as warranted.      Electronically signed by: Betito Hein MD  Date:    10/24/2023  Time:    17:49               Narrative:    EXAMINATION:  XR RIBS 2 VIEW LEFT    CLINICAL HISTORY:  Unspecified fall, initial encounter    TECHNIQUE:  Two views of the left ribs were performed.    COMPARISON:  Chest radiograph 10/24/2023, chest radiograph 11/03/2022    FINDINGS:  Four views left ribs.    Please note, monitoring leads limit evaluation of the left lateral ribs.  There are remote appearing injuries involving left distal lateral ribs 7, 8 and 9.  No pneumothorax.                                       X-Ray Chest 1 View (Final result)  Result time 10/24/23 17:38:14   Procedure changed from X-Ray Chest PA And Lateral     Final result by Og Baker MD (10/24/23 17:38:14)                   Impression:      Abnormal appearance of the cardiomediastinal silhouette.   Additional evaluation, as clinically warranted.    Chronic right-sided rib deformities.      Electronically signed by: Og Baker MD  Date:    10/24/2023  Time:    17:38               Narrative:    EXAMINATION:  XR CHEST 1 VIEW    CLINICAL HISTORY:  FALL; Unspecified fall, initial encounter    TECHNIQUE:  Single frontal view of the chest was performed.    COMPARISON:  11/03/2022.    07/28/2019.    FINDINGS:  Monitoring EKG leads are present.  The trachea is unremarkable.  The cardiomediastinal silhouette is stable.  There is no evidence of free air beneath the hemidiaphragms.  There are no pleural effusions.  There is no evidence of a pneumothorax.  There is no evidence of pneumomediastinum.  No airspace opacity is present.    There are degenerative changes in the osseous structures.  There are chronic right-sided rib deformities.  Please see the left-sided rib series for the left wrist.                                       CT Head Without Contrast (Final result)  Result time 10/24/23 15:09:11      Final result by Chris Interiano MD (10/24/23 15:09:11)                   Impression:      No evidence of acute intracranial hemorrhage as above.      Electronically signed by: Chris Interiano MD  Date:    10/24/2023  Time:    15:09               Narrative:    EXAMINATION:  CT HEAD WITHOUT CONTRAST    CLINICAL HISTORY:  Head trauma, minor (Age >= 65y);    TECHNIQUE:  Low dose axial CT images obtained throughout the head without the use of intravenous contrast.  Axial, sagittal and coronal reconstructions were performed.    COMPARISON:  07/19/2023    FINDINGS:  Intracranial compartment:    Pattern of cerebral volume loss, similar to prior exam.  Configuration not suggestive of hydrocephalus.    Mild patchy hypoattenuation in the supratentorial white matter, nonspecific but most likely reflecting chronic small vessel ischemic changes. No recent or remote major vascular distribution infarct. No acute hemorrhage.  No mass  effect or midline shift.    No new extra-axial blood or fluid collections.    Skull/extracranial contents (limited evaluation):    No displaced calvarial fracture.    Mastoid air cells are clear. Mild patchy mucosal thickening in the visualized paranasal sinuses.                                       Medications   0.9%  NaCl infusion (for blood administration) (has no administration in time range)   morphine injection 4 mg (4 mg Intravenous Given 10/24/23 1407)   sodium chloride 0.9% bolus 1,000 mL 1,000 mL (0 mLs Intravenous Stopped 10/24/23 1543)   pantoprazole injection 80 mg (80 mg Intravenous Given 10/24/23 1512)     Medical Decision Making  Emergent evaluation of a 68 y.o. male presenting to the emergency department for lightheadedness, dizziness, syncope, fall resulting in rib trauma, and GI bleed. Patient is afebrile, hemodynamically stable, and non toxic appearing.   Will order labs, imaging, fluids, analgesia.     Differential diagnosis includes but isn't limited to metabolic derangement, anemia, upper GI bleed, rib fracture, rib contusion, pneumothorax.     Patient presenting with multiple complaints.  He reports about 10 days ago, he was walking into his backyard when he felt lightheaded and had a syncopal episode.  His roommate witnessed this.  He went inside to get him water.  The patient sat upright and had another syncopal episode without any prodromal symptoms.  He felt well for the next few days following.  About 1 week ago, he began to feel lightheaded and dizzy with vertiginous symptoms.  This mostly occurred with positional changes and when attempting to walk.  Last night, he was walking to his kitchen when he began to feel dizzy and fell, striking the left side of his ribs on a nearby chair.  He did not hit his head or lose consciousness.  Since then, he has not been able to walk and has been crawling around his home.  He also notes black stool for the last 3 days.  He endorses nausea and  vomiting but denies blood in the vomit.  He has greatly decreased his alcohol use over the last 3 months after fracturing his wrist.  His last drink was about 1 week ago.    His H/H is 4.4/15.5.  This is a significant drop from previous labs.  Will order 2 units PRBC.  Blood consent obtained.  He is multiple metabolic derangements.  Hyponatremia of 131.  CO2 of 17.  Creatinine 2.1, what is reveals mild ISRAEL.  His total bilirubin is 1.1.  Lipase 36.  Magnesium 1.6.  INR within normal limits.  Troponin is significantly elevated at 4.69.  The patient denies any chest pain or shortness of breath.  I suspect this is due to demand ischemia.  I will not initiate ACS protocol as he is currently significantly anemic from a GI bleed.  Will trend troponin.  I have started the patient on Protonix and consulted GI.  The patient's blood pressure was soft, but has responded well to fluids and has remained stable.  Will admit to Hospital Medicine for further workup and treatment.  Discussed with patient who is agreeable.  All questions answered.  The patient's history, physical exam, and plan of care was discussed with and agreed upon with my supervising physician.       Amount and/or Complexity of Data Reviewed  Labs: ordered. Decision-making details documented in ED Course.  Radiology: ordered.    Risk  Prescription drug management.  Decision regarding hospitalization.               ED Course as of 10/24/23 1918   Tue Oct 24, 2023   1708 WBC: 6.48 [JM]   1708 Hemoglobin(!!): 4.4 [JM]   1708 Hematocrit(!!): 15.5 [JM]   1708 Platelet Count(!): 67 [JM]   1708 Protime: 11.8 [JM]   1708 INR: 1.1 [JM]   1708 aPTT: 22.0 [JM]      ED Course User Index  [JM] Temitope Day PA-C                    Clinical Impression:   Final diagnoses:  [W19.XXXA] Fall  [R55] Syncope  [D64.9] Anemia  [K92.2] GI bleed (Primary)        ED Disposition Condition    Admit Stable                Temitope Day PA-C  10/24/23 1918       Shay  Temitope HICKMAN PA-C  11/11/23 1507

## 2023-10-24 NOTE — ED TRIAGE NOTES
Pt reports to ED via EMS with reports of multiple falls. Pt endorses dizziness, weakness, and left sided rib pain. Pt reports a syncopal episode x2 weeks ago. Pt reports 2 bouts of vomiting and diarrhea on Friday. Pt states he fell on to a chair last night while walking to the kitchen; stating he felt weak and diaphoretic; - hit to head, -LOC, - blood thinner. Hx of ETOH use; last drink 10 days ago. Pt denies chest pain, SOB, unilateral numbness/weakness.

## 2023-10-25 ENCOUNTER — ANESTHESIA EVENT (OUTPATIENT)
Dept: ENDOSCOPY | Facility: HOSPITAL | Age: 68
DRG: 377 | End: 2023-10-25
Payer: MEDICARE

## 2023-10-25 ENCOUNTER — ANESTHESIA (OUTPATIENT)
Dept: ENDOSCOPY | Facility: HOSPITAL | Age: 68
DRG: 377 | End: 2023-10-25
Payer: MEDICARE

## 2023-10-25 PROBLEM — K92.2 GI BLEED: Status: ACTIVE | Noted: 2023-10-25

## 2023-10-25 PROBLEM — K92.1 GASTROINTESTINAL HEMORRHAGE WITH MELENA: Status: ACTIVE | Noted: 2023-10-25

## 2023-10-25 PROBLEM — S22.42XD CLOSED FRACTURE OF MULTIPLE RIBS OF LEFT SIDE WITH ROUTINE HEALING: Status: ACTIVE | Noted: 2022-11-03

## 2023-10-25 LAB
ALBUMIN SERPL BCP-MCNC: 2.9 G/DL (ref 3.5–5.2)
ALP SERPL-CCNC: 64 U/L (ref 55–135)
ALP SERPL-CCNC: 64 U/L (ref 55–135)
ALP SERPL-CCNC: 65 U/L (ref 55–135)
ALT SERPL W/O P-5'-P-CCNC: 10 U/L (ref 10–44)
ALT SERPL W/O P-5'-P-CCNC: 10 U/L (ref 10–44)
ALT SERPL W/O P-5'-P-CCNC: 12 U/L (ref 10–44)
ANION GAP SERPL CALC-SCNC: 11 MMOL/L (ref 8–16)
ANION GAP SERPL CALC-SCNC: 12 MMOL/L (ref 8–16)
ANION GAP SERPL CALC-SCNC: 12 MMOL/L (ref 8–16)
ANISOCYTOSIS BLD QL SMEAR: ABNORMAL
AST SERPL-CCNC: 32 U/L (ref 10–40)
AST SERPL-CCNC: 32 U/L (ref 10–40)
AST SERPL-CCNC: 40 U/L (ref 10–40)
BASOPHILS # BLD AUTO: 0 K/UL (ref 0–0.2)
BASOPHILS # BLD AUTO: 0 K/UL (ref 0–0.2)
BASOPHILS # BLD AUTO: 0.01 K/UL (ref 0–0.2)
BASOPHILS # BLD AUTO: 0.01 K/UL (ref 0–0.2)
BASOPHILS # BLD AUTO: 0.02 K/UL (ref 0–0.2)
BASOPHILS # BLD AUTO: 0.02 K/UL (ref 0–0.2)
BASOPHILS NFR BLD: 0 % (ref 0–1.9)
BASOPHILS NFR BLD: 0 % (ref 0–1.9)
BASOPHILS NFR BLD: 0.1 % (ref 0–1.9)
BASOPHILS NFR BLD: 0.1 % (ref 0–1.9)
BASOPHILS NFR BLD: 0.2 % (ref 0–1.9)
BASOPHILS NFR BLD: 0.2 % (ref 0–1.9)
BILIRUB DIRECT SERPL-MCNC: 1.5 MG/DL (ref 0.1–0.3)
BILIRUB SERPL-MCNC: 2.8 MG/DL (ref 0.1–1)
BILIRUB SERPL-MCNC: 4.1 MG/DL (ref 0.1–1)
BILIRUB SERPL-MCNC: 4.9 MG/DL (ref 0.1–1)
BLD PROD TYP BPU: NORMAL
BLOOD UNIT EXPIRATION DATE: NORMAL
BLOOD UNIT TYPE CODE: 5100
BLOOD UNIT TYPE CODE: 9500
BLOOD UNIT TYPE: NORMAL
BUN SERPL-MCNC: 40 MG/DL (ref 8–23)
BUN SERPL-MCNC: 43 MG/DL (ref 8–23)
BUN SERPL-MCNC: 43 MG/DL (ref 8–23)
CA-I BLDV-SCNC: 1.07 MMOL/L (ref 1.06–1.42)
CALCIUM SERPL-MCNC: 7.6 MG/DL (ref 8.7–10.5)
CALCIUM SERPL-MCNC: 7.8 MG/DL (ref 8.7–10.5)
CALCIUM SERPL-MCNC: 7.8 MG/DL (ref 8.7–10.5)
CHLORIDE SERPL-SCNC: 102 MMOL/L (ref 95–110)
CHLORIDE SERPL-SCNC: 104 MMOL/L (ref 95–110)
CHLORIDE SERPL-SCNC: 95 MMOL/L (ref 95–110)
CO2 SERPL-SCNC: 14 MMOL/L (ref 23–29)
CO2 SERPL-SCNC: 16 MMOL/L (ref 23–29)
CO2 SERPL-SCNC: 18 MMOL/L (ref 23–29)
CODING SYSTEM: NORMAL
CREAT SERPL-MCNC: 1.7 MG/DL (ref 0.5–1.4)
CREAT SERPL-MCNC: 1.9 MG/DL (ref 0.5–1.4)
CREAT SERPL-MCNC: 2 MG/DL (ref 0.5–1.4)
CROSSMATCH INTERPRETATION: NORMAL
DIFFERENTIAL METHOD: ABNORMAL
DISPENSE STATUS: NORMAL
EOSINOPHIL # BLD AUTO: 0 K/UL (ref 0–0.5)
EOSINOPHIL NFR BLD: 0 % (ref 0–8)
EOSINOPHIL NFR BLD: 0.1 % (ref 0–8)
EOSINOPHIL NFR BLD: 0.2 % (ref 0–8)
ERYTHROCYTE [DISTWIDTH] IN BLOOD BY AUTOMATED COUNT: 17 % (ref 11.5–14.5)
ERYTHROCYTE [DISTWIDTH] IN BLOOD BY AUTOMATED COUNT: 17.2 % (ref 11.5–14.5)
ERYTHROCYTE [DISTWIDTH] IN BLOOD BY AUTOMATED COUNT: 17.3 % (ref 11.5–14.5)
ERYTHROCYTE [DISTWIDTH] IN BLOOD BY AUTOMATED COUNT: 17.7 % (ref 11.5–14.5)
ERYTHROCYTE [DISTWIDTH] IN BLOOD BY AUTOMATED COUNT: 18 % (ref 11.5–14.5)
ERYTHROCYTE [DISTWIDTH] IN BLOOD BY AUTOMATED COUNT: 18.9 % (ref 11.5–14.5)
EST. GFR  (NO RACE VARIABLE): 35.5 ML/MIN/1.73 M^2
EST. GFR  (NO RACE VARIABLE): 37.7 ML/MIN/1.73 M^2
EST. GFR  (NO RACE VARIABLE): 43.1 ML/MIN/1.73 M^2
FIBRINOGEN PPP-MCNC: 212 MG/DL (ref 182–400)
GLUCOSE SERPL-MCNC: 134 MG/DL (ref 70–110)
GLUCOSE SERPL-MCNC: 164 MG/DL (ref 70–110)
GLUCOSE SERPL-MCNC: 383 MG/DL (ref 70–110)
HAPTOGLOB SERPL-MCNC: 82 MG/DL (ref 30–250)
HCT VFR BLD AUTO: 16.7 % (ref 40–54)
HCT VFR BLD AUTO: 19.5 % (ref 40–54)
HCT VFR BLD AUTO: 24.9 % (ref 40–54)
HCT VFR BLD AUTO: 24.9 % (ref 40–54)
HCT VFR BLD AUTO: 29.1 % (ref 40–54)
HCT VFR BLD AUTO: 29.3 % (ref 40–54)
HGB BLD-MCNC: 5.4 G/DL (ref 14–18)
HGB BLD-MCNC: 6 G/DL (ref 14–18)
HGB BLD-MCNC: 7.7 G/DL (ref 14–18)
HGB BLD-MCNC: 8.1 G/DL (ref 14–18)
HGB BLD-MCNC: 9 G/DL (ref 14–18)
HGB BLD-MCNC: 9.6 G/DL (ref 14–18)
IMM GRANULOCYTES # BLD AUTO: 0.02 K/UL (ref 0–0.04)
IMM GRANULOCYTES # BLD AUTO: 0.05 K/UL (ref 0–0.04)
IMM GRANULOCYTES # BLD AUTO: 0.06 K/UL (ref 0–0.04)
IMM GRANULOCYTES # BLD AUTO: 0.07 K/UL (ref 0–0.04)
IMM GRANULOCYTES # BLD AUTO: 0.08 K/UL (ref 0–0.04)
IMM GRANULOCYTES # BLD AUTO: 0.11 K/UL (ref 0–0.04)
IMM GRANULOCYTES NFR BLD AUTO: 0.4 % (ref 0–0.5)
IMM GRANULOCYTES NFR BLD AUTO: 0.6 % (ref 0–0.5)
IMM GRANULOCYTES NFR BLD AUTO: 0.9 % (ref 0–0.5)
IMM GRANULOCYTES NFR BLD AUTO: 0.9 % (ref 0–0.5)
INR PPP: 1.2 (ref 0.8–1.2)
INR PPP: 1.2 (ref 0.8–1.2)
LDH SERPL L TO P-CCNC: 358 U/L (ref 110–260)
LYMPHOCYTES # BLD AUTO: 0.5 K/UL (ref 1–4.8)
LYMPHOCYTES # BLD AUTO: 0.6 K/UL (ref 1–4.8)
LYMPHOCYTES # BLD AUTO: 0.7 K/UL (ref 1–4.8)
LYMPHOCYTES # BLD AUTO: 0.8 K/UL (ref 1–4.8)
LYMPHOCYTES # BLD AUTO: 0.8 K/UL (ref 1–4.8)
LYMPHOCYTES # BLD AUTO: 1.1 K/UL (ref 1–4.8)
LYMPHOCYTES NFR BLD: 11.1 % (ref 18–48)
LYMPHOCYTES NFR BLD: 13.3 % (ref 18–48)
LYMPHOCYTES NFR BLD: 5.2 % (ref 18–48)
LYMPHOCYTES NFR BLD: 6.7 % (ref 18–48)
LYMPHOCYTES NFR BLD: 7.7 % (ref 18–48)
LYMPHOCYTES NFR BLD: 8.8 % (ref 18–48)
MAGNESIUM SERPL-MCNC: 1.4 MG/DL (ref 1.6–2.6)
MAGNESIUM SERPL-MCNC: 1.5 MG/DL (ref 1.6–2.6)
MCH RBC QN AUTO: 27.8 PG (ref 27–31)
MCH RBC QN AUTO: 27.9 PG (ref 27–31)
MCH RBC QN AUTO: 28.1 PG (ref 27–31)
MCH RBC QN AUTO: 28.6 PG (ref 27–31)
MCH RBC QN AUTO: 28.9 PG (ref 27–31)
MCH RBC QN AUTO: 29.1 PG (ref 27–31)
MCHC RBC AUTO-ENTMCNC: 30.8 G/DL (ref 32–36)
MCHC RBC AUTO-ENTMCNC: 30.9 G/DL (ref 32–36)
MCHC RBC AUTO-ENTMCNC: 30.9 G/DL (ref 32–36)
MCHC RBC AUTO-ENTMCNC: 32.3 G/DL (ref 32–36)
MCHC RBC AUTO-ENTMCNC: 32.5 G/DL (ref 32–36)
MCHC RBC AUTO-ENTMCNC: 32.8 G/DL (ref 32–36)
MCV RBC AUTO: 88 FL (ref 82–98)
MCV RBC AUTO: 89 FL (ref 82–98)
MCV RBC AUTO: 89 FL (ref 82–98)
MCV RBC AUTO: 90 FL (ref 82–98)
MCV RBC AUTO: 91 FL (ref 82–98)
MCV RBC AUTO: 91 FL (ref 82–98)
MONOCYTES # BLD AUTO: 0.6 K/UL (ref 0.3–1)
MONOCYTES # BLD AUTO: 0.9 K/UL (ref 0.3–1)
MONOCYTES # BLD AUTO: 1 K/UL (ref 0.3–1)
MONOCYTES # BLD AUTO: 1.1 K/UL (ref 0.3–1)
MONOCYTES NFR BLD: 10.2 % (ref 4–15)
MONOCYTES NFR BLD: 11.9 % (ref 4–15)
MONOCYTES NFR BLD: 12 % (ref 4–15)
MONOCYTES NFR BLD: 13.5 % (ref 4–15)
MONOCYTES NFR BLD: 6.9 % (ref 4–15)
MONOCYTES NFR BLD: 9.1 % (ref 4–15)
NEUTROPHILS # BLD AUTO: 10.5 K/UL (ref 1.8–7.7)
NEUTROPHILS # BLD AUTO: 10.8 K/UL (ref 1.8–7.7)
NEUTROPHILS # BLD AUTO: 3.4 K/UL (ref 1.8–7.7)
NEUTROPHILS # BLD AUTO: 6.3 K/UL (ref 1.8–7.7)
NEUTROPHILS # BLD AUTO: 6.4 K/UL (ref 1.8–7.7)
NEUTROPHILS # BLD AUTO: 8.1 K/UL (ref 1.8–7.7)
NEUTROPHILS NFR BLD: 73.6 % (ref 38–73)
NEUTROPHILS NFR BLD: 74.8 % (ref 38–73)
NEUTROPHILS NFR BLD: 78.3 % (ref 38–73)
NEUTROPHILS NFR BLD: 81.4 % (ref 38–73)
NEUTROPHILS NFR BLD: 83.5 % (ref 38–73)
NEUTROPHILS NFR BLD: 86.8 % (ref 38–73)
NRBC BLD-RTO: 0 /100 WBC
NRBC BLD-RTO: 1 /100 WBC
NUM UNITS TRANS FFP: NORMAL
NUM UNITS TRANS PACKED RBC: NORMAL
PHOSPHATE SERPL-MCNC: 3.2 MG/DL (ref 2.7–4.5)
PHOSPHATE SERPL-MCNC: 3.8 MG/DL (ref 2.7–4.5)
PLATELET # BLD AUTO: 111 K/UL (ref 150–450)
PLATELET # BLD AUTO: 82 K/UL (ref 150–450)
PLATELET # BLD AUTO: 88 K/UL (ref 150–450)
PLATELET # BLD AUTO: ABNORMAL K/UL (ref 150–450)
PLATELET BLD QL SMEAR: ABNORMAL
PMV BLD AUTO: 10.5 FL (ref 9.2–12.9)
PMV BLD AUTO: 11.5 FL (ref 9.2–12.9)
PMV BLD AUTO: 11.7 FL (ref 9.2–12.9)
PMV BLD AUTO: 11.9 FL (ref 9.2–12.9)
PMV BLD AUTO: ABNORMAL FL (ref 9.2–12.9)
PMV BLD AUTO: ABNORMAL FL (ref 9.2–12.9)
POIKILOCYTOSIS BLD QL SMEAR: SLIGHT
POLYCHROMASIA BLD QL SMEAR: ABNORMAL
POTASSIUM SERPL-SCNC: 3.8 MMOL/L (ref 3.5–5.1)
POTASSIUM SERPL-SCNC: 3.9 MMOL/L (ref 3.5–5.1)
POTASSIUM SERPL-SCNC: 4.2 MMOL/L (ref 3.5–5.1)
PROT SERPL-MCNC: 6.1 G/DL (ref 6–8.4)
PROT SERPL-MCNC: 6.2 G/DL (ref 6–8.4)
PROT SERPL-MCNC: 6.3 G/DL (ref 6–8.4)
PROTHROMBIN TIME: 12.4 SEC (ref 9–12.5)
PROTHROMBIN TIME: 12.5 SEC (ref 9–12.5)
RBC # BLD AUTO: 1.87 M/UL (ref 4.6–6.2)
RBC # BLD AUTO: 2.15 M/UL (ref 4.6–6.2)
RBC # BLD AUTO: 2.77 M/UL (ref 4.6–6.2)
RBC # BLD AUTO: 2.83 M/UL (ref 4.6–6.2)
RBC # BLD AUTO: 3.2 M/UL (ref 4.6–6.2)
RBC # BLD AUTO: 3.3 M/UL (ref 4.6–6.2)
SODIUM SERPL-SCNC: 125 MMOL/L (ref 136–145)
SODIUM SERPL-SCNC: 128 MMOL/L (ref 136–145)
SODIUM SERPL-SCNC: 131 MMOL/L (ref 136–145)
TRANS ERYTHROCYTES VOL PATIENT: NORMAL ML
TROPONIN I SERPL DL<=0.01 NG/ML-MCNC: 3.53 NG/ML (ref 0–0.03)
UNIT NUMBER: NORMAL
WBC # BLD AUTO: 12.38 K/UL (ref 3.9–12.7)
WBC # BLD AUTO: 12.55 K/UL (ref 3.9–12.7)
WBC # BLD AUTO: 4.6 K/UL (ref 3.9–12.7)
WBC # BLD AUTO: 8.15 K/UL (ref 3.9–12.7)
WBC # BLD AUTO: 8.59 K/UL (ref 3.9–12.7)
WBC # BLD AUTO: 9.89 K/UL (ref 3.9–12.7)

## 2023-10-25 PROCEDURE — P9021 RED BLOOD CELLS UNIT: HCPCS | Performed by: FAMILY MEDICINE

## 2023-10-25 PROCEDURE — 85610 PROTHROMBIN TIME: CPT | Mod: 91 | Performed by: NURSE PRACTITIONER

## 2023-10-25 PROCEDURE — P9017 PLASMA 1 DONOR FRZ W/IN 8 HR: HCPCS | Performed by: STUDENT IN AN ORGANIZED HEALTH CARE EDUCATION/TRAINING PROGRAM

## 2023-10-25 PROCEDURE — 99223 PR INITIAL HOSPITAL CARE,LEVL III: ICD-10-PCS | Mod: 25,GC,, | Performed by: STUDENT IN AN ORGANIZED HEALTH CARE EDUCATION/TRAINING PROGRAM

## 2023-10-25 PROCEDURE — 86920 COMPATIBILITY TEST SPIN: CPT | Performed by: FAMILY MEDICINE

## 2023-10-25 PROCEDURE — 63600175 PHARM REV CODE 636 W HCPCS: Performed by: NURSE ANESTHETIST, CERTIFIED REGISTERED

## 2023-10-25 PROCEDURE — 85610 PROTHROMBIN TIME: CPT | Performed by: FAMILY MEDICINE

## 2023-10-25 PROCEDURE — 82330 ASSAY OF CALCIUM: CPT | Performed by: NURSE PRACTITIONER

## 2023-10-25 PROCEDURE — 85384 FIBRINOGEN ACTIVITY: CPT | Performed by: NURSE PRACTITIONER

## 2023-10-25 PROCEDURE — 27000221 HC OXYGEN, UP TO 24 HOURS

## 2023-10-25 PROCEDURE — 94761 N-INVAS EAR/PLS OXIMETRY MLT: CPT

## 2023-10-25 PROCEDURE — 99223 1ST HOSP IP/OBS HIGH 75: CPT | Mod: 25,GC,, | Performed by: STUDENT IN AN ORGANIZED HEALTH CARE EDUCATION/TRAINING PROGRAM

## 2023-10-25 PROCEDURE — 63600175 PHARM REV CODE 636 W HCPCS: Performed by: STUDENT IN AN ORGANIZED HEALTH CARE EDUCATION/TRAINING PROGRAM

## 2023-10-25 PROCEDURE — 43235 EGD DIAGNOSTIC BRUSH WASH: CPT | Performed by: STUDENT IN AN ORGANIZED HEALTH CARE EDUCATION/TRAINING PROGRAM

## 2023-10-25 PROCEDURE — 99291 PR CRITICAL CARE, E/M 30-74 MINUTES: ICD-10-PCS | Mod: ,,, | Performed by: NURSE PRACTITIONER

## 2023-10-25 PROCEDURE — 25000003 PHARM REV CODE 250: Performed by: NURSE ANESTHETIST, CERTIFIED REGISTERED

## 2023-10-25 PROCEDURE — 86920 COMPATIBILITY TEST SPIN: CPT

## 2023-10-25 PROCEDURE — 83615 LACTATE (LD) (LDH) ENZYME: CPT

## 2023-10-25 PROCEDURE — 63600175 PHARM REV CODE 636 W HCPCS: Performed by: NURSE PRACTITIONER

## 2023-10-25 PROCEDURE — P9017 PLASMA 1 DONOR FRZ W/IN 8 HR: HCPCS | Performed by: NURSE PRACTITIONER

## 2023-10-25 PROCEDURE — 83010 ASSAY OF HAPTOGLOBIN QUANT: CPT

## 2023-10-25 PROCEDURE — 80053 COMPREHEN METABOLIC PANEL: CPT | Performed by: FAMILY MEDICINE

## 2023-10-25 PROCEDURE — C9113 INJ PANTOPRAZOLE SODIUM, VIA: HCPCS | Performed by: FAMILY MEDICINE

## 2023-10-25 PROCEDURE — P9021 RED BLOOD CELLS UNIT: HCPCS | Performed by: INTERNAL MEDICINE

## 2023-10-25 PROCEDURE — 85025 COMPLETE CBC W/AUTO DIFF WBC: CPT | Mod: 91 | Performed by: NURSE PRACTITIONER

## 2023-10-25 PROCEDURE — 43235 PR EGD, FLEX, DIAGNOSTIC: ICD-10-PCS | Mod: GC,,, | Performed by: STUDENT IN AN ORGANIZED HEALTH CARE EDUCATION/TRAINING PROGRAM

## 2023-10-25 PROCEDURE — 43235 EGD DIAGNOSTIC BRUSH WASH: CPT | Mod: GC,,, | Performed by: STUDENT IN AN ORGANIZED HEALTH CARE EDUCATION/TRAINING PROGRAM

## 2023-10-25 PROCEDURE — D9220A PRA ANESTHESIA: Mod: CRNA,,, | Performed by: NURSE ANESTHETIST, CERTIFIED REGISTERED

## 2023-10-25 PROCEDURE — 86920 COMPATIBILITY TEST SPIN: CPT | Performed by: INTERNAL MEDICINE

## 2023-10-25 PROCEDURE — D9220A PRA ANESTHESIA: ICD-10-PCS | Mod: CRNA,,, | Performed by: NURSE ANESTHETIST, CERTIFIED REGISTERED

## 2023-10-25 PROCEDURE — 99900035 HC TECH TIME PER 15 MIN (STAT)

## 2023-10-25 PROCEDURE — 25000003 PHARM REV CODE 250

## 2023-10-25 PROCEDURE — D9220A PRA ANESTHESIA: Mod: ANES,,, | Performed by: ANESTHESIOLOGY

## 2023-10-25 PROCEDURE — 25000003 PHARM REV CODE 250: Performed by: FAMILY MEDICINE

## 2023-10-25 PROCEDURE — 36415 COLL VENOUS BLD VENIPUNCTURE: CPT | Performed by: STUDENT IN AN ORGANIZED HEALTH CARE EDUCATION/TRAINING PROGRAM

## 2023-10-25 PROCEDURE — 37000009 HC ANESTHESIA EA ADD 15 MINS: Performed by: STUDENT IN AN ORGANIZED HEALTH CARE EDUCATION/TRAINING PROGRAM

## 2023-10-25 PROCEDURE — 80053 COMPREHEN METABOLIC PANEL: CPT | Mod: 91 | Performed by: NURSE PRACTITIONER

## 2023-10-25 PROCEDURE — 85025 COMPLETE CBC W/AUTO DIFF WBC: CPT | Performed by: STUDENT IN AN ORGANIZED HEALTH CARE EDUCATION/TRAINING PROGRAM

## 2023-10-25 PROCEDURE — D9220A PRA ANESTHESIA: ICD-10-PCS | Mod: ANES,,, | Performed by: ANESTHESIOLOGY

## 2023-10-25 PROCEDURE — P9016 RBC LEUKOCYTES REDUCED: HCPCS

## 2023-10-25 PROCEDURE — C9113 INJ PANTOPRAZOLE SODIUM, VIA: HCPCS

## 2023-10-25 PROCEDURE — 85025 COMPLETE CBC W/AUTO DIFF WBC: CPT | Mod: 91

## 2023-10-25 PROCEDURE — 84100 ASSAY OF PHOSPHORUS: CPT | Mod: 91 | Performed by: NURSE PRACTITIONER

## 2023-10-25 PROCEDURE — 99223 1ST HOSP IP/OBS HIGH 75: CPT | Mod: AI,,, | Performed by: FAMILY MEDICINE

## 2023-10-25 PROCEDURE — 63600175 PHARM REV CODE 636 W HCPCS: Mod: JA | Performed by: FAMILY MEDICINE

## 2023-10-25 PROCEDURE — 25500020 PHARM REV CODE 255: Performed by: INTERNAL MEDICINE

## 2023-10-25 PROCEDURE — 86920 COMPATIBILITY TEST SPIN: CPT | Performed by: STUDENT IN AN ORGANIZED HEALTH CARE EDUCATION/TRAINING PROGRAM

## 2023-10-25 PROCEDURE — 83735 ASSAY OF MAGNESIUM: CPT | Mod: 91 | Performed by: NURSE PRACTITIONER

## 2023-10-25 PROCEDURE — 63600175 PHARM REV CODE 636 W HCPCS

## 2023-10-25 PROCEDURE — 83735 ASSAY OF MAGNESIUM: CPT | Performed by: NURSE PRACTITIONER

## 2023-10-25 PROCEDURE — 99291 CRITICAL CARE FIRST HOUR: CPT | Mod: ,,, | Performed by: NURSE PRACTITIONER

## 2023-10-25 PROCEDURE — 20000000 HC ICU ROOM

## 2023-10-25 PROCEDURE — 63600175 PHARM REV CODE 636 W HCPCS: Performed by: FAMILY MEDICINE

## 2023-10-25 PROCEDURE — 84484 ASSAY OF TROPONIN QUANT: CPT | Performed by: INTERNAL MEDICINE

## 2023-10-25 PROCEDURE — 99223 PR INITIAL HOSPITAL CARE,LEVL III: ICD-10-PCS | Mod: AI,,, | Performed by: FAMILY MEDICINE

## 2023-10-25 PROCEDURE — 80053 COMPREHEN METABOLIC PANEL: CPT | Mod: 91 | Performed by: INTERNAL MEDICINE

## 2023-10-25 PROCEDURE — 82248 BILIRUBIN DIRECT: CPT

## 2023-10-25 PROCEDURE — 37000008 HC ANESTHESIA 1ST 15 MINUTES: Performed by: STUDENT IN AN ORGANIZED HEALTH CARE EDUCATION/TRAINING PROGRAM

## 2023-10-25 PROCEDURE — 84100 ASSAY OF PHOSPHORUS: CPT | Performed by: NURSE PRACTITIONER

## 2023-10-25 RX ORDER — HYDROCODONE BITARTRATE AND ACETAMINOPHEN 500; 5 MG/1; MG/1
TABLET ORAL
Status: DISCONTINUED | OUTPATIENT
Start: 2023-10-25 | End: 2023-10-26

## 2023-10-25 RX ORDER — MAGNESIUM SULFATE HEPTAHYDRATE 40 MG/ML
2 INJECTION, SOLUTION INTRAVENOUS ONCE
Status: COMPLETED | OUTPATIENT
Start: 2023-10-25 | End: 2023-10-25

## 2023-10-25 RX ORDER — PHENYLEPHRINE HYDROCHLORIDE 10 MG/ML
INJECTION INTRAVENOUS
Status: DISCONTINUED | OUTPATIENT
Start: 2023-10-25 | End: 2023-10-25

## 2023-10-25 RX ORDER — NOREPINEPHRINE BITARTRATE/D5W 4MG/250ML
0-.2 PLASTIC BAG, INJECTION (ML) INTRAVENOUS CONTINUOUS
Status: ACTIVE | OUTPATIENT
Start: 2023-10-25 | End: 2023-10-26

## 2023-10-25 RX ORDER — LIDOCAINE HYDROCHLORIDE 20 MG/ML
INJECTION INTRAVENOUS
Status: DISCONTINUED | OUTPATIENT
Start: 2023-10-25 | End: 2023-10-25

## 2023-10-25 RX ORDER — NOREPINEPHRINE BITARTRATE/D5W 4MG/250ML
0-.2 PLASTIC BAG, INJECTION (ML) INTRAVENOUS CONTINUOUS
Status: DISCONTINUED | OUTPATIENT
Start: 2023-10-25 | End: 2023-10-25

## 2023-10-25 RX ORDER — HYDROCODONE BITARTRATE AND ACETAMINOPHEN 500; 5 MG/1; MG/1
TABLET ORAL
Status: DISCONTINUED | OUTPATIENT
Start: 2023-10-25 | End: 2023-10-25

## 2023-10-25 RX ORDER — MORPHINE SULFATE 2 MG/ML
3 INJECTION, SOLUTION INTRAMUSCULAR; INTRAVENOUS ONCE
Status: DISCONTINUED | OUTPATIENT
Start: 2023-10-25 | End: 2023-10-26

## 2023-10-25 RX ORDER — PROPOFOL 10 MG/ML
VIAL (ML) INTRAVENOUS
Status: DISCONTINUED | OUTPATIENT
Start: 2023-10-25 | End: 2023-10-25

## 2023-10-25 RX ORDER — NOREPINEPHRINE BITARTRATE/D5W 4MG/250ML
PLASTIC BAG, INJECTION (ML) INTRAVENOUS
Status: COMPLETED
Start: 2023-10-25 | End: 2023-10-25

## 2023-10-25 RX ORDER — PANTOPRAZOLE SODIUM 40 MG/10ML
40 INJECTION, POWDER, LYOPHILIZED, FOR SOLUTION INTRAVENOUS 2 TIMES DAILY
Status: DISCONTINUED | OUTPATIENT
Start: 2023-10-25 | End: 2023-10-26

## 2023-10-25 RX ORDER — MAGNESIUM SULFATE HEPTAHYDRATE 40 MG/ML
2 INJECTION, SOLUTION INTRAVENOUS ONCE
Status: COMPLETED | OUTPATIENT
Start: 2023-10-25 | End: 2023-10-26

## 2023-10-25 RX ORDER — NOREPINEPHRINE BITARTRATE 1 MG/ML
INJECTION, SOLUTION INTRAVENOUS
Status: DISCONTINUED | OUTPATIENT
Start: 2023-10-25 | End: 2023-10-25

## 2023-10-25 RX ORDER — PROPOFOL 10 MG/ML
VIAL (ML) INTRAVENOUS CONTINUOUS PRN
Status: DISCONTINUED | OUTPATIENT
Start: 2023-10-25 | End: 2023-10-25

## 2023-10-25 RX ADMIN — NOREPINEPHRINE BITARTRATE 0.06 MCG/KG/MIN: 4 INJECTION, SOLUTION INTRAVENOUS at 03:10

## 2023-10-25 RX ADMIN — IOHEXOL 100 ML: 350 INJECTION, SOLUTION INTRAVENOUS at 02:10

## 2023-10-25 RX ADMIN — OXYCODONE HYDROCHLORIDE 10 MG: 10 TABLET ORAL at 09:10

## 2023-10-25 RX ADMIN — PHENYLEPHRINE HYDROCHLORIDE 200 MCG: 10 INJECTION INTRAVENOUS at 02:10

## 2023-10-25 RX ADMIN — LIDOCAINE HYDROCHLORIDE 50 MG: 20 INJECTION INTRAVENOUS at 02:10

## 2023-10-25 RX ADMIN — MAGNESIUM SULFATE HEPTAHYDRATE 2 G: 40 INJECTION, SOLUTION INTRAVENOUS at 10:10

## 2023-10-25 RX ADMIN — PANTOPRAZOLE SODIUM 40 MG: 40 INJECTION, POWDER, FOR SOLUTION INTRAVENOUS at 08:10

## 2023-10-25 RX ADMIN — SODIUM CHLORIDE 1000 ML: 0.9 INJECTION, SOLUTION INTRAVENOUS at 01:10

## 2023-10-25 RX ADMIN — MUPIROCIN: 20 OINTMENT TOPICAL at 08:10

## 2023-10-25 RX ADMIN — GLYCOPYRROLATE 0.2 MG: 0.2 INJECTION, SOLUTION INTRAMUSCULAR; INTRAVENOUS at 02:10

## 2023-10-25 RX ADMIN — OCTREOTIDE ACETATE 50 MCG/HR: 500 INJECTION, SOLUTION INTRAVENOUS; SUBCUTANEOUS at 12:10

## 2023-10-25 RX ADMIN — MUPIROCIN: 20 OINTMENT TOPICAL at 09:10

## 2023-10-25 RX ADMIN — SODIUM BICARBONATE: 84 INJECTION, SOLUTION INTRAVENOUS at 01:10

## 2023-10-25 RX ADMIN — SODIUM CHLORIDE: 9 INJECTION, SOLUTION INTRAVENOUS at 02:10

## 2023-10-25 RX ADMIN — SODIUM CHLORIDE 500 ML: 9 INJECTION, SOLUTION INTRAVENOUS at 12:10

## 2023-10-25 RX ADMIN — PANTOPRAZOLE SODIUM 40 MG: 40 INJECTION, POWDER, FOR SOLUTION INTRAVENOUS at 09:10

## 2023-10-25 RX ADMIN — ONDANSETRON 4 MG: 4 TABLET, ORALLY DISINTEGRATING ORAL at 03:10

## 2023-10-25 RX ADMIN — ESCITALOPRAM OXALATE 10 MG: 5 TABLET, FILM COATED ORAL at 08:10

## 2023-10-25 RX ADMIN — PROPOFOL 50 MG: 10 INJECTION, EMULSION INTRAVENOUS at 02:10

## 2023-10-25 RX ADMIN — OXYCODONE HYDROCHLORIDE 5 MG: 5 TABLET ORAL at 08:10

## 2023-10-25 RX ADMIN — ONDANSETRON 4 MG: 4 TABLET, ORALLY DISINTEGRATING ORAL at 10:10

## 2023-10-25 RX ADMIN — PROPOFOL 100 MCG/KG/MIN: 10 INJECTION, EMULSION INTRAVENOUS at 02:10

## 2023-10-25 RX ADMIN — Medication 0.06 MCG/KG/MIN: at 03:10

## 2023-10-25 RX ADMIN — CEFTRIAXONE 1 G: 1 INJECTION, POWDER, FOR SOLUTION INTRAMUSCULAR; INTRAVENOUS at 09:10

## 2023-10-25 RX ADMIN — NOREPINEPHRINE BITARTRATE 16 MCG: 1 INJECTION, SOLUTION, CONCENTRATE INTRAVENOUS at 02:10

## 2023-10-25 RX ADMIN — OXYCODONE HYDROCHLORIDE 5 MG: 5 TABLET ORAL at 05:10

## 2023-10-25 NOTE — ASSESSMENT & PLAN NOTE
- GIB Pathway initiated  - setting of cirrhosis with tachycardia and evidence of end organ damage (ISRAEL/type II NSTEMI) highly concerning for significant variceal bleed  - Hb 4.4 on presentation  ;  Was 11.1 three months ago  - start protonix 80mg iv once followed by 40mg iv BID  - start octreotide bolus and gtt  - start ceftriaxone 1g  - transfusing 2 units PRBCs  - monitor tele  - npo midnight  - GI consulted  - further management pending clinical course and future study review

## 2023-10-25 NOTE — HPI
Fransico Montalvo is a 68 year-old male with history of alcohol use disorder, esophageal varices with banding noted in 2018, BPH, cirrhosis, hypertension who presented to the emergency department with chief complaint of syncope, lightheadedness and rib pain. He states that he felt weak and lightheaded while walking, he fell to his left side, and struck the left side of his ribs on a nearby chair. He then fell to the ground. Did not hit his head or lose consciousness. He has been unable to walk since yesterday due to the dizziness. He has been crawling around his house since last night. Of note, he has had multiple syncopal episodes over the last few weeks.    In the ED patient tachycardic, hemodynamically stable, afebrile, saturating well on room air. Hb 4.4 down from 11 three months ago. Patient is unaware of if he has history of varices. Per chart review, has had an EGD with banding (March 2018). CXR with remote left 7/8/9 rib fractures and patient reporting signficant pain with palpitation, deep inspiration, and certain movements. His initial Trop 4.6 and follow up 4.1. Patient was started on IV Protonix, octreotide, ceftriaxone, and transfusing 2 units PRBC. Patient initially was admitted to internal medicine.    Rapid response called on the patient and was found to be hypotensive, MICU team consulted. Patient admitted to MICU for hemorrhagic shock.

## 2023-10-25 NOTE — HPI
68-year-old male with past medical history of alcohol abuse, BPH, cirrhosis, hypertension who presents to the emergency department with chief complaint of syncope, lightheadedness, rib pain. He endorses a syncopal episode that occurred about 10 days ago. He was walking into his backyard, felt lightheaded, and lost consciousness. He was out for a few seconds. His roommate witnessed this. He went inside to get the patient some water. The patient was sitting upright, and had another syncopal episode without any prodromal symptoms. Again, he was out for a few seconds before regaining consciousness. He felt relatively well for the few days following that episode. For the last week, he endorses lightheadedness and dizziness. Feels lightheaded as if he might pass out, but also endorses vertiginous symptoms. His symptoms are worse with movement and ambulation. He had a fall yesterday. He states that he felt weak and lightheaded while walking, he fell to his left side, and struck the left side of his ribs on a nearby chair. He then fell to the ground. Did not hit his head or lose consciousness. He has been unable to walk since yesterday due to the dizziness. He has been crawling around his house since last night. Denies headache, cp, sob, abd pain, diarrhea. Does report nausea and vomiting and black stools over the last four days. Additionally, he broke his wrist a few months ago. He significant decreased his alcohol use since then. Last drink was one week ago. Denies other drug use. Denies other worsening or alleviating factors.     In the ED patient tachycardic, hemodynamically stable, afebrile, saturating well on room air. Hb 4.4 down from 11 three months ago. Patient is unaware of if he has history of varices. CXR with remote left 7/8/9 rib fractures and patient reporting signficant pain with palpitation, deep inspiration, and certain movements. His initial Trop 4.6 and follow up 4.1. Patient started on iv protonix,  octreotide, ceftriaxone, and transfusing 2 units PRBC. Patient admitted to the care of medicine for further evaluation and management.

## 2023-10-25 NOTE — PLAN OF CARE
"[facility] - Cardiac Medical ICU  Initial Discharge Assessment       Primary Care Provider: [facility]    Admission Diagnosis: Syncope [R55]  Anemia [D64.9]  GI bleed [K92.2]  Fall [W19.XXXA]    Admission Date: 10/24/2023  Expected Discharge Date: 10/30/2023    Transition of Care Barriers: Transportation    Payor: [employer] MANAGED MEDICARE / Plan: [employer] SECURE COMPLETE / Product Type: Medicare Advantage /     Extended Emergency Contact Information  Primary Emergency Contact: Ina Quintero   [place]  Mobile Phone: [phone]  Relation: Friend  Secondary Emergency Contact: Tonya Lewis   [facility]  Home Phone: [phone]  Relation: Friend    Discharge Plan A: Home Health  Discharge Plan B: Home      [id number] [place] - [address]  [address]  [place] [address]  Phone: [phone] Fax: [phone]    [id number] - [place] - [address]  AT [facility] [address]  [address] [place] [address]  Phone: [phone] Fax: [phone]    [facility]  [address]  [facility] [address]  Phone: [phone] Fax: [phone]      Initial Assessment (most recent)       Adult Discharge Assessment - 10/25/23 1533          Discharge Assessment    Assessment Type Discharge Planning Assessment     Confirmed/corrected address, phone number and insurance Yes     Confirmed Demographics Correct on Facesheet     Source of Information patient     When was your last doctors appointment? --   "It has been a while".    Does patient/caregiver understand observation status No     Communicated ARTURO with patient/caregiver Date not available/Unable to determine     Reason For Admission Syncopy/GI Bleed with melena     People in Home friend(s)     Do you expect to return to your current living situation? Yes     Do you have help at home or someone to help you manage your care at home? Yes     Who " "are your caregiver(s) and their phone number(s)? Edy Stanleymichelle, friend/roommate/cp# [phone]     Prior to hospitilization cognitive status: Unable to Assess     Current cognitive status: Alert/Oriented     Home Layout Able to live on 1st floor     Equipment Currently Used at Home none     Readmission within 30 days? No     Patient currently being followed by outpatient case management? No     Do you currently have service(s) that help you manage your care at home? No     Do you take prescription medications? Yes     Do you have prescription coverage? Yes     Coverage [employer] Managed Medicare - Secure complete     Do you have any problems affording any of your prescribed medications? No     Is the patient taking medications as prescribed? no     If no, which medications is patient not taking? " patient couldn't remember"     Who is going to help you get home at discharge? Will need a ride home     How do you get to doctors appointments? health plan transportation     Are you on dialysis? No     Do you take coumadin? No     DME Needed Upon Discharge  other (see comments)   TBD    Discharge Plan discussed with: Patient     Transition of Care Barriers Transportation     Discharge Plan A Home Health     Discharge Plan B Home        Physical Activity    On average, how many days per week do you engage in moderate to strenuous exercise (like a brisk walk)? Patient refused     On average, how many minutes do you engage in exercise at this level? Patient refused        Financial Resource Strain    How hard is it for you to pay for the very basics like food, housing, medical care, and heating? Not hard at all        Housing Stability    In the last 12 months, was there a time when you were not able to pay the mortgage or rent on time? No     In the last 12 months, how many places have you lived? 1     In the last 12 months, was there a time when you did not have a steady place to sleep or slept in a shelter (including now)? No  "       Transportation Needs    In the past 12 months, has lack of transportation kept you from medical appointments or from getting medications? Yes     In the past 12 months, has lack of transportation kept you from meetings, work, or from getting things needed for daily living? Yes        Food Insecurity    Within the past 12 months, you worried that your food would run out before you got the money to buy more. Patient refused     Within the past 12 months, the food you bought just didn't last and you didn't have money to get more. Patient refused        Stress    Do you feel stress - tense, restless, nervous, or anxious, or unable to sleep at night because your mind is troubled all the time - these days? To some extent        Social Connections    In a typical week, how many times do you talk on the phone with family, friends, or neighbors? --   Patient states not very often.    How often do you get together with friends or relatives? More than three times a week     How often do you attend Samaritan or Anabaptism services? Never     Do you belong to any clubs or organizations such as [employer]aritan groups, unions, fraternal or athletic groups, or school groups? No     How often do you attend meetings of the clubs or organizations you belong to? Never     Are you , , , , never , or living with a partner? Never         Alcohol Use    Q1: How often do you have a drink containing alcohol? 2-3 times a week     Q2: How many drinks containing alcohol do you have on a typical day when you are drinking? 5 or 6     Q3: How often do you have six or more drinks on one occasion? Daily or almost daily        OTHER    Name(s) of People in Home ashley Calvo                   Spoke to patient.  Patient lives with friend (Edy Whitley).  Post hospital stay Edy Whitley friend will be his support person and help in the home.  Patient has transportation at discharge with payor source  transportation. There have been no hospitalizations within the last 30 days per patient. Verified patient's PCP and preferred Pharmacy.  Patient states not on Coumadin and is not receiving dialysis.  All questions answered regarding Case Management Discharge Planning, patient verbalized understanding.  Discharge booklet with SW's contact information given to patient.    Esthela Amaya LMSW  [facility]  [phone]

## 2023-10-25 NOTE — ASSESSMENT & PLAN NOTE
Creatinine 2.1 on admission, was 1.2 three months prior. Suspect pre-renal from acute blood loss.  Estimated Creatinine Clearance: 35.5 mL/min (A) (based on SCr of 1.9 mg/dL (H)).  Replacing blood and will continue to monitor renal function.    -CMP daily and trend  -avoid nephrotoxic agents  -avoid hypotension (MAP goal of greater than 65)  -monitor urine output  -renally dose all medications   -transfuse to keep hgb > 7

## 2023-10-25 NOTE — SUBJECTIVE & OBJECTIVE
Past Medical History:   Diagnosis Date    Alcohol abuse     BPH (benign prostatic hyperplasia)     Cirrhosis 06/21/2018    pt states that he was diagnosed a week ago during his last hospital visit    Closed fracture of left distal radius 7/19/2023    Gastritis     Hypertension     Renal disorder        Past Surgical History:   Procedure Laterality Date    CAPSULOTOMY OF JOINT Right 7/29/2019    Procedure: CAPSULOTOMY, JOINT;  Surgeon: Raj Grossman MD;  Location: [address];  Service: Orthopedics;  Laterality: Right;    PERCUTANEOUS PINNING OF HIP Right 7/29/2019    Procedure: PINNING, HIP, PERCUTANEOUS- synthes cannulated screws- hana table- C arm door side-;  Surgeon: Raj Grossman MD;  Location: [facility] OR [address] [place] [address];  Service: Orthopedics;  Laterality: Right;       Review of patient's allergies indicates:  No Known Allergies    Family History       Problem Relation (Age of Onset)    Heart disease Father, Brother    Hypertension Father          Tobacco Use    Smoking status: Every Day     Current packs/day: 1.00     Types: Cigarettes    Smokeless tobacco: Never   Substance and Sexual Activity    Alcohol use: Yes     Comment: (former drinker; 2months sober) this visit admits drinking a couple beers today    Drug use: No    Sexual activity: Not on file      Review of Systems   Constitutional:  Positive for fatigue. Negative for chills and fever.   Respiratory:  Positive for shortness of breath.    Cardiovascular:  Positive for chest pain. Negative for leg swelling.   Gastrointestinal:  Positive for blood in stool, nausea and vomiting. Negative for abdominal pain.   Musculoskeletal:  Negative for back pain.   Skin:  Negative for color change.   Neurological:  Positive for syncope, weakness and light-headedness.   Psychiatric/Behavioral:  Negative for behavioral problems and confusion.      Objective:     Vital Signs (Most Recent):  Temp: 98.8 °F (37.1 °C) (10/25/23 0237)  Pulse: 68 (10/25/23 0237)  Resp: (!)  21 (10/25/23 0237)  BP: (!) 102/54 (10/25/23 0237)  SpO2: 97 % (10/25/23 0237) Vital Signs (24h Range):  Temp:  [98.2 °F (36.8 °C)-99.8 °F (37.7 °C)] 98.8 °F (37.1 °C)  Pulse:  [] 68  Resp:  [11-31] 21  SpO2:  [91 %-100 %] 97 %  BP: ()/(45-73) 102/54   Weight: 81.6 kg (180 lb)  Body mass index is 27.37 kg/m².      Intake/Output Summary (Last 24 hours) at 10/25/2023 0303  Last data filed at 10/25/2023 0200  Gross per 24 hour   Intake 3630 ml   Output 100 ml   Net 3530 ml          Physical Exam  Vitals and nursing note reviewed.   Constitutional:       Appearance: He is ill-appearing.   HENT:      Nose: Nose normal.   Eyes:      General:         Right eye: No discharge.         Left eye: No discharge.      Conjunctiva/sclera: Conjunctivae normal.   Cardiovascular:      Rate and Rhythm: Normal rate and regular rhythm.      Pulses: Normal pulses.      Heart sounds: Normal heart sounds.   Pulmonary:      Effort: Pulmonary effort is normal.      Breath sounds: Normal breath sounds.   Abdominal:      General: Abdomen is flat.      Palpations: Abdomen is soft.   Musculoskeletal:      Right lower leg: No edema.      Left lower leg: No edema.      Comments: Pain over L ribs   Skin:     General: Skin is warm and dry.   Neurological:      Mental Status: He is alert and oriented to person, place, and time. Mental status is at baseline.      GCS: GCS eye subscore is 4. GCS verbal subscore is 5. GCS motor subscore is 6.   Psychiatric:         Mood and Affect: Mood normal.         Behavior: Behavior normal.            Vents:     Lines/Drains/Airways       Peripheral Intravenous Line  Duration                  Peripheral IV - Single Lumen 10/24/23 1133 18 G Right Hand <1 day         Peripheral IV - Single Lumen 10/24/23 1654 18 G Left Antecubital <1 day                  Significant Labs:    CBC/Anemia Profile:  Recent Labs   Lab 10/24/23  1416 10/24/23  1425 10/25/23  0130   WBC 6.48  --  9.89   HGB 4.4*  --  6.0*   HCT  "15.5* 16* 19.5*   PLT 67*  --  111*   MCV 89  --  91   RDW 20.2*  --  18.9*        Chemistries:  Recent Labs   Lab 10/24/23  1416 10/25/23  0216   * 131*   K 3.8 3.9    104   CO2 17* 16*   BUN 46* 43*   CREATININE 2.1* 1.9*   CALCIUM 8.6* 7.8*   ALBUMIN 3.3* 2.9*   PROT 6.9 6.2   BILITOT 1.6* 4.1*   ALKPHOS 68 64   ALT 13 10   AST 42* 32   MG 1.6  --        Coagulation:   Recent Labs   Lab 10/24/23  1416   INR 1.1   APTT 22.0     Lactic Acid: No results for input(s): "LACTATE" in the last 48 hours.  Troponin:   Recent Labs   Lab 10/24/23  1416 10/24/23  1801 10/25/23  0216   TROPONINI 4.689* 4.106* 3.533*     All pertinent labs within the past 24 hours have been reviewed.    Significant Imaging: I have reviewed all pertinent imaging results/findings within the past 24 hours.  "

## 2023-10-25 NOTE — ANESTHESIA PREPROCEDURE EVALUATION
[facility]  Anesthesia Pre-Operative Evaluation         Patient Name: Fransico Montalvo  YOB: 1955  MRN: [id number]    SUBJECTIVE:     Pre-operative evaluation for Procedure(s) (LRB):  EGD (ESOPHAGOGASTRODUODENOSCOPY) (N/A)     10/25/2023    Fransico Montalvo is a 68 y.o. male w/ a significant PMHx of use disorder, esophageal varices with banding noted in 2018, BPH, cirrhosis, hypertension who presented to the emergency department with chief complaint of syncope, lightheadedness and rib pain. Pt denied melena, hematochezia, and hematemesis.  Hb 4.4 down from 11 three months ago. Pt wa found to be hypotensive and ultimately stepped up to the MICU for hemorrhagic shock. S/p 2 PRBCs.    Patient now presents for the above procedure(s).     TTE 11/3/22:  Technically challenging study with poor acoustic windows.   The left ventricle is normal in size with normal systolic function.   The estimated ejection fraction is 60%.   Normal right ventricular size with normal right ventricular systolic function.   The estimated PA systolic pressure is 25 mmHg.   Normal central venous pressure (3 mmHg).      LDA: None documented.       Peripheral IV - Single Lumen 10/24/23 1133 18 G Right Hand (Active)   Site Assessment Clean;Dry;Intact;No redness;No swelling 10/25/23 1101   Extremity Assessment Distal to IV No abnormal discoloration;No swelling;No redness;No warmth 10/25/23 1101   Line Status Infusing 10/25/23 1101   Dressing Status Clean;Dry;Intact 10/25/23 1101   Dressing Intervention Integrity maintained 10/25/23 1101   Dressing Change Due 10/28/23 10/25/23 1101   Site Change Due 10/28/23 10/25/23 1101   Reason Not Rotated Not due 10/25/23 1101   Number of days: 1            Peripheral IV - Single Lumen 10/24/23 1654 18 G Left Antecubital (Active)   Site Assessment Clean;Dry;Intact;No redness;No swelling 10/25/23 1101   Extremity Assessment Distal to IV No abnormal discoloration;No redness;No swelling;No  warmth 10/25/23 1101   Line Status Saline locked 10/25/23 1101   Dressing Status Clean;Dry;Intact 10/25/23 1101   Dressing Intervention Integrity maintained 10/25/23 1101   Dressing Change Due 10/28/23 10/25/23 1101   Site Change Due 10/28/23 10/25/23 1101   Reason Not Rotated Not due 10/25/23 1101   Number of days: 0            Peripheral IV - Single Lumen 10/25/23 0400 18 G Anterior;Right Forearm (Active)   Site Assessment Dry;Clean;Intact;No redness;No swelling 10/25/23 1101   Extremity Assessment Distal to IV No abnormal discoloration;No redness;No swelling;No warmth 10/25/23 1101   Line Status Blood return noted 10/25/23 1101   Dressing Status Clean;Dry;Intact 10/25/23 1101   Dressing Intervention First dressing 10/25/23 1101   Dressing Change Due 10/29/23 10/25/23 1101   Site Change Due 10/29/23 10/25/23 1101   Reason Not Rotated Not due 10/25/23 1101   Number of days: 0       Prev airway: None documented.    Drips: None documented.  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 Grossman    Multiple renal cysts    Cholelithiases    Chronic pain of  right knee    Impaired functional mobility and endurance    Syncope    Closed fracture of multiple ribs of left side with routine healing    Closed fracture of left distal radius    Gastrointestinal hemorrhage with melena       Review of patient's allergies indicates:  No Known Allergies    Current Inpatient Medications:   cefTRIAXone (ROCEPHIN) IVPB  1 g Intravenous Q24H    EScitalopram oxalate  10 mg Oral Daily    lactated ringers  500 mL Intravenous Once    morphine  3 mg Intravenous Once    mupirocin   Nasal BID    pantoprazole  40 mg Intravenous BID       No current facility-administered medications on file prior to encounter.     Current Outpatient Medications on File Prior to Encounter   Medication Sig Dispense Refill    lisinopriL (PRINIVIL,ZESTRIL) 20 MG tablet Take 1 tablet (20 mg total) by mouth once daily. 30 tablet 0    EScitalopram oxalate (LEXAPRO) 10 MG tablet Take 10 mg by mouth.      naproxen sodium (ALEVE) 220 MG tablet Take 440 mg by mouth daily as needed (Pain).      tetrahydrozoline HCl (VISINE OPHT) Place 2 drops into both eyes daily as needed (Irritation).         Past Surgical History:   Procedure Laterality Date    CAPSULOTOMY OF JOINT Right 7/29/2019    Procedure: CAPSULOTOMY, JOINT;  Surgeon: Raj Grossman MD;  Location: [address];  Service: Orthopedics;  Laterality: Right;    PERCUTANEOUS PINNING OF HIP Right 7/29/2019    Procedure: PINNING, HIP, PERCUTANEOUS- synthes cannulated screws- hana table- C arm door side-;  Surgeon: Raj Grossman MD;  Location: [address];  Service: Orthopedics;  Laterality: Right;       Social History     Socioeconomic History    Marital status: Single   Tobacco Use    Smoking status: Every Day     Current packs/day: 1.00     Types: Cigarettes    Smokeless tobacco: Never   Substance and Sexual Activity    Alcohol use: Yes     Comment: (former drinker; 2months sober) this visit admits drinking a couple beers today     Drug use: No       OBJECTIVE:     Vital Signs Range (Last 24H):  Temp:  [36.4 °C (97.6 °F)-37.7 °C (99.8 °F)]   Pulse:  []   Resp:  [11-47]   BP: ()/(34-86)   SpO2:  [65 %-100 %]       Significant Labs:  Lab Results   Component Value Date    WBC 8.59 10/25/2023    HGB 8.1 (L) 10/25/2023    HCT 24.9 (L) 10/25/2023    PLT SEE COMMENT 10/25/2023    CHOL 102 (L) 07/30/2018    TRIG 68 07/30/2018    HDL 23 (L) 07/30/2018    ALT 10 10/25/2023    AST 32 10/25/2023     (L) 10/25/2023    K 4.2 10/25/2023     10/25/2023    CREATININE 1.7 (H) 10/25/2023    BUN 43 (H) 10/25/2023    CO2 14 (L) 10/25/2023    INR 1.2 10/25/2023    HGBA1C 4.2 07/28/2019       Diagnostic Studies: No relevant studies.    EKG:   Results for orders placed or performed during the hospital encounter of 10/24/23   EKG 12-lead    Collection Time: 10/24/23  6:17 PM    Narrative    Test Reason : D64.9,    Vent. Rate : 100 BPM     Atrial Rate : 100 BPM     P-R Int : 196 ms          QRS Dur : 094 ms      QT Int : 368 ms       P-R-T Axes : 053 -20 102 degrees     QTc Int : 474 ms    Normal sinus rhythm  ST and T wave abnormality, consider anterolateral ischemia  Abnormal ECG  When compared with ECG of 24-OCT-2023 18:16,  No significant change was found  Confirmed by Mariano MIRANDA MD ([id number]) on 10/25/2023 8:21:34 AM    Referred By: [email or web address]   SELF           Confirmed By:Mariano MIRANDA MD       2D ECHO:  TTE:  Results for orders placed or performed during the hospital encounter of 11/02/22   Echo   Result Value Ref Range    AV mean gradient 3 mmHg    Ao peak alfredo 1.15 m/s    Ao VTI 19.09 cm    IVS 1.03 0.6 - 1.1 cm    LA size 4.05 cm    Left Atrium Major Axis 5.19 cm    Left Atrium Minor Axis 5.61 cm    LVIDd 5.40 3.5 - 6.0 cm    LVIDs 3.65 2.1 - 4.0 cm    LVOT diameter 2.34 cm    LVOT peak VTI 11.45 cm    Posterior Wall 0.88 0.6 - 1.1 cm    MV Peak A Alfredo 0.55 m/s    E wave deceleration time 285.49 msec    MV Peak E Alfredo 0.32 m/s    RA Major Axis  4.39 cm    RA Width 3.47 cm    RVDD 2.99 cm    TR Max Alfredo 2.33 m/s    TDI LATERAL 0.06 m/s    TDI SEPTAL 0.05 m/s    LA WIDTH 4.04 cm    MV stenosis pressure 1/2 time 82.79 ms    LV Diastolic Volume 150.99 mL    LV Systolic Volume 56.36 mL    LVOT peak alfredo 0.75 m/s    LA volume (mod) 64.33 cm3    LV LATERAL E/E' RATIO 5.33 m/s    LV SEPTAL E/E' RATIO 6.40 m/s    FS 32 %    LA volume 74.99 cm3    LV mass 194.58 g    Left Ventricle Relative Wall Thickness 0.33 cm    AV valve area 2.58 cm2    AV Velocity Ratio 0.65     AV index (prosthetic) 0.60     MV valve area p 1/2 method 2.66 cm2    E/A ratio 0.58     Mean e' 0.06 m/s    LVOT area 4.3 cm2    LVOT stroke volume 49.22 cm3    AV peak gradient 5 mmHg    E/E' ratio 5.82 m/s    LV Systolic Volume Index 27.8 mL/m2    LV Diastolic Volume Index 74.38 mL/m2    LA Volume Index 36.9 mL/m2    LV Mass Index 96 g/m2    Triscuspid Valve Regurgitation Peak Gradient 22 mmHg    LA Volume Index (Mod) 31.7 mL/m2    BSA 2.03 m2    Right Atrial Pressure (from IVC) 3 mmHg    EF 60 %    TV resting pulmonary artery pressure 25 mmHg    Narrative    · Technically challenging study with poor acoustic windows.  · The left ventricle is normal in size with normal systolic function.  · The estimated ejection fraction is 60%.  · Normal right ventricular size with normal right ventricular systolic   function.  · The estimated PA systolic pressure is 25 mmHg.  · Normal central venous pressure (3 mmHg).          RASHID:  No results found for this or any previous visit.    ASSESSMENT/PLAN:         Pre-op Assessment    I have reviewed the Patient Summary Reports.     I have reviewed the Nursing Notes. I have reviewed the NPO Status.   I have reviewed the Medications.     Review of Systems  Anesthesia Hx:  No problems with previous Anesthesia  History of prior surgery of interest to airway management or planning: Denies Family Hx of Anesthesia complications.   Denies Personal Hx of Anesthesia  complications.   Social:  Non-Smoker, Alcohol Use    Hematology/Oncology:         -- Anemia: Denies Current/Recent Cancer   EENT/Dental:   denies chronic allergic rhinitis   Cardiovascular:   Hypertension Denies CAD.    no hyperlipidemia Demand ischemia on arrival to ER   Pulmonary:   Denies COPD.  Denies Asthma.  Denies Sleep Apnea.    Renal/:   Denies Chronic Renal Disease.     Hepatic/GI:   Denies GERD. Liver Disease,    Musculoskeletal:   Denies Arthritis.     Neurological:   Denies CVA. Denies Seizures.    Psych:   Denies Psychiatric History.          Physical Exam  General: Well nourished, Cooperative, Alert and Oriented    Airway:  Mallampati: II   Mouth Opening: Normal  TM Distance: Normal  Tongue: Normal  Neck ROM: Normal ROM    Dental:  Intact, Dentures  Top dentures  Bottom row intact      Anesthesia Plan  Type of Anesthesia, risks & benefits discussed:    Anesthesia Type: Gen ETT  Intra-op Monitoring Plan: Standard ASA Monitors  Post Op Pain Control Plan: multimodal analgesia  Induction:  IV  Airway Plan: Direct, Post-Induction  Informed Consent: Informed consent signed with the Patient and all parties understand the risks and agree with anesthesia plan.  All questions answered.   ASA Score: 3  Day of Surgery Review of History & Physical: H&P Update referred to the surgeon/provider.    Ready For Surgery From Anesthesia Perspective.     .

## 2023-10-25 NOTE — SUBJECTIVE & OBJECTIVE
Past Medical History:   Diagnosis Date    Alcohol abuse     BPH (benign prostatic hyperplasia)     Cirrhosis 06/21/2018    pt states that he was diagnosed a week ago during his last hospital visit    Closed fracture of left distal radius 7/19/2023    Gastritis     Hypertension     Renal disorder        Past Surgical History:   Procedure Laterality Date    CAPSULOTOMY OF JOINT Right 7/29/2019    Procedure: CAPSULOTOMY, JOINT;  Surgeon: Raj Grossman MD;  Location: [address];  Service: Orthopedics;  Laterality: Right;    PERCUTANEOUS PINNING OF HIP Right 7/29/2019    Procedure: PINNING, HIP, PERCUTANEOUS- synthes cannulated screws- hana table- C arm door side-;  Surgeon: Raj Grossman MD;  Location: [facility] OR [address] [place] [address];  Service: Orthopedics;  Laterality: Right;       Review of patient's allergies indicates:  No Known Allergies  Family History       Problem Relation (Age of Onset)    Heart disease Father, Brother    Hypertension Father          Tobacco Use    Smoking status: Every Day     Current packs/day: 1.00     Types: Cigarettes    Smokeless tobacco: Never   Substance and Sexual Activity    Alcohol use: Yes     Comment: (former drinker; 2months sober) this visit admits drinking a couple beers today    Drug use: No    Sexual activity: Not on file     Review of Systems   Constitutional:  Negative for chills and fever.   Gastrointestinal:  Negative for abdominal pain, diarrhea, nausea and vomiting.        Melena+     Objective:     Vital Signs (Most Recent):  Temp: 98.1 °F (36.7 °C) (10/25/23 0345)  Pulse: 97 (10/25/23 0706)  Resp: (!) 31 (10/25/23 0808)  BP: 129/80 (10/25/23 0706)  SpO2: 96 % (10/25/23 0706) Vital Signs (24h Range):  Temp:  [98 °F (36.7 °C)-99.8 °F (37.7 °C)] 98.1 °F (36.7 °C)  Pulse:  [] 97  Resp:  [11-31] 31  SpO2:  [83 %-100 %] 96 %  BP: ()/(45-82) 129/80     Weight: 81.6 kg (180 lb) (10/24/23 1201)  Body mass index is 27.37 kg/m².      Intake/Output Summary (Last 24 hours)  at 10/25/2023 0835  Last data filed at 10/25/2023 0600  Gross per 24 hour   Intake 3777.42 ml   Output 100 ml   Net 3677.42 ml       Lines/Drains/Airways       Peripheral Intravenous Line  Duration                  Peripheral IV - Single Lumen 10/24/23 1133 18 G Right Hand <1 day         Peripheral IV - Single Lumen 10/24/23 1654 18 G Left Antecubital <1 day         Peripheral IV - Single Lumen 10/25/23 0400 18 G Anterior;Right Forearm <1 day                     Physical Exam  Vitals reviewed.   Constitutional:       General: He is not in acute distress.     Appearance: He is not ill-appearing.   Eyes:      Extraocular Movements: Extraocular movements intact.   Cardiovascular:      Rate and Rhythm: Normal rate.   Pulmonary:      Effort: Pulmonary effort is normal.   Abdominal:      General: There is no distension.      Palpations: Abdomen is soft.      Tenderness: There is no abdominal tenderness. There is no guarding or rebound.   Neurological:      Mental Status: He is alert. Mental status is at baseline.          Significant Labs:  All pertinent lab results from the last 24 hours have been reviewed.    Significant Imaging:  Imaging results within the past 24 hours have been reviewed.

## 2023-10-25 NOTE — ASSESSMENT & PLAN NOTE
Due to underlying cirrhosis, has been noted in previous documentation as far back as 2018.    -CBC daily  -transfuse for platelets less than 10K, or 50k if actively bleeding

## 2023-10-25 NOTE — ASSESSMENT & PLAN NOTE
Patient reportedly with syncopal episode 10 days ago that was witnessed by his neighbor. Patient has felt symptoms of lightheadedness, dizziness over last several days. Suspect this is related to symptomatic anemia.

## 2023-10-25 NOTE — HPI
Fransico Montalvo is a 68 year old female for whom GI is consulted for upper GIB. He has a medical history significant for alcohol use disorder,cirrhosis associated with esophageal varices s/p banding in 2018. History provided by patient and chart review.     Pt presented to [facility] on 10/24 with syncope, light-headedness and rib pain. He states he has been having black stools for the past several days but denies any hematemesis, hematochezia or abdominal pain. He does reports taking two tablets of aleve on a daily basis for an unknown amount of time. On arrival, he was tachycardic otherwise HDS. Laboratory workup notable for normocytic anemia with Hgb 4.4 (previously 11 3 months ago), thrombocytopenia (67), serum bicarb of 15, NSTEMI with troponin of 4.6, ISRAEL with Cr of 2.1. he was found to be hypotensive and ultimately stepped up to the MICU for hemorrhagic shock. He was given 2 U pRBCs with post transfusion Hgb being 7.7. CTA chest abd and pelvis with wall thickening of gastric body and pylorus. He is not taking any blood thinners.

## 2023-10-25 NOTE — ASSESSMENT & PLAN NOTE
Patient with recent fall. Chest x ray results reviewed from this admission  - Remote rib fractures of left 7/8/9th ribs  - symptomatic management

## 2023-10-25 NOTE — ASSESSMENT & PLAN NOTE
Patient with history of alcohol use disorder and cirrhosis, recently undergoing detox. Reports decrease in ETOH intake.    MELD 3.0: 22 at 10/24/2023  2:16 PM  MELD-Na: 21 at 10/24/2023  2:16 PM  Calculated from:  Serum Creatinine: 2.1 mg/dL at 10/24/2023  2:16 PM  Serum Sodium: 131 mmol/L at 10/24/2023  2:16 PM  Total Bilirubin: 1.6 mg/dL at 10/24/2023  2:16 PM  Serum Albumin: 3.3 g/dL at 10/24/2023  2:16 PM  INR(ratio): 1.1 at 10/24/2023  2:16 PM  Age at listing (hypothetical): 68 years  Sex: Male at 10/24/2023  2:16 PM

## 2023-10-25 NOTE — ASSESSMENT & PLAN NOTE
- Creatinine 2.1 on presentation  ;  Baseline 1.2  - secondary to GIB  - avoid nephrotoxic agents as appropriate  - further management as above

## 2023-10-25 NOTE — CONSULTS
[facility] - Cardiac Medical ICU  Gastroenterology  Consult Note    Patient Name: Fransico Montalvo  MRN: [id number]  Admission Date: 10/24/2023  Hospital Length of Stay: 1 days  Code Status: Full Code   Attending Provider: Marisol Felix MD   Consulting Provider: Christine Rodriguez MD  Primary Care Physician: Jessica  Principal Problem:Gastrointestinal hemorrhage with melena    Inpatient consult to Gastroenterology  Consult performed by: Christine Rodriguez MD  Consult ordered by: Temitope Day PA-C        Subjective:     HPI:  Fransico Montalvo is a 68 year old female for whom GI is consulted for upper GIB. He has a medical history significant for alcohol use disorder,cirrhosis associated with esophageal varices s/p banding in 2018. History provided by patient and chart review.     [place] [facility] [place] with syncope, light-headedness and rib pain. He states he has been having black stools for the past several days but denies any hematemesis, hematochezia or abdominal pain. He does reports taking two tablets of aleve on a daily basis for an unknown amount of time. On arrival, he was tachycardic otherwise HDS. Laboratory workup notable for normocytic anemia with Hgb 4.4 (previously 11 3 months ago), thrombocytopenia (67), serum bicarb of 15, NSTEMI with troponin of 4.6, ISRAEL with Cr of 2.1. he was found to be hypotensive and ultimately stepped up to the MICU for hemorrhagic shock. He was given 2 U pRBCs with post transfusion Hgb being 7.7. CTA chest abd and pelvis with wall thickening of gastric body and pylorus. He is not taking any blood thinners.       Past Medical History:   Diagnosis Date    Alcohol abuse     BPH (benign prostatic hyperplasia)     Cirrhosis 06/21/2018    pt states that he was diagnosed a week ago during his last hospital visit    Closed fracture of left distal radius 7/19/2023    Gastritis     Hypertension     Renal disorder        Past Surgical History:   Procedure Laterality Date     CAPSULOTOMY OF JOINT Right 7/29/2019    Procedure: CAPSULOTOMY, JOINT;  Surgeon: Raj Grossman MD;  [facility];  Service: Orthopedics;  Laterality: Right;    PERCUTANEOUS PINNING OF HIP Right 7/29/2019    Procedure: PINNING, HIP, PERCUTANEOUS- synthes cannulated screws- hana table- C arm door side-;  Surgeon: Raj Grossman MD;  [facility];  Service: Orthopedics;  Laterality: Right;       Review of patient's allergies indicates:  No Known Allergies  Family History       Problem Relation (Age of Onset)    Heart disease Father, Brother    Hypertension Father          Tobacco Use    Smoking status: Every Day     Current packs/day: 1.00     Types: Cigarettes    Smokeless tobacco: Never   Substance and Sexual Activity    Alcohol use: Yes     Comment: (former drinker; 2months sober) this visit admits drinking a couple beers today    Drug use: No    Sexual activity: Not on file     Review of Systems   Constitutional:  Negative for chills and fever.   Gastrointestinal:  Negative for abdominal pain, diarrhea, nausea and vomiting.        Melena+     Objective:     Vital Signs (Most Recent):  Temp: 98.1 °F (36.7 °C) (10/25/23 0345)  Pulse: 97 (10/25/23 0706)  Resp: (!) 31 (10/25/23 0808)  BP: 129/80 (10/25/23 0706)  SpO2: 96 % (10/25/23 0706) Vital Signs (24h Range):  Temp:  [98 °F (36.7 °C)-99.8 °F (37.7 °C)] 98.1 °F (36.7 °C)  Pulse:  [] 97  Resp:  [11-31] 31  SpO2:  [83 %-100 %] 96 %  BP: ()/(45-82) 129/80     Weight: 81.6 kg (180 lb) (10/24/23 1201)  Body mass index is 27.37 kg/m².      Intake/Output Summary (Last 24 hours) at 10/25/2023 0835  Last data filed at 10/25/2023 0600  Gross per 24 hour   Intake 3777.42 ml   Output 100 ml   Net 3677.42 ml       Lines/Drains/Airways       Peripheral Intravenous Line  Duration                  Peripheral IV - Single Lumen 10/24/23 1133 18 G Right Hand <1 day         Peripheral IV - Single Lumen 10/24/23 1654 18 G Left  Antecubital <1 day         Peripheral IV - Single Lumen 10/25/23 0400 18 G Anterior;Right Forearm <1 day                     Physical Exam  Vitals reviewed.   Constitutional:       General: He is not in acute distress.     Appearance: He is not ill-appearing.   Eyes:      Extraocular Movements: Extraocular movements intact.   Cardiovascular:      Rate and Rhythm: Normal rate.   Pulmonary:      Effort: Pulmonary effort is normal.   Abdominal:      General: There is no distension.      Palpations: Abdomen is soft.      Tenderness: There is no abdominal tenderness. There is no guarding or rebound.   Neurological:      Mental Status: He is alert. Mental status is at baseline.          Significant Labs:  All pertinent lab results from the last 24 hours have been reviewed.    Significant Imaging:  Imaging results within the past 24 hours have been reviewed.    Assessment/Plan:     GI  * Gastrointestinal hemorrhage with melena  68 year old male with history of alcohol use disorder, cirrhosis associated with esophageal varices s/p banding in 2018 admitted to [facility] with light-headedness and melena for several days, found to be anemic to 4.4 on admission. Due to persistent hypotensive he was stepped up to the MICU and is now s/p 2 U pRBCs with current Hgb of 7.7, not requiring pressor support.     Recommendations:  - Keep NPO, plan for EGD today.  - Trend Hgb q8 hrs. Transfuse for Hgb > 7, unless otherwise indicated  - Continue IV PPI 40 mg BID.   - Continue Octreotide gtt given prior hx of varices in the setting of cirrhosis.  - Maintain IV access with 2 large bore Ivs  - Intravascular resuscitation/support with IVFs   - Hold all NSAIDs and anticoagulants, unless contraindicated  - Please correct any coagulopathy with platelets and FFP for goal of platelets >50K and INR <2.0  - Please notify GI team if there is significant change in patient's clinical status        Thank you for your consult. I will follow-up with patient.  Please contact us if you have any additional questions.    Christine Rodriguez MD  Gastroenterology  [facility] - Cardiac Medical ICU

## 2023-10-25 NOTE — ANESTHESIA POSTPROCEDURE EVALUATION
Anesthesia Post Evaluation    Patient: Fransico Montalvo    Procedure(s) Performed: Procedure(s) (LRB):  EGD (ESOPHAGOGASTRODUODENOSCOPY) (N/A)    Final Anesthesia Type: general      Patient location during evaluation: ICU  Patient participation: Yes- Able to Participate  Level of consciousness: awake and alert  Post-procedure vital signs: reviewed and stable  Pain management: adequate  Airway patency: patent    PONV status at discharge: No PONV  Anesthetic complications: no      Cardiovascular status: blood pressure returned to baseline  Respiratory status: unassisted  Hydration status: euvolemic  Follow-up not needed.          Vitals Value Taken Time   /71 10/25/23 1501   Temp 36.6 °C (97.8 °F) 10/25/23 1028   Pulse 91 10/25/23 1515   Resp 21 10/25/23 1515   SpO2 96 % 10/25/23 1515   Vitals shown include unvalidated device data.      No case tracking events are documented in the log.      Pain/Margareth Score: Pain Rating Prior to Med Admin: 6 (10/25/2023  8:08 AM)  Pain Rating Post Med Admin: 6 (10/24/2023  3:22 PM)

## 2023-10-25 NOTE — TRANSFER OF CARE
Anesthesia Transfer of Care Note    Patient: Fransico Montalvo    Procedure(s) Performed: Procedure(s) (LRB):  EGD (ESOPHAGOGASTRODUODENOSCOPY) (N/A)    Patient location: ICU    Anesthesia Type: general    Transport from OR: Transported from OR on 2-3 L/min O2 by NC with adequate spontaneous ventilation    Post pain: adequate analgesia    Post assessment: no apparent anesthetic complications    Post vital signs: stable    Level of consciousness: awake    Nausea/Vomiting: no nausea/vomiting    Complications: none    Transfer of care protocol was followed      Last vitals:   Visit Vitals  /79   Pulse 98   Temp 36.6 °C (97.8 °F) (Oral)   Resp (!) 22   Wt 81.6 kg (180 lb)   SpO2 (!) 80%   BMI 27.37 kg/m²

## 2023-10-25 NOTE — PROVATION PATIENT INSTRUCTIONS
Discharge Summary/Instructions after an Endoscopic Procedure  Patient Name: Fransico Montalvo  Patient MRN: [id number]  Patient YOB: 1955 Wednesday, October 25, 2023  Lux Rome MD  Dear patient,  As a result of recent federal legislation (The Federal Cures Act), you may   receive lab or pathology results from your procedure in your [facility]   account before your physician is able to contact you. Your physician or   their representative will relay the results to you with their   recommendations at their soonest availability.  Thank you,  RESTRICTIONS:  During your procedure today, you received medications for sedation.  These   medications may affect your judgment, balance and coordination.  Therefore,   for 24 hours, you have the following restrictions:   - DO NOT drive a car, operate machinery, make legal/financial decisions,   sign important papers or drink alcohol.    ACTIVITY:  Today: no heavy lifting, straining or running due to procedural   sedation/anesthesia.  The following day: return to full activity including work.  DIET:  Eat and drink normally unless instructed otherwise.     TREATMENT FOR COMMON SIDE EFFECTS:  - Mild abdominal pain, nausea, belching, bloating or excessive gas:  rest,   eat lightly and use a heating pad.  - Sore Throat: treat with throat lozenges and/or gargle with warm salt   water.  - Because air was used during the procedure, expelling large amounts of air   from your rectum or belching is normal.  - If a bowel prep was taken, you may not have a bowel movement for 1-3 days.    This is normal.  SYMPTOMS TO WATCH FOR AND REPORT TO YOUR PHYSICIAN:  1. Abdominal pain or bloating, other than gas cramps.  2. Chest pain.  3. Back pain.  4. Signs of infection such as: chills or fever occurring within 24 hours   after the procedure.  5. Rectal bleeding, which would show as bright red, maroon, or black stools.   (A tablespoon of blood from the rectum is not serious, especially  if   hemorrhoids are present.)  6. Vomiting.  7. Weakness or dizziness.  GO DIRECTLY TO THE NEAREST EMERGENCY ROOM IF YOU HAVE ANY OF THE FOLLOWING:      Difficulty breathing              Chills and/or fever over 101 F   Persistent vomiting and/or vomiting blood   Severe abdominal pain   Severe chest pain   Black, tarry stools   Bleeding- more than one tablespoon   Any other symptom or condition that you feel may need urgent attention  Your doctor recommends these additional instructions:  If any biopsies were taken, your doctors clinic will contact you in 1 to 2   weeks with any results.  - No clear source of bleeding noted on EGD today.  As well, brown stool   noted on ARCELIA at time of procedure.  Given persistent hypotension and lack   of clear GI source on egd, would evaluate for non-GI sources of   hypotension.    - Return patient to ICU for ongoing care.   - Clear liquid diet today.   - Perform a colonoscopy once patient able to tolerate prep.     - Discontinue Protonix (pantoprazole).   - Discontinue octreotide.  For questions, problems or results please call your physician - Lux Rome MD at Work:  [phone].  [facility], EMERGENCY ROOM PHONE NUMBER: [phone]  IF A COMPLICATION OR EMERGENCY SITUATION ARISES AND YOU ARE UNABLE TO REACH   YOUR PHYSICIAN - GO DIRECTLY TO THE EMERGENCY ROOM.  Lux Rome MD  10/25/2023 2:47:19 PM  This report has been verified and signed electronically.  Dear patient,  As a result of recent federal legislation (The Federal Cures Act), you may   receive lab or pathology results from your procedure in your [facility]   account before your physician is able to contact you. Your physician or   their representative will relay the results to you with their   recommendations at their soonest availability.  Thank you,  PR[employer]ATION

## 2023-10-25 NOTE — ASSESSMENT & PLAN NOTE
68 year old male with history of alcohol use disorder, cirrhosis associated with esophageal varices s/p banding in 2018 admitted to [facility] with light-headedness and melena for several days, found to be anemic to 4.4 on admission. Due to persistent hypotensive he was stepped up to the MICU and is now s/p 2 U pRBCs with current Hgb of 7.7, not requiring pressor support.     Recommendations:  - Keep NPO, plan for EGD today.  - Trend Hgb q8 hrs. Transfuse for Hgb > 7, unless otherwise indicated  - Continue IV PPI 40 mg BID.   - Continue Octreotide gtt given prior hx of varices in the setting of cirrhosis.  - Maintain IV access with 2 large bore Ivs  - Intravascular resuscitation/support with IVFs   - Hold all NSAIDs and anticoagulants, unless contraindicated  - Please correct any coagulopathy with platelets and FFP for goal of platelets >50K and INR <2.0  - Please notify GI team if there is significant change in patient's clinical status

## 2023-10-25 NOTE — H&P
[facility] - Cardiac Medical ICU  Critical Care Medicine  History & Physical    Patient Name: Fransico Montalvo  MRN: [id number]  Admission Date: 10/24/2023  Hospital Length of Stay: 1 days  Code Status: Full Code  Attending Physician: Marisol Felix MD   Primary Care Provider: Jessica   Principal Problem: Gastrointestinal hemorrhage with melena    Subjective:     HPI:  Fransico Montalvo is a 68 year-old male with history of alcohol use disorder, esophageal varices with banding noted in 2018, BPH, cirrhosis, hypertension who presented to the emergency department with chief complaint of syncope, lightheadedness and rib pain. He states that he felt weak and lightheaded while walking, he fell to his left side, and struck the left side of his ribs on a nearby chair. He then fell to the ground. Did not hit his head or lose consciousness. He has been unable to walk since yesterday due to the dizziness. He has been crawling around his house since last night. Of note, he has had multiple syncopal episodes over the last few weeks.    In the ED patient tachycardic, hemodynamically stable, afebrile, saturating well on room air. Hb 4.4 down from 11 three months ago. Patient is unaware of if he has history of varices. Per chart review, has had an EGD with banding (March 2018). CXR with remote left 7/8/9 rib fractures and patient reporting signficant pain with palpitation, deep inspiration, and certain movements. His initial Trop 4.6 and follow up 4.1. Patient was started on IV Protonix, octreotide, ceftriaxone, and transfusing 2 units PRBC. Patient initially was admitted to internal medicine.    Rapid response called on the patient and was found to be hypotensive, MICU team consulted. Patient admitted to MICU for hemorrhagic shock.        Hospital/ICU Course:  No notes on file     Past Medical History:   Diagnosis Date    Alcohol abuse     BPH (benign prostatic hyperplasia)     Cirrhosis 06/21/2018    pt states that he was diagnosed a week  ago during his last hospital visit    Closed fracture of left distal radius 7/19/2023    Gastritis     Hypertension     Renal disorder        Past Surgical History:   Procedure Laterality Date    CAPSULOTOMY OF JOINT Right 7/29/2019    Procedure: CAPSULOTOMY, JOINT;  Surgeon: Raj Grossman MD;  Location: [address];  Service: Orthopedics;  Laterality: Right;    PERCUTANEOUS PINNING OF HIP Right 7/29/2019    Procedure: PINNING, HIP, PERCUTANEOUS- synthes cannulated screws- hana table- C arm door side-;  Surgeon: Raj Grossman MD;  Location: [address];  Service: Orthopedics;  Laterality: Right;       Review of patient's allergies indicates:  No Known Allergies    Family History       Problem Relation (Age of Onset)    Heart disease Father, Brother    Hypertension Father          Tobacco Use    Smoking status: Every Day     Current packs/day: 1.00     Types: Cigarettes    Smokeless tobacco: Never   Substance and Sexual Activity    Alcohol use: Yes     Comment: (former drinker; 2months sober) this visit admits drinking a couple beers today    Drug use: No    Sexual activity: Not on file      Review of Systems   Constitutional:  Positive for fatigue. Negative for chills and fever.   Respiratory:  Positive for shortness of breath.    Cardiovascular:  Positive for chest pain. Negative for leg swelling.   Gastrointestinal:  Positive for blood in stool, nausea and vomiting. Negative for abdominal pain.   Musculoskeletal:  Negative for back pain.   Skin:  Negative for color change.   Neurological:  Positive for syncope, weakness and light-headedness.   Psychiatric/Behavioral:  Negative for behavioral problems and confusion.      Objective:     Vital Signs (Most Recent):  Temp: 98.8 °F (37.1 °C) (10/25/23 0237)  Pulse: 68 (10/25/23 0237)  Resp: (!) 21 (10/25/23 0237)  BP: (!) 102/54 (10/25/23 0237)  SpO2: 97 % (10/25/23 0237) Vital Signs (24h Range):  Temp:  [98.2 °F (36.8 °C)-99.8 °F (37.7 °C)]  98.8 °F (37.1 °C)  Pulse:  [] 68  Resp:  [11-31] 21  SpO2:  [91 %-100 %] 97 %  BP: ()/(45-73) 102/54   Weight: 81.6 kg (180 lb)  Body mass index is 27.37 kg/m².      Intake/Output Summary (Last 24 hours) at 10/25/2023 0303  Last data filed at 10/25/2023 0200  Gross per 24 hour   Intake 3630 ml   Output 100 ml   Net 3530 ml          Physical Exam  Vitals and nursing note reviewed.   Constitutional:       Appearance: He is ill-appearing.   HENT:      Nose: Nose normal.   Eyes:      General:         Right eye: No discharge.         Left eye: No discharge.      Conjunctiva/sclera: Conjunctivae normal.   Cardiovascular:      Rate and Rhythm: Normal rate and regular rhythm.      Pulses: Normal pulses.      Heart sounds: Normal heart sounds.   Pulmonary:      Effort: Pulmonary effort is normal.      Breath sounds: Normal breath sounds.   Abdominal:      General: Abdomen is flat.      Palpations: Abdomen is soft.   Musculoskeletal:      Right lower leg: No edema.      Left lower leg: No edema.      Comments: Pain over L ribs   Skin:     General: Skin is warm and dry.   Neurological:      Mental Status: He is alert and oriented to person, place, and time. Mental status is at baseline.      GCS: GCS eye subscore is 4. GCS verbal subscore is 5. GCS motor subscore is 6.   Psychiatric:         Mood and Affect: Mood normal.         Behavior: Behavior normal.            Vents:     Lines/Drains/Airways       Peripheral Intravenous Line  Duration                  Peripheral IV - Single Lumen 10/24/23 1133 18 G Right Hand <1 day         Peripheral IV - Single Lumen 10/24/23 1654 18 G Left Antecubital <1 day                  Significant Labs:    CBC/Anemia Profile:  Recent Labs   Lab 10/24/23  1416 10/24/23  1425 10/25/23  0130   WBC 6.48  --  9.89   HGB 4.4*  --  6.0*   HCT 15.5* 16* 19.5*   PLT 67*  --  111*   MCV 89  --  91   RDW 20.2*  --  18.9*        Chemistries:  Recent Labs   Lab 10/24/23  1416 10/25/23  0216   NA  "131* 131*   K 3.8 3.9    104   CO2 17* 16*   BUN 46* 43*   CREATININE 2.1* 1.9*   CALCIUM 8.6* 7.8*   ALBUMIN 3.3* 2.9*   PROT 6.9 6.2   BILITOT 1.6* 4.1*   ALKPHOS 68 64   ALT 13 10   AST 42* 32   MG 1.6  --        Coagulation:   Recent Labs   Lab 10/24/23  1416   INR 1.1   APTT 22.0     Lactic Acid: No results for input(s): "LACTATE" in the last 48 hours.  Troponin:   Recent Labs   Lab 10/24/23  1416 10/24/23  1801 10/25/23  0216   TROPONINI 4.689* 4.106* 3.533*     All pertinent labs within the past 24 hours have been reviewed.    Significant Imaging: I have reviewed all pertinent imaging results/findings within the past 24 hours.    Assessment/Plan:     Psychiatric  Anxiety and depression  Continue home escitalopram    Cardiac/Vascular  Essential hypertension  Hold all anti-hypertensive medications in setting of GI bleed and hypotension.    Elevated troponin  Troponin elevated likely secondary to demand ischemia from GI bleed.    - Troponin initially 4.6--> 4.1--> 3.5  - Obtain repeat EKG  - Will stop trending troponin as it down trending now   - If has new chest pain will obtain STAT EKG and troponin     Renal/  ISRAEL (acute kidney injury)  Creatinine 2.1 on admission, was 1.2 three months prior. Suspect pre-renal from acute blood loss.  Estimated Creatinine Clearance: 35.5 mL/min (A) (based on SCr of 1.9 mg/dL (H)).  Replacing blood and will continue to monitor renal function.    -CMP daily and trend  -avoid nephrotoxic agents  -avoid hypotension (MAP goal of greater than 65)  -monitor urine output  -renally dose all medications   -transfuse to keep hgb > 7    Hematology  Thrombocytopenia  Due to underlying cirrhosis, has been noted in previous documentation as far back as 2018.    -CBC daily  -transfuse for platelets less than 10K, or 50k if actively bleeding    GI  * Gastrointestinal hemorrhage with melena  68 year-old male presenting with four days of vomiting/nausea and black stools. On admission his " Hgb was 4.4 (baseline appears around 11 from three months ago). Patient with pRBC transfusion that was started in ED. Patient initially admitted to hospital medicine however developed hypotension while on the floor so will admit to MICU service. Suspect upper GI bleed more likely given history of dark stools. Concern for possible esophageal variceal bleed given ETOH history. DDX esophageal varicies, peptic ulcer bleed, diverticulosis, AVM.    Plan:  - 2 large bore IVs  -Protonix 80 mg IV once followed by 40 mg IV BID  -transfusing 2 more units of pRBC  -peripheral vasopressors initiated  -follow up CTA abdomen to determine source of bleed  -prepare FFP  -obtain CBC q6h to monitor Hgb  -NPO at midnight for GI consult tomorrow AM.  -continue ceftriaxone 1 mg q24h.      Alcoholic cirrhosis  Patient with history of alcohol use disorder and cirrhosis, recently undergoing detox. Reports decrease in ETOH intake.    MELD 3.0: 22 at 10/24/2023  2:16 PM  MELD-Na: 21 at 10/24/2023  2:16 PM  Calculated from:  Serum Creatinine: 2.1 mg/dL at 10/24/2023  2:16 PM  Serum Sodium: 131 mmol/L at 10/24/2023  2:16 PM  Total Bilirubin: 1.6 mg/dL at 10/24/2023  2:16 PM  Serum Albumin: 3.3 g/dL at 10/24/2023  2:16 PM  INR(ratio): 1.1 at 10/24/2023  2:16 PM  Age at listing (hypothetical): 68 years  Sex: Male at 10/24/2023  2:16 PM      Orthopedic  Closed fracture of multiple ribs of left side with routine healing  Patient with recent fall. Chest x ray results reviewed from this admission  - Remote rib fractures of left 7/8/9th ribs  - symptomatic management    Other  Syncope  Patient reportedly with syncopal episode 10 days ago that was witnessed by his neighbor. Patient has felt symptoms of lightheadedness, dizziness over last several days. Suspect this is related to symptomatic anemia.        Critical Care Daily Checklist:    A: Awake: RASS Goal/Actual Goal:    Actual:     B: Spontaneous Breathing Trial Performed?     C: SAT & SBT  Coordinated?  N/A                      D: Delirium: CAM-ICU     E: Early Mobility Performed? No   F: Feeding Goal:    Status:     Current Diet Order   Procedures    Diet NPO Except for: Sips with Medication     Order Specific Question:   Except for     Answer:   Sips with Medication      AS: Analgesia/Sedation PRN    T: Thromboembolic Prophylaxis Hold in setting of bleed   H: HOB > 300 Yes   U: Stress Ulcer Prophylaxis (if needed) Protonix    G: Glucose Control < 180 goal    B: Bowel Function     I: Indwelling Catheter (Lines & Sauer) Necessity PIV   D: De-escalation of Antimicrobials/Pharmacotherapies Continue CTX     Plan for the day/ETD Admit to ICU for GIB    Code Status:  Family/Goals of Care: Full Code       Critical Care Time: 50 minutes    Plan of care to be discussed with Dr. Felix. Attestation to follow.     Critical secondary to Patient has a condition that poses threat to life and bodily function: gastrointestinal bleeding      Critical care was time spent personally by me on the following activities: development of treatment plan with patient or surrogate and bedside caregivers, discussions with consultants, evaluation of patient's response to treatment, examination of patient, ordering and performing treatments and interventions, ordering and review of laboratory studies, ordering and review of radiographic studies, pulse oximetry, re-evaluation of patient's condition. This critical care time did not overlap with that of any other provider or involve time for any procedures.     Van Galvan NP  Critical Care Medicine  [facility] - Cardiac Medical ICU

## 2023-10-25 NOTE — ASSESSMENT & PLAN NOTE
68 year-old male presenting with four days of vomiting/nausea and black stools. On admission his Hgb was 4.4 (baseline appears around 11 from three months ago). Patient with pRBC transfusion that was started in ED. Patient initially admitted to hospital medicine however developed hypotension while on the floor so will admit to MICU service. Suspect upper GI bleed more likely given history of dark stools. Concern for possible esophageal variceal bleed given ETOH history. DDX esophageal varicies, peptic ulcer bleed, diverticulosis, AVM.    Plan:  - 2 large bore IVs  -Protonix 80 mg IV once followed by 40 mg IV BID  -transfusing 2 more units of pRBC  -peripheral vasopressors initiated  -follow up CTA abdomen to determine source of bleed  -prepare FFP  -obtain CBC q6h to monitor Hgb  -NPO at midnight for GI consult tomorrow AM.  -continue ceftriaxone 1 mg q24h.

## 2023-10-25 NOTE — CONSULTS
Patient seen and evaluated by critical care medicine during rapid response. To be admitted to ICU for further management. Full H&P to follow.     NOLAN Washington, [facility]  Pulmonary Critical Care Medicine   10/25/2023

## 2023-10-25 NOTE — NURSING
Patient arrived to the floor in with 1 unit of blood infusing. HGB in the ED showed 4.4.one dose of oxy given for pain, as well as a turkey sandwich and orange juice. Immediately after eating food patient became nauseated and vomited. IV ceftriaxone administered after initial unit of blood finished infusing. IV octreotide administered. Second unit of blood administered. Upon arrival to the unit pt was tachycardic with HR in the low 100's to 110's, pt rhythm began to change to bradycardic as well as displaying episodes of afib. Pt's BP was low after the infusion of second unit was completed. 80's/50's. MD ordered a 1L NS bolus. Bolus infused. Pt is responsive to fluids as they are infusing, but after the infusion has ended his BP drops rapidly again. BP increased momentarily to 115/66 to drop to 85/55 moments later. Charge RN notified, came in to assess patient was well new IV access initiated 20G LFA. Charge RN reached out to rapid team to have them also come and assess pt. Rapid response team assessed and had orders placed for Pt to have scans completed as well as 2 additional units of blood to be administered. Patient bladder scanned to check for urinary retention. 247ml of urine found in bladder. Unit number 3 infusing before patient was to be taken down for scans. Critical care service came up to evaluate patient as well and deemed patient appropriate to be transferred to ICU service. Pt taken down for scans in the ED, handoff given to ICU RN.

## 2023-10-25 NOTE — ASSESSMENT & PLAN NOTE
- suspect secondary to demand ischemia from signficant blood loss / GIB  - transfusing PRBCs and further management as above  - IVFs  - monitor tele  - further management pending clinical course and future study review

## 2023-10-25 NOTE — HOSPITAL COURSE
68-year-old male with EtOH cirrhosis and prior esophageal variceal banding in 2018 was admitted for GI bleed in the setting of symptomatic anemia. Initial Hgb 4.4 and low MAPs requiring 2 units pRBCs, 2L NS, and temporary peripheral vasopressors. GI was consulted and recommended EGD; however, later developed hypotension for which he was given additional 500cc bolus, 2 units pRBCs and bicarb gtt. Subsequently underwent EGD with unremarkable findings. Hgb currently stable with recommended outpatient colonoscopy. Experienced intermittent episodes of hypotension despite no signs of bleeding or infection.

## 2023-10-25 NOTE — SUBJECTIVE & OBJECTIVE
Past Medical History:   Diagnosis Date    Alcohol abuse     BPH (benign prostatic hyperplasia)     Cirrhosis 06/21/2018    pt states that he was diagnosed a week ago during his last hospital visit    Closed fracture of left distal radius 7/19/2023    Gastritis     Hypertension     Renal disorder        Past Surgical History:   Procedure Laterality Date    CAPSULOTOMY OF JOINT Right 7/29/2019    Procedure: CAPSULOTOMY, JOINT;  Surgeon: Raj Grossman MD;  Location: [facility] [address] [place] [address];  Service: Orthopedics;  Laterality: Right;    PERCUTANEOUS PINNING OF HIP Right 7/29/2019    Procedure: PINNING, HIP, PERCUTANEOUS- synthes cannulated screws- hana table- C arm door side-;  Surgeon: Raj Grossman MD;  Location: [facility] [address] [place] [address];  Service: Orthopedics;  Laterality: Right;       Review of patient's allergies indicates:  No Known Allergies    No current facility-administered medications on file prior to encounter.     Current Outpatient Medications on File Prior to Encounter   Medication Sig    EScitalopram oxalate (LEXAPRO) 10 MG tablet Take 10 mg by mouth.    lisinopriL (PRINIVIL,ZESTRIL) 20 MG tablet Take 1 tablet (20 mg total) by mouth once daily.    naproxen sodium (ALEVE) 220 MG tablet Take 440 mg by mouth daily as needed (Pain).    omeprazole (PRILOSEC) 20 MG capsule Take 2 capsules (40 mg total) by mouth once daily. (Patient taking differently: Take 20 mg by mouth once a week.)    oxyCODONE (ROXICODONE) 5 MG immediate release tablet Take 1 tablet (5 mg total) by mouth every 8 (eight) hours as needed for Pain (Not relieved by Tylenol).    oxyCODONE-acetaminophen (PERCOCET) 5-325 mg per tablet Take 1 tablet by mouth every 4 to 6 hours as needed for Pain.    polyethylene glycol (GLYCOLAX) 17 gram PwPk Take 17 g by mouth 2 (two) times daily as needed (constipation).    pregabalin (LYRICA) 75 MG capsule Take 1 capsule by mouth every evening    tetrahydrozoline HCl (VISINE OPHT) Place 2 drops into both eyes daily as  needed (Irritation).     Family History       Problem Relation (Age of Onset)    Heart disease Father, Brother    Hypertension Father          Tobacco Use    Smoking status: Every Day     Current packs/day: 1.00     Types: Cigarettes    Smokeless tobacco: Never   Substance and Sexual Activity    Alcohol use: Yes     Comment: (former drinker; 2months sober) this visit admits drinking a couple beers today    Drug use: No    Sexual activity: Not on file     Review of Systems   Constitutional:  Positive for fatigue. Negative for chills and fever.   HENT:  Negative for sore throat and trouble swallowing.    Eyes:  Negative for photophobia and visual disturbance.   Respiratory:  Negative for cough, shortness of breath and wheezing.    Cardiovascular:  Positive for chest pain. Negative for palpitations and leg swelling.   Gastrointestinal:  Positive for nausea and vomiting. Negative for abdominal distention, abdominal pain, constipation and diarrhea.   Genitourinary:  Negative for dysuria and hematuria.   Musculoskeletal:  Positive for arthralgias. Negative for neck pain and neck stiffness.   Skin:  Negative for rash and wound.   Neurological:  Positive for syncope, weakness and light-headedness. Negative for seizures, numbness and headaches.   Psychiatric/Behavioral:  Negative for confusion and decreased concentration.      Objective:     Vital Signs (Most Recent):  Temp: 98.5 °F (36.9 °C) (10/25/23 0000)  Pulse: (!) 58 (10/24/23 2328)  Resp: 18 (10/24/23 2145)  BP: 107/69 (10/24/23 2145)  SpO2: 97 % (10/24/23 2145) Vital Signs (24h Range):  Temp:  [98.2 °F (36.8 °C)-99.8 °F (37.7 °C)] 98.5 °F (36.9 °C)  Pulse:  [] 58  Resp:  [11-20] 18  SpO2:  [95 %-100 %] 97 %  BP: ()/(53-73) 107/69     Weight: 81.6 kg (180 lb)  Body mass index is 27.37 kg/m².     Physical Exam  Constitutional:       General: He is not in acute distress.     Appearance: He is not toxic-appearing or diaphoretic.   HENT:      Head:  Normocephalic and atraumatic.      Nose: Nose normal.   Eyes:      General: No scleral icterus.     Extraocular Movements: Extraocular movements intact.      Pupils: Pupils are equal, round, and reactive to light.   Cardiovascular:      Rate and Rhythm: Regular rhythm. Tachycardia present.   Pulmonary:      Effort: Pulmonary effort is normal. No respiratory distress.      Breath sounds: No wheezing or rales.   Chest:      Chest wall: Tenderness present.   Abdominal:      General: Abdomen is flat. There is no distension.      Palpations: Abdomen is soft.      Tenderness: There is no abdominal tenderness. There is no guarding.   Musculoskeletal:         General: Normal range of motion.      Cervical back: Normal range of motion and neck supple. No rigidity.      Right lower leg: No edema.      Left lower leg: No edema.   Skin:     General: Skin is warm and dry.      Coloration: Skin is pale. Skin is not jaundiced.   Neurological:      General: No focal deficit present.      Mental Status: He is alert and oriented to person, place, and time.      Cranial Nerves: No cranial nerve deficit.   Psychiatric:         Mood and Affect: Mood normal.         Behavior: Behavior normal.              CRANIAL NERVES     CN III, IV, VI   Pupils are equal, round, and reactive to light.       Significant Labs: All pertinent labs within the past 24 hours have been reviewed.  CBC:   Recent Labs   Lab 10/24/23  1416 10/24/23  1425   WBC 6.48  --    HGB 4.4*  --    HCT 15.5* 16*   PLT 67*  --      CMP:   Recent Labs   Lab 10/24/23  1416   *   K 3.8      CO2 17*   GLU 95   BUN 46*   CREATININE 2.1*   CALCIUM 8.6*   PROT 6.9   ALBUMIN 3.3*   BILITOT 1.6*   ALKPHOS 68   AST 42*   ALT 13   ANIONGAP 12       Significant Imaging: I have reviewed all pertinent imaging results/findings within the past 24 hours.

## 2023-10-25 NOTE — CODE/ RAPID DOCUMENTATION
"RAPID RESPONSE NURSE NOTE        Admit Date: 10/24/2023  LOS: 1  Code Status: Full Code   Date of Consult: 10/25/2023  : 1955  Age: 68 y.o.  Weight:   Wt Readings from Last 1 Encounters:   10/24/23 81.6 kg (180 lb)     Sex: male  Race: White   Bed: [facility]/[id number]  MRN: [id number]  Time Rapid Response Team page Received: 0152  Time Rapid Response Team at Bedside: 0155  Time Rapid Response Team left Bedside: 0325  Was the patient discharged from an ICU this admission? No   Was the patient discharged from a PACU within last 24 hours? No   Did the patient receive conscious sedation/general anesthesia in last 24 hours? No  Was the patient in the ED within the past 24 hours? Yes  Was the patient on NIPPV within the past 24 hours? No   Did this progress into an ARC or CPA: No  Attending Physician: Marisol Felix MD  Primary Service: [facility]       SITUATION    Notified by  charge RN via secure chat .  Reason for alert: hypotension  Called to evaluate the patient for Circulatory    BACKGROUND     Why is the patient in the hospital?: Gastrointestinal hemorrhage with melena    Patient has a past medical history of Alcohol abuse, BPH (benign prostatic hyperplasia), Cirrhosis, Closed fracture of left distal radius, Gastritis, Hypertension, and Renal disorder.    Last Vitals:  Temp: 98 °F (36.7 °C) (10/25 0315)  Pulse: 55 (10/25 0315)  Resp: 21 (10/25 0315)  BP: 86/71 (10/25 0315)  SpO2: 96 % (10/25 0315)    24 Hours Vitals Range:  Temp:  [98 °F (36.7 °C)-99.8 °F (37.7 °C)]   Pulse:  []   Resp:  [11-31]   BP: ()/(45-73)   SpO2:  [91 %-100 %]     Labs:  Recent Labs     10/24/23  1416 10/24/23  1425 10/25/23  0130   WBC 6.48  --  9.89   HGB 4.4*  --  6.0*   HCT 15.5* 16* 19.5*   PLT 67*  --  111*       Recent Labs     10/24/23  1416 10/25/23  0216   * 131*   K 3.8 3.9    104   CO2 17* 16*   BUN 46* 43*   CREATININE 2.1* 1.9*   GLU 95 134*   MG 1.6  --         No results for input(s): "PH", " ""PCO2", "PO2", "HCO3", "POCSATURATED", "BE" in the last 72 hours.     ASSESSMENT    Physical Exam  Constitutional:       Appearance: He is ill-appearing.   Cardiovascular:      Rate and Rhythm: Bradycardia present. Rhythm irregular.      Comments: HR initially low 100s, while at bedside dropped to mid 50s with ST depression noted in lead 2 on bedside monitor  Pulmonary:      Effort: Pulmonary effort is normal.      Breath sounds: Normal breath sounds.   Chest:      Comments: C/o L-sided lower chest pain associated with known fractured ribs  Skin:     General: Skin is cool and dry.      Capillary Refill: Capillary refill takes 2 to 3 seconds.      Coloration: Skin is pale.      Findings: Bruising present.   Neurological:      Mental Status: He is oriented to person, place, and time. He is lethargic.      GCS: GCS eye subscore is 3. GCS verbal subscore is 5. GCS motor subscore is 6.       BP 89/60 (70)  RR 19, SpO2 97% on room air    Notified by bedside RN, charge RN of patient persistent hypotension despite receiving 2u PRBC, 1.5L fluid bolus. Upon arrival to bedside, 1L fluid bolus just finishing. Repeat CBC drawn at 0130 and in process    INTERVENTIONS    The patient was seen for Cardiac problem. Staff concerns included hypotension. The following interventions were performed: CMP, Troponin, and critical care consult.  Medical problem. Staff concerns included acute decrease in urine output, < 50 ml in 4 hours. The following interventions were performed: bladder scan.    Shortly after fluid bolus completed, patient BP dropped to 84/51 (62). CBC resulted with H/H 6.0/19.5. Dr Suh with  contacted, order placed for additional 2u PRBC to be transfused, STAT CMP and troponin to draw    3rd unit PRBC released, labs drawn and sent    Patient has only put out 100cc of urine since arrival to floor. Bladder scan performed, ~247cc in bladder. Patient currently denies urge to void    While RRN at bedside, patient BP " continued to decline, down to 78/48 (57). Patient HR also noted to be intermittently dropping to mid-50s, with noted ST depression in lead 2 on the bedside monitor, which is a change from prior EKG.  Critical care notified at that time, to bedside at 0216. Decision made to transfer patient to MICU for higher level of care, possible vasopressor support.    3rd unit PRBC started by RRN, 14WT charge RN. CT chest/abd/pelvis ordered by [facility], patient transported to CT with RRN x2, 14WT RN, [facility] MD and [facility] NP. Patient then transferred to MICU 6065    RECOMMENDATIONS    We recommend: continue care per critical care medicine    PROVIDER ESCALATION    Orders received and case discussed with Dr. Suh with CON NP with [facility] .    Primary team arrival time: n/a  [facility] arrival at 0216    Disposition: Tx in ICU bed 6065.  Patient transported first to CT scan, then to 6065. On continuous cardiac, SpO2, NIBP monitoring with PRBC and octreotide infusing to PIV  Bedside handoff given to Purnima MICU RN    FOLLOW UP    14WT bedside RNLa Nena charge JYOTI Priest  updated on plan of care. Instructed to call the Rapid Response NurseMARYLOU RN at [phone] for additional questions or concerns.

## 2023-10-25 NOTE — ASSESSMENT & PLAN NOTE
Troponin elevated likely secondary to demand ischemia from GI bleed.    - Troponin initially 4.6--> 4.1--> 3.5  - Obtain repeat EKG  - Will stop trending troponin as it down trending now   - If has new chest pain will obtain STAT EKG and troponin    [FreeTextEntry1] : new pt in office for CPE. [de-identified] : [place] [facility] [place]\par \par here for follow up\par started becoming gasy 2 months ago/ changed diet started more vegeta/ no constipation\par works as a nursing aid 6 tower at [employer]\par colonic polyp 5cm pedenculated

## 2023-10-25 NOTE — H&P
[facility] - Intensive Care ([address] Medicine  History & Physical    Patient Name: Fransico Montalvo  MRN: [id number]  Patient Class: IP- Inpatient  Admission Date: 10/24/2023  Attending Physician: Anmol Scanlon DO   Primary Care Provider: Jessica         Patient information was obtained from patient, past medical records and ER records.     Subjective:     Principal Problem:GI bleed    Chief Complaint:   Chief Complaint   Patient presents with    Fall     Pt reports multiple falls over the past few days. Pt detoxing from ETOH with last drink a week ago. Pt reports lightheadedness, weakness, nausea.        HPI: 68-year-old male with past medical history of alcohol abuse, BPH, cirrhosis, hypertension who presents to the emergency department with chief complaint of syncope, lightheadedness, rib pain. He endorses a syncopal episode that occurred about 10 days ago. He was walking into his backyard, felt lightheaded, and lost consciousness. He was out for a few seconds. His roommate witnessed this. He went inside to get the patient some water. The patient was sitting upright, and had another syncopal episode without any prodromal symptoms. Again, he was out for a few seconds before regaining consciousness. He felt relatively well for the few days following that episode. For the last week, he endorses lightheadedness and dizziness. Feels lightheaded as if he might pass out, but also endorses vertiginous symptoms. His symptoms are worse with movement and ambulation. He had a fall yesterday. He states that he felt weak and lightheaded while walking, he fell to his left side, and struck the left side of his ribs on a nearby chair. He then fell to the ground. Did not hit his head or lose consciousness. He has been unable to walk since yesterday due to the dizziness. He has been crawling around his house since last night. Denies headache, cp, sob, abd pain, diarrhea. Does report nausea and vomiting and black stools  over the last four days. Additionally, he broke his wrist a few months ago. He significant decreased his alcohol use since then. Last drink was one week ago. Denies other drug use. Denies other worsening or alleviating factors.     In the ED patient tachycardic, hemodynamically stable, afebrile, saturating well on room air. Hb 4.4 down from 11 three months ago. Patient is unaware of if he has history of varices. CXR with remote left 7/8/9 rib fractures and patient reporting signficant pain with palpitation, deep inspiration, and certain movements. His initial Trop 4.6 and follow up 4.1. Patient started on iv protonix, octreotide, ceftriaxone, and transfusing 2 units PRBC. Patient admitted to the care of medicine for further evaluation and management.       Past Medical History:   Diagnosis Date    Alcohol abuse     BPH (benign prostatic hyperplasia)     Cirrhosis 06/21/2018    pt states that he was diagnosed a week ago during his last hospital visit    Closed fracture of left distal radius 7/19/2023    Gastritis     Hypertension     Renal disorder        Past Surgical History:   Procedure Laterality Date    CAPSULOTOMY OF JOINT Right 7/29/2019    Procedure: CAPSULOTOMY, JOINT;  Surgeon: Raj Grossman MD;  Location: [address];  Service: Orthopedics;  Laterality: Right;    PERCUTANEOUS PINNING OF HIP Right 7/29/2019    Procedure: PINNING, HIP, PERCUTANEOUS- synthes cannulated screws- hana table- C arm door side-;  Surgeon: Raj Grossman MD;  Location: [address];  Service: Orthopedics;  Laterality: Right;       Review of patient's allergies indicates:  No Known Allergies    No current facility-administered medications on file prior to encounter.     Current Outpatient Medications on File Prior to Encounter   Medication Sig    EScitalopram oxalate (LEXAPRO) 10 MG tablet Take 10 mg by mouth.    lisinopriL (PRINIVIL,ZESTRIL) 20 MG tablet Take 1 tablet (20 mg total) by mouth once daily.     naproxen sodium (ALEVE) 220 MG tablet Take 440 mg by mouth daily as needed (Pain).    omeprazole (PRILOSEC) 20 MG capsule Take 2 capsules (40 mg total) by mouth once daily. (Patient taking differently: Take 20 mg by mouth once a week.)    oxyCODONE (ROXICODONE) 5 MG immediate release tablet Take 1 tablet (5 mg total) by mouth every 8 (eight) hours as needed for Pain (Not relieved by Tylenol).    oxyCODONE-acetaminophen (PERCOCET) 5-325 mg per tablet Take 1 tablet by mouth every 4 to 6 hours as needed for Pain.    polyethylene glycol (GLYCOLAX) 17 gram PwPk Take 17 g by mouth 2 (two) times daily as needed (constipation).    pregabalin (LYRICA) 75 MG capsule Take 1 capsule by mouth every evening    tetrahydrozoline HCl (VISINE OPHT) Place 2 drops into both eyes daily as needed (Irritation).     Family History       Problem Relation (Age of Onset)    Heart disease Father, Brother    Hypertension Father          Tobacco Use    Smoking status: Every Day     Current packs/day: 1.00     Types: Cigarettes    Smokeless tobacco: Never   Substance and Sexual Activity    Alcohol use: Yes     Comment: (former drinker; 2months sober) this visit admits drinking a couple beers today    Drug use: No    Sexual activity: Not on file     Review of Systems   Constitutional:  Positive for fatigue. Negative for chills and fever.   HENT:  Negative for sore throat and trouble swallowing.    Eyes:  Negative for photophobia and visual disturbance.   Respiratory:  Negative for cough, shortness of breath and wheezing.    Cardiovascular:  Positive for chest pain. Negative for palpitations and leg swelling.   Gastrointestinal:  Positive for nausea and vomiting. Negative for abdominal distention, abdominal pain, constipation and diarrhea.   Genitourinary:  Negative for dysuria and hematuria.   Musculoskeletal:  Positive for arthralgias. Negative for neck pain and neck stiffness.   Skin:  Negative for rash and wound.   Neurological:   Positive for syncope, weakness and light-headedness. Negative for seizures, numbness and headaches.   Psychiatric/Behavioral:  Negative for confusion and decreased concentration.      Objective:     Vital Signs (Most Recent):  Temp: 98.5 °F (36.9 °C) (10/25/23 0000)  Pulse: (!) 58 (10/24/23 2328)  Resp: 18 (10/24/23 2145)  BP: 107/69 (10/24/23 2145)  SpO2: 97 % (10/24/23 2145) Vital Signs (24h Range):  Temp:  [98.2 °F (36.8 °C)-99.8 °F (37.7 °C)] 98.5 °F (36.9 °C)  Pulse:  [] 58  Resp:  [11-20] 18  SpO2:  [95 %-100 %] 97 %  BP: ()/(53-73) 107/69     Weight: 81.6 kg (180 lb)  Body mass index is 27.37 kg/m².     Physical Exam  Constitutional:       General: He is not in acute distress.     Appearance: He is not toxic-appearing or diaphoretic.   HENT:      Head: Normocephalic and atraumatic.      Nose: Nose normal.   Eyes:      General: No scleral icterus.     Extraocular Movements: Extraocular movements intact.      Pupils: Pupils are equal, round, and reactive to light.   Cardiovascular:      Rate and Rhythm: Regular rhythm. Tachycardia present.   Pulmonary:      Effort: Pulmonary effort is normal. No respiratory distress.      Breath sounds: No wheezing or rales.   Chest:      Chest wall: Tenderness present.   Abdominal:      General: Abdomen is flat. There is no distension.      Palpations: Abdomen is soft.      Tenderness: There is no abdominal tenderness. There is no guarding.   Musculoskeletal:         General: Normal range of motion.      Cervical back: Normal range of motion and neck supple. No rigidity.      Right lower leg: No edema.      Left lower leg: No edema.   Skin:     General: Skin is warm and dry.      Coloration: Skin is pale. Skin is not jaundiced.   Neurological:      General: No focal deficit present.      Mental Status: He is alert and oriented to person, place, and time.      Cranial Nerves: No cranial nerve deficit.   Psychiatric:         Mood and Affect: Mood normal.          Behavior: Behavior normal.              CRANIAL NERVES     CN III, IV, VI   Pupils are equal, round, and reactive to light.       Significant Labs: All pertinent labs within the past 24 hours have been reviewed.  CBC:   Recent Labs   Lab 10/24/23  1416 10/24/23  1425   WBC 6.48  --    HGB 4.4*  --    HCT 15.5* 16*   PLT 67*  --      CMP:   Recent Labs   Lab 10/24/23  1416   *   K 3.8      CO2 17*   GLU 95   BUN 46*   CREATININE 2.1*   CALCIUM 8.6*   PROT 6.9   ALBUMIN 3.3*   BILITOT 1.6*   ALKPHOS 68   AST 42*   ALT 13   ANIONGAP 12       Significant Imaging: I have reviewed all pertinent imaging results/findings within the past 24 hours.    Assessment/Plan:     * GI bleed  - GIB Pathway initiated  - setting of cirrhosis with tachycardia and evidence of end organ damage (ISRAEL/type II NSTEMI) highly concerning for significant variceal bleed  - Hb 4.4 on presentation  ;  Was 11.1 three months ago  - start protonix 80mg iv once followed by 40mg iv BID  - start octreotide bolus and gtt  - start ceftriaxone 1g  - transfusing 2 units PRBCs  - monitor tele  - npo midnight  - GI consulted  - further management pending clinical course and future study review      Closed fracture of multiple ribs of left side with routine healing  - Remote rib fractures of left 7/8/9th ribs  - symptomatic management    ISRAEL (acute kidney injury)  - Creatinine 2.1 on presentation  ;  Baseline 1.2  - secondary to GIB  - avoid nephrotoxic agents as appropriate  - further management as above    Essential hypertension  - holding home meds      Alcoholic cirrhosis  MELD 3.0: 22 at 10/24/2023  2:16 PM  MELD-Na: 21 at 10/24/2023  2:16 PM  Calculated from:  Serum Creatinine: 2.1 mg/dL at 10/24/2023  2:16 PM  Serum Sodium: 131 mmol/L at 10/24/2023  2:16 PM  Total Bilirubin: 1.6 mg/dL at 10/24/2023  2:16 PM  Serum Albumin: 3.3 g/dL at 10/24/2023  2:16 PM  INR(ratio): 1.1 at 10/24/2023  2:16 PM  Age at listing (hypothetical): 68 years  Sex:  Male at 10/24/2023  2:16 PM    - further management as above      Elevated troponin  - suspect secondary to demand ischemia from signficant blood loss / GIB  - transfusing PRBCs and further management as above  - IVFs  - monitor tele  - further management pending clinical course and future study review        VTE Risk Mitigation (From admission, onward)         Ordered     IP VTE LOW RISK PATIENT  Once         10/24/23 1931     Place sequential compression device  Until discontinued         10/24/23 1931                               Hilario Szymanski MD  Department of Hospital Medicine  [facility] - Intensive Care ([place] [id number])    COMPLETED  Family History   Problem Relation Age of Onset    Hypertension Father     Heart disease Father     Heart disease Brother

## 2023-10-25 NOTE — ASSESSMENT & PLAN NOTE
MELD 3.0: 22 at 10/24/2023  2:16 PM  MELD-Na: 21 at 10/24/2023  2:16 PM  Calculated from:  Serum Creatinine: 2.1 mg/dL at 10/24/2023  2:16 PM  Serum Sodium: 131 mmol/L at 10/24/2023  2:16 PM  Total Bilirubin: 1.6 mg/dL at 10/24/2023  2:16 PM  Serum Albumin: 3.3 g/dL at 10/24/2023  2:16 PM  INR(ratio): 1.1 at 10/24/2023  2:16 PM  Age at listing (hypothetical): 68 years  Sex: Male at 10/24/2023  2:16 PM    - further management as above

## 2023-10-26 PROBLEM — K86.9 PANCREATIC LESION: Status: ACTIVE | Noted: 2023-10-26

## 2023-10-26 LAB
ALBUMIN SERPL BCP-MCNC: 3.2 G/DL (ref 3.5–5.2)
ALP SERPL-CCNC: 65 U/L (ref 55–135)
ALT SERPL W/O P-5'-P-CCNC: 17 U/L (ref 10–44)
ANION GAP SERPL CALC-SCNC: 13 MMOL/L (ref 8–16)
ANISOCYTOSIS BLD QL SMEAR: SLIGHT
ANISOCYTOSIS BLD QL SMEAR: SLIGHT
AST SERPL-CCNC: 50 U/L (ref 10–40)
BASO STIPL BLD QL SMEAR: ABNORMAL
BASOPHILS # BLD AUTO: 0.01 K/UL (ref 0–0.2)
BASOPHILS NFR BLD: 0 % (ref 0–1.9)
BASOPHILS NFR BLD: 0.2 % (ref 0–1.9)
BILIRUB SERPL-MCNC: 2 MG/DL (ref 0.1–1)
BLD PROD TYP BPU: NORMAL
BLD PROD TYP BPU: NORMAL
BLOOD UNIT EXPIRATION DATE: NORMAL
BLOOD UNIT EXPIRATION DATE: NORMAL
BLOOD UNIT TYPE CODE: 5100
BLOOD UNIT TYPE CODE: 5100
BLOOD UNIT TYPE: NORMAL
BLOOD UNIT TYPE: NORMAL
BUN SERPL-MCNC: 38 MG/DL (ref 8–23)
BURR CELLS BLD QL SMEAR: ABNORMAL
CALCIUM SERPL-MCNC: 8.2 MG/DL (ref 8.7–10.5)
CHLORIDE SERPL-SCNC: 99 MMOL/L (ref 95–110)
CO2 SERPL-SCNC: 17 MMOL/L (ref 23–29)
CODING SYSTEM: NORMAL
CODING SYSTEM: NORMAL
CREAT SERPL-MCNC: 2 MG/DL (ref 0.5–1.4)
CROSSMATCH INTERPRETATION: NORMAL
CROSSMATCH INTERPRETATION: NORMAL
DIFFERENTIAL METHOD: ABNORMAL
DISPENSE STATUS: NORMAL
DISPENSE STATUS: NORMAL
DOHLE BOD BLD QL SMEAR: PRESENT
EOSINOPHIL # BLD AUTO: 0 K/UL (ref 0–0.5)
EOSINOPHIL NFR BLD: 0 % (ref 0–8)
EOSINOPHIL NFR BLD: 0.3 % (ref 0–8)
EOSINOPHIL NFR BLD: 0.3 % (ref 0–8)
EOSINOPHIL NFR BLD: 0.6 % (ref 0–8)
ERYTHROCYTE [DISTWIDTH] IN BLOOD BY AUTOMATED COUNT: 16.7 % (ref 11.5–14.5)
ERYTHROCYTE [DISTWIDTH] IN BLOOD BY AUTOMATED COUNT: 17.1 % (ref 11.5–14.5)
ERYTHROCYTE [DISTWIDTH] IN BLOOD BY AUTOMATED COUNT: 17.4 % (ref 11.5–14.5)
ERYTHROCYTE [DISTWIDTH] IN BLOOD BY AUTOMATED COUNT: 17.6 % (ref 11.5–14.5)
EST. GFR  (NO RACE VARIABLE): 35.5 ML/MIN/1.73 M^2
GLUCOSE SERPL-MCNC: 177 MG/DL (ref 70–110)
HCT VFR BLD AUTO: 27.8 % (ref 40–54)
HCT VFR BLD AUTO: 28.6 % (ref 40–54)
HCT VFR BLD AUTO: 29.1 % (ref 40–54)
HCT VFR BLD AUTO: 29.9 % (ref 40–54)
HGB BLD-MCNC: 8.9 G/DL (ref 14–18)
HGB BLD-MCNC: 9.3 G/DL (ref 14–18)
HGB BLD-MCNC: 9.5 G/DL (ref 14–18)
HGB BLD-MCNC: 9.5 G/DL (ref 14–18)
HYPOCHROMIA BLD QL SMEAR: ABNORMAL
HYPOCHROMIA BLD QL SMEAR: ABNORMAL
IMM GRANULOCYTES # BLD AUTO: 0.02 K/UL (ref 0–0.04)
IMM GRANULOCYTES # BLD AUTO: 0.04 K/UL (ref 0–0.04)
IMM GRANULOCYTES # BLD AUTO: 0.04 K/UL (ref 0–0.04)
IMM GRANULOCYTES # BLD AUTO: ABNORMAL K/UL (ref 0–0.04)
IMM GRANULOCYTES NFR BLD AUTO: 0.4 % (ref 0–0.5)
IMM GRANULOCYTES NFR BLD AUTO: 0.6 % (ref 0–0.5)
IMM GRANULOCYTES NFR BLD AUTO: 0.7 % (ref 0–0.5)
IMM GRANULOCYTES NFR BLD AUTO: ABNORMAL % (ref 0–0.5)
INR PPP: 1.1 (ref 0.8–1.2)
LACTATE SERPL-SCNC: 1.1 MMOL/L (ref 0.5–2.2)
LYMPHOCYTES # BLD AUTO: 0.4 K/UL (ref 1–4.8)
LYMPHOCYTES # BLD AUTO: 0.5 K/UL (ref 1–4.8)
LYMPHOCYTES # BLD AUTO: 0.8 K/UL (ref 1–4.8)
LYMPHOCYTES NFR BLD: 12.1 % (ref 18–48)
LYMPHOCYTES NFR BLD: 8 % (ref 18–48)
LYMPHOCYTES NFR BLD: 8.4 % (ref 18–48)
LYMPHOCYTES NFR BLD: 8.9 % (ref 18–48)
MAGNESIUM SERPL-MCNC: 2.5 MG/DL (ref 1.6–2.6)
MCH RBC QN AUTO: 28.1 PG (ref 27–31)
MCH RBC QN AUTO: 28.8 PG (ref 27–31)
MCH RBC QN AUTO: 29 PG (ref 27–31)
MCH RBC QN AUTO: 29 PG (ref 27–31)
MCHC RBC AUTO-ENTMCNC: 31.8 G/DL (ref 32–36)
MCHC RBC AUTO-ENTMCNC: 32 G/DL (ref 32–36)
MCHC RBC AUTO-ENTMCNC: 32.5 G/DL (ref 32–36)
MCHC RBC AUTO-ENTMCNC: 32.6 G/DL (ref 32–36)
MCV RBC AUTO: 89 FL (ref 82–98)
MCV RBC AUTO: 91 FL (ref 82–98)
MONOCYTES # BLD AUTO: 0.7 K/UL (ref 0.3–1)
MONOCYTES # BLD AUTO: 0.8 K/UL (ref 0.3–1)
MONOCYTES # BLD AUTO: 0.9 K/UL (ref 0.3–1)
MONOCYTES NFR BLD: 12.8 % (ref 4–15)
MONOCYTES NFR BLD: 14 % (ref 4–15)
MONOCYTES NFR BLD: 14.4 % (ref 4–15)
MONOCYTES NFR BLD: 4 % (ref 4–15)
MPV, BLUE TOP: NORMAL FL (ref 9.2–12.9)
NEUTROPHILS # BLD AUTO: 4 K/UL (ref 1.8–7.7)
NEUTROPHILS # BLD AUTO: 4.6 K/UL (ref 1.8–7.7)
NEUTROPHILS # BLD AUTO: 4.7 K/UL (ref 1.8–7.7)
NEUTROPHILS NFR BLD: 74 % (ref 38–73)
NEUTROPHILS NFR BLD: 75.5 % (ref 38–73)
NEUTROPHILS NFR BLD: 76.4 % (ref 38–73)
NEUTROPHILS NFR BLD: 86 % (ref 38–73)
NEUTS BAND NFR BLD MANUAL: 2 %
NRBC BLD-RTO: 0 /100 WBC
NRBC BLD-RTO: 0 /100 WBC
NRBC BLD-RTO: 1 /100 WBC
NRBC BLD-RTO: 1 /100 WBC
NUM UNITS TRANS FFP: NORMAL
NUM UNITS TRANS FFP: NORMAL
OVALOCYTES BLD QL SMEAR: ABNORMAL
OVALOCYTES BLD QL SMEAR: ABNORMAL
PHOSPHATE SERPL-MCNC: 3.1 MG/DL (ref 2.7–4.5)
PLATELET # BLD AUTO: 45 K/UL (ref 150–450)
PLATELET # BLD AUTO: 86 K/UL (ref 150–450)
PLATELET # BLD AUTO: ABNORMAL K/UL (ref 150–450)
PLATELET # BLD AUTO: ABNORMAL K/UL (ref 150–450)
PLATELET BLD QL SMEAR: ABNORMAL
PLATELET, BLUE TOP: NORMAL K/UL (ref 150–450)
PMV BLD AUTO: 11.8 FL (ref 9.2–12.9)
PMV BLD AUTO: 13.3 FL (ref 9.2–12.9)
PMV BLD AUTO: ABNORMAL FL (ref 9.2–12.9)
PMV BLD AUTO: ABNORMAL FL (ref 9.2–12.9)
POIKILOCYTOSIS BLD QL SMEAR: SLIGHT
POIKILOCYTOSIS BLD QL SMEAR: SLIGHT
POLYCHROMASIA BLD QL SMEAR: ABNORMAL
POLYCHROMASIA BLD QL SMEAR: ABNORMAL
POTASSIUM SERPL-SCNC: 4.3 MMOL/L (ref 3.5–5.1)
PROT SERPL-MCNC: 6.9 G/DL (ref 6–8.4)
PROTHROMBIN TIME: 11.6 SEC (ref 9–12.5)
RBC # BLD AUTO: 3.07 M/UL (ref 4.6–6.2)
RBC # BLD AUTO: 3.23 M/UL (ref 4.6–6.2)
RBC # BLD AUTO: 3.28 M/UL (ref 4.6–6.2)
RBC # BLD AUTO: 3.38 M/UL (ref 4.6–6.2)
SODIUM SERPL-SCNC: 129 MMOL/L (ref 136–145)
SPHEROCYTES BLD QL SMEAR: ABNORMAL
TARGETS BLD QL SMEAR: ABNORMAL
TOXIC GRANULES BLD QL SMEAR: PRESENT
WBC # BLD AUTO: 5.22 K/UL (ref 3.9–12.7)
WBC # BLD AUTO: 6.06 K/UL (ref 3.9–12.7)
WBC # BLD AUTO: 6.39 K/UL (ref 3.9–12.7)
WBC # BLD AUTO: 6.97 K/UL (ref 3.9–12.7)

## 2023-10-26 PROCEDURE — 99232 PR SUBSEQUENT HOSPITAL CARE,LEVL II: ICD-10-PCS | Mod: ,,,

## 2023-10-26 PROCEDURE — C9113 INJ PANTOPRAZOLE SODIUM, VIA: HCPCS

## 2023-10-26 PROCEDURE — 85610 PROTHROMBIN TIME: CPT | Performed by: FAMILY MEDICINE

## 2023-10-26 PROCEDURE — 85049 AUTOMATED PLATELET COUNT: CPT

## 2023-10-26 PROCEDURE — 99291 CRITICAL CARE FIRST HOUR: CPT | Mod: GC,,, | Performed by: INTERNAL MEDICINE

## 2023-10-26 PROCEDURE — 25000003 PHARM REV CODE 250: Performed by: FAMILY MEDICINE

## 2023-10-26 PROCEDURE — 85007 BL SMEAR W/DIFF WBC COUNT: CPT

## 2023-10-26 PROCEDURE — 20000000 HC ICU ROOM

## 2023-10-26 PROCEDURE — 63600175 PHARM REV CODE 636 W HCPCS

## 2023-10-26 PROCEDURE — 25000003 PHARM REV CODE 250

## 2023-10-26 PROCEDURE — 99291 PR CRITICAL CARE, E/M 30-74 MINUTES: ICD-10-PCS | Mod: GC,,, | Performed by: INTERNAL MEDICINE

## 2023-10-26 PROCEDURE — 85027 COMPLETE CBC AUTOMATED: CPT

## 2023-10-26 PROCEDURE — 80053 COMPREHEN METABOLIC PANEL: CPT | Performed by: FAMILY MEDICINE

## 2023-10-26 PROCEDURE — 84100 ASSAY OF PHOSPHORUS: CPT | Performed by: NURSE PRACTITIONER

## 2023-10-26 PROCEDURE — 99232 SBSQ HOSP IP/OBS MODERATE 35: CPT | Mod: ,,,

## 2023-10-26 PROCEDURE — 94761 N-INVAS EAR/PLS OXIMETRY MLT: CPT

## 2023-10-26 PROCEDURE — 83735 ASSAY OF MAGNESIUM: CPT | Performed by: NURSE PRACTITIONER

## 2023-10-26 PROCEDURE — 87040 BLOOD CULTURE FOR BACTERIA: CPT

## 2023-10-26 PROCEDURE — 85025 COMPLETE CBC W/AUTO DIFF WBC: CPT | Mod: 91

## 2023-10-26 PROCEDURE — 83605 ASSAY OF LACTIC ACID: CPT

## 2023-10-26 RX ORDER — FUROSEMIDE 10 MG/ML
40 INJECTION INTRAMUSCULAR; INTRAVENOUS ONCE
Status: COMPLETED | OUTPATIENT
Start: 2023-10-26 | End: 2023-10-26

## 2023-10-26 RX ORDER — NOREPINEPHRINE BITARTRATE/D5W 4MG/250ML
0-.2 PLASTIC BAG, INJECTION (ML) INTRAVENOUS CONTINUOUS
Status: ACTIVE | OUTPATIENT
Start: 2023-10-26 | End: 2023-10-26

## 2023-10-26 RX ORDER — LIDOCAINE 50 MG/G
1 PATCH TOPICAL
Status: DISCONTINUED | OUTPATIENT
Start: 2023-10-26 | End: 2023-10-30 | Stop reason: HOSPADM

## 2023-10-26 RX ORDER — CAPSAICIN 0.03 G/100G
CREAM TOPICAL 2 TIMES DAILY PRN
Status: DISCONTINUED | OUTPATIENT
Start: 2023-10-26 | End: 2023-10-30 | Stop reason: HOSPADM

## 2023-10-26 RX ADMIN — NOREPINEPHRINE BITARTRATE 0.04 MCG/KG/MIN: 4 INJECTION, SOLUTION INTRAVENOUS at 05:10

## 2023-10-26 RX ADMIN — LIDOCAINE 1 PATCH: 50 PATCH CUTANEOUS at 10:10

## 2023-10-26 RX ADMIN — PIPERACILLIN SODIUM AND TAZOBACTAM SODIUM 4.5 G: 4; .5 INJECTION, POWDER, FOR SOLUTION INTRAVENOUS at 07:10

## 2023-10-26 RX ADMIN — OXYCODONE HYDROCHLORIDE 5 MG: 5 TABLET ORAL at 05:10

## 2023-10-26 RX ADMIN — PIPERACILLIN SODIUM AND TAZOBACTAM SODIUM 4.5 G: 4; .5 INJECTION, POWDER, FOR SOLUTION INTRAVENOUS at 11:10

## 2023-10-26 RX ADMIN — FUROSEMIDE 40 MG: 10 INJECTION, SOLUTION INTRAVENOUS at 10:10

## 2023-10-26 RX ADMIN — ESCITALOPRAM OXALATE 10 MG: 5 TABLET, FILM COATED ORAL at 08:10

## 2023-10-26 RX ADMIN — PIPERACILLIN SODIUM AND TAZOBACTAM SODIUM 4.5 G: 4; .5 INJECTION, POWDER, FOR SOLUTION INTRAVENOUS at 04:10

## 2023-10-26 RX ADMIN — OXYCODONE HYDROCHLORIDE 5 MG: 5 TABLET ORAL at 07:10

## 2023-10-26 RX ADMIN — MUPIROCIN: 20 OINTMENT TOPICAL at 10:10

## 2023-10-26 RX ADMIN — OXYCODONE HYDROCHLORIDE 5 MG: 5 TABLET ORAL at 11:10

## 2023-10-26 RX ADMIN — PANTOPRAZOLE SODIUM 40 MG: 40 INJECTION, POWDER, FOR SOLUTION INTRAVENOUS at 08:10

## 2023-10-26 NOTE — ASSESSMENT & PLAN NOTE
Lesions noted on CTA obtained during hypotensive episodes    - Outpatient abdominal MRI  - Outpatient GI evaluation

## 2023-10-26 NOTE — ASSESSMENT & PLAN NOTE
68 year-old male presenting with four days of vomiting/nausea and black stools. On admission his Hgb was 4.4 (baseline appears around 11 from three months ago). Patient with pRBC transfusion that was started in ED. Patient initially admitted to hospital medicine however developed hypotension while on the floor so will admit to MICU service. Suspect upper GI bleed more likely given history of dark stools. Concern for possible esophageal variceal bleed given ETOH history. DDX esophageal varicies, peptic ulcer bleed, diverticulosis, AVM.    Underwent EGD with unremarkable findings.    - GI recommending outpatient colonoscopy

## 2023-10-26 NOTE — ASSESSMENT & PLAN NOTE
- HOLD all anti-hypertensive medications in setting of GI bleed and hypotension  - Intermittently requiring pressor support

## 2023-10-26 NOTE — SUBJECTIVE & OBJECTIVE
Subjective:     Interval History: Followed up with patient s/p EGD 10/25. No varices or ulcers noted. Erroneous Hgb yesterday in which patient received 1U pRBC and 2 FFP. Blood counts have remained stable. Did have some transient hypotension overnight preceded by bradycardia requiring initiation of Levophed. No titrated down to 0.01mcg/kg/min. Last BM was 10/24. No further episodes of melena reported per nurse and patient.     Review of Systems   Constitutional:  Positive for fatigue. Negative for activity change, appetite change and fever.   HENT:  Negative for sore throat and trouble swallowing.    Eyes:  Negative for redness.   Gastrointestinal:  Negative for abdominal distention, abdominal pain, anal bleeding, blood in stool, constipation, diarrhea, nausea, rectal pain and vomiting.   Skin:  Negative for color change and pallor.   Neurological:  Positive for dizziness (with his bradycardic episode). Negative for syncope and weakness.     Objective:     Vital Signs (Most Recent):  Temp: 97.8 °F (36.6 °C) (10/26/23 0701)  Pulse: 93 (10/26/23 0900)  Resp: (!) 25 (10/26/23 0900)  BP: (!) 75/58 (10/26/23 0900)  SpO2: (!) 89 % (10/26/23 0855) Vital Signs (24h Range):  Temp:  [97.8 °F (36.6 °C)-98.4 °F (36.9 °C)] 97.8 °F (36.6 °C)  Pulse:  [47-98] 93  Resp:  [13-29] 25  SpO2:  [65 %-100 %] 89 %  BP: ()/(34-86) 75/58     Weight: 81.6 kg (180 lb) (10/24/23 1201)  Body mass index is 27.37 kg/m².      Intake/Output Summary (Last 24 hours) at 10/26/2023 0922  Last data filed at 10/26/2023 0701  Gross per 24 hour   Intake 2419.56 ml   Output 175 ml   Net 2244.56 ml       Lines/Drains/Airways       Peripheral Intravenous Line  Duration                  Peripheral IV - Single Lumen 10/24/23 1133 18 G Right Hand 1 day         Peripheral IV - Single Lumen 10/24/23 1654 18 G Left Antecubital 1 day         Peripheral IV - Single Lumen 10/25/23 0400 18 G Anterior;Right Forearm 1 day                     Physical Exam  Vitals  reviewed.   Constitutional:       Appearance: He is normal weight. He is not ill-appearing.   HENT:      Mouth/Throat:      Mouth: Mucous membranes are moist.      Pharynx: Oropharynx is clear.   Eyes:      General: No scleral icterus.  Abdominal:      General: Bowel sounds are normal. There is no distension.      Palpations: Abdomen is soft. There is no mass.   Skin:     General: Skin is warm and dry.      Capillary Refill: Capillary refill takes less than 2 seconds.      Coloration: Skin is not jaundiced or pale.   Neurological:      Mental Status: He is alert and oriented to person, place, and time.          Significant Labs:  CBC:   Recent Labs   Lab 10/25/23  1942 10/26/23  0032 10/26/23  0313   WBC 8.15 6.39 6.97   HGB 9.6* 9.3* 9.5*   HCT 29.3* 28.6* 29.9*   PLT SEE COMMENT 86* SEE COMMENT     CMP:   Recent Labs   Lab 10/26/23  0313   *   CALCIUM 8.2*   ALBUMIN 3.2*   PROT 6.9   *   K 4.3   CO2 17*   CL 99   BUN 38*   CREATININE 2.0*   ALKPHOS 65   ALT 17   AST 50*   BILITOT 2.0*         Significant Imaging:  Imaging results within the past 24 hours have been reviewed.    EGD 10/25/2023  Impression:            - Normal esophagus.                          - Normal stomach.                          - Normal examined duodenum.                          - No specimens collected.   Recommendation:        - No clear source of bleeding noted on EGD today.                          As well, brown stool noted on ARCELIA at time of                          procedure. Given persistent hypotension and lack                          of clear GI source on egd, would evaluate for                          non-GI sources of hypotension.                          - Return patient to ICU for ongoing care.                          - Clear liquid diet today.                          - Perform a colonoscopy once patient able to                          tolerate prep.                          - Discontinue Protonix  (pantoprazole).                          - Discontinue octreotide.   Attending Participation:        I was present and participated during the entire procedure from        insertion to removal of the endoscope.   Lux Rome MD   10/25/2023 2:47:19 PM

## 2023-10-26 NOTE — CARE UPDATE
Called to bedside by Critical Care Nurse with concern for hypotension and bradycardia. Patient noted to have systolic in 60's. Patient was off of all vasopressors at the time. He was completely asymptomatic during evaluation. We restarted norepinephrine to 0.04 with acceptable correction in blood pressure. Critical Care team explained our medical management to the patient.    Will obtain blood cultures and repeat lactic acid incase their is a septic shock component that is making it hard to wean off vasopressors. His CBCs have remained stable since his blood transfusions earlier in the day. It appears their was an erroneous lab value (Hgb of 5.4) around 6 pm today, repeat Hgb around 9.3. patient ended up receiving 1 out of 2 pRBC and FFP. No overt signs of bleeding at this time.    Of note, reviewed patients blood pressure in outpatient setting and he is normally having systolic of 140's and he notes that he has had episodes of his blood pressure being in the 200's when he is not in the hospital.    Infection at this time seems less likely given no leukocytosis and he is afebrile. However, will broaden his intra-abdominal coverage to zosyn from ceftriaxone to include for anaerobic coverage.    RADHA Jacinto MD  IM PGY-3

## 2023-10-26 NOTE — ASSESSMENT & PLAN NOTE
Patient with history of alcohol use disorder and cirrhosis, recently undergoing detox. Reports decrease in ETOH intake.    MELD 3.0: 23 at 10/26/2023  3:13 AM  MELD-Na: 23 at 10/26/2023  3:13 AM  Calculated from:  Serum Creatinine: 2.0 mg/dL at 10/26/2023  3:13 AM  Serum Sodium: 129 mmol/L at 10/26/2023  3:13 AM  Total Bilirubin: 2.0 mg/dL at 10/26/2023  3:13 AM  Serum Albumin: 3.2 g/dL at 10/26/2023  3:13 AM  INR(ratio): 1.1 at 10/26/2023  3:13 AM  Age at listing (hypothetical): 68 years  Sex: Male at 10/26/2023  3:13 AM    - No concerns for withdrawal at this time

## 2023-10-26 NOTE — PROGRESS NOTES
[facility] - Cardiac Medical ICU  Gastroenterology  Progress Note    Patient Name: Fransico Montalvo  MRN: [id number]  Admission Date: 10/24/2023  Hospital Length of Stay: 2 days  Code Status: Full Code   Attending Provider: Marisol Felix MD  Consulting Provider: Zayda Alanis NP  Primary Care Physician: Jessica  Principal Problem: Gastrointestinal hemorrhage with melena    Subjective:     Interval History: Followed up with patient s/p EGD 10/25. No varices or ulcers noted. Erroneous Hgb yesterday in which patient received 1U pRBC and 2 FFP. Blood counts have remained stable. Did have some transient hypotension overnight preceded by bradycardia requiring initiation of Levophed. No titrated down to 0.01mcg/kg/min. Last BM was 10/24. No further episodes of melena reported per nurse and patient.     Review of Systems   Constitutional:  Positive for fatigue. Negative for activity change, appetite change and fever.   HENT:  Negative for sore throat and trouble swallowing.    Eyes:  Negative for redness.   Gastrointestinal:  Negative for abdominal distention, abdominal pain, anal bleeding, blood in stool, constipation, diarrhea, nausea, rectal pain and vomiting.   Skin:  Negative for color change and pallor.   Neurological:  Positive for dizziness (with his bradycardic episode). Negative for syncope and weakness.     Objective:     Vital Signs (Most Recent):  Temp: 97.8 °F (36.6 °C) (10/26/23 0701)  Pulse: 93 (10/26/23 0900)  Resp: (!) 25 (10/26/23 0900)  BP: (!) 75/58 (10/26/23 0900)  SpO2: (!) 89 % (10/26/23 0855) Vital Signs (24h Range):  Temp:  [97.8 °F (36.6 °C)-98.4 °F (36.9 °C)] 97.8 °F (36.6 °C)  Pulse:  [47-98] 93  Resp:  [13-29] 25  SpO2:  [65 %-100 %] 89 %  BP: ()/(34-86) 75/58     Weight: 81.6 kg (180 lb) (10/24/23 1201)  Body mass index is 27.37 kg/m².      Intake/Output Summary (Last 24 hours) at 10/26/2023 0922  Last data filed at 10/26/2023 0701  Gross per 24 hour   Intake 2419.56 ml   Output 175 ml   Net  2244.56 ml       Lines/Drains/Airways       Peripheral Intravenous Line  Duration                  Peripheral IV - Single Lumen 10/24/23 1133 18 G Right Hand 1 day         Peripheral IV - Single Lumen 10/24/23 1654 18 G Left Antecubital 1 day         Peripheral IV - Single Lumen 10/25/23 0400 18 G Anterior;Right Forearm 1 day                     Physical Exam  Vitals reviewed.   Constitutional:       Appearance: He is normal weight. He is not ill-appearing.   HENT:      Mouth/Throat:      Mouth: Mucous membranes are moist.      Pharynx: Oropharynx is clear.   Eyes:      General: No scleral icterus.  Abdominal:      General: Bowel sounds are normal. There is no distension.      Palpations: Abdomen is soft. There is no mass.   Skin:     General: Skin is warm and dry.      Capillary Refill: Capillary refill takes less than 2 seconds.      Coloration: Skin is not jaundiced or pale.   Neurological:      Mental Status: He is alert and oriented to person, place, and time.          Significant Labs:  CBC:   Recent Labs   Lab 10/25/23  1942 10/26/23  0032 10/26/23  0313   WBC 8.15 6.39 6.97   HGB 9.6* 9.3* 9.5*   HCT 29.3* 28.6* 29.9*   PLT SEE COMMENT 86* SEE COMMENT     CMP:   Recent Labs   Lab 10/26/23  0313   *   CALCIUM 8.2*   ALBUMIN 3.2*   PROT 6.9   *   K 4.3   CO2 17*   CL 99   BUN 38*   CREATININE 2.0*   ALKPHOS 65   ALT 17   AST 50*   BILITOT 2.0*         Significant Imaging:  Imaging results within the past 24 hours have been reviewed.    EGD 10/25/2023  Impression:            - Normal esophagus.                          - Normal stomach.                          - Normal examined duodenum.                          - No specimens collected.   Recommendation:        - No clear source of bleeding noted on EGD today.                          As well, brown stool noted on ARCELIA at time of                          procedure. Given persistent hypotension and lack                          of clear GI source  on egd, would evaluate for                          non-GI sources of hypotension.                          - Return patient to ICU for ongoing care.                          - Clear liquid diet today.                          - Perform a colonoscopy once patient able to                          tolerate prep.                          - Discontinue Protonix (pantoprazole).                          - Discontinue octreotide.   Attending Participation:        I was present and participated during the entire procedure from        insertion to removal of the endoscope.   Lux Rome MD   10/25/2023 2:47:19 PM     Assessment/Plan:     GI  * Gastrointestinal hemorrhage with melena  68 year old male with history of alcohol use disorder, cirrhosis associated with esophageal varices s/p banding in 2018 admitted to [facility] with light-headedness and melena for several days, found to be anemic to 4.4 on admission. EGD performed yesterday, however, no bleeding source identified.     Recommendations:  - OK to advance patient diet as tolerated.  - Will arrange for outpatient colonoscopy  - Continue to evaluate for other causes of patients transient hemodynamic instability          Thank you for your consult. After careful review of labs and patient current status with the GI staff and fellow, it has been decided that I will sign off. Please contact us if you have any additional questions.    Zayda Alanis NP  Gastroenterology  [facility] - [facility]

## 2023-10-26 NOTE — ASSESSMENT & PLAN NOTE
Due to underlying cirrhosis, has been noted in previous documentation as far back as 2018.    - CBC daily  - transfuse for platelets less than 10K, or 50k if actively bleeding

## 2023-10-26 NOTE — SUBJECTIVE & OBJECTIVE
Interval History/Significant Events: Unremarkable EGD yesterday with GI recommending outpatient colonoscopy. Hgb stable. Intermittent hypotensive episodes despite no visible bleeding or bloody BM. CTA ordered overnight without concerns for bleeding. Will need outpatient abdominal MRI for hypodense pancreatic, liver and splenic lesions.    Review of Systems   Constitutional:  Positive for fatigue. Negative for activity change, appetite change and fever.   HENT:  Negative for sore throat and trouble swallowing.    Eyes:  Negative for redness.   Gastrointestinal:  Negative for blood in stool and nausea.   Skin:  Negative for color change and pallor.   Neurological:  Positive for dizziness. Negative for syncope and weakness.     Objective:     Vital Signs (Most Recent):  Temp: 98.3 °F (36.8 °C) (10/26/23 1100)  Pulse: 86 (10/26/23 1100)  Resp: 20 (10/26/23 1145)  BP: 124/83 (10/26/23 1100)  SpO2: 96 % (10/26/23 1100) Vital Signs (24h Range):  Temp:  [97.8 °F (36.6 °C)-98.4 °F (36.9 °C)] 98.3 °F (36.8 °C)  Pulse:  [47-95] 86  Resp:  [15-29] 20  SpO2:  [87 %-97 %] 96 %  BP: ()/(42-87) 124/83   Weight: 81.6 kg (180 lb)  Body mass index is 27.37 kg/m².      Intake/Output Summary (Last 24 hours) at 10/26/2023 1221  Last data filed at 10/26/2023 1000  Gross per 24 hour   Intake 2498.77 ml   Output 250 ml   Net 2248.77 ml          Physical Exam  Vitals reviewed.   Constitutional:       Appearance: He is normal weight. He is not ill-appearing.   HENT:      Nose: Nose normal.      Mouth/Throat:      Mouth: Mucous membranes are moist.      Pharynx: Oropharynx is clear.   Eyes:      General: No scleral icterus.        Right eye: No discharge.         Left eye: No discharge.      Conjunctiva/sclera: Conjunctivae normal.   Cardiovascular:      Rate and Rhythm: Normal rate and regular rhythm.      Pulses: Normal pulses.      Heart sounds: Normal heart sounds.   Pulmonary:      Effort: Pulmonary effort is normal.      Breath  sounds: Normal breath sounds.   Abdominal:      General: Abdomen is flat. Bowel sounds are normal. There is no distension.      Palpations: Abdomen is soft. There is no mass.   Musculoskeletal:      Right lower leg: No edema.      Left lower leg: No edema.      Comments: Pain over L ribs   Skin:     General: Skin is warm and dry.      Capillary Refill: Capillary refill takes less than 2 seconds.      Coloration: Skin is not jaundiced or pale.   Neurological:      Mental Status: He is alert and oriented to person, place, and time.   Psychiatric:         Mood and Affect: Mood normal.            Vents:     Lines/Drains/Airways       Peripheral Intravenous Line  Duration                  Peripheral IV - Single Lumen 10/24/23 1133 18 G Right Hand 2 days         Peripheral IV - Single Lumen 10/24/23 1654 18 G Left Antecubital 1 day         Peripheral IV - Single Lumen 10/25/23 0400 18 G Anterior;Right Forearm 1 day                  Significant Labs:    CBC/Anemia Profile:  Recent Labs   Lab 10/25/23  1942 10/26/23  0032 10/26/23  0313   WBC 8.15 6.39 6.97   HGB 9.6* 9.3* 9.5*   HCT 29.3* 28.6* 29.9*   PLT SEE COMMENT 86* SEE COMMENT   MCV 89 89 89   RDW 17.2* 17.1* 16.7*        Chemistries:  Recent Labs   Lab 10/25/23  0519 10/25/23  1942 10/26/23  0313   * 125* 129*   K 4.2 3.8 4.3    95 99   CO2 14* 18* 17*   BUN 43* 40* 38*   CREATININE 1.7* 2.0* 2.0*   CALCIUM 7.8* 7.6* 8.2*   ALBUMIN 2.9* 2.9* 3.2*   PROT 6.1 6.3 6.9   BILITOT 4.9* 2.8* 2.0*   ALKPHOS 65 64 65   ALT 10 12 17   AST 32 40 50*   MG 1.4* 1.5* 2.5   PHOS 3.8 3.2 3.1       All pertinent labs within the past 24 hours have been reviewed.    Significant Imaging:  I have reviewed all pertinent imaging results/findings within the past 24 hours.

## 2023-10-26 NOTE — ASSESSMENT & PLAN NOTE
Patient with recent fall. Chest x ray results reviewed from this admission    - Remote rib fractures of left 7/8/9th ribs  - symptomatic management  - Multimodal pain control

## 2023-10-26 NOTE — PLAN OF CARE
MICU DAILY GOALS     Family/Goals of care/Code Status   Code Status: Full Code    24H Vital Sign Range  Temp:  [97.6 °F (36.4 °C)-98.4 °F (36.9 °C)]   Pulse:  []   Resp:  [13-47]   BP: ()/(34-86)   SpO2:  [65 %-100 %]      Shift Events   Pt became bradycardic in the 40's and hypotensive 60-70/40-50's x2 this shift. Pt awake and alert throughout both episodes. Team notified. Pt restarted on Levo both times with quick improvement. Pt remains on levo. Received 1 unit pRBC and 2 units FFP this shift.    AWAKE RASS: Goal - RASS Goal: 0-->alert and calm  Actual - RASS (Higgins Agitation-Sedation Scale): alert and calm    Restraint necessity: Not necessary   BREATHE SBT: Not intubated    Coordinate A & B, analgesics/sedatives Pain: managed   SAT: Not intubated   Delirium CAM-ICU: Overall CAM-ICU: Negative   Early(intubated/ Progressive (non-intubated) Mobility MOVE Screen (INTUBATED ONLY): Pass    Activity: Activity Management: Arm raise - L1   Feeding/Nutrition Diet order: Diet/Nutrition Received: NPO,     Thrombus DVT prophylaxis: VTE Required Core Measure: Per order contraindicated for SCDs/Anticoagulants   HOB Elevation Head of Bed (HOB) Positioning: HOB at 30 degrees   Ulcer Prophylaxis GI: yes   Glucose control managed     Skin Skin assessed during: Q Shift Change    Sacrum intact? Yes  Heels intact? Yes  Surgical wound? No    [x] No Altered Skin Integrity Present    [x]Prevention Measures Documented    [] Altered Skin Integrity Present or Discovered   [] LDA present /added in EPIC   [] Wound Image Taken     Wound Care Consulted? No    Attending Nurse:  Purnima Erwin RN/Staff Member:  JYOTI Eaton   Bowel Function constipation    Indwelling Catheter Necessity            De-escalation Antibiotics No       VS and assessment per flow sheet, patient progressing towards goals as tolerated, plan of care reviewed with  Fransico Montalvo , all concerns addressed, will continue to monitor.

## 2023-10-26 NOTE — PROGRESS NOTES
[facility] - Cardiac Medical ICU  Critical Care Medicine  Progress Note    Patient Name: Fransico Montalvo  MRN: [id number]  Admission Date: 10/24/2023  Hospital Length of Stay: 2 days  Code Status: Full Code  Attending Provider: Marisol Felix MD  Primary Care Provider: Jessica   Principal Problem: Gastrointestinal hemorrhage with melena    Subjective:     HPI:  Fransico Montalvo is a 68 year-old male with history of alcohol use disorder, esophageal varices with banding noted in 2018, BPH, cirrhosis, hypertension who presented to the emergency department with chief complaint of syncope, lightheadedness and rib pain. He states that he felt weak and lightheaded while walking, he fell to his left side, and struck the left side of his ribs on a nearby chair. He then fell to the ground. Did not hit his head or lose consciousness. He has been unable to walk since yesterday due to the dizziness. He has been crawling around his house since last night. Of note, he has had multiple syncopal episodes over the last few weeks.    In the ED patient tachycardic, hemodynamically stable, afebrile, saturating well on room air. Hb 4.4 down from 11 three months ago. Patient is unaware of if he has history of varices. Per chart review, has had an EGD with banding (March 2018). CXR with remote left 7/8/9 rib fractures and patient reporting signficant pain with palpitation, deep inspiration, and certain movements. His initial Trop 4.6 and follow up 4.1. Patient was started on IV Protonix, octreotide, ceftriaxone, and transfusing 2 units PRBC. Patient initially was admitted to internal medicine.    Rapid response called on the patient and was found to be hypotensive, MICU team consulted. Patient admitted to MICU for hemorrhagic shock.        Hospital/ICU Course:  68-year-old male with EtOH cirrhosis and prior esophageal variceal banding in 2018 was admitted for GI bleed in the setting of symptomatic anemia. Initial Hgb 4.4 and low MAPs requiring 2 units  pRBCs, 2L NS, and temporary peripheral vasopressors. GI was consulted and recommended EGD; however, later developed hypotension for which he was given additional 500cc bolus, 2 units pRBCs and bicarb gtt. Subsequently underwent EGD with unremarkable findings. Hgb currently stable with recommended outpatient colonoscopy. Still with intermittent episodes of hypotension despite no signs of bleeding.       Interval History/Significant Events: Unremarkable EGD yesterday with GI recommending outpatient colonoscopy. Hgb stable. Intermittent hypotensive episodes despite no visible bleeding or bloody BM. CTA ordered overnight without concerns for bleeding. Will need outpatient abdominal MRI for hypodense pancreatic, liver and splenic lesions.    Review of Systems   Constitutional:  Positive for fatigue. Negative for activity change, appetite change and fever.   HENT:  Negative for sore throat and trouble swallowing.    Eyes:  Negative for redness.   Gastrointestinal:  Negative for blood in stool and nausea.   Skin:  Negative for color change and pallor.   Neurological:  Positive for dizziness. Negative for syncope and weakness.     Objective:     Vital Signs (Most Recent):  Temp: 98.3 °F (36.8 °C) (10/26/23 1100)  Pulse: 86 (10/26/23 1100)  Resp: 20 (10/26/23 1145)  BP: 124/83 (10/26/23 1100)  SpO2: 96 % (10/26/23 1100) Vital Signs (24h Range):  Temp:  [97.8 °F (36.6 °C)-98.4 °F (36.9 °C)] 98.3 °F (36.8 °C)  Pulse:  [47-95] 86  Resp:  [15-29] 20  SpO2:  [87 %-97 %] 96 %  BP: ()/(42-87) 124/83   Weight: 81.6 kg (180 lb)  Body mass index is 27.37 kg/m².      Intake/Output Summary (Last 24 hours) at 10/26/2023 1221  Last data filed at 10/26/2023 1000  Gross per 24 hour   Intake 2498.77 ml   Output 250 ml   Net 2248.77 ml          Physical Exam  Vitals reviewed.   Constitutional:       Appearance: He is normal weight. He is not ill-appearing.   HENT:      Nose: Nose normal.      Mouth/Throat:      Mouth: Mucous membranes  are moist.      Pharynx: Oropharynx is clear.   Eyes:      General: No scleral icterus.        Right eye: No discharge.         Left eye: No discharge.      Conjunctiva/sclera: Conjunctivae normal.   Cardiovascular:      Rate and Rhythm: Normal rate and regular rhythm.      Pulses: Normal pulses.      Heart sounds: Normal heart sounds.   Pulmonary:      Effort: Pulmonary effort is normal.      Breath sounds: Normal breath sounds.   Abdominal:      General: Abdomen is flat. Bowel sounds are normal. There is no distension.      Palpations: Abdomen is soft. There is no mass.   Musculoskeletal:      Right lower leg: No edema.      Left lower leg: No edema.      Comments: Pain over L ribs   Skin:     General: Skin is warm and dry.      Capillary Refill: Capillary refill takes less than 2 seconds.      Coloration: Skin is not jaundiced or pale.   Neurological:      Mental Status: He is alert and oriented to person, place, and time.   Psychiatric:         Mood and Affect: Mood normal.            Vents:     Lines/Drains/Airways       Peripheral Intravenous Line  Duration                  Peripheral IV - Single Lumen 10/24/23 1133 18 G Right Hand 2 days         Peripheral IV - Single Lumen 10/24/23 1654 18 G Left Antecubital 1 day         Peripheral IV - Single Lumen 10/25/23 0400 18 G Anterior;Right Forearm 1 day                  Significant Labs:    CBC/Anemia Profile:  Recent Labs   Lab 10/25/23  1942 10/26/23  0032 10/26/23  0313   WBC 8.15 6.39 6.97   HGB 9.6* 9.3* 9.5*   HCT 29.3* 28.6* 29.9*   PLT SEE COMMENT 86* SEE COMMENT   MCV 89 89 89   RDW 17.2* 17.1* 16.7*        Chemistries:  Recent Labs   Lab 10/25/23  0519 10/25/23  1942 10/26/23  0313   * 125* 129*   K 4.2 3.8 4.3    95 99   CO2 14* 18* 17*   BUN 43* 40* 38*   CREATININE 1.7* 2.0* 2.0*   CALCIUM 7.8* 7.6* 8.2*   ALBUMIN 2.9* 2.9* 3.2*   PROT 6.1 6.3 6.9   BILITOT 4.9* 2.8* 2.0*   ALKPHOS 65 64 65   ALT 10 12 17   AST 32 40 50*   MG 1.4* 1.5* 2.5  "  PHOS 3.8 3.2 3.1       All pertinent labs within the past 24 hours have been reviewed.    Significant Imaging:  I have reviewed all pertinent imaging results/findings within the past 24 hours.      ABG  No results for input(s): "PH", "PO2", "PCO2", "HCO3", "BE" in the last 168 hours.  Assessment/Plan:     Psychiatric  Anxiety and depression  - Continue home escitalopram 10 mg     Cardiac/Vascular  Essential hypertension  - HOLD all anti-hypertensive medications in setting of GI bleed and hypotension  - Intermittently requiring pressor support    Elevated troponin  Troponin elevated likely secondary to demand ischemia from GI bleed.    - Troponin initially 4.6--> 4.1--> 3.5  - Obtain repeat EKG  - Will stop trending troponin as it down trending now   - If has new chest pain will obtain STAT EKG and troponin     Renal/  ISRAEL (acute kidney injury)  Creatinine 2.1 on admission, was 1.2 three months prior. Suspect pre-renal from acute blood loss.  Estimated Creatinine Clearance: 35.5 mL/min (A) (based on SCr of 1.9 mg/dL (H)).  Replacing blood and will continue to monitor renal function.    -CMP daily and trend  -avoid nephrotoxic agents  -avoid hypotension (MAP goal of greater than 65)  -monitor urine output  -renally dose all medications   -transfuse to keep hgb > 7    Hematology  Thrombocytopenia  Due to underlying cirrhosis, has been noted in previous documentation as far back as 2018.    - CBC daily  - transfuse for platelets less than 10K, or 50k if actively bleeding    GI  * Gastrointestinal hemorrhage with melena  68 year-old male presenting with four days of vomiting/nausea and black stools. On admission his Hgb was 4.4 (baseline appears around 11 from three months ago). Patient with pRBC transfusion that was started in ED. Patient initially admitted to hospital medicine however developed hypotension while on the floor so will admit to MICU service. Suspect upper GI bleed more likely given history of dark " stools. Concern for possible esophageal variceal bleed given ETOH history. DDX esophageal varicies, peptic ulcer bleed, diverticulosis, AVM.    Underwent EGD with unremarkable findings.    - GI recommending outpatient colonoscopy      Pancreatic, splenic and liver lesion  Lesions noted on CTA obtained during hypotensive episodes    - Outpatient abdominal MRI  - Outpatient GI evaluation     Alcoholic cirrhosis  Patient with history of alcohol use disorder and cirrhosis, recently undergoing detox. Reports decrease in ETOH intake.    MELD 3.0: 23 at 10/26/2023  3:13 AM  MELD-Na: 23 at 10/26/2023  3:13 AM  Calculated from:  Serum Creatinine: 2.0 mg/dL at 10/26/2023  3:13 AM  Serum Sodium: 129 mmol/L at 10/26/2023  3:13 AM  Total Bilirubin: 2.0 mg/dL at 10/26/2023  3:13 AM  Serum Albumin: 3.2 g/dL at 10/26/2023  3:13 AM  INR(ratio): 1.1 at 10/26/2023  3:13 AM  Age at listing (hypothetical): 68 years  Sex: Male at 10/26/2023  3:13 AM    - No concerns for withdrawal at this time    Orthopedic  Closed fracture of multiple ribs of left side with routine healing  Patient with recent fall. Chest x ray results reviewed from this admission    - Remote rib fractures of left 7/8/9th ribs  - symptomatic management  - Multimodal pain control    Other  Syncope  Patient reportedly with syncopal episode 10 days ago that was witnessed by his neighbor. Patient has felt symptoms of lightheadedness, dizziness over last several days. Suspect this is related to symptomatic anemia.       Critical Care Daily Checklist:    A: Awake: RASS Goal/Actual Goal: RASS Goal: 0-->alert and calm  Actual:     B: Spontaneous Breathing Trial Performed?     C: SAT & SBT Coordinated?  N/A                      D: Delirium: CAM-ICU Overall CAM-ICU: Negative   E: Early Mobility Performed? No   F: Feeding Goal:    Status:     Current Diet Order   Procedures    Diet clear liquid      AS: Analgesia/Sedation PRN Norco/multimodal   T: Thromboembolic Prophylaxis SCDs    H: HOB > 300 Yes   U: Stress Ulcer Prophylaxis (if needed) None   G: Glucose Control < 180 goal   B: Bowel Function     I: Indwelling Catheter (Lines & Sauer) Necessity PIV   D: De-escalation of Antimicrobials/Pharmacotherapies     Plan for the day/ETD BP monitoring     Code Status:  Family/Goals of Care: Full Code         Critical secondary to Patient has a condition that poses threat to life and bodily function: acute blood loss anemia     Critical care was time spent personally by me on the following activities: development of treatment plan with patient or surrogate and bedside caregivers, discussions with consultants, evaluation of patient's response to treatment, examination of patient, ordering and performing treatments and interventions, ordering and review of laboratory studies, ordering and review of radiographic studies, pulse oximetry, re-evaluation of patient's condition. This critical care time did not overlap with that of any other provider or involve time for any procedures.     Danny Gilmore MD  Internal Medicine, PGY-2  [facility]

## 2023-10-26 NOTE — ASSESSMENT & PLAN NOTE
68 year old male with history of alcohol use disorder, cirrhosis associated with esophageal varices s/p banding in 2018 admitted to [facility] with light-headedness and melena for several days, found to be anemic to 4.4 on admission. EGD performed yesterday, however, no bleeding source identified.     Recommendations:  - OK to advance patient diet as tolerated.  - Will arrange for outpatient colonoscopy  - Continue to evaluate for other causes of patients transient hemodynamic instability

## 2023-10-27 PROBLEM — Z79.899 ON DEEP VEIN THROMBOSIS (DVT) PROPHYLAXIS: Status: ACTIVE | Noted: 2023-10-27

## 2023-10-27 PROBLEM — T40.2X5A THERAPEUTIC OPIOID INDUCED CONSTIPATION: Status: ACTIVE | Noted: 2023-10-27

## 2023-10-27 PROBLEM — K59.03 THERAPEUTIC OPIOID INDUCED CONSTIPATION: Status: ACTIVE | Noted: 2023-10-27

## 2023-10-27 LAB
ABO + RH BLD: NORMAL
ALBUMIN SERPL BCP-MCNC: 3 G/DL (ref 3.5–5.2)
ALP SERPL-CCNC: 68 U/L (ref 55–135)
ALT SERPL W/O P-5'-P-CCNC: 13 U/L (ref 10–44)
ANION GAP SERPL CALC-SCNC: 13 MMOL/L (ref 8–16)
ANISOCYTOSIS BLD QL SMEAR: SLIGHT
AST SERPL-CCNC: 31 U/L (ref 10–40)
BASOPHILS # BLD AUTO: 0.01 K/UL (ref 0–0.2)
BASOPHILS NFR BLD: 0.2 % (ref 0–1.9)
BILIRUB SERPL-MCNC: 1.3 MG/DL (ref 0.1–1)
BLD GP AB SCN CELLS X3 SERPL QL: NORMAL
BUN SERPL-MCNC: 34 MG/DL (ref 8–23)
BURR CELLS BLD QL SMEAR: ABNORMAL
CALCIUM SERPL-MCNC: 7.9 MG/DL (ref 8.7–10.5)
CHLORIDE SERPL-SCNC: 97 MMOL/L (ref 95–110)
CO2 SERPL-SCNC: 18 MMOL/L (ref 23–29)
CREAT SERPL-MCNC: 1.9 MG/DL (ref 0.5–1.4)
DIFFERENTIAL METHOD: ABNORMAL
EOSINOPHIL # BLD AUTO: 0 K/UL (ref 0–0.5)
EOSINOPHIL # BLD AUTO: 0.1 K/UL (ref 0–0.5)
EOSINOPHIL NFR BLD: 0.6 % (ref 0–8)
EOSINOPHIL NFR BLD: 0.7 % (ref 0–8)
EOSINOPHIL NFR BLD: 0.8 % (ref 0–8)
EOSINOPHIL NFR BLD: 1.2 % (ref 0–8)
ERYTHROCYTE [DISTWIDTH] IN BLOOD BY AUTOMATED COUNT: 17.6 % (ref 11.5–14.5)
ERYTHROCYTE [DISTWIDTH] IN BLOOD BY AUTOMATED COUNT: 17.6 % (ref 11.5–14.5)
ERYTHROCYTE [DISTWIDTH] IN BLOOD BY AUTOMATED COUNT: 17.7 % (ref 11.5–14.5)
ERYTHROCYTE [DISTWIDTH] IN BLOOD BY AUTOMATED COUNT: 17.9 % (ref 11.5–14.5)
EST. GFR  (NO RACE VARIABLE): 37.7 ML/MIN/1.73 M^2
GLUCOSE SERPL-MCNC: 125 MG/DL (ref 70–110)
HCT VFR BLD AUTO: 27.2 % (ref 40–54)
HCT VFR BLD AUTO: 27.5 % (ref 40–54)
HCT VFR BLD AUTO: 27.7 % (ref 40–54)
HCT VFR BLD AUTO: 28.9 % (ref 40–54)
HGB BLD-MCNC: 8.4 G/DL (ref 14–18)
HGB BLD-MCNC: 8.7 G/DL (ref 14–18)
HGB BLD-MCNC: 8.9 G/DL (ref 14–18)
HGB BLD-MCNC: 9.2 G/DL (ref 14–18)
HYPOCHROMIA BLD QL SMEAR: ABNORMAL
IMM GRANULOCYTES # BLD AUTO: 0.01 K/UL (ref 0–0.04)
IMM GRANULOCYTES # BLD AUTO: 0.02 K/UL (ref 0–0.04)
IMM GRANULOCYTES # BLD AUTO: 0.02 K/UL (ref 0–0.04)
IMM GRANULOCYTES # BLD AUTO: 0.05 K/UL (ref 0–0.04)
IMM GRANULOCYTES NFR BLD AUTO: 0.2 % (ref 0–0.5)
IMM GRANULOCYTES NFR BLD AUTO: 0.4 % (ref 0–0.5)
IMM GRANULOCYTES NFR BLD AUTO: 0.5 % (ref 0–0.5)
IMM GRANULOCYTES NFR BLD AUTO: 1.2 % (ref 0–0.5)
INR PPP: 1.4 (ref 0.8–1.2)
LYMPHOCYTES # BLD AUTO: 0.6 K/UL (ref 1–4.8)
LYMPHOCYTES # BLD AUTO: 0.7 K/UL (ref 1–4.8)
LYMPHOCYTES NFR BLD: 13.4 % (ref 18–48)
LYMPHOCYTES NFR BLD: 13.4 % (ref 18–48)
LYMPHOCYTES NFR BLD: 13.5 % (ref 18–48)
LYMPHOCYTES NFR BLD: 14.2 % (ref 18–48)
MAGNESIUM SERPL-MCNC: 1.8 MG/DL (ref 1.6–2.6)
MCH RBC QN AUTO: 28.5 PG (ref 27–31)
MCH RBC QN AUTO: 28.6 PG (ref 27–31)
MCH RBC QN AUTO: 28.7 PG (ref 27–31)
MCH RBC QN AUTO: 28.8 PG (ref 27–31)
MCHC RBC AUTO-ENTMCNC: 30.9 G/DL (ref 32–36)
MCHC RBC AUTO-ENTMCNC: 31.6 G/DL (ref 32–36)
MCHC RBC AUTO-ENTMCNC: 31.8 G/DL (ref 32–36)
MCHC RBC AUTO-ENTMCNC: 32.1 G/DL (ref 32–36)
MCV RBC AUTO: 90 FL (ref 82–98)
MCV RBC AUTO: 90 FL (ref 82–98)
MCV RBC AUTO: 91 FL (ref 82–98)
MCV RBC AUTO: 92 FL (ref 82–98)
MONOCYTES # BLD AUTO: 0.7 K/UL (ref 0.3–1)
MONOCYTES # BLD AUTO: 0.7 K/UL (ref 0.3–1)
MONOCYTES # BLD AUTO: 0.8 K/UL (ref 0.3–1)
MONOCYTES # BLD AUTO: 0.8 K/UL (ref 0.3–1)
MONOCYTES NFR BLD: 15.4 % (ref 4–15)
MONOCYTES NFR BLD: 16.3 % (ref 4–15)
MONOCYTES NFR BLD: 16.7 % (ref 4–15)
MONOCYTES NFR BLD: 17.3 % (ref 4–15)
NEUTROPHILS # BLD AUTO: 2.7 K/UL (ref 1.8–7.7)
NEUTROPHILS # BLD AUTO: 2.9 K/UL (ref 1.8–7.7)
NEUTROPHILS # BLD AUTO: 3.3 K/UL (ref 1.8–7.7)
NEUTROPHILS # BLD AUTO: 3.5 K/UL (ref 1.8–7.7)
NEUTROPHILS NFR BLD: 67 % (ref 38–73)
NEUTROPHILS NFR BLD: 67.4 % (ref 38–73)
NEUTROPHILS NFR BLD: 68.9 % (ref 38–73)
NEUTROPHILS NFR BLD: 70.1 % (ref 38–73)
NRBC BLD-RTO: 0 /100 WBC
NRBC BLD-RTO: 1 /100 WBC
OVALOCYTES BLD QL SMEAR: ABNORMAL
PHOSPHATE SERPL-MCNC: 2.4 MG/DL (ref 2.7–4.5)
PLATELET # BLD AUTO: 125 K/UL (ref 150–450)
PLATELET # BLD AUTO: 74 K/UL (ref 150–450)
PLATELET # BLD AUTO: 78 K/UL (ref 150–450)
PLATELET # BLD AUTO: 97 K/UL (ref 150–450)
PLATELET BLD QL SMEAR: ABNORMAL
PMV BLD AUTO: 10.2 FL (ref 9.2–12.9)
PMV BLD AUTO: 11.4 FL (ref 9.2–12.9)
PMV BLD AUTO: 11.6 FL (ref 9.2–12.9)
PMV BLD AUTO: 11.9 FL (ref 9.2–12.9)
POIKILOCYTOSIS BLD QL SMEAR: SLIGHT
POLYCHROMASIA BLD QL SMEAR: ABNORMAL
POTASSIUM SERPL-SCNC: 3.4 MMOL/L (ref 3.5–5.1)
PROT SERPL-MCNC: 6.5 G/DL (ref 6–8.4)
PROTHROMBIN TIME: 14.6 SEC (ref 9–12.5)
RBC # BLD AUTO: 2.95 M/UL (ref 4.6–6.2)
RBC # BLD AUTO: 3.03 M/UL (ref 4.6–6.2)
RBC # BLD AUTO: 3.09 M/UL (ref 4.6–6.2)
RBC # BLD AUTO: 3.22 M/UL (ref 4.6–6.2)
SODIUM SERPL-SCNC: 128 MMOL/L (ref 136–145)
SPECIMEN OUTDATE: NORMAL
TARGETS BLD QL SMEAR: ABNORMAL
TOXIC GRANULES BLD QL SMEAR: PRESENT
WBC # BLD AUTO: 4.01 K/UL (ref 3.9–12.7)
WBC # BLD AUTO: 4.33 K/UL (ref 3.9–12.7)
WBC # BLD AUTO: 4.67 K/UL (ref 3.9–12.7)
WBC # BLD AUTO: 5.09 K/UL (ref 3.9–12.7)

## 2023-10-27 PROCEDURE — 36415 COLL VENOUS BLD VENIPUNCTURE: CPT

## 2023-10-27 PROCEDURE — 97535 SELF CARE MNGMENT TRAINING: CPT

## 2023-10-27 PROCEDURE — 63600175 PHARM REV CODE 636 W HCPCS

## 2023-10-27 PROCEDURE — 25000003 PHARM REV CODE 250: Performed by: FAMILY MEDICINE

## 2023-10-27 PROCEDURE — 80053 COMPREHEN METABOLIC PANEL: CPT | Performed by: NURSE PRACTITIONER

## 2023-10-27 PROCEDURE — 84100 ASSAY OF PHOSPHORUS: CPT | Performed by: NURSE PRACTITIONER

## 2023-10-27 PROCEDURE — 97165 OT EVAL LOW COMPLEX 30 MIN: CPT

## 2023-10-27 PROCEDURE — 85025 COMPLETE CBC W/AUTO DIFF WBC: CPT | Mod: 91

## 2023-10-27 PROCEDURE — 86850 RBC ANTIBODY SCREEN: CPT | Performed by: NURSE PRACTITIONER

## 2023-10-27 PROCEDURE — 97112 NEUROMUSCULAR REEDUCATION: CPT

## 2023-10-27 PROCEDURE — 25000003 PHARM REV CODE 250

## 2023-10-27 PROCEDURE — 99233 PR SUBSEQUENT HOSPITAL CARE,LEVL III: ICD-10-PCS | Mod: GC,,, | Performed by: INTERNAL MEDICINE

## 2023-10-27 PROCEDURE — 63600175 PHARM REV CODE 636 W HCPCS: Performed by: STUDENT IN AN ORGANIZED HEALTH CARE EDUCATION/TRAINING PROGRAM

## 2023-10-27 PROCEDURE — 99233 SBSQ HOSP IP/OBS HIGH 50: CPT | Mod: GC,,, | Performed by: INTERNAL MEDICINE

## 2023-10-27 PROCEDURE — 20600001 HC STEP DOWN PRIVATE ROOM

## 2023-10-27 PROCEDURE — 83735 ASSAY OF MAGNESIUM: CPT | Performed by: NURSE PRACTITIONER

## 2023-10-27 PROCEDURE — 25000003 PHARM REV CODE 250: Performed by: STUDENT IN AN ORGANIZED HEALTH CARE EDUCATION/TRAINING PROGRAM

## 2023-10-27 PROCEDURE — 85610 PROTHROMBIN TIME: CPT | Performed by: FAMILY MEDICINE

## 2023-10-27 PROCEDURE — 97161 PT EVAL LOW COMPLEX 20 MIN: CPT

## 2023-10-27 RX ORDER — FUROSEMIDE 10 MG/ML
80 INJECTION INTRAMUSCULAR; INTRAVENOUS ONCE
Status: COMPLETED | OUTPATIENT
Start: 2023-10-27 | End: 2023-10-27

## 2023-10-27 RX ORDER — POLYETHYLENE GLYCOL 3350 17 G/17G
17 POWDER, FOR SOLUTION ORAL DAILY
Status: DISCONTINUED | OUTPATIENT
Start: 2023-10-27 | End: 2023-10-30 | Stop reason: HOSPADM

## 2023-10-27 RX ORDER — HEPARIN SODIUM 5000 [USP'U]/ML
5000 INJECTION, SOLUTION INTRAVENOUS; SUBCUTANEOUS EVERY 8 HOURS
Status: DISCONTINUED | OUTPATIENT
Start: 2023-10-27 | End: 2023-10-30 | Stop reason: HOSPADM

## 2023-10-27 RX ORDER — POTASSIUM CHLORIDE 7.45 MG/ML
10 INJECTION INTRAVENOUS
Status: COMPLETED | OUTPATIENT
Start: 2023-10-27 | End: 2023-10-27

## 2023-10-27 RX ORDER — SODIUM,POTASSIUM PHOSPHATES 280-250MG
2 POWDER IN PACKET (EA) ORAL
Status: COMPLETED | OUTPATIENT
Start: 2023-10-27 | End: 2023-10-27

## 2023-10-27 RX ORDER — TAMSULOSIN HYDROCHLORIDE 0.4 MG/1
0.4 CAPSULE ORAL DAILY
Status: DISCONTINUED | OUTPATIENT
Start: 2023-10-27 | End: 2023-10-30 | Stop reason: HOSPADM

## 2023-10-27 RX ORDER — POTASSIUM CHLORIDE 750 MG/1
30 CAPSULE, EXTENDED RELEASE ORAL ONCE
Status: COMPLETED | OUTPATIENT
Start: 2023-10-27 | End: 2023-10-27

## 2023-10-27 RX ADMIN — OXYCODONE HYDROCHLORIDE 5 MG: 5 TABLET ORAL at 03:10

## 2023-10-27 RX ADMIN — POTASSIUM CHLORIDE 10 MEQ: 7.46 INJECTION, SOLUTION INTRAVENOUS at 11:10

## 2023-10-27 RX ADMIN — HEPARIN SODIUM 5000 UNITS: 5000 INJECTION INTRAVENOUS; SUBCUTANEOUS at 09:10

## 2023-10-27 RX ADMIN — ESCITALOPRAM OXALATE 10 MG: 5 TABLET, FILM COATED ORAL at 08:10

## 2023-10-27 RX ADMIN — POTASSIUM CHLORIDE 10 MEQ: 7.46 INJECTION, SOLUTION INTRAVENOUS at 09:10

## 2023-10-27 RX ADMIN — FUROSEMIDE 80 MG: 10 INJECTION, SOLUTION INTRAVENOUS at 11:10

## 2023-10-27 RX ADMIN — POTASSIUM & SODIUM PHOSPHATES POWDER PACK 280-160-250 MG 2 PACKET: 280-160-250 PACK at 09:10

## 2023-10-27 RX ADMIN — POTASSIUM & SODIUM PHOSPHATES POWDER PACK 280-160-250 MG 2 PACKET: 280-160-250 PACK at 11:10

## 2023-10-27 RX ADMIN — LIDOCAINE 1 PATCH: 50 PATCH CUTANEOUS at 10:10

## 2023-10-27 RX ADMIN — POLYETHYLENE GLYCOL 3350 17 G: 17 POWDER, FOR SOLUTION ORAL at 11:10

## 2023-10-27 RX ADMIN — TAMSULOSIN HYDROCHLORIDE 0.4 MG: 0.4 CAPSULE ORAL at 10:10

## 2023-10-27 RX ADMIN — HEPARIN SODIUM 5000 UNITS: 5000 INJECTION INTRAVENOUS; SUBCUTANEOUS at 02:10

## 2023-10-27 RX ADMIN — OXYCODONE HYDROCHLORIDE 5 MG: 5 TABLET ORAL at 09:10

## 2023-10-27 RX ADMIN — MUPIROCIN: 20 OINTMENT TOPICAL at 09:10

## 2023-10-27 RX ADMIN — POTASSIUM CHLORIDE 10 MEQ: 7.46 INJECTION, SOLUTION INTRAVENOUS at 08:10

## 2023-10-27 RX ADMIN — POTASSIUM CHLORIDE 10 MEQ: 7.46 INJECTION, SOLUTION INTRAVENOUS at 12:10

## 2023-10-27 RX ADMIN — OXYCODONE HYDROCHLORIDE 5 MG: 5 TABLET ORAL at 01:10

## 2023-10-27 RX ADMIN — MUPIROCIN: 20 OINTMENT TOPICAL at 08:10

## 2023-10-27 RX ADMIN — POTASSIUM CHLORIDE 30 MEQ: 10 CAPSULE, COATED, EXTENDED RELEASE ORAL at 08:10

## 2023-10-27 RX ADMIN — PIPERACILLIN SODIUM AND TAZOBACTAM SODIUM 4.5 G: 4; .5 INJECTION, POWDER, FOR SOLUTION INTRAVENOUS at 03:10

## 2023-10-27 RX ADMIN — OXYCODONE HYDROCHLORIDE 5 MG: 5 TABLET ORAL at 07:10

## 2023-10-27 NOTE — PLAN OF CARE
Problem: Occupational Therapy  Goal: Occupational Therapy Goal  Description: Goals to be met by: 7 days 11/3/23      Patient will increase functional independence with ADLs by performing:    Pt to complete UE dressing with set-up  Pt to complete LE dressing with MIN A   Pt to complete toileting with SBA  Pt to complete standing g/h skills with SBA  Pt to complete t/f bed, chair and commode with SBA   Outcome: Ongoing, Progressing

## 2023-10-27 NOTE — PLAN OF CARE
Fransico Montalvo is a 68 y.o. male admitted to [facility] [place] on 10/24/2023 for Gastrointestinal hemorrhage with melena. Fransico Montalvo tolerated evaluation well today. Admitted with syncopal episodes, had a fall resulting in multiple L sided rib fractures which is his primary limiting factor in regards to activity. Recorded BP at [facility] orth[facility]atics with position changes today (see objective portion of note for specifics); overall BP was stable with position changes, patient with c/o minor dizziness during session but improved with time. He does require min (A) for all transitions due to unsteadiness; therapist providing R hand-held support, he wears a L wrist splint from a prior fracture (he estimates 10 weeks ago). He was able to take ~8 steps at bedside with therapist min A via R hand-held support, sitting up in bedside chair comfortable at end of session. Discussed PT role, POC, and goals with patient; verbalized understanding. Fransico Montalvo would benefit from acute PT services to promote mobility during this admission and improve return to PLOF.    Problem: Physical Therapy  Goal: Physical Therapy Goal  Description: Goals to be met by: 11/10/23     Patient will increase functional independence with mobility by performin. Supine to sit with Stand-by Assistance - Not met  2. Sit to stand transfer with Stand-by Assistance - Not met  3. Bed to chair transfer with Stand-by Assistance using No Assistive Device - Not met  4. Gait  x 100 feet with Stand-by Assistance using R Single-point Cane - Not met  Outcome: Ongoing, Progressing    Yoan Graves, PT  10/27/2023

## 2023-10-27 NOTE — ASSESSMENT & PLAN NOTE
Troponin elevated likely secondary to demand ischemia from GI bleed with severe anemia.    - Troponin initially 4.6--> 4.1--> 3.5  - If has new chest pain will obtain STAT EKG and troponin

## 2023-10-27 NOTE — ASSESSMENT & PLAN NOTE
Creatinine 2.1 on admission, was 1.2 three months prior.  Estimated Creatinine Clearance: 35.5 mL/min (A) (based on SCr of 1.9 mg/dL (H)).  Suspect initially pre-renal from acute blood loss, now with potential ATN. He also has history of BPH on tamsulosin.    - furosemide 80mg IV  -CMP daily and trend  -avoid nephrotoxic agents  -avoid hypotension (MAP goal of greater than 65)  -monitor urine output  -renally dose all medications   -transfuse to keep hgb > 7

## 2023-10-27 NOTE — PT/OT/SLP EVAL
"Occupational Therapy  Co Evaluation    Name: Fransico Montalvo  MRN: [id number]  Admitting Diagnosis: Gastrointestinal hemorrhage with melena  Recent Surgery: Procedure(s) (LRB):  EGD (ESOPHAGOGASTRODUODENOSCOPY) (N/A) 2 Days Post-Op  Pt initially admitted from ED with syncope at home and then t/f to MICU with hemorraghic shock.   Pt with hx of EtOH abuse.    Recommendations:     Discharge Recommendations: Moderate Intensity Therapy    Assessment:     Fransico Montalvo is a 68 y.o. male with a medical diagnosis of Gastrointestinal hemorrhage with melena.     Performance deficits affecting function: weakness, impaired endurance, impaired self care skills, impaired functional mobility, gait instability, impaired balance, decreased safety awareness, pain.  Pt tolerated session well with good effort and performance.   Anticipate pt will benefit from moderate intensity therapy upon hospital d/c. Pt was independent at baseline and is not currently functioning at his baseline.     Rehab Prognosis: Good; patient would benefit from acute skilled OT services to address these deficits and reach maximum level of function.       Plan:     Patient to be seen 4 x/week to address the above listed problems via self-care/home management, therapeutic activities, therapeutic exercises  Plan of Care Expires:    Plan of Care Reviewed with: patient    Subjective     Pt agreeable to therapy.   Pt with urinary urgency 3 x during session.     Occupational Profile:  Pt reports he lives in a "rented room in a house" with roommates. The home has 4 SABA and L HR.   Pt reports 2 of the roommates could provide some physical assist if needed, but they both work.   Pt reports he is retired and was independent prior to arrival.   Pt has rollator and SPC (which he was using prior to arrival).  Pt has w/c and shower chair with grab bars.       Pain/Comfort:  Pain Rating 1: 10/10  Location - Side 1: Left  Location 1: flank  Pain Addressed 1: Reposition, " Distraction    Patients cultural, spiritual, Restorationism conflicts given the current situation: no    Objective:     Communicated with: nsg prior to session.  Patient found in bed with tele, pulse ox, BP cuff and IV and left wrist splint.   Coeval completed this date to optimize functional performance and safety given impaired tolerance for activity in setting of ICU.     General Precautions: Standard, fall    Occupational Performance:    Bed Mobility:    Supine>sit with MIN A     Functional Mobility/Transfers:  Sit>stand with MIN A       Activities of Daily Living:  Feeding: independent  G/H standing to wash face and hands with CGA.   LE dressing: MAX A marina footwear. Attempted variety of techniques, but he was unsuccessful.   Toileting: set-up seated for urinal use and CGA in stand.       Cognitive/Visual Perceptual  Pt alert, awake and following commands. Pt with some impulsive behavior noted with impaired safety.     Physical Exam:  Pt is right hand dominant and demo WFL UE strength/ROM, coordination and sensation. Pt with sudden onset of pain related to left rib fractures with some arm movement, but not consistent.   Pt also with left wrist splint in place since a fall 10 weeks ago. Pt reports no surgical intervention for this injury      AMPA 6 Click ADL:  AMPAC Total Score: 15    Treatment & Education:  Pt completed static and dynamic standing with CGA as he required one UE support to maintain balance. Pt completed few steps to chair with MIN A    Pt reports minimal dizziness initially upon sitting EOB. Symptoms resolved and BP monitored which remained stable.   Pt did require cues to stay on task and for safety during activity. Pt appeared to have some anxiety with activity, but responded appropriately to cues and redirection.   Education provided re: role of OT and safety with functional mobility/ADL skills.     Patient left up in chair with all lines intact, call button in reach, and nsg notified    GOALS:    Multidisciplinary Problems       Occupational Therapy Goals          Problem: Occupational Therapy    Goal Priority Disciplines Outcome Interventions   Occupational Therapy Goal     OT, PT/OT Ongoing, Progressing    Description: Goals to be met by: 7 days 11/3/23      Patient will increase functional independence with ADLs by performing:    Pt to complete UE dressing with set-up  Pt to complete LE dressing with MIN A   Pt to complete toileting with SBA  Pt to complete standing g/h skills with SBA  Pt to complete t/f bed, chair and commode with SBA                        History:     Past Medical History:   Diagnosis Date    Alcohol abuse     BPH (benign prostatic hyperplasia)     Cirrhosis 06/21/2018    pt states that he was diagnosed a week ago during his last hospital visit    Closed fracture of left distal radius 7/19/2023    Gastritis     Hypertension     Renal disorder          Past Surgical History:   Procedure Laterality Date    CAPSULOTOMY OF JOINT Right 7/29/2019    Procedure: CAPSULOTOMY, JOINT;  Surgeon: Raj Grossman MD;  Location: [address];  Service: Orthopedics;  Laterality: Right;    ESOPHAGOGASTRODUODENOSCOPY N/A 10/25/2023    Procedure: EGD (ESOPHAGOGASTRODUODENOSCOPY);  Surgeon: Lux Rome MD;  Location: [address]);  Service: Endoscopy;  Laterality: N/A;    PERCUTANEOUS PINNING OF HIP Right 7/29/2019    Procedure: PINNING, HIP, PERCUTANEOUS- synthes cannulated screws- hana table- C arm door side-;  Surgeon: Raj Grossman MD;  Location: [address];  Service: Orthopedics;  Laterality: Right;       Time Tracking:     OT Date of Treatment: 10/27/23  OT Start Time: 1123  OT Stop Time: 1148  OT Total Time (min): 25 min    Billable Minutes:Evaluation 15  Self Care/Home Management 10    10/27/2023

## 2023-10-27 NOTE — PT/OT/SLP EVAL
Physical Therapy  Co-Evaluation (OT) and Treatment    Fransico Montalvo   [id number]    Time Tracking:     PT Received On: 10/27/23   PT Start Time: 1125   PT Stop Time: 1149   PT Total Time (min): 24 min    Billable Minutes: Evaluation 14 and Neuromuscular Re-education 10  minutes    Recommendations:     Therapy Intensity Recommendations at Discharge: Moderate Intensity Therapy     Equipment Needed After Discharge: none    Barriers to Discharge: None    Patient Information:     Recent Surgery: Procedure(s) (LRB):  EGD (ESOPHAGOGASTRODUODENOSCOPY) (N/A) 2 Days Post-Op    Diagnosis: Gastrointestinal hemorrhage with melena    Length of Stay: 3 days    General Precautions: Standard, fall  Orthopedic Precautions: N/A  Brace: L wrist splint    Assessment:     Fransico Montalvo is a 68 y.o. male admitted to [facility] on 10/24/2023 for Gastrointestinal hemorrhage with melena. Fransico Montalvo tolerated evaluation well today. Admitted with syncopal episodes, had a fall resulting in multiple L sided rib fractures which is his primary limiting factor in regards to activity. Recorded BP at [facility] with position changes today (see objective portion of note for specifics); overall BP was stable with position changes, patient with c/o minor dizziness during session but improved with time. He does require min (A) for all transitions due to unsteadiness; therapist providing R hand-held support, he wears a L wrist splint from a prior fracture (he estimates 10 weeks ago). He was able to take ~8 steps at bedside with therapist min A via R hand-held support, sitting up in bedside chair comfortable at end of session. Discussed PT role, POC, and goals with patient; verbalized understanding. Fransico Montalvo would benefit from acute PT services to promote mobility during this admission and improve return to PLOF.    Problem List: weakness, impaired endurance, impaired self care skills, impaired functional mobility, gait instability, impaired balance, pain,  "decreased upper extremity function, decreased lower extremity function, impaired cardiopulmonary response to activity, decreased ROM    Rehab Prognosis: Good; patient would benefit from acute skilled PT services to address these deficits and reach maximum level of function.    Plan:     Patient to be seen 3 x/week to address the above listed problems via gait training, therapeutic activities, therapeutic exercises, neuromuscular re-education    Plan of Care Expires: 11/26/23  Plan of Care reviewed with: patient    Subjective:     Communicated with RN prior to evaluation, appropriate to see for evaluation.    Pt found supine in bed (HOB elevated) upon PT entry to room, agreeable to evaluation.    Patient commenting: "I really haven't moved much at all over the past 3 days."    Does this patient have any cultural, spiritual, Adventism conflicts given the current situation? Patient has no barriers to learning. Patient verbalizes understanding of his/her program and goals and demonstrates them correctly. No cultural, spiritual, or educational needs identified.    Past Medical History:   Diagnosis Date    Alcohol abuse     BPH (benign prostatic hyperplasia)     Cirrhosis 06/21/2018    pt states that he was diagnosed a week ago during his last hospital visit    Closed fracture of left distal radius 7/19/2023    Gastritis     Hypertension     Renal disorder       Past Surgical History:   Procedure Laterality Date    CAPSULOTOMY OF JOINT Right 7/29/2019    Procedure: CAPSULOTOMY, JOINT;  Surgeon: Raj Grossman MD;  Location: [facility] OR [address] [place] [address];  Service: Orthopedics;  Laterality: Right;    ESOPHAGOGASTRODUODENOSCOPY N/A 10/25/2023    Procedure: EGD (ESOPHAGOGASTRODUODENOSCOPY);  Surgeon: Lux Rome MD;  Location: [facility] ([address] [place] [address]);  Service: Endoscopy;  Laterality: N/A;    PERCUTANEOUS PINNING OF HIP Right 7/29/2019    Procedure: PINNING, HIP, PERCUTANEOUS- synthes cannulated screws- hana table- C arm door " side-;  Surgeon: Raj Grossman MD;  Location: [facility] OR [address] [place] [address];  Service: Orthopedics;  Laterality: Right;       Living Environment:  Pt lives with his 4 roommates (he rents out a bed/bathroom of a large house) in a 1  with 1 SABA.    PLOF:  Prior to admission, patient reports utilizing a straight cane (in R hand) for ambulation. Sustained a L wrist fracture ~10 weeks ago, wearing a L wrist splint throughout session today.    DME:  Patient owns or has access to the following DME: cane, straight    Upon discharge, patient will have assistance from roommates (reports 1-2 of them are typically available to support/assist).    Objective:     Patient found with: pulse ox (continuous), telemetry, blood pressure cuff, peripheral IV (L wrist splint)    Pain:  Pain Rating 1: 8/10  Location - Side 1: Left  Location - Orientation 1: generalized  Location 1: flank (ribs)  Pain Addressed 1: Reposition, Distraction  Pain Rating Post-Intervention 1: 8/10    Cognitive Exam:  Patient is oriented to Person, Place, Time, and Situation.  Patient follows 100% of single-step commands.    Sensation:   Intact at BLE to light touch    Lower Extremity Range of Motion:  Right Lower Extremity: WFL actively  Left Lower Extremity: WFL actively    Lower Extremity Strength:  Right Lower Extremity: grossly 4-/5 via MMT  Left Lower Extremity: grossly 4-/5 via MMT    Functional Mobility:    Bed Mobility:  Supine to Sitting: Min Assist via R hand-held support for trunk elevation    Transfers:  Sit to Stand: Min Assist from EOB with R Hand-held assist x 1 trial    Gait:  ~8 steps at bedside with therapist min A via R hand-held support, moderately unsteadiness on his feet. If therapist not providing hand-held support, confident patient would lose balance and experience a near-fall. Tolerates well with minor c/o dizziness, some c/o L rib pain when mobilizing    Assist level: Min Assist  Device: Hand-held assist x 1 (R)    Balance:  Static Sit:  Stand-By Assist at EOB    Static Stand: Contact-Guard Assist with no AD, pt's R hand on IV pole or table tray for support while standing (stood for 2-3 minutes attempting to void, holding urinal in his L hand)    Additional Therapeutic Activity/Exercises:     1. Admitted with syncopal episodes, had a fall resulting in multiple L sided rib fractures which is his primary limiting factor in regards to activity. Recorded BP at LUE orthostatics with position changes today (see below for specifics); overall BP was stable with position changes, patient with c/o minor dizziness during session but improved with time.   A. Supine BP at LUE: 122/76, HR 85 bpm   B. Seated BP at LUE: 115/71, HR 91 bpm   C. Standing BP at LUE: 111/68, HR 95 bpm   D. Seated BP (at end of session) at LUE: 160/93, HR 86 bpm    2. He does require min (A) for all transitions due to unsteadiness; therapist providing R hand-held support, he wears a L wrist splint from a prior fracture (he estimates 10 weeks ago). He was able to take ~8 steps at bedside with therapist min A via R hand-held support, sitting up in bedside chair comfortable at end of session.    3. Discussed PT role, POC, and goals with patient; verbalized understanding    AM-PAC 6 CLICK MOBILITY  Turning over in bed (including adjusting bedclothes, sheets and blankets)?: 3  Sitting down on and standing up from a chair with arms (e.g., wheelchair, bedside commode, etc.): 3  Moving from lying on back to sitting on the side of the bed?: 3  Moving to and from a bed to a chair (including a wheelchair)?: 3  Need to walk in hospital room?: 3  Climbing 3-5 steps with a railing?: 2  Basic Mobility Total Score: 17    Patient was left reclined in bedside chair with all lines intact, call button in reach, and RN notified.    Clinical Decision Making for Evaluation Complexity:  1. Body System(s) Examination: 1-2  2. Clinical Presentation: Evolving  3. Evaluation Complexity: Low    GOALS:    Multidisciplinary Problems       Physical Therapy Goals          Problem: Physical Therapy    Goal Priority Disciplines Outcome Goal Variances Interventions   Physical Therapy Goal     PT, PT/OT      Description: Goals to be met by: 11/10/23     Patient will increase functional independence with mobility by performin. Supine to sit with Stand-by Assistance - Not met  2. Sit to stand transfer with Stand-by Assistance - Not met  3. Bed to chair transfer with Stand-by Assistance using No Assistive Device - Not met  4. Gait  x 100 feet with Stand-by Assistance using R Single-point Cane - Not met                     Yoan Graves, PT  10/27/2023

## 2023-10-27 NOTE — NURSING
Received from [facility] via bed. AAOX4. VVS. Telemetry monitor. Urinal @bedside.  Call light in reach. Oriented to room.

## 2023-10-27 NOTE — PLAN OF CARE
Hospitalist chart review acceptance note    68-year-old male with h/o esophageal variceal banding in 2018 was admitted for symptomatic anemia (hgb 4.4). He received 3 units pRBCs and 1 unit FFP. CTA the night of admission negative for acute bleed. EGD neg while in the ICU. Was recommended outpatient colonoscopy for his GIB and an abdominal MRI for pancreas/liver/splenic lesions found on CTA. Course complicated by type 2 NSTEMI and [place]. We are administering lasix 80mg today as he had 1L UOP yesterday after lasix 40mg, and started tamsulosin for BPH.

## 2023-10-27 NOTE — PROGRESS NOTES
[facility] - Cardiac Medical ICU  Critical Care Medicine  Progress Note    Patient Name: Fransico Montalvo  MRN: [id number]  Admission Date: 10/24/2023  Hospital Length of Stay: 3 days  Code Status: Full Code  Attending Provider: Marisol Felix MD  Primary Care Provider: Jessica   Principal Problem: Gastrointestinal hemorrhage with melena    Subjective:     HPI:  Fransico Montalvo is a 68 year-old male with history of alcohol use disorder, esophageal varices with banding noted in 2018, BPH, cirrhosis, hypertension who presented to the emergency department with chief complaint of syncope, lightheadedness and rib pain. He states that he felt weak and lightheaded while walking, he fell to his left side, and struck the left side of his ribs on a nearby chair. He then fell to the ground. Did not hit his head or lose consciousness. He has been unable to walk since yesterday due to the dizziness. He has been crawling around his house since last night. Of note, he has had multiple syncopal episodes over the last few weeks.    In the ED patient tachycardic, hemodynamically stable, afebrile, saturating well on room air. Hb 4.4 down from 11 three months ago. Patient is unaware of if he has history of varices. Per chart review, has had an EGD with banding (March 2018). CXR with remote left 7/8/9 rib fractures and patient reporting signficant pain with palpitation, deep inspiration, and certain movements. His initial Trop 4.6 and follow up 4.1. Patient was started on IV Protonix, octreotide, ceftriaxone, and transfusing 2 units PRBC. Patient initially was admitted to internal medicine.    Rapid response called on the patient and was found to be hypotensive, MICU team consulted. Patient admitted to MICU for hemorrhagic shock.        Hospital/ICU Course:  68-year-old male with EtOH cirrhosis and prior esophageal variceal banding in 2018 was admitted for GI bleed in the setting of symptomatic anemia. Initial Hgb 4.4 and low MAPs requiring 2 units  pRBCs, 2L NS, and temporary peripheral vasopressors. GI was consulted and recommended EGD; however, later developed hypotension for which he was given additional 500cc bolus, 2 units pRBCs and bicarb gtt. Subsequently underwent EGD with unremarkable findings. Hgb currently stable with recommended outpatient colonoscopy. Experienced intermittent episodes of hypotension despite no signs of bleeding or infection.       Interval History/Significant Events: NAEO. Denies any BM while in the hospital.    Review of Systems   Constitutional:  Negative for chills and fever.   Respiratory:  Negative for shortness of breath.    Cardiovascular:  Positive for chest pain. Negative for leg swelling.   Gastrointestinal:  Negative for abdominal pain, diarrhea, nausea and vomiting.   Genitourinary:  Negative for difficulty urinating.   Neurological:  Positive for weakness. Negative for headaches.     Objective:     Vital Signs (Most Recent):  Temp: 98 °F (36.7 °C) (10/27/23 0405)  Pulse: 86 (10/27/23 0701)  Resp: 20 (10/27/23 0732)  BP: 109/84 (10/27/23 0701)  SpO2: 95 % (10/27/23 0701) Vital Signs (24h Range):  Temp:  [98 °F (36.7 °C)-98.4 °F (36.9 °C)] 98 °F (36.7 °C)  Pulse:  [47-93] 86  Resp:  [12-28] 20  SpO2:  [89 %-100 %] 95 %  BP: ()/(46-87) 109/84   Weight: 81.6 kg (180 lb)  Body mass index is 27.37 kg/m².      Intake/Output Summary (Last 24 hours) at 10/27/2023 0821  Last data filed at 10/27/2023 0605  Gross per 24 hour   Intake 1020.65 ml   Output 1075 ml   Net -54.35 ml          Physical Exam  Vitals and nursing note reviewed.   Constitutional:       General: He is not in acute distress.     Appearance: Normal appearance. He is not toxic-appearing.   HENT:      Head: Normocephalic and atraumatic.   Eyes:      Extraocular Movements: Extraocular movements intact.      Pupils: Pupils are equal, round, and reactive to light.   Cardiovascular:      Rate and Rhythm: Normal rate and regular rhythm.      Heart sounds: Heart  "sounds are distant.   Pulmonary:      Effort: Pulmonary effort is normal. No respiratory distress.      Breath sounds: Normal breath sounds.   Chest:      Chest wall: Tenderness present.   Abdominal:      General: Abdomen is flat.      Palpations: Abdomen is soft.      Tenderness: There is no abdominal tenderness.   Musculoskeletal:      Cervical back: Normal range of motion and neck supple.      Right lower leg: No edema.      Left lower leg: No edema.   Skin:     General: Skin is warm and dry.   Neurological:      General: No focal deficit present.      Mental Status: He is alert and oriented to person, place, and time.   Psychiatric:         Mood and Affect: Mood normal.         Behavior: Behavior normal.            Vents:     Lines/Drains/Airways       Peripheral Intravenous Line  Duration                  Peripheral IV - Single Lumen 10/24/23 1133 18 G Right Hand 2 days         Peripheral IV - Single Lumen 10/24/23 1654 18 G Left Antecubital 2 days         Peripheral IV - Single Lumen 10/25/23 0400 18 G Anterior;Right Forearm 2 days                  Significant Labs:    CBC/Anemia Profile:  Recent Labs   Lab 10/26/23  1835 10/27/23  0020 10/27/23  0354   WBC 5.22 5.09 4.33   HGB 8.9* 8.9* 8.7*   HCT 27.8* 27.7* 27.5*   PLT 45* 78* 125*   MCV 91 90 91   RDW 17.6* 17.6* 17.6*        Chemistries:  Recent Labs   Lab 10/25/23  1942 10/26/23  0313 10/27/23  0354   * 129* 128*   K 3.8 4.3 3.4*   CL 95 99 97   CO2 18* 17* 18*   BUN 40* 38* 34*   CREATININE 2.0* 2.0* 1.9*   CALCIUM 7.6* 8.2* 7.9*   ALBUMIN 2.9* 3.2* 3.0*   PROT 6.3 6.9 6.5   BILITOT 2.8* 2.0* 1.3*   ALKPHOS 64 65 68   ALT 12 17 13   AST 40 50* 31   MG 1.5* 2.5 1.8   PHOS 3.2 3.1 2.4*       All pertinent labs within the past 24 hours have been reviewed.    Significant Imaging:  I have reviewed all pertinent imaging results/findings within the past 24 hours.      ABG  No results for input(s): "PH", "PO2", "PCO2", "HCO3", "BE" in the last 168 " hours.  Assessment/Plan:     Psychiatric  Anxiety and depression  - Continue home escitalopram 10 mg     Cardiac/Vascular  Essential hypertension  - HOLD all anti-hypertensive medications in setting of GI bleed and hypotension  - Intermittently requiring pressor support, now stable off pressors    Elevated troponin  Troponin elevated likely secondary to demand ischemia from GI bleed with severe anemia.    - Troponin initially 4.6--> 4.1--> 3.5  - If has new chest pain will obtain STAT EKG and troponin     Renal/  ISRAEL (acute kidney injury)  Creatinine 2.1 on admission, was 1.2 three months prior.  Estimated Creatinine Clearance: 35.5 mL/min (A) (based on SCr of 1.9 mg/dL (H)).  Suspect initially pre-renal from acute blood loss, now with potential ATN. He also has history of BPH on tamsulosin.    - furosemide 80mg IV  -CMP daily and trend  -avoid nephrotoxic agents  -avoid hypotension (MAP goal of greater than 65)  -monitor urine output  -renally dose all medications   -transfuse to keep hgb > 7    BPH (benign prostatic hyperplasia)  - tamsulosin 0.4mg daily    Hematology  On deep vein thrombosis (DVT) prophylaxis  - heparin    Thrombocytopenia  Due to underlying cirrhosis, has been noted in previous documentation as far back as 2018.    - CBC daily  - transfuse for platelets less than 10K, or 50k if actively bleeding    GI  * Gastrointestinal hemorrhage with melena  68 year-old male presenting with four days of vomiting/nausea and black stools. On admission his Hgb was 4.4 (baseline appears around 11 from three months ago). Patient with pRBC transfusion that was started in ED. Patient initially admitted to hospital medicine however developed hypotension while on the floor so will admit to MICU service. Suspect upper GI bleed more likely given history of dark stools. Concern for possible esophageal variceal bleed given ETOH history. DDX esophageal varicies, peptic ulcer bleed, diverticulosis, AVM.    Underwent EGD  with unremarkable findings.    - GI recommending outpatient colonoscopy      Therapeutic opioid induced constipation  - daily miralax    Pancreatic, splenic and liver lesion  Lesions noted on CTA obtained during hypotensive episodes    - Outpatient abdominal MRI  - Outpatient GI evaluation     Alcoholic cirrhosis  Patient with history of alcohol use disorder and cirrhosis, recently undergoing detox. Reports decrease in ETOH intake.    MELD 3.0: 25 at 10/27/2023  3:54 AM  MELD-Na: 24 at 10/27/2023  3:54 AM  Calculated from:  Serum Creatinine: 1.9 mg/dL at 10/27/2023  3:54 AM  Serum Sodium: 128 mmol/L at 10/27/2023  3:54 AM  Total Bilirubin: 1.3 mg/dL at 10/27/2023  3:54 AM  Serum Albumin: 3.0 g/dL at 10/27/2023  3:54 AM  INR(ratio): 1.4 at 10/27/2023  3:54 AM  Age at listing (hypothetical): 68 years  Sex: Male at 10/27/2023  3:54 AM    - No concerns for withdrawal at this time    Orthopedic  Closed fracture of multiple ribs of left side with routine healing  Patient with recent fall. Chest x ray results reviewed from this admission    - Remote rib fractures of left 7/8/9th ribs  - symptomatic management  - Multimodal pain control    Other  Syncope  Patient reportedly with syncopal episode 10 days ago that was witnessed by his neighbor. Patient has felt symptoms of lightheadedness, dizziness over last several days. Suspect this is related to symptomatic anemia.     Critical Care Daily Checklist:    A: Awake: RASS Goal/Actual Goal: RASS Goal: 0-->alert and calm  Actual:     B: Spontaneous Breathing Trial Performed?     C: SAT & SBT Coordinated?  n/a                      D: Delirium: CAM-ICU Overall CAM-ICU: Negative   E: Early Mobility Performed? No   F: Feeding Goal:    Status:     Current Diet Order   Procedures    Diet Adult Regular (IDDSI Level 7)      AS: Analgesia/Sedation Oxycodone prn for pain   T: Thromboembolic Prophylaxis heparin   H: HOB > 300 No   U: Stress Ulcer Prophylaxis (if needed) no   G: Glucose  Control LDSSI   B: Bowel Function     I: Indwelling Catheter (Lines & Sauer) Necessity none   D: De-escalation of Antimicrobials/Pharmacotherapies Zosyn d/c 10/26-10/27/23    Plan for the day/ETD Stepdown out of MICU    Code Status:  Family/Goals of Care: Full Code  ongoing       Critical secondary to Patient has a condition that poses threat to life and bodily function: hypotension and anemia      Critical care was time spent personally by me on the following activities: development of treatment plan with patient or surrogate and bedside caregivers, discussions with consultants, evaluation of patient's response to treatment, examination of patient, ordering and performing treatments and interventions, ordering and review of laboratory studies, ordering and review of radiographic studies, pulse oximetry, re-evaluation of patient's condition. This critical care time did not overlap with that of any other provider or involve time for any procedures.     Penny Mata, DO  Critical Care Medicine  [facility] - Cardiac Medical ICU

## 2023-10-27 NOTE — PLAN OF CARE
Problem: Adult Inpatient Plan of Care  Goal: Plan of Care Review  Outcome: Ongoing, Progressing  Goal: Patient-Specific Goal (Individualized)  Outcome: Ongoing, Progressing  Goal: Absence of Hospital-Acquired Illness or Injury  Outcome: Ongoing, Progressing  Goal: Optimal Comfort and Wellbeing  Outcome: Ongoing, Progressing  Goal: Readiness for Transition of Care  Outcome: Ongoing, Progressing     Problem: Adjustment to Illness (Gastrointestinal Bleeding)  Goal: Optimal Coping with Acute Illness  Outcome: Ongoing, Progressing     Problem: Bleeding (Gastrointestinal Bleeding)  Goal: Hemostasis  Outcome: Ongoing, Progressing     Problem: Fluid and Electrolyte Imbalance (Acute Kidney Injury/Impairment)  Goal: Fluid and Electrolyte Balance  Outcome: Ongoing, Progressing     Problem: Renal Function Impairment (Acute Kidney Injury/Impairment)  Goal: Effective Renal Function  Outcome: Ongoing, Progressing     Problem: Fall Injury Risk  Goal: Absence of Fall and Fall-Related Injury  Outcome: Ongoing, Progressing  Intervention: Promote Injury-Free Environment  Flowsheets (Taken 10/27/2023 7336)  Safety Promotion/Fall Prevention:   assistive device/personal item within reach   Fall Risk reviewed with patient/family   Fall Risk signage in place   nonskid shoes/socks when out of bed   instructed to call staff for mobility     POC reviewed. Address questions and concerns. AAOX4. VVS. No acute changes. Call light in reach. Bed in lowest position. Bed alarm.

## 2023-10-27 NOTE — SUBJECTIVE & OBJECTIVE
Interval History/Significant Events: NAEO. Denies any BM while in the hospital.    Review of Systems   Constitutional:  Negative for chills and fever.   Respiratory:  Negative for shortness of breath.    Cardiovascular:  Positive for chest pain. Negative for leg swelling.   Gastrointestinal:  Negative for abdominal pain, diarrhea, nausea and vomiting.   Genitourinary:  Negative for difficulty urinating.   Neurological:  Positive for weakness. Negative for headaches.     Objective:     Vital Signs (Most Recent):  Temp: 98 °F (36.7 °C) (10/27/23 0405)  Pulse: 86 (10/27/23 0701)  Resp: 20 (10/27/23 0732)  BP: 109/84 (10/27/23 0701)  SpO2: 95 % (10/27/23 0701) Vital Signs (24h Range):  Temp:  [98 °F (36.7 °C)-98.4 °F (36.9 °C)] 98 °F (36.7 °C)  Pulse:  [47-93] 86  Resp:  [12-28] 20  SpO2:  [89 %-100 %] 95 %  BP: ()/(46-87) 109/84   Weight: 81.6 kg (180 lb)  Body mass index is 27.37 kg/m².      Intake/Output Summary (Last 24 hours) at 10/27/2023 0821  Last data filed at 10/27/2023 0605  Gross per 24 hour   Intake 1020.65 ml   Output 1075 ml   Net -54.35 ml          Physical Exam  Vitals and nursing note reviewed.   Constitutional:       General: He is not in acute distress.     Appearance: Normal appearance. He is not toxic-appearing.   HENT:      Head: Normocephalic and atraumatic.   Eyes:      Extraocular Movements: Extraocular movements intact.      Pupils: Pupils are equal, round, and reactive to light.   Cardiovascular:      Rate and Rhythm: Normal rate and regular rhythm.      Heart sounds: Heart sounds are distant.   Pulmonary:      Effort: Pulmonary effort is normal. No respiratory distress.      Breath sounds: Normal breath sounds.   Chest:      Chest wall: Tenderness present.   Abdominal:      General: Abdomen is flat.      Palpations: Abdomen is soft.      Tenderness: There is no abdominal tenderness.   Musculoskeletal:      Cervical back: Normal range of motion and neck supple.      Right lower leg: No  edema.      Left lower leg: No edema.   Skin:     General: Skin is warm and dry.   Neurological:      General: No focal deficit present.      Mental Status: He is alert and oriented to person, place, and time.   Psychiatric:         Mood and Affect: Mood normal.         Behavior: Behavior normal.            Vents:     Lines/Drains/Airways       Peripheral Intravenous Line  Duration                  Peripheral IV - Single Lumen 10/24/23 1133 18 G Right Hand 2 days         Peripheral IV - Single Lumen 10/24/23 1654 18 G Left Antecubital 2 days         Peripheral IV - Single Lumen 10/25/23 0400 18 G Anterior;Right Forearm 2 days                  Significant Labs:    CBC/Anemia Profile:  Recent Labs   Lab 10/26/23  1835 10/27/23  0020 10/27/23  0354   WBC 5.22 5.09 4.33   HGB 8.9* 8.9* 8.7*   HCT 27.8* 27.7* 27.5*   PLT 45* 78* 125*   MCV 91 90 91   RDW 17.6* 17.6* 17.6*        Chemistries:  Recent Labs   Lab 10/25/23  1942 10/26/23  0313 10/27/23  0354   * 129* 128*   K 3.8 4.3 3.4*   CL 95 99 97   CO2 18* 17* 18*   BUN 40* 38* 34*   CREATININE 2.0* 2.0* 1.9*   CALCIUM 7.6* 8.2* 7.9*   ALBUMIN 2.9* 3.2* 3.0*   PROT 6.3 6.9 6.5   BILITOT 2.8* 2.0* 1.3*   ALKPHOS 64 65 68   ALT 12 17 13   AST 40 50* 31   MG 1.5* 2.5 1.8   PHOS 3.2 3.1 2.4*       All pertinent labs within the past 24 hours have been reviewed.    Significant Imaging:  I have reviewed all pertinent imaging results/findings within the past 24 hours.

## 2023-10-27 NOTE — ASSESSMENT & PLAN NOTE
Patient with history of alcohol use disorder and cirrhosis, recently undergoing detox. Reports decrease in ETOH intake.    MELD 3.0: 25 at 10/27/2023  3:54 AM  MELD-Na: 24 at 10/27/2023  3:54 AM  Calculated from:  Serum Creatinine: 1.9 mg/dL at 10/27/2023  3:54 AM  Serum Sodium: 128 mmol/L at 10/27/2023  3:54 AM  Total Bilirubin: 1.3 mg/dL at 10/27/2023  3:54 AM  Serum Albumin: 3.0 g/dL at 10/27/2023  3:54 AM  INR(ratio): 1.4 at 10/27/2023  3:54 AM  Age at listing (hypothetical): 68 years  Sex: Male at 10/27/2023  3:54 AM    - No concerns for withdrawal at this time   Diabetes

## 2023-10-27 NOTE — ASSESSMENT & PLAN NOTE
- HOLD all anti-hypertensive medications in setting of GI bleed and hypotension  - Intermittently requiring pressor support, now stable off pressors

## 2023-10-27 NOTE — PLAN OF CARE
Handoff     Primary Team: Networked reference to record PCT  Room Number: 6065/6065 A     Patient Name: Fransico Montalvo MRN: [id number]     Date of Birth: [id number] Allergies: Patient has no known allergies.     Age: 68 y.o. Admit Date: 10/24/2023     Sex: male  BMI: Body mass index is 27.37 kg/m².     Code Status: Full Code        Illness Level (current clinical status): Watcher - No    Reason for Admission: Gastrointestinal hemorrhage with melena    Brief HPI (pertinent PMH and diagnosis or differential diagnosis): Fransico Montalvo is a 68 year-old male with history of alcohol use disorder, esophageal varices with banding noted in 2018, BPH, cirrhosis, hypertension who presented to the emergency department with chief complaint of syncope, lightheadedness and rib pain. He states that he felt weak and lightheaded while walking, he fell to his left side, and struck the left side of his ribs on a nearby chair. He then fell to the ground. Did not hit his head or lose consciousness. He has been unable to walk since yesterday due to the dizziness. He has been crawling around his house since last night. Of note, he has had multiple syncopal episodes over the last few weeks.     In the ED patient tachycardic, hemodynamically stable, afebrile, saturating well on room air. Hb 4.4 down from 11 three months ago. Patient is unaware of if he has history of varices. Per chart review, has had an EGD with banding (March 2018). CXR with remote left 7/8/9 rib fractures and patient reporting signficant pain with palpitation, deep inspiration, and certain movements. His initial Trop 4.6 and follow up 4.1. Patient was started on IV Protonix, octreotide, ceftriaxone, and transfusing 2 units PRBC. Patient initially was admitted to internal medicine.     Rapid response called on the patient and was found to be hypotensive, MICU team consulted. Patient admitted to MICU for hemorrhagic shock.         Procedure Date: EGD 10/25/23 was normal without cause  for GIB found.    Hospital Course (updated, brief assessment by system or problem, significant events): 68-year-old male with EtOH cirrhosis and prior esophageal variceal banding in 2018 was admitted for GI bleed in the setting of symptomatic anemia. Initial Hgb 4.4 and low MAPs requiring 2 units pRBCs, 2L NS, and temporary peripheral vasopressors. GI was consulted and recommended EGD; however, later developed hypotension for which he was given additional 500cc bolus, 2 units pRBCs and bicarb gtt. Subsequently underwent EGD with unremarkable findings. Hgb currently stable with recommended outpatient colonoscopy. Intermittent episodes of hypotension despite no signs of bleeding; stable off pressors overnight on 10/27/23. Course complicated by ISRAEL with low UPO, received furosemide 40mg on 10/26 with 1.15L UPO and receiving furosemide 80mg and tamsulosin on 10/27/23.    Tasks (specific, using if-then statements): Monitor for resolution of ISRAEL, Cr 1.9 up from baseline 1.4. Likely pre-renal -> ATN. Monitor UOP and obtain renal U/S if no improvement; additional fluids if starts making significant urine. Monitor Hgb and transfuse if <7 or with bleeding.    Contingency Plan (special circumstances anticipated and plan): if acutely decompensates, assess for acute bleed (CBC, lactate, CTA).    Estimated Discharge Date: 10/28/23    Discharge Disposition: Home-Health Care c    Mentored By: Dr. Felix

## 2023-10-27 NOTE — NURSING TRANSFER
Nursing Transfer Note      10/27/2023   4:08 PM    Nurse giving handoff:Coby/JYOTI  Nurse receiving handoff:Froilan    Reason patient is being transferred: Stepdown orders    Transfer To: [id number]    Transfer via bed    Transfer with cardiac monitoring    Transported by Coby/JYOTI/Ana PCT    Transfer Vital Signs:  Blood Pressure:126/73  Heart Rate:82  O2:94 R/A  Temperature:98.6  Respirations:20    Telemetry: Box Number [id number]  Order for Tele Monitor? Yes    Additional Lines:     4eyes on Skin: yes    Medicines sent: No    Any special needs or follow-up needed:     Patient belongings transferred with patient: Yes    Chart send with patient: Yes    Notified: N/A    Patient reassessed at: 10/27/23 @15:55  1  Upon arrival to floor: patient oriented to room, call bell in reach, and bed in lowest position

## 2023-10-27 NOTE — PLAN OF CARE
Problem: Adult Inpatient Plan of Care  Goal: Plan of Care Review  Outcome: Ongoing, Progressing     MICU DAILY GOALS     Family/Goals of care/Code Status   Code Status: Full Code    24H Vital Sign Range  Temp:  [97.8 °F (36.6 °C)-98.4 °F (36.9 °C)]   Pulse:  [47-93]   Resp:  [15-29]   BP: ()/(42-87)   SpO2:  [87 %-97 %]      Shift Events   No acute events throughout shift    AWAKE RASS: Goal - RASS Goal: 0-->alert and calm  Actual - RASS (Higgins Agitation-Sedation Scale): alert and calm    Restraint necessity: Not necessary   BREATHE SBT: Not intubated    Coordinate A & B, analgesics/sedatives Pain: managed   SAT: Not intubated   Delirium CAM-ICU: Overall CAM-ICU: Negative   Early(intubated/ Progressive (non-intubated) Mobility MOVE Screen (INTUBATED ONLY): Not intubated    Activity: Activity Management: Arm raise - L1, Leg kicks - L2   Feeding/Nutrition Diet order: Diet/Nutrition Received: clear liquid,     Thrombus DVT prophylaxis: VTE Required Core Measure: Per order contraindicated for SCDs/Anticoagulants   HOB Elevation Head of Bed (HOB) Positioning: HOB at 30 degrees   Ulcer Prophylaxis GI: yes   Glucose control managed     Skin Skin assessed during: Daily Assessment    Sacrum intact? No  Heels intact? Yes  Surgical wound? No    [] No Altered Skin Integrity Present    []Prevention Measures Documented    [] Altered Skin Integrity Present or Discovered   [] LDA present /added in EPIC   [] Wound Image Taken     Wound Care Consulted? No    Attending Nurse:  Gayle/RN    Second RN/Staff Member:     Bowel Function No BM today     Indwelling Catheter Necessity         No   De-escalation Antibiotics Yes       VS and assessment per flow sheet, patient progressing towards goals as tolerated, plan of care reviewed with Fransico Montalvo, all concerns addressed, will continue to monitor.

## 2023-10-28 LAB
ALBUMIN SERPL BCP-MCNC: 3 G/DL (ref 3.5–5.2)
ALP SERPL-CCNC: 66 U/L (ref 55–135)
ALT SERPL W/O P-5'-P-CCNC: 11 U/L (ref 10–44)
ANION GAP SERPL CALC-SCNC: 14 MMOL/L (ref 8–16)
AST SERPL-CCNC: 23 U/L (ref 10–40)
BASOPHILS # BLD AUTO: 0.01 K/UL (ref 0–0.2)
BASOPHILS # BLD AUTO: 0.01 K/UL (ref 0–0.2)
BASOPHILS # BLD AUTO: 0.02 K/UL (ref 0–0.2)
BASOPHILS # BLD AUTO: 0.02 K/UL (ref 0–0.2)
BASOPHILS NFR BLD: 0.3 % (ref 0–1.9)
BASOPHILS NFR BLD: 0.3 % (ref 0–1.9)
BASOPHILS NFR BLD: 0.6 % (ref 0–1.9)
BASOPHILS NFR BLD: 0.6 % (ref 0–1.9)
BILIRUB SERPL-MCNC: 1.3 MG/DL (ref 0.1–1)
BLD PROD TYP BPU: NORMAL
BLOOD UNIT EXPIRATION DATE: NORMAL
BLOOD UNIT TYPE CODE: 5100
BLOOD UNIT TYPE: NORMAL
BUN SERPL-MCNC: 29 MG/DL (ref 8–23)
CALCIUM SERPL-MCNC: 8.1 MG/DL (ref 8.7–10.5)
CHLORIDE SERPL-SCNC: 96 MMOL/L (ref 95–110)
CO2 SERPL-SCNC: 22 MMOL/L (ref 23–29)
CODING SYSTEM: NORMAL
CREAT SERPL-MCNC: 1.8 MG/DL (ref 0.5–1.4)
CROSSMATCH INTERPRETATION: NORMAL
DIFFERENTIAL METHOD: ABNORMAL
DISPENSE STATUS: NORMAL
EOSINOPHIL # BLD AUTO: 0 K/UL (ref 0–0.5)
EOSINOPHIL # BLD AUTO: 0.1 K/UL (ref 0–0.5)
EOSINOPHIL NFR BLD: 0.6 % (ref 0–8)
EOSINOPHIL NFR BLD: 0.7 % (ref 0–8)
EOSINOPHIL NFR BLD: 0.9 % (ref 0–8)
EOSINOPHIL NFR BLD: 1.3 % (ref 0–8)
ERYTHROCYTE [DISTWIDTH] IN BLOOD BY AUTOMATED COUNT: 18.4 % (ref 11.5–14.5)
ERYTHROCYTE [DISTWIDTH] IN BLOOD BY AUTOMATED COUNT: 18.6 % (ref 11.5–14.5)
ERYTHROCYTE [DISTWIDTH] IN BLOOD BY AUTOMATED COUNT: 19 % (ref 11.5–14.5)
ERYTHROCYTE [DISTWIDTH] IN BLOOD BY AUTOMATED COUNT: 19.1 % (ref 11.5–14.5)
EST. GFR  (NO RACE VARIABLE): 40.2 ML/MIN/1.73 M^2
GLUCOSE SERPL-MCNC: 95 MG/DL (ref 70–110)
HCT VFR BLD AUTO: 28.2 % (ref 40–54)
HCT VFR BLD AUTO: 28.3 % (ref 40–54)
HCT VFR BLD AUTO: 28.5 % (ref 40–54)
HCT VFR BLD AUTO: 28.6 % (ref 40–54)
HGB BLD-MCNC: 8.7 G/DL (ref 14–18)
HGB BLD-MCNC: 9 G/DL (ref 14–18)
HGB BLD-MCNC: 9 G/DL (ref 14–18)
HGB BLD-MCNC: 9.1 G/DL (ref 14–18)
IMM GRANULOCYTES # BLD AUTO: 0.01 K/UL (ref 0–0.04)
IMM GRANULOCYTES # BLD AUTO: 0.01 K/UL (ref 0–0.04)
IMM GRANULOCYTES # BLD AUTO: 0.02 K/UL (ref 0–0.04)
IMM GRANULOCYTES # BLD AUTO: 0.03 K/UL (ref 0–0.04)
IMM GRANULOCYTES NFR BLD AUTO: 0.3 % (ref 0–0.5)
IMM GRANULOCYTES NFR BLD AUTO: 0.3 % (ref 0–0.5)
IMM GRANULOCYTES NFR BLD AUTO: 0.6 % (ref 0–0.5)
IMM GRANULOCYTES NFR BLD AUTO: 0.8 % (ref 0–0.5)
INR PPP: 1.2 (ref 0.8–1.2)
LYMPHOCYTES # BLD AUTO: 0.4 K/UL (ref 1–4.8)
LYMPHOCYTES # BLD AUTO: 0.6 K/UL (ref 1–4.8)
LYMPHOCYTES # BLD AUTO: 0.6 K/UL (ref 1–4.8)
LYMPHOCYTES # BLD AUTO: 0.7 K/UL (ref 1–4.8)
LYMPHOCYTES NFR BLD: 13 % (ref 18–48)
LYMPHOCYTES NFR BLD: 17.9 % (ref 18–48)
LYMPHOCYTES NFR BLD: 18 % (ref 18–48)
LYMPHOCYTES NFR BLD: 20.2 % (ref 18–48)
MAGNESIUM SERPL-MCNC: 1.6 MG/DL (ref 1.6–2.6)
MCH RBC QN AUTO: 28.3 PG (ref 27–31)
MCH RBC QN AUTO: 29.1 PG (ref 27–31)
MCH RBC QN AUTO: 29.5 PG (ref 27–31)
MCH RBC QN AUTO: 29.5 PG (ref 27–31)
MCHC RBC AUTO-ENTMCNC: 30.5 G/DL (ref 32–36)
MCHC RBC AUTO-ENTMCNC: 31.8 G/DL (ref 32–36)
MCHC RBC AUTO-ENTMCNC: 31.8 G/DL (ref 32–36)
MCHC RBC AUTO-ENTMCNC: 31.9 G/DL (ref 32–36)
MCV RBC AUTO: 91 FL (ref 82–98)
MCV RBC AUTO: 93 FL (ref 82–98)
MONOCYTES # BLD AUTO: 0.6 K/UL (ref 0.3–1)
MONOCYTES # BLD AUTO: 0.7 K/UL (ref 0.3–1)
MONOCYTES NFR BLD: 17.6 % (ref 4–15)
MONOCYTES NFR BLD: 18.2 % (ref 4–15)
MONOCYTES NFR BLD: 18.7 % (ref 4–15)
MONOCYTES NFR BLD: 19 % (ref 4–15)
NEUTROPHILS # BLD AUTO: 1.8 K/UL (ref 1.8–7.7)
NEUTROPHILS # BLD AUTO: 2 K/UL (ref 1.8–7.7)
NEUTROPHILS # BLD AUTO: 2.1 K/UL (ref 1.8–7.7)
NEUTROPHILS # BLD AUTO: 2.3 K/UL (ref 1.8–7.7)
NEUTROPHILS NFR BLD: 60.3 % (ref 38–73)
NEUTROPHILS NFR BLD: 60.6 % (ref 38–73)
NEUTROPHILS NFR BLD: 63 % (ref 38–73)
NEUTROPHILS NFR BLD: 66.2 % (ref 38–73)
NRBC BLD-RTO: 0 /100 WBC
NUM UNITS TRANS PACKED RBC: NORMAL
PHOSPHATE SERPL-MCNC: 2.8 MG/DL (ref 2.7–4.5)
PLATELET # BLD AUTO: 110 K/UL (ref 150–450)
PLATELET # BLD AUTO: 142 K/UL (ref 150–450)
PLATELET # BLD AUTO: 66 K/UL (ref 150–450)
PLATELET # BLD AUTO: 94 K/UL (ref 150–450)
PMV BLD AUTO: 10.4 FL (ref 9.2–12.9)
PMV BLD AUTO: 10.9 FL (ref 9.2–12.9)
PMV BLD AUTO: 11.1 FL (ref 9.2–12.9)
PMV BLD AUTO: 11.6 FL (ref 9.2–12.9)
POTASSIUM SERPL-SCNC: 3.3 MMOL/L (ref 3.5–5.1)
PROT SERPL-MCNC: 6.6 G/DL (ref 6–8.4)
PROTHROMBIN TIME: 12.5 SEC (ref 9–12.5)
RBC # BLD AUTO: 3.05 M/UL (ref 4.6–6.2)
RBC # BLD AUTO: 3.07 M/UL (ref 4.6–6.2)
RBC # BLD AUTO: 3.08 M/UL (ref 4.6–6.2)
RBC # BLD AUTO: 3.09 M/UL (ref 4.6–6.2)
SODIUM SERPL-SCNC: 132 MMOL/L (ref 136–145)
WBC # BLD AUTO: 3.02 K/UL (ref 3.9–12.7)
WBC # BLD AUTO: 3.16 K/UL (ref 3.9–12.7)
WBC # BLD AUTO: 3.24 K/UL (ref 3.9–12.7)
WBC # BLD AUTO: 3.73 K/UL (ref 3.9–12.7)

## 2023-10-28 PROCEDURE — 99232 SBSQ HOSP IP/OBS MODERATE 35: CPT | Mod: 95,,, | Performed by: HOSPITALIST

## 2023-10-28 PROCEDURE — 36415 COLL VENOUS BLD VENIPUNCTURE: CPT | Performed by: NURSE PRACTITIONER

## 2023-10-28 PROCEDURE — 63600175 PHARM REV CODE 636 W HCPCS

## 2023-10-28 PROCEDURE — 99232 PR SUBSEQUENT HOSPITAL CARE,LEVL II: ICD-10-PCS | Mod: 95,,, | Performed by: HOSPITALIST

## 2023-10-28 PROCEDURE — 25000003 PHARM REV CODE 250: Performed by: HOSPITALIST

## 2023-10-28 PROCEDURE — 85025 COMPLETE CBC W/AUTO DIFF WBC: CPT | Mod: 91

## 2023-10-28 PROCEDURE — 25000003 PHARM REV CODE 250

## 2023-10-28 PROCEDURE — 84100 ASSAY OF PHOSPHORUS: CPT | Performed by: NURSE PRACTITIONER

## 2023-10-28 PROCEDURE — 85610 PROTHROMBIN TIME: CPT | Performed by: FAMILY MEDICINE

## 2023-10-28 PROCEDURE — 20600001 HC STEP DOWN PRIVATE ROOM

## 2023-10-28 PROCEDURE — 80053 COMPREHEN METABOLIC PANEL: CPT | Performed by: FAMILY MEDICINE

## 2023-10-28 PROCEDURE — 36415 COLL VENOUS BLD VENIPUNCTURE: CPT

## 2023-10-28 PROCEDURE — 25000003 PHARM REV CODE 250: Performed by: FAMILY MEDICINE

## 2023-10-28 PROCEDURE — 83735 ASSAY OF MAGNESIUM: CPT | Performed by: NURSE PRACTITIONER

## 2023-10-28 PROCEDURE — 25000003 PHARM REV CODE 250: Performed by: STUDENT IN AN ORGANIZED HEALTH CARE EDUCATION/TRAINING PROGRAM

## 2023-10-28 RX ORDER — LANOLIN ALCOHOL/MO/W.PET/CERES
400 CREAM (GRAM) TOPICAL 2 TIMES DAILY
Status: DISCONTINUED | OUTPATIENT
Start: 2023-10-28 | End: 2023-10-30 | Stop reason: HOSPADM

## 2023-10-28 RX ORDER — SODIUM CHLORIDE 0.9 % (FLUSH) 0.9 %
10 SYRINGE (ML) INJECTION
Status: DISCONTINUED | OUTPATIENT
Start: 2023-10-28 | End: 2023-10-28

## 2023-10-28 RX ORDER — POTASSIUM CHLORIDE 20 MEQ/1
40 TABLET, EXTENDED RELEASE ORAL ONCE
Status: COMPLETED | OUTPATIENT
Start: 2023-10-28 | End: 2023-10-28

## 2023-10-28 RX ORDER — MAGNESIUM SULFATE HEPTAHYDRATE 40 MG/ML
2 INJECTION, SOLUTION INTRAVENOUS ONCE
Status: DISCONTINUED | OUTPATIENT
Start: 2023-10-28 | End: 2023-10-29

## 2023-10-28 RX ORDER — SODIUM CHLORIDE 0.9 % (FLUSH) 0.9 %
10 SYRINGE (ML) INJECTION EVERY 6 HOURS
Status: DISCONTINUED | OUTPATIENT
Start: 2023-10-28 | End: 2023-10-28

## 2023-10-28 RX ADMIN — LIDOCAINE 1 PATCH: 50 PATCH CUTANEOUS at 01:10

## 2023-10-28 RX ADMIN — HEPARIN SODIUM 5000 UNITS: 5000 INJECTION INTRAVENOUS; SUBCUTANEOUS at 09:10

## 2023-10-28 RX ADMIN — Medication 400 MG: at 05:10

## 2023-10-28 RX ADMIN — OXYCODONE HYDROCHLORIDE 5 MG: 5 TABLET ORAL at 05:10

## 2023-10-28 RX ADMIN — OXYCODONE HYDROCHLORIDE 5 MG: 5 TABLET ORAL at 09:10

## 2023-10-28 RX ADMIN — TAMSULOSIN HYDROCHLORIDE 0.4 MG: 0.4 CAPSULE ORAL at 09:10

## 2023-10-28 RX ADMIN — MUPIROCIN: 20 OINTMENT TOPICAL at 09:10

## 2023-10-28 RX ADMIN — POTASSIUM CHLORIDE 40 MEQ: 1500 TABLET, EXTENDED RELEASE ORAL at 09:10

## 2023-10-28 RX ADMIN — OXYCODONE HYDROCHLORIDE 5 MG: 5 TABLET ORAL at 10:10

## 2023-10-28 RX ADMIN — HEPARIN SODIUM 5000 UNITS: 5000 INJECTION INTRAVENOUS; SUBCUTANEOUS at 01:10

## 2023-10-28 RX ADMIN — OXYCODONE HYDROCHLORIDE 5 MG: 5 TABLET ORAL at 03:10

## 2023-10-28 RX ADMIN — ESCITALOPRAM OXALATE 10 MG: 5 TABLET, FILM COATED ORAL at 09:10

## 2023-10-28 NOTE — SUBJECTIVE & OBJECTIVE
Interval History:  Patient denies any chest pain other than his rib fracture pain.  No shortness of breath.  Tolerating p.o..  Expresses concerns about physical deconditioning and weakness.    Review of Systems  Objective:     Vital Signs (Most Recent):  Temp: 99.7 °F (37.6 °C) (10/28/23 1536)  Pulse: 85 (10/28/23 1518)  Resp: 18 (10/28/23 1518)  BP: 124/78 (10/28/23 1518)  SpO2: 95 % (10/28/23 1518) Vital Signs (24h Range):  Temp:  [97.9 °F (36.6 °C)-100.2 °F (37.9 °C)] 99.7 °F (37.6 °C)  Pulse:  [83-90] 85  Resp:  [16-20] 18  SpO2:  [92 %-95 %] 95 %  BP: ()/(72-86) 124/78     Weight: 81.6 kg (180 lb)  Body mass index is 27.37 kg/m².    Intake/Output Summary (Last 24 hours) at 10/28/2023 1702  Last data filed at 10/28/2023 0923  Gross per 24 hour   Intake 360 ml   Output 1650 ml   Net -1290 ml         Physical Exam      Clear lungs bilaterally, unlabored breathing, on room air, no cyanosis   Hrt Sounds indicate a regular rate and rhythm  Awake alert, no acute distress   No facial droop, no slurred speech   No obvious lower extremity edema   Moves all 4 extremities   Pleasant  Abdomen nondistended

## 2023-10-28 NOTE — PLAN OF CARE
Problem: Adult Inpatient Plan of Care  Goal: Plan of Care Review  Outcome: Ongoing, Progressing  Goal: Patient-Specific Goal (Individualized)  Outcome: Ongoing, Progressing  Goal: Absence of Hospital-Acquired Illness or Injury  Outcome: Ongoing, Progressing  Goal: Optimal Comfort and Wellbeing  Outcome: Ongoing, Progressing     Problem: Adjustment to Illness (Gastrointestinal Bleeding)  Goal: Optimal Coping with Acute Illness  Outcome: Ongoing, Progressing     Problem: Bleeding (Gastrointestinal Bleeding)  Goal: Hemostasis  Outcome: Ongoing, Progressing     Problem: Fluid and Electrolyte Imbalance (Acute Kidney Injury/Impairment)  Goal: Fluid and Electrolyte Balance  Outcome: Ongoing, Progressing     Problem: Oral Intake Inadequate (Acute Kidney Injury/Impairment)  Goal: Optimal Nutrition Intake  Outcome: Ongoing, Progressing     Problem: Fall Injury Risk  Goal: Absence of Fall and Fall-Related Injury  Outcome: Ongoing, Progressing     POC reviewed. Address questions and concerns. AAOX4. VVS. MD OK to  leave out midline. Frequent safety safety checks performed. Call light in reach, bed in lowest position. Bed alarmed. Urinal within reach.

## 2023-10-28 NOTE — PLAN OF CARE
Problem: Adult Inpatient Plan of Care  Goal: Plan of Care Review  Outcome: Ongoing, Progressing  Goal: Patient-Specific Goal (Individualized)  Outcome: Ongoing, Progressing  Goal: Absence of Hospital-Acquired Illness or Injury  Outcome: Ongoing, Progressing  Goal: Optimal Comfort and Wellbeing  Outcome: Ongoing, Progressing  Goal: Readiness for Transition of Care  Outcome: Ongoing, Progressing     Problem: Fluid and Electrolyte Imbalance (Acute Kidney Injury/Impairment)  Goal: Fluid and Electrolyte Balance  Outcome: Ongoing, Progressing     Problem: Oral Intake Inadequate (Acute Kidney Injury/Impairment)  Goal: Optimal Nutrition Intake  Outcome: Ongoing, Progressing     Problem: Renal Function Impairment (Acute Kidney Injury/Impairment)  Goal: Effective Renal Function  Outcome: Ongoing, Progressing     Problem: Fall Injury Risk  Goal: Absence of Fall and Fall-Related Injury  Outcome: Ongoing, Progressing     Problem: Skin Injury Risk Increased  Goal: Skin Health and Integrity  Outcome: Ongoing, Progressing    Pt A&Ox4 VSS. Routine meds given POC reviewed with pt. Bmx1. Pt experienced rib pain relieved by Prn pain meds. NO trauma or falls during shift. Pt resting equal fall and rise of the chest. Reeducated pt on safety of call light use before trying to exit bed. Bed alarm set.

## 2023-10-28 NOTE — HOSPITAL COURSE
68-year-old male with h/o esophageal variceal banding in 2018 was admitted for symptomatic anemia (hgb 4.4). He received 3 units pRBCs and 1 unit FFP. CTA the night of admission negative for acute bleed. EGD neg while in the ICU. Was recommended outpatient colonoscopy for his GIB and an abdominal MRI for pancreas/liver/splenic lesions found on CTA. Course complicated by type 2 NSTEMI and ISRAEL.  Lasix stopped; ISRAEL improved. Pt's hb stable > 9 on day of discharge.     Clear lungs bilaterally, unlabored breathing, on room air, no cyanosis   Heart sounds indicate a regular rate and rhythm   Awake alert, no acute distress   No facial droop, no slurred speech   Abdomen nondistended  No jermaine jaundice   No obvious lower extremity edema     Patient was extensively counseled on refraining from NSAIDs and following up with medical providers as an outpatient especially for repeat blood work. Informed about outpt MRI for pancreatic lesion. Verbalized interest in going directly home.

## 2023-10-28 NOTE — PROGRESS NOTES
[facility] - Telemetry [facility] Medicine  Progress Note    Patient Name: Fransico Montalvo  MRN: [id number]  Patient Class: IP- Inpatient   Admission Date: 10/24/2023  Length of Stay: 4 days  Attending Physician: Ernie Beard MD  Primary Care Provider: Jessica        Subjective:     Principal Problem:Gastrointestinal hemorrhage with melena        HPI:  68-year-old male with past medical history of alcohol abuse, BPH, cirrhosis, hypertension who presents to the emergency department with chief complaint of syncope, lightheadedness, rib pain. He endorses a syncopal episode that occurred about 10 days ago. He was walking into his backyard, felt lightheaded, and lost consciousness. He was out for a few seconds. His roommate witnessed this. He went inside to get the patient some water. The patient was sitting upright, and had another syncopal episode without any prodromal symptoms. Again, he was out for a few seconds before regaining consciousness. He felt relatively well for the few days following that episode. For the last week, he endorses lightheadedness and dizziness. Feels lightheaded as if he might pass out, but also endorses vertiginous symptoms. His symptoms are worse with movement and ambulation. He had a fall yesterday. He states that he felt weak and lightheaded while walking, he fell to his left side, and struck the left side of his ribs on a nearby chair. He then fell to the ground. Did not hit his head or lose consciousness. He has been unable to walk since yesterday due to the dizziness. He has been crawling around his house since last night. Denies headache, cp, sob, abd pain, diarrhea. Does report nausea and vomiting and black stools over the last four days. Additionally, he broke his wrist a few months ago. He significant decreased his alcohol use since then. Last drink was one week ago. Denies other drug use. Denies other worsening or alleviating factors.     In the ED patient tachycardic,  hemodynamically stable, afebrile, saturating well on room air. Hb 4.4 down from 11 three months ago. Patient is unaware of if he has history of varices. CXR with remote left 7/8/9 rib fractures and patient reporting signficant pain with palpitation, deep inspiration, and certain movements. His initial Trop 4.6 and follow up 4.1. Patient started on iv protonix, octreotide, ceftriaxone, and transfusing 2 units PRBC. Patient admitted to the [facility] of medicine for further evaluation and management.     Hospital course:  68-year-old male with h/o esophageal variceal banding in 2018 was admitted for symptomatic anemia (hgb 4.4). He received 3 units pRBCs and 1 unit FFP. CTA the night of admission negative for acute bleed. EGD neg while in the ICU. Was recommended outpatient colonoscopy for his GIB and an abdominal MRI for pancreas/liver/splenic lesions found on CTA. Course complicated by type 2 NSTEMI and ISRAEL. We are administering lasix 80mg today as he had 1L UOP yesterday after lasix 40mg, and started tamsulosin for BPH.      No notes on file    Interval History:  Patient denies any chest pain other than his rib fracture pain.  No shortness of breath.  Tolerating p.o..  Expresses concerns about physical deconditioning and weakness.    Review of Systems  Objective:     Vital Signs (Most Recent):  Temp: 99.7 °F (37.6 °C) (10/28/23 1536)  Pulse: 85 (10/28/23 1518)  Resp: 18 (10/28/23 1518)  BP: 124/78 (10/28/23 1518)  SpO2: 95 % (10/28/23 1518) Vital Signs (24h Range):  Temp:  [97.9 °F (36.6 °C)-100.2 °F (37.9 °C)] 99.7 °F (37.6 °C)  Pulse:  [83-90] 85  Resp:  [16-20] 18  SpO2:  [92 %-95 %] 95 %  BP: ()/(72-86) 124/78     Weight: 81.6 kg (180 lb)  Body mass index is 27.37 kg/m².    Intake/Output Summary (Last 24 hours) at 10/28/2023 1702  Last data filed at 10/28/2023 0923  Gross per 24 hour   Intake 360 ml   Output 1650 ml   Net -1290 ml         Physical Exam      Clear lungs bilaterally, unlabored breathing, on room  air, no cyanosis   Hrt Sounds indicate a regular rate and rhythm  Awake alert, no acute distress   No facial droop, no slurred speech   No obvious lower extremity edema   Moves all 4 extremities   Pleasant  Abdomen nondistended       Assessment/Plan:      * Gastrointestinal hemorrhage with melena  -Underwent EGD with unremarkable findings.  -s/p prbc with hb 4 to > 9  - GI recommending outpatient colonoscopy  - protonix; octreotride stoped      Pancreatic, splenic and liver lesion  outpt MRI       Closed fracture of multiple ribs of left side with routine healing  - Remote rib fractures of left 7/8/9th ribs  - symptomatic management    ISRAEL (acute kidney injury)  - s/p lasix; holding steady around 1.8 now; monitor    Essential hypertension  - holding home meds      Anxiety and depression  - Continue home escitalopram       Alcoholic cirrhosis  MELD 3.0: 22 at 10/24/2023  2:16 PM  MELD-Na: 21 at 10/24/2023  2:16 PM  Calculated from:  Serum Creatinine: 2.1 mg/dL at 10/24/2023  2:16 PM  Serum Sodium: 131 mmol/L at 10/24/2023  2:16 PM  Total Bilirubin: 1.6 mg/dL at 10/24/2023  2:16 PM  Serum Albumin: 3.3 g/dL at 10/24/2023  2:16 PM  INR(ratio): 1.1 at 10/24/2023  2:16 PM  Age at listing (hypothetical): 68 years  Sex: Male at 10/24/2023  2:16 PM    - further management as above      Elevated troponin  - suspect secondary to demand ischemia from signficant blood loss / GIB  - transfusing PRBCs and further management as above  - IVFs  - monitor tele  - further management pending clinical course and future study review        VTE Risk Mitigation (From admission, onward)           Ordered     heparin (porcine) injection 5,000 Units  Every 8 hours         10/27/23 1100     IP VTE HIGH RISK PATIENT  Once         10/27/23 1100     Place sequential compression device  Until discontinued         10/24/23 1931                    Discharge Planning   ARTURO: 10/30/2023     Code Status: Full Code   Is the patient medically ready for  discharge?:     Reason for patient still in hospital (select all that apply): Patient trending condition and Pending disposition  Discharge Plan A: Home Health                  Ernie Beard MD  Department of Hospital Medicine   [facility] - Telemetry Stepdown

## 2023-10-28 NOTE — ASSESSMENT & PLAN NOTE
-Underwent EGD with unremarkable findings.  -s/p prbc with hb 4 to > 9  - GI recommending outpatient colonoscopy  - protonix; octreotride stoped

## 2023-10-29 PROBLEM — S22.009D CLOSED FRACTURE OF MULTIPLE THORACIC VERTEBRAE WITH ROUTINE HEALING: Status: ACTIVE | Noted: 2023-10-29

## 2023-10-29 LAB
ALBUMIN SERPL BCP-MCNC: 3 G/DL (ref 3.5–5.2)
ALP SERPL-CCNC: 68 U/L (ref 55–135)
ALT SERPL W/O P-5'-P-CCNC: 8 U/L (ref 10–44)
ANION GAP SERPL CALC-SCNC: 11 MMOL/L (ref 8–16)
AST SERPL-CCNC: 19 U/L (ref 10–40)
BASOPHILS # BLD AUTO: 0.01 K/UL (ref 0–0.2)
BASOPHILS # BLD AUTO: 0.02 K/UL (ref 0–0.2)
BASOPHILS NFR BLD: 0.4 % (ref 0–1.9)
BASOPHILS NFR BLD: 0.6 % (ref 0–1.9)
BASOPHILS NFR BLD: 0.7 % (ref 0–1.9)
BASOPHILS NFR BLD: 0.8 % (ref 0–1.9)
BILIRUB SERPL-MCNC: 1.2 MG/DL (ref 0.1–1)
BUN SERPL-MCNC: 26 MG/DL (ref 8–23)
CALCIUM SERPL-MCNC: 8.5 MG/DL (ref 8.7–10.5)
CHLORIDE SERPL-SCNC: 99 MMOL/L (ref 95–110)
CO2 SERPL-SCNC: 23 MMOL/L (ref 23–29)
CREAT SERPL-MCNC: 1.5 MG/DL (ref 0.5–1.4)
DIFFERENTIAL METHOD: ABNORMAL
EOSINOPHIL # BLD AUTO: 0 K/UL (ref 0–0.5)
EOSINOPHIL NFR BLD: 0.7 % (ref 0–8)
EOSINOPHIL NFR BLD: 0.9 % (ref 0–8)
EOSINOPHIL NFR BLD: 1.3 % (ref 0–8)
EOSINOPHIL NFR BLD: 1.5 % (ref 0–8)
ERYTHROCYTE [DISTWIDTH] IN BLOOD BY AUTOMATED COUNT: 19 % (ref 11.5–14.5)
ERYTHROCYTE [DISTWIDTH] IN BLOOD BY AUTOMATED COUNT: 19.1 % (ref 11.5–14.5)
ERYTHROCYTE [DISTWIDTH] IN BLOOD BY AUTOMATED COUNT: 19.3 % (ref 11.5–14.5)
ERYTHROCYTE [DISTWIDTH] IN BLOOD BY AUTOMATED COUNT: 19.4 % (ref 11.5–14.5)
EST. GFR  (NO RACE VARIABLE): 50.1 ML/MIN/1.73 M^2
GLUCOSE SERPL-MCNC: 90 MG/DL (ref 70–110)
HCT VFR BLD AUTO: 28.6 % (ref 40–54)
HCT VFR BLD AUTO: 28.8 % (ref 40–54)
HCT VFR BLD AUTO: 29.1 % (ref 40–54)
HCT VFR BLD AUTO: 29.9 % (ref 40–54)
HGB BLD-MCNC: 8.8 G/DL (ref 14–18)
HGB BLD-MCNC: 8.9 G/DL (ref 14–18)
HGB BLD-MCNC: 9 G/DL (ref 14–18)
HGB BLD-MCNC: 9.3 G/DL (ref 14–18)
IMM GRANULOCYTES # BLD AUTO: 0.01 K/UL (ref 0–0.04)
IMM GRANULOCYTES # BLD AUTO: 0.02 K/UL (ref 0–0.04)
IMM GRANULOCYTES NFR BLD AUTO: 0.3 % (ref 0–0.5)
IMM GRANULOCYTES NFR BLD AUTO: 0.4 % (ref 0–0.5)
IMM GRANULOCYTES NFR BLD AUTO: 0.4 % (ref 0–0.5)
IMM GRANULOCYTES NFR BLD AUTO: 0.7 % (ref 0–0.5)
INR PPP: 1.1 (ref 0.8–1.2)
LYMPHOCYTES # BLD AUTO: 0.5 K/UL (ref 1–4.8)
LYMPHOCYTES # BLD AUTO: 0.5 K/UL (ref 1–4.8)
LYMPHOCYTES # BLD AUTO: 0.6 K/UL (ref 1–4.8)
LYMPHOCYTES # BLD AUTO: 0.7 K/UL (ref 1–4.8)
LYMPHOCYTES NFR BLD: 16.4 % (ref 18–48)
LYMPHOCYTES NFR BLD: 19.6 % (ref 18–48)
LYMPHOCYTES NFR BLD: 20.1 % (ref 18–48)
LYMPHOCYTES NFR BLD: 20.5 % (ref 18–48)
MAGNESIUM SERPL-MCNC: 1.5 MG/DL (ref 1.6–2.6)
MCH RBC QN AUTO: 28.7 PG (ref 27–31)
MCH RBC QN AUTO: 28.9 PG (ref 27–31)
MCHC RBC AUTO-ENTMCNC: 30.6 G/DL (ref 32–36)
MCHC RBC AUTO-ENTMCNC: 30.9 G/DL (ref 32–36)
MCHC RBC AUTO-ENTMCNC: 31.1 G/DL (ref 32–36)
MCHC RBC AUTO-ENTMCNC: 31.1 G/DL (ref 32–36)
MCV RBC AUTO: 93 FL (ref 82–98)
MCV RBC AUTO: 93 FL (ref 82–98)
MCV RBC AUTO: 94 FL (ref 82–98)
MCV RBC AUTO: 94 FL (ref 82–98)
MONOCYTES # BLD AUTO: 0.5 K/UL (ref 0.3–1)
MONOCYTES # BLD AUTO: 0.5 K/UL (ref 0.3–1)
MONOCYTES # BLD AUTO: 0.6 K/UL (ref 0.3–1)
MONOCYTES # BLD AUTO: 0.6 K/UL (ref 0.3–1)
MONOCYTES NFR BLD: 16.9 % (ref 4–15)
MONOCYTES NFR BLD: 17.4 % (ref 4–15)
MONOCYTES NFR BLD: 18.3 % (ref 4–15)
MONOCYTES NFR BLD: 19.3 % (ref 4–15)
NEUTROPHILS # BLD AUTO: 1.6 K/UL (ref 1.8–7.7)
NEUTROPHILS # BLD AUTO: 1.7 K/UL (ref 1.8–7.7)
NEUTROPHILS # BLD AUTO: 1.8 K/UL (ref 1.8–7.7)
NEUTROPHILS # BLD AUTO: 2.1 K/UL (ref 1.8–7.7)
NEUTROPHILS NFR BLD: 59.4 % (ref 38–73)
NEUTROPHILS NFR BLD: 59.4 % (ref 38–73)
NEUTROPHILS NFR BLD: 61.2 % (ref 38–73)
NEUTROPHILS NFR BLD: 62.8 % (ref 38–73)
NRBC BLD-RTO: 0 /100 WBC
PHOSPHATE SERPL-MCNC: 2.3 MG/DL (ref 2.7–4.5)
PLATELET # BLD AUTO: 104 K/UL (ref 150–450)
PLATELET # BLD AUTO: 105 K/UL (ref 150–450)
PLATELET # BLD AUTO: 138 K/UL (ref 150–450)
PLATELET # BLD AUTO: 96 K/UL (ref 150–450)
PMV BLD AUTO: 10.4 FL (ref 9.2–12.9)
PMV BLD AUTO: 11 FL (ref 9.2–12.9)
PMV BLD AUTO: 11 FL (ref 9.2–12.9)
PMV BLD AUTO: 11.3 FL (ref 9.2–12.9)
POTASSIUM SERPL-SCNC: 3.7 MMOL/L (ref 3.5–5.1)
PROT SERPL-MCNC: 6.7 G/DL (ref 6–8.4)
PROTHROMBIN TIME: 12.1 SEC (ref 9–12.5)
RBC # BLD AUTO: 3.07 M/UL (ref 4.6–6.2)
RBC # BLD AUTO: 3.08 M/UL (ref 4.6–6.2)
RBC # BLD AUTO: 3.11 M/UL (ref 4.6–6.2)
RBC # BLD AUTO: 3.22 M/UL (ref 4.6–6.2)
SODIUM SERPL-SCNC: 133 MMOL/L (ref 136–145)
WBC # BLD AUTO: 2.64 K/UL (ref 3.9–12.7)
WBC # BLD AUTO: 2.74 K/UL (ref 3.9–12.7)
WBC # BLD AUTO: 3.01 K/UL (ref 3.9–12.7)
WBC # BLD AUTO: 3.38 K/UL (ref 3.9–12.7)

## 2023-10-29 PROCEDURE — 36415 COLL VENOUS BLD VENIPUNCTURE: CPT

## 2023-10-29 PROCEDURE — 84100 ASSAY OF PHOSPHORUS: CPT | Performed by: NURSE PRACTITIONER

## 2023-10-29 PROCEDURE — 20600001 HC STEP DOWN PRIVATE ROOM

## 2023-10-29 PROCEDURE — 25000003 PHARM REV CODE 250

## 2023-10-29 PROCEDURE — 25000003 PHARM REV CODE 250: Performed by: HOSPITALIST

## 2023-10-29 PROCEDURE — 83735 ASSAY OF MAGNESIUM: CPT | Performed by: NURSE PRACTITIONER

## 2023-10-29 PROCEDURE — 97530 THERAPEUTIC ACTIVITIES: CPT

## 2023-10-29 PROCEDURE — 80053 COMPREHEN METABOLIC PANEL: CPT | Performed by: FAMILY MEDICINE

## 2023-10-29 PROCEDURE — 97535 SELF CARE MNGMENT TRAINING: CPT

## 2023-10-29 PROCEDURE — 63600175 PHARM REV CODE 636 W HCPCS

## 2023-10-29 PROCEDURE — 99231 SBSQ HOSP IP/OBS SF/LOW 25: CPT | Mod: 95,,, | Performed by: HOSPITALIST

## 2023-10-29 PROCEDURE — 99231 PR SUBSEQUENT HOSPITAL CARE,LEVL I: ICD-10-PCS | Mod: 95,,, | Performed by: HOSPITALIST

## 2023-10-29 PROCEDURE — 25000003 PHARM REV CODE 250: Performed by: STUDENT IN AN ORGANIZED HEALTH CARE EDUCATION/TRAINING PROGRAM

## 2023-10-29 PROCEDURE — 25000003 PHARM REV CODE 250: Performed by: FAMILY MEDICINE

## 2023-10-29 PROCEDURE — 85025 COMPLETE CBC W/AUTO DIFF WBC: CPT | Mod: 91

## 2023-10-29 PROCEDURE — 85610 PROTHROMBIN TIME: CPT | Performed by: FAMILY MEDICINE

## 2023-10-29 RX ADMIN — OXYCODONE HYDROCHLORIDE 5 MG: 5 TABLET ORAL at 12:10

## 2023-10-29 RX ADMIN — HEPARIN SODIUM 5000 UNITS: 5000 INJECTION INTRAVENOUS; SUBCUTANEOUS at 09:10

## 2023-10-29 RX ADMIN — Medication 400 MG: at 09:10

## 2023-10-29 RX ADMIN — HEPARIN SODIUM 5000 UNITS: 5000 INJECTION INTRAVENOUS; SUBCUTANEOUS at 05:10

## 2023-10-29 RX ADMIN — ESCITALOPRAM OXALATE 10 MG: 5 TABLET, FILM COATED ORAL at 09:10

## 2023-10-29 RX ADMIN — OXYCODONE HYDROCHLORIDE 5 MG: 5 TABLET ORAL at 06:10

## 2023-10-29 RX ADMIN — TAMSULOSIN HYDROCHLORIDE 0.4 MG: 0.4 CAPSULE ORAL at 09:10

## 2023-10-29 RX ADMIN — LIDOCAINE 1 PATCH: 50 PATCH CUTANEOUS at 12:10

## 2023-10-29 RX ADMIN — HEPARIN SODIUM 5000 UNITS: 5000 INJECTION INTRAVENOUS; SUBCUTANEOUS at 03:10

## 2023-10-29 RX ADMIN — MUPIROCIN: 20 OINTMENT TOPICAL at 09:10

## 2023-10-29 RX ADMIN — OXYCODONE HYDROCHLORIDE 5 MG: 5 TABLET ORAL at 04:10

## 2023-10-29 NOTE — PLAN OF CARE
Problem: Adult Inpatient Plan of Care  Goal: Plan of Care Review  Outcome: Ongoing, Progressing  Goal: Patient-Specific Goal (Individualized)  Outcome: Ongoing, Progressing  Goal: Absence of Hospital-Acquired Illness or Injury  Outcome: Ongoing, Progressing  Goal: Optimal Comfort and Wellbeing  Outcome: Ongoing, Progressing  Goal: Readiness for Transition of Care  Outcome: Ongoing, Progressing     Problem: Adjustment to Illness (Gastrointestinal Bleeding)  Goal: Optimal Coping with Acute Illness  Outcome: Ongoing, Progressing     Problem: Bleeding (Gastrointestinal Bleeding)  Goal: Hemostasis  Outcome: Ongoing, Progressing     Problem: Fluid and Electrolyte Imbalance (Acute Kidney Injury/Impairment)  Goal: Fluid and Electrolyte Balance  Outcome: Ongoing, Progressing     Problem: Oral Intake Inadequate (Acute Kidney Injury/Impairment)  Goal: Optimal Nutrition Intake  Outcome: Ongoing, Progressing     Problem: Renal Function Impairment (Acute Kidney Injury/Impairment)  Goal: Effective Renal Function  Outcome: Ongoing, Progressing     Problem: Fall Injury Risk  Goal: Absence of Fall and Fall-Related Injury  Outcome: Ongoing, Progressing     Problem: Skin Injury Risk Increased  Goal: Skin Health and Integrity  Outcome: Ongoing, Progressing     POC reviewed. No acute changes. Remain with rib pain prn Oxy q6. Possible discharge to Rehab. Urinal w/I reach. Call light @bedside. Bed alarm.

## 2023-10-29 NOTE — PLAN OF CARE
Problem: Adult Inpatient Plan of Care  Goal: Plan of Care Review  Outcome: Ongoing, Progressing  Goal: Patient-Specific Goal (Individualized)  Outcome: Ongoing, Progressing  Goal: Absence of Hospital-Acquired Illness or Injury  Outcome: Ongoing, Progressing  Goal: Optimal Comfort and Wellbeing  Outcome: Ongoing, Progressing  Goal: Readiness for Transition of Care  Outcome: Ongoing, Progressing     Problem: Adjustment to Illness (Gastrointestinal Bleeding)  Goal: Optimal Coping with Acute Illness  Outcome: Ongoing, Progressing     Problem: Bleeding (Gastrointestinal Bleeding)  Goal: Hemostasis  Outcome: Ongoing, Progressing    Patient slept during shift with pain relieved with PRN pain medication. Patient ambulated with assistance to bathroom. Patient had a bowel movement.

## 2023-10-29 NOTE — PT/OT/SLP PROGRESS
Occupational Therapy   Treatment    Name: Fransico Montalvo  MRN: [id number]  Admitting Diagnosis:  Gastrointestinal hemorrhage with melena  4 Days Post-Op    Recommendations:     Discharge Recommendations: Moderate Intensity Therapy  Discharge Equipment Recommendations:  none  Barriers to discharge:  None    Assessment:     Fransico Montalvo is a 68 y.o. male with a medical diagnosis of Gastrointestinal hemorrhage with melena.  He presents with good participation and motivation. Pt continues to be at high fall risk & requires (A) with all OOB activities.  Pt demonstrated decreased safety awareness during session regarding desire to go home for a day then go to rehab. Performance deficits affecting function are weakness, impaired endurance, impaired self care skills, impaired balance, impaired functional mobility, impaired cognition, decreased upper extremity function, decreased lower extremity function, decreased safety awareness, impaired cardiopulmonary response to activity, pain.     Rehab Prognosis:  Good; patient would benefit from acute skilled OT services to address these deficits and reach maximum level of function.       Plan:     Patient to be seen 4 x/week to address the above listed problems via self-care/home management, therapeutic activities, therapeutic exercises, neuromuscular re-education  Plan of Care Expires: 11/28/23  Plan of Care Reviewed with: patient    Subjective     Chief Complaint: needing to get a new charge cord for his phone  Patient/Family Comments/goals: Pt reported that he needs to go home for a day to get a new charge cord, pay his rent & a few other things before going to rehab.  Pain/Comfort:  Pain Rating 1: 0/10  Pain Addressed 1: Reposition, Distraction  Pain Rating Post-Intervention 1: 0/10 (did report pain in left ribs region during bed mobility however did not rate & no pain at rest)    Objective:     Communicated with: RN prior to session.  Patient found supine with telemetry, bed alarm  (no family present) upon OT entry to room.    General Precautions: Standard, fall    Orthopedic Precautions:N/A  Braces:  (left wrist splint)  Respiratory Status: Room air     Occupational Performance:     Bed Mobility:    Patient completed Supine to Sit with contact guard assistance  Patient completed Sit to Supine with stand by assistance     Functional Mobility/Transfers:  Patient completed Sit <> Stand Transfer with contact guard assistance  with  no assistive device x 2-3 trials from EOB  Functional Mobility: Min (A) with functional mobility in room x 2 trials with (A) for balance.    Activities of Daily Living:  Upper Body Dressing: moderate assistance donning gown around back while seated EOB  Lower Body Dressing: contact guard assistance donning socks seated EOB      AMPAC 6 Click ADL: 15    Treatment & Education:  Provided education on safety & need for (A) with all OOB activities.  Discussed his desire to discharge home first then go to rehab & safety concerns regarding this within OT scope of practice.  Pt had no further questions & when asked whether there were any concerns pt reported none.      Patient left supine with all lines intact, call button in reach, bed alarm on, and RN notified    GOALS:   Multidisciplinary Problems       Occupational Therapy Goals          Problem: Occupational Therapy    Goal Priority Disciplines Outcome Interventions   Occupational Therapy Goal     OT, PT/OT Ongoing, Progressing    Description: Goals to be met by: 7 days 11/3/23      Patient will increase functional independence with ADLs by performing:    Pt to complete UE dressing with set-up  Pt to complete LE dressing with MIN A   Pt to complete toileting with SBA  Pt to complete standing g/h skills with SBA  Pt to complete t/f bed, chair and commode with SBA                        Time Tracking:     OT Date of Treatment: 10/29/23  OT Start Time: 1109  OT Stop Time: 1133  OT Total Time (min): 24 min    Billable  Minutes:Self Care/Home Management 10  Therapeutic Activity 14    OT/GRICELDA: OT          10/29/2023

## 2023-10-29 NOTE — PLAN OF CARE
Problem: Occupational Therapy  Goal: Occupational Therapy Goal  Description: Goals to be met by: 7 days 11/3/23      Patient will increase functional independence with ADLs by performing:    Pt to complete UE dressing with set-up  Pt to complete LE dressing with MIN A   Pt to complete toileting with SBA  Pt to complete standing g/h skills with SBA  Pt to complete t/f bed, chair and commode with SBA   Outcome: Ongoing, Progressing     Goals remain appropriate

## 2023-10-29 NOTE — PLAN OF CARE
CM received secure message from Dr Beard, patient is medically ready. Reviewed chart for updates. Plan A: SNF placement. Plan B: Home with home health. CM to patient's room to discuss discharge options. Patient verbalized needing possible placement to get stronger before going home. CM will prepare list of in-network facilities within 15-20 miles of patient's zip code. Return to see patient Sunday (10/29) to review and obtain top choices. Following, submit auth request to N. Orders pending completion. Dr. Beard updated. CM staff will continue to follow until patient discharges.       Penelope Avery RN  Weekend  - [facility]  Spectralink: [phone]

## 2023-10-29 NOTE — PROGRESS NOTES
[facility] - Telemetry [facility] Medicine  Progress Note    Patient Name: Fransico Montalvo  MRN: [id number]  Patient Class: IP- Inpatient   Admission Date: 10/24/2023  Length of Stay: 5 days  Attending Physician: Ernie Beard MD  Primary Care Provider: Jessica        Subjective:     Principal Problem:Gastrointestinal hemorrhage with melena        HPI:  68-year-old male with past medical history of alcohol abuse, BPH, cirrhosis, hypertension who presents to the emergency department with chief complaint of syncope, lightheadedness, rib pain. He endorses a syncopal episode that occurred about 10 days ago. He was walking into his backyard, felt lightheaded, and lost consciousness. He was out for a few seconds. His roommate witnessed this. He went inside to get the patient some water. The patient was sitting upright, and had another syncopal episode without any prodromal symptoms. Again, he was out for a few seconds before regaining consciousness. He felt relatively well for the few days following that episode. For the last week, he endorses lightheadedness and dizziness. Feels lightheaded as if he might pass out, but also endorses vertiginous symptoms. His symptoms are worse with movement and ambulation. He had a fall yesterday. He states that he felt weak and lightheaded while walking, he fell to his left side, and struck the left side of his ribs on a nearby chair. He then fell to the ground. Did not hit his head or lose consciousness. He has been unable to walk since yesterday due to the dizziness. He has been crawling around his house since last night. Denies headache, cp, sob, abd pain, diarrhea. Does report nausea and vomiting and black stools over the last four days. Additionally, he broke his wrist a few months ago. He significant decreased his alcohol use since then. Last drink was one week ago. Denies other drug use. Denies other worsening or alleviating factors.     In the ED patient tachycardic,  hemodynamically stable, afebrile, saturating well on room air. Hb 4.4 down from 11 three months ago. Patient is unaware of if he has history of varices. CXR with remote left 7/8/9 rib fractures and patient reporting signficant pain with palpitation, deep inspiration, and certain movements. His initial Trop 4.6 and follow up 4.1. Patient started on iv protonix, octreotide, ceftriaxone, and transfusing 2 units PRBC. Patient admitted to the [facility] of medicine for further evaluation and management.       Overview/Hospital Course:  68-year-old male with h/o esophageal variceal banding in 2018 was admitted for symptomatic anemia (hgb 4.4). He received 3 units pRBCs and 1 unit FFP. CTA the night of admission negative for acute bleed. EGD neg while in the ICU. Was recommended outpatient colonoscopy for his GIB and an abdominal MRI for pancreas/liver/splenic lesions found on CTA. Course complicated by type 2 NSTEMI and ISRAEL.  Lasix stopped; ISRAEL improved.       Interval History:  No chest pain, no shortness a breath.  Does still affirm ribcage pain from fractures.  Patient does not recall exactly what his final plan decision was for discharge.    Review of Systems  Objective:     Vital Signs (Most Recent):  Temp: 98.7 °F (37.1 °C) (10/29/23 0823)  Pulse: 94 (10/29/23 1050)  Resp: 20 (10/29/23 0823)  BP: 123/86 (10/29/23 0823)  SpO2: (!) 93 % (10/29/23 0823) Vital Signs (24h Range):  Temp:  [98.5 °F (36.9 °C)-100.2 °F (37.9 °C)] 98.7 °F (37.1 °C)  Pulse:  [82-94] 94  Resp:  [17-20] 20  SpO2:  [93 %-95 %] 93 %  BP: (122-135)/(78-94) 123/86     Weight: 81.6 kg (180 lb)  Body mass index is 27.37 kg/m².    Intake/Output Summary (Last 24 hours) at 10/29/2023 1158  Last data filed at 10/29/2023 0906  Gross per 24 hour   Intake 1860 ml   Output 800 ml   Net 1060 ml         Physical Exam      Clear lungs bilaterally, unlabored breathing, on room air, no cyanosis   Heart sounds indicate a regular rate and rhythm   Awake alert, no acute  distress   No facial droop, no slurred speech  Pleasant   No obvious lower extremity edema   Follows motor commands   Abdomen nondistended   Left hand/wrist in splint      Assessment/Plan:      * Gastrointestinal hemorrhage with melena  -Underwent EGD with unremarkable findings.  -s/p prbc with hb 4 to > 9  - GI recommending outpatient colonoscopy  - protonix; octreotride stoped      Closed fracture of multiple thoracic vertebrae with routine healing  TSLO brace  Chronic  Suspect from ETOH contributing component      Pancreatic, splenic and liver lesion  outpt MRI       Closed fracture of multiple ribs of left side with routine healing  - Remote rib fractures of left 7/8/9th ribs  - symptomatic management    ISRAEL (acute kidney injury)  - s/p lasix; improving, now 1.5 Cr    Essential hypertension  - holding home meds      Anxiety and depression  - Continue home escitalopram       Alcoholic cirrhosis  MELD 3.0: 22 at 10/24/2023  2:16 PM  MELD-Na: 21 at 10/24/2023  2:16 PM  Calculated from:  Serum Creatinine: 2.1 mg/dL at 10/24/2023  2:16 PM  Serum Sodium: 131 mmol/L at 10/24/2023  2:16 PM  Total Bilirubin: 1.6 mg/dL at 10/24/2023  2:16 PM  Serum Albumin: 3.3 g/dL at 10/24/2023  2:16 PM  INR(ratio): 1.1 at 10/24/2023  2:16 PM  Age at listing (hypothetical): 68 years  Sex: Male at 10/24/2023  2:16 PM    - further management as above      Elevated troponin  - suspect secondary to demand ischemia from signficant blood loss / GIB  - transfusing PRBCs and further management as above  - IVFs  - monitor tele  - further management pending clinical course and future study review        VTE Risk Mitigation (From admission, onward)         Ordered     heparin (porcine) injection 5,000 Units  Every 8 hours         10/27/23 1100     IP VTE HIGH RISK PATIENT  Once         10/27/23 1100     Place sequential compression device  Until discontinued         10/24/23 1931                Discharge Planning   ARTURO: 10/29/2023     Code Status:  Full Code   Is the patient medically ready for discharge?: Yes    Reason for patient still in hospital (select all that apply): Pending disposition  Discharge Plan A: Home Health                  Ernie Beard MD  Department of Hospital Medicine   [facility] - Telemetry Stepdown

## 2023-10-29 NOTE — SUBJECTIVE & OBJECTIVE
Interval History:  No chest pain, no shortness a breath.  Does still affirm ribcage pain from fractures.  Patient does not recall exactly what his final plan decision was for discharge.    Review of Systems  Objective:     Vital Signs (Most Recent):  Temp: 98.7 °F (37.1 °C) (10/29/23 0823)  Pulse: 94 (10/29/23 1050)  Resp: 20 (10/29/23 0823)  BP: 123/86 (10/29/23 0823)  SpO2: (!) 93 % (10/29/23 0823) Vital Signs (24h Range):  Temp:  [98.5 °F (36.9 °C)-100.2 °F (37.9 °C)] 98.7 °F (37.1 °C)  Pulse:  [82-94] 94  Resp:  [17-20] 20  SpO2:  [93 %-95 %] 93 %  BP: (122-135)/(78-94) 123/86     Weight: 81.6 kg (180 lb)  Body mass index is 27.37 kg/m².    Intake/Output Summary (Last 24 hours) at 10/29/2023 1158  Last data filed at 10/29/2023 0906  Gross per 24 hour   Intake 1860 ml   Output 800 ml   Net 1060 ml         Physical Exam      Clear lungs bilaterally, unlabored breathing, on room air, no cyanosis   Heart sounds indicate a regular rate and rhythm   Awake alert, no acute distress   No facial droop, no slurred speech  Pleasant   No obvious lower extremity edema   Follows motor commands   Abdomen nondistended   Left hand/wrist in splint

## 2023-10-30 VITALS
BODY MASS INDEX: 27.37 KG/M2 | DIASTOLIC BLOOD PRESSURE: 82 MMHG | TEMPERATURE: 98 F | SYSTOLIC BLOOD PRESSURE: 148 MMHG | HEART RATE: 83 BPM | WEIGHT: 180 LBS | OXYGEN SATURATION: 97 % | RESPIRATION RATE: 20 BRPM

## 2023-10-30 LAB
ALBUMIN SERPL BCP-MCNC: 2.8 G/DL (ref 3.5–5.2)
ALP SERPL-CCNC: 67 U/L (ref 55–135)
ALT SERPL W/O P-5'-P-CCNC: 6 U/L (ref 10–44)
ANION GAP SERPL CALC-SCNC: 10 MMOL/L (ref 8–16)
ANISOCYTOSIS BLD QL SMEAR: SLIGHT
ANISOCYTOSIS BLD QL SMEAR: SLIGHT
AST SERPL-CCNC: 16 U/L (ref 10–40)
BASOPHILS # BLD AUTO: 0.02 K/UL (ref 0–0.2)
BASOPHILS # BLD AUTO: 0.02 K/UL (ref 0–0.2)
BASOPHILS # BLD AUTO: 0.03 K/UL (ref 0–0.2)
BASOPHILS NFR BLD: 0.6 % (ref 0–1.9)
BASOPHILS NFR BLD: 0.7 % (ref 0–1.9)
BASOPHILS NFR BLD: 1.1 % (ref 0–1.9)
BILIRUB SERPL-MCNC: 1 MG/DL (ref 0.1–1)
BUN SERPL-MCNC: 21 MG/DL (ref 8–23)
BURR CELLS BLD QL SMEAR: ABNORMAL
CALCIUM SERPL-MCNC: 8.5 MG/DL (ref 8.7–10.5)
CHLORIDE SERPL-SCNC: 99 MMOL/L (ref 95–110)
CO2 SERPL-SCNC: 23 MMOL/L (ref 23–29)
CREAT SERPL-MCNC: 1.2 MG/DL (ref 0.5–1.4)
DIFFERENTIAL METHOD: ABNORMAL
DOHLE BOD BLD QL SMEAR: PRESENT
EOSINOPHIL # BLD AUTO: 0 K/UL (ref 0–0.5)
EOSINOPHIL # BLD AUTO: 0 K/UL (ref 0–0.5)
EOSINOPHIL # BLD AUTO: 0.1 K/UL (ref 0–0.5)
EOSINOPHIL NFR BLD: 0.7 % (ref 0–8)
EOSINOPHIL NFR BLD: 1.2 % (ref 0–8)
EOSINOPHIL NFR BLD: 2 % (ref 0–8)
ERYTHROCYTE [DISTWIDTH] IN BLOOD BY AUTOMATED COUNT: 19.1 % (ref 11.5–14.5)
ERYTHROCYTE [DISTWIDTH] IN BLOOD BY AUTOMATED COUNT: 19.2 % (ref 11.5–14.5)
ERYTHROCYTE [DISTWIDTH] IN BLOOD BY AUTOMATED COUNT: 19.4 % (ref 11.5–14.5)
EST. GFR  (NO RACE VARIABLE): >60 ML/MIN/1.73 M^2
GLUCOSE SERPL-MCNC: 88 MG/DL (ref 70–110)
HCT VFR BLD AUTO: 29.8 % (ref 40–54)
HCT VFR BLD AUTO: 29.9 % (ref 40–54)
HCT VFR BLD AUTO: 30.2 % (ref 40–54)
HGB BLD-MCNC: 9.2 G/DL (ref 14–18)
HGB BLD-MCNC: 9.2 G/DL (ref 14–18)
HGB BLD-MCNC: 9.4 G/DL (ref 14–18)
HYPOCHROMIA BLD QL SMEAR: ABNORMAL
HYPOCHROMIA BLD QL SMEAR: ABNORMAL
IMM GRANULOCYTES # BLD AUTO: 0.01 K/UL (ref 0–0.04)
IMM GRANULOCYTES NFR BLD AUTO: 0.3 % (ref 0–0.5)
IMM GRANULOCYTES NFR BLD AUTO: 0.3 % (ref 0–0.5)
IMM GRANULOCYTES NFR BLD AUTO: 0.4 % (ref 0–0.5)
INR PPP: 1.1 (ref 0.8–1.2)
LYMPHOCYTES # BLD AUTO: 0.6 K/UL (ref 1–4.8)
LYMPHOCYTES # BLD AUTO: 0.7 K/UL (ref 1–4.8)
LYMPHOCYTES # BLD AUTO: 0.8 K/UL (ref 1–4.8)
LYMPHOCYTES NFR BLD: 21.2 % (ref 18–48)
LYMPHOCYTES NFR BLD: 22 % (ref 18–48)
LYMPHOCYTES NFR BLD: 27.5 % (ref 18–48)
MAGNESIUM SERPL-MCNC: 1.5 MG/DL (ref 1.6–2.6)
MCH RBC QN AUTO: 28.6 PG (ref 27–31)
MCH RBC QN AUTO: 28.9 PG (ref 27–31)
MCH RBC QN AUTO: 29.3 PG (ref 27–31)
MCHC RBC AUTO-ENTMCNC: 30.5 G/DL (ref 32–36)
MCHC RBC AUTO-ENTMCNC: 30.9 G/DL (ref 32–36)
MCHC RBC AUTO-ENTMCNC: 31.4 G/DL (ref 32–36)
MCV RBC AUTO: 93 FL (ref 82–98)
MCV RBC AUTO: 94 FL (ref 82–98)
MCV RBC AUTO: 94 FL (ref 82–98)
MONOCYTES # BLD AUTO: 0.4 K/UL (ref 0.3–1)
MONOCYTES # BLD AUTO: 0.5 K/UL (ref 0.3–1)
MONOCYTES # BLD AUTO: 0.6 K/UL (ref 0.3–1)
MONOCYTES NFR BLD: 15.3 % (ref 4–15)
MONOCYTES NFR BLD: 16.5 % (ref 4–15)
MONOCYTES NFR BLD: 17.7 % (ref 4–15)
NEUTROPHILS # BLD AUTO: 1.6 K/UL (ref 1.8–7.7)
NEUTROPHILS # BLD AUTO: 1.6 K/UL (ref 1.8–7.7)
NEUTROPHILS # BLD AUTO: 2.1 K/UL (ref 1.8–7.7)
NEUTROPHILS NFR BLD: 51.8 % (ref 38–73)
NEUTROPHILS NFR BLD: 60.2 % (ref 38–73)
NEUTROPHILS NFR BLD: 60.5 % (ref 38–73)
NRBC BLD-RTO: 0 /100 WBC
OVALOCYTES BLD QL SMEAR: ABNORMAL
OVALOCYTES BLD QL SMEAR: ABNORMAL
PHOSPHATE SERPL-MCNC: 2.3 MG/DL (ref 2.7–4.5)
PLATELET # BLD AUTO: 114 K/UL (ref 150–450)
PLATELET # BLD AUTO: 88 K/UL (ref 150–450)
PLATELET # BLD AUTO: 97 K/UL (ref 150–450)
PLATELET BLD QL SMEAR: ABNORMAL
PLATELET BLD QL SMEAR: ABNORMAL
PMV BLD AUTO: 10.5 FL (ref 9.2–12.9)
PMV BLD AUTO: 12.1 FL (ref 9.2–12.9)
PMV BLD AUTO: 12.7 FL (ref 9.2–12.9)
POIKILOCYTOSIS BLD QL SMEAR: SLIGHT
POIKILOCYTOSIS BLD QL SMEAR: SLIGHT
POLYCHROMASIA BLD QL SMEAR: ABNORMAL
POLYCHROMASIA BLD QL SMEAR: ABNORMAL
POTASSIUM SERPL-SCNC: 4.1 MMOL/L (ref 3.5–5.1)
PROT SERPL-MCNC: 6.5 G/DL (ref 6–8.4)
PROTHROMBIN TIME: 12 SEC (ref 9–12.5)
RBC # BLD AUTO: 3.18 M/UL (ref 4.6–6.2)
RBC # BLD AUTO: 3.21 M/UL (ref 4.6–6.2)
RBC # BLD AUTO: 3.22 M/UL (ref 4.6–6.2)
SODIUM SERPL-SCNC: 132 MMOL/L (ref 136–145)
SPHEROCYTES BLD QL SMEAR: ABNORMAL
TARGETS BLD QL SMEAR: ABNORMAL
TOXIC GRANULES BLD QL SMEAR: PRESENT
WBC # BLD AUTO: 2.68 K/UL (ref 3.9–12.7)
WBC # BLD AUTO: 3.05 K/UL (ref 3.9–12.7)
WBC # BLD AUTO: 3.4 K/UL (ref 3.9–12.7)

## 2023-10-30 PROCEDURE — 99238 HOSP IP/OBS DSCHRG MGMT 30/<: CPT | Mod: 95,,, | Performed by: HOSPITALIST

## 2023-10-30 PROCEDURE — 84100 ASSAY OF PHOSPHORUS: CPT | Performed by: NURSE PRACTITIONER

## 2023-10-30 PROCEDURE — 25000003 PHARM REV CODE 250

## 2023-10-30 PROCEDURE — 97530 THERAPEUTIC ACTIVITIES: CPT | Mod: CQ

## 2023-10-30 PROCEDURE — 25000003 PHARM REV CODE 250: Performed by: FAMILY MEDICINE

## 2023-10-30 PROCEDURE — 80053 COMPREHEN METABOLIC PANEL: CPT | Performed by: FAMILY MEDICINE

## 2023-10-30 PROCEDURE — 99238 PR HOSPITAL DISCHARGE DAY,<30 MIN: ICD-10-PCS | Mod: 95,,, | Performed by: HOSPITALIST

## 2023-10-30 PROCEDURE — 85025 COMPLETE CBC W/AUTO DIFF WBC: CPT | Mod: 91

## 2023-10-30 PROCEDURE — 25000003 PHARM REV CODE 250: Performed by: HOSPITALIST

## 2023-10-30 PROCEDURE — 97116 GAIT TRAINING THERAPY: CPT | Mod: CQ

## 2023-10-30 PROCEDURE — 83735 ASSAY OF MAGNESIUM: CPT | Performed by: NURSE PRACTITIONER

## 2023-10-30 PROCEDURE — 36415 COLL VENOUS BLD VENIPUNCTURE: CPT | Performed by: NURSE PRACTITIONER

## 2023-10-30 PROCEDURE — 36415 COLL VENOUS BLD VENIPUNCTURE: CPT

## 2023-10-30 PROCEDURE — 85610 PROTHROMBIN TIME: CPT | Performed by: FAMILY MEDICINE

## 2023-10-30 PROCEDURE — 63600175 PHARM REV CODE 636 W HCPCS

## 2023-10-30 PROCEDURE — 25000003 PHARM REV CODE 250: Performed by: STUDENT IN AN ORGANIZED HEALTH CARE EDUCATION/TRAINING PROGRAM

## 2023-10-30 RX ORDER — TAMSULOSIN HYDROCHLORIDE 0.4 MG/1
0.4 CAPSULE ORAL DAILY
Qty: 30 CAPSULE | Refills: 1 | Status: ON HOLD | OUTPATIENT
Start: 2023-10-31

## 2023-10-30 RX ORDER — OXYCODONE HYDROCHLORIDE 5 MG/1
5 TABLET ORAL EVERY 8 HOURS PRN
Qty: 21 TABLET | Refills: 0 | Status: ON HOLD | OUTPATIENT
Start: 2023-10-30

## 2023-10-30 RX ORDER — PANTOPRAZOLE SODIUM 40 MG/1
40 TABLET, DELAYED RELEASE ORAL DAILY
Qty: 30 TABLET | Refills: 1 | Status: ON HOLD | OUTPATIENT
Start: 2023-10-30

## 2023-10-30 RX ORDER — CAPSAICIN 0.03 G/100G
CREAM TOPICAL 2 TIMES DAILY PRN
Qty: 60 G | Refills: 0 | Status: ON HOLD | OUTPATIENT
Start: 2023-10-30

## 2023-10-30 RX ORDER — LANOLIN ALCOHOL/MO/W.PET/CERES
400 CREAM (GRAM) TOPICAL 2 TIMES DAILY
Qty: 60 TABLET | Refills: 1 | Status: SHIPPED | OUTPATIENT
Start: 2023-10-30 | End: 2023-11-29

## 2023-10-30 RX ADMIN — OXYCODONE HYDROCHLORIDE 5 MG: 5 TABLET ORAL at 12:10

## 2023-10-30 RX ADMIN — OXYCODONE HYDROCHLORIDE 5 MG: 5 TABLET ORAL at 06:10

## 2023-10-30 RX ADMIN — LIDOCAINE 1 PATCH: 50 PATCH CUTANEOUS at 12:10

## 2023-10-30 RX ADMIN — POLYETHYLENE GLYCOL 3350 17 G: 17 POWDER, FOR SOLUTION ORAL at 08:10

## 2023-10-30 RX ADMIN — ESCITALOPRAM OXALATE 10 MG: 5 TABLET, FILM COATED ORAL at 08:10

## 2023-10-30 RX ADMIN — CAPSAICIN: 0.25 CREAM TOPICAL at 05:10

## 2023-10-30 RX ADMIN — HEPARIN SODIUM 5000 UNITS: 5000 INJECTION INTRAVENOUS; SUBCUTANEOUS at 05:10

## 2023-10-30 RX ADMIN — Medication 400 MG: at 08:10

## 2023-10-30 RX ADMIN — TAMSULOSIN HYDROCHLORIDE 0.4 MG: 0.4 CAPSULE ORAL at 08:10

## 2023-10-30 NOTE — PLAN OF CARE
Pt AAOx4, c/o left rib cage pain secondary to his fractures from a fall, PRN Oxycodone given per pt request. Pt verbalized understanding about falling, bed alarm in use. VSS, no acute distress noted. Plan of care on going.    Problem: Adult Inpatient Plan of Care  Goal: Plan of Care Review  Outcome: Ongoing, Progressing  Goal: Absence of Hospital-Acquired Illness or Injury  Outcome: Ongoing, Progressing  Goal: Optimal Comfort and Wellbeing  Outcome: Ongoing, Progressing  Goal: Readiness for Transition of Care  Outcome: Ongoing, Progressing     Problem: Adjustment to Illness (Gastrointestinal Bleeding)  Goal: Optimal Coping with Acute Illness  Outcome: Ongoing, Progressing     Problem: Bleeding (Gastrointestinal Bleeding)  Goal: Hemostasis  Outcome: Ongoing, Progressing     Problem: Fall Injury Risk  Goal: Absence of Fall and Fall-Related Injury  Outcome: Ongoing, Progressing     Problem: Skin Injury Risk Increased  Goal: Skin Health and Integrity  Outcome: Ongoing, Progressing

## 2023-10-30 NOTE — PLAN OF CARE
Spoke with patient at bedside regarding d/c plan. PT/OT recommending moderate intensity for post-acute needs. Patient declined SNF placement and is insisting on D/C home despite safety concerns from MD, RNCM and Therapy. Patient agreed to  at discharged. Referral sent to [facility], pending acceptance. Patient reports his roomates will be working until late tonight so he has no ride home. RNCM to arrange wheelchair van transport once nurse reports patient is ready to leave. MD wants patient to have a TLSO brace for d/c. Spoke with Kofi in DME, brace to arrive after 1PM today. Will continue to follow.        UPDATED 155PM- [facility] declined. Referral sent to [facility] and the medical Team.    UPDATE 255PM-Patient accepted by [facility]     Yris POST  Case Management  [facility]  [phone]

## 2023-10-30 NOTE — PLAN OF CARE
[facility] - Telemetry Stepdown      HOME HEALTH ORDERS  FACE TO FACE ENCOUNTER    Patient Name: Fransico Montalvo  YOB: 1955    PCP: Jessica   PCP Address: [address] / [place]  PCP Phone Number: [phone]  PCP Fax: [phone]    Encounter Date: 10/24/23    Admit to Home Health    Diagnoses:  Active Hospital Problems    Diagnosis  POA    *Gastrointestinal hemorrhage with melena [K92.1]  Unknown    Closed fracture of multiple thoracic vertebrae with routine healing [S22.009D]  Not Applicable    Therapeutic opioid induced constipation [K59.03, T40.2X5A]  No    On deep vein thrombosis (DVT) prophylaxis [Z79.899]  Not Applicable    Pancreatic, splenic and liver lesion [K86.9]  Unknown    Closed fracture of multiple ribs of left side with routine healing [S22.42XD]  Not Applicable    Syncope [R55]  Yes    ISRAEL (acute kidney injury) [N17.9]  Yes    Essential hypertension [I10]  Yes     Chronic    Anxiety and depression [F41.9, F32.A]  Yes     Chronic    Alcoholic cirrhosis [K70.30]  Yes     Chronic    Thrombocytopenia [D69.6]  Yes     Chronic    Elevated troponin [R79.89]  Yes    BPH (benign prostatic hyperplasia) [N40.0]  Yes     Chronic      Resolved Hospital Problems   No resolved problems to display.       Follow Up Appointments:  No future appointments.    Allergies:Review of patient's allergies indicates:  No Known Allergies    Medications: Review discharge medications with patient and family and provide education.    Current Facility-Administered Medications   Medication Dose Route Frequency Provider Last Rate Last Admin    acetaminophen tablet 500 mg  500 mg Oral Q6H PRN Hilario Szymanski MD        capsaicin 0.025 % cream   Topical (Top) BID PRN Danny Gilmore MD   Given at 10/30/23 0519    EScitalopram oxalate tablet 10 mg  10 mg Oral Daily Hilario Szymanski MD   10 mg at 10/30/23 0811    heparin (porcine) injection 5,000 Units  5,000 Units Subcutaneous Q8H eDirdre  MD Danny   5,000 Units at 10/30/23 0519    LIDOcaine 5 % patch 1 patch  1 patch Transdermal Q24H Danny Gilmore MD   1 patch at 10/29/23 1202    magnesium oxide tablet 400 mg  400 mg Oral BID Ernie Beard MD   400 mg at 10/30/23 0811    ondansetron disintegrating tablet 4 mg  4 mg Oral Q6H PRN Hilario Szymanski MD   4 mg at 10/25/23 1047    oxyCODONE immediate release tablet 5 mg  5 mg Oral Q6H PRN Hilario Szymanski MD   5 mg at 10/30/23 0633    polyethylene glycol packet 17 g  17 g Oral Daily Danny Gilmore MD   17 g at 10/30/23 0811    tamsulosin 24 hr capsule 0.4 mg  0.4 mg Oral Daily Penny Mata DO   0.4 mg at 10/30/23 0811     Current Discharge Medication List        START taking these medications    Details   capsaicin (ZOSTRIX) 0.025 % cream Apply topically 2 (two) times daily as needed (rib pain).  Qty: 60 g, Refills: 0      magnesium oxide (MAG-OX) 400 mg (241.3 mg magnesium) tablet Take 1 tablet (400 mg total) by mouth 2 (two) times daily.  Qty: 60 tablet, Refills: 1      oxyCODONE (ROXICODONE) 5 MG immediate release tablet Take 1 tablet (5 mg total) by mouth every 8 (eight) hours as needed for Pain (pain not responding to tylenol).  Qty: 21 tablet, Refills: 0      pantoprazole (PROTONIX) 40 MG tablet Take 1 tablet (40 mg total) by mouth once daily.  Qty: 30 tablet, Refills: 1      tamsulosin (FLOMAX) 0.4 mg Cap Take 1 capsule (0.4 mg total) by mouth once daily.  Qty: 30 capsule, Refills: 1           CONTINUE these medications which have NOT CHANGED    Details   EScitalopram oxalate (LEXAPRO) 10 MG tablet Take 10 mg by mouth once daily.           STOP taking these medications       lisinopriL (PRINIVIL,ZESTRIL) 20 MG tablet Comments:   Reason for Stopping:         naproxen sodium (ALEVE) 220 MG tablet Comments:   Reason for Stopping:         tetrahydrozoline HCl (VISINE OPHT) Comments:   Reason for Stopping:                 I have seen and examined this patient within the last 30 days. My  clinical findings that support the need for the home health skilled services and home bound status are the following:no   Requiring assistive device to leave home due to unsteady gait caused by  Weakness/Debility.     Diet:   cardiac diet    Labs:  SN to perform labs:  CBC: Weekly; 3 week(s), CMP: Weekly; 3 week(s), and INR: Weekly; 3 week(s) and Report Lab results to PCP.    Referrals/ Consults  Physical Therapy to evaluate and treat. Evaluate for home safety and equipment needs; Establish/upgrade home exercise program. Perform / instruct on therapeutic exercises, gait training, transfer training, and Range of Motion.  Occupational Therapy to evaluate and treat. Evaluate home environment for safety and equipment needs. Perform/Instruct on transfers, ADL training, ROM, and therapeutic exercises.  Aide to provide assistance with personal care, ADLs, and vital signs.    Activities:   activity as tolerated    Nursing:   Agency to admit patient within 24 hours of hospital discharge unless specified on physician order or at patient request    SN to complete comprehensive assessment including routine vital signs. Instruct on disease process and s/s of complications to report to MD. Review/verify medication list sent home with the patient at time of discharge  and instruct patient/caregiver as needed. Frequency may be adjusted depending on start of care date.     Skilled nurse to perform up to 3 visits PRN for symptoms related to diagnosis    Notify MD if SBP > 160 or < 90; DBP > 90 or < 50; HR > 120 or < 50; Temp > 101; O2 < 88%;      Ok to schedule additional visits based on staff availability and patient request on consecutive days within the home health episode.    When multiple disciplines ordered:    Start of Care occurs on Sunday - Wednesday schedule remaining discipline evaluations as ordered on separate consecutive days following the start of care.    Thursday SOC -schedule subsequent evaluations Friday and Monday  the following week.     Friday - Saturday SOC - schedule subsequent discipline evaluations on consecutive days starting Monday of the following week.    For all post-discharge communication and subsequent orders please contact patient's primary care physician.           Home Health Aide:  Nursing Three times weekly, Physical Therapy Three times weekly, and Occupational Therapy Three times weekly         I certify that this patient is confined to his home and needs intermittent skilled nursing care, physical therapy, and occupational therapy.

## 2023-10-30 NOTE — DISCHARGE INSTRUCTIONS
Follow up with you outpatient MRI Abdomen scan to further evaluate your pancreas lesion. Expect a phone call for this. Also follow up with gastroenterology as they anticipate performing a colonoscopy in the near future.    Primary care doctor check your blood work within 1 week to ensure stable blood counts as well as renal and hepatic function.  Do not take any NSAIDs including but not limited to Advil, ibuprofen, Latisha[place], naproxen, Naprosyn, Goody powders, BC powders, Excedrin, Motrin.  May take Tylenol.    Stop drinking alcohol.    Wear your back brace whenever you are sitting up or standing/ambulating.This will help stabilize your spinal fractures.

## 2023-10-30 NOTE — PLAN OF CARE
Patient's transport home arranged via  [employer] wheelchair van for 4PM per [facility]. Bedside nurse to update patient of time.       Yris Jean Baptiste RNCM  Case Management  [facility]  [phone]

## 2023-10-30 NOTE — PLAN OF CARE
Pt has to be seen as a walk-in pt for his hospital f/u visit, due to pt not being active with his appointments. Walk-in appointments are from Monday-Friday 7:00 am - 2:00 pm.

## 2023-10-30 NOTE — PLAN OF CARE
[facility] - Telemetry Stepdown  Discharge Final Note    Primary Care Provider: Jessica    Expected Discharge Date: 10/30/2023    Final Discharge Note (most recent)       Final Note - 10/30/23 1507          Final Note    Assessment Type Final Discharge Note     Anticipated Discharge Disposition Home-Health Care [facility]     What phone number can be called within the next 1-3 days to see how you are doing after discharge? --   [phone]       Post-Acute Status    Post-Acute Authorization Home Health     Home Health Status Set-up Complete/Auth obtained                     Important Message from Medicare  Important Message from Medicare regarding Discharge Appeal Rights: Given to patient/caregiver, Explained to patient/caregiver, Signed/date by patient/caregiver     Date IMM was signed: 10/30/23  Time IMM was signed: 6864    Contact Info       [facility]   Specialty: Behavioral Health, Psychiatry, Psychology   Relationship: PCP - General    [facility]  [place] [address]   Phone: [phone]       Next Steps: Follow up    Instructions: Pt has to be seen as walk-in pt for his hospital f/u visit, due to pt not being active with his appointments. Walk in hours are from Monday-Friday 7:00pm-2:00pm. Please bring discharge summary, ID, insuance card, and medication list.    [facility]   Specialty: Behavioral Health, Psychiatry, Psychology   Relationship: PCP - General    [facility]  [address]  [place] [address]   Phone: [phone]       Next Steps: Follow up    [facility]   Specialty: Home Health Services    [facility] [facility] [address]  [address]  [place] [address]   Phone: [phone]       Next Steps: Follow up              Patient discharged home to care of self and [facility]  on 10/30/23.    Yris Jean Baptiste RNCM  Case Management  [facility]  [phone]

## 2023-10-30 NOTE — DISCHARGE SUMMARY
[facility] - Telemetry [facility] Medicine  Discharge Summary      Patient Name: Fransico Montalvo  MRN: [id number]  ILIANA: [id number]  Patient Class: IP- Inpatient  Admission Date: 10/24/2023  Hospital Length of Stay: 6 days  Discharge Date and Time: 10/30/2023  3:50 PM  Attending Physician: No att. providers found   Discharging Provider: Ernie Beard MD  Primary Care Provider: [facility] Medicine Team: [facility] Ernie Beard MD  Primary Care Team: [facility]    HPI:   68-year-old male with past medical history of alcohol abuse, BPH, cirrhosis, hypertension who presents to the emergency department with chief complaint of syncope, lightheadedness, rib pain. He endorses a syncopal episode that occurred about 10 days ago. He was walking into his backyard, felt lightheaded, and lost consciousness. He was out for a few seconds. His roommate witnessed this. He went inside to get the patient some water. The patient was sitting upright, and had another syncopal episode without any prodromal symptoms. Again, he was out for a few seconds before regaining consciousness. He felt relatively well for the few days following that episode. For the last week, he endorses lightheadedness and dizziness. Feels lightheaded as if he might pass out, but also endorses vertiginous symptoms. His symptoms are worse with movement and ambulation. He had a fall yesterday. He states that he felt weak and lightheaded while walking, he fell to his left side, and struck the left side of his ribs on a nearby chair. He then fell to the ground. Did not hit his head or lose consciousness. He has been unable to walk since yesterday due to the dizziness. He has been crawling around his house since last night. Denies headache, cp, sob, abd pain, diarrhea. Does report nausea and vomiting and black stools over the last four days. Additionally, he broke his wrist a few months ago. He significant decreased his alcohol use since then. Last  drink was one week ago. Denies other drug use. Denies other worsening or alleviating factors.     In the ED patient tachycardic, hemodynamically stable, afebrile, saturating well on room air. Hb 4.4 down from 11 three months ago. Patient is unaware of if he has history of varices. CXR with remote left 7/8/9 rib fractures and patient reporting signficant pain with palpitation, deep inspiration, and certain movements. His initial Trop 4.6 and follow up 4.1. Patient started on iv protonix, octreotide, ceftriaxone, and transfusing 2 units PRBC. Patient admitted to the [facility] for further evaluation and management.       Procedure(s) (LRB):  EGD (ESOPHAGOGASTRODUODENOSCOPY) (N/A)      Hospital Course:   68-year-old male with h/o esophageal variceal banding in 2018 was admitted for symptomatic anemia (hgb 4.4). He received 3 units pRBCs and 1 unit FFP. CTA the night of admission negative for acute bleed. EGD neg while in the ICU. Was recommended outpatient colonoscopy for his GIB and an abdominal MRI for pancreas/liver/splenic lesions found on CTA. Course complicated by type 2 NSTEMI and ISRAEL.  Lasix stopped; ISRAEL improved. Pt's hb stable > 9 on day of discharge.     Clear lungs bilaterally, unlabored breathing, on room air, no cyanosis   Heart sounds indicate a regular rate and rhythm   Awake alert, no acute distress   No facial droop, no slurred speech   Abdomen nondistended  No jermaine jaundice   No obvious lower extremity edema     Patient was extensively counseled on refraining from NSAIDs and following up with medical providers as an outpatient especially for repeat blood work. Informed about outpt MRI for pancreatic lesion. Verbalized interest in going directly home.    Also noted to have thoracic and lumbar compression fx's likely 2/2 osteoporosis and ETOH use. TSLO brace ordered.     Goals of Care Treatment Preferences:  Code Status: Full Code      Consults:   Consults (From admission, onward)           Status Ordering Provider     Inpatient consult to Critical Care Medicine  Once        Provider:  (Not yet assigned)    Completed TEAGAN MEDINA     Inpatient consult to Gastroenterology  Once        Provider:  (Not yet assigned)    Completed ANDREY FOOTE     Inpatient consult to Gastroenterology  Once        Provider:  (Not yet assigned)    Completed BAILEE SALGUERO            No new Assessment & Plan notes have been filed under this hospital service since the last note was generated.  Service: Hospital Medicine    Final Active Diagnoses:    Diagnosis Date Noted POA    PRINCIPAL PROBLEM:  Gastrointestinal hemorrhage with melena [K92.1] 10/25/2023 Unknown    Closed fracture of multiple thoracic vertebrae with routine healing [S22.009D] 10/29/2023 Not Applicable    Therapeutic opioid induced constipation [K59.03, T40.2X5A] 10/27/2023 No    On deep vein thrombosis (DVT) prophylaxis [Z79.899] 10/27/2023 Not Applicable    Pancreatic, splenic and liver lesion [K86.9] 10/26/2023 Unknown    Closed fracture of multiple ribs of left side with routine healing [S22.42XD] 11/03/2022 Not Applicable    Syncope [R55] 11/03/2022 Yes    ISRAEL (acute kidney injury) [N17.9] 07/29/2019 Yes    Essential hypertension [I10] 07/28/2019 Yes     Chronic    Anxiety and depression [F41.9, F32.A] 07/28/2019 Yes     Chronic    Alcoholic cirrhosis [K70.30] 07/31/2018 Yes     Chronic    Thrombocytopenia [D69.6] 07/31/2018 Yes     Chronic    Elevated troponin [R79.89] 07/29/2018 Yes    BPH (benign prostatic hyperplasia) [N40.0]  Yes     Chronic      Problems Resolved During this Admission:       Discharged Condition: good    Disposition: Home or Self Care    Follow Up:   Follow-up Information       [facility] Follow up.    Specialties: Behavioral Health, Psychiatry, Psychology  Why: Pt has to be seen as walk-in pt for his hospital f/u visit, due to pt not being active with his appointments. Walk in hours are from Monday-Friday 7:00pm-2:00pm.  Please bring discharge summary, ID, insuance card, and medication list.  Contact information:  [address]  [address]  [place] [address]  [phone]               [facility] Follow up.    Specialties: Behavioral Health, Psychiatry, Psychology  Contact information:  [address]  [address]  [place] [address]  [phone]               [facility] Follow up.    Specialty: Home Health Services  Contact information:  [address]  [address]  [place] [address]  [phone]                           Patient Instructions:      MRI Abdomen W WO Contrast   Standing Status: Future Standing Exp. Date: 10/27/24     Order Specific Question Answer Comments   Does the patient have or ever had a pacemaker or a defibrillator (Note: Some facilities may not be able to schedule an MRI for patients with pacemakers and defibrillators. You should contact your local radiology dept to determine if this is the case.)? No    Does the patient have an aneurysm or surgical clip, pump, nerve/brain stimulator, middle/inner ear prosthesis, or other metal implant or foreign object (bullet, shrapnel)? If they have a card related to their implant, ask them to bring it. Issues related to the implant may cause the MRI to be delayed. No    Will the patient require sedation? No    May the Radiologist modify the order per protocol to meet the clinical needs of the patient? Yes    Will this service be billed to a Worker's Comp policy? No    Does the patient have on a skin patch for medication with aluminized backing? No      Ambulatory referral/consult to Internal Medicine   Standing Status: Future   Referral Priority: Urgent Referral Type: Consultation   Referral Reason: Specialty Services Required   Requested Specialty: Internal Medicine   Number of Visits Requested: 1     Ambulatory referral/consult to Gastroenterology   Standing Status: Future   Referral Priority: Routine Referral Type: Consultation   Referral  Reason: Specialty Services Required   Requested Specialty: Gastroenterology   Number of Visits Requested: 1            Pending Diagnostic Studies:       None           Medications:  Reconciled Home Medications:      Medication List        START taking these medications      capsaicin 0.025 % cream  Commonly known as: ZOSTRIX  Apply topically 2 (two) times daily as needed (rib pain).     magnesium oxide 400 mg (241.3 mg magnesium) tablet  Commonly known as: MAG-OX  Take 1 tablet (400 mg total) by mouth 2 (two) times daily.     oxyCODONE 5 MG immediate release tablet  Commonly known as: ROXICODONE  Take 1 tablet (5 mg total) by mouth every 8 (eight) hours as needed for Pain (pain not responding to tylenol).     pantoprazole 40 MG tablet  Commonly known as: PROTONIX  Take 1 tablet (40 mg total) by mouth once daily.     tamsulosin 0.4 mg Cap  Commonly known as: FLOMAX  Take 1 capsule (0.4 mg total) by mouth once daily.  Start taking on: October 31, 2023            CONTINUE taking these medications      EScitalopram oxalate 10 MG tablet  Commonly known as: LEXAPRO  Take 10 mg by mouth once daily.            STOP taking these medications      ALEVE 220 MG tablet  Generic drug: naproxen sodium     lisinopriL 20 MG tablet  Commonly known as: PRINIVIL,ZESTRIL     VISINE OPHT              Indwelling Lines/Drains at time of discharge:   Lines/Drains/Airways       None                        Ernie Beard MD  Department of Hospital Medicine  [facility] - Telemetry Stepdown

## 2023-10-30 NOTE — PT/OT/SLP PROGRESS
Physical Therapy Treatment    Patient Name:  Fransico Montalvo   MRN:  [id number]    Recommendations:     Discharge Recommendations: Moderate Intensity Therapy  Discharge Equipment Recommendations: none  Barriers to discharge: None    Assessment:     Fransico Montalvo is a 68 y.o. male admitted with a medical diagnosis of Gastrointestinal hemorrhage with melena.  He presents with the following impairments/functional limitations: weakness, impaired endurance, impaired self care skills, impaired functional mobility, gait instability, impaired balance, decreased coordination, decreased upper extremity function, decreased lower extremity function, decreased safety awareness, impaired coordination requiring light assistance and verbal cues for bed mob, sit < > stand transitions, and gait to prevent falls due to weakness, instability.   In light of pt's current functional level and deficits, it is anticipated that pt will need to participate in an intense rehab program consisting of PT and OT in order to achieve full rehab potential to return to previous level of function and roles.  Pt remains motivated to participate in PT session and will cont to benefit from skilled PT intervention.  .    Rehab Prognosis: Good; patient would benefit from acute skilled PT services to address these deficits and reach maximum level of function.    Recent Surgery: Procedure(s) (LRB):  EGD (ESOPHAGOGASTRODUODENOSCOPY) (N/A) 5 Days Post-Op    Plan:     During this hospitalization, patient to be seen 3 x/week to address the identified rehab impairments via gait training, therapeutic activities, therapeutic exercises, neuromuscular re-education and progress toward the following goals:    Plan of Care Expires:  11/26/23    Subjective     Chief Complaint: fatigue  Pain/Comfort:  Pain Rating 1: 0/10      Objective:     Communicated with nurse (Katy) prior to session.  Patient found  R side lying in bed with HOB at 30* angle  with bed alarm upon PT entry to  room.     General Precautions: Standard, fall  Orthopedic Precautions: N/A  Braces:  (L wrist splint due to fx)  Respiratory Status: Room air     Functional Mobility:  Bed Mobility:     Rolling Left:  min A, HOB flat  Scooting: anteriorly to the EOB with CGA/min A  Supine to Sit: min A for trunk elevation via R HHA, exiting on the L side, HOB flat  Transfers:     Sit to Stand:  from EOB with CGA with R HHA  Gait: R HHA min to CGA for balance 16ft and then 40ft within room.  Pt demonstrates decreased B step length, narrow ALICIA, decreased B foot clearance, and increased instability, especially when turning  Balance: static standing with R HHA SBA      AM-PAC 6 CLICK MOBILITY  Turning over in bed (including adjusting bedclothes, sheets and blankets)?: 3  Sitting down on and standing up from a chair with arms (e.g., wheelchair, bedside commode, etc.): 3  Moving from lying on back to sitting on the side of the bed?: 3  Moving to and from a bed to a chair (including a wheelchair)?: 3  Need to walk in hospital room?: 3  Climbing 3-5 steps with a railing?: 2  Basic Mobility Total Score: 17       Treatment & Education:  Patient provided with daily orientation and goals of this PT session. They were educated to call for assistance and to transfer with hospital staff only.  Also, pt was educated on the effects of prolonged immobility and the importance of performing OOB activity and exercises to promote healing and reduce recovery time    Patient left up in chair with all lines intact, call button in reach, and nurse notified..    GOALS:   Multidisciplinary Problems       Physical Therapy Goals          Problem: Physical Therapy    Goal Priority Disciplines Outcome Goal Variances Interventions   Physical Therapy Goal     PT, PT/OT Ongoing, Progressing     Description: Goals to be met by: 11/10/23     Patient will increase functional independence with mobility by performin. Supine to sit with Stand-by Assistance - Not  met  2. Sit to stand transfer with Stand-by Assistance - Not met  3. Bed to chair transfer with Stand-by Assistance using No Assistive Device - Not met  4. Gait  x 100 feet with Stand-by Assistance using R Single-point Cane - Not met                       Time Tracking:     PT Received On: 10/30/23  PT Start Time: 1125     PT Stop Time: 1153  PT Total Time (min): 28 min     Billable Minutes: Gait Training 15 and Therapeutic Activity 13    Treatment Type: Treatment  PT/PTA: PTA     Number of PTA visits since last PT visit: 1     10/30/2023

## 2023-10-31 LAB
BACTERIA BLD CULT: NORMAL
BACTERIA BLD CULT: NORMAL

## 2023-10-31 NOTE — PHYSICIAN QUERY
PT Name: Fransico Montalvo  MR #: [id number]    DOCUMENTATION CLARIFICATION      CDS/: Meghann Burgos RN        Contact information: [email or web address]    This form is a permanent document in the medical record.      Query Date: October 31, 2023    By submitting this query, we are merely seeking further clarification of documentation. Please utilize your independent clinical judgment when addressing the question(s) below.       Indicators Supporting Clinical Findings Location in Medical Record   x Anemia, Thrombocytopenia, Neutropenia, Pancytopenia documented  Symptomatic anemia  Thrombocytopenia  Due to underlying cirrhosis, has been noted in previous documentation as far back as 2018.    10/27 Critical care medicine   x H&H 10/25 - 9.6/29.3    10/27 - 8.9/27.8    10/29 - 8.8/28.8  Labs   x WBC 10/25 - 6.39    10/27- 3.02    10/29 - 2.64  Labs   x Neutrophils/ Granulocytes/ ANC 10/25 - 10.5    10/27 - 2.7    10/29 - 1.6  Labs   x Platelets 10/25- 88    10/27 - 66    10/29 - 88  Labs   x Transfusion(s)  6 units PRBCs  2 FFPs  Labs (Blood transfusion)    Treatments:     x Acute/ Chronic illness  68-year-old male with past medical history of alcohol abuse, BPH, cirrhosis, hypertension who presents to the emergency department with chief complaint of syncope, lightheadedness, rib pain. He endorses a syncopal episode that occurred about 10 days ago. He was walking into his backyard, felt lightheaded, and lost consciousness. He was out for a few seconds. His roommate witnessed this. He went inside to get the patient some water. The patient was sitting upright, and had another syncopal episode without any prodromal symptoms. Again, he was out for a few seconds before regaining consciousness. He felt relatively well for the few days following that episode. For the last week, he endorses lightheadedness and dizziness. Feels lightheaded as if he might pass out, but also endorses vertiginous symptoms. His  symptoms are worse with movement and ambulation. He had a fall yesterday. He states that he felt weak and lightheaded while walking, he fell to his left side, and struck the left side of his ribs on a nearby chair. He then fell to the ground. Did not hit his head or lose consciousness. He has been unable to walk since yesterday due to the dizziness. He has been crawling around his house since last night. Denies headache, cp, sob, abd pain, diarrhea. Does report nausea and vomiting and black stools over the last four days.     Assessment  Gastrointestinal hemorrhage with melena - Underwent EGD with unremarkable findings   Closed fracture of multiple thoracic vertebrae with routine healing   Pancreatic, splenic and liver lesion   ISRAEL   Alcoholic cirrhosis  Elevated troponin    10/29 HM PN    Other:  In the ED patient tachycardic, hemodynamically stable, afebrile, saturating well on room air. Hb 4.4 down from 11 three months ago. Patient is unaware of if he has history of varices. CXR with remote left 7/8/9 rib fractures and patient reporting signficant pain with palpitation, deep inspiration, and certain movements. His initial Trop 4.6 and follow up 4.1    Pancytopenia is defined by:  Hb < 12g/dL (non-pregnant women) or <13g/dL (men) + ANC < 1800/microL + Platelets < 150,000/microL    The clinical guidelines noted are only a system guideline. It does not replace the providers clinical judgment.      Provider, please specify diagnosis or diagnoses associated with above clinical findings.    [  x ] Pancytopenia due to liver disease   [   ] Pancytopenia, unspecified   [   ] Other Hematological Diagnosis (please specify): ________       Please document in your progress notes daily for the duration of treatment, until resolved, and include in your discharge summary.    Reference:    RADHA Blackwood (n.d.). Approach to the adult with pancytopenia. UpToDate. Retrieved September 7, 2022, from  https://www.[employer]/contents/approach-to-the-adult-with-pancytopenia?search=pancytopenia&source=search_result&selectedTitle=1~150&usage_type=default&display_rank=1    Form No. 20120

## 2023-11-03 ENCOUNTER — PATIENT OUTREACH (OUTPATIENT)
Dept: ADMINISTRATIVE | Facility: CLINIC | Age: 68
End: 2023-11-03
Payer: MEDICARE

## 2023-11-03 NOTE — PROGRESS NOTES
C3 nurse spoke with Fransico Montalvo for a TCC post hospital discharge follow up call. The patient has a scheduled hospitals appointment with Bernice Price NP on 11/13/23 @ 0800.

## 2023-11-13 ENCOUNTER — OFFICE VISIT (OUTPATIENT)
Dept: HOME HEALTH SERVICES | Facility: CLINIC | Age: 68
End: 2023-11-13
Payer: MEDICARE

## 2023-11-13 DIAGNOSIS — D61.818 PANCYTOPENIA: ICD-10-CM

## 2023-11-13 DIAGNOSIS — N18.31 STAGE 3A CHRONIC KIDNEY DISEASE: Chronic | ICD-10-CM

## 2023-11-13 DIAGNOSIS — I85.00 ESOPHAGEAL VARICES WITHOUT BLEEDING, UNSPECIFIED ESOPHAGEAL VARICES TYPE: Primary | ICD-10-CM

## 2023-11-13 PROCEDURE — 99347 PR HOME VISIT,ESTAB PATIENT,LEVEL I: ICD-10-PCS | Mod: 95,S$GLB,, | Performed by: NURSE PRACTITIONER

## 2023-11-13 PROCEDURE — 99347 HOME/RES VST EST SF MDM 20: CPT | Mod: 95,S$GLB,, | Performed by: NURSE PRACTITIONER

## 2023-11-14 PROBLEM — D61.818 PANCYTOPENIA: Status: ACTIVE | Noted: 2023-11-14

## 2023-11-15 NOTE — PROGRESS NOTES
[facility]  Established Patient - Audio Only Post-Hospitalization Telehealth Visit with [facility] Provider    Visit Date: 11/13/23   Encounter Provider: Bernice Price NP  PCP:  [facility]    PRESENTING HISTORY      Patient ID: Fransico Montalvo     Consult Requested By:  No ref. provider found  Reason for Consult:  Post-Hospitalization Telehealth Visit    Chief Complaint: Transitional Care     History of Present Illness:       Admission Date: 10/24/2023  Hospital Length of Stay: 6 days  Discharge Date and Time: 10/30/2023    HPI:   68-year-old male with past medical history of alcohol abuse, BPH, cirrhosis, hypertension who presents to the emergency department with chief complaint of syncope, lightheadedness, rib pain. He endorses a syncopal episode that occurred about 10 days ago. He was walking into his backyard, felt lightheaded, and lost consciousness. He was out for a few seconds. His roommate witnessed this. He went inside to get the patient some water. The patient was sitting upright, and had another syncopal episode without any prodromal symptoms. Again, he was out for a few seconds before regaining consciousness. He felt relatively well for the few days following that episode. For the last week, he endorses lightheadedness and dizziness. Feels lightheaded as if he might pass out, but also endorses vertiginous symptoms. His symptoms are worse with movement and ambulation. He had a fall yesterday. He states that he felt weak and lightheaded while walking, he fell to his left side, and struck the left side of his ribs on a nearby chair. He then fell to the ground. Did not hit his head or lose consciousness. He has been unable to walk since yesterday due to the dizziness. He has been crawling around his house since last night. Denies headache, cp, sob, abd pain, diarrhea. Does report nausea and vomiting and black stools over the last four days. Additionally, he broke his wrist a  few months ago. He significant decreased his alcohol use since then. Last drink was one week ago. Denies other drug use. Denies other worsening or alleviating factors.      In the ED patient tachycardic, hemodynamically stable, afebrile, saturating well on room air. Hb 4.4 down from 11 three months ago. Patient is unaware of if he has history of varices. CXR with remote left 7/8/9 rib fractures and patient reporting signficant pain with palpitation, deep inspiration, and certain movements. His initial Trop 4.6 and follow up 4.1. Patient started on iv protonix, octreotide, ceftriaxone, and transfusing 2 units PRBC. Patient admitted to the care of medicine for further evaluation and management.         Procedure(s) (LRB):  EGD (ESOPHAGOGASTRODUODENOSCOPY) (N/A)       Hospital Course:   68-year-old male with h/o esophageal variceal banding in 2018 was admitted for symptomatic anemia (hgb 4.4). He received 3 units pRBCs and 1 unit FFP. CTA the night of admission negative for acute bleed. EGD neg while in the ICU. Was recommended outpatient colonoscopy for his GIB and an abdominal MRI for pancreas/liver/splenic lesions found on CTA. Course complicated by type 2 NSTEMI and ISRAEL.  Lasix stopped; ISRAEL improved. Pt's hb stable > 9 on day of discharge.      Clear lungs bilaterally, unlabored breathing, on room air, no cyanosis   Heart sounds indicate a regular rate and rhythm   Awake alert, no acute distress   No facial droop, no slurred speech   Abdomen nondistended  No jermaine jaundice   No obvious lower extremity edema      Patient was extensively counseled on refraining from NSAIDs and following up with medical providers as an outpatient especially for repeat blood work. Informed about outpt MRI for pancreatic lesion. Verbalized interest in going directly home.    Also noted to have thoracic and lumbar compression fx's likely 2/2 osteoporosis and ETOH use. TSLO brace ordered.   _______________________________    Date of  Service: 11/13/23        The patient location is: HOME  The chief complaint leading to consultation is: routine care for evaluation and management of chronic medial issues and medication review.     Visit type: Virtual visit with audio only (telephone)     The reason for the audio only service rather than synchronous audio and video virtual visit was related to technical difficulties or patient preference/necessity.     Each patient to whom I provide medical services by telemedicine is:  (1) informed of the relationship between the physician and patient and the respective role of any other health care provider with respect to management of the patient; and (2) notified that they may decline to receive medical services by telemedicine and may withdraw from such care at any time. Patient verbally consented to receive this service via voice-only telephone call.     Subjective:   Today:  Mr. Fransico Montalvo is a 68 y.o. male being evaluated by telephone today for  transitional care visit to the home environment post-discharge from inpatient hospitalization encounter described above. Patient presents at baseline state of health as reported by patient and caregiver. Denies any acute issues, concerns or complaints to address on today's visit. Reports taking all medications as prescribed. No other needs identified at this time. Risks of environmental exposure to coronavirus discussed including: social distancing, hand hygiene, and limiting departures from the home for necessities only. Reports understanding and willingness to comevaluation and management of chronic medical issues and medication review. Patient denies any chest pain, SOB, nausea, vomiting, diarrhea, constipation, fever, chills, cough, known COVID exposure or illness. Reports taking all medications as prescribed. No other acute needs identified at this time. The patient is provided contact information to call to discuss any presenting concerns, questions or  "complaints until our next visit. Refills for all maintenance medications are confirmed on file at the patient's pharrmacy of choice.     OBJECTIVE:   Vital Signs: None Taken for this visit      Physical Exam   Laboratory  Lab Results   Component Value Date    WBC 2.68 (L) 10/30/2023    HGB 9.2 (L) 10/30/2023    HCT 30.2 (L) 10/30/2023    MCV 94 10/30/2023    PLT 97 (L) 10/30/2023     Lab Results   Component Value Date    INR 1.1 10/30/2023    INR 1.1 10/29/2023    INR 1.2 10/28/2023     Lab Results   Component Value Date    HGBA1C 4.2 07/28/2019     No results for input(s): "POCTGLUCOSE" in the last 72 hours.    TRANSITION OF CARE:     Family and/or Caretaker present at visit?  Yes.  Diagnostic tests reviewed/disposition: No diagnosic tests pending after this hospitalization.  Disease/illness education: Importance of Compliance with all prescribed medications and treatments, COVID Precautions/Social Distancing/Mask Use  Home health/community services discussion/referrals: Patient does not have home health established from hospital visit.  They do not need home health.  If needed, we will set up home health for the patient.   Establishment or re-establishment of referral orders for community resources: No other necessary community resources.   Discussion with other health care providers: No discussion with other health care providers necessary.     Transition of Care Visit:  I have reviewed and updated the history and problem list. I have reconciled the medication list. I have discussed the hospitalization and current medical issues, prognosis and plans with the patient/family.     Medications Reconciliation:   I have reconciled the patient's home medications and discharge medications with the patient/family. I have updated all changes. Refer to After-Visit Medication List.    Discharge plans, follow-up instructions, future appointments, provider contact information, indicators to seek emergency treatment and " encouragement to call for any questions, concerns or clarification of the patient's plan of care explained to patient and/or caregiver(s), whom confirm understanding of provided information and endorse willingness to comply.     Assessment:   Problem List Items Addressed on this Visit:  1. Esophageal varices without bleeding, unspecified esophageal varices type    2. Pancytopenia    3. Stage 3a chronic kidney disease      Plan:     1. Esophageal varices without bleeding, unspecified esophageal varices type    2. Pancytopenia    3. Stage 3a chronic kidney disease      No NSAIDS  The current medical regimen is effective;  continue present plan and medications.     Continue GI follow up    - [facility] to schedule follow-up visit with patient in 4-6 weeks or PRN.    Patient Instructions Given:  - Continue all medications, treatments and therapies as ordered.   - Follow all instructions, recommendations as discussed.  - Maintain Safety Precautions at all times.  - Attend all medical appointments as scheduled.  - For worsening symptoms: call Primary Care Physician or Nurse Practitioner.  - For emergencies, call 911 or immediately report to the nearest emergency room.    After Visit Medication List :     Medication List            Accurate as of November 13, 2023 11:59 PM. If you have any questions, ask your nurse or doctor.                CONTINUE taking these medications      capsaicin 0.025 % cream  Commonly known as: ZOSTRIX  Apply topically 2 (two) times daily as needed (rib pain).     EScitalopram oxalate 10 MG tablet  Commonly known as: LEXAPRO     magnesium oxide 400 mg (241.3 mg magnesium) tablet  Commonly known as: MAG-OX  Take 1 tablet (400 mg total) by mouth 2 (two) times daily.     oxyCODONE 5 MG immediate release tablet  Commonly known as: ROXICODONE  Take 1 tablet (5 mg total) by mouth every 8 (eight) hours as needed for Pain (pain not responding to tylenol).     pantoprazole 40 MG  tablet  Commonly known as: PROTONIX  Take 1 tablet (40 mg total) by mouth once daily.     tamsulosin 0.4 mg Cap  Commonly known as: FLOMAX  Take 1 capsule (0.4 mg total) by mouth once daily.            No future appointments.  Risks of environmental exposure to coronavirus discussed including: social distancing, hand hygiene, and limiting departures from the home for necessities only. Reports understanding and willingness to comply.     Signature:    Bernice Price, MSN, APRN, FNP-C  [facility]     Total time for this telephone encounter was 25 minutes. The following issues were discussed: primary and secondary diagnoses, co-morbidities, prescribed medications, treatment modalities, importance of compliance with medical advice and directives for follow-up care.    This service was not originating from a related E/M service provided within the previous 7 days nor will  to an E/M service or procedure within the next 24 hours or my soonest available appointment.  Prevailing standard of care was able to be met in this audio-only visit.

## 2024-01-29 PROBLEM — N17.9 AKI (ACUTE KIDNEY INJURY): Status: RESOLVED | Noted: 2019-07-29 | Resolved: 2024-01-29

## 2024-02-09 ENCOUNTER — EXTERNAL HOME HEALTH (OUTPATIENT)
Dept: HOME HEALTH SERVICES | Facility: HOSPITAL | Age: 69
End: 2024-02-09
Payer: MEDICARE

## 2024-03-28 ENCOUNTER — HOSPITAL ENCOUNTER (INPATIENT)
Facility: HOSPITAL | Age: 69
LOS: 7 days | Discharge: NURSING, REHAB OR LONG-TERM CARE | DRG: 377 | End: 2024-04-04
Attending: EMERGENCY MEDICINE | Admitting: EMERGENCY MEDICINE
Payer: MEDICARE

## 2024-03-28 DIAGNOSIS — N17.9 AKI (ACUTE KIDNEY INJURY): ICD-10-CM

## 2024-03-28 DIAGNOSIS — J18.9 MULTIFOCAL PNEUMONIA: ICD-10-CM

## 2024-03-28 DIAGNOSIS — J44.1 COPD EXACERBATION: ICD-10-CM

## 2024-03-28 DIAGNOSIS — R09.02 HYPOXEMIA: ICD-10-CM

## 2024-03-28 DIAGNOSIS — K70.30 ALCOHOLIC CIRRHOSIS OF LIVER WITHOUT ASCITES: Chronic | ICD-10-CM

## 2024-03-28 DIAGNOSIS — D68.9 COAGULOPATHY: ICD-10-CM

## 2024-03-28 DIAGNOSIS — J96.01 ACUTE HYPOXEMIC RESPIRATORY FAILURE: Primary | ICD-10-CM

## 2024-03-28 DIAGNOSIS — D64.9 ANEMIA, UNSPECIFIED TYPE: ICD-10-CM

## 2024-03-28 DIAGNOSIS — R06.02 SOB (SHORTNESS OF BREATH): ICD-10-CM

## 2024-03-28 DIAGNOSIS — A41.9 SEPSIS, DUE TO UNSPECIFIED ORGANISM, UNSPECIFIED WHETHER ACUTE ORGAN DYSFUNCTION PRESENT: ICD-10-CM

## 2024-03-28 DIAGNOSIS — E87.20 LACTIC ACID ACIDOSIS: ICD-10-CM

## 2024-03-28 PROBLEM — D63.8 ANEMIA OF CHRONIC DISEASE: Chronic | Status: RESOLVED | Noted: 2018-07-30 | Resolved: 2024-03-28

## 2024-03-28 LAB
ABO + RH BLD: NORMAL
ALBUMIN SERPL BCP-MCNC: 3.3 G/DL (ref 3.5–5.2)
ALLENS TEST: ABNORMAL
ALP SERPL-CCNC: 75 U/L (ref 55–135)
ALT SERPL W/O P-5'-P-CCNC: 18 U/L (ref 10–44)
ANION GAP SERPL CALC-SCNC: 18 MMOL/L (ref 8–16)
ANISOCYTOSIS BLD QL SMEAR: SLIGHT
AST SERPL-CCNC: 38 U/L (ref 10–40)
BASOPHILS # BLD AUTO: 0 K/UL (ref 0–0.2)
BASOPHILS # BLD AUTO: 0.01 K/UL (ref 0–0.2)
BASOPHILS NFR BLD: 0 % (ref 0–1.9)
BASOPHILS NFR BLD: 0.2 % (ref 0–1.9)
BILIRUB SERPL-MCNC: 1.6 MG/DL (ref 0.1–1)
BLD GP AB SCN CELLS X3 SERPL QL: NORMAL
BLD PROD TYP BPU: NORMAL
BLOOD UNIT EXPIRATION DATE: NORMAL
BLOOD UNIT TYPE CODE: 5100
BLOOD UNIT TYPE: NORMAL
BNP SERPL-MCNC: 2932 PG/ML (ref 0–99)
BUN SERPL-MCNC: 28 MG/DL (ref 8–23)
BUN SERPL-MCNC: 29 MG/DL (ref 6–30)
CALCIUM SERPL-MCNC: 8.9 MG/DL (ref 8.7–10.5)
CHLORIDE SERPL-SCNC: 103 MMOL/L (ref 95–110)
CHLORIDE SERPL-SCNC: 105 MMOL/L (ref 95–110)
CK SERPL-CCNC: 40 U/L (ref 20–200)
CO2 SERPL-SCNC: 13 MMOL/L (ref 23–29)
CODING SYSTEM: NORMAL
CREAT SERPL-MCNC: 1.7 MG/DL (ref 0.5–1.4)
CREAT SERPL-MCNC: 2.1 MG/DL (ref 0.5–1.4)
CROSSMATCH INTERPRETATION: NORMAL
DACRYOCYTES BLD QL SMEAR: ABNORMAL
DELSYS: ABNORMAL
DIFFERENTIAL METHOD BLD: ABNORMAL
DISPENSE STATUS: NORMAL
EOSINOPHIL # BLD AUTO: 0 K/UL (ref 0–0.5)
EOSINOPHIL NFR BLD: 0 % (ref 0–8)
EOSINOPHIL NFR BLD: 0.2 % (ref 0–8)
ERYTHROCYTE [DISTWIDTH] IN BLOOD BY AUTOMATED COUNT: 21.1 % (ref 11.5–14.5)
ERYTHROCYTE [DISTWIDTH] IN BLOOD BY AUTOMATED COUNT: 21.6 % (ref 11.5–14.5)
ERYTHROCYTE [DISTWIDTH] IN BLOOD BY AUTOMATED COUNT: 21.6 % (ref 11.5–14.5)
ERYTHROCYTE [DISTWIDTH] IN BLOOD BY AUTOMATED COUNT: 22 % (ref 11.5–14.5)
ERYTHROCYTE [DISTWIDTH] IN BLOOD BY AUTOMATED COUNT: 22.5 % (ref 11.5–14.5)
EST. GFR  (NO RACE VARIABLE): 33.4 ML/MIN/1.73 M^2
GLUCOSE SERPL-MCNC: 130 MG/DL (ref 70–110)
GLUCOSE SERPL-MCNC: 134 MG/DL (ref 70–110)
HCO3 UR-SCNC: 12.7 MMOL/L (ref 24–28)
HCT VFR BLD AUTO: 16.3 % (ref 40–54)
HCT VFR BLD AUTO: 20.6 % (ref 40–54)
HCT VFR BLD AUTO: 22.9 % (ref 40–54)
HCT VFR BLD AUTO: 25 % (ref 40–54)
HCT VFR BLD AUTO: 27.7 % (ref 40–54)
HCT VFR BLD CALC: 18 %PCV (ref 36–54)
HCT VFR BLD CALC: 19 %PCV (ref 36–54)
HGB BLD-MCNC: 4.1 G/DL (ref 14–18)
HGB BLD-MCNC: 5.8 G/DL (ref 14–18)
HGB BLD-MCNC: 6.7 G/DL (ref 14–18)
HGB BLD-MCNC: 7.6 G/DL (ref 14–18)
HGB BLD-MCNC: 8.3 G/DL (ref 14–18)
HYPOCHROMIA BLD QL SMEAR: ABNORMAL
IMM GRANULOCYTES # BLD AUTO: 0.01 K/UL (ref 0–0.04)
IMM GRANULOCYTES # BLD AUTO: 0.02 K/UL (ref 0–0.04)
IMM GRANULOCYTES # BLD AUTO: 0.02 K/UL (ref 0–0.04)
IMM GRANULOCYTES # BLD AUTO: 0.03 K/UL (ref 0–0.04)
IMM GRANULOCYTES # BLD AUTO: 0.03 K/UL (ref 0–0.04)
IMM GRANULOCYTES NFR BLD AUTO: 0.2 % (ref 0–0.5)
IMM GRANULOCYTES NFR BLD AUTO: 0.4 % (ref 0–0.5)
IMM GRANULOCYTES NFR BLD AUTO: 0.5 % (ref 0–0.5)
IMM GRANULOCYTES NFR BLD AUTO: 0.6 % (ref 0–0.5)
IMM GRANULOCYTES NFR BLD AUTO: 0.8 % (ref 0–0.5)
INFLUENZA A, MOLECULAR: NOT DETECTED
INFLUENZA B, MOLECULAR: NOT DETECTED
LACTATE SERPL-SCNC: 6.2 MMOL/L (ref 0.5–2.2)
LDH SERPL L TO P-CCNC: 6.17 MMOL/L (ref 0.5–2.2)
LDH SERPL L TO P-CCNC: 8.43 MMOL/L (ref 0.5–2.2)
LYMPHOCYTES # BLD AUTO: 0.2 K/UL (ref 1–4.8)
LYMPHOCYTES # BLD AUTO: 0.3 K/UL (ref 1–4.8)
LYMPHOCYTES # BLD AUTO: 0.3 K/UL (ref 1–4.8)
LYMPHOCYTES # BLD AUTO: 0.4 K/UL (ref 1–4.8)
LYMPHOCYTES # BLD AUTO: 0.9 K/UL (ref 1–4.8)
LYMPHOCYTES NFR BLD: 17.4 % (ref 18–48)
LYMPHOCYTES NFR BLD: 4.7 % (ref 18–48)
LYMPHOCYTES NFR BLD: 6.6 % (ref 18–48)
LYMPHOCYTES NFR BLD: 6.6 % (ref 18–48)
LYMPHOCYTES NFR BLD: 7.7 % (ref 18–48)
MAGNESIUM SERPL-MCNC: 1.6 MG/DL (ref 1.6–2.6)
MCH RBC QN AUTO: 18.3 PG (ref 27–31)
MCH RBC QN AUTO: 21.4 PG (ref 27–31)
MCH RBC QN AUTO: 22.4 PG (ref 27–31)
MCH RBC QN AUTO: 22.8 PG (ref 27–31)
MCH RBC QN AUTO: 23.5 PG (ref 27–31)
MCHC RBC AUTO-ENTMCNC: 25.2 G/DL (ref 32–36)
MCHC RBC AUTO-ENTMCNC: 28.2 G/DL (ref 32–36)
MCHC RBC AUTO-ENTMCNC: 29.3 G/DL (ref 32–36)
MCHC RBC AUTO-ENTMCNC: 30 G/DL (ref 32–36)
MCHC RBC AUTO-ENTMCNC: 30.4 G/DL (ref 32–36)
MCV RBC AUTO: 73 FL (ref 82–98)
MCV RBC AUTO: 76 FL (ref 82–98)
MCV RBC AUTO: 76 FL (ref 82–98)
MCV RBC AUTO: 77 FL (ref 82–98)
MCV RBC AUTO: 77 FL (ref 82–98)
MONOCYTES # BLD AUTO: 0.1 K/UL (ref 0.3–1)
MONOCYTES # BLD AUTO: 0.3 K/UL (ref 0.3–1)
MONOCYTES # BLD AUTO: 0.4 K/UL (ref 0.3–1)
MONOCYTES NFR BLD: 1.8 % (ref 4–15)
MONOCYTES NFR BLD: 2 % (ref 4–15)
MONOCYTES NFR BLD: 2.8 % (ref 4–15)
MONOCYTES NFR BLD: 6.5 % (ref 4–15)
MONOCYTES NFR BLD: 7.9 % (ref 4–15)
NEUTROPHILS # BLD AUTO: 3.3 K/UL (ref 1.8–7.7)
NEUTROPHILS # BLD AUTO: 3.6 K/UL (ref 1.8–7.7)
NEUTROPHILS # BLD AUTO: 3.8 K/UL (ref 1.8–7.7)
NEUTROPHILS # BLD AUTO: 4.2 K/UL (ref 1.8–7.7)
NEUTROPHILS # BLD AUTO: 4.2 K/UL (ref 1.8–7.7)
NEUTROPHILS NFR BLD: 73.9 % (ref 38–73)
NEUTROPHILS NFR BLD: 85.6 % (ref 38–73)
NEUTROPHILS NFR BLD: 90 % (ref 38–73)
NEUTROPHILS NFR BLD: 91.1 % (ref 38–73)
NEUTROPHILS NFR BLD: 92.5 % (ref 38–73)
NRBC BLD-RTO: 0 /100 WBC
NRBC BLD-RTO: 1 /100 WBC
OHS QRS DURATION: 94 MS
OHS QRS DURATION: 98 MS
OHS QTC CALCULATION: 438 MS
OHS QTC CALCULATION: 447 MS
OVALOCYTES BLD QL SMEAR: ABNORMAL
PCO2 BLDA: 20.9 MMHG (ref 35–45)
PH SMN: 7.39 [PH] (ref 7.35–7.45)
PLATELET # BLD AUTO: 122 K/UL (ref 150–450)
PLATELET # BLD AUTO: 126 K/UL (ref 150–450)
PLATELET # BLD AUTO: 130 K/UL (ref 150–450)
PLATELET # BLD AUTO: ABNORMAL K/UL (ref 150–450)
PLATELET # BLD AUTO: ABNORMAL K/UL (ref 150–450)
PLATELET BLD QL SMEAR: ABNORMAL
PMV BLD AUTO: 10.4 FL (ref 9.2–12.9)
PMV BLD AUTO: 11.1 FL (ref 9.2–12.9)
PMV BLD AUTO: 11.6 FL (ref 9.2–12.9)
PMV BLD AUTO: ABNORMAL FL (ref 9.2–12.9)
PMV BLD AUTO: ABNORMAL FL (ref 9.2–12.9)
PO2 BLDA: 175 MMHG (ref 80–100)
POC BE: -12 MMOL/L
POC IONIZED CALCIUM: 1.09 MMOL/L (ref 1.06–1.42)
POC IONIZED CALCIUM: 1.15 MMOL/L (ref 1.06–1.42)
POC SATURATED O2: 100 % (ref 95–100)
POC TCO2 (MEASURED): 16 MMOL/L (ref 23–29)
POC TCO2: 13 MMOL/L (ref 23–27)
POIKILOCYTOSIS BLD QL SMEAR: SLIGHT
POLYCHROMASIA BLD QL SMEAR: ABNORMAL
POLYCHROMASIA BLD QL SMEAR: ABNORMAL
POTASSIUM BLD-SCNC: 4.1 MMOL/L (ref 3.5–5.1)
POTASSIUM BLD-SCNC: 4.3 MMOL/L (ref 3.5–5.1)
POTASSIUM SERPL-SCNC: 4.2 MMOL/L (ref 3.5–5.1)
PROT SERPL-MCNC: 7.5 G/DL (ref 6–8.4)
RBC # BLD AUTO: 2.24 M/UL (ref 4.6–6.2)
RBC # BLD AUTO: 2.71 M/UL (ref 4.6–6.2)
RBC # BLD AUTO: 2.99 M/UL (ref 4.6–6.2)
RBC # BLD AUTO: 3.23 M/UL (ref 4.6–6.2)
RBC # BLD AUTO: 3.64 M/UL (ref 4.6–6.2)
RSV AG BY MOLECULAR METHOD: NOT DETECTED
SAMPLE: ABNORMAL
SARS-COV-2 RNA RESP QL NAA+PROBE: NOT DETECTED
SITE: ABNORMAL
SODIUM BLD-SCNC: 136 MMOL/L (ref 136–145)
SODIUM BLD-SCNC: 136 MMOL/L (ref 136–145)
SODIUM SERPL-SCNC: 136 MMOL/L (ref 136–145)
SPECIMEN OUTDATE: NORMAL
SPHEROCYTES BLD QL SMEAR: ABNORMAL
SPHEROCYTES BLD QL SMEAR: ABNORMAL
TARGETS BLD QL SMEAR: ABNORMAL
TRANS ERYTHROCYTES VOL PATIENT: NORMAL ML
TROPONIN I SERPL DL<=0.01 NG/ML-MCNC: 0.14 NG/ML (ref 0–0.03)
WBC # BLD AUTO: 3.58 K/UL (ref 3.9–12.7)
WBC # BLD AUTO: 3.95 K/UL (ref 3.9–12.7)
WBC # BLD AUTO: 4.69 K/UL (ref 3.9–12.7)
WBC # BLD AUTO: 4.93 K/UL (ref 3.9–12.7)
WBC # BLD AUTO: 5.18 K/UL (ref 3.9–12.7)

## 2024-03-28 PROCEDURE — 30233N1 TRANSFUSION OF NONAUTOLOGOUS RED BLOOD CELLS INTO PERIPHERAL VEIN, PERCUTANEOUS APPROACH: ICD-10-PCS | Performed by: EMERGENCY MEDICINE

## 2024-03-28 PROCEDURE — 85025 COMPLETE CBC W/AUTO DIFF WBC: CPT | Mod: 91

## 2024-03-28 PROCEDURE — 82330 ASSAY OF CALCIUM: CPT

## 2024-03-28 PROCEDURE — 63600175 PHARM REV CODE 636 W HCPCS: Performed by: STUDENT IN AN ORGANIZED HEALTH CARE EDUCATION/TRAINING PROGRAM

## 2024-03-28 PROCEDURE — 83735 ASSAY OF MAGNESIUM: CPT | Performed by: EMERGENCY MEDICINE

## 2024-03-28 PROCEDURE — 99223 1ST HOSP IP/OBS HIGH 75: CPT | Mod: GC,,, | Performed by: INTERNAL MEDICINE

## 2024-03-28 PROCEDURE — P9021 RED BLOOD CELLS UNIT: HCPCS

## 2024-03-28 PROCEDURE — 99900035 HC TECH TIME PER 15 MIN (STAT)

## 2024-03-28 PROCEDURE — 85014 HEMATOCRIT: CPT

## 2024-03-28 PROCEDURE — 0241U SARS-COV2 (COVID) WITH FLU/RSV BY PCR: CPT | Performed by: EMERGENCY MEDICINE

## 2024-03-28 PROCEDURE — 99291 CRITICAL CARE FIRST HOUR: CPT

## 2024-03-28 PROCEDURE — 82550 ASSAY OF CK (CPK): CPT | Performed by: EMERGENCY MEDICINE

## 2024-03-28 PROCEDURE — 36600 WITHDRAWAL OF ARTERIAL BLOOD: CPT

## 2024-03-28 PROCEDURE — 86920 COMPATIBILITY TEST SPIN: CPT

## 2024-03-28 PROCEDURE — 80053 COMPREHEN METABOLIC PANEL: CPT | Performed by: EMERGENCY MEDICINE

## 2024-03-28 PROCEDURE — 83605 ASSAY OF LACTIC ACID: CPT | Performed by: STUDENT IN AN ORGANIZED HEALTH CARE EDUCATION/TRAINING PROGRAM

## 2024-03-28 PROCEDURE — 85025 COMPLETE CBC W/AUTO DIFF WBC: CPT | Performed by: EMERGENCY MEDICINE

## 2024-03-28 PROCEDURE — 81001 URINALYSIS AUTO W/SCOPE: CPT | Performed by: EMERGENCY MEDICINE

## 2024-03-28 PROCEDURE — 85025 COMPLETE CBC W/AUTO DIFF WBC: CPT | Mod: 91 | Performed by: STUDENT IN AN ORGANIZED HEALTH CARE EDUCATION/TRAINING PROGRAM

## 2024-03-28 PROCEDURE — 83605 ASSAY OF LACTIC ACID: CPT

## 2024-03-28 PROCEDURE — 87040 BLOOD CULTURE FOR BACTERIA: CPT | Mod: 59 | Performed by: EMERGENCY MEDICINE

## 2024-03-28 PROCEDURE — 84484 ASSAY OF TROPONIN QUANT: CPT | Performed by: EMERGENCY MEDICINE

## 2024-03-28 PROCEDURE — 63700000 PHARM REV CODE 250 ALT 637 W/O HCPCS: Performed by: STUDENT IN AN ORGANIZED HEALTH CARE EDUCATION/TRAINING PROGRAM

## 2024-03-28 PROCEDURE — C9113 INJ PANTOPRAZOLE SODIUM, VIA: HCPCS | Performed by: STUDENT IN AN ORGANIZED HEALTH CARE EDUCATION/TRAINING PROGRAM

## 2024-03-28 PROCEDURE — 86850 RBC ANTIBODY SCREEN: CPT | Performed by: EMERGENCY MEDICINE

## 2024-03-28 PROCEDURE — 12000002 HC ACUTE/MED SURGE SEMI-PRIVATE ROOM

## 2024-03-28 PROCEDURE — 83880 ASSAY OF NATRIURETIC PEPTIDE: CPT | Performed by: EMERGENCY MEDICINE

## 2024-03-28 PROCEDURE — 94761 N-INVAS EAR/PLS OXIMETRY MLT: CPT | Mod: XB

## 2024-03-28 PROCEDURE — 25000003 PHARM REV CODE 250

## 2024-03-28 PROCEDURE — 84132 ASSAY OF SERUM POTASSIUM: CPT

## 2024-03-28 PROCEDURE — 96365 THER/PROPH/DIAG IV INF INIT: CPT

## 2024-03-28 PROCEDURE — 36430 TRANSFUSION BLD/BLD COMPNT: CPT

## 2024-03-28 PROCEDURE — 63600175 PHARM REV CODE 636 W HCPCS

## 2024-03-28 PROCEDURE — 25000003 PHARM REV CODE 250: Performed by: STUDENT IN AN ORGANIZED HEALTH CARE EDUCATION/TRAINING PROGRAM

## 2024-03-28 PROCEDURE — 93005 ELECTROCARDIOGRAM TRACING: CPT

## 2024-03-28 PROCEDURE — 82803 BLOOD GASES ANY COMBINATION: CPT

## 2024-03-28 PROCEDURE — 93010 ELECTROCARDIOGRAM REPORT: CPT | Mod: ,,, | Performed by: INTERNAL MEDICINE

## 2024-03-28 PROCEDURE — 84295 ASSAY OF SERUM SODIUM: CPT

## 2024-03-28 RX ORDER — SODIUM CHLORIDE 0.9 % (FLUSH) 0.9 %
10 SYRINGE (ML) INJECTION
Status: DISCONTINUED | OUTPATIENT
Start: 2024-03-28 | End: 2024-04-04 | Stop reason: HOSPADM

## 2024-03-28 RX ORDER — PANTOPRAZOLE SODIUM 40 MG/10ML
40 INJECTION, POWDER, LYOPHILIZED, FOR SOLUTION INTRAVENOUS DAILY
Status: DISCONTINUED | OUTPATIENT
Start: 2024-03-28 | End: 2024-03-28

## 2024-03-28 RX ORDER — PANTOPRAZOLE SODIUM 40 MG/10ML
80 INJECTION, POWDER, LYOPHILIZED, FOR SOLUTION INTRAVENOUS ONCE
Status: COMPLETED | OUTPATIENT
Start: 2024-03-28 | End: 2024-03-28

## 2024-03-28 RX ORDER — PANTOPRAZOLE SODIUM 40 MG/10ML
40 INJECTION, POWDER, LYOPHILIZED, FOR SOLUTION INTRAVENOUS 2 TIMES DAILY
Status: DISCONTINUED | OUTPATIENT
Start: 2024-03-28 | End: 2024-04-02

## 2024-03-28 RX ORDER — HYDROCODONE BITARTRATE AND ACETAMINOPHEN 500; 5 MG/1; MG/1
TABLET ORAL
Status: DISCONTINUED | OUTPATIENT
Start: 2024-03-28 | End: 2024-04-04

## 2024-03-28 RX ORDER — AZITHROMYCIN 250 MG/1
500 TABLET, FILM COATED ORAL DAILY
Status: COMPLETED | OUTPATIENT
Start: 2024-03-28 | End: 2024-03-30

## 2024-03-28 RX ORDER — ACETAMINOPHEN 325 MG/1
650 TABLET ORAL EVERY 4 HOURS PRN
Status: DISCONTINUED | OUTPATIENT
Start: 2024-03-28 | End: 2024-03-29

## 2024-03-28 RX ORDER — ONDANSETRON 8 MG/1
8 TABLET, ORALLY DISINTEGRATING ORAL EVERY 8 HOURS PRN
Status: DISCONTINUED | OUTPATIENT
Start: 2024-03-28 | End: 2024-04-04 | Stop reason: HOSPADM

## 2024-03-28 RX ADMIN — VANCOMYCIN HYDROCHLORIDE 2000 MG: 500 INJECTION, POWDER, LYOPHILIZED, FOR SOLUTION INTRAVENOUS at 01:03

## 2024-03-28 RX ADMIN — PANTOPRAZOLE SODIUM 80 MG: 40 INJECTION, POWDER, FOR SOLUTION INTRAVENOUS at 12:03

## 2024-03-28 RX ADMIN — CEFTRIAXONE 1 G: 1 INJECTION, POWDER, FOR SOLUTION INTRAMUSCULAR; INTRAVENOUS at 01:03

## 2024-03-28 RX ADMIN — AZITHROMYCIN DIHYDRATE 500 MG: 250 TABLET ORAL at 01:03

## 2024-03-28 RX ADMIN — PANTOPRAZOLE SODIUM 40 MG: 40 INJECTION, POWDER, FOR SOLUTION INTRAVENOUS at 10:03

## 2024-03-28 RX ADMIN — PIPERACILLIN SODIUM AND TAZOBACTAM SODIUM 4.5 G: 4; .5 INJECTION, POWDER, FOR SOLUTION INTRAVENOUS at 09:03

## 2024-03-28 RX ADMIN — THIAMINE HYDROCHLORIDE 500 MG: 100 INJECTION, SOLUTION INTRAMUSCULAR; INTRAVENOUS at 12:03

## 2024-03-28 NOTE — ASSESSMENT & PLAN NOTE
Wean supplemental oxygen as appropriate  Normal ABG upon ER presentation, reassuring   Placed on CAP coverage  Trend WBC and cultures  Reconsult ICU if medical deterioration

## 2024-03-28 NOTE — ED TRIAGE NOTES
Pt c/o SOB for past x2 months, pt reports has been laying on ground at home x4 days saturated in urine/feces. EMS states pt room sat was in lower 70s/high 60s, gave solumedrol neb pre arrival

## 2024-03-28 NOTE — AI DETERIORATION ALERT
RAPID RESPONSE NURSE AI ALERT       AI alert received.    Chart Reviewed: 03/28/2024, 5:28 PM    MRN: [id number]  Bed: ED 07/07    Dx: <principal problem not specified>    Fransico Montalvo has a past medical history of Alcohol abuse, BPH (benign prostatic hyperplasia), Cirrhosis, Closed fracture of left distal radius, Gastritis, Hypertension, and Renal disorder.    Last VS: /76   Pulse 103   Temp 97.9 °F (36.6 °C) (Oral)   Resp (!) 24   Wt 81.6 kg (180 lb)   SpO2 98%   BMI 27.37 kg/m²     24H Vital Sign Range:  Temp:  [97.5 °F (36.4 °C)-97.9 °F (36.6 °C)]   Pulse:  [102-115]   Resp:  [18-27]   BP: (103-143)/(61-95)   SpO2:  [79 %-100 %]     Level of Consciousness (AVPU): alert    Recent Labs     03/28/24  0846 03/28/24  0903 03/28/24  1401   WBC  --  5.18 3.58*   HGB  --  4.1* 5.8*   HCT 18* 16.3* 20.6*   PLT  --  130* SEE COMMENT       Recent Labs     03/28/24  0903      K 4.2      CO2 13*   BUN 28*   CREATININE 2.1*   *   MG 1.6        Recent Labs     03/28/24  0844   PH 7.393   PCO2 20.9*   PO2 175*   HCO3 12.7*   POCSATURATED 100   BE -12*        OXYGEN:  Flow (L/min): 15  Oxygen Concentration (%): 40       MEWS score: 3    Bedside RNAnu  contacted for Anemia reports patient has improved since 1100 am. Patient currently receiving 3 U of PRBCs.  No concerns verbalized at this time. Instructed to call [phone] for further concerns or assistance.    aRffi Moyer RN

## 2024-03-28 NOTE — ED NOTES
I-STAT Chem-8+ Results:   Value Reference Range   Sodium 136 136-145 mmol/L   Potassium  4.3 3.5-5.1 mmol/L   Chloride 103  mmol/L   Ionized Calcium 1.09 1.06-1.42 mmol/L   CO2 (measured) 16 23-29 mmol/L   Glucose 134  mg/dL   BUN 29 6-30 mg/dL   Creatinine 1.7 0.5-1.4 mg/dL   Hematocrit 18 36-54%

## 2024-03-28 NOTE — CLINICAL REVIEW
IP Sepsis Screen (most recent)       Sepsis Screen (IP) - 03/28/24 1547       Is the patient's history or complaint suggestive of a possible infection? Yes  -DD    Are there at least two of the following signs and symptoms present? Yes  -DD    Sepsis signs/symptoms - Tachycardia Tachycardia     >90  -DD    Sepsis signs/symptoms - Tachypnea Tachypnea     >20  -DD    Sepsis signs/symptoms - WBC WBC < 4,000 or WBC > 12,000  -DD    Are any of the following organ dysfunction criteria present and not considered to be due to a chronic condition? Yes  -DD    Organ Dysfunction Criteria Creatinine > 2.0  -DD    Organ Dysfunction Criteria Lactate > 2.0  -DD    Organ Dysfunction Criteria - Resp Comp Respiratory Compromise: Requiring > 5L NC  -DD    Initiate Sepsis Protocol No  -DD    Reason sepsis not considered Pt. receiving appropriate management  -DD              User Key  (r) = Recorded By, (t) = Taken By, (c) = Cosigned By      Initials Name    Girish Issa, JYOTI

## 2024-03-28 NOTE — ASSESSMENT & PLAN NOTE
Holding home antihypertensives as bp upon ER presentation was on the lower side  Did not require pressor support  Monitor vitals [id number]  Maintain MAP >65 mmhg

## 2024-03-28 NOTE — HPI
68 year old gentleman who is a known case of f alcohol abuse, BPH, cirrhosis, hypertension, esophageal varices, gastritis and tobacco abuse presenting with shortness of breath that started about 4 days ago. It was accompanied by a dry cough and dizziness as well as generalized weakness. He reported no fever, sputum, hemoptysis, bleeding from any orifice. He reported no changes in stool color. Patient did not complain of chest pain, seizures, syncope, or motor weakness. He denied a trauma and falls. He noted that he was diagnosed with pneumonia 3 days  ago and was prescribed antibiotics. Patient was afebrile in the ER: BP: 125/80 SpO2: 89% on venturi mask . He was  found to be hypoxemic with end-tidal CO2 of 20 and hypoxemic to 79% requiring immediate  oxygen support at the EMS. His hemoglobin was found to be around 4 and 3 pints of PRBC were  scheduled for transfusion. By the time of our arrival his second bag was flowing.  Patient is to be admitted as a case of CAP, Anemia, and acute hypoxemic respiratory failure.  He received two pints PRBC in the ER, maintaining approproriate saturation of SpO2 with venturi mask on, given a shot of Vancomycin in the Er and placed on CAP coverage antibiotics.     Patient appears to be stable for stepdown to hospital medicine teams.  Maintaining appropriate vitals with minimal support.

## 2024-03-28 NOTE — SUBJECTIVE & OBJECTIVE
Past Medical History:   Diagnosis Date    Alcohol abuse     BPH (benign prostatic hyperplasia)     Cirrhosis 06/21/2018    pt states that he was diagnosed a week ago during his last hospital visit    Closed fracture of left distal radius 7/19/2023    Gastritis     Hypertension     Renal disorder        Past Surgical History:   Procedure Laterality Date    CAPSULOTOMY OF JOINT Right 7/29/2019    Procedure: CAPSULOTOMY, JOINT;  Surgeon: Raj Grossman MD;  Location: [address];  Service: Orthopedics;  Laterality: Right;    ESOPHAGOGASTRODUODENOSCOPY N/A 10/25/2023    Procedure: EGD (ESOPHAGOGASTRODUODENOSCOPY);  Surgeon: Lux Rome MD;  Location: [facility] ENDO ([address] [place] [address]);  Service: Endoscopy;  Laterality: N/A;    PERCUTANEOUS PINNING OF HIP Right 7/29/2019    Procedure: PINNING, HIP, PERCUTANEOUS- synthes cannulated screws- hana table- C arm door side-;  Surgeon: Raj Grossman MD;  Location: [address];  Service: Orthopedics;  Laterality: Right;       Review of patient's allergies indicates:  No Known Allergies    Family History       Problem Relation (Age of Onset)    Heart disease Father, Brother    Hypertension Father          Tobacco Use    Smoking status: Every Day     Current packs/day: 1.00     Types: Cigarettes    Smokeless tobacco: Never   Substance and Sexual Activity    Alcohol use: Yes     Comment: (former drinker; 2months sober) this visit admits drinking a couple beers today    Drug use: No    Sexual activity: Not on file      Review of Systems   Constitutional:  Positive for activity change. Negative for fever.   HENT:  Negative for congestion and drooling.    Respiratory:  Positive for cough (dry), chest tightness and shortness of breath.    Cardiovascular:  Negative for chest pain, palpitations and leg swelling.   Gastrointestinal:  Negative for abdominal distention, abdominal pain, anal bleeding, blood in stool, constipation, diarrhea, nausea and vomiting.   Genitourinary:   Negative for dysuria, hematuria and urgency.   Musculoskeletal:  Negative for back pain.   Neurological:  Negative for dizziness, seizures, weakness and numbness.   Psychiatric/Behavioral:  Negative for agitation, behavioral problems and confusion.      Objective:     Vital Signs (Most Recent):  Temp: 97.9 °F (36.6 °C) (03/28/24 1317)  Pulse: 104 (03/28/24 1331)  Resp: (!) 24 (03/28/24 1257)  BP: 125/80 (03/28/24 1331)  SpO2: (!) 89 % (03/28/24 1331) Vital Signs (24h Range):  Temp:  [97.5 °F (36.4 °C)-97.9 °F (36.6 °C)] 97.9 °F (36.6 °C)  Pulse:  [104-115] 104  Resp:  [18-27] 24  SpO2:  [79 %-97 %] 89 %  BP: (103-143)/(61-95) 125/80   Weight: 81.6 kg (180 lb)  Body mass index is 27.37 kg/m².      Intake/Output Summary (Last 24 hours) at 3/28/2024 1503  Last data filed at 3/28/2024 1314  Gross per 24 hour   Intake 562.41 ml   Output --   Net 562.41 ml          Physical Exam  Constitutional:       Appearance: He is ill-appearing.   HENT:      Head: Normocephalic and atraumatic.   Eyes:      Pupils: Pupils are equal, round, and reactive to light.   Cardiovascular:      Rate and Rhythm: Regular rhythm. Tachycardia present.      Pulses: Normal pulses.      Heart sounds: Normal heart sounds.   Pulmonary:      Effort: Respiratory distress present.      Breath sounds: No wheezing or rales.   Chest:      Chest wall: No tenderness.   Abdominal:      General: Abdomen is flat. Bowel sounds are normal.      Palpations: Abdomen is soft.   Musculoskeletal:      Right lower leg: No edema.      Left lower leg: No edema.   Skin:     Capillary Refill: Capillary refill takes less than 2 seconds.   Neurological:      General: No focal deficit present.      Mental Status: He is alert and oriented to person, place, and time. Mental status is at baseline.   Psychiatric:         Mood and Affect: Mood normal.         Behavior: Behavior normal.         Thought Content: Thought content normal.         Judgment: Judgment normal.             Vents:  Oxygen Concentration (%): 40 (03/28/24 1231)  Lines/Drains/Airways       Peripheral Intravenous Line  Duration                  Peripheral IV - Single Lumen 03/28/24 0815 20 G Posterior;Right Hand <1 day         Peripheral IV - Single Lumen 03/28/24 0913 18 G Left;Posterior Hand <1 day                  Significant Labs:    CBC/Anemia Profile:  Recent Labs   Lab 03/28/24  0844 03/28/24  0846 03/28/24  0903   WBC  --   --  5.18   HGB  --   --  4.1*   HCT 19* 18* 16.3*   PLT  --   --  130*   MCV  --   --  73*   RDW  --   --  21.1*        Chemistries:  Recent Labs   Lab 03/28/24  0903      K 4.2      CO2 13*   BUN 28*   CREATININE 2.1*   CALCIUM 8.9   ALBUMIN 3.3*   PROT 7.5   BILITOT 1.6*   ALKPHOS 75   ALT 18   AST 38   MG 1.6       All pertinent labs within the past 24 hours have been reviewed.    Significant Imaging: I have reviewed all pertinent imaging results/findings within the past 24 hours.

## 2024-03-28 NOTE — ASSESSMENT & PLAN NOTE
Baseline Hgb is around 9  Hemoglobin today around 4  Received 2 pints PRBC and the third is on the way  Started Pantoprazole 40mg IV BID  CBC to be followed  Hemodynamically stable, no evidence of variceal bleed  Given the hemodynamic stability and normal mentation, does not need ICU admission at this time  If clinical alterations, reconsult MICU

## 2024-03-28 NOTE — ED PROVIDER NOTES
Encounter Date: 3/28/2024       History     Chief Complaint   Patient presents with    Shortness of Breath     x4 days, been laying on floor for past 4 days, pt saturated in urine and diarrhea     The history is provided by the patient, the EMS personnel and medical records. No  was used.       Fransico Montalvo is a 68-year-old male with a history of alcohol abuse, BPH, cirrhosis, hypertension, esophageal varices, gastritis and tobacco abuse presenting with shortness of breath.  Patient presents via EMS for shortness of breath and hypoxemia.  On arrival, patient has increased work of breathing, hypoxia, looks pale but able to answer questions.  He reports shortness of breath for 4 days, and per EMS was found lying on the floor saturating urine and diarrhea.  Patient reports no fevers or chills or chest pain but reports shortness of breath and increased tobacco abuse. The patient continues to smoke and reports his shortness of breath has worsened.  Per EMS, capnography showed end-tidal CO2 of 20 and hypoxemic to 79% requiring immediate oxygen support.  Patient denies any falls or trauma, head injury, LOC, chest pain, back pain, neck pain, abdominal pain or any other symptoms.  His blood sugar was 173 per EMS.    Review of patient's allergies indicates:  No Known Allergies  Past Medical History:   Diagnosis Date    Alcohol abuse     BPH (benign prostatic hyperplasia)     Cirrhosis 06/21/2018    pt states that he was diagnosed a week ago during his last hospital visit    Closed fracture of left distal radius 7/19/2023    Gastritis     Hypertension     Renal disorder      Past Surgical History:   Procedure Laterality Date    CAPSULOTOMY OF JOINT Right 7/29/2019    Procedure: CAPSULOTOMY, JOINT;  Surgeon: Raj Grossman MD;  Location: [facility] OR [address] [place] [address];  Service: Orthopedics;  Laterality: Right;    ESOPHAGOGASTRODUODENOSCOPY N/A 10/25/2023    Procedure: EGD (ESOPHAGOGASTRODUODENOSCOPY);  Surgeon: Wild  Lux CORNELIUS MD;  Location: [facility] ENDO (2ND FLR);  Service: Endoscopy;  Laterality: N/A;    PERCUTANEOUS PINNING OF HIP Right 7/29/2019    Procedure: PINNING, HIP, PERCUTANEOUS- synthes cannulated screws- hana table- C arm door side-;  Surgeon: Raj Grossman MD;  [facility];  Service: Orthopedics;  Laterality: Right;     Family History   Problem Relation Age of Onset    Hypertension Father     Heart disease Father     Heart disease Brother      Social History     Tobacco Use    Smoking status: Every Day     Current packs/day: 1.00     Types: Cigarettes    Smokeless tobacco: Never   Substance Use Topics    Alcohol use: Yes     Comment: (former drinker; 2months sober) this visit admits drinking a couple beers today    Drug use: No     Review of Systems  All other systems reviewed and were negative; see HPI also for additional ROS.      Physical Exam     Initial Vitals   BP Pulse Resp Temp SpO2   03/28/24 0812 03/28/24 0812 03/28/24 0812 03/28/24 1002 03/28/24 0812   (!) 143/95 (!) 115 (!) 27 97.5 °F (36.4 °C) (!) 79 %      MAP       --                Physical Exam    Nursing note and vitals reviewed.  Constitutional: He appears ill.   HENT:   Head: Normocephalic.   Mouth/Throat: Oropharynx is clear and moist.   Eyes: EOM are normal. No scleral icterus.   Neck: Neck supple.   Cardiovascular:  Regular rhythm.   Tachycardia present.         Pulmonary/Chest: No stridor. Tachypnea noted. He is in respiratory distress. He has wheezes. He has rhonchi.   Abdominal: Abdomen is soft. He exhibits no distension. There is no abdominal tenderness.   Genitourinary: Rectum:      Guaiac result negative.   Guaiac negative stool. : Acceptable.   Genitourinary Comments: Rectal exam with RN at bedside as chaperone, no gross blood on exam      Musculoskeletal:         General: Edema (trace bipedal) present.      Cervical back: Neck supple.     Neurological: He is alert. GCS score is 15. GCS eye subscore is  4. GCS verbal subscore is 5. GCS motor subscore is 6.   Oriented to name and place, confused on date. States he was in his bed at home, unclear if he is aware of being found on the ground. Moving all 4 extremities, follows commands, equal strength in bilateral upper and lower extremities, normal sensation    Skin: There is pallor.         ED Course   Critical Care    Date/Time: 3/28/2024 11:00 AM    Performed by: Marciano Miner DO  Authorized by: Marciano Miner DO  Direct patient critical care time: 15 minutes  Additional history critical care time: 15 minutes  Ordering / reviewing critical care time: 25 minutes  Documentation critical care time: 10 minutes  Consulting other physicians critical care time: 10 minutes  Total critical care time (exclusive of procedural time) : 75 minutes  Critical care was necessary to treat or prevent imminent or life-threatening deterioration of the following conditions: cardiac failure, dehydration, renal failure, sepsis and circulatory failure.  Critical care was time spent personally by me on the following activities: blood draw for specimens, development of treatment plan with patient or surrogate, discussions with consultants, evaluation of patient's response to treatment, examination of patient, obtaining history from patient or surrogate, ordering and performing treatments and interventions, ordering and review of laboratory studies, ordering and review of radiographic studies, pulse oximetry, re-evaluation of patient's condition and review of old charts.        Labs Reviewed   CBC W/ AUTO DIFFERENTIAL - Abnormal; Notable for the following components:       Result Value    RBC 2.24 (*)     Hemoglobin 4.1 (*)     Hematocrit 16.3 (*)     MCV 73 (*)     MCH 18.3 (*)     MCHC 25.2 (*)     RDW 21.1 (*)     Platelets 130 (*)     Immature Granulocytes 0.6 (*)     Lymph # 0.9 (*)     nRBC 1 (*)     Gran % 73.9 (*)     Lymph % 17.4 (*)     Platelet Estimate Decreased (*)     All  other components within normal limits    Narrative:     hgb hct sc sent to Stacia Hoyt RN by [facility] 03/28/2024 09:36   COMPREHENSIVE METABOLIC PANEL - Abnormal; Notable for the following components:    CO2 13 (*)     Glucose 130 (*)     BUN 28 (*)     Creatinine 2.1 (*)     Albumin 3.3 (*)     Total Bilirubin 1.6 (*)     eGFR 33.4 (*)     Anion Gap 18 (*)     All other components within normal limits   URINALYSIS, REFLEX TO URINE CULTURE - Abnormal; Notable for the following components:    Protein, UA 1+ (*)     Ketones, UA 1+ (*)     All other components within normal limits    Narrative:     Specimen Source->Urine   TROPONIN I - Abnormal; Notable for the following components:    Troponin I 0.143 (*)     All other components within normal limits   B-TYPE NATRIURETIC PEPTIDE - Abnormal; Notable for the following components:    BNP 2,932 (*)     All other components within normal limits   CBC W/ AUTO DIFFERENTIAL - Abnormal; Notable for the following components:    WBC 3.58 (*)     RBC 2.71 (*)     Hemoglobin 5.8 (*)     Hematocrit 20.6 (*)     MCV 76 (*)     MCH 21.4 (*)     MCHC 28.2 (*)     RDW 21.6 (*)     Immature Granulocytes 0.8 (*)     Lymph # 0.2 (*)     Mono # 0.1 (*)     Gran % 92.5 (*)     Lymph % 4.7 (*)     Mono % 2.0 (*)     Platelet Estimate Clumped (*)     All other components within normal limits    Narrative:     hgb    critical result(s) called and verbal readback obtained from   WILLIAN GAZRA RN by MRV1 03/28/2024 15:40   LACTIC ACID, PLASMA - Abnormal; Notable for the following components:    Lactate (Lactic Acid) 6.2 (*)     All other components within normal limits   CBC W/ AUTO DIFFERENTIAL - Abnormal; Notable for the following components:    RBC 2.99 (*)     Hemoglobin 6.7 (*)     Hematocrit 22.9 (*)     MCV 77 (*)     MCH 22.4 (*)     MCHC 29.3 (*)     RDW 22.0 (*)     Platelets 126 (*)     Lymph # 0.3 (*)     Mono # 0.1 (*)     Gran % 91.1 (*)     Lymph % 6.6 (*)     Mono % 1.8  (*)     Platelet Estimate Clumped (*)     All other components within normal limits   CBC W/ AUTO DIFFERENTIAL - Abnormal; Notable for the following components:    RBC 3.64 (*)     Hemoglobin 8.3 (*)     Hematocrit 27.7 (*)     MCV 76 (*)     MCH 22.8 (*)     MCHC 30.0 (*)     RDW 22.5 (*)     Lymph # 0.3 (*)     Mono # 0.1 (*)     Gran % 90.0 (*)     Lymph % 6.6 (*)     Mono % 2.8 (*)     Platelet Estimate Clumped (*)     All other components within normal limits   CBC W/ AUTO DIFFERENTIAL - Abnormal; Notable for the following components:    RBC 3.23 (*)     Hemoglobin 7.6 (*)     Hematocrit 25.0 (*)     MCV 77 (*)     MCH 23.5 (*)     MCHC 30.4 (*)     RDW 21.6 (*)     Platelets 122 (*)     Lymph # 0.4 (*)     Gran % 85.6 (*)     Lymph % 7.7 (*)     All other components within normal limits   URINALYSIS MICROSCOPIC - Abnormal; Notable for the following components:    Hyaline Casts, UA 4 (*)     All other components within normal limits    Narrative:     Specimen Source->Urine   CBC W/ AUTO DIFFERENTIAL - Abnormal; Notable for the following components:    RBC 2.99 (*)     Hemoglobin 6.9 (*)     Hematocrit 23.2 (*)     MCV 78 (*)     MCH 23.1 (*)     MCHC 29.7 (*)     RDW 22.5 (*)     Platelets 106 (*)     Lymph # 0.4 (*)     Gran % 85.3 (*)     Lymph % 7.3 (*)     All other components within normal limits   COMPREHENSIVE METABOLIC PANEL - Abnormal; Notable for the following components:    Sodium 135 (*)     CO2 18 (*)     Glucose 114 (*)     BUN 37 (*)     Creatinine 1.7 (*)     Calcium 8.1 (*)     Albumin 3.0 (*)     Total Bilirubin 1.3 (*)     AST 62 (*)     eGFR 43.1 (*)     All other components within normal limits   MAGNESIUM - Abnormal; Notable for the following components:    Magnesium 1.4 (*)     All other components within normal limits   ISTAT PROCEDURE - Abnormal; Notable for the following components:    POC PCO2 20.9 (*)     POC PO2 175 (*)     POC HCO3 12.7 (*)     POC BE -12 (*)     POC TCO2 13 (*)      POC Hematocrit 19 (*)     All other components within normal limits   ISTAT PROCEDURE - Abnormal; Notable for the following components:    POC Glucose 134 (*)     POC Creatinine 1.7 (*)     POC TCO2 (MEASURED) 16 (*)     POC Hematocrit 18 (*)     All other components within normal limits   ISTAT LACTATE - Abnormal; Notable for the following components:    POC Lactate 8.43 (*)     All other components within normal limits   ISTAT LACTATE - Abnormal; Notable for the following components:    POC Lactate 6.17 (*)     All other components within normal limits   CULTURE, BLOOD    Narrative:     Aerobic and anaerobic   CULTURE, BLOOD    Narrative:     Aerobic and anaerobic   MAGNESIUM   CK   SARS-COV2 (COVID) WITH FLU/RSV BY PCR   PHOSPHORUS   CBC W/ AUTO DIFFERENTIAL   TYPE & SCREEN   PREPARE RBC SOFT   PREPARE RBC SOFT     EKG Readings: (Independently Interpreted)   Initial Reading: No STEMI. Previous EKG: Compared with most recent EKG   Accelerated junctional rhythm, rate 113, inferolateral ST depressions that appear chronic on chart review     ECG Results              EKG 12-lead (Final result)        Collection Time Result Time QRS Duration OHS QTC Calculation    03/28/24 08:26:27 03/28/24 13:52:07 98 438                     Final result by Interface, Lab In [facility] (03/28/24 13:52:13)                   Narrative:    Test Reason : R06.02,    Vent. Rate : 114 BPM     Atrial Rate : 114 BPM     P-R Int : 000 ms          QRS Dur : 098 ms      QT Int : 318 ms       P-R-T Axes : 000 037 204 degrees     QTc Int : 438 ms    Accelerated Junctional rhythm  ST and T wave abnormality, consider inferolateral ischemia  Abnormal ECG  When compared with ECG of 24-OCT-2023 18:17,  Junctional rhythm has replaced Sinus rhythm  ST now depressed in Inferior leads  Nonspecific T wave abnormality now evident in Inferior leads  Confirmed by Dana Wilde MD ([id number]) on 3/28/2024 1:52:03 PM    Referred By: [email or web address]   SELF            Confirmed By:Dana iWlde MD                                  Imaging Results              X-Ray Chest AP Portable (Final result)  Result time 03/28/24 09:13:40      Final result by Anup Herrera DO (03/28/24 09:13:40)                   Impression:      Hazy airspace opacities bilaterally, suspicious for multifocal pneumonia or pulmonary edema.      Electronically signed by: Anup Herrera  Date:    03/28/2024  Time:    09:13               Narrative:    EXAMINATION:  XR CHEST AP PORTABLE    CLINICAL HISTORY:  Sepsis;    TECHNIQUE:  Single frontal view of the chest was performed.    COMPARISON:  10/24/2023.    FINDINGS:  There are hazy airspace opacities in the right mid lung and the right lung base as well as the medial left lung base and retrocardiac region.  The pleural spaces are clear.  The cardiac silhouette is enlarged.  Osseous structures demonstrate degenerative changes.                                    X-Rays:   Independently Interpreted Readings:   Chest X-Ray: Bilateral airspace opacities consistent with multifocal pneumonia pulmonary edema     Medications   0.9%  NaCl infusion (for blood administration) (has no administration in time range)   0.9%  NaCl infusion (for blood administration) (has no administration in time range)   sodium chloride 0.9% flush 10 mL (has no administration in time range)   acetaminophen tablet 650 mg (has no administration in time range)   ondansetron disintegrating tablet 8 mg (has no administration in time range)   pantoprazole injection 40 mg (40 mg Intravenous Given 3/29/24 1019)   cefTRIAXone (Rocephin) 1 g in dextrose 5 % in water (D5W) 100 mL IVPB (MB+) (0 g Intravenous Stopped 3/28/24 1415)   azithromycin tablet 500 mg (500 mg Oral Given 3/29/24 1018)   thiamine (B-1) 500 mg in dextrose 5 % (D5W) 100 mL IVPB (500 mg Intravenous New Bag 3/29/24 1009)   vancomycin 2 g in dextrose 5 % 500 mL IVPB (0 mg Intravenous Stopped 3/28/24 1602)   piperacillin-tazobactam  (ZOSYN) 4.5 g in dextrose 5 % in water (D5W) 100 mL IVPB (MB+) (0 g Intravenous Stopped 3/28/24 1105)   pantoprazole injection 80 mg (80 mg Intravenous Given 3/28/24 1238)     Medical Decision Making  69 y/o M presents with dyspnea.     On arrival, pt is pale, tachycardic, normotensive, and afebrile. Unable to  SpO2 on monitor. ABG obtained with Arterial Blood Gas result:  pO2 175; pCO2 20.9; pH 7.393;  HCO3 12.7, %O2 Sat 100.     Type and screen obtained. Hgb 4.2 dropped from 9.2 in October 2023; 3u PRBC ordered. Rectal exam without gross blood, guaiac negative. Pt denies blood in stool or any other noted blood loss.   Lactate 8.4, CXR with multifocal pneumonia and edema     Sepsis workup was initiated including 2 sets of blood cultures, CBC, CMP, troponin, lactic acid, EKG, CXR, and UA. Empiric antibiotics (vancomycin and zosyn) ordered. No IVF bolus given due to concern for CHF/hypervolemia. Patient was on continuous cardiopulmonary monitoring.   I attest, a sepsis perfusion exam was performed within 6 hours of Sepsis presentation, following fluid resuscitation.    BNP 2932, consistent with CHF exacerbation.   Trop 0.143 consistent with NSTEMI; likely type 2 due to demand ischemia from anemia +/- sepsis.   Cr 2.1 consistent with ISRAEL     Consulted and discussed with MICU who evaluated the pt emergently at bedside and agreed to admit him for further evaluation and management.           Amount and/or Complexity of Data Reviewed  Labs: ordered. Decision-making details documented in ED Course.  Radiology: ordered and independent interpretation performed. Decision-making details documented in ED Course.  ECG/medicine tests: ordered and independent interpretation performed. Decision-making details documented in ED Course.    Risk  Prescription drug management.  Decision regarding hospitalization.              Attending Attestation:   Physician Attestation Statement for Resident:  As the supervising MD   Physician  Attestation Statement: I have personally seen and examined this patient.   I agree with the above history.  -:   As the supervising MD I agree with the above PE.     As the supervising MD I agree with the above treatment, course, plan, and disposition.                    I have reviewed and concur with the resident's history, physical, assessment, and plan.  I have personally interviewed and examined the patient at bedside.   I did supervise any and all procedures and was present for any critical portion, and was always immediately available for help and as a resource.     The above history physical, review of symptoms, HPI and physical exam reflect my independent interpretation and evaluation.    Briefly, 68-year-old male with a history of alcohol abuse, BPH, cirrhosis, hypertension, known esophageal varices, gastritis and tobacco abuse presenting with shortness of breath, hypoxia that began 4 days ago.  He was found in his bed, lying on the ground complaining of shortness of breath, weakness and fatigue.  He was brought in via EMS for hypoxemia with oxygen saturation to 79% requiring immediate oxygen support.  He had an end-tidal CO2 of 20.  Immediate placed on oxygen with some improvement.  Physical exam appears pale with pale mucous membranes, poor skin turgor as well as diminished capillary refill.  I suspect he is anemic.  Laboratory evaluation confirms his hemoglobin of 4 requiring immediate and emergent PRBC transfusion for symptomatic anemia.  Unclear etiology but likely a slow source.  Rectal exam shows no melena or hematochezia and negative occult guaiac.  He has a history of endoscopy in the past for similar findings which was negative for acute variceal bleed.  ECG without ischemia or STEMI, chest x-ray shows concern for possible aspiration or pneumonia and will be covered with broad-spectrum antibiotics.  Given initial tachypnea, tachycardia, hypoxemia, and lactic acidosis will initiate sepsis  treatment but I suspect much of his symptoms are due to profound anemia as well.  Discussed case with ICU team, will admit for ongoing management and treatment.  His lactate is elevated, will treat for pneumonia/sepsis, he does have evidence of ISRAEL, will continue further management.  Please see critical care note for critical care time.    This patient does have evidence of infective focus  My overall impression is sepsis.  Source: Respiratory  Antibiotics given-   Antibiotics (72h ago, onward)      Start     Stop Route Frequency Ordered    03/28/24 1245  cefTRIAXone (Rocephin) 1 g in dextrose 5 % in water (D5W) 100 mL IVPB (MB+)         -- IV Every 24 hours (non-standard times) 03/28/24 1138    03/28/24 1245  azithromycin tablet 500 mg         03/31/24 0859 Oral Daily 03/28/24 1138          Latest lactate reviewed-  Recent Labs   Lab 03/28/24  1331 03/28/24  1401   LACTATE  --  6.2*   POCLAC 6.17*  --      Organ dysfunction indicated by Acute kidney injury, Acute respiratory failure, and Anemia    Fluid challenge Not needed - patient is not hypotensive      Post- resuscitation assessment Yes Perfusion exam was performed within 6 hours of septic shock presentation after bolus shows Adequate tissue perfusion assessed by non-invasive monitoring       Will Not start Pressors- Levophed for MAP of 65  Source control achieved by: Blood transfusion and abx        Complexity:  Critical Care    Final diagnoses:  [R06.02] SOB (shortness of breath)  [J18.9] Multifocal pneumonia (Primary)  [D64.9] Anemia, unspecified type  [D68.9] Coagulopathy  [K70.30] Alcoholic cirrhosis of liver without ascites (Chronic)  [R09.02] Hypoxemia  [A41.9] Sepsis, due to unspecified organism, unspecified whether acute organ dysfunction present  [E87.20] Lactic acid acidosis  [N17.9] ISRAEL (acute kidney injury)     Marciano Miner DO, FAAEM  Emergency Staff Physician   Dept of Emergency Medicine   [facility]  Spectralink:  [id number]        Disclaimer: This note has been generated using voice-recognition software. There may be typographical errors that have been missed during proof-reading.            ED Course as of 03/29/24 1030   Thu Mar 28, 2024   0935 Troponin I(!): 0.143  Improved from 3.5 prior trop.  [OW]   0936 Creatinine(!): 2.1  Increased from 1.2, consistent with ISRAEL  [OW]   0936 Anion Gap(!): 18  Anion gap, blood gas pending  [OW]   0937 Glucose(!): 130  Slight hyperglycemia  [OW]   0937 BILIRUBIN TOTAL(!): 1.6  Hyperbilirubinemia  [OW]   0937 Hemoglobin(!!): 4.1  Significant anemia, down from 9.2 in October 2023. 3u ordered for transfusion, consented for transfusion  [OW]   0938 BNP(!): 2,932  Severely increased BNP concerning for acute CHF decompensation  [OW]   0947 Rectal exam without gross blood. Guaiac negative.  [OW]   0956 Consulted and discussed with MICU who will come and evaluate the patient emergently at bedside.  [OW]      ED Course User Index  [OW] Patrizia Tapia MD                           Clinical Impression:  Final diagnoses:  [R06.02] SOB (shortness of breath)  [J18.9] Multifocal pneumonia (Primary)  [D64.9] Anemia, unspecified type  [D68.9] Coagulopathy  [K70.30] Alcoholic cirrhosis of liver without ascites (Chronic)  [R09.02] Hypoxemia  [A41.9] Sepsis, due to unspecified organism, unspecified whether acute organ dysfunction present  [E87.20] Lactic acid acidosis  [N17.9] ISRAEL (acute kidney injury)          ED Disposition Condition    Admit Stable                Patrizia Tapia MD  Resident  03/28/24 0615       Marciano Miner, DO  03/29/24 1040

## 2024-03-28 NOTE — CONSULTS
[facility] - Emergency Dept  Critical Care Medicine  Consult Note    Patient Name: Fransico Montalvo  MRN: [id number]  Admission Date: 3/28/2024  Hospital Length of Stay: 0 days  Code Status: Full Code  Attending Physician: Jake Guzman MD   Primary Care Provider: [facility]   Principal Problem: <principal problem not specified>    Inpatient consult to Critical Care Medicine  Consult performed by: Alina Ervin MD  Consult ordered by: Patrizia Tapai MD        Subjective:     HPI:  68 year old gentleman who is a known case of f alcohol abuse, BPH, cirrhosis, hypertension, esophageal varices, gastritis and tobacco abuse presenting with shortness of breath that started about 4 days ago. It was accompanied by a dry cough and dizziness as well as generalized weakness. He reported no fever, sputum, hemoptysis, bleeding from any orifice. He reported no changes in stool color. Patient did not complain of chest pain, seizures, syncope, or motor weakness. He denied a trauma and falls. He noted that he was diagnosed with pneumonia 3 days  ago and was prescribed antibiotics. Patient was afebrile in the ER: BP: 125/80 SpO2: 89% on venturi mask . He was  found to be hypoxemic with end-tidal CO2 of 20 and hypoxemic to 79% requiring immediate  oxygen support at the EMS. His hemoglobin was found to be around 4 and 3 pints of PRBC were  scheduled for transfusion. By the time of our arrival his second bag was flowing.  Patient is to be admitted as a case of CAP, Anemia, and acute hypoxemic respiratory failure.  He received two pints PRBC in the ER, maintaining approproriate saturation of SpO2 with venturi mask on, given a shot of Vancomycin in the Er and placed on CAP coverage antibiotics.     Patient appears to be stable for stepdown to hospital medicine teams.  Maintaining appropriate vitals with minimal support.    Hospital/ICU Course:  No notes on file    Past Medical History:   Diagnosis Date    Alcohol  abuse     BPH (benign prostatic hyperplasia)     Cirrhosis 06/21/2018    pt states that he was diagnosed a week ago during his last hospital visit    Closed fracture of left distal radius 7/19/2023    Gastritis     Hypertension     Renal disorder        Past Surgical History:   Procedure Laterality Date    CAPSULOTOMY OF JOINT Right 7/29/2019    Procedure: CAPSULOTOMY, JOINT;  Surgeon: Raj Grossman MD;  Location: [address];  Service: Orthopedics;  Laterality: Right;    ESOPHAGOGASTRODUODENOSCOPY N/A 10/25/2023    Procedure: EGD (ESOPHAGOGASTRODUODENOSCOPY);  Surgeon: Lux Rome MD;  Location: [facility] ENDO ([address] [place] [address]);  Service: Endoscopy;  Laterality: N/A;    PERCUTANEOUS PINNING OF HIP Right 7/29/2019    Procedure: PINNING, HIP, PERCUTANEOUS- synthes cannulated screws- hana table- C arm door side-;  Surgeon: Raj Grossman MD;  Location: [address];  Service: Orthopedics;  Laterality: Right;       Review of patient's allergies indicates:  No Known Allergies    Family History       Problem Relation (Age of Onset)    Heart disease Father, Brother    Hypertension Father          Tobacco Use    Smoking status: Every Day     Current packs/day: 1.00     Types: Cigarettes    Smokeless tobacco: Never   Substance and Sexual Activity    Alcohol use: Yes     Comment: (former drinker; 2months sober) this visit admits drinking a couple beers today    Drug use: No    Sexual activity: Not on file      Review of Systems   Constitutional:  Positive for activity change. Negative for fever.   HENT:  Negative for congestion and drooling.    Respiratory:  Positive for cough (dry), chest tightness and shortness of breath.    Cardiovascular:  Negative for chest pain, palpitations and leg swelling.   Gastrointestinal:  Negative for abdominal distention, abdominal pain, anal bleeding, blood in stool, constipation, diarrhea, nausea and vomiting.   Genitourinary:  Negative for dysuria, hematuria and urgency.    Musculoskeletal:  Negative for back pain.   Neurological:  Negative for dizziness, seizures, weakness and numbness.   Psychiatric/Behavioral:  Negative for agitation, behavioral problems and confusion.      Objective:     Vital Signs (Most Recent):  Temp: 97.9 °F (36.6 °C) (03/28/24 1317)  Pulse: 104 (03/28/24 1331)  Resp: (!) 24 (03/28/24 1257)  BP: 125/80 (03/28/24 1331)  SpO2: (!) 89 % (03/28/24 1331) Vital Signs (24h Range):  Temp:  [97.5 °F (36.4 °C)-97.9 °F (36.6 °C)] 97.9 °F (36.6 °C)  Pulse:  [104-115] 104  Resp:  [18-27] 24  SpO2:  [79 %-97 %] 89 %  BP: (103-143)/(61-95) 125/80   Weight: 81.6 kg (180 lb)  Body mass index is 27.37 kg/m².      Intake/Output Summary (Last 24 hours) at 3/28/2024 1503  Last data filed at 3/28/2024 1314  Gross per 24 hour   Intake 562.41 ml   Output --   Net 562.41 ml          Physical Exam  Constitutional:       Appearance: He is ill-appearing.   HENT:      Head: Normocephalic and atraumatic.   Eyes:      Pupils: Pupils are equal, round, and reactive to light.   Cardiovascular:      Rate and Rhythm: Regular rhythm. Tachycardia present.      Pulses: Normal pulses.      Heart sounds: Normal heart sounds.   Pulmonary:      Effort: Respiratory distress present.      Breath sounds: No wheezing or rales.   Chest:      Chest wall: No tenderness.   Abdominal:      General: Abdomen is flat. Bowel sounds are normal.      Palpations: Abdomen is soft.   Musculoskeletal:      Right lower leg: No edema.      Left lower leg: No edema.   Skin:     Capillary Refill: Capillary refill takes less than 2 seconds.   Neurological:      General: No focal deficit present.      Mental Status: He is alert and oriented to person, place, and time. Mental status is at baseline.   Psychiatric:         Mood and Affect: Mood normal.         Behavior: Behavior normal.         Thought Content: Thought content normal.         Judgment: Judgment normal.            Vents:  Oxygen Concentration (%): 40 (03/28/24  1231)  Lines/Drains/Airways       Peripheral Intravenous Line  Duration                  Peripheral IV - Single Lumen 03/28/24 0815 20 G Posterior;Right Hand <1 day         Peripheral IV - Single Lumen 03/28/24 0913 18 G Left;Posterior Hand <1 day                  Significant Labs:    CBC/Anemia Profile:  Recent Labs   Lab 03/28/24  0844 03/28/24  0846 03/28/24  0903   WBC  --   --  5.18   HGB  --   --  4.1*   HCT 19* 18* 16.3*   PLT  --   --  130*   MCV  --   --  73*   RDW  --   --  21.1*        Chemistries:  Recent Labs   Lab 03/28/24  0903      K 4.2      CO2 13*   BUN 28*   CREATININE 2.1*   CALCIUM 8.9   ALBUMIN 3.3*   PROT 7.5   BILITOT 1.6*   ALKPHOS 75   ALT 18   AST 38   MG 1.6       All pertinent labs within the past 24 hours have been reviewed.    Significant Imaging: I have reviewed all pertinent imaging results/findings within the past 24 hours.    ABG  Recent Labs   Lab 03/28/24  0844   PH 7.393   PO2 175*   PCO2 20.9*   HCO3 12.7*   BE -12*     Assessment/Plan:     Pulmonary  Acute hypoxemic respiratory failure  Wean supplemental oxygen as appropriate  Normal ABG upon ER presentation, reassuring   Placed on CAP coverage  Trend WBC and cultures  Reconsult ICU if medical deterioration    Cardiac/Vascular  Essential hypertension  Holding home antihypertensives as bp upon ER presentation was on the lower side  Did not require pressor support  Monitor vitals [id number]  Maintain MAP >65 mmhg    Renal/  CKD Stage 3  Patient's baseline is around 2.0  Today it is 2.1 (at baseline)    Plan:  Continue to monitor    BPH (benign prostatic hyperplasia)  On home flomax  Hold for now, bp on the lower side    Oncology  Anemia  Baseline Hgb is around 9  Hemoglobin today around 4  Received 2 pints PRBC and the third is on the way  Started Pantoprazole 40mg IV BID  CBC to be followed  Hemodynamically stable, no evidence of variceal bleed  Given the hemodynamic stability and normal mentation, does not need  ICU admission at this time  If clinical alterations, reconsult MICU        Other  Tobacco abuse  Reported increased tobacco smoking lately.        Critical Care Daily Checklist:    A: Awake: RASS Goal/Actual Goal:    Actual:     B: Spontaneous Breathing Trial Performed?     C: SAT & SBT Coordinated?  NA                      D: Delirium: CAM-ICU     E: Early Mobility Performed? No   F: Feeding Goal:    Status:     Current Diet Order   Procedures    Diet NPO Except for: Medication     Order Specific Question:   Except for     Answer:   Medication      AS: Analgesia/Sedation NA   T: Thromboembolic Prophylaxis N/A   H: HOB > 300 Yes   U: Stress Ulcer Prophylaxis (if needed) YES   G: Glucose Control N/A   B: Bowel Function     I: Indwelling Catheter (Lines & Sauer) Necessity no   D: De-escalation of Antimicrobials/Pharmacotherapies Started CAP coverage    Plan for the day/ETD Transferred to     Code Status:  Family/Goals of Care: Full Code         Critical secondary to Patient has a condition that poses threat to life and bodily function: Severe Respiratory Distress     Critical care was time spent personally by me on the following activities: development of treatment plan with patient or surrogate and bedside caregivers, discussions with consultants, evaluation of patient's response to treatment, examination of patient, ordering and performing treatments and interventions, ordering and review of laboratory studies, ordering and review of radiographic studies, pulse oximetry, re-evaluation of patient's condition. This critical care time did not overlap with that of any other provider or involve time for any procedures.    Thank you for your consult. I will sign off. Please contact us if you have any additional questions.     Alina Ervin MD  Critical Care Medicine  [facility] - Emergency Dept

## 2024-03-29 PROBLEM — K92.2 GI BLEED: Status: ACTIVE | Noted: 2024-03-29

## 2024-03-29 PROBLEM — J18.9 PNEUMONIA: Status: ACTIVE | Noted: 2024-03-29

## 2024-03-29 PROBLEM — K74.60 CIRRHOSIS: Status: ACTIVE | Noted: 2024-03-29

## 2024-03-29 LAB
ADENOVIRUS: NOT DETECTED
ALBUMIN SERPL BCP-MCNC: 3 G/DL (ref 3.5–5.2)
ALP SERPL-CCNC: 56 U/L (ref 55–135)
ALT SERPL W/O P-5'-P-CCNC: 27 U/L (ref 10–44)
ANION GAP SERPL CALC-SCNC: 10 MMOL/L (ref 8–16)
AST SERPL-CCNC: 62 U/L (ref 10–40)
BACTERIA #/AREA URNS AUTO: ABNORMAL /HPF
BASOPHILS # BLD AUTO: 0 K/UL (ref 0–0.2)
BASOPHILS # BLD AUTO: 0 K/UL (ref 0–0.2)
BASOPHILS # BLD AUTO: 0.01 K/UL (ref 0–0.2)
BASOPHILS NFR BLD: 0 % (ref 0–1.9)
BASOPHILS NFR BLD: 0 % (ref 0–1.9)
BASOPHILS NFR BLD: 0.1 % (ref 0–1.9)
BILIRUB SERPL-MCNC: 1.3 MG/DL (ref 0.1–1)
BILIRUB UR QL STRIP: NEGATIVE
BORDETELLA PARAPERTUSSIS (IS1001): NOT DETECTED
BORDETELLA PERTUSSIS (PTXP): NOT DETECTED
BUN SERPL-MCNC: 37 MG/DL (ref 8–23)
CALCIUM SERPL-MCNC: 8.1 MG/DL (ref 8.7–10.5)
CHLAMYDIA PNEUMONIAE: NOT DETECTED
CHLORIDE SERPL-SCNC: 107 MMOL/L (ref 95–110)
CLARITY UR REFRACT.AUTO: CLEAR
CO2 SERPL-SCNC: 18 MMOL/L (ref 23–29)
COLOR UR AUTO: YELLOW
CORONAVIRUS 229E, COMMON COLD VIRUS: NOT DETECTED
CORONAVIRUS HKU1, COMMON COLD VIRUS: NOT DETECTED
CORONAVIRUS NL63, COMMON COLD VIRUS: NOT DETECTED
CORONAVIRUS OC43, COMMON COLD VIRUS: NOT DETECTED
CREAT SERPL-MCNC: 1.7 MG/DL (ref 0.5–1.4)
DIFFERENTIAL METHOD BLD: ABNORMAL
EOSINOPHIL # BLD AUTO: 0 K/UL (ref 0–0.5)
EOSINOPHIL NFR BLD: 0 % (ref 0–8)
EOSINOPHIL NFR BLD: 0 % (ref 0–8)
EOSINOPHIL NFR BLD: 0.1 % (ref 0–8)
ERYTHROCYTE [DISTWIDTH] IN BLOOD BY AUTOMATED COUNT: 22.4 % (ref 11.5–14.5)
ERYTHROCYTE [DISTWIDTH] IN BLOOD BY AUTOMATED COUNT: 22.5 % (ref 11.5–14.5)
ERYTHROCYTE [DISTWIDTH] IN BLOOD BY AUTOMATED COUNT: 22.6 % (ref 11.5–14.5)
EST. GFR  (NO RACE VARIABLE): 43.1 ML/MIN/1.73 M^2
FLUBV RNA NPH QL NAA+NON-PROBE: NOT DETECTED
GLUCOSE SERPL-MCNC: 114 MG/DL (ref 70–110)
GLUCOSE UR QL STRIP: NEGATIVE
HCT VFR BLD AUTO: 23.2 % (ref 40–54)
HCT VFR BLD AUTO: 23.5 % (ref 40–54)
HCT VFR BLD AUTO: 24.3 % (ref 40–54)
HGB BLD-MCNC: 6.9 G/DL (ref 14–18)
HGB BLD-MCNC: 7.1 G/DL (ref 14–18)
HGB BLD-MCNC: 7.1 G/DL (ref 14–18)
HGB UR QL STRIP: NEGATIVE
HPIV1 RNA NPH QL NAA+NON-PROBE: NOT DETECTED
HPIV2 RNA NPH QL NAA+NON-PROBE: NOT DETECTED
HPIV3 RNA NPH QL NAA+NON-PROBE: NOT DETECTED
HPIV4 RNA NPH QL NAA+NON-PROBE: NOT DETECTED
HUMAN METAPNEUMOVIRUS: NOT DETECTED
HYALINE CASTS UR QL AUTO: 4 /LPF
IMM GRANULOCYTES # BLD AUTO: 0.02 K/UL (ref 0–0.04)
IMM GRANULOCYTES # BLD AUTO: 0.02 K/UL (ref 0–0.04)
IMM GRANULOCYTES # BLD AUTO: 0.03 K/UL (ref 0–0.04)
IMM GRANULOCYTES NFR BLD AUTO: 0.3 % (ref 0–0.5)
IMM GRANULOCYTES NFR BLD AUTO: 0.3 % (ref 0–0.5)
IMM GRANULOCYTES NFR BLD AUTO: 0.4 % (ref 0–0.5)
INFLUENZA A (SUBTYPES H1,H1-2009,H3): NOT DETECTED
KETONES UR QL STRIP: ABNORMAL
LACTATE SERPL-SCNC: 1.4 MMOL/L (ref 0.5–2.2)
LEUKOCYTE ESTERASE UR QL STRIP: NEGATIVE
LYMPHOCYTES # BLD AUTO: 0.4 K/UL (ref 1–4.8)
LYMPHOCYTES # BLD AUTO: 0.6 K/UL (ref 1–4.8)
LYMPHOCYTES # BLD AUTO: 0.7 K/UL (ref 1–4.8)
LYMPHOCYTES NFR BLD: 10.6 % (ref 18–48)
LYMPHOCYTES NFR BLD: 7.3 % (ref 18–48)
LYMPHOCYTES NFR BLD: 9.6 % (ref 18–48)
MAGNESIUM SERPL-MCNC: 1.4 MG/DL (ref 1.6–2.6)
MCH RBC QN AUTO: 22.8 PG (ref 27–31)
MCH RBC QN AUTO: 23.1 PG (ref 27–31)
MCH RBC QN AUTO: 23.3 PG (ref 27–31)
MCHC RBC AUTO-ENTMCNC: 29.2 G/DL (ref 32–36)
MCHC RBC AUTO-ENTMCNC: 29.7 G/DL (ref 32–36)
MCHC RBC AUTO-ENTMCNC: 30.2 G/DL (ref 32–36)
MCV RBC AUTO: 77 FL (ref 82–98)
MCV RBC AUTO: 78 FL (ref 82–98)
MCV RBC AUTO: 78 FL (ref 82–98)
MICROSCOPIC COMMENT: ABNORMAL
MONOCYTES # BLD AUTO: 0.4 K/UL (ref 0.3–1)
MONOCYTES NFR BLD: 5.4 % (ref 4–15)
MONOCYTES NFR BLD: 6 % (ref 4–15)
MONOCYTES NFR BLD: 7.1 % (ref 4–15)
MYCOPLASMA PNEUMONIAE: NOT DETECTED
NEUTROPHILS # BLD AUTO: 5 K/UL (ref 1.8–7.7)
NEUTROPHILS # BLD AUTO: 5.1 K/UL (ref 1.8–7.7)
NEUTROPHILS # BLD AUTO: 5.9 K/UL (ref 1.8–7.7)
NEUTROPHILS NFR BLD: 83.1 % (ref 38–73)
NEUTROPHILS NFR BLD: 84.4 % (ref 38–73)
NEUTROPHILS NFR BLD: 85.3 % (ref 38–73)
NITRITE UR QL STRIP: NEGATIVE
NRBC BLD-RTO: 0 /100 WBC
PH UR STRIP: 6 [PH] (ref 5–8)
PHOSPHATE SERPL-MCNC: 2.7 MG/DL (ref 2.7–4.5)
PLATELET # BLD AUTO: 106 K/UL (ref 150–450)
PLATELET # BLD AUTO: 127 K/UL (ref 150–450)
PLATELET # BLD AUTO: 127 K/UL (ref 150–450)
PMV BLD AUTO: 10.2 FL (ref 9.2–12.9)
PMV BLD AUTO: 10.9 FL (ref 9.2–12.9)
PMV BLD AUTO: 11.5 FL (ref 9.2–12.9)
POTASSIUM SERPL-SCNC: 3.8 MMOL/L (ref 3.5–5.1)
PROT SERPL-MCNC: 6.7 G/DL (ref 6–8.4)
PROT UR QL STRIP: ABNORMAL
RBC # BLD AUTO: 2.99 M/UL (ref 4.6–6.2)
RBC # BLD AUTO: 3.05 M/UL (ref 4.6–6.2)
RBC # BLD AUTO: 3.11 M/UL (ref 4.6–6.2)
RBC #/AREA URNS AUTO: 1 /HPF (ref 0–4)
RESPIRATORY INFECTION PANEL SOURCE: NORMAL
RSV RNA NPH QL NAA+NON-PROBE: NOT DETECTED
RV+EV RNA NPH QL NAA+NON-PROBE: NOT DETECTED
SARS-COV-2 RNA RESP QL NAA+PROBE: NOT DETECTED
SODIUM SERPL-SCNC: 135 MMOL/L (ref 136–145)
SP GR UR STRIP: 1.03 (ref 1–1.03)
SQUAMOUS #/AREA URNS AUTO: 0 /HPF
UNSPECIFIED CRY UR QL COMP ASSIST: 1
URN SPEC COLLECT METH UR: ABNORMAL
WBC # BLD AUTO: 5.92 K/UL (ref 3.9–12.7)
WBC # BLD AUTO: 6.04 K/UL (ref 3.9–12.7)
WBC # BLD AUTO: 7.01 K/UL (ref 3.9–12.7)
WBC #/AREA URNS AUTO: 1 /HPF (ref 0–5)

## 2024-03-29 PROCEDURE — 63600175 PHARM REV CODE 636 W HCPCS: Performed by: STUDENT IN AN ORGANIZED HEALTH CARE EDUCATION/TRAINING PROGRAM

## 2024-03-29 PROCEDURE — 84100 ASSAY OF PHOSPHORUS: CPT | Performed by: STUDENT IN AN ORGANIZED HEALTH CARE EDUCATION/TRAINING PROGRAM

## 2024-03-29 PROCEDURE — 99223 1ST HOSP IP/OBS HIGH 75: CPT | Mod: GC,,, | Performed by: STUDENT IN AN ORGANIZED HEALTH CARE EDUCATION/TRAINING PROGRAM

## 2024-03-29 PROCEDURE — 25000242 PHARM REV CODE 250 ALT 637 W/ HCPCS: Performed by: INTERNAL MEDICINE

## 2024-03-29 PROCEDURE — 63600175 PHARM REV CODE 636 W HCPCS: Performed by: INTERNAL MEDICINE

## 2024-03-29 PROCEDURE — 25000003 PHARM REV CODE 250: Performed by: STUDENT IN AN ORGANIZED HEALTH CARE EDUCATION/TRAINING PROGRAM

## 2024-03-29 PROCEDURE — 83605 ASSAY OF LACTIC ACID: CPT | Performed by: INTERNAL MEDICINE

## 2024-03-29 PROCEDURE — 36415 COLL VENOUS BLD VENIPUNCTURE: CPT | Mod: XB | Performed by: STUDENT IN AN ORGANIZED HEALTH CARE EDUCATION/TRAINING PROGRAM

## 2024-03-29 PROCEDURE — C9113 INJ PANTOPRAZOLE SODIUM, VIA: HCPCS | Performed by: STUDENT IN AN ORGANIZED HEALTH CARE EDUCATION/TRAINING PROGRAM

## 2024-03-29 PROCEDURE — 63700000 PHARM REV CODE 250 ALT 637 W/O HCPCS: Performed by: STUDENT IN AN ORGANIZED HEALTH CARE EDUCATION/TRAINING PROGRAM

## 2024-03-29 PROCEDURE — 25000003 PHARM REV CODE 250: Performed by: INTERNAL MEDICINE

## 2024-03-29 PROCEDURE — 85025 COMPLETE CBC W/AUTO DIFF WBC: CPT | Performed by: STUDENT IN AN ORGANIZED HEALTH CARE EDUCATION/TRAINING PROGRAM

## 2024-03-29 PROCEDURE — 87798 DETECT AGENT NOS DNA AMP: CPT | Performed by: INTERNAL MEDICINE

## 2024-03-29 PROCEDURE — 36415 COLL VENOUS BLD VENIPUNCTURE: CPT | Performed by: INTERNAL MEDICINE

## 2024-03-29 PROCEDURE — 20600001 HC STEP DOWN PRIVATE ROOM

## 2024-03-29 PROCEDURE — 94640 AIRWAY INHALATION TREATMENT: CPT

## 2024-03-29 PROCEDURE — 83735 ASSAY OF MAGNESIUM: CPT | Performed by: STUDENT IN AN ORGANIZED HEALTH CARE EDUCATION/TRAINING PROGRAM

## 2024-03-29 PROCEDURE — 99900035 HC TECH TIME PER 15 MIN (STAT)

## 2024-03-29 PROCEDURE — 25000003 PHARM REV CODE 250: Performed by: HOSPITALIST

## 2024-03-29 PROCEDURE — 27000221 HC OXYGEN, UP TO 24 HOURS

## 2024-03-29 PROCEDURE — 94761 N-INVAS EAR/PLS OXIMETRY MLT: CPT

## 2024-03-29 PROCEDURE — 80053 COMPREHEN METABOLIC PANEL: CPT | Performed by: STUDENT IN AN ORGANIZED HEALTH CARE EDUCATION/TRAINING PROGRAM

## 2024-03-29 RX ORDER — IPRATROPIUM BROMIDE AND ALBUTEROL SULFATE 2.5; .5 MG/3ML; MG/3ML
3 SOLUTION RESPIRATORY (INHALATION) EVERY 4 HOURS
Status: DISCONTINUED | OUTPATIENT
Start: 2024-03-29 | End: 2024-04-04

## 2024-03-29 RX ORDER — DICYCLOMINE HYDROCHLORIDE 10 MG/1
10 CAPSULE ORAL 3 TIMES DAILY PRN
Status: DISCONTINUED | OUTPATIENT
Start: 2024-03-29 | End: 2024-04-04 | Stop reason: HOSPADM

## 2024-03-29 RX ORDER — FOLIC ACID 1 MG/1
1 TABLET ORAL DAILY
Status: DISCONTINUED | OUTPATIENT
Start: 2024-03-29 | End: 2024-04-04 | Stop reason: HOSPADM

## 2024-03-29 RX ORDER — ALUMINUM HYDROXIDE, MAGNESIUM HYDROXIDE, AND SIMETHICONE 2400; 240; 2400 MG/30ML; MG/30ML; MG/30ML
30 SUSPENSION ORAL EVERY 6 HOURS PRN
Status: DISCONTINUED | OUTPATIENT
Start: 2024-03-29 | End: 2024-04-04 | Stop reason: HOSPADM

## 2024-03-29 RX ORDER — TALC
6 POWDER (GRAM) TOPICAL NIGHTLY PRN
Status: DISCONTINUED | OUTPATIENT
Start: 2024-03-29 | End: 2024-04-04 | Stop reason: HOSPADM

## 2024-03-29 RX ORDER — THIAMINE HCL 100 MG
100 TABLET ORAL DAILY
Status: DISCONTINUED | OUTPATIENT
Start: 2024-03-29 | End: 2024-04-04 | Stop reason: HOSPADM

## 2024-03-29 RX ORDER — ACETAMINOPHEN 325 MG/1
650 TABLET ORAL EVERY 8 HOURS PRN
Status: DISCONTINUED | OUTPATIENT
Start: 2024-03-29 | End: 2024-04-04 | Stop reason: HOSPADM

## 2024-03-29 RX ORDER — GUAIFENESIN 100 MG/5ML
200 SOLUTION ORAL EVERY 4 HOURS
Status: DISCONTINUED | OUTPATIENT
Start: 2024-03-29 | End: 2024-04-04 | Stop reason: HOSPADM

## 2024-03-29 RX ORDER — MAGNESIUM SULFATE HEPTAHYDRATE 40 MG/ML
2 INJECTION, SOLUTION INTRAVENOUS ONCE
Status: COMPLETED | OUTPATIENT
Start: 2024-03-29 | End: 2024-03-29

## 2024-03-29 RX ADMIN — PANTOPRAZOLE SODIUM 40 MG: 40 INJECTION, POWDER, FOR SOLUTION INTRAVENOUS at 10:03

## 2024-03-29 RX ADMIN — DICYCLOMINE HYDROCHLORIDE 10 MG: 10 CAPSULE ORAL at 10:03

## 2024-03-29 RX ADMIN — ONDANSETRON 8 MG: 8 TABLET, ORALLY DISINTEGRATING ORAL at 08:03

## 2024-03-29 RX ADMIN — CEFTRIAXONE 1 G: 1 INJECTION, POWDER, FOR SOLUTION INTRAMUSCULAR; INTRAVENOUS at 12:03

## 2024-03-29 RX ADMIN — PANTOPRAZOLE SODIUM 40 MG: 40 INJECTION, POWDER, FOR SOLUTION INTRAVENOUS at 08:03

## 2024-03-29 RX ADMIN — IPRATROPIUM BROMIDE AND ALBUTEROL SULFATE 3 ML: 2.5; .5 SOLUTION RESPIRATORY (INHALATION) at 07:03

## 2024-03-29 RX ADMIN — AZITHROMYCIN DIHYDRATE 500 MG: 250 TABLET ORAL at 10:03

## 2024-03-29 RX ADMIN — Medication 100 MG: at 04:03

## 2024-03-29 RX ADMIN — THIAMINE HYDROCHLORIDE 500 MG: 100 INJECTION, SOLUTION INTRAMUSCULAR; INTRAVENOUS at 10:03

## 2024-03-29 RX ADMIN — Medication 6 MG: at 08:03

## 2024-03-29 RX ADMIN — MAGNESIUM SULFATE HEPTAHYDRATE 2 G: 40 INJECTION, SOLUTION INTRAVENOUS at 02:03

## 2024-03-29 RX ADMIN — FOLIC ACID 1 MG: 1 TABLET ORAL at 04:03

## 2024-03-29 RX ADMIN — ACETAMINOPHEN 650 MG: 325 TABLET ORAL at 10:03

## 2024-03-29 RX ADMIN — GUAIFENESIN 200 MG: 200 SOLUTION ORAL at 10:03

## 2024-03-29 RX ADMIN — GUAIFENESIN 200 MG: 200 SOLUTION ORAL at 05:03

## 2024-03-29 NOTE — PLAN OF CARE
Problem: Adult Inpatient Plan of Care  Goal: Plan of Care Review  Outcome: Ongoing, Progressing  Flowsheets (Taken 3/29/2024 1800)  Plan of Care Reviewed With: patient  Goal: Patient-Specific Goal (Individualized)  Outcome: Ongoing, Progressing  Goal: Absence of Hospital-Acquired Illness or Injury  Outcome: Ongoing, Progressing  Goal: Optimal Comfort and Wellbeing  Outcome: Ongoing, Progressing  Goal: Readiness for Transition of Care  Outcome: Ongoing, Progressing     Problem: Fluid Imbalance (Pneumonia)  Goal: Fluid Balance  Outcome: Ongoing, Progressing     Problem: Infection (Pneumonia)  Goal: Resolution of Infection Signs and Symptoms  Outcome: Ongoing, Progressing     Problem: Respiratory Compromise (Pneumonia)  Goal: Effective Oxygenation and Ventilation  Outcome: Ongoing, Progressing     Problem: Skin Injury Risk Increased  Goal: Skin Health and Integrity  Outcome: Ongoing, Progressing     Problem: Impaired Wound Healing  Goal: Optimal Wound Healing  Outcome: Ongoing, Progressing

## 2024-03-29 NOTE — PROGRESS NOTES
[facility] Emergency Dept  [facility] Hospital Medicine  Progress Note    Patient Name: Fransico Montalvo  MRN: [id number]  Patient Class: IP- Inpatient   Admission Date: 3/28/2024  Length of Stay: 1 days  Attending Physician: Jake Guzman MD  Primary Care Provider: [facility]        Subjective:     Principal Problem:Pneumonia        HPI:  No notes on file    Overview/Hospital Course:  No notes on file    Interval History:  Patient reports no symptoms of alcohol withdrawal, last drink was about 2 weeks ago.  Hemoglobin stable.  No reports of overt bleed, awaiting GI note however patient states that they came by to see him and plan to scope on Monday.  GFR and creatinine improved    Review of Systems   Unable to perform ROS: Other     Objective:     Vital Signs (Most Recent):  Temp: 97.5 °F (36.4 °C) (03/29/24 1158)  Pulse: 102 (03/29/24 1302)  Resp: 20 (03/29/24 1302)  BP: 111/76 (03/29/24 1302)  SpO2: 100 % (03/29/24 1302) Vital Signs (24h Range):  Temp:  [97.5 °F (36.4 °C)-98 °F (36.7 °C)] 97.5 °F (36.4 °C)  Pulse:  [] 102  Resp:  [18-24] 20  SpO2:  [95 %-100 %] 100 %  BP: (104-131)/(57-87) 111/76     Weight: 81.6 kg (180 lb)  Body mass index is 27.37 kg/m².    Intake/Output Summary (Last 24 hours) at 3/29/2024 1411  Last data filed at 3/29/2024 1331  Gross per 24 hour   Intake 1409.69 ml   Output --   Net 1409.69 ml         Physical Exam  Constitutional:       General: He is not in acute distress.     Appearance: He is ill-appearing.   HENT:      Head: Normocephalic.      Right Ear: External ear normal.      Left Ear: External ear normal.      Nose: Nose normal.      Comments: Nasal cannula  Cardiovascular:      Rate and Rhythm: Normal rate.   Pulmonary:      Breath sounds: Rhonchi and rales present.   Abdominal:      Palpations: Abdomen is soft.      Tenderness: There is no abdominal tenderness.   Musculoskeletal:      Right lower leg: No edema.      Left lower leg: No edema.   Skin:     General: Skin is  warm.   Neurological:      General: No focal deficit present.      Mental Status: He is alert and oriented to person, place, and time.   Psychiatric:         Mood and Affect: Mood normal.             Significant Labs: All pertinent labs within the past 24 hours have been reviewed.        Assessment/Plan:      * Pneumonia  Noted on chest x-ray and Accounting for hypoxia.  We will do cough expectorant and nebs for pulmonary toileting.  Incentive spirometry.  Respiratory panel.  Continue with azithromycin and ceftriaxone.  Sputum culture.  Oxygen as needed      Anemia    Current CBC reviewed-   Lab Results   Component Value Date    HGB 7.1 (L) 03/29/2024    HCT 24.3 (L) 03/29/2024     Monitor serial CBC and transfuse if patient becomes hemodynamically unstable, symptomatic or H/H drops below 7/21.    3/29  Concerns for GI bleed.  Continue with Protonix 40 mg b.i.d..  Follow up with GI recommendations. Continue  to monitor hemoglobin    Cirrhosis  History of alcohol use and reported varices with banding.  GI consulted.  We will continue thiamine and folic acid.  No history of alcohol withdrawal symptoms    GI bleed  See 2. Assessment      Acute hypoxemic respiratory failure  See #1    CKD Stage 3  Currently at baseline      VTE Risk Mitigation (From admission, onward)           Ordered     Place sequential compression device  Until discontinued         03/28/24 1121     IP VTE LOW RISK PATIENT  Once         03/28/24 1121                    Discharge Planning   ARTURO:      Code Status: Full Code   Is the patient medically ready for discharge?:     Reason for patient still in hospital (select all that apply): Patient trending condition                     Jake Guzman MD  Department of Hospital Medicine   [facility] - Emergency Dept

## 2024-03-29 NOTE — NURSING
Pt up to room from ED. Pt AAO x 4, no c/o pain. VSS, ST on telemetry box. O2 @ 3L NC with oxygen sats %. 3 PIVs intact and flush well. Magnesium replacement infusing to pt's right hand without difficulty. 4 eyes complete. Pt oriented to unit/ room. Urinal placed in reach. Fall precautions reviewed with patient. Bed alarm activated, bed low and locked, call light in reach. Monitoring.

## 2024-03-29 NOTE — ED NOTES
Pt has completed a total of 3 units of PRBCs. No signs of a reaction. Pt tolerated all units well. VS and work of breathing trending in the right direction.

## 2024-03-29 NOTE — SUBJECTIVE & OBJECTIVE
Interval History:  Patient reports no symptoms of alcohol withdrawal, last drink was about 2 weeks ago.  Hemoglobin stable.  No reports of overt bleed, awaiting GI note however patient states that they came by to see him and plan to scope on Monday.  GFR and creatinine improved    Review of Systems   Unable to perform ROS: Other     Objective:     Vital Signs (Most Recent):  Temp: 97.5 °F (36.4 °C) (03/29/24 1158)  Pulse: 102 (03/29/24 1302)  Resp: 20 (03/29/24 1302)  BP: 111/76 (03/29/24 1302)  SpO2: 100 % (03/29/24 1302) Vital Signs (24h Range):  Temp:  [97.5 °F (36.4 °C)-98 °F (36.7 °C)] 97.5 °F (36.4 °C)  Pulse:  [] 102  Resp:  [18-24] 20  SpO2:  [95 %-100 %] 100 %  BP: (104-131)/(57-87) 111/76     Weight: 81.6 kg (180 lb)  Body mass index is 27.37 kg/m².    Intake/Output Summary (Last 24 hours) at 3/29/2024 1411  Last data filed at 3/29/2024 1331  Gross per 24 hour   Intake 1409.69 ml   Output --   Net 1409.69 ml         Physical Exam  Constitutional:       General: He is not in acute distress.     Appearance: He is ill-appearing.   HENT:      Head: Normocephalic.      Right Ear: External ear normal.      Left Ear: External ear normal.      Nose: Nose normal.      Comments: Nasal cannula  Cardiovascular:      Rate and Rhythm: Normal rate.   Pulmonary:      Breath sounds: Rhonchi and rales present.   Abdominal:      Palpations: Abdomen is soft.      Tenderness: There is no abdominal tenderness.   Musculoskeletal:      Right lower leg: No edema.      Left lower leg: No edema.   Skin:     General: Skin is warm.   Neurological:      General: No focal deficit present.      Mental Status: He is alert and oriented to person, place, and time.   Psychiatric:         Mood and Affect: Mood normal.             Significant Labs: All pertinent labs within the past 24 hours have been reviewed.

## 2024-03-29 NOTE — NURSING
Pt AAO x 4, no c/o pain. Oxygen titrated to 2.5L NC. Oxygen sats 98%. IS at the bedside. Pt instructed on usage with a goal set at 1500. Pt achieves 1200. No acute events since arrival. Bed low and locked, call light in reach.

## 2024-03-29 NOTE — ASSESSMENT & PLAN NOTE
Current CBC reviewed-   Lab Results   Component Value Date    HGB 7.1 (L) 03/29/2024    HCT 24.3 (L) 03/29/2024     Monitor serial CBC and transfuse if patient becomes hemodynamically unstable, symptomatic or H/H drops below 7/21.    3/29  Concerns for GI bleed.  Continue with Protonix 40 mg b.i.d..  Follow up with GI recommendations. Continue  to monitor hemoglobin

## 2024-03-29 NOTE — NURSING
Nurses Note -- 4 Eyes      3/29/2024   4:34 PM      Skin assessed during: Admit      [] No Altered Skin Integrity Present    []Prevention Measures Documented      [x] Yes- Altered Skin Integrity Present or Discovered   [x] LDA Added if Not in Epic (Describe Wound)   [] New Altered Skin Integrity was Present on Admit and Documented in LDA   [] Wound Image Taken    Wound Care Consulted? No    Attending Nurse:  Millie Mcrae RN    Second RN/Staff Member: Thi Acevedo RN

## 2024-03-29 NOTE — ASSESSMENT & PLAN NOTE
Noted on chest x-ray and Accounting for hypoxia.  We will do cough expectorant and nebs for pulmonary toileting.  Incentive spirometry.  Respiratory panel.  Continue with azithromycin and ceftriaxone.  Sputum culture.  Oxygen as needed

## 2024-03-29 NOTE — ASSESSMENT & PLAN NOTE
History of alcohol use and reported varices with banding.  GI consulted.  We will continue thiamine and folic acid.  No history of alcohol withdrawal symptoms

## 2024-03-29 NOTE — H&P
Crticial Care H&P note    Please see consult note written on 03/28/2024 at 5:13 pm  Patient was initially evaluated by critical care medicine for respiratory distress, and briefly admitted under critical care and was then moved under hospital medicine.        Alina Ervin MD  Critical Care Medicine  [facility] - Emergency Dept

## 2024-03-29 NOTE — AI DETERIORATION ALERT
RAPID RESPONSE NURSE AI ALERT       AI alert received.    Chart Reviewed: 03/29/2024, 4:32 AM    MRN: [id number]  Bed: ED 07/07    Dx: <principal problem not specified>    Fransico Montalvo has a past medical history of Alcohol abuse, BPH (benign prostatic hyperplasia), Cirrhosis, Closed fracture of left distal radius, Gastritis, Hypertension, and Renal disorder.    Last VS: /84   Pulse 101   Temp 97.9 °F (36.6 °C) (Oral)   Resp (!) 4   Wt 81.6 kg (180 lb)   SpO2 100%   BMI 27.37 kg/m²     24H Vital Sign Range:  Temp:  [97.5 °F (36.4 °C)-98 °F (36.7 °C)]   Pulse:  []   Resp:  [4-27]   BP: (103-143)/(61-95)   SpO2:  [79 %-100 %]     Level of Consciousness (AVPU): alert    Recent Labs     03/28/24  1643 03/28/24  1905 03/28/24  2337   WBC 3.95 4.69 4.93   HGB 6.7* 8.3* 7.6*   HCT 22.9* 27.7* 25.0*   * SEE COMMENT 122*       Recent Labs     03/28/24  0903      K 4.2      CO2 13*   BUN 28*   CREATININE 2.1*   *   MG 1.6        Recent Labs     03/28/24  0844   PH 7.393   PCO2 20.9*   PO2 175*   HCO3 12.7*   POCSATURATED 100   BE -12*        OXYGEN:  Flow (L/min): 4  Oxygen Concentration (%): 40       MEWS score: 3    bedside RNJaylene  contacted. No concerns verbalized at this time. Instructed to call [phone] for further concerns or assistance.    Jake Sam RN

## 2024-03-29 NOTE — CONSULTS
Patient ALICIA OREILLY is a 44y (1978) man with HTN, Hx Nephrolithiasis presenting with vertigo for 2 days. Patient reports he started to have room spinning dizziness worsening with head movements, which then improved Now he describes light headedness and dizziness when standing up and walking around. If he sits still and doesn't move he feels fine. He had COVID 2 weeks ago and experienced fever, chills and fatigue. He also fell while skiing 1 week ago. He reports not hitting his head that hard and felt fine afterwards. Denies previous history of vertigo, weakness, numbness, changes to speech, vision, headache, nausea, ear pain, tinnitis. Exam nonfocal, no nystagmus, no correctional saccades, no skew deviation. CTH and CTA normal.     Impression: Dizziness likely peripheral vertigo       Recommendation: INCOMPLETE  [] Orthostatic vitals  [] Meclizine 25 mg Q8 prn for dizziness  [] diazepam 1mg q12 hrs PO  [] outpatient [facility]    Case to be seen and discussed with neurology attending.  [facility]  Gastroenterology  Consult Note    Patient Name: Fransico Montalvo  MRN: [id number]  Admission Date: 3/28/2024  Hospital Length of Stay: 1 days  Code Status: Full Code   Attending Provider: Jake Guzman MD   Consulting Provider: Orestes Galaviz MD  Primary Care Physician: [facility]  Principal Problem:<principal problem not specified>    Inpatient consult to Gastroenterology  Consult performed by: Orestes Galaviz MD  Consult ordered by: Hilario Szymanski MD        Subjective:     HPI: Fransioc Montalvo is a 68 y.o. male with history of alcohol use disorder, decompensated cirrhosis who presents for SOB and cough. Denies overt GI bleeding. GI consulted for Hgb 4.1. BUN/Cr 28/2.1.    Tachycardic, normotensive, tachypneic on arrival.  Hgb 4.1 > 8.3 with 2u pRBC, received additional 1u but now down to 6.9  No gross blood on rectal exam in ED    EGD in Oct 2023 was normal. Recommended outpatient colonoscopy which is not yet done. Brown stool was noted at time of that procedure.         Objective:     Vitals:    03/29/24 0602   BP: (!) 110/57   Pulse: 100   Resp: 20   Temp:          Constitutional:  not in acute distress and well developed  HENT: Head: Normal, normocephalic, atraumatic.  Eyes: conjunctiva clear and sclera nonicteric  GI: soft, non-tender, without masses or organomegaly  Musculoskeletal: no muscular tenderness noted  Skin: normal color  Neurological: alert, oriented x3  Psychiatric: mood and affect are within normal limits, pt is a good historian; no memory problems were noted    Significant Labs:  Reviewed the following pertinent laboratory tests: Hgb 4.1 > 8.3 with transfusion > 7.1 since admission.   BUN 37 Cr 1.7    Significant Imaging:  No pertinent imaging.      Assessment/Plan:     Fransico Montalvo is a 68 y.o. male with history of decompensated alcoholic cirrhosis who presents for SOB and cough, found to have Hgb 4.1 with no overt GI bleeding. Recent normal EGD  for similar issue and was not able to get outpatient colonoscopy. Will plan to complete as inpatient prior to discharge.     Problem List:  Microcytic anemia  Decompensated cirrhosis    Recommendations:  - Can continue IV PPI BID for now. No overt bleeding.  - Colonoscopy tentatively 4/1  - Ok for regular diet, switch to clears on 3/30  - Trend Hgb q12h    Thank you for involving us in the care of Fransico Montalvo. Please call with any additional questions, concerns or changes in the patient's clinical status. We will continue to follow.     Orestes Galaviz MD  Gastroenterology Fellow PGY V  [facility]               Patient ALICIA OREILLY is a 44y (1978) man with HTN, Hx Nephrolithiasis presenting with vertigo for 2 days. Patient reports he started to have room spinning dizziness worsening with head movements, which then improved Now he describes light headedness and dizziness when standing up and walking around. If he sits still and doesn't move he feels fine. He had COVID 2 weeks ago and experienced fever, chills and fatigue. He also fell while skiing 1 week ago. He reports not hitting his head that hard and felt fine afterwards. Denies previous history of vertigo, weakness, numbness, changes to speech, vision, headache, nausea, ear pain, tinnitis. Exam nonfocal, no nystagmus, no correctional saccades, no skew deviation. CTH and CTA normal.     Impression: Dizziness likely peripheral vertigo       Recommendation:  [] Orthostatic vitals  [] Meclizine 25 mg Q8 prn for dizziness  [] diazepam 1mg q12 hrs PO  [] outpatient [facility]  [] Follow up with Dr. Osvaldo Robin [address], [place] [address]    Discussed with Dr. Patel neurology attending. Patient ALICIA OREILLY is a 44y (1978) man with HTN and Hx Nephrolithiasis presenting with vertigo for 2 days. Patient reports he started to have room spinning dizziness provoked with head movements. Dizziness has improved overall, but still has not resolved completely and feels unsteady when he walks. He also endorses light headedness upon standing as well. If he sits still and doesn't move then dizziness resolves. He had COVID 2 weeks ago and experienced fever, chills and fatigue. He also fell while skiing 1 week ago. He reports hitting his head, but not that hard and felt fine afterwards. Denies previous history of vertigo, weakness, numbness, changes to speech, vision, headache, nausea, ear pain, tinnitis, falls. Exam nonfocal, no nystagmus, no correctional saccades, no skew deviation. CTH and CTA normal.     Impression: Dizziness likely peripheral vertigo       Recommendation:  [] Orthostatic vitals  [] Meclizine 25 mg Q8 prn for dizziness  [] Diazepam 1mg q12 hrs PO  [] outpatient [facility]  [] Follow up with Dr. Osvaldo Robin [address], [place] [address]    Discussed with Dr. Patel neurology attending.

## 2024-03-30 LAB
ALBUMIN SERPL BCP-MCNC: 3.2 G/DL (ref 3.5–5.2)
ALP SERPL-CCNC: 56 U/L (ref 55–135)
ALT SERPL W/O P-5'-P-CCNC: 28 U/L (ref 10–44)
ANION GAP SERPL CALC-SCNC: 9 MMOL/L (ref 8–16)
ANISOCYTOSIS BLD QL SMEAR: SLIGHT
AST SERPL-CCNC: 43 U/L (ref 10–40)
BASOPHILS # BLD AUTO: 0 K/UL (ref 0–0.2)
BASOPHILS # BLD AUTO: 0 K/UL (ref 0–0.2)
BASOPHILS # BLD AUTO: 0.01 K/UL (ref 0–0.2)
BASOPHILS # BLD AUTO: 0.01 K/UL (ref 0–0.2)
BASOPHILS NFR BLD: 0 % (ref 0–1.9)
BASOPHILS NFR BLD: 0 % (ref 0–1.9)
BASOPHILS NFR BLD: 0.2 % (ref 0–1.9)
BASOPHILS NFR BLD: 0.2 % (ref 0–1.9)
BILIRUB SERPL-MCNC: 0.8 MG/DL (ref 0.1–1)
BLD PROD TYP BPU: NORMAL
BLOOD UNIT EXPIRATION DATE: NORMAL
BLOOD UNIT TYPE CODE: 5100
BLOOD UNIT TYPE: NORMAL
BUN SERPL-MCNC: 46 MG/DL (ref 8–23)
CALCIUM SERPL-MCNC: 8.6 MG/DL (ref 8.7–10.5)
CHLORIDE SERPL-SCNC: 101 MMOL/L (ref 95–110)
CO2 SERPL-SCNC: 22 MMOL/L (ref 23–29)
CODING SYSTEM: NORMAL
CREAT SERPL-MCNC: 1.9 MG/DL (ref 0.5–1.4)
CROSSMATCH INTERPRETATION: NORMAL
DIFFERENTIAL METHOD BLD: ABNORMAL
DISPENSE STATUS: NORMAL
EOSINOPHIL # BLD AUTO: 0 K/UL (ref 0–0.5)
EOSINOPHIL NFR BLD: 0 % (ref 0–8)
EOSINOPHIL NFR BLD: 0 % (ref 0–8)
EOSINOPHIL NFR BLD: 0.4 % (ref 0–8)
EOSINOPHIL NFR BLD: 0.4 % (ref 0–8)
ERYTHROCYTE [DISTWIDTH] IN BLOOD BY AUTOMATED COUNT: 22.8 % (ref 11.5–14.5)
ERYTHROCYTE [DISTWIDTH] IN BLOOD BY AUTOMATED COUNT: 22.9 % (ref 11.5–14.5)
ERYTHROCYTE [DISTWIDTH] IN BLOOD BY AUTOMATED COUNT: 23.3 % (ref 11.5–14.5)
ERYTHROCYTE [DISTWIDTH] IN BLOOD BY AUTOMATED COUNT: 23.4 % (ref 11.5–14.5)
EST. GFR  (NO RACE VARIABLE): 37.7 ML/MIN/1.73 M^2
GLUCOSE SERPL-MCNC: 101 MG/DL (ref 70–110)
HCT VFR BLD AUTO: 22.4 % (ref 40–54)
HCT VFR BLD AUTO: 23.2 % (ref 40–54)
HCT VFR BLD AUTO: 24 % (ref 40–54)
HCT VFR BLD AUTO: 25 % (ref 40–54)
HGB BLD-MCNC: 6.6 G/DL (ref 14–18)
HGB BLD-MCNC: 6.8 G/DL (ref 14–18)
HGB BLD-MCNC: 7.1 G/DL (ref 14–18)
HGB BLD-MCNC: 7.4 G/DL (ref 14–18)
HYPOCHROMIA BLD QL SMEAR: ABNORMAL
IMM GRANULOCYTES # BLD AUTO: 0.02 K/UL (ref 0–0.04)
IMM GRANULOCYTES NFR BLD AUTO: 0.3 % (ref 0–0.5)
IMM GRANULOCYTES NFR BLD AUTO: 0.4 % (ref 0–0.5)
INR PPP: 1.4 (ref 0.8–1.2)
LYMPHOCYTES # BLD AUTO: 0.4 K/UL (ref 1–4.8)
LYMPHOCYTES # BLD AUTO: 0.5 K/UL (ref 1–4.8)
LYMPHOCYTES # BLD AUTO: 0.5 K/UL (ref 1–4.8)
LYMPHOCYTES # BLD AUTO: 0.6 K/UL (ref 1–4.8)
LYMPHOCYTES NFR BLD: 11.5 % (ref 18–48)
LYMPHOCYTES NFR BLD: 7 % (ref 18–48)
LYMPHOCYTES NFR BLD: 8.1 % (ref 18–48)
LYMPHOCYTES NFR BLD: 9 % (ref 18–48)
MAGNESIUM SERPL-MCNC: 1.9 MG/DL (ref 1.6–2.6)
MCH RBC QN AUTO: 22.7 PG (ref 27–31)
MCH RBC QN AUTO: 23.2 PG (ref 27–31)
MCH RBC QN AUTO: 23.9 PG (ref 27–31)
MCH RBC QN AUTO: 23.9 PG (ref 27–31)
MCHC RBC AUTO-ENTMCNC: 29.3 G/DL (ref 32–36)
MCHC RBC AUTO-ENTMCNC: 29.5 G/DL (ref 32–36)
MCHC RBC AUTO-ENTMCNC: 29.6 G/DL (ref 32–36)
MCHC RBC AUTO-ENTMCNC: 29.6 G/DL (ref 32–36)
MCV RBC AUTO: 78 FL (ref 82–98)
MCV RBC AUTO: 79 FL (ref 82–98)
MCV RBC AUTO: 81 FL (ref 82–98)
MCV RBC AUTO: 81 FL (ref 82–98)
MONOCYTES # BLD AUTO: 0.3 K/UL (ref 0.3–1)
MONOCYTES NFR BLD: 5.2 % (ref 4–15)
MONOCYTES NFR BLD: 5.9 % (ref 4–15)
MONOCYTES NFR BLD: 6.3 % (ref 4–15)
MONOCYTES NFR BLD: 6.8 % (ref 4–15)
NEUTROPHILS # BLD AUTO: 4 K/UL (ref 1.8–7.7)
NEUTROPHILS # BLD AUTO: 4.3 K/UL (ref 1.8–7.7)
NEUTROPHILS # BLD AUTO: 4.4 K/UL (ref 1.8–7.7)
NEUTROPHILS # BLD AUTO: 5.1 K/UL (ref 1.8–7.7)
NEUTROPHILS NFR BLD: 81.3 % (ref 38–73)
NEUTROPHILS NFR BLD: 84.1 % (ref 38–73)
NEUTROPHILS NFR BLD: 85.7 % (ref 38–73)
NEUTROPHILS NFR BLD: 86.4 % (ref 38–73)
NRBC BLD-RTO: 0 /100 WBC
OVALOCYTES BLD QL SMEAR: ABNORMAL
PHOSPHATE SERPL-MCNC: 2.6 MG/DL (ref 2.7–4.5)
PLATELET # BLD AUTO: 120 K/UL (ref 150–450)
PLATELET # BLD AUTO: 150 K/UL (ref 150–450)
PLATELET # BLD AUTO: 150 K/UL (ref 150–450)
PLATELET # BLD AUTO: 85 K/UL (ref 150–450)
PLATELET BLD QL SMEAR: ABNORMAL
PMV BLD AUTO: 10.1 FL (ref 9.2–12.9)
PMV BLD AUTO: 10.2 FL (ref 9.2–12.9)
PMV BLD AUTO: 10.5 FL (ref 9.2–12.9)
PMV BLD AUTO: 10.7 FL (ref 9.2–12.9)
POIKILOCYTOSIS BLD QL SMEAR: SLIGHT
POLYCHROMASIA BLD QL SMEAR: ABNORMAL
POTASSIUM SERPL-SCNC: 3.8 MMOL/L (ref 3.5–5.1)
PROT SERPL-MCNC: 6.7 G/DL (ref 6–8.4)
PROTHROMBIN TIME: 14.6 SEC (ref 9–12.5)
RBC # BLD AUTO: 2.84 M/UL (ref 4.6–6.2)
RBC # BLD AUTO: 2.97 M/UL (ref 4.6–6.2)
RBC # BLD AUTO: 2.99 M/UL (ref 4.6–6.2)
RBC # BLD AUTO: 3.09 M/UL (ref 4.6–6.2)
SODIUM SERPL-SCNC: 132 MMOL/L (ref 136–145)
TARGETS BLD QL SMEAR: ABNORMAL
TRANS ERYTHROCYTES VOL PATIENT: NORMAL ML
WBC # BLD AUTO: 4.86 K/UL (ref 3.9–12.7)
WBC # BLD AUTO: 5.1 K/UL (ref 3.9–12.7)
WBC # BLD AUTO: 5.11 K/UL (ref 3.9–12.7)
WBC # BLD AUTO: 5.92 K/UL (ref 3.9–12.7)

## 2024-03-30 PROCEDURE — 83735 ASSAY OF MAGNESIUM: CPT | Performed by: STUDENT IN AN ORGANIZED HEALTH CARE EDUCATION/TRAINING PROGRAM

## 2024-03-30 PROCEDURE — P9021 RED BLOOD CELLS UNIT: HCPCS | Performed by: INTERNAL MEDICINE

## 2024-03-30 PROCEDURE — 20600001 HC STEP DOWN PRIVATE ROOM

## 2024-03-30 PROCEDURE — 25000003 PHARM REV CODE 250: Performed by: STUDENT IN AN ORGANIZED HEALTH CARE EDUCATION/TRAINING PROGRAM

## 2024-03-30 PROCEDURE — 36430 TRANSFUSION BLD/BLD COMPNT: CPT

## 2024-03-30 PROCEDURE — 36415 COLL VENOUS BLD VENIPUNCTURE: CPT | Mod: XB | Performed by: STUDENT IN AN ORGANIZED HEALTH CARE EDUCATION/TRAINING PROGRAM

## 2024-03-30 PROCEDURE — 99900035 HC TECH TIME PER 15 MIN (STAT)

## 2024-03-30 PROCEDURE — 80053 COMPREHEN METABOLIC PANEL: CPT | Performed by: STUDENT IN AN ORGANIZED HEALTH CARE EDUCATION/TRAINING PROGRAM

## 2024-03-30 PROCEDURE — 25000242 PHARM REV CODE 250 ALT 637 W/ HCPCS: Performed by: INTERNAL MEDICINE

## 2024-03-30 PROCEDURE — 63600175 PHARM REV CODE 636 W HCPCS: Performed by: STUDENT IN AN ORGANIZED HEALTH CARE EDUCATION/TRAINING PROGRAM

## 2024-03-30 PROCEDURE — 36415 COLL VENOUS BLD VENIPUNCTURE: CPT | Performed by: STUDENT IN AN ORGANIZED HEALTH CARE EDUCATION/TRAINING PROGRAM

## 2024-03-30 PROCEDURE — C9113 INJ PANTOPRAZOLE SODIUM, VIA: HCPCS | Performed by: STUDENT IN AN ORGANIZED HEALTH CARE EDUCATION/TRAINING PROGRAM

## 2024-03-30 PROCEDURE — 86920 COMPATIBILITY TEST SPIN: CPT | Performed by: INTERNAL MEDICINE

## 2024-03-30 PROCEDURE — 84100 ASSAY OF PHOSPHORUS: CPT | Performed by: STUDENT IN AN ORGANIZED HEALTH CARE EDUCATION/TRAINING PROGRAM

## 2024-03-30 PROCEDURE — 85610 PROTHROMBIN TIME: CPT | Performed by: STUDENT IN AN ORGANIZED HEALTH CARE EDUCATION/TRAINING PROGRAM

## 2024-03-30 PROCEDURE — 94640 AIRWAY INHALATION TREATMENT: CPT

## 2024-03-30 PROCEDURE — 85025 COMPLETE CBC W/AUTO DIFF WBC: CPT | Mod: 91 | Performed by: STUDENT IN AN ORGANIZED HEALTH CARE EDUCATION/TRAINING PROGRAM

## 2024-03-30 PROCEDURE — 63700000 PHARM REV CODE 250 ALT 637 W/O HCPCS: Performed by: STUDENT IN AN ORGANIZED HEALTH CARE EDUCATION/TRAINING PROGRAM

## 2024-03-30 PROCEDURE — 25000003 PHARM REV CODE 250: Performed by: HOSPITALIST

## 2024-03-30 PROCEDURE — 27000221 HC OXYGEN, UP TO 24 HOURS

## 2024-03-30 PROCEDURE — 25000003 PHARM REV CODE 250: Performed by: INTERNAL MEDICINE

## 2024-03-30 PROCEDURE — 94799 UNLISTED PULMONARY SVC/PX: CPT | Mod: XB

## 2024-03-30 PROCEDURE — 94761 N-INVAS EAR/PLS OXIMETRY MLT: CPT

## 2024-03-30 RX ORDER — HYDROCODONE BITARTRATE AND ACETAMINOPHEN 500; 5 MG/1; MG/1
TABLET ORAL
Status: DISCONTINUED | OUTPATIENT
Start: 2024-03-30 | End: 2024-04-04 | Stop reason: HOSPADM

## 2024-03-30 RX ADMIN — DICYCLOMINE HYDROCHLORIDE 10 MG: 10 CAPSULE ORAL at 03:03

## 2024-03-30 RX ADMIN — IPRATROPIUM BROMIDE AND ALBUTEROL SULFATE 3 ML: 2.5; .5 SOLUTION RESPIRATORY (INHALATION) at 12:03

## 2024-03-30 RX ADMIN — CEFTRIAXONE 1 G: 1 INJECTION, POWDER, FOR SOLUTION INTRAMUSCULAR; INTRAVENOUS at 11:03

## 2024-03-30 RX ADMIN — ONDANSETRON 8 MG: 8 TABLET, ORALLY DISINTEGRATING ORAL at 04:03

## 2024-03-30 RX ADMIN — PANTOPRAZOLE SODIUM 40 MG: 40 INJECTION, POWDER, FOR SOLUTION INTRAVENOUS at 08:03

## 2024-03-30 RX ADMIN — IPRATROPIUM BROMIDE AND ALBUTEROL SULFATE 3 ML: 2.5; .5 SOLUTION RESPIRATORY (INHALATION) at 08:03

## 2024-03-30 RX ADMIN — IPRATROPIUM BROMIDE AND ALBUTEROL SULFATE 3 ML: 2.5; .5 SOLUTION RESPIRATORY (INHALATION) at 04:03

## 2024-03-30 RX ADMIN — GUAIFENESIN 200 MG: 200 SOLUTION ORAL at 09:03

## 2024-03-30 RX ADMIN — FOLIC ACID 1 MG: 1 TABLET ORAL at 08:03

## 2024-03-30 RX ADMIN — GUAIFENESIN 200 MG: 200 SOLUTION ORAL at 05:03

## 2024-03-30 RX ADMIN — GUAIFENESIN 200 MG: 200 SOLUTION ORAL at 02:03

## 2024-03-30 RX ADMIN — AZITHROMYCIN DIHYDRATE 500 MG: 250 TABLET ORAL at 08:03

## 2024-03-30 RX ADMIN — DICYCLOMINE HYDROCHLORIDE 10 MG: 10 CAPSULE ORAL at 10:03

## 2024-03-30 RX ADMIN — Medication 100 MG: at 08:03

## 2024-03-30 RX ADMIN — IPRATROPIUM BROMIDE AND ALBUTEROL SULFATE 3 ML: 2.5; .5 SOLUTION RESPIRATORY (INHALATION) at 09:03

## 2024-03-30 RX ADMIN — ACETAMINOPHEN 650 MG: 325 TABLET ORAL at 03:03

## 2024-03-30 RX ADMIN — GUAIFENESIN 200 MG: 200 SOLUTION ORAL at 08:03

## 2024-03-30 RX ADMIN — DOCUSATE SODIUM 50 MG: 50 CAPSULE, LIQUID FILLED ORAL at 04:03

## 2024-03-30 NOTE — PROGRESS NOTES
[facility] - Stepdown Flex ([place] [address])  [facility] Medicine  Progress Note    Patient Name: Fransico Montalvo  MRN: [id number]  Patient Class: IP- Inpatient   Admission Date: 3/28/2024  Length of Stay: 2 days  Attending Physician: Jake Guzman MD  Primary Care Provider: [facility]        Subjective:     Principal Problem:Pneumonia        HPI:  68 year old gentleman who is a known case of f alcohol abuse, BPH, cirrhosis, hypertension, esophageal varices, gastritis and tobacco abuse presenting with shortness of breath that started about 4 days ago. It was accompanied by a dry cough and dizziness as well as generalized weakness. He reported no fever, sputum, hemoptysis, bleeding from any orifice. He reported no changes in stool color. Patient did not complain of chest pain, seizures, syncope, or motor weakness. He denied a trauma and falls. He noted that he was diagnosed with pneumonia 3 days  ago and was prescribed antibiotics. Patient was afebrile in the ER: BP: 125/80 SpO2: 89% on venturi mask . He was  found to be hypoxemic with end-tidal CO2 of 20 and hypoxemic to 79% requiring immediate  oxygen support at the EMS. His hemoglobin was found to be around 4 and 3 pints of PRBC were  scheduled for transfusion. By the time of our arrival his second bag was flowing.  Patient is to be admitted as a case of CAP, Anemia, and acute hypoxemic respiratory failure.  He received two pints PRBC in the ER, maintaining approproriate saturation of SpO2 with venturi mask on, given a shot of Vancomycin in the Er and placed on CAP coverage antibiotics.      Patient appears to be stable for stepdown to hospital medicine teams.  Maintaining appropriate vitals with minimal support.    Overview/Hospital Course:  Patient was admitted for symptomatic anemia, received blood transfusion.  GI was consulted who plans for scoping on 4/1.  Patient also treated for community-acquired pneumonia    Interval History:  Respiratory  panel unremarkable.  Patient reports cough has improved.  Denies any bleeding per rectum or symptoms of anemia.    Review of Systems   Respiratory:  Positive for cough and wheezing.    Gastrointestinal:  Negative for abdominal pain and vomiting.     Objective:     Vital Signs (Most Recent):  Temp: 97.5 °F (36.4 °C) (03/30/24 1035)  Pulse: 102 (03/30/24 1035)  Resp: 19 (03/30/24 1035)  BP: (!) 91/58 (03/30/24 1035)  SpO2: 99 % (03/30/24 1035) Vital Signs (24h Range):  Temp:  [97.4 °F (36.3 °C)-98.2 °F (36.8 °C)] 97.5 °F (36.4 °C)  Pulse:  [] 102  Resp:  [17-22] 19  SpO2:  [95 %-100 %] 99 %  BP: ()/(58-79) 91/58     Weight: 81.6 kg (180 lb)  Body mass index is 24.41 kg/m².    Intake/Output Summary (Last 24 hours) at 3/30/2024 1050  Last data filed at 3/30/2024 0834  Gross per 24 hour   Intake 451 ml   Output 600 ml   Net -149 ml         Physical Exam  Constitutional:       General: He is not in acute distress.  HENT:      Head: Normocephalic.      Right Ear: External ear normal.      Left Ear: External ear normal.      Nose: Nose normal.      Comments: Nasal cannula  Cardiovascular:      Rate and Rhythm: Normal rate.   Pulmonary:      Breath sounds: Rhonchi and rales present.   Abdominal:      Palpations: Abdomen is soft.      Tenderness: There is no abdominal tenderness.   Musculoskeletal:      Right lower leg: No edema.      Left lower leg: No edema.   Skin:     General: Skin is warm.   Neurological:      General: No focal deficit present.      Mental Status: He is alert and oriented to person, place, and time.   Psychiatric:         Mood and Affect: Mood normal.         Significant Labs: All pertinent labs within the past 24 hours have been reviewed.        Assessment/Plan:      * Pneumonia  Noted on chest x-ray and Accounting for hypoxia.  We will do cough expectorant and nebs for pulmonary toileting.  Incentive spirometry.  Respiratory panel.  Continue with azithromycin and ceftriaxone.  Sputum  culture.  Oxygen as needed  3/30  Continue with antibiotics and supportive care      Anemia    Current CBC reviewed-   Lab Results   Component Value Date    HGB 6.6 (L) 03/30/2024    HCT 22.4 (L) 03/30/2024     Monitor serial CBC and transfuse if patient becomes hemodynamically unstable, symptomatic or H/H drops below 7/21.    3/29  Concerns for GI bleed.  Continue with Protonix 40 mg b.i.d..  Follow up with GI recommendations. Continue  to monitor hemoglobin  3/30  We will transfuse a unit of packed red cells today.  GI plans to scope on Monday.  Continue with Protonix.  Continue to monitor hemoglobin    Cirrhosis  History of alcohol use and reported varices with banding.  GI consulted.  We will continue thiamine and folic acid.  No history of alcohol withdrawal symptoms    GI bleed  See 2. Assessment      Acute hypoxemic respiratory failure  See #1    CKD Stage 3  Currently at baseline      VTE Risk Mitigation (From admission, onward)           Ordered     Place sequential compression device  Until discontinued         03/28/24 1121     IP VTE LOW RISK PATIENT  Once         03/28/24 1121                    Discharge Planning   ARTURO: 4/2/2024     Code Status: Full Code   Is the patient medically ready for discharge?:     Reason for patient still in hospital (select all that apply): Patient trending condition                     Jake Guzman MD  Department of Hospital Medicine   [facility] - Stepdown Flex ([place]-14)

## 2024-03-30 NOTE — HOSPITAL COURSE
-Patient was admitted for symptomatic anemia, received blood transfusion.  GI was consulted- EGD in Oct 2023 was normal. They recommend c-scope. and patient underwent colonoscopy which was unremarkable.  Reports significant smoking history and was treated for presumed COPD exacerbation and community-acquired pneumonia. Patient has been on regularly scheduled nebulizer treatments. Started on steroids 4/01. He completed 3 days of Azithro and 6 days of Rocephin. It appears patient was transfused 3 units of pRBC on day of admission. 2 more units since then, last on 4/02. Since then Hgb has been stable around 8.2.   -PT/OT valuated the patient, recommend moderate intensity therapy. Patient wavered on whether he wanted to go to care home or SNF. Eventually patient decided on SNF placement. Appreciate CM assistance. Patient accepted to [facility] and will discharge today. Pt deemed appropriate for discharge. Plan discussed with pt, who was agreeable; medications were discussed and reviewed. Outpatient follow-up discussed and scheduled when able. ER precautions were given. Patient discharged.

## 2024-03-30 NOTE — CARE UPDATE
RAPID RESPONSE NURSE ROUND       Rounding completed with charge RNCalvin  for anemia  reports patient is receiving 1 U PRBCs. BP soft but stable.  No additional concerns verbalized at this time. Instructed to call [phone] for further concerns or assistance.

## 2024-03-30 NOTE — NURSING
Pt AAO x 4, no c/o pain. Pt h/h 6.6/22.4 this am. Dr. Guzman notified. No new orders at this time. Monitoring.

## 2024-03-30 NOTE — PLAN OF CARE
Discharge Plan A and Plan B have been determined by review of patient's clinical status, future medical and therapeutic needs, and coverage/benefits for post-acute care in coordination with multidisciplinary team members.      03/30/24 1630   Post-Acute Status   Post-Acute Authorization Home Health   Home Health Status Referrals Sent   Coverage [facility] Resources/Appts/Education Provided Appointments scheduled and added to AVS   Discharge Delays (!) Procedure Scheduling (IR, OR, Labs, Echo, Cath, Echo, EEG)  (treated for pneumonia)   Discharge Plan   Discharge Plan A Home Health   Discharge Plan B Home     Met with patient  to review discharge recommendation of  HH  and is agreeable to plan    Patient/family provided list of facilities in-network with patient's payor plan. Providers that are owned, operated, or affiliated with [facility] are included on the list.     Notified that referral sent to below listed facilities from in-network list based on proximity to home/family support:   1.[facility] Phone: [phone] Response: Yes, willing to accept patient       2.[facility] Phone: [phone]   Response: No, unable to accept patient Reason: Other (see comments)  Comments: No availability for timely SOC.      3.[facility] Phone: [phone]   Response: No, unable to accept patient    4.[facility] [place] [facility] Phone: [phone]   Response: Yes, willing to accept patient     Patient/family instructed to identify preference.    Preferred Facility: (if more than 1, listed in order of descending preference)  No verbalized preference     If an additional preferred facility not listed above is identified, additional referral to be sent. If above facilities unable to accept, will send additional referrals to in-network providers.              Sabine  Afia MONTES  Case Management  [facility]  [phone]

## 2024-03-30 NOTE — ASSESSMENT & PLAN NOTE
Current CBC reviewed-   Lab Results   Component Value Date    HGB 6.6 (L) 03/30/2024    HCT 22.4 (L) 03/30/2024     Monitor serial CBC and transfuse if patient becomes hemodynamically unstable, symptomatic or H/H drops below 7/21.    3/29  Concerns for GI bleed.  Continue with Protonix 40 mg b.i.d..  Follow up with GI recommendations. Continue  to monitor hemoglobin  3/30  We will transfuse a unit of packed red cells today.  GI plans to scope on Monday.  Continue with Protonix.  Continue to monitor hemoglobin

## 2024-03-30 NOTE — PLAN OF CARE
[facility] - Stepdown Flex ([place]-14)  Initial Discharge Assessment       Primary Care Provider: [facility]    Admission Diagnosis: Hypoxemia [R09.02]  Coagulopathy [D68.9]  SOB (shortness of breath) [R06.02]  Lactic acid acidosis [E87.20]  Alcoholic cirrhosis of liver without ascites [K70.30]  ISRAEL (acute kidney injury) [N17.9]  Anemia, unspecified type [D64.9]  Multifocal pneumonia [J18.9]  Sepsis, due to unspecified organism, unspecified whether acute organ dysfunction present [A41.9]    Admission Date: 3/28/2024  Expected Discharge Date: 4/2/2024    Transition of Care Barriers: No family/friends to help    Payor: [employer] MGD MCARE [facility] / Plan: [employer] SNP / Product Type: Medicare Advantage /     Extended Emergency Contact Information  Primary Emergency Contact: Ina Quintero   [place]  Mobile Phone: [phone]  Relation: Friend  Secondary Emergency Contact: Tonya Lewis   [facility]  Home Phone: [phone]  Relation: Friend    Discharge Plan A: Home (community housing)         [id number] [place] - [address]  [address]  [place] [address]  Phone: [phone] Fax: [phone]    [id number] - [place] - [address]  [address] [facility] [address]  [address] [place] [address]  Phone: [phone] Fax: [phone]    [facility]  [address]  [place] [address]  Phone: [phone] Fax: [phone]      Initial Assessment (most recent)       Adult Discharge Assessment - 03/30/24 1545          Discharge Assessment    Assessment Type Discharge Planning Assessment     Confirmed/corrected address, phone number and insurance Yes     Source of Information patient     When was your last doctors appointment? 02/06/24     Communicated ARTURO with patient/caregiver Yes     Reason For Admission Pneumonia     People in Home friend(s)   community  housing    Facility Arrived From: home     Do you expect to return to your current living situation? Yes     Do you have help at home or someone to help you manage your care at home? No     Prior to hospitilization cognitive status: Alert/Oriented     Current cognitive status: Alert/Oriented     Walking or Climbing Stairs Difficulty no     Dressing/Bathing Difficulty yes     Dressing/Bathing bathing difficulty, assistance 1 person     Equipment Currently Used at Home wheelchair;cane, straight     Readmission within 30 days? No     Patient currently being followed by outpatient case management? No     Do you currently have service(s) that help you manage your care at home? No     Do you take prescription medications? Yes     Do you have prescription coverage? Yes     Coverage [facility]GD MCARE [facility] SECURE SNP -     Do you have any problems affording any of your prescribed medications? No     Is the patient taking medications as prescribed? yes     Who is going to help you get home at discharge? Patient has no help     How do you get to doctors appointments? health plan transportation;public transportation     Are you on dialysis? No     Do you take coumadin? No     Discharge Plan A Home   community housing    DME Needed Upon Discharge  other (see comments)   TBD    Discharge Plan discussed with: Patient     Transition of Care Barriers No family/friends to help        Physical Activity    On average, how many days per week do you engage in moderate to strenuous exercise (like a brisk walk)? 4 days     On average, how many minutes do you engage in exercise at this level? 20 min        Financial Resource Strain    How hard is it for you to pay for the very basics like food, housing, medical care, and heating? Not hard at all        Housing Stability    In the last 12 months, was there a time when you were not able to pay the mortgage or rent on time? No     In the last 12 months, was there a time  when you did not have a steady place to sleep or slept in a shelter (including now)? No        Transportation Needs    In the past 12 months, has lack of transportation kept you from medical appointments or from getting medications? No     In the past 12 months, has lack of transportation kept you from meetings, work, or from getting things needed for daily living? No        Food Insecurity    Within the past 12 months, you worried that your food would run out before you got the money to buy more. Never true     Within the past 12 months, the food you bought just didn't last and you didn't have money to get more. Never true        Stress    Do you feel stress - tense, restless, nervous, or anxious, or unable to sleep at night because your mind is troubled all the time - these days? To some extent        Social Connections    In a typical week, how many times do you talk on the phone with family, friends, or neighbors? Once a week     How often do you get together with friends or relatives? Once a week     How often do you attend Alevism or Jew services? Never     Do you belong to any clubs or organizations such as Alevism groups, unions, fraternal or athletic groups, or school groups? No     How often do you attend meetings of the clubs or organizations you belong to? Never     Are you , , , , never , or living with a partner? Never         Alcohol Use    Q1: How often do you have a drink containing alcohol? Never     Q2: How many drinks containing alcohol do you have on a typical day when you are drinking? Patient does not drink     Q3: How often do you have six or more drinks on one occasion? Never        OTHER    Name(s) of People in Home lives with friends in Community Housing                          CM met with patient at bedside to complete discharge planning assessment.  Patient alert and oriented xs 4.  Patient verified all demographic information on  facesheet is correct.  Patient verified PCP is  [facility]   Patient verified primary health insurance is [employer] MGD [facility] SECURE [facility]  and [place] Medicaid. Patient is agreeable with bedside medication delivery.   Patient is not  active with  home health and has listed DME.  Patient with NO POA or Living Will.  Patient not on dialysis or medication coumadin.  Patient with no 30 day admission.  Patient with no financial issues at this time.  Patient  will need  transportation upon discharge from facility.  Patient will have assistance from his wife upon discharge Patient is usually  independent with ADLs, patient rep[orts needing assistance with bathing and  lives  in community housing. All questions answered regarding Case Management Discharge Planning, patient verbalized understanding.    Discharge booklet with CM's contact information given to patient.           Discharge Plan A and Plan B have been determined by review of patient's clinical status, future medical and therapeutic needs, and coverage/benefits for post-acute care in coordination with multidisciplinary team members.     Sabine Oreilly RN  Case Management  [facility]  [phone]

## 2024-03-30 NOTE — NURSING
Pt AAO x 4, VSS, NSR-ST on telemetry. O2 @ 2L NC with sats %. No shortness of breath at rest. Pt is dyspneic on exertion. 1 U of PRBC given this shift. Pt tolerated infusion well. CBC ordered Q6h. Pt c/o abdominal cramping and tenderness after lunch. PRN medications given. Pt c/o constipation. MD notified. PRN laxative ordered and given. BSC placed in the room. Pt instructed to call when he has to use the restroom. Pt verbalized understanding. Diet changed to clear liquids for planned colonoscopy on 4/1. Pt educated on diet change in preparation for his cleanse for the colonoscopy. Pt verbalized understanding. Pt changes position independently. No acute events this shift. Bed low and locked, call light in reach.

## 2024-03-30 NOTE — ASSESSMENT & PLAN NOTE
Noted on chest x-ray and Accounting for hypoxia.  We will do cough expectorant and nebs for pulmonary toileting.  Incentive spirometry.  Respiratory panel.  Continue with azithromycin and ceftriaxone.  Sputum culture.  Oxygen as needed  3/30  Continue with antibiotics and supportive care

## 2024-03-30 NOTE — PLAN OF CARE
Problem: Adult Inpatient Plan of Care  Goal: Plan of Care Review  Outcome: Ongoing, Progressing  Goal: Patient-Specific Goal (Individualized)  Outcome: Ongoing, Progressing  Goal: Absence of Hospital-Acquired Illness or Injury  Outcome: Ongoing, Progressing  Goal: Optimal Comfort and Wellbeing  Outcome: Ongoing, Progressing  Goal: Readiness for Transition of Care  Outcome: Ongoing, Progressing     Problem: Fluid Imbalance (Pneumonia)  Goal: Fluid Balance  Outcome: Ongoing, Progressing     Problem: Infection (Pneumonia)  Goal: Resolution of Infection Signs and Symptoms  Outcome: Ongoing, Progressing     Problem: Respiratory Compromise (Pneumonia)  Goal: Effective Oxygenation and Ventilation  Outcome: Ongoing, Progressing     Problem: Skin Injury Risk Increased  Goal: Skin Health and Integrity  Outcome: Ongoing, Progressing     Problem: Impaired Wound Healing  Goal: Optimal Wound Healing  Outcome: Ongoing, Progressing

## 2024-03-30 NOTE — NURSING
Pt AAO x 4, no c/o pain. Pt h/h 6.6/22.4. 1U PRC ordered and infused. Pt tolerated blood transfusion well. No s/s of any reactions. Repeat CBC at 6pm per MD. Monitoring.

## 2024-03-30 NOTE — SUBJECTIVE & OBJECTIVE
Interval History:  Respiratory panel unremarkable.  Patient reports cough has improved.  Denies any bleeding per rectum or symptoms of anemia.    Review of Systems   Respiratory:  Positive for cough and wheezing.    Gastrointestinal:  Negative for abdominal pain and vomiting.     Objective:     Vital Signs (Most Recent):  Temp: 97.5 °F (36.4 °C) (03/30/24 1035)  Pulse: 102 (03/30/24 1035)  Resp: 19 (03/30/24 1035)  BP: (!) 91/58 (03/30/24 1035)  SpO2: 99 % (03/30/24 1035) Vital Signs (24h Range):  Temp:  [97.4 °F (36.3 °C)-98.2 °F (36.8 °C)] 97.5 °F (36.4 °C)  Pulse:  [] 102  Resp:  [17-22] 19  SpO2:  [95 %-100 %] 99 %  BP: ()/(58-79) 91/58     Weight: 81.6 kg (180 lb)  Body mass index is 24.41 kg/m².    Intake/Output Summary (Last 24 hours) at 3/30/2024 1050  Last data filed at 3/30/2024 0834  Gross per 24 hour   Intake 451 ml   Output 600 ml   Net -149 ml         Physical Exam  Constitutional:       General: He is not in acute distress.  HENT:      Head: Normocephalic.      Right Ear: External ear normal.      Left Ear: External ear normal.      Nose: Nose normal.      Comments: Nasal cannula  Cardiovascular:      Rate and Rhythm: Normal rate.   Pulmonary:      Breath sounds: Rhonchi and rales present.   Abdominal:      Palpations: Abdomen is soft.      Tenderness: There is no abdominal tenderness.   Musculoskeletal:      Right lower leg: No edema.      Left lower leg: No edema.   Skin:     General: Skin is warm.   Neurological:      General: No focal deficit present.      Mental Status: He is alert and oriented to person, place, and time.   Psychiatric:         Mood and Affect: Mood normal.         Significant Labs: All pertinent labs within the past 24 hours have been reviewed.

## 2024-03-31 ENCOUNTER — ANESTHESIA EVENT (OUTPATIENT)
Dept: ENDOSCOPY | Facility: HOSPITAL | Age: 69
DRG: 377 | End: 2024-03-31
Payer: MEDICARE

## 2024-03-31 LAB
ALBUMIN SERPL BCP-MCNC: 3.1 G/DL (ref 3.5–5.2)
ALP SERPL-CCNC: 58 U/L (ref 55–135)
ALT SERPL W/O P-5'-P-CCNC: 24 U/L (ref 10–44)
ANION GAP SERPL CALC-SCNC: 8 MMOL/L (ref 8–16)
ANISOCYTOSIS BLD QL SMEAR: SLIGHT
AST SERPL-CCNC: 28 U/L (ref 10–40)
BASO STIPL BLD QL SMEAR: ABNORMAL
BASOPHILS # BLD AUTO: 0.01 K/UL (ref 0–0.2)
BASOPHILS # BLD AUTO: 0.01 K/UL (ref 0–0.2)
BASOPHILS # BLD AUTO: 0.02 K/UL (ref 0–0.2)
BASOPHILS NFR BLD: 0.2 % (ref 0–1.9)
BASOPHILS NFR BLD: 0.2 % (ref 0–1.9)
BASOPHILS NFR BLD: 0.5 % (ref 0–1.9)
BILIRUB SERPL-MCNC: 1.1 MG/DL (ref 0.1–1)
BUN SERPL-MCNC: 42 MG/DL (ref 8–23)
CALCIUM SERPL-MCNC: 8.4 MG/DL (ref 8.7–10.5)
CHLORIDE SERPL-SCNC: 103 MMOL/L (ref 95–110)
CO2 SERPL-SCNC: 21 MMOL/L (ref 23–29)
CREAT SERPL-MCNC: 1.6 MG/DL (ref 0.5–1.4)
DIFFERENTIAL METHOD BLD: ABNORMAL
EOSINOPHIL # BLD AUTO: 0 K/UL (ref 0–0.5)
EOSINOPHIL NFR BLD: 0.4 % (ref 0–8)
EOSINOPHIL NFR BLD: 0.6 % (ref 0–8)
EOSINOPHIL NFR BLD: 1 % (ref 0–8)
ERYTHROCYTE [DISTWIDTH] IN BLOOD BY AUTOMATED COUNT: 23 % (ref 11.5–14.5)
ERYTHROCYTE [DISTWIDTH] IN BLOOD BY AUTOMATED COUNT: 23.3 % (ref 11.5–14.5)
ERYTHROCYTE [DISTWIDTH] IN BLOOD BY AUTOMATED COUNT: 23.3 % (ref 11.5–14.5)
EST. GFR  (NO RACE VARIABLE): 46.4 ML/MIN/1.73 M^2
GLUCOSE SERPL-MCNC: 98 MG/DL (ref 70–110)
HCT VFR BLD AUTO: 24.3 % (ref 40–54)
HCT VFR BLD AUTO: 25.1 % (ref 40–54)
HCT VFR BLD AUTO: 25.4 % (ref 40–54)
HGB BLD-MCNC: 7.3 G/DL (ref 14–18)
HGB BLD-MCNC: 7.3 G/DL (ref 14–18)
HGB BLD-MCNC: 7.4 G/DL (ref 14–18)
HYPOCHROMIA BLD QL SMEAR: ABNORMAL
IMM GRANULOCYTES # BLD AUTO: 0.01 K/UL (ref 0–0.04)
IMM GRANULOCYTES # BLD AUTO: 0.02 K/UL (ref 0–0.04)
IMM GRANULOCYTES # BLD AUTO: 0.07 K/UL (ref 0–0.04)
IMM GRANULOCYTES NFR BLD AUTO: 0.2 % (ref 0–0.5)
IMM GRANULOCYTES NFR BLD AUTO: 0.5 % (ref 0–0.5)
IMM GRANULOCYTES NFR BLD AUTO: 1.5 % (ref 0–0.5)
LYMPHOCYTES # BLD AUTO: 0.5 K/UL (ref 1–4.8)
LYMPHOCYTES NFR BLD: 10 % (ref 18–48)
LYMPHOCYTES NFR BLD: 12.7 % (ref 18–48)
LYMPHOCYTES NFR BLD: 9.9 % (ref 18–48)
MAGNESIUM SERPL-MCNC: 1.8 MG/DL (ref 1.6–2.6)
MCH RBC QN AUTO: 24.1 PG (ref 27–31)
MCH RBC QN AUTO: 24.2 PG (ref 27–31)
MCH RBC QN AUTO: 24.3 PG (ref 27–31)
MCHC RBC AUTO-ENTMCNC: 29.1 G/DL (ref 32–36)
MCHC RBC AUTO-ENTMCNC: 29.1 G/DL (ref 32–36)
MCHC RBC AUTO-ENTMCNC: 30 G/DL (ref 32–36)
MCV RBC AUTO: 81 FL (ref 82–98)
MCV RBC AUTO: 83 FL (ref 82–98)
MCV RBC AUTO: 83 FL (ref 82–98)
MONOCYTES # BLD AUTO: 0.4 K/UL (ref 0.3–1)
MONOCYTES NFR BLD: 7.5 % (ref 4–15)
MONOCYTES NFR BLD: 8.9 % (ref 4–15)
MONOCYTES NFR BLD: 9.2 % (ref 4–15)
NEUTROPHILS # BLD AUTO: 3.1 K/UL (ref 1.8–7.7)
NEUTROPHILS # BLD AUTO: 3.8 K/UL (ref 1.8–7.7)
NEUTROPHILS # BLD AUTO: 3.9 K/UL (ref 1.8–7.7)
NEUTROPHILS NFR BLD: 76.1 % (ref 38–73)
NEUTROPHILS NFR BLD: 80.1 % (ref 38–73)
NEUTROPHILS NFR BLD: 80.5 % (ref 38–73)
NRBC BLD-RTO: 0 /100 WBC
PHOSPHATE SERPL-MCNC: 2.9 MG/DL (ref 2.7–4.5)
PLATELET # BLD AUTO: 106 K/UL (ref 150–450)
PLATELET # BLD AUTO: 96 K/UL (ref 150–450)
PLATELET # BLD AUTO: 97 K/UL (ref 150–450)
PLATELET BLD QL SMEAR: ABNORMAL
PMV BLD AUTO: 10 FL (ref 9.2–12.9)
PMV BLD AUTO: 10.8 FL (ref 9.2–12.9)
PMV BLD AUTO: 11.3 FL (ref 9.2–12.9)
POIKILOCYTOSIS BLD QL SMEAR: SLIGHT
POLYCHROMASIA BLD QL SMEAR: ABNORMAL
POTASSIUM SERPL-SCNC: 3.8 MMOL/L (ref 3.5–5.1)
PROT SERPL-MCNC: 6.5 G/DL (ref 6–8.4)
RBC # BLD AUTO: 3 M/UL (ref 4.6–6.2)
RBC # BLD AUTO: 3.02 M/UL (ref 4.6–6.2)
RBC # BLD AUTO: 3.07 M/UL (ref 4.6–6.2)
SODIUM SERPL-SCNC: 132 MMOL/L (ref 136–145)
SPHEROCYTES BLD QL SMEAR: ABNORMAL
TARGETS BLD QL SMEAR: ABNORMAL
WBC # BLD AUTO: 4.01 K/UL (ref 3.9–12.7)
WBC # BLD AUTO: 4.66 K/UL (ref 3.9–12.7)
WBC # BLD AUTO: 4.81 K/UL (ref 3.9–12.7)

## 2024-03-31 PROCEDURE — 36415 COLL VENOUS BLD VENIPUNCTURE: CPT | Mod: XB | Performed by: STUDENT IN AN ORGANIZED HEALTH CARE EDUCATION/TRAINING PROGRAM

## 2024-03-31 PROCEDURE — 63600175 PHARM REV CODE 636 W HCPCS: Performed by: STUDENT IN AN ORGANIZED HEALTH CARE EDUCATION/TRAINING PROGRAM

## 2024-03-31 PROCEDURE — 25000242 PHARM REV CODE 250 ALT 637 W/ HCPCS: Performed by: INTERNAL MEDICINE

## 2024-03-31 PROCEDURE — 99900035 HC TECH TIME PER 15 MIN (STAT)

## 2024-03-31 PROCEDURE — 83735 ASSAY OF MAGNESIUM: CPT | Performed by: STUDENT IN AN ORGANIZED HEALTH CARE EDUCATION/TRAINING PROGRAM

## 2024-03-31 PROCEDURE — 94761 N-INVAS EAR/PLS OXIMETRY MLT: CPT

## 2024-03-31 PROCEDURE — 25000003 PHARM REV CODE 250: Performed by: INTERNAL MEDICINE

## 2024-03-31 PROCEDURE — 25000003 PHARM REV CODE 250: Performed by: STUDENT IN AN ORGANIZED HEALTH CARE EDUCATION/TRAINING PROGRAM

## 2024-03-31 PROCEDURE — 25000003 PHARM REV CODE 250: Performed by: HOSPITALIST

## 2024-03-31 PROCEDURE — 27000221 HC OXYGEN, UP TO 24 HOURS

## 2024-03-31 PROCEDURE — 80053 COMPREHEN METABOLIC PANEL: CPT | Performed by: STUDENT IN AN ORGANIZED HEALTH CARE EDUCATION/TRAINING PROGRAM

## 2024-03-31 PROCEDURE — 85025 COMPLETE CBC W/AUTO DIFF WBC: CPT | Mod: 91 | Performed by: STUDENT IN AN ORGANIZED HEALTH CARE EDUCATION/TRAINING PROGRAM

## 2024-03-31 PROCEDURE — 94640 AIRWAY INHALATION TREATMENT: CPT

## 2024-03-31 PROCEDURE — C9113 INJ PANTOPRAZOLE SODIUM, VIA: HCPCS | Performed by: STUDENT IN AN ORGANIZED HEALTH CARE EDUCATION/TRAINING PROGRAM

## 2024-03-31 PROCEDURE — 20600001 HC STEP DOWN PRIVATE ROOM

## 2024-03-31 PROCEDURE — 94799 UNLISTED PULMONARY SVC/PX: CPT | Mod: XB

## 2024-03-31 PROCEDURE — 84100 ASSAY OF PHOSPHORUS: CPT | Performed by: STUDENT IN AN ORGANIZED HEALTH CARE EDUCATION/TRAINING PROGRAM

## 2024-03-31 RX ORDER — POLYETHYLENE GLYCOL 3350, SODIUM SULFATE ANHYDROUS, SODIUM BICARBONATE, SODIUM CHLORIDE, POTASSIUM CHLORIDE 236; 22.74; 6.74; 5.86; 2.97 G/4L; G/4L; G/4L; G/4L; G/4L
4000 POWDER, FOR SOLUTION ORAL ONCE
Status: COMPLETED | OUTPATIENT
Start: 2024-03-31 | End: 2024-03-31

## 2024-03-31 RX ADMIN — GUAIFENESIN 200 MG: 200 SOLUTION ORAL at 06:03

## 2024-03-31 RX ADMIN — IPRATROPIUM BROMIDE AND ALBUTEROL SULFATE 3 ML: 2.5; .5 SOLUTION RESPIRATORY (INHALATION) at 11:03

## 2024-03-31 RX ADMIN — PANTOPRAZOLE SODIUM 40 MG: 40 INJECTION, POWDER, FOR SOLUTION INTRAVENOUS at 08:03

## 2024-03-31 RX ADMIN — POLYETHYLENE GLYCOL 3350, SODIUM SULFATE ANHYDROUS, SODIUM BICARBONATE, SODIUM CHLORIDE, POTASSIUM CHLORIDE 4000 ML: 236; 22.74; 6.74; 5.86; 2.97 POWDER, FOR SOLUTION ORAL at 03:03

## 2024-03-31 RX ADMIN — ACETAMINOPHEN 650 MG: 325 TABLET ORAL at 06:03

## 2024-03-31 RX ADMIN — Medication 100 MG: at 08:03

## 2024-03-31 RX ADMIN — IPRATROPIUM BROMIDE AND ALBUTEROL SULFATE 3 ML: 2.5; .5 SOLUTION RESPIRATORY (INHALATION) at 07:03

## 2024-03-31 RX ADMIN — CEFTRIAXONE 1 G: 1 INJECTION, POWDER, FOR SOLUTION INTRAMUSCULAR; INTRAVENOUS at 11:03

## 2024-03-31 RX ADMIN — GUAIFENESIN 200 MG: 200 SOLUTION ORAL at 09:03

## 2024-03-31 RX ADMIN — GUAIFENESIN 200 MG: 200 SOLUTION ORAL at 05:03

## 2024-03-31 RX ADMIN — GUAIFENESIN 200 MG: 200 SOLUTION ORAL at 01:03

## 2024-03-31 RX ADMIN — IPRATROPIUM BROMIDE AND ALBUTEROL SULFATE 3 ML: 2.5; .5 SOLUTION RESPIRATORY (INHALATION) at 03:03

## 2024-03-31 RX ADMIN — FOLIC ACID 1 MG: 1 TABLET ORAL at 08:03

## 2024-03-31 RX ADMIN — IPRATROPIUM BROMIDE AND ALBUTEROL SULFATE 3 ML: 2.5; .5 SOLUTION RESPIRATORY (INHALATION) at 12:03

## 2024-03-31 RX ADMIN — DICYCLOMINE HYDROCHLORIDE 10 MG: 10 CAPSULE ORAL at 06:03

## 2024-03-31 NOTE — ASSESSMENT & PLAN NOTE
Current CBC reviewed-   Lab Results   Component Value Date    HGB 7.4 (L) 03/31/2024    HCT 25.4 (L) 03/31/2024     Monitor serial CBC and transfuse if patient becomes hemodynamically unstable, symptomatic or H/H drops below 7/21.    3/29  Concerns for GI bleed.  Continue with Protonix 40 mg b.i.d..  Follow up with GI recommendations. Continue  to monitor hemoglobin  3/30  We will transfuse a unit of packed red cells today.  GI plans to scope on Monday.  Continue with Protonix.  Continue to monitor hemoglobin  3/31  GI notified of melena, hemoglobin stable, continue to monitor.  Continue with Protonix, follow up with GI

## 2024-03-31 NOTE — SUBJECTIVE & OBJECTIVE
Interval History:  Patient reports his cough is coming up, feels better from that standpoint.  Denies any symptoms of anemia however reports of black stool last night, this was reported to GI.  Hemoglobin stable.    Review of Systems   Respiratory:  Positive for cough.    Cardiovascular:  Negative for chest pain.   Gastrointestinal:  Positive for blood in stool. Negative for abdominal pain.   Neurological:  Negative for light-headedness.     Objective:     Vital Signs (Most Recent):  Temp: 98 °F (36.7 °C) (03/31/24 1210)  Pulse: 97 (03/31/24 1210)  Resp: 18 (03/31/24 1210)  BP: 100/66 (03/31/24 1210)  SpO2: 98 % (03/31/24 1210) Vital Signs (24h Range):  Temp:  [97.5 °F (36.4 °C)-98 °F (36.7 °C)] 98 °F (36.7 °C)  Pulse:  [] 97  Resp:  [17-20] 18  SpO2:  [93 %-100 %] 98 %  BP: ()/(61-73) 100/66     Weight: 81.6 kg (180 lb)  Body mass index is 24.41 kg/m².    Intake/Output Summary (Last 24 hours) at 3/31/2024 1226  Last data filed at 3/31/2024 1154  Gross per 24 hour   Intake 515.77 ml   Output 700 ml   Net -184.23 ml         Physical Exam  Constitutional:       General: He is not in acute distress.  HENT:      Head: Normocephalic.      Right Ear: External ear normal.      Left Ear: External ear normal.      Nose: Nose normal.      Comments: Nasal cannula  Cardiovascular:      Rate and Rhythm: Normal rate.   Pulmonary:      Breath sounds: Rhonchi and rales present.   Abdominal:      Palpations: Abdomen is soft.      Tenderness: There is no abdominal tenderness.   Musculoskeletal:      Right lower leg: No edema.      Left lower leg: No edema.   Skin:     General: Skin is warm.   Neurological:      General: No focal deficit present.      Mental Status: He is alert and oriented to person, place, and time.   Psychiatric:         Mood and Affect: Mood normal.         Significant Labs: All pertinent labs within the past 24 hours have been reviewed.

## 2024-03-31 NOTE — PLAN OF CARE
Problem: Adult Inpatient Plan of Care  Goal: Plan of Care Review  3/31/2024 1648 by Millie Mcrae RN  Outcome: Ongoing, Progressing  3/31/2024 1648 by Millie Mcrae RN  Outcome: Ongoing, Progressing  Flowsheets (Taken 3/31/2024 1648)  Plan of Care Reviewed With: patient  Goal: Patient-Specific Goal (Individualized)  3/31/2024 1648 by Millie Mcrae RN  Outcome: Ongoing, Progressing  3/31/2024 1648 by Millie Mcrae RN  Outcome: Ongoing, Progressing  Goal: Absence of Hospital-Acquired Illness or Injury  3/31/2024 1648 by Millie Mcrae RN  Outcome: Ongoing, Progressing  3/31/2024 1648 by Millie Mcrae RN  Outcome: Ongoing, Progressing  Goal: Optimal Comfort and Wellbeing  3/31/2024 1648 by Millie Mcrae RN  Outcome: Ongoing, Progressing  3/31/2024 1648 by Millie Mcrae RN  Outcome: Ongoing, Progressing  Goal: Readiness for Transition of Care  3/31/2024 1648 by Millie Mcrae RN  Outcome: Ongoing, Progressing  3/31/2024 1648 by Millie Mcrae RN  Outcome: Ongoing, Progressing     Problem: Fluid Imbalance (Pneumonia)  Goal: Fluid Balance  3/31/2024 1648 by Millie Mcrae RN  Outcome: Ongoing, Progressing  3/31/2024 1648 by Millie Mcrae RN  Outcome: Ongoing, Progressing     Problem: Infection (Pneumonia)  Goal: Resolution of Infection Signs and Symptoms  3/31/2024 1648 by Millie Mcrae RN  Outcome: Ongoing, Progressing  3/31/2024 1648 by Millie Mcrae RN  Outcome: Ongoing, Progressing     Problem: Respiratory Compromise (Pneumonia)  Goal: Effective Oxygenation and Ventilation  3/31/2024 1648 by Millie Mcrae RN  Outcome: Ongoing, Progressing  3/31/2024 1648 by Millie Mcrae RN  Outcome: Ongoing, Progressing     Problem: Skin Injury Risk Increased  Goal: Skin Health and Integrity  3/31/2024 1648 by Millie Mcrae RN  Outcome: Ongoing, Progressing  3/31/2024 1648 by Millie Mcrae RN  Outcome: Ongoing, Progressing     Problem: Impaired Wound Healing  Goal: Optimal Wound Healing  3/31/2024 1648 by Millie Mcrae, RN  Outcome:  Ongoing, Progressing  3/31/2024 1648 by Millie Mcrae, RN  Outcome: Ongoing, Progressing     Problem: Fall Injury Risk  Goal: Absence of Fall and Fall-Related Injury  Outcome: Ongoing, Progressing

## 2024-03-31 NOTE — PROGRESS NOTES
[facility] - Stepdown Flex ([place] [address])  [facility] Medicine  Progress Note    Patient Name: Fransico Montalvo  MRN: [id number]  Patient Class: IP- Inpatient   Admission Date: 3/28/2024  Length of Stay: 3 days  Attending Physician: Jake Guzman MD  Primary Care Provider: [facility]        Subjective:     Principal Problem:Pneumonia        HPI:  68 year old gentleman who is a known case of f alcohol abuse, BPH, cirrhosis, hypertension, esophageal varices, gastritis and tobacco abuse presenting with shortness of breath that started about 4 days ago. It was accompanied by a dry cough and dizziness as well as generalized weakness. He reported no fever, sputum, hemoptysis, bleeding from any orifice. He reported no changes in stool color. Patient did not complain of chest pain, seizures, syncope, or motor weakness. He denied a trauma and falls. He noted that he was diagnosed with pneumonia 3 days  ago and was prescribed antibiotics. Patient was afebrile in the ER: BP: 125/80 SpO2: 89% on venturi mask . He was  found to be hypoxemic with end-tidal CO2 of 20 and hypoxemic to 79% requiring immediate  oxygen support at the EMS. His hemoglobin was found to be around 4 and 3 pints of PRBC were  scheduled for transfusion. By the time of our arrival his second bag was flowing.  Patient is to be admitted as a case of CAP, Anemia, and acute hypoxemic respiratory failure.  He received two pints PRBC in the ER, maintaining approproriate saturation of SpO2 with venturi mask on, given a shot of Vancomycin in the Er and placed on CAP coverage antibiotics.      Patient appears to be stable for stepdown to hospital medicine teams.  Maintaining appropriate vitals with minimal support.    Overview/Hospital Course:  Patient was admitted for symptomatic anemia, received blood transfusion.  GI was consulted who plans for scoping on 4/1.  Patient also treated for community-acquired pneumonia    Interval History:  Patient reports  his cough is coming up, feels better from that standpoint.  Denies any symptoms of anemia however reports of black stool last night, this was reported to GI.  Hemoglobin stable.    Review of Systems   Respiratory:  Positive for cough.    Cardiovascular:  Negative for chest pain.   Gastrointestinal:  Positive for blood in stool. Negative for abdominal pain.   Neurological:  Negative for light-headedness.     Objective:     Vital Signs (Most Recent):  Temp: 98 °F (36.7 °C) (03/31/24 1210)  Pulse: 97 (03/31/24 1210)  Resp: 18 (03/31/24 1210)  BP: 100/66 (03/31/24 1210)  SpO2: 98 % (03/31/24 1210) Vital Signs (24h Range):  Temp:  [97.5 °F (36.4 °C)-98 °F (36.7 °C)] 98 °F (36.7 °C)  Pulse:  [] 97  Resp:  [17-20] 18  SpO2:  [93 %-100 %] 98 %  BP: ()/(61-73) 100/66     Weight: 81.6 kg (180 lb)  Body mass index is 24.41 kg/m².    Intake/Output Summary (Last 24 hours) at 3/31/2024 1226  Last data filed at 3/31/2024 1154  Gross per 24 hour   Intake 515.77 ml   Output 700 ml   Net -184.23 ml         Physical Exam  Constitutional:       General: He is not in acute distress.  HENT:      Head: Normocephalic.      Right Ear: External ear normal.      Left Ear: External ear normal.      Nose: Nose normal.      Comments: Nasal cannula  Cardiovascular:      Rate and Rhythm: Normal rate.   Pulmonary:      Breath sounds: Rhonchi and rales present.   Abdominal:      Palpations: Abdomen is soft.      Tenderness: There is no abdominal tenderness.   Musculoskeletal:      Right lower leg: No edema.      Left lower leg: No edema.   Skin:     General: Skin is warm.   Neurological:      General: No focal deficit present.      Mental Status: He is alert and oriented to person, place, and time.   Psychiatric:         Mood and Affect: Mood normal.         Significant Labs: All pertinent labs within the past 24 hours have been reviewed.        Assessment/Plan:      * Pneumonia  Noted on chest x-ray and Accounting for hypoxia.  We will  do cough expectorant and nebs for pulmonary toileting.  Incentive spirometry.  Respiratory panel.  Continue with azithromycin and ceftriaxone.  Sputum culture.  Oxygen as needed  3/30  Continue with antibiotics and supportive care      Anemia    Current CBC reviewed-   Lab Results   Component Value Date    HGB 7.4 (L) 03/31/2024    HCT 25.4 (L) 03/31/2024     Monitor serial CBC and transfuse if patient becomes hemodynamically unstable, symptomatic or H/H drops below 7/21.    3/29  Concerns for GI bleed.  Continue with Protonix 40 mg b.i.d..  Follow up with GI recommendations. Continue  to monitor hemoglobin  3/30  We will transfuse a unit of packed red cells today.  GI plans to scope on Monday.  Continue with Protonix.  Continue to monitor hemoglobin  3/31  GI notified of melena, hemoglobin stable, continue to monitor.  Continue with Protonix, follow up with GI    Cirrhosis  History of alcohol use and reported varices with banding.  GI consulted.  We will continue thiamine and folic acid.  No history of alcohol withdrawal symptoms    GI bleed  See 2. Assessment      Acute hypoxemic respiratory failure  See #1    CKD Stage 3  Currently at baseline      VTE Risk Mitigation (From admission, onward)           Ordered     Place sequential compression device  Until discontinued         03/28/24 1121     IP VTE LOW RISK PATIENT  Once         03/28/24 1121                    Discharge Planning   ARTURO: 4/2/2024     Code Status: Full Code   Is the patient medically ready for discharge?:     Reason for patient still in hospital (select all that apply): Patient trending condition  Discharge Plan A: Home Health   Discharge Delays: (!) Procedure Scheduling (IR, OR, Labs, Echo, Cath, Echo, EEG) (treated for pneumonia)              Jake Guzman MD  Department of Hospital Medicine   [facility] - Stepdown Flex ([place]-[facility])

## 2024-03-31 NOTE — NURSING
Pt AAO x 4, no c/o pain. VSS, SR-ST on telemetry monitor. O2 @ 2L NC with oxygen sats %. Pt has a non productive cough. L hand and R forearm PIV intact and flush well. Trending CBC @ 6H. Pt educated on Colonoscopy procedure tomorrow. Go Lytely prep in progress. Pt having frequent BM. Stool is dark and liquid. Reinforced education with patient to press the call light for assistance to the BSC. Pt verbalized understanding. Anesthesia consent done. No acute events this shift. Bed low and locked, bed alarm activated, call light in reach.

## 2024-03-31 NOTE — PLAN OF CARE
"Discharge Plan A and Plan B have been determined by review of patient's clinical status, future medical and therapeutic needs, and coverage/benefits for post-acute care in coordination with multidisciplinary team members.      03/31/24 1438   Post-Acute Status   Post-Acute Authorization Placement;Home Health   Post-Acute Placement Status Patient List Provided   Home Health Status Set-up Complete/Auth obtained   Coverage [facility] MGD MCARE [facility] - [facility] SECURE [facility] -   Hospital Resources/Appts/Education Provided Appointments scheduled and added to AVS   Discharge Delays (!) Procedure Scheduling (IR, OR, Labs, Echo, Cath, Echo, EEG)  (therapy evaluation pending)   Discharge Plan   Discharge Plan A Skilled Nursing Facility   Discharge Plan B Home Health     CM spoke  with patient/family to discuss any changes in discharge planning.. Patient reported , " The tonio who owns the community housing I reside in, he is kicking me out. He told me that I could live on the streets."  I want to try go to a nursing facility. CM secure chat sent to attending to evaluate for therapy consult. Orders  placed for Therapy to evaluate.       Met with patient  to review discharge recommendation of SNF  and is agreeable to plan    Patient/family provided list of facilities in-network with patient's payor plan. Providers that are owned, operated, or affiliated with [facility] are included on the list.     Notified that referral sent to below listed facilities from in-network list based on proximity to home/family support:     1.[facility] [employer] Phone: [phone]   Response: Yes, willing to accept patient      2.[facility] Phone: [phone]   Response: Yes, willing to accept patient      3.[facility] Phone: [phone]  Response: Interested, but need more information  Comments: please send updates thanks       4.[facility] Phone: [phone]  Response: No, unable to accept patient  Reason: Care Needs Exceed Current Capacity       5. [facility] Phone: [phone]   Response: Yes, willing to accept patient      6. [facility] Phone: [phone]  Response: Interested, but need more information  Comments: please send updates thanks       Patient/family instructed to identify preference.    Preferred Facility: (if more than 1, listed in order of descending preference)    No verbalized preference     If an additional preferred facility not listed above is identified, additional referral to be sent. If above facilities unable to accept, will send additional referrals to in-network providers.      Discharge Recommendations: TBD    Sabine Oreilly RN  Case Management  [facility]  [phone]

## 2024-03-31 NOTE — ANESTHESIA PREPROCEDURE EVALUATION
[facility]  Anesthesia Pre-Operative Evaluation         Patient Name: Fransico Montalvo  YOB: 1955  MRN: [id number]    SUBJECTIVE:     Pre-operative evaluation for Procedure(s) (LRB):  COLONOSCOPY (N/A)     03/31/2024    Fransico Montalvo is a 68 y.o. male w/ a significant PMHx of HTN, BPH, alcohol abuse and decompensated cirrhosis, esophageal varices with banding, gastritis, and tobacco abuse [place] [facility] with SOB x4 days, found to be anemic with Hgb of 4.1 upon initial admission on 3/28. Pt denies over GI bleeding throughout current hospital stay.     Patient now presents for the above procedure(s).    TTE 11/3/22:  Technically challenging study with poor acoustic windows.  The left ventricle is normal in size with normal systolic function.  The estimated ejection fraction is 60%.  Normal right ventricular size with normal right ventricular systolic function.  The estimated PA systolic pressure is 25 mmHg.  Normal central venous pressure (3 mmHg).    LDA: None documented.       Peripheral IV - Single Lumen 03/28/24 0913 18 G Left;Posterior Hand (Active)   Site Assessment Clean;Dry;Intact 03/31/24 1600   Extremity Assessment Distal to IV No abnormal discoloration;No redness;No swelling;No warmth 03/31/24 0739   Line Status Flushed 03/31/24 1600   Dressing Status Clean;Dry;Intact 03/31/24 1600   Dressing Intervention Integrity maintained 03/31/24 1600   Number of days: 3            Peripheral IV - Single Lumen 03/29/24 1325 20 G Posterior;Right Forearm (Active)   Site Assessment Clean;Dry;Intact 03/31/24 1600   Extremity Assessment Distal to IV No abnormal discoloration 03/31/24 0739   Line Status Saline locked 03/31/24 1600   Dressing Status Clean;Dry;Intact 03/31/24 1600   Dressing Intervention Integrity maintained 03/31/24 1600   Number of days: 2       Prev airway: None documented.    Drips: None documented.      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 Grossman    Multiple renal cysts    Cholelithiases    Chronic pain of right knee    Impaired functional mobility and endurance    Syncope    Closed fracture of multiple ribs of left side with routine healing    Closed fracture of left distal radius    Gastrointestinal hemorrhage with melena    Pancreatic, splenic and liver lesion    Therapeutic opioid induced constipation    On deep vein thrombosis (DVT) prophylaxis    Closed fracture of multiple thoracic vertebrae with routine healing    Anemia    Acute hypoxemic respiratory failure    Pneumonia    GI bleed    Cirrhosis       Review of patient's allergies indicates:  No Known Allergies    Current Inpatient Medications:   albuterol-ipratropium  3 mL Nebulization Q4H    cefTRIAXone (Rocephin) IV (PEDS and ADULTS)  1 g Intravenous Q24H    folic acid  1 mg Oral Daily    guaiFENesin 100 mg/5 ml  200 mg Oral Q4H    pantoprazole  40 mg Intravenous BID    thiamine  100 mg Oral Daily       No current facility-administered medications on file prior to encounter.     Current Outpatient Medications on File Prior to Encounter   Medication Sig Dispense Refill    capsaicin (ZOSTRIX) 0.025 % cream Apply topically 2 (two) times daily as needed (rib pain). (Patient not taking: Reported on 11/3/2023) 60 g 0    EScitalopram oxalate (LEXAPRO) 10 MG tablet Take 10 mg by mouth once daily.      oxyCODONE (ROXICODONE) 5 MG immediate release  tablet Take 1 tablet (5 mg total) by mouth every 8 (eight) hours as needed for Pain (pain not responding to tylenol). 21 tablet 0    pantoprazole (PROTONIX) 40 MG tablet Take 1 tablet (40 mg total) by mouth once daily. 30 tablet 1    tamsulosin (FLOMAX) 0.4 mg Cap Take 1 capsule (0.4 mg total) by mouth once daily. (Patient not taking: Reported on 11/3/2023) 30 capsule 1       Past Surgical History:   Procedure Laterality Date    CAPSULOTOMY OF JOINT Right 7/29/2019    Procedure: CAPSULOTOMY, JOINT;  Surgeon: Raj Grossman MD;  Location: [address];  Service: Orthopedics;  Laterality: Right;    ESOPHAGOGASTRODUODENOSCOPY N/A 10/25/2023    Procedure: EGD (ESOPHAGOGASTRODUODENOSCOPY);  Surgeon: Lux Rome MD;  Location: [address]);  Service: Endoscopy;  Laterality: N/A;    PERCUTANEOUS PINNING OF HIP Right 7/29/2019    Procedure: PINNING, HIP, PERCUTANEOUS- synthes cannulated screws- hana table- C arm door side-;  Surgeon: Raj Grossman MD;  Location: [facility] OR [address] [place] [address];  Service: Orthopedics;  Laterality: Right;       Social History     Socioeconomic History    Marital status: Single   Tobacco Use    Smoking status: Every Day     Current packs/day: 1.00     Types: Cigarettes    Smokeless tobacco: Never   Substance and Sexual Activity    Alcohol use: Yes     Comment: (former drinker; 2months sober) this visit admits drinking a couple beers today    Drug use: No     Social Determinants of Health     Financial Resource Strain: Low Risk  (3/30/2024)    Overall Financial Resource Strain (CARDIA)     Difficulty of Paying Living Expenses: Not hard at all   Food Insecurity: No Food Insecurity (3/30/2024)    Hunger Vital Sign     Worried About Running Out of Food in the Last Year: Never true     Ran Out of Food in the Last Year: Never true   Transportation Needs: No Transportation Needs (3/30/2024)    PRAPARE - Transportation     Lack of Transportation (Medical): No     Lack of Transportation  (Non-Medical): No   Physical Activity: Insufficiently Active (3/30/2024)    Exercise Vital Sign     Days of Exercise per Week: 4 days     Minutes of Exercise per Session: 20 min   Stress: Stress Concern Present (3/30/2024)    [place] [employer] - Occupational Stress Questionnaire     Feeling of Stress : To some extent   Social Connections: Socially Isolated (3/30/2024)    Social Connection and Isolation Panel [NHANES]     Frequency of Communication with Friends and Family: Once a week     Frequency of Social Gatherings with Friends and Family: Once a week     Attends Religion Services: Never     Active Member of Clubs or Organizations: No     Attends Club or Organization Meetings: Never     Marital Status: Never    Housing Stability: Low Risk  (3/30/2024)    Housing Stability Vital Sign     Unable to Pay for Housing in the Last Year: No     Number of Places Lived in the Last Year: 1     Unstable Housing in the Last Year: No       OBJECTIVE:     Vital Signs Range (Last 24H):  Temp:  [36.4 °C (97.5 °F)-36.7 °C (98.1 °F)]   Pulse:  []   Resp:  [17-20]   BP: (100-129)/(61-78)   SpO2:  [93 %-100 %]       Significant Labs:  Lab Results   Component Value Date    WBC 4.81 03/31/2024    HGB 7.4 (L) 03/31/2024    HCT 25.4 (L) 03/31/2024    PLT 96 (L) 03/31/2024    CHOL 102 (L) 07/30/2018    TRIG 68 07/30/2018    HDL 23 (L) 07/30/2018    ALT 24 03/31/2024    AST 28 03/31/2024     (L) 03/31/2024    K 3.8 03/31/2024     03/31/2024    CREATININE 1.6 (H) 03/31/2024    BUN 42 (H) 03/31/2024    CO2 21 (L) 03/31/2024    INR 1.4 (H) 03/30/2024    HGBA1C 4.2 07/28/2019       Diagnostic Studies: No relevant studies.    EKG:   Results for orders placed or performed during the hospital encounter of 03/28/24   EKG 12-lead    Collection Time: 03/28/24  8:26 AM   Result Value Ref Range    QRS Duration 98 ms    [facility] QTC Calculation 438 ms    Narrative    Test Reason : R06.02,    Vent. Rate : 114  BPM     Atrial Rate : 114 BPM     P-R Int : 000 ms          QRS Dur : 098 ms      QT Int : 318 ms       P-R-T Axes : 000 037 204 degrees     QTc Int : 438 ms    Accelerated Junctional rhythm  ST and T wave abnormality, consider inferolateral ischemia  Abnormal ECG  When compared with ECG of 24-OCT-2023 18:17,  Junctional rhythm has replaced Sinus rhythm  ST now depressed in Inferior leads  Nonspecific T wave abnormality now evident in Inferior leads  Confirmed by Dana Wilde MD ([id number]) on 3/28/2024 1:52:03 PM    Referred By: NAHEED   SELF           Confirmed By:Dana Wilde MD       2D ECHO:  TTE:  Results for orders placed or performed during the hospital encounter of 11/02/22   Echo   Result Value Ref Range    AV mean gradient 3 mmHg    Ao peak marilee 1.15 m/s    Ao VTI 19.09 cm    IVS 1.03 0.6 - 1.1 cm    LA size 4.05 cm    Left Atrium Major Axis 5.19 cm    Left Atrium Minor Axis 5.61 cm    LVIDd 5.40 3.5 - 6.0 cm    LVIDs 3.65 2.1 - 4.0 cm    LVOT diameter 2.34 cm    LVOT peak VTI 11.45 cm    Posterior Wall 0.88 0.6 - 1.1 cm    MV Peak A Marilee 0.55 m/s    E wave deceleration time 285.49 msec    MV Peak E Marilee 0.32 m/s    RA Major Axis 4.39 cm    RA Width 3.47 cm    RVDD 2.99 cm    TR Max Marilee 2.33 m/s    TDI LATERAL 0.06 m/s    TDI SEPTAL 0.05 m/s    LA WIDTH 4.04 cm    MV stenosis pressure 1/2 time 82.79 ms    LV Diastolic Volume 150.99 mL    LV Systolic Volume 56.36 mL    LVOT peak marilee 0.75 m/s    LA volume (mod) 64.33 cm3    LV LATERAL E/E' RATIO 5.33 m/s    LV SEPTAL E/E' RATIO 6.40 m/s    FS 32 %    LA volume 74.99 cm3    LV mass 194.58 g    Left Ventricle Relative Wall Thickness 0.33 cm    AV valve area 2.58 cm2    AV Velocity Ratio 0.65     AV index (prosthetic) 0.60     MV valve area p 1/2 method 2.66 cm2    E/A ratio 0.58     Mean e' 0.06 m/s    LVOT area 4.3 cm2    LVOT stroke volume 49.22 cm3    AV peak gradient 5 mmHg    E/E' ratio 5.82 m/s    LV Systolic Volume Index 27.8 mL/m2    LV Diastolic  Volume Index 74.38 mL/m2    LA Volume Index 36.9 mL/m2    LV Mass Index 96 g/m2    Triscuspid Valve Regurgitation Peak Gradient 22 mmHg    LA Volume Index (Mod) 31.7 mL/m2    BSA 2.03 m2    Right Atrial Pressure (from IVC) 3 mmHg    EF 60 %    TV resting pulmonary artery pressure 25 mmHg    Narrative    · Technically challenging study with poor acoustic windows.  · The left ventricle is normal in size with normal systolic function.  · The estimated ejection fraction is 60%.  · Normal right ventricular size with normal right ventricular systolic   function.  · The estimated PA systolic pressure is 25 mmHg.  · Normal central venous pressure (3 mmHg).          RASHID:  No results found for this or any previous visit.    ASSESSMENT/PLAN:         Pre-op Assessment    I have reviewed the Patient Summary Reports.     I have reviewed the Nursing Notes. I have reviewed the NPO Status.   I have reviewed the Medications.     Review of Systems  Anesthesia Hx:  No problems with previous Anesthesia   History of prior surgery of interest to airway management or planning:          Denies Family Hx of Anesthesia complications.    Denies Personal Hx of Anesthesia complications.                    Social:  Alcohol Use, Smoker       Hematology/Oncology:       -- Anemia:                  Denies Current/Recent Cancer                EENT/Dental:  denies chronic allergic rhinitis           Cardiovascular:      Denies Hypertension.    Denies CAD.           no hyperlipidemia    Demand ischemia on arrival to ER                   Hypertension         Pulmonary:  Pneumonia  Denies COPD.  Denies Asthma.     Denies Sleep Apnea.            Pulmonary Infection:  Pneumonia.     Renal/:   Denies Chronic Renal Disease.  BPH      Kidney Function/Disease             Hepatic/GI:      Denies GERD. Liver Disease,  Esophageal varices s/p  banding       Liver Disease        Musculoskeletal:  Denies Arthritis.               Neurological:    Denies CVA.     Denies Seizures.                                Psych:  Denies Psychiatric History.                  Physical Exam  General: Well nourished, Cooperative, Alert and Oriented    Airway:  Mallampati: II   Mouth Opening: Normal  TM Distance: Normal  Tongue: Normal  Neck ROM: Normal ROM    Dental:  Dentures        Anesthesia Plan  Type of Anesthesia, risks & benefits discussed:    Anesthesia Type: Gen Natural Airway, MAC  Intra-op Monitoring Plan: Standard ASA Monitors  Post Op Pain Control Plan: multimodal analgesia  Induction:  IV  Airway Plan: Direct, Post-Induction  Informed Consent: Informed consent signed with the Patient and all parties understand the risks and agree with anesthesia plan.  All questions answered.   ASA Score: 3  Day of Surgery Review of History & Physical: H&P Update referred to the surgeon/provider.    Ready For Surgery From Anesthesia Perspective.     .

## 2024-03-31 NOTE — NURSING
Pt c/o abd pain and cramping. Says he has been hurting all day. Gave prescribed meds and care clustered. Will continue to monitor.

## 2024-03-31 NOTE — NURSING
Pt AAO x 4, no c/o pain. Go Lytely brought to the bedside. Pt educated on drinking the Go Lytely often to prep for his Colonosopy tomorrow. Educated pt on using the call light for assistance to the BSC when he has to have a BM. Pt verbalized understanding and needs reinforcement. Monitoring.

## 2024-03-31 NOTE — PLAN OF CARE
Problem: Adult Inpatient Plan of Care  Goal: Plan of Care Review  Outcome: Ongoing, Progressing  Goal: Patient-Specific Goal (Individualized)  Outcome: Ongoing, Progressing  Goal: Absence of Hospital-Acquired Illness or Injury  Outcome: Ongoing, Progressing  Goal: Optimal Comfort and Wellbeing  Outcome: Ongoing, Progressing  Goal: Readiness for Transition of Care  Outcome: Ongoing, Progressing     Problem: Fluid Imbalance (Pneumonia)  Goal: Fluid Balance  Outcome: Ongoing, Progressing     Problem: Infection (Pneumonia)  Goal: Resolution of Infection Signs and Symptoms  Outcome: Ongoing, Progressing     Problem: Respiratory Compromise (Pneumonia)  Goal: Effective Oxygenation and Ventilation  Outcome: Ongoing, Progressing     Problem: Skin Injury Risk Increased  Goal: Skin Health and Integrity  Outcome: Ongoing, Progressing     Problem: Impaired Wound Healing  Goal: Optimal Wound Healing  Outcome: Ongoing, Progressing   Continued to have abd pain , medicated as ordered, no injuries this shift , slept after medicine, no other complaints or request. CLWR, srup

## 2024-04-01 ENCOUNTER — ANESTHESIA (OUTPATIENT)
Dept: ENDOSCOPY | Facility: HOSPITAL | Age: 69
DRG: 377 | End: 2024-04-01
Payer: MEDICARE

## 2024-04-01 PROBLEM — E87.1 HYPONATREMIA: Status: ACTIVE | Noted: 2024-04-01

## 2024-04-01 LAB
ALBUMIN SERPL BCP-MCNC: 3.1 G/DL (ref 3.5–5.2)
ALP SERPL-CCNC: 63 U/L (ref 55–135)
ALT SERPL W/O P-5'-P-CCNC: 21 U/L (ref 10–44)
ANION GAP SERPL CALC-SCNC: 10 MMOL/L (ref 8–16)
ANISOCYTOSIS BLD QL SMEAR: SLIGHT
AST SERPL-CCNC: 26 U/L (ref 10–40)
BASO STIPL BLD QL SMEAR: ABNORMAL
BASOPHILS # BLD AUTO: 0.01 K/UL (ref 0–0.2)
BASOPHILS # BLD AUTO: 0.02 K/UL (ref 0–0.2)
BASOPHILS # BLD AUTO: 0.02 K/UL (ref 0–0.2)
BASOPHILS # BLD AUTO: 0.03 K/UL (ref 0–0.2)
BASOPHILS NFR BLD: 0.3 % (ref 0–1.9)
BASOPHILS NFR BLD: 0.5 % (ref 0–1.9)
BASOPHILS NFR BLD: 0.6 % (ref 0–1.9)
BASOPHILS NFR BLD: 0.8 % (ref 0–1.9)
BILIRUB SERPL-MCNC: 0.9 MG/DL (ref 0.1–1)
BUN SERPL-MCNC: 29 MG/DL (ref 8–23)
CALCIUM SERPL-MCNC: 8.4 MG/DL (ref 8.7–10.5)
CHLORIDE SERPL-SCNC: 101 MMOL/L (ref 95–110)
CO2 SERPL-SCNC: 21 MMOL/L (ref 23–29)
CREAT SERPL-MCNC: 1.6 MG/DL (ref 0.5–1.4)
DACRYOCYTES BLD QL SMEAR: ABNORMAL
DIFFERENTIAL METHOD BLD: ABNORMAL
EOSINOPHIL # BLD AUTO: 0 K/UL (ref 0–0.5)
EOSINOPHIL # BLD AUTO: 0 K/UL (ref 0–0.5)
EOSINOPHIL # BLD AUTO: 0.1 K/UL (ref 0–0.5)
EOSINOPHIL # BLD AUTO: 0.1 K/UL (ref 0–0.5)
EOSINOPHIL NFR BLD: 0 % (ref 0–8)
EOSINOPHIL NFR BLD: 1.2 % (ref 0–8)
EOSINOPHIL NFR BLD: 1.5 % (ref 0–8)
EOSINOPHIL NFR BLD: 1.9 % (ref 0–8)
ERYTHROCYTE [DISTWIDTH] IN BLOOD BY AUTOMATED COUNT: 23.1 % (ref 11.5–14.5)
ERYTHROCYTE [DISTWIDTH] IN BLOOD BY AUTOMATED COUNT: 23.3 % (ref 11.5–14.5)
ERYTHROCYTE [DISTWIDTH] IN BLOOD BY AUTOMATED COUNT: 23.7 % (ref 11.5–14.5)
ERYTHROCYTE [DISTWIDTH] IN BLOOD BY AUTOMATED COUNT: 23.9 % (ref 11.5–14.5)
EST. GFR  (NO RACE VARIABLE): 46.4 ML/MIN/1.73 M^2
GIANT PLATELETS BLD QL SMEAR: PRESENT
GLUCOSE SERPL-MCNC: 88 MG/DL (ref 70–110)
HCT VFR BLD AUTO: 24.2 % (ref 40–54)
HCT VFR BLD AUTO: 24.6 % (ref 40–54)
HCT VFR BLD AUTO: 26.2 % (ref 40–54)
HCT VFR BLD AUTO: 26.9 % (ref 40–54)
HGB BLD-MCNC: 7.2 G/DL (ref 14–18)
HGB BLD-MCNC: 7.3 G/DL (ref 14–18)
HGB BLD-MCNC: 7.5 G/DL (ref 14–18)
HGB BLD-MCNC: 7.9 G/DL (ref 14–18)
HYPOCHROMIA BLD QL SMEAR: ABNORMAL
IMM GRANULOCYTES # BLD AUTO: 0.01 K/UL (ref 0–0.04)
IMM GRANULOCYTES # BLD AUTO: 0.02 K/UL (ref 0–0.04)
IMM GRANULOCYTES # BLD AUTO: 0.02 K/UL (ref 0–0.04)
IMM GRANULOCYTES # BLD AUTO: 0.04 K/UL (ref 0–0.04)
IMM GRANULOCYTES NFR BLD AUTO: 0.3 % (ref 0–0.5)
IMM GRANULOCYTES NFR BLD AUTO: 0.5 % (ref 0–0.5)
IMM GRANULOCYTES NFR BLD AUTO: 0.6 % (ref 0–0.5)
IMM GRANULOCYTES NFR BLD AUTO: 1.1 % (ref 0–0.5)
LYMPHOCYTES # BLD AUTO: 0.2 K/UL (ref 1–4.8)
LYMPHOCYTES # BLD AUTO: 0.5 K/UL (ref 1–4.8)
LYMPHOCYTES # BLD AUTO: 0.7 K/UL (ref 1–4.8)
LYMPHOCYTES # BLD AUTO: 0.7 K/UL (ref 1–4.8)
LYMPHOCYTES NFR BLD: 15.8 % (ref 18–48)
LYMPHOCYTES NFR BLD: 17.6 % (ref 18–48)
LYMPHOCYTES NFR BLD: 17.8 % (ref 18–48)
LYMPHOCYTES NFR BLD: 5.4 % (ref 18–48)
MAGNESIUM SERPL-MCNC: 1.6 MG/DL (ref 1.6–2.6)
MCH RBC QN AUTO: 24.2 PG (ref 27–31)
MCH RBC QN AUTO: 24.3 PG (ref 27–31)
MCHC RBC AUTO-ENTMCNC: 28.6 G/DL (ref 32–36)
MCHC RBC AUTO-ENTMCNC: 29.3 G/DL (ref 32–36)
MCHC RBC AUTO-ENTMCNC: 29.4 G/DL (ref 32–36)
MCHC RBC AUTO-ENTMCNC: 30.2 G/DL (ref 32–36)
MCV RBC AUTO: 80 FL (ref 82–98)
MCV RBC AUTO: 83 FL (ref 82–98)
MCV RBC AUTO: 83 FL (ref 82–98)
MCV RBC AUTO: 85 FL (ref 82–98)
MONOCYTES # BLD AUTO: 0.1 K/UL (ref 0.3–1)
MONOCYTES # BLD AUTO: 0.4 K/UL (ref 0.3–1)
MONOCYTES NFR BLD: 1.7 % (ref 4–15)
MONOCYTES NFR BLD: 10.1 % (ref 4–15)
MONOCYTES NFR BLD: 10.5 % (ref 4–15)
MONOCYTES NFR BLD: 8.7 % (ref 4–15)
NEUTROPHILS # BLD AUTO: 2.4 K/UL (ref 1.8–7.7)
NEUTROPHILS # BLD AUTO: 2.6 K/UL (ref 1.8–7.7)
NEUTROPHILS # BLD AUTO: 2.8 K/UL (ref 1.8–7.7)
NEUTROPHILS # BLD AUTO: 2.9 K/UL (ref 1.8–7.7)
NEUTROPHILS NFR BLD: 68.3 % (ref 38–73)
NEUTROPHILS NFR BLD: 71.2 % (ref 38–73)
NEUTROPHILS NFR BLD: 71.3 % (ref 38–73)
NEUTROPHILS NFR BLD: 92.3 % (ref 38–73)
NRBC BLD-RTO: 0 /100 WBC
OVALOCYTES BLD QL SMEAR: ABNORMAL
PHOSPHATE SERPL-MCNC: 2.5 MG/DL (ref 2.7–4.5)
PLATELET # BLD AUTO: 101 K/UL (ref 150–450)
PLATELET # BLD AUTO: 106 K/UL (ref 150–450)
PLATELET # BLD AUTO: 113 K/UL (ref 150–450)
PLATELET # BLD AUTO: 135 K/UL (ref 150–450)
PLATELET BLD QL SMEAR: ABNORMAL
PMV BLD AUTO: 10.2 FL (ref 9.2–12.9)
PMV BLD AUTO: 10.7 FL (ref 9.2–12.9)
PMV BLD AUTO: 10.7 FL (ref 9.2–12.9)
PMV BLD AUTO: 11.3 FL (ref 9.2–12.9)
POIKILOCYTOSIS BLD QL SMEAR: SLIGHT
POLYCHROMASIA BLD QL SMEAR: ABNORMAL
POTASSIUM SERPL-SCNC: 3.9 MMOL/L (ref 3.5–5.1)
PROT SERPL-MCNC: 6.7 G/DL (ref 6–8.4)
RBC # BLD AUTO: 2.97 M/UL (ref 4.6–6.2)
RBC # BLD AUTO: 3.01 M/UL (ref 4.6–6.2)
RBC # BLD AUTO: 3.1 M/UL (ref 4.6–6.2)
RBC # BLD AUTO: 3.26 M/UL (ref 4.6–6.2)
SODIUM SERPL-SCNC: 132 MMOL/L (ref 136–145)
SPHEROCYTES BLD QL SMEAR: ABNORMAL
TARGETS BLD QL SMEAR: ABNORMAL
WBC # BLD AUTO: 2.99 K/UL (ref 3.9–12.7)
WBC # BLD AUTO: 3.42 K/UL (ref 3.9–12.7)
WBC # BLD AUTO: 3.77 K/UL (ref 3.9–12.7)
WBC # BLD AUTO: 4.03 K/UL (ref 3.9–12.7)

## 2024-04-01 PROCEDURE — 94799 UNLISTED PULMONARY SVC/PX: CPT | Mod: XB

## 2024-04-01 PROCEDURE — 0DJD8ZZ INSPECTION OF LOWER INTESTINAL TRACT, VIA NATURAL OR ARTIFICIAL OPENING ENDOSCOPIC: ICD-10-PCS | Performed by: STUDENT IN AN ORGANIZED HEALTH CARE EDUCATION/TRAINING PROGRAM

## 2024-04-01 PROCEDURE — 37000009 HC ANESTHESIA EA ADD 15 MINS: Performed by: STUDENT IN AN ORGANIZED HEALTH CARE EDUCATION/TRAINING PROGRAM

## 2024-04-01 PROCEDURE — 63600175 PHARM REV CODE 636 W HCPCS: Performed by: INTERNAL MEDICINE

## 2024-04-01 PROCEDURE — 37000008 HC ANESTHESIA 1ST 15 MINUTES: Performed by: STUDENT IN AN ORGANIZED HEALTH CARE EDUCATION/TRAINING PROGRAM

## 2024-04-01 PROCEDURE — 83735 ASSAY OF MAGNESIUM: CPT | Performed by: STUDENT IN AN ORGANIZED HEALTH CARE EDUCATION/TRAINING PROGRAM

## 2024-04-01 PROCEDURE — 36415 COLL VENOUS BLD VENIPUNCTURE: CPT | Performed by: STUDENT IN AN ORGANIZED HEALTH CARE EDUCATION/TRAINING PROGRAM

## 2024-04-01 PROCEDURE — 80053 COMPREHEN METABOLIC PANEL: CPT | Performed by: STUDENT IN AN ORGANIZED HEALTH CARE EDUCATION/TRAINING PROGRAM

## 2024-04-01 PROCEDURE — 99900035 HC TECH TIME PER 15 MIN (STAT)

## 2024-04-01 PROCEDURE — 94640 AIRWAY INHALATION TREATMENT: CPT

## 2024-04-01 PROCEDURE — 27000221 HC OXYGEN, UP TO 24 HOURS

## 2024-04-01 PROCEDURE — 45378 DIAGNOSTIC COLONOSCOPY: CPT | Mod: ,,, | Performed by: STUDENT IN AN ORGANIZED HEALTH CARE EDUCATION/TRAINING PROGRAM

## 2024-04-01 PROCEDURE — 25000003 PHARM REV CODE 250: Performed by: NURSE ANESTHETIST, CERTIFIED REGISTERED

## 2024-04-01 PROCEDURE — 25000003 PHARM REV CODE 250: Performed by: INTERNAL MEDICINE

## 2024-04-01 PROCEDURE — 94761 N-INVAS EAR/PLS OXIMETRY MLT: CPT

## 2024-04-01 PROCEDURE — C9113 INJ PANTOPRAZOLE SODIUM, VIA: HCPCS | Performed by: STUDENT IN AN ORGANIZED HEALTH CARE EDUCATION/TRAINING PROGRAM

## 2024-04-01 PROCEDURE — 25000003 PHARM REV CODE 250: Performed by: HOSPITALIST

## 2024-04-01 PROCEDURE — 45378 DIAGNOSTIC COLONOSCOPY: CPT | Performed by: STUDENT IN AN ORGANIZED HEALTH CARE EDUCATION/TRAINING PROGRAM

## 2024-04-01 PROCEDURE — 84100 ASSAY OF PHOSPHORUS: CPT | Performed by: STUDENT IN AN ORGANIZED HEALTH CARE EDUCATION/TRAINING PROGRAM

## 2024-04-01 PROCEDURE — D9220A PRA ANESTHESIA: Mod: ANES,,, | Performed by: ANESTHESIOLOGY

## 2024-04-01 PROCEDURE — 63600175 PHARM REV CODE 636 W HCPCS: Performed by: NURSE ANESTHETIST, CERTIFIED REGISTERED

## 2024-04-01 PROCEDURE — 25000003 PHARM REV CODE 250: Performed by: STUDENT IN AN ORGANIZED HEALTH CARE EDUCATION/TRAINING PROGRAM

## 2024-04-01 PROCEDURE — 63600175 PHARM REV CODE 636 W HCPCS: Performed by: STUDENT IN AN ORGANIZED HEALTH CARE EDUCATION/TRAINING PROGRAM

## 2024-04-01 PROCEDURE — 85025 COMPLETE CBC W/AUTO DIFF WBC: CPT | Mod: 91 | Performed by: STUDENT IN AN ORGANIZED HEALTH CARE EDUCATION/TRAINING PROGRAM

## 2024-04-01 PROCEDURE — 25000242 PHARM REV CODE 250 ALT 637 W/ HCPCS: Performed by: INTERNAL MEDICINE

## 2024-04-01 PROCEDURE — D9220A PRA ANESTHESIA: Mod: CRNA,,, | Performed by: NURSE ANESTHETIST, CERTIFIED REGISTERED

## 2024-04-01 PROCEDURE — 20600001 HC STEP DOWN PRIVATE ROOM

## 2024-04-01 RX ORDER — KETAMINE HCL IN 0.9 % NACL 50 MG/5 ML
SYRINGE (ML) INTRAVENOUS
Status: DISCONTINUED | OUTPATIENT
Start: 2024-04-01 | End: 2024-04-01

## 2024-04-01 RX ORDER — PREDNISONE 20 MG/1
40 TABLET ORAL DAILY
Status: COMPLETED | OUTPATIENT
Start: 2024-04-01 | End: 2024-04-04

## 2024-04-01 RX ORDER — PROCHLORPERAZINE EDISYLATE 5 MG/ML
5 INJECTION INTRAMUSCULAR; INTRAVENOUS EVERY 30 MIN PRN
Status: DISCONTINUED | OUTPATIENT
Start: 2024-04-01 | End: 2024-04-01 | Stop reason: HOSPADM

## 2024-04-01 RX ORDER — FLUTICASONE FUROATE AND VILANTEROL 100; 25 UG/1; UG/1
1 POWDER RESPIRATORY (INHALATION) DAILY
Status: DISCONTINUED | OUTPATIENT
Start: 2024-04-01 | End: 2024-04-04 | Stop reason: HOSPADM

## 2024-04-01 RX ORDER — LIDOCAINE HYDROCHLORIDE 20 MG/ML
INJECTION INTRAVENOUS
Status: DISCONTINUED | OUTPATIENT
Start: 2024-04-01 | End: 2024-04-01

## 2024-04-01 RX ORDER — PROPOFOL 10 MG/ML
VIAL (ML) INTRAVENOUS
Status: DISCONTINUED | OUTPATIENT
Start: 2024-04-01 | End: 2024-04-01

## 2024-04-01 RX ADMIN — Medication 20 MG: at 02:04

## 2024-04-01 RX ADMIN — PREDNISONE 40 MG: 20 TABLET ORAL at 11:04

## 2024-04-01 RX ADMIN — IPRATROPIUM BROMIDE AND ALBUTEROL SULFATE 3 ML: 2.5; .5 SOLUTION RESPIRATORY (INHALATION) at 09:04

## 2024-04-01 RX ADMIN — GUAIFENESIN 200 MG: 200 SOLUTION ORAL at 06:04

## 2024-04-01 RX ADMIN — IPRATROPIUM BROMIDE AND ALBUTEROL SULFATE 3 ML: 2.5; .5 SOLUTION RESPIRATORY (INHALATION) at 04:04

## 2024-04-01 RX ADMIN — PROPOFOL 50 MG: 10 INJECTION, EMULSION INTRAVENOUS at 02:04

## 2024-04-01 RX ADMIN — IPRATROPIUM BROMIDE AND ALBUTEROL SULFATE 3 ML: 2.5; .5 SOLUTION RESPIRATORY (INHALATION) at 07:04

## 2024-04-01 RX ADMIN — SODIUM CHLORIDE: 9 INJECTION, SOLUTION INTRAVENOUS at 02:04

## 2024-04-01 RX ADMIN — PANTOPRAZOLE SODIUM 40 MG: 40 INJECTION, POWDER, FOR SOLUTION INTRAVENOUS at 08:04

## 2024-04-01 RX ADMIN — FLUTICASONE FUROATE AND VILANTEROL TRIFENATATE 1 PUFF: 100; 25 POWDER RESPIRATORY (INHALATION) at 11:04

## 2024-04-01 RX ADMIN — Medication 100 MG: at 08:04

## 2024-04-01 RX ADMIN — IPRATROPIUM BROMIDE AND ALBUTEROL SULFATE 3 ML: 2.5; .5 SOLUTION RESPIRATORY (INHALATION) at 11:04

## 2024-04-01 RX ADMIN — LIDOCAINE HYDROCHLORIDE 100 MG: 20 INJECTION INTRAVENOUS at 02:04

## 2024-04-01 RX ADMIN — DICYCLOMINE HYDROCHLORIDE 10 MG: 10 CAPSULE ORAL at 09:04

## 2024-04-01 RX ADMIN — PANTOPRAZOLE SODIUM 40 MG: 40 INJECTION, POWDER, FOR SOLUTION INTRAVENOUS at 09:04

## 2024-04-01 RX ADMIN — FOLIC ACID 1 MG: 1 TABLET ORAL at 08:04

## 2024-04-01 RX ADMIN — CEFTRIAXONE 1 G: 1 INJECTION, POWDER, FOR SOLUTION INTRAMUSCULAR; INTRAVENOUS at 11:04

## 2024-04-01 RX ADMIN — GUAIFENESIN 200 MG: 200 SOLUTION ORAL at 05:04

## 2024-04-01 RX ADMIN — ACETAMINOPHEN 650 MG: 325 TABLET ORAL at 03:04

## 2024-04-01 RX ADMIN — GUAIFENESIN 200 MG: 200 SOLUTION ORAL at 09:04

## 2024-04-01 NOTE — PT/OT/SLP PROGRESS
Physical Therapy Missed Treatment Note      Patient Name:  Fransico Montalvo   MRN:  [id number]  Admitting Diagnosis:  Pneumonia   Recent Surgery: Procedure(s) (LRB):  COLONOSCOPY (N/A) Day of Surgery  Admit Date: 3/28/2024  Length of Stay: 4 days    Patient not seen today due to pt on BSC drinking prep for colonoscopy that is scheduled for early this PM. Will follow-up for progressive mobility pending continued medical stability and patient participation.    4/1/2024

## 2024-04-01 NOTE — ANESTHESIA POSTPROCEDURE EVALUATION
Anesthesia Post Evaluation    Patient: Fransico Montalvo    Procedure(s) Performed: Procedure(s) (LRB):  COLONOSCOPY (N/A)    Final Anesthesia Type: general      Patient location during evaluation: PACU  Patient participation: Yes- Able to Participate  Level of consciousness: awake and alert  Post-procedure vital signs: reviewed and stable  Pain management: adequate  Airway patency: patent    PONV status at discharge: No PONV  Anesthetic complications: no      Cardiovascular status: blood pressure returned to baseline  Respiratory status: unassisted, spontaneous ventilation and room air  Hydration status: euvolemic                Vitals Value Taken Time   /77 04/01/24 1516   Temp 37 °C (98.6 °F) 04/01/24 1505   Pulse 99 04/01/24 1530   Resp 17 04/01/24 1530   SpO2 98 % 04/01/24 1530         No case tracking events are documented in the log.      Pain/Margareth Score: Pain Rating Prior to Med Admin: 7 (4/1/2024  3:02 AM)  Pain Rating Post Med Admin: 0 (4/1/2024  4:02 AM)  Margareth Score: 9 (4/1/2024  3:20 PM)

## 2024-04-01 NOTE — PLAN OF CARE
I have reviewed the chart of Fransico Montalvo who is hospitalized for the following:    Active Hospital Problems    Diagnosis    *Pneumonia    Hyponatremia    GI bleed    Cirrhosis    Acute hypoxemic respiratory failure    Anemia    Acute blood loss anemia    Essential hypertension    CKD Stage 3          Katie Santana PA-C  [facility]

## 2024-04-01 NOTE — PROVATION PATIENT INSTRUCTIONS
Discharge Summary/Instructions after an Endoscopic Procedure  Patient Name: Fransico Montalvo  Patient MRN: [id number]  Patient YOB: 1955 Monday, April 1, 2024  Scarlett Whitt MD  Dear patient,  As a result of recent federal legislation (The Federal Cures Act), you may   receive lab or pathology results from your procedure in your [facility]   account before your physician is able to contact you. Your physician or   their representative will relay the results to you with their   recommendations at their soonest availability.  Thank you,  RESTRICTIONS:  During your procedure today, you received medications for sedation.  These   medications may affect your judgment, balance and coordination.  Therefore,   for 24 hours, you have the following restrictions:   - DO NOT drive a car, operate machinery, make legal/financial decisions,   sign important papers or drink alcohol.    ACTIVITY:  Today: no heavy lifting, straining or running due to procedural   sedation/anesthesia.  The following day: return to full activity including work.  DIET:  Eat and drink normally unless instructed otherwise.     TREATMENT FOR COMMON SIDE EFFECTS:  - Mild abdominal pain, nausea, belching, bloating or excessive gas:  rest,   eat lightly and use a heating pad.  - Sore Throat: treat with throat lozenges and/or gargle with warm salt   water.  - Because air was used during the procedure, expelling large amounts of air   from your rectum or belching is normal.  - If a bowel prep was taken, you may not have a bowel movement for 1-3 days.    This is normal.  SYMPTOMS TO WATCH FOR AND REPORT TO YOUR PHYSICIAN:  1. Abdominal pain or bloating, other than gas cramps.  2. Chest pain.  3. Back pain.  4. Signs of infection such as: chills or fever occurring within 24 hours   after the procedure.  5. Rectal bleeding, which would show as bright red, maroon, or black stools.   (A tablespoon of blood from the rectum is not serious, especially if    hemorrhoids are present.)  6. Vomiting.  7. Weakness or dizziness.  GO DIRECTLY TO THE NEAREST EMERGENCY ROOM IF YOU HAVE ANY OF THE FOLLOWING:      Difficulty breathing              Chills and/or fever over 101 F   Persistent vomiting and/or vomiting blood   Severe abdominal pain   Severe chest pain   Black, tarry stools   Bleeding- more than one tablespoon   Any other symptom or condition that you feel may need urgent attention  Your doctor recommends these additional instructions:  If any biopsies were taken, your doctors clinic will contact you in 1 to 2   weeks with any results.  - Return patient to hospital lujan for ongoing care.   - Advance diet as tolerated.   - Continue present medications.   - Repeat colonoscopy in 10 years for screening purposes.  For questions, problems or results please call your physician - Scarlett Whitt MD at Work:  [phone].  [facility], EMERGENCY ROOM PHONE NUMBER: [phone]  IF A COMPLICATION OR EMERGENCY SITUATION ARISES AND YOU ARE UNABLE TO REACH   YOUR PHYSICIAN - GO DIRECTLY TO THE EMERGENCY ROOM.  Scarlett Whitt MD  4/1/2024 2:58:42 PM  This report has been verified and signed electronically.  Dear patient,  As a result of recent federal legislation (The Federal Cures Act), you may   receive lab or pathology results from your procedure in your [facility]   account before your physician is able to contact you. Your physician or   their representative will relay the results to you with their   recommendations at their soonest availability.  Thank you,  PROVATION

## 2024-04-01 NOTE — SUBJECTIVE & OBJECTIVE
Interval History:  PT OT evaluation ordered.  Reports cough has improved, states that he has a chronic smoking history no formal diagnosis of COPD nor has he seen Pulmonary as an outpatient.  No further reports of melena, hemoglobin stable.  Plans for scoping today.    Review of Systems   Respiratory:  Positive for cough and wheezing.    Gastrointestinal:  Negative for diarrhea and vomiting.     Objective:     Vital Signs (Most Recent):  Temp: 98.9 °F (37.2 °C) (04/01/24 0746)  Pulse: 100 (04/01/24 0750)  Resp: 18 (04/01/24 0750)  BP: 119/74 (04/01/24 0746)  SpO2: 98 % (04/01/24 0750) Vital Signs (24h Range):  Temp:  [98 °F (36.7 °C)-99.1 °F (37.3 °C)] 98.9 °F (37.2 °C)  Pulse:  [] 100  Resp:  [16-18] 18  SpO2:  [95 %-100 %] 98 %  BP: (100-129)/(59-80) 119/74     Weight: 81.6 kg (180 lb)  Body mass index is 24.41 kg/m².    Intake/Output Summary (Last 24 hours) at 4/1/2024 0930  Last data filed at 4/1/2024 0731  Gross per 24 hour   Intake 999.06 ml   Output 850 ml   Net 149.06 ml         Physical Exam  Constitutional:       General: He is not in acute distress.  HENT:      Head: Normocephalic.      Right Ear: External ear normal.      Left Ear: External ear normal.      Nose: Nose normal.      Comments: Nasal cannula  Cardiovascular:      Rate and Rhythm: Normal rate.   Pulmonary:      Breath sounds: Rhonchi and rales present.   Abdominal:      Palpations: Abdomen is soft.      Tenderness: There is no abdominal tenderness.   Musculoskeletal:      Right lower leg: No edema.      Left lower leg: No edema.   Skin:     General: Skin is warm.   Neurological:      General: No focal deficit present.      Mental Status: He is alert and oriented to person, place, and time.   Psychiatric:         Mood and Affect: Mood normal.          Significant Labs: All pertinent labs within the past 24 hours have been reviewed.        Significant Labs: All pertinent labs within the past 24 hours have been reviewed.

## 2024-04-01 NOTE — PLAN OF CARE
Problem: Adult Inpatient Plan of Care  Goal: Plan of Care Review  Outcome: Ongoing, Progressing  Goal: Patient-Specific Goal (Individualized)  Outcome: Ongoing, Progressing  Goal: Absence of Hospital-Acquired Illness or Injury  Outcome: Ongoing, Progressing  Goal: Optimal Comfort and Wellbeing  Outcome: Ongoing, Progressing  Goal: Readiness for Transition of Care  Outcome: Ongoing, Progressing     Problem: Fluid Imbalance (Pneumonia)  Goal: Fluid Balance  Outcome: Ongoing, Progressing     Problem: Infection (Pneumonia)  Goal: Resolution of Infection Signs and Symptoms  Outcome: Ongoing, Progressing     Problem: Respiratory Compromise (Pneumonia)  Goal: Effective Oxygenation and Ventilation  Outcome: Ongoing, Progressing     Problem: Skin Injury Risk Increased  Goal: Skin Health and Integrity  Outcome: Ongoing, Progressing     Problem: Impaired Wound Healing  Goal: Optimal Wound Healing  Outcome: Ongoing, Progressing     Problem: Fall Injury Risk  Goal: Absence of Fall and Fall-Related Injury  Outcome: Ongoing, Progressing

## 2024-04-01 NOTE — ASSESSMENT & PLAN NOTE
Noted on chest x-ray and Accounting for hypoxia.  We will do cough expectorant and nebs for pulmonary toileting.  Incentive spirometry.  Respiratory panel.  Continue with azithromycin and ceftriaxone.  Sputum culture.  Oxygen as needed  3/30  Continue with antibiotics and supportive care  4/1  Patient reports significant smoking history, still has significant rhonchi on examination though reports symptoms have improved, concerns for possible COPD, outpatient pulmonary referral.  We will give a trial of prednisone and start inhaler.  Nicotine patch as needed

## 2024-04-01 NOTE — PROGRESS NOTES
[facility] - Stepdown Flex ([place] [address])  [facility] Medicine  Progress Note    Patient Name: Fransico Montalvo  MRN: [id number]  Patient Class: IP- Inpatient   Admission Date: 3/28/2024  Length of Stay: 4 days  Attending Physician: Jake Guzman MD  Primary Care Provider: [facility]        Subjective:     Principal Problem:Pneumonia        HPI:  68 year old gentleman who is a known case of f alcohol abuse, BPH, cirrhosis, hypertension, esophageal varices, gastritis and tobacco abuse presenting with shortness of breath that started about 4 days ago. It was accompanied by a dry cough and dizziness as well as generalized weakness. He reported no fever, sputum, hemoptysis, bleeding from any orifice. He reported no changes in stool color. Patient did not complain of chest pain, seizures, syncope, or motor weakness. He denied a trauma and falls. He noted that he was diagnosed with pneumonia 3 days  ago and was prescribed antibiotics. Patient was afebrile in the ER: BP: 125/80 SpO2: 89% on venturi mask . He was  found to be hypoxemic with end-tidal CO2 of 20 and hypoxemic to 79% requiring immediate  oxygen support at the EMS. His hemoglobin was found to be around 4 and 3 pints of PRBC were  scheduled for transfusion. By the time of our arrival his second bag was flowing.  Patient is to be admitted as a case of CAP, Anemia, and acute hypoxemic respiratory failure.  He received two pints PRBC in the ER, maintaining approproriate saturation of SpO2 with venturi mask on, given a shot of Vancomycin in the Er and placed on CAP coverage antibiotics.      Patient appears to be stable for stepdown to hospital medicine teams.  Maintaining appropriate vitals with minimal support.    Overview/Hospital Course:  Patient was admitted for symptomatic anemia, received blood transfusion.  GI was consulted who plans for scoping on 4/1.  Patient also treated for community-acquired pneumonia    Interval History:  PT OT  evaluation ordered.  Reports cough has improved, states that he has a chronic smoking history no formal diagnosis of COPD nor has he seen Pulmonary as an outpatient.  No further reports of melena, hemoglobin stable.  Plans for scoping today.    Review of Systems   Respiratory:  Positive for cough and wheezing.    Gastrointestinal:  Negative for diarrhea and vomiting.     Objective:     Vital Signs (Most Recent):  Temp: 98.9 °F (37.2 °C) (04/01/24 0746)  Pulse: 100 (04/01/24 0750)  Resp: 18 (04/01/24 0750)  BP: 119/74 (04/01/24 0746)  SpO2: 98 % (04/01/24 0750) Vital Signs (24h Range):  Temp:  [98 °F (36.7 °C)-99.1 °F (37.3 °C)] 98.9 °F (37.2 °C)  Pulse:  [] 100  Resp:  [16-18] 18  SpO2:  [95 %-100 %] 98 %  BP: (100-129)/(59-80) 119/74     Weight: 81.6 kg (180 lb)  Body mass index is 24.41 kg/m².    Intake/Output Summary (Last 24 hours) at 4/1/2024 0930  Last data filed at 4/1/2024 0731  Gross per 24 hour   Intake 999.06 ml   Output 850 ml   Net 149.06 ml         Physical Exam  Constitutional:       General: He is not in acute distress.  HENT:      Head: Normocephalic.      Right Ear: External ear normal.      Left Ear: External ear normal.      Nose: Nose normal.      Comments: Nasal cannula  Cardiovascular:      Rate and Rhythm: Normal rate.   Pulmonary:      Breath sounds: Rhonchi and rales present.   Abdominal:      Palpations: Abdomen is soft.      Tenderness: There is no abdominal tenderness.   Musculoskeletal:      Right lower leg: No edema.      Left lower leg: No edema.   Skin:     General: Skin is warm.   Neurological:      General: No focal deficit present.      Mental Status: He is alert and oriented to person, place, and time.   Psychiatric:         Mood and Affect: Mood normal.          Significant Labs: All pertinent labs within the past 24 hours have been reviewed.        Significant Labs: All pertinent labs within the past 24 hours have been reviewed.        Assessment/Plan:      *  Pneumonia  Noted on chest x-ray and Accounting for hypoxia.  We will do cough expectorant and nebs for pulmonary toileting.  Incentive spirometry.  Respiratory panel.  Continue with azithromycin and ceftriaxone.  Sputum culture.  Oxygen as needed  3/30  Continue with antibiotics and supportive care  4/1  Patient reports significant smoking history, still has significant rhonchi on examination though reports symptoms have improved, concerns for possible COPD, outpatient pulmonary referral.  We will give a trial of prednisone and start inhaler.  Nicotine patch as needed       Anemia  Acute blood loss anemia  Current CBC reviewed-   Lab Results   Component Value Date    HGB 7.3 (L) 04/01/2024    HCT 24.2 (L) 04/01/2024     Monitor serial CBC and transfuse if patient becomes hemodynamically unstable, symptomatic or H/H drops below 7/21.    3/29  Concerns for GI bleed.  Continue with Protonix 40 mg b.i.d..  Follow up with GI recommendations. Continue  to monitor hemoglobin  3/30  We will transfuse a unit of packed red cells today.  GI plans to scope on Monday.  Continue with Protonix.  Continue to monitor hemoglobin  3/31  GI notified of melena, hemoglobin stable, continue to monitor.  Continue with Protonix, follow up with GI  4/1  For scoping today.  Follow up with GI     Cirrhosis  History of alcohol use and reported varices with banding.  GI consulted.  We will continue thiamine and folic acid.  No history of alcohol withdrawal symptoms    GI bleed  See 2. Assessment      Acute hypoxemic respiratory failure  See #1    CKD Stage 3  Currently at baseline      VTE Risk Mitigation (From admission, onward)           Ordered     Place sequential compression device  Until discontinued         03/28/24 1121     IP VTE LOW RISK PATIENT  Once         03/28/24 1121                    Discharge Planning   ARTURO: 4/2/2024     Code Status: Full Code   Is the patient medically ready for discharge?: No    Reason for patient still in  hospital (select all that apply): Patient trending condition  Discharge Plan A: Skilled Nursing Facility   Discharge Delays: (!) Procedure Scheduling (IR, OR, Labs, Echo, Cath, Echo, EEG) (therapy evaluation pending)              Jake Guzman MD  Department of Hospital Medicine   [facility] - Stepdown Flex ([place]-14)

## 2024-04-01 NOTE — PT/OT/SLP PROGRESS
Occupational Therapy      Patient Name:  Fransico Montalvo   MRN:  [id number]  OT orders received and acknowledged. Patient not seen today secondary to RN hold at 0928 due to patient need to remain on bedside commode and finish colonoscopy prep . Will follow-up as appropriate for initial evaluation.    4/1/2024

## 2024-04-01 NOTE — NURSING
Pt AAO x 4, no c/o pain. VSS, NSR on telemetry. O2 @ 2L NC with sats 98%. Left hand and right forearm PIV intact and flushes well. Colonoscopy done today. Pt tolerated procedure well. Diet advanced post procedure. Pt changes position independently. No acute events this shift. Bed low and locked, call light in reach.

## 2024-04-01 NOTE — ASSESSMENT & PLAN NOTE
Acute blood loss anemia  Current CBC reviewed-   Lab Results   Component Value Date    HGB 7.3 (L) 04/01/2024    HCT 24.2 (L) 04/01/2024     Monitor serial CBC and transfuse if patient becomes hemodynamically unstable, symptomatic or H/H drops below 7/21.    3/29  Concerns for GI bleed.  Continue with Protonix 40 mg b.i.d..  Follow up with GI recommendations. Continue  to monitor hemoglobin  3/30  We will transfuse a unit of packed red cells today.  GI plans to scope on Monday.  Continue with Protonix.  Continue to monitor hemoglobin  3/31  GI notified of melena, hemoglobin stable, continue to monitor.  Continue with Protonix, follow up with GI  4/1  For scoping today.  Follow up with GI

## 2024-04-01 NOTE — PLAN OF CARE
04/01/24 1252   Discharge Reassessment   Assessment Type Discharge Planning Reassessment   Did the patient's condition or plan change since previous assessment? No   Discharge Plan discussed with: Patient   Communicated ARTURO with patient/caregiver Yes   Discharge Plan A Skilled Nursing Facility   DME Needed Upon Discharge  other (see comments)  (TBD)   Transition of Care Barriers Mobility;No family/friends to help   Why the patient remains in the hospital Requires continued medical care  (colooscopy scheduled)   Post-Acute Status   Post-Acute Authorization Placement   Post-Acute Placement Status Pending payor review/awaiting authorization (if required)   Home Health Status Referrals Sent   Coverage [facility] Resources/Appts/Education Provided Appointments scheduled and added to AVS   Discharge Delays (!) Procedure Scheduling (IR, OR, Labs, Echo, Cath, Echo, EEG)  (Colonoscopy and therapy to evaluate for post acute recommendationss)     CM met with patient  to discuss any changes in discharge planning.  Patients plan is to  dc to SNF   No changes in DC plans. ARTURO: 4/3/24    Discharge Recommendations: TBD    Therapy to evaluate later this afternoon pending colonoscopy    4:15 pm   CM spoke with patient at the bedside and patient chose [facility] [employer] [facility] Phone: [phone]   and CM updated admit coordinator at [facility].      Sabine Oreilly RN  Case Management  [facility]  [phone]

## 2024-04-01 NOTE — NURSING
Pt back to floor from Endoscopy. Pt AAO x 4, no c/o pain. VSS, NSR on telemetry. O2 @ 2L with sats %. No acute distress noted. Bed low and locked, call light in reach. Monitoring.

## 2024-04-01 NOTE — H&P
Short Stay Endoscopy History and Physical    PCP - [facility]  Referring Physician - Jake Guzman MD  [address] [place] [address]    Procedure - Colonoscopy  ASA - per anesthesia  Mallampati - per anesthesia  History of Anesthesia problems - no  Family history Anesthesia problems -  no   Plan of anesthesia - General    HPI  68 y.o. male  Reason for procedure:   Anemia, unspecified type [D64.9]        ROS:  Constitutional: No fevers, chills, No weight loss  CV: No chest pain  Pulm: No cough, No shortness of breath  GI: see HPI    Medical History:  has a past medical history of Alcohol abuse, BPH (benign prostatic hyperplasia), Cirrhosis (06/21/2018), Closed fracture of left distal radius (7/19/2023), Gastritis, Hypertension, and Renal disorder.    Surgical History:  has a past surgical history that includes Percutaneous pinning of hip (Right, 7/29/2019); Capsulotomy of joint (Right, 7/29/2019); and Esophagogastroduodenoscopy (N/A, 10/25/2023).    Family History: family history includes Heart disease in his brother and father; Hypertension in his father..    Social History:  reports that he has been smoking cigarettes. He has never used smokeless tobacco. He reports current alcohol use. He reports that he does not use drugs.    Review of patient's allergies indicates:  No Known Allergies    Medications:   Medications Prior to Admission   Medication Sig Dispense Refill Last Dose    capsaicin (ZOSTRIX) 0.025 % cream Apply topically 2 (two) times daily as needed (rib pain). (Patient not taking: Reported on 11/3/2023) 60 g 0     EScitalopram oxalate (LEXAPRO) 10 MG tablet Take 10 mg by mouth once daily.       oxyCODONE (ROXICODONE) 5 MG immediate release tablet Take 1 tablet (5 mg total) by mouth every 8 (eight) hours as needed for Pain (pain not responding to tylenol). 21 tablet 0     pantoprazole (PROTONIX) 40 MG tablet Take 1 tablet (40 mg total) by mouth once daily. 30 tablet  1     tamsulosin (FLOMAX) 0.4 mg Cap Take 1 capsule (0.4 mg total) by mouth once daily. (Patient not taking: Reported on 11/3/2023) 30 capsule 1        Physical Exam:    Vital Signs:   Vitals:    04/01/24 1210   BP: 114/62   Pulse: (!) 115   Resp: 17   Temp:        General Appearance: Well appearing in no acute distress  Abdomen: Soft, non tender, non distended with normal bowel sounds, no masses    Labs:  Lab Results   Component Value Date    WBC 3.42 (L) 04/01/2024    HGB 7.5 (L) 04/01/2024    HCT 26.2 (L) 04/01/2024     (L) 04/01/2024    CHOL 102 (L) 07/30/2018    TRIG 68 07/30/2018    HDL 23 (L) 07/30/2018    ALT 21 04/01/2024    AST 26 04/01/2024     (L) 04/01/2024    K 3.9 04/01/2024     04/01/2024    CREATININE 1.6 (H) 04/01/2024    BUN 29 (H) 04/01/2024    CO2 21 (L) 04/01/2024    INR 1.4 (H) 03/30/2024    HGBA1C 4.2 07/28/2019       I have explained the risks and benefits of this endoscopic procedure to the patient including but not limited to bleeding, inflammation, infection, perforation, and death.    Assessment/Plan:     Symptomatic anemia     - Proceed with colonoscopy     Scarlett Whitt MD  Gastroenterology   [facility]

## 2024-04-01 NOTE — TREATMENT PLAN
GI Post-Procedure Treatment Plan    Colonoscopy complete    Impression:            - The entire examined colon is normal.                          - The examined portion of the ileum was normal.                          - The distal rectum and anal verge are normal on                          retroflexion view.                          - No specimens collected.     Recommendation:        - Return patient to hospital lujan for ongoing care.                          - Advance diet as tolerated.                          - Continue present medications.                          - Repeat colonoscopy in 10 years for screening                          purposes.     Anemia may be secondary to production issue as other cytopenias are noted  GI will sign off    Orestes Galaviz  Gastroenterology Fellow, PGY-V

## 2024-04-01 NOTE — TRANSFER OF CARE
Anesthesia Transfer of Care Note    Patient: Fransico Montalvo    Procedure(s) Performed: Procedure(s) (LRB):  COLONOSCOPY (N/A)    Patient location: PACU    Anesthesia Type: general    Transport from OR: Transported from OR on 2-3 L/min O2 by NC with adequate spontaneous ventilation    Post pain: adequate analgesia    Post assessment: no apparent anesthetic complications and tolerated procedure well    Post vital signs: stable    Level of consciousness: awake, alert and oriented    Nausea/Vomiting: no nausea/vomiting    Complications: none    Transfer of care protocol was followed      Last vitals: Visit Vitals  /81 (BP Location: Left arm, Patient Position: Lying)   Pulse 102   Temp 36.4 °C (97.6 °F) (Temporal)   Resp 16   Ht 6' (1.829 m)   Wt 81.6 kg (180 lb)   SpO2 97%   BMI 24.41 kg/m²

## 2024-04-01 NOTE — NURSING
Pt AAO x 4, no c/o pain. Transport at the bedside to take the pt to Endoscopy. Colon prep complete. Pt is clear. Telemetry and continuous pulse ox in use. O2 @ 2L NC on pt for transport. Pt left on a stretcher.

## 2024-04-02 LAB
ABO + RH BLD: NORMAL
ALBUMIN SERPL BCP-MCNC: 3 G/DL (ref 3.5–5.2)
ALP SERPL-CCNC: 64 U/L (ref 55–135)
ALT SERPL W/O P-5'-P-CCNC: 18 U/L (ref 10–44)
ANION GAP SERPL CALC-SCNC: 8 MMOL/L (ref 8–16)
AST SERPL-CCNC: 19 U/L (ref 10–40)
BACTERIA BLD CULT: NORMAL
BACTERIA BLD CULT: NORMAL
BASOPHILS # BLD AUTO: 0 K/UL (ref 0–0.2)
BASOPHILS NFR BLD: 0 % (ref 0–1.9)
BILIRUB SERPL-MCNC: 0.7 MG/DL (ref 0.1–1)
BLD GP AB SCN CELLS X3 SERPL QL: NORMAL
BLD PROD TYP BPU: NORMAL
BLOOD UNIT EXPIRATION DATE: NORMAL
BLOOD UNIT TYPE CODE: 5100
BLOOD UNIT TYPE: NORMAL
BUN SERPL-MCNC: 26 MG/DL (ref 8–23)
CALCIUM SERPL-MCNC: 8.4 MG/DL (ref 8.7–10.5)
CHLORIDE SERPL-SCNC: 102 MMOL/L (ref 95–110)
CO2 SERPL-SCNC: 22 MMOL/L (ref 23–29)
CODING SYSTEM: NORMAL
CREAT SERPL-MCNC: 1.4 MG/DL (ref 0.5–1.4)
CROSSMATCH INTERPRETATION: NORMAL
DIFFERENTIAL METHOD BLD: ABNORMAL
DISPENSE STATUS: NORMAL
EOSINOPHIL # BLD AUTO: 0 K/UL (ref 0–0.5)
EOSINOPHIL NFR BLD: 0 % (ref 0–8)
ERYTHROCYTE [DISTWIDTH] IN BLOOD BY AUTOMATED COUNT: 22.8 % (ref 11.5–14.5)
ERYTHROCYTE [DISTWIDTH] IN BLOOD BY AUTOMATED COUNT: 23.5 % (ref 11.5–14.5)
ERYTHROCYTE [DISTWIDTH] IN BLOOD BY AUTOMATED COUNT: 23.9 % (ref 11.5–14.5)
EST. GFR  (NO RACE VARIABLE): 54.4 ML/MIN/1.73 M^2
GLUCOSE SERPL-MCNC: 127 MG/DL (ref 70–110)
HCT VFR BLD AUTO: 24.4 % (ref 40–54)
HCT VFR BLD AUTO: 24.7 % (ref 40–54)
HCT VFR BLD AUTO: 26.6 % (ref 40–54)
HGB BLD-MCNC: 7.1 G/DL (ref 14–18)
HGB BLD-MCNC: 7.3 G/DL (ref 14–18)
HGB BLD-MCNC: 8.1 G/DL (ref 14–18)
IMM GRANULOCYTES # BLD AUTO: 0.02 K/UL (ref 0–0.04)
IMM GRANULOCYTES # BLD AUTO: 0.02 K/UL (ref 0–0.04)
IMM GRANULOCYTES # BLD AUTO: 0.06 K/UL (ref 0–0.04)
IMM GRANULOCYTES NFR BLD AUTO: 0.7 % (ref 0–0.5)
IMM GRANULOCYTES NFR BLD AUTO: 0.8 % (ref 0–0.5)
IMM GRANULOCYTES NFR BLD AUTO: 0.8 % (ref 0–0.5)
LYMPHOCYTES # BLD AUTO: 0.2 K/UL (ref 1–4.8)
LYMPHOCYTES # BLD AUTO: 0.3 K/UL (ref 1–4.8)
LYMPHOCYTES # BLD AUTO: 0.3 K/UL (ref 1–4.8)
LYMPHOCYTES NFR BLD: 10.4 % (ref 18–48)
LYMPHOCYTES NFR BLD: 3.8 % (ref 18–48)
LYMPHOCYTES NFR BLD: 7.1 % (ref 18–48)
MAGNESIUM SERPL-MCNC: 1.6 MG/DL (ref 1.6–2.6)
MCH RBC QN AUTO: 24 PG (ref 27–31)
MCH RBC QN AUTO: 24.4 PG (ref 27–31)
MCH RBC QN AUTO: 25.2 PG (ref 27–31)
MCHC RBC AUTO-ENTMCNC: 29.1 G/DL (ref 32–36)
MCHC RBC AUTO-ENTMCNC: 29.6 G/DL (ref 32–36)
MCHC RBC AUTO-ENTMCNC: 30.5 G/DL (ref 32–36)
MCV RBC AUTO: 82 FL (ref 82–98)
MCV RBC AUTO: 83 FL (ref 82–98)
MCV RBC AUTO: 83 FL (ref 82–98)
MONOCYTES # BLD AUTO: 0.1 K/UL (ref 0.3–1)
MONOCYTES # BLD AUTO: 0.1 K/UL (ref 0.3–1)
MONOCYTES # BLD AUTO: 0.2 K/UL (ref 0.3–1)
MONOCYTES NFR BLD: 2.9 % (ref 4–15)
MONOCYTES NFR BLD: 3.9 % (ref 4–15)
MONOCYTES NFR BLD: 4.6 % (ref 4–15)
NEUTROPHILS # BLD AUTO: 2.3 K/UL (ref 1.8–7.7)
NEUTROPHILS # BLD AUTO: 2.4 K/UL (ref 1.8–7.7)
NEUTROPHILS # BLD AUTO: 7 K/UL (ref 1.8–7.7)
NEUTROPHILS NFR BLD: 84.3 % (ref 38–73)
NEUTROPHILS NFR BLD: 88.2 % (ref 38–73)
NEUTROPHILS NFR BLD: 92.5 % (ref 38–73)
NRBC BLD-RTO: 0 /100 WBC
PHOSPHATE SERPL-MCNC: 2.3 MG/DL (ref 2.7–4.5)
PLATELET # BLD AUTO: 101 K/UL (ref 150–450)
PLATELET # BLD AUTO: 115 K/UL (ref 150–450)
PLATELET # BLD AUTO: 74 K/UL (ref 150–450)
PMV BLD AUTO: 10.3 FL (ref 9.2–12.9)
PMV BLD AUTO: 10.3 FL (ref 9.2–12.9)
PMV BLD AUTO: 11.2 FL (ref 9.2–12.9)
POTASSIUM SERPL-SCNC: 4.2 MMOL/L (ref 3.5–5.1)
PROT SERPL-MCNC: 6.8 G/DL (ref 6–8.4)
RBC # BLD AUTO: 2.96 M/UL (ref 4.6–6.2)
RBC # BLD AUTO: 2.99 M/UL (ref 4.6–6.2)
RBC # BLD AUTO: 3.21 M/UL (ref 4.6–6.2)
SODIUM SERPL-SCNC: 132 MMOL/L (ref 136–145)
SPECIMEN OUTDATE: NORMAL
TRANS ERYTHROCYTES VOL PATIENT: NORMAL ML
WBC # BLD AUTO: 2.55 K/UL (ref 3.9–12.7)
WBC # BLD AUTO: 2.8 K/UL (ref 3.9–12.7)
WBC # BLD AUTO: 7.59 K/UL (ref 3.9–12.7)

## 2024-04-02 PROCEDURE — 94640 AIRWAY INHALATION TREATMENT: CPT

## 2024-04-02 PROCEDURE — P9021 RED BLOOD CELLS UNIT: HCPCS | Performed by: INTERNAL MEDICINE

## 2024-04-02 PROCEDURE — 63600175 PHARM REV CODE 636 W HCPCS: Performed by: INTERNAL MEDICINE

## 2024-04-02 PROCEDURE — 80053 COMPREHEN METABOLIC PANEL: CPT | Performed by: STUDENT IN AN ORGANIZED HEALTH CARE EDUCATION/TRAINING PROGRAM

## 2024-04-02 PROCEDURE — 36430 TRANSFUSION BLD/BLD COMPNT: CPT

## 2024-04-02 PROCEDURE — 25000242 PHARM REV CODE 250 ALT 637 W/ HCPCS: Performed by: INTERNAL MEDICINE

## 2024-04-02 PROCEDURE — 25000003 PHARM REV CODE 250: Performed by: HOSPITALIST

## 2024-04-02 PROCEDURE — 99900035 HC TECH TIME PER 15 MIN (STAT)

## 2024-04-02 PROCEDURE — 83735 ASSAY OF MAGNESIUM: CPT | Performed by: STUDENT IN AN ORGANIZED HEALTH CARE EDUCATION/TRAINING PROGRAM

## 2024-04-02 PROCEDURE — 94761 N-INVAS EAR/PLS OXIMETRY MLT: CPT

## 2024-04-02 PROCEDURE — 97166 OT EVAL MOD COMPLEX 45 MIN: CPT

## 2024-04-02 PROCEDURE — 27100098 HC SPACER

## 2024-04-02 PROCEDURE — 97535 SELF CARE MNGMENT TRAINING: CPT

## 2024-04-02 PROCEDURE — C9113 INJ PANTOPRAZOLE SODIUM, VIA: HCPCS | Performed by: STUDENT IN AN ORGANIZED HEALTH CARE EDUCATION/TRAINING PROGRAM

## 2024-04-02 PROCEDURE — 85025 COMPLETE CBC W/AUTO DIFF WBC: CPT | Mod: 91 | Performed by: STUDENT IN AN ORGANIZED HEALTH CARE EDUCATION/TRAINING PROGRAM

## 2024-04-02 PROCEDURE — 86920 COMPATIBILITY TEST SPIN: CPT | Performed by: INTERNAL MEDICINE

## 2024-04-02 PROCEDURE — 36415 COLL VENOUS BLD VENIPUNCTURE: CPT | Performed by: STUDENT IN AN ORGANIZED HEALTH CARE EDUCATION/TRAINING PROGRAM

## 2024-04-02 PROCEDURE — 20600001 HC STEP DOWN PRIVATE ROOM

## 2024-04-02 PROCEDURE — 97530 THERAPEUTIC ACTIVITIES: CPT

## 2024-04-02 PROCEDURE — 63600175 PHARM REV CODE 636 W HCPCS: Performed by: STUDENT IN AN ORGANIZED HEALTH CARE EDUCATION/TRAINING PROGRAM

## 2024-04-02 PROCEDURE — 86901 BLOOD TYPING SEROLOGIC RH(D): CPT | Performed by: INTERNAL MEDICINE

## 2024-04-02 PROCEDURE — 84100 ASSAY OF PHOSPHORUS: CPT | Performed by: STUDENT IN AN ORGANIZED HEALTH CARE EDUCATION/TRAINING PROGRAM

## 2024-04-02 PROCEDURE — 94799 UNLISTED PULMONARY SVC/PX: CPT | Mod: XB

## 2024-04-02 PROCEDURE — 25000003 PHARM REV CODE 250: Performed by: INTERNAL MEDICINE

## 2024-04-02 PROCEDURE — 27000221 HC OXYGEN, UP TO 24 HOURS

## 2024-04-02 RX ORDER — HYDROCODONE BITARTRATE AND ACETAMINOPHEN 500; 5 MG/1; MG/1
TABLET ORAL
Status: DISCONTINUED | OUTPATIENT
Start: 2024-04-02 | End: 2024-04-04 | Stop reason: HOSPADM

## 2024-04-02 RX ORDER — PANTOPRAZOLE SODIUM 40 MG/1
40 TABLET, DELAYED RELEASE ORAL DAILY
Status: CANCELLED | OUTPATIENT
Start: 2024-04-03

## 2024-04-02 RX ORDER — PANTOPRAZOLE SODIUM 40 MG/1
40 TABLET, DELAYED RELEASE ORAL DAILY
Status: DISCONTINUED | OUTPATIENT
Start: 2024-04-03 | End: 2024-04-04 | Stop reason: HOSPADM

## 2024-04-02 RX ORDER — SODIUM,POTASSIUM PHOSPHATES 280-250MG
2 POWDER IN PACKET (EA) ORAL
Status: CANCELLED | OUTPATIENT
Start: 2024-04-02 | End: 2024-04-02

## 2024-04-02 RX ADMIN — IPRATROPIUM BROMIDE AND ALBUTEROL SULFATE 3 ML: 2.5; .5 SOLUTION RESPIRATORY (INHALATION) at 11:04

## 2024-04-02 RX ADMIN — ACETAMINOPHEN 650 MG: 325 TABLET ORAL at 02:04

## 2024-04-02 RX ADMIN — GUAIFENESIN 200 MG: 200 SOLUTION ORAL at 06:04

## 2024-04-02 RX ADMIN — GUAIFENESIN 200 MG: 200 SOLUTION ORAL at 09:04

## 2024-04-02 RX ADMIN — PREDNISONE 40 MG: 20 TABLET ORAL at 08:04

## 2024-04-02 RX ADMIN — ACETAMINOPHEN 650 MG: 325 TABLET ORAL at 03:04

## 2024-04-02 RX ADMIN — Medication 6 MG: at 02:04

## 2024-04-02 RX ADMIN — PANTOPRAZOLE SODIUM 40 MG: 40 INJECTION, POWDER, FOR SOLUTION INTRAVENOUS at 08:04

## 2024-04-02 RX ADMIN — IPRATROPIUM BROMIDE AND ALBUTEROL SULFATE 3 ML: 2.5; .5 SOLUTION RESPIRATORY (INHALATION) at 07:04

## 2024-04-02 RX ADMIN — FLUTICASONE FUROATE AND VILANTEROL TRIFENATATE 1 PUFF: 100; 25 POWDER RESPIRATORY (INHALATION) at 08:04

## 2024-04-02 RX ADMIN — IPRATROPIUM BROMIDE AND ALBUTEROL SULFATE 3 ML: 2.5; .5 SOLUTION RESPIRATORY (INHALATION) at 01:04

## 2024-04-02 RX ADMIN — GUAIFENESIN 200 MG: 200 SOLUTION ORAL at 05:04

## 2024-04-02 RX ADMIN — IPRATROPIUM BROMIDE AND ALBUTEROL SULFATE 3 ML: 2.5; .5 SOLUTION RESPIRATORY (INHALATION) at 04:04

## 2024-04-02 RX ADMIN — GUAIFENESIN 200 MG: 200 SOLUTION ORAL at 02:04

## 2024-04-02 RX ADMIN — CEFTRIAXONE 1 G: 1 INJECTION, POWDER, FOR SOLUTION INTRAMUSCULAR; INTRAVENOUS at 01:04

## 2024-04-02 RX ADMIN — FOLIC ACID 1 MG: 1 TABLET ORAL at 08:04

## 2024-04-02 RX ADMIN — Medication 100 MG: at 08:04

## 2024-04-02 RX ADMIN — DICYCLOMINE HYDROCHLORIDE 10 MG: 10 CAPSULE ORAL at 06:04

## 2024-04-02 RX ADMIN — Medication 6 MG: at 09:04

## 2024-04-02 NOTE — PLAN OF CARE
Problem: Occupational Therapy  Goal: Occupational Therapy Goal  Description: Goals to be met by: 4/16/24     Patient will increase functional independence with ADLs by performing:    LE Dressing with Supervision.  Grooming while standing at sink with Stand-by Assistance.  Toileting from toilet with Stand-by Assistance for hygiene and clothing management.   Toilet transfer to toilet with Stand-by Assistance.  [place] with BUE HEP to improve activity tolerance to complete ADLs and IADLs  Within home ambulation distances with  supervision and LRAD necessary to complete ADLs.      Outcome: Ongoing, Progressing     Patient tolerated OT eval. Goals and POC established. See note for further details.

## 2024-04-02 NOTE — PROGRESS NOTES
[facility] - Stepdown Flex ([place] [address])  [facility] Medicine  Progress Note    Patient Name: Fransico Montalvo  MRN: [id number]  Patient Class: IP- Inpatient   Admission Date: 3/28/2024  Length of Stay: 5 days  Attending Physician: Jake Guzman MD  Primary Care Provider: [facility]        Subjective:     Principal Problem:Pneumonia        HPI:  68 year old gentleman who is a known case of f alcohol abuse, BPH, cirrhosis, hypertension, esophageal varices, gastritis and tobacco abuse presenting with shortness of breath that started about 4 days ago. It was accompanied by a dry cough and dizziness as well as generalized weakness. He reported no fever, sputum, hemoptysis, bleeding from any orifice. He reported no changes in stool color. Patient did not complain of chest pain, seizures, syncope, or motor weakness. He denied a trauma and falls. He noted that he was diagnosed with pneumonia 3 days  ago and was prescribed antibiotics. Patient was afebrile in the ER: BP: 125/80 SpO2: 89% on venturi mask . He was  found to be hypoxemic with end-tidal CO2 of 20 and hypoxemic to 79% requiring immediate  oxygen support at the EMS. His hemoglobin was found to be around 4 and 3 pints of PRBC were  scheduled for transfusion. By the time of our arrival his second bag was flowing.  Patient is to be admitted as a case of CAP, Anemia, and acute hypoxemic respiratory failure.  He received two pints PRBC in the ER, maintaining approproriate saturation of SpO2 with venturi mask on, given a shot of Vancomycin in the Er and placed on CAP coverage antibiotics.      Patient appears to be stable for stepdown to hospital medicine teams.  Maintaining appropriate vitals with minimal support.    Overview/Hospital Course:  Patient was admitted for symptomatic anemia, received blood transfusion.  GI was consulted who plans for scoping on 4/1.  Patient also treated for community-acquired pneumonia    Interval History:  Patient  underwent colonoscopy which was unremarkable.  GI signed off.  Still reports of some coughing and wheezing.  Seen by therapy, awaiting recommendations.  Complains of symptoms of anemia, notably dizziness and lightheadedness and weakness with therapy.    Review of Systems   Respiratory:  Negative for shortness of breath.    Cardiovascular:  Negative for chest pain.   Gastrointestinal:  Negative for vomiting.   Neurological:  Positive for dizziness, weakness and light-headedness.     Objective:     Vital Signs (Most Recent):  Temp: 98.5 °F (36.9 °C) (04/02/24 0744)  Pulse: 99 (04/02/24 1100)  Resp: 18 (04/02/24 0802)  BP: 119/80 (04/02/24 0744)  SpO2: 96 % (04/02/24 1100) Vital Signs (24h Range):  Temp:  [97.6 °F (36.4 °C)-99.5 °F (37.5 °C)] 98.5 °F (36.9 °C)  Pulse:  [] 99  Resp:  [15-18] 18  SpO2:  [93 %-100 %] 96 %  BP: (103-124)/(59-90) 119/80     Weight: 81.6 kg (180 lb)  Body mass index is 24.41 kg/m².    Intake/Output Summary (Last 24 hours) at 4/2/2024 1113  Last data filed at 4/2/2024 0553  Gross per 24 hour   Intake 200 ml   Output 550 ml   Net -350 ml            Physical Exam  Constitutional:       General: He is not in acute distress.  HENT:      Head: Normocephalic.      Right Ear: External ear normal.      Left Ear: External ear normal.      Nose: Nose normal.      Comments: Nasal cannula  Cardiovascular:      Rate and Rhythm: Normal rate.   Pulmonary:      Breath sounds: Rhonchi  present.   Abdominal:      Palpations: Abdomen is soft.      Tenderness: There is no abdominal tenderness.   Musculoskeletal:      Right lower leg: No edema.      Left lower leg: No edema.   Skin:     General: Skin is warm.   Neurological:      General: No focal deficit present.      Mental Status: He is alert and oriented to person, place, and time.   Psychiatric:         Mood and Affect: Mood normal.         Significant Labs: All pertinent labs within the past 24 hours have been reviewed.        Assessment/Plan:      *  Pneumonia  Noted on chest x-ray and Accounting for hypoxia.  We will do cough expectorant and nebs for pulmonary toileting.  Incentive spirometry.  Respiratory panel.  Continue with azithromycin and ceftriaxone.  Sputum culture.  Oxygen as needed  3/30  Continue with antibiotics and supportive care  4/1  Patient reports significant smoking history, still has significant rhonchi on examination though reports symptoms have improved, concerns for possible COPD, outpatient pulmonary referral.  We will give a trial of prednisone and start inhaler.  Nicotine patch as needed   4/2 continue with steroids and inhalers.  Outpatient pulmonary referral.  We will do 1 more day of antibiotics      Anemia  Acute blood loss anemia  Current CBC reviewed-   Lab Results   Component Value Date    HGB 7.3 (L) 04/02/2024    HCT 24.7 (L) 04/02/2024     Monitor serial CBC and transfuse if patient becomes hemodynamically unstable, symptomatic or H/H drops below 7/21.    3/29  Concerns for GI bleed.  Continue with Protonix 40 mg b.i.d..  Follow up with GI recommendations. Continue  to monitor hemoglobin  3/30  We will transfuse a unit of packed red cells today.  GI plans to scope on Monday.  Continue with Protonix.  Continue to monitor hemoglobin  3/31  GI notified of melena, hemoglobin stable, continue to monitor.  Continue with Protonix, follow up with GI  4/1  For scoping today.  Follow up with GI   4/2  Status post colonoscopy, GI signed off.  We will switch to home oral Protonix.  Patient experiences symptoms of anemia, previous hemoglobin around 8/9.  We will transfuse a unit of PRBC    Cirrhosis  History of alcohol use and reported varices with banding.  GI consulted.  We will continue thiamine and folic acid.  No history of alcohol withdrawal symptoms.  Chronic bi/pancytopenia    GI bleed  See 2. Assessment      Acute hypoxemic respiratory failure  See #1    CKD Stage 3  Currently at baseline      VTE Risk Mitigation (From admission,  onward)           Ordered     Place sequential compression device  Until discontinued         03/28/24 1121     IP VTE LOW RISK PATIENT  Once         03/28/24 1121                    Discharge Planning   ARTURO: 4/4/2024     Code Status: Full Code   Is the patient medically ready for discharge?: Yes    Reason for patient still in hospital (select all that apply): Patient trending condition  Discharge Plan A: Skilled Nursing Facility   Discharge Delays: (!) Procedure Scheduling (IR, OR, Labs, Echo, Cath, Echo, EEG) (Colonoscopy and therapy to evaluate for post acute recommendationss)              Jake Guzman MD  Department of Hospital Medicine   [facility])

## 2024-04-02 NOTE — ASSESSMENT & PLAN NOTE
Noted on chest x-ray and Accounting for hypoxia.  We will do cough expectorant and nebs for pulmonary toileting.  Incentive spirometry.  Respiratory panel.  Continue with azithromycin and ceftriaxone.  Sputum culture.  Oxygen as needed  3/30  Continue with antibiotics and supportive care  4/1  Patient reports significant smoking history, still has significant rhonchi on examination though reports symptoms have improved, concerns for possible COPD, outpatient pulmonary referral.  We will give a trial of prednisone and start inhaler.  Nicotine patch as needed   4/2 continue with steroids and inhalers.  Outpatient pulmonary referral.  We will do 1 more day of antibiotics

## 2024-04-02 NOTE — PT/OT/SLP PROGRESS
Physical Therapy      Patient Name:  Fransico Montalvo   MRN:  [id number]    Patient not seen today secondary to Patient fatigue (Pt. worked with OT). Will follow-up tomorrow.    Jake Rutledge, PT  4/2/2024

## 2024-04-02 NOTE — PLAN OF CARE
Pre-Admission Criteria  Utilization Review Completed By: Porsha Goldstein  Level Of Care Reviewed For: SNF Preadmission  Review Findings: Criteria Met     Presently, this patient meets InterQual® criteria for SNF Preadmission.  Please note, if there is a change in the patient's clinical status or treatments needed, a new review will be necessary.    *These review findings are based on InterQual® guidelines and information documented in the patient's Epic EMR.  They do not substitute the provider's judgement for medical necessity.    *Per InterQual® Guidelines: For a Medicare beneficiary, a 3-day hospital stay is required for coverage of SNF services. The 3-day qualifying stay may not be required for patients who have a Medicare Advantage plan, are part of an ACO, meet waiver eligibility requirements, or are admitted with a condition that qualifies for a bundled payment.    Discharge Plan A and Plan B have been determined by review of patient's clinical status, future medical and therapeutic needs, and coverage/benefits for post-acute care in coordination with multidisciplinary team members.    04/02/24 1353   Post-Acute Status   Post-Acute Authorization Placement;HME   Post-Acute Placement Status Pending payor review/awaiting authorization (if required)  ([facility] -)   HME Status Pending Qualifying Diagnosis  (rolling walker)   Coverage [facility] [employer] - [facility] [employer] -   Hospital Resources/Appts/Education Provided Appointments scheduled and added to AVS   Patient choice form signed by patient/caregiver List from CMS Compare;List with quality metrics by geographic area provided   Discharge Delays None known at this time   Discharge Plan   Discharge Plan A Skilled Nursing Facility  ([facility] [phone])     CM met with patient to discuss any changes in discharge planning.  Patients plan is to  to SNF.   No changes in DC plans. ARTURO:  "4/3/24    Discharge Recommendations: moderate intensity therapy    0830 am  CM completed the LOCET via phone. CM faxed PASRR to obtain the 142 for NH admission    1215 pm  142 uploaded in Care Port    2:04 pm  CM spoke with Mary @ [facility] and updated that patient chose [facility]    2:14 pm  CM faxed supportive documentation to [facility] for SNF authorization O: [phone] F: [phone]    Your fax has been successfully sent to [phone] at [phone].  ------------------------------------------------------------  From: [id number]  ------------------------------------------------------------  4/2/2024 2:10:48 PM Transmission Record          Sent to [phone] with remote ID "[id number]"          Result: (0/339;0/0) Success          Page record: 1 - 28          Elapsed time: 09:35 on channel 40     5:07 pm  CM spoke with Mary FLYNN from [facility] and the patient has an open claim. CM will contact coordination of benefits and Recovery  call center [phone] and [facility] claim department [phone]    Sabine Oreilly RN  Case Management  [facility]  [phone]    "

## 2024-04-02 NOTE — PLAN OF CARE
Problem: Adult Inpatient Plan of Care  Goal: Plan of Care Review  Outcome: Ongoing, Progressing  Goal: Patient-Specific Goal (Individualized)  Outcome: Ongoing, Progressing  Goal: Absence of Hospital-Acquired Illness or Injury  Outcome: Ongoing, Progressing  Goal: Optimal Comfort and Wellbeing  Outcome: Ongoing, Progressing  Goal: Readiness for Transition of Care  Outcome: Ongoing, Progressing     Problem: Fluid Imbalance (Pneumonia)  Goal: Fluid Balance  Outcome: Ongoing, Progressing     Problem: Infection (Pneumonia)  Goal: Resolution of Infection Signs and Symptoms  Outcome: Ongoing, Progressing     Problem: Respiratory Compromise (Pneumonia)  Goal: Effective Oxygenation and Ventilation  Outcome: Ongoing, Progressing     Problem: Skin Injury Risk Increased  Goal: Skin Health and Integrity  Outcome: Ongoing, Progressing     Problem: Impaired Wound Healing  Goal: Optimal Wound Healing  Outcome: Ongoing, Progressing     Problem: Fall Injury Risk  Goal: Absence of Fall and Fall-Related Injury  Outcome: Ongoing, Progressing   Alert , ox4 , c/o pain to abd , med per orders, no injuries this shift will continue to clare smith , clwr

## 2024-04-02 NOTE — PT/OT/SLP EVAL
Occupational Therapy   Evaluation & Treatment    Name: Fransico Montalvo  MRN: [id number]  Admitting Diagnosis: Pneumonia  Recent Surgery: Procedure(s) (LRB):  COLONOSCOPY (N/A) 1 Day Post-Op    Recommendations:     Discharge Recommendations: Moderate Intensity Therapy  Discharge Equipment Recommendations:  walker, rolling    Patient demonstrates a mobility limitation that significantly impairs their ability to participate in one or more mobility related activities of daily living. Patient's mobility limitation cannot be sufficiently resolved with the use of a cane, but can be sufficiently resolved with the use of a rolling walker.The use of a rolling walker will considerably improve their ability to participate in MRADLs. Patient will use the walker on a regular basis at home.      Barriers to discharge:  None    Assessment:     Fransico Montalvo is a 68 y.o. male with a medical diagnosis of Pneumonia.  He presents with overall unsteadiness and decreased activity tolerance. Some c/o dizziness (vitals below). Performance deficits affecting function: weakness, impaired endurance, impaired functional mobility, impaired self care skills, gait instability, impaired balance, decreased safety awareness.  Patient continues to demonstrate the need for moderate intensity therapy on a daily basis post acute exhibited by decreased independence with self-care and functional mobility     Rehab Prognosis: Good; patient would benefit from acute skilled OT services to address these deficits and reach maximum level of function.       Plan:     Patient to be seen 3 x/week to address the above listed problems via self-care/home management, therapeutic activities, therapeutic exercises, neuromuscular re-education  Plan of Care Expires: 04/16/24  Plan of Care Reviewed with: patient    Subjective     Chief Complaint: reporting concerns over current living environment   Patient/Family Comments/goals: get stronger    Occupational Profile:  Living  Environment: Patient lives in group home ([facility] with 4 SBAA L handrail with bathtub shower combo)  Previous level of function: reports recent difficulty with ADLs; reports 6 falls within last 3 months  Roles and Routines: none verbalized   Equipment Used at Home: wheelchair, cane, straight, bath bench (rusted rollator)  Assistance upon Discharge: limited; reports other members of group home are unwilling or unable to assist him    Pain/Comfort:  Pain Rating 1: 0/10  Pain Rating Post-Intervention 1: 0/10    Patients cultural, spiritual, Caodaism conflicts given the current situation: no    Objective:     Communicated with: nursing prior to session.  Patient found HOB elevated with telemetry, peripheral IV, oxygen upon OT entry to room.    General Precautions: Standard, fall  Orthopedic Precautions: N/A  Braces: N/A  Respiratory Status: Nasal cannula, flow 2 L/min    Occupational Performance:    Bed Mobility:    Patient completed Supine to Sit with minimum assistance    Functional Mobility/Transfers:  Patient completed Sit <> Stand Transfer with contact guard assistance  with  rolling walker x2 reps from EOB and x1 rep from bedside chair  Functional Mobility: patient ambulated to/from bathroom (~12 ft) with RW and minimum assistance; then ambulated ~10 ft with RW and minimum assistance but improved coordination   Education provided on RW management between trials with good evidence of learning  Patient verbalizing fatigue after each activity     Activities of Daily Living:  Grooming: contact guard assistance to wash face in stance at sink; fatiguing in stance after ~1.5 min  Upper Body Dressing: contact guard assistance to don gown like jacket  Lower Body Dressing: minimum assistance to don socks    Cognitive/Visual Perceptual:  Cognitive/Psychosocial Skills:     -       Oriented to: Person, Place, Time, and Situation   -       Follows Commands/attention:Follows one-step commands  -       Communication: clear/fluent  -        Memory: No Deficits noted  -       Safety awareness/insight to disability: intact   -       Mood/Affect/Coping skills/emotional control: Appropriate to situation    Physical Exam:  Dominant hand:    -       R  Upper Extremity Range of Motion:     -       Right Upper Extremity: WFL  -       Left Upper Extremity: WFL  Upper Extremity Strength:    -       Right Upper Extremity: WFL  -       Left Upper Extremity: WFL   Strength:    -       Right Upper Extremity: WFL  -       Left Upper Extremity: Deficits: due to radius fracture in 07/2023  Fine Motor Coordination:    -       Intact    AMPA 6 Click ADL:  AMPA Total Score: 17    Treatment & Education:  Vitals as follows:   Sitting EOB: 126/84 (101)   Stance: 115/74 (87)    Patient educated on:   -purpose of OT and OT POC  -facilitation and education on proper body mechanics, energy conservation, and safety  -importance of early mobility and out of bed activities with staff assist  -overall benefits of therapy     All questions answered within OT scope and to patient's satisfaction    Patient left up in chair with all lines intact, call button in reach, and nursing notified    GOALS:   Multidisciplinary Problems       Occupational Therapy Goals          Problem: Occupational Therapy    Goal Priority Disciplines Outcome Interventions   Occupational Therapy Goal     OT, PT/OT Ongoing, Progressing    Description: Goals to be met by: 4/16/24     Patient will increase functional independence with ADLs by performing:    LE Dressing with Supervision.  Grooming while standing at sink with Stand-by Assistance.  Toileting from toilet with Stand-by Assistance for hygiene and clothing management.   Toilet transfer to toilet with Stand-by Assistance.  [place] with BUE HEP to improve activity tolerance to complete ADLs and IADLs  Within home ambulation distances with  supervision and LRAD necessary to complete ADLs.                           History:     Past Medical  History:   Diagnosis Date    Alcohol abuse     BPH (benign prostatic hyperplasia)     Cirrhosis 06/21/2018    pt states that he was diagnosed a week ago during his last hospital visit    Closed fracture of left distal radius 7/19/2023    Gastritis     Hypertension     Renal disorder          Past Surgical History:   Procedure Laterality Date    CAPSULOTOMY OF JOINT Right 7/29/2019    Procedure: CAPSULOTOMY, JOINT;  Surgeon: Raj Grossman MD;  Location: [facility] OR [address] [place] [address];  Service: Orthopedics;  Laterality: Right;    COLONOSCOPY N/A 4/1/2024    Procedure: COLONOSCOPY;  Surgeon: Scarlett Whitt MD;  Location: [facility] ENDO ([address] [place] [address]);  Service: Gastroenterology;  Laterality: N/A;    ESOPHAGOGASTRODUODENOSCOPY N/A 10/25/2023    Procedure: EGD (ESOPHAGOGASTRODUODENOSCOPY);  Surgeon: Lux Rome MD;  Location: [facility] ENDO ([address] [place] [address]);  Service: Endoscopy;  Laterality: N/A;    PERCUTANEOUS PINNING OF HIP Right 7/29/2019    Procedure: PINNING, HIP, PERCUTANEOUS- synthes cannulated screws- hana table- C arm door side-;  Surgeon: Raj Grossman MD;  Location: [facility] OR [address] [place] [address];  Service: Orthopedics;  Laterality: Right;       Time Tracking:     OT Date of Treatment: 04/02/24  OT Start Time: 0841  OT Stop Time: 0920  OT Total Time (min): 39 min    Billable Minutes:Evaluation 10  Self Care/Home Management 19  Therapeutic Activity 10    4/2/2024

## 2024-04-02 NOTE — ASSESSMENT & PLAN NOTE
Acute blood loss anemia  Current CBC reviewed-   Lab Results   Component Value Date    HGB 7.3 (L) 04/02/2024    HCT 24.7 (L) 04/02/2024     Monitor serial CBC and transfuse if patient becomes hemodynamically unstable, symptomatic or H/H drops below 7/21.    3/29  Concerns for GI bleed.  Continue with Protonix 40 mg b.i.d..  Follow up with GI recommendations. Continue  to monitor hemoglobin  3/30  We will transfuse a unit of packed red cells today.  GI plans to scope on Monday.  Continue with Protonix.  Continue to monitor hemoglobin  3/31  GI notified of melena, hemoglobin stable, continue to monitor.  Continue with Protonix, follow up with GI  4/1  For scoping today.  Follow up with GI   4/2  Status post colonoscopy, GI signed off.  We will switch to home oral Protonix.  Patient experiences symptoms of anemia, previous hemoglobin around 8/9.  We will transfuse a unit of PRBC

## 2024-04-02 NOTE — SUBJECTIVE & OBJECTIVE
Interval History:  Patient underwent colonoscopy which was unremarkable.  GI signed off.  Still reports of some coughing and wheezing.  Seen by therapy, awaiting recommendations.  Complains of symptoms of anemia, notably dizziness and lightheadedness and weakness with therapy.    Review of Systems   Respiratory:  Negative for shortness of breath.    Cardiovascular:  Negative for chest pain.   Gastrointestinal:  Negative for vomiting.   Neurological:  Positive for dizziness, weakness and light-headedness.     Objective:     Vital Signs (Most Recent):  Temp: 98.5 °F (36.9 °C) (04/02/24 0744)  Pulse: 99 (04/02/24 1100)  Resp: 18 (04/02/24 0802)  BP: 119/80 (04/02/24 0744)  SpO2: 96 % (04/02/24 1100) Vital Signs (24h Range):  Temp:  [97.6 °F (36.4 °C)-99.5 °F (37.5 °C)] 98.5 °F (36.9 °C)  Pulse:  [] 99  Resp:  [15-18] 18  SpO2:  [93 %-100 %] 96 %  BP: (103-124)/(59-90) 119/80     Weight: 81.6 kg (180 lb)  Body mass index is 24.41 kg/m².    Intake/Output Summary (Last 24 hours) at 4/2/2024 1113  Last data filed at 4/2/2024 0553  Gross per 24 hour   Intake 200 ml   Output 550 ml   Net -350 ml            Physical Exam  Constitutional:       General: He is not in acute distress.  HENT:      Head: Normocephalic.      Right Ear: External ear normal.      Left Ear: External ear normal.      Nose: Nose normal.      Comments: Nasal cannula  Cardiovascular:      Rate and Rhythm: Normal rate.   Pulmonary:      Breath sounds: Rhonchi  present.   Abdominal:      Palpations: Abdomen is soft.      Tenderness: There is no abdominal tenderness.   Musculoskeletal:      Right lower leg: No edema.      Left lower leg: No edema.   Skin:     General: Skin is warm.   Neurological:      General: No focal deficit present.      Mental Status: He is alert and oriented to person, place, and time.   Psychiatric:         Mood and Affect: Mood normal.         Significant Labs: All pertinent labs within the past 24 hours have been  reviewed.

## 2024-04-02 NOTE — ASSESSMENT & PLAN NOTE
History of alcohol use and reported varices with banding.  GI consulted.  We will continue thiamine and folic acid.  No history of alcohol withdrawal symptoms.  Chronic bi/pancytopenia

## 2024-04-03 PROBLEM — R53.81 DEBILITY: Status: ACTIVE | Noted: 2024-04-03

## 2024-04-03 PROBLEM — J44.1 COPD EXACERBATION: Status: ACTIVE | Noted: 2024-04-03

## 2024-04-03 PROBLEM — J96.01 ACUTE HYPOXEMIC RESPIRATORY FAILURE: Status: RESOLVED | Noted: 2024-03-28 | Resolved: 2024-04-03

## 2024-04-03 LAB
ALBUMIN SERPL BCP-MCNC: 3.1 G/DL (ref 3.5–5.2)
ALP SERPL-CCNC: 61 U/L (ref 55–135)
ALT SERPL W/O P-5'-P-CCNC: 16 U/L (ref 10–44)
ANION GAP SERPL CALC-SCNC: 9 MMOL/L (ref 8–16)
ANISOCYTOSIS BLD QL SMEAR: SLIGHT
AST SERPL-CCNC: 20 U/L (ref 10–40)
BASOPHILS # BLD AUTO: 0 K/UL (ref 0–0.2)
BASOPHILS # BLD AUTO: 0.01 K/UL (ref 0–0.2)
BASOPHILS NFR BLD: 0 % (ref 0–1.9)
BASOPHILS NFR BLD: 0.2 % (ref 0–1.9)
BILIRUB SERPL-MCNC: 0.8 MG/DL (ref 0.1–1)
BUN SERPL-MCNC: 23 MG/DL (ref 8–23)
CALCIUM SERPL-MCNC: 8.8 MG/DL (ref 8.7–10.5)
CHLORIDE SERPL-SCNC: 105 MMOL/L (ref 95–110)
CO2 SERPL-SCNC: 22 MMOL/L (ref 23–29)
CREAT SERPL-MCNC: 1.3 MG/DL (ref 0.5–1.4)
DIFFERENTIAL METHOD BLD: ABNORMAL
EOSINOPHIL # BLD AUTO: 0 K/UL (ref 0–0.5)
EOSINOPHIL NFR BLD: 0 % (ref 0–8)
EOSINOPHIL NFR BLD: 0.2 % (ref 0–8)
ERYTHROCYTE [DISTWIDTH] IN BLOOD BY AUTOMATED COUNT: 22.8 % (ref 11.5–14.5)
ERYTHROCYTE [DISTWIDTH] IN BLOOD BY AUTOMATED COUNT: 23.3 % (ref 11.5–14.5)
ERYTHROCYTE [DISTWIDTH] IN BLOOD BY AUTOMATED COUNT: 23.4 % (ref 11.5–14.5)
ERYTHROCYTE [DISTWIDTH] IN BLOOD BY AUTOMATED COUNT: 23.4 % (ref 11.5–14.5)
EST. GFR  (NO RACE VARIABLE): 59.5 ML/MIN/1.73 M^2
GLUCOSE SERPL-MCNC: 103 MG/DL (ref 70–110)
HCT VFR BLD AUTO: 27.1 % (ref 40–54)
HCT VFR BLD AUTO: 27.8 % (ref 40–54)
HCT VFR BLD AUTO: 28.4 % (ref 40–54)
HCT VFR BLD AUTO: 30.5 % (ref 40–54)
HGB BLD-MCNC: 8.2 G/DL (ref 14–18)
HGB BLD-MCNC: 8.3 G/DL (ref 14–18)
HGB BLD-MCNC: 8.4 G/DL (ref 14–18)
HGB BLD-MCNC: 8.8 G/DL (ref 14–18)
IMM GRANULOCYTES # BLD AUTO: 0.01 K/UL (ref 0–0.04)
IMM GRANULOCYTES # BLD AUTO: 0.02 K/UL (ref 0–0.04)
IMM GRANULOCYTES # BLD AUTO: 0.04 K/UL (ref 0–0.04)
IMM GRANULOCYTES # BLD AUTO: 0.06 K/UL (ref 0–0.04)
IMM GRANULOCYTES NFR BLD AUTO: 0.2 % (ref 0–0.5)
IMM GRANULOCYTES NFR BLD AUTO: 0.6 % (ref 0–0.5)
IMM GRANULOCYTES NFR BLD AUTO: 0.9 % (ref 0–0.5)
IMM GRANULOCYTES NFR BLD AUTO: 1.3 % (ref 0–0.5)
LYMPHOCYTES # BLD AUTO: 0.3 K/UL (ref 1–4.8)
LYMPHOCYTES # BLD AUTO: 0.4 K/UL (ref 1–4.8)
LYMPHOCYTES # BLD AUTO: 0.5 K/UL (ref 1–4.8)
LYMPHOCYTES # BLD AUTO: 0.9 K/UL (ref 1–4.8)
LYMPHOCYTES NFR BLD: 10.3 % (ref 18–48)
LYMPHOCYTES NFR BLD: 18.5 % (ref 18–48)
LYMPHOCYTES NFR BLD: 8.8 % (ref 18–48)
LYMPHOCYTES NFR BLD: 9.4 % (ref 18–48)
MAGNESIUM SERPL-MCNC: 1.7 MG/DL (ref 1.6–2.6)
MCH RBC QN AUTO: 24.3 PG (ref 27–31)
MCH RBC QN AUTO: 24.4 PG (ref 27–31)
MCH RBC QN AUTO: 24.5 PG (ref 27–31)
MCH RBC QN AUTO: 24.8 PG (ref 27–31)
MCHC RBC AUTO-ENTMCNC: 28.9 G/DL (ref 32–36)
MCHC RBC AUTO-ENTMCNC: 29.6 G/DL (ref 32–36)
MCHC RBC AUTO-ENTMCNC: 29.9 G/DL (ref 32–36)
MCHC RBC AUTO-ENTMCNC: 30.3 G/DL (ref 32–36)
MCV RBC AUTO: 81 FL (ref 82–98)
MCV RBC AUTO: 82 FL (ref 82–98)
MCV RBC AUTO: 83 FL (ref 82–98)
MCV RBC AUTO: 85 FL (ref 82–98)
MONOCYTES # BLD AUTO: 0.1 K/UL (ref 0.3–1)
MONOCYTES # BLD AUTO: 0.2 K/UL (ref 0.3–1)
MONOCYTES # BLD AUTO: 0.3 K/UL (ref 0.3–1)
MONOCYTES # BLD AUTO: 0.3 K/UL (ref 0.3–1)
MONOCYTES NFR BLD: 3.7 % (ref 4–15)
MONOCYTES NFR BLD: 4.2 % (ref 4–15)
MONOCYTES NFR BLD: 5.9 % (ref 4–15)
MONOCYTES NFR BLD: 7 % (ref 4–15)
NEUTROPHILS # BLD AUTO: 3 K/UL (ref 1.8–7.7)
NEUTROPHILS # BLD AUTO: 3.4 K/UL (ref 1.8–7.7)
NEUTROPHILS # BLD AUTO: 3.9 K/UL (ref 1.8–7.7)
NEUTROPHILS # BLD AUTO: 4.1 K/UL (ref 1.8–7.7)
NEUTROPHILS NFR BLD: 73 % (ref 38–73)
NEUTROPHILS NFR BLD: 83.6 % (ref 38–73)
NEUTROPHILS NFR BLD: 85.9 % (ref 38–73)
NEUTROPHILS NFR BLD: 86.3 % (ref 38–73)
NRBC BLD-RTO: 0 /100 WBC
OVALOCYTES BLD QL SMEAR: ABNORMAL
PHOSPHATE SERPL-MCNC: 2.8 MG/DL (ref 2.7–4.5)
PLATELET # BLD AUTO: 116 K/UL (ref 150–450)
PLATELET # BLD AUTO: 118 K/UL (ref 150–450)
PLATELET # BLD AUTO: 136 K/UL (ref 150–450)
PLATELET # BLD AUTO: 155 K/UL (ref 150–450)
PMV BLD AUTO: 10.3 FL (ref 9.2–12.9)
PMV BLD AUTO: 10.8 FL (ref 9.2–12.9)
PMV BLD AUTO: 10.8 FL (ref 9.2–12.9)
PMV BLD AUTO: 11.2 FL (ref 9.2–12.9)
POIKILOCYTOSIS BLD QL SMEAR: SLIGHT
POLYCHROMASIA BLD QL SMEAR: ABNORMAL
POTASSIUM SERPL-SCNC: 3.9 MMOL/L (ref 3.5–5.1)
PROT SERPL-MCNC: 6.7 G/DL (ref 6–8.4)
RBC # BLD AUTO: 3.3 M/UL (ref 4.6–6.2)
RBC # BLD AUTO: 3.42 M/UL (ref 4.6–6.2)
RBC # BLD AUTO: 3.43 M/UL (ref 4.6–6.2)
RBC # BLD AUTO: 3.61 M/UL (ref 4.6–6.2)
SARS-COV-2 RNA RESP QL NAA+PROBE: NOT DETECTED
SODIUM SERPL-SCNC: 136 MMOL/L (ref 136–145)
WBC # BLD AUTO: 3.5 K/UL (ref 3.9–12.7)
WBC # BLD AUTO: 4.55 K/UL (ref 3.9–12.7)
WBC # BLD AUTO: 4.59 K/UL (ref 3.9–12.7)
WBC # BLD AUTO: 4.94 K/UL (ref 3.9–12.7)

## 2024-04-03 PROCEDURE — 63600175 PHARM REV CODE 636 W HCPCS: Performed by: INTERNAL MEDICINE

## 2024-04-03 PROCEDURE — 83735 ASSAY OF MAGNESIUM: CPT | Performed by: STUDENT IN AN ORGANIZED HEALTH CARE EDUCATION/TRAINING PROGRAM

## 2024-04-03 PROCEDURE — 84100 ASSAY OF PHOSPHORUS: CPT | Performed by: STUDENT IN AN ORGANIZED HEALTH CARE EDUCATION/TRAINING PROGRAM

## 2024-04-03 PROCEDURE — 36415 COLL VENOUS BLD VENIPUNCTURE: CPT | Mod: XB | Performed by: STUDENT IN AN ORGANIZED HEALTH CARE EDUCATION/TRAINING PROGRAM

## 2024-04-03 PROCEDURE — 25000242 PHARM REV CODE 250 ALT 637 W/ HCPCS: Performed by: INTERNAL MEDICINE

## 2024-04-03 PROCEDURE — 94640 AIRWAY INHALATION TREATMENT: CPT

## 2024-04-03 PROCEDURE — 85025 COMPLETE CBC W/AUTO DIFF WBC: CPT | Performed by: STUDENT IN AN ORGANIZED HEALTH CARE EDUCATION/TRAINING PROGRAM

## 2024-04-03 PROCEDURE — 27000221 HC OXYGEN, UP TO 24 HOURS

## 2024-04-03 PROCEDURE — 87635 SARS-COV-2 COVID-19 AMP PRB: CPT | Performed by: INTERNAL MEDICINE

## 2024-04-03 PROCEDURE — 97530 THERAPEUTIC ACTIVITIES: CPT | Mod: CO

## 2024-04-03 PROCEDURE — 20600001 HC STEP DOWN PRIVATE ROOM

## 2024-04-03 PROCEDURE — 99900035 HC TECH TIME PER 15 MIN (STAT)

## 2024-04-03 PROCEDURE — 80053 COMPREHEN METABOLIC PANEL: CPT | Performed by: STUDENT IN AN ORGANIZED HEALTH CARE EDUCATION/TRAINING PROGRAM

## 2024-04-03 PROCEDURE — 94761 N-INVAS EAR/PLS OXIMETRY MLT: CPT

## 2024-04-03 PROCEDURE — 25000003 PHARM REV CODE 250: Performed by: INTERNAL MEDICINE

## 2024-04-03 PROCEDURE — 25000003 PHARM REV CODE 250: Performed by: HOSPITALIST

## 2024-04-03 PROCEDURE — 97530 THERAPEUTIC ACTIVITIES: CPT

## 2024-04-03 PROCEDURE — 97161 PT EVAL LOW COMPLEX 20 MIN: CPT

## 2024-04-03 RX ADMIN — FLUTICASONE FUROATE AND VILANTEROL TRIFENATATE 1 PUFF: 100; 25 POWDER RESPIRATORY (INHALATION) at 08:04

## 2024-04-03 RX ADMIN — GUAIFENESIN 200 MG: 200 SOLUTION ORAL at 01:04

## 2024-04-03 RX ADMIN — GUAIFENESIN 200 MG: 200 SOLUTION ORAL at 09:04

## 2024-04-03 RX ADMIN — GUAIFENESIN 200 MG: 200 SOLUTION ORAL at 10:04

## 2024-04-03 RX ADMIN — FOLIC ACID 1 MG: 1 TABLET ORAL at 09:04

## 2024-04-03 RX ADMIN — Medication 100 MG: at 09:04

## 2024-04-03 RX ADMIN — DICYCLOMINE HYDROCHLORIDE 10 MG: 10 CAPSULE ORAL at 10:04

## 2024-04-03 RX ADMIN — PREDNISONE 40 MG: 20 TABLET ORAL at 09:04

## 2024-04-03 RX ADMIN — PANTOPRAZOLE SODIUM 40 MG: 40 TABLET, DELAYED RELEASE ORAL at 09:04

## 2024-04-03 RX ADMIN — GUAIFENESIN 200 MG: 200 SOLUTION ORAL at 06:04

## 2024-04-03 RX ADMIN — IPRATROPIUM BROMIDE AND ALBUTEROL SULFATE 3 ML: 2.5; .5 SOLUTION RESPIRATORY (INHALATION) at 09:04

## 2024-04-03 RX ADMIN — GUAIFENESIN 200 MG: 200 SOLUTION ORAL at 05:04

## 2024-04-03 RX ADMIN — IPRATROPIUM BROMIDE AND ALBUTEROL SULFATE 3 ML: 2.5; .5 SOLUTION RESPIRATORY (INHALATION) at 08:04

## 2024-04-03 RX ADMIN — IPRATROPIUM BROMIDE AND ALBUTEROL SULFATE 3 ML: 2.5; .5 SOLUTION RESPIRATORY (INHALATION) at 01:04

## 2024-04-03 RX ADMIN — IPRATROPIUM BROMIDE AND ALBUTEROL SULFATE 3 ML: 2.5; .5 SOLUTION RESPIRATORY (INHALATION) at 04:04

## 2024-04-03 NOTE — ASSESSMENT & PLAN NOTE
Noted on chest x-ray and Accounting for hypoxia.  We will do cough expectorant and nebs for pulmonary toileting.  Incentive spirometry.  Respiratory panel.  Continue with azithromycin and ceftriaxone.  Sputum culture.  Oxygen as needed  3/30  Continue with antibiotics and supportive care  4/1  Patient reports significant smoking history, still has significant rhonchi on examination though reports symptoms have improved, concerns for possible COPD, outpatient pulmonary referral.  We will give a trial of prednisone and start inhaler.  Nicotine patch as needed   4/2 continue with steroids and nebulizer.  Outpatient pulmonary referral.  We will do 1 more day of antibiotics  4/3 improving, currently on room air.  We will continue with steroids and nebulization.  PT OT recommends moderate intensity.

## 2024-04-03 NOTE — PT/OT/SLP EVAL
Physical Therapy Evaluation and Treatment    Patient Name:  Fransico Montalvo   MRN:  [id number]  Admit Date: 3/28/2024  Admitting Diagnosis:  Pneumonia   Length of Stay: 6 days  Recent Surgery: Procedure(s) (LRB):  COLONOSCOPY (N/A) 2 Days Post-Op    Recommendations:     Discharge Recommendations: Moderate Intensity Therapy  Discharge Equipment Recommendations: walker, rolling   Barriers to discharge: Inaccessible home and Decreased caregiver support    Appropriate transfer level with nursing staff: bed <> chair and inroom ambulation with assist x 1    Plan:     During this hospitalization, patient to be seen 4 x/week to address the identified rehab impairments via gait training, therapeutic activities, therapeutic exercises, neuromuscular re-education and progress towards the established goals.  Plan of Care Expires:  05/03/24  Plan of Care Reviewed with: patient    Assessment     Fransico Montalvo is a 68 y.o. male admitted with a medical diagnosis of Pneumonia. Pt tolerated evaluation well today. He presented to [facility] on 3/28 with c/o SOB and found to have pna. He presents upon eval with good functional strength, however Rt with some weakness due to previous fx of Rt hip. He was able to complete all mobility without assist on this date but was primarily limited by very poor activity tolerance. Pt with SOB at rest which worsens in intensity with mobility and requires pt to take frequent seated rest breaks to catch breath. His SPO2 ranged from 98%-99% both with activity at at rest. He was only able to perform very short bouts of ambulation and activity on this date due to SOB as well as poor endurance of LE musculature. Pt will continue to benefit from skilled PT services during this hospital admit to continue to improve transfer ability and efficiency as well as continue to progress pt's ambulation distance and cardiopulmonary endurance to maximize pt's functional independence and return to PLOF. Patient currently demonstrates a  need for moderate intensity therapy on a daily basis post acute secondary to a decline in functional status due to illness    Problem List: impaired endurance, weakness, impaired self care skills, impaired functional mobility, gait instability, impaired balance, decreased lower extremity function, decreased safety awareness, pain, impaired cardiopulmonary response to activity.  Rehab Prognosis: Good; patient would benefit from acute skilled PT services to address these deficits and reach maximum level of function.      Goals:   Multidisciplinary Problems       Physical Therapy Goals          Problem: Physical Therapy    Goal Priority Disciplines Outcome Goal Variances Interventions   Physical Therapy Goal     PT, PT/OT Ongoing, Progressing     Description: Goals to be met by: 2024     Patient will increase functional independence with mobility by performin. Sit to stand transfer with Supervision  2. Gait  x 100 feet with Supervision using RW or LRAD with standing rest breaks prn.   3. Ascend/descend 4 stair with left Handrails Stand-by Assistance using LRAD.   4. Stand for 3 minutes with Supervision using LRAD while performing dynamic task with no worsening SOB  5. Lower extremity exercise program x30 reps per handout, with independence                         Subjective     No family/friends present upon PT entrance into room. Patient agreeable to PT evaluation.    Chief Complaint: Shortness of Breath (x4 days, been laying on floor for past 4 days, pt saturated in urine and diarrhea)  Patient/Family Comments/goals: pt worried about caring for himself at home  Pain/Comfort:  Pain Rating 1: 0/10  Pain Rating Post-Intervention 1: 0/10    Social History:  Residence: Patient lives in a group home in a single story house with number of outside stair(s): 4 with LHR . Pt's bathroom has a tub/shower combo.  Equipment Owned (not using): wheelchair, cane, straight, bath bench, rollator  Equipment Used: single  point cane, bath bench, and wheelchair  Prior level of function:  Prior to admission, patient was modified independent with use of SPC. Reports multiple falls in the last 3 months and reports most recent lead to hospitalization. Reports most recent fall caused by increased SOB and dizziness  Work: not working.   Assistance Upon Discharge: unknown    Objective:     Additional staff present: N/A    Patient found HOB elevated with: pulse ox (continuous), telemetry, oxygen     General Precautions: Standard, Cardiac fall   Orthopedic Precautions:N/A   Braces: N/A   Body mass index is 24.41 kg/m².  Oxygen Device: Nasal Cannula 2L  Vitals: /83   Pulse 100   Temp 98.2 °F (36.8 °C) (Oral)   Resp 19   Ht 6' (1.829 m)   Wt 81.6 kg (180 lb)   SpO2 96%   BMI 24.41 kg/m²     Exams:  Cognition:   Alert and Cooperative   Patient is oriented to Person, Place, Time, Situation  Command following: Follows multistep verbal commands  Fluency: clear/fluent  Hearing: Intact  Vision:  Intact  Skin Integrity: Visible skin intact  Postural Assessment: slouched posture and rounded shoulders  Physical Exam:    Left UE Left LE Right UE Right LE   Edema absent absent absent absent   ROM AROM WFL AROM WFL AROM WFL AROM WFL   Strength adequate ROM, adequate strength 4+/5 adequate ROM, adequate strength 4-/5   Sensation intact to light touch intact to light touch intact to light touch intact to light touch   Coordination normal normal normal normal     Outcome Measures:  AM-PAC 6 CLICK MOBILITY  Turning over in bed (including adjusting bedclothes, sheets and blankets)?: 3  Sitting down on and standing up from a chair with arms (e.g., wheelchair, bedside commode, etc.): 3  Moving from lying on back to sitting on the side of the bed?: 3  Moving to and from a bed to a chair (including a wheelchair)?: 3  Need to walk in hospital room?: 3  Climbing 3-5 steps with a railing?: 2  Basic Mobility Total Score: 17     Functional Mobility:    Bed  Mobility:   Supine to Sit: stand by assistance with HOB elevated; to Rt side of bed  Scooting anteriorly to EOB to have both feet planted on floor: stand by assistance   Sit to Supine: stand by assistance  ; to Rt side of bed    Sitting Balance at Edge of Bed:  Static Sitting Balance: Good- : able to accept minimal resistance  Dynamic Sitting Balance: Good- : able to sit unsupported and weight shift across midline minimally  Assistance Level Required: Stand-by Assistance  Time: ~5 minutes  Postural deviations noted: slouched posture and rounded shoulders    Transfers:   Sit <> Stand Transfer: contact guard assistance with rolling walker. [id number] from EOB    Standing Balance:  Static Standing Balance: Fair : able to stand unsupported without UE support and without LOB for 1 minute  Dynamic Standing Balance: Fair : stand independently unsupported, weight shift, and reach ipsilaterally. LOB noted when crossing midline.  Assistance Level Required: Contact Guard Assistance  Patient used: rolling walker       Gait:   Patient ambulated: 24 ft + seated break + 24 ft   Patient required: contact guard assistance  Patient used:  rolling walker   Gait Pattern observed: reciprocal gait  Gait Deviation(s): decreased step length, narrow base of support, decreased weight shift, flexed posture, decreased ruth, and shuffle gait  Impairments due to: impaired balance, decreased strength, and decreased endurance  Portable Supplemental O2 2L utilized  all lines remained intact throughout ambulation trial  Comments: Patient required cues for stride length, width of base of support, and safety with RW  to increase independence and safety. Patient required cues ~ 75% of the time. Max vc's for appropriate RW use especially with turns. Much improved safety after educated on RW use    Education:  Time provided for education, counseling and discussion of health disposition in regards to patient's current status  All questions answered  within PT scope of practice and to patient's satisfaction  PT role in POC to address current functional deficits  Pt educated on proper body mechanics, safety techniques, and energy conservation with PT facilitation and cueing throughout session  Call nursing/pct to transfer to chair/use bathroom. Pt stated understanding.  Whiteboard updated with therapist name and pt's current mobility status documented above  Safe to perform step transfer to/from chair/bedside commode CGA and RW w/ nursing/PCT present  Importance of OOB tolerance prn hrs/day to improve lung ventilation and expansion as well as strengthen postural musculature    Patient left HOB elevated with all lines intact and call button in reach.    History:     Past Medical History:   Diagnosis Date    Alcohol abuse     BPH (benign prostatic hyperplasia)     Cirrhosis 06/21/2018    pt states that he was diagnosed a week ago during his last hospital visit    Closed fracture of left distal radius 7/19/2023    Gastritis     Hypertension     Renal disorder        Past Surgical History:   Procedure Laterality Date    CAPSULOTOMY OF JOINT Right 7/29/2019    Procedure: CAPSULOTOMY, JOINT;  Surgeon: Raj Grossman MD;  Location: [facility] OR [address] [place] [address];  Service: Orthopedics;  Laterality: Right;    COLONOSCOPY N/A 4/1/2024    Procedure: COLONOSCOPY;  Surgeon: Scarlett Whitt MD;  Location: [facility] ([address] [place] [address]);  Service: Gastroenterology;  Laterality: N/A;    ESOPHAGOGASTRODUODENOSCOPY N/A 10/25/2023    Procedure: EGD (ESOPHAGOGASTRODUODENOSCOPY);  Surgeon: Lux Rome MD;  Location: [address]);  Service: Endoscopy;  Laterality: N/A;    PERCUTANEOUS PINNING OF HIP Right 7/29/2019    Procedure: PINNING, HIP, [facility]CUTANEOUS- [facility]-;  Surgeon: Raj Grossman MD;  Location: [facility] OR [address] [place] [address];  Service: Orthopedics;  Laterality: Right;       Family History   Problem Relation Age of Onset    Hypertension Father      Heart disease Father     Heart disease Brother        Social History     Socioeconomic History    Marital status: Single   Tobacco Use    Smoking status: Every Day     Current packs/day: 1.00     Types: Cigarettes    Smokeless tobacco: Never   Substance and Sexual Activity    Alcohol use: Yes     Comment: (former drinker; 2months sober) this visit admits drinking a couple beers today    Drug use: No     Social Determinants of Health     Financial Resource Strain: Low Risk  (3/30/2024)    Overall Financial Resource Strain (CARDIA)     Difficulty of Paying Living Expenses: Not hard at all   Food Insecurity: No Food Insecurity (3/30/2024)    Hunger Vital Sign     Worried About Running Out of Food in the Last Year: Never true     Ran Out of Food in the Last Year: Never true   Transportation Needs: No Transportation Needs (3/30/2024)    PRAPARE - Transportation     Lack of Transportation (Medical): No     Lack of Transportation (Non-Medical): No   Physical Activity: Insufficiently Active (3/30/2024)    Exercise Vital Sign     Days of Exercise per Week: 4 days     Minutes of Exercise per Session: 20 min   Stress: Stress Concern Present (3/30/2024)    [employer] [place] [employer] - Occupational Stress Questionnaire     Feeling of Stress : To some extent   Social Connections: Socially Isolated (3/30/2024)    Social Connection and Isolation Panel [NHANES]     Frequency of Communication with Friends and Family: Once a week     Frequency of Social Gatherings with Friends and Family: Once a week     Attends Caodaism Services: Never     Active Member of Clubs or Organizations: No     Attends Club or Organization Meetings: Never     Marital Status: Never    Housing Stability: Low Risk  (3/30/2024)    Housing Stability Vital Sign     Unable to Pay for Housing in the Last Year: No     Number of Places Lived in the Last Year: 1     Unstable Housing in the Last Year: No       Time Tracking:     PT Received On:  04/03/24  PT Start Time: 1451     PT Stop Time: 1512  PT Total Time (min): 21 min     Billable Minutes: Evaluation 10 mintues and Therapeutic Activity 11 minutes    4/3/2024 \

## 2024-04-03 NOTE — ASSESSMENT & PLAN NOTE
Acute blood loss anemia  Current CBC reviewed-   Lab Results   Component Value Date    HGB 8.3 (L) 04/03/2024    HCT 27.8 (L) 04/03/2024     Monitor serial CBC and transfuse if patient becomes hemodynamically unstable, symptomatic or H/H drops below 7/21.    3/29  Concerns for GI bleed.  Continue with Protonix 40 mg b.i.d..  Follow up with GI recommendations. Continue  to monitor hemoglobin  3/30  We will transfuse a unit of packed red cells today.  GI plans to scope on Monday.  Continue with Protonix.  Continue to monitor hemoglobin  3/31  GI notified of melena, hemoglobin stable, continue to monitor.  Continue with Protonix, follow up with GI  4/1  For scoping today.  Follow up with GI   4/2  Status post colonoscopy, GI signed off.  We will switch to home oral Protonix.  Patient experiences symptoms of anemia, previous hemoglobin around 8/9.  We will transfuse a unit of PRBC  4/3  Status post blood transfusion(1 unit) yesterday.  Asymptomatic of anemia at this time.

## 2024-04-03 NOTE — SUBJECTIVE & OBJECTIVE
Interval History:  Patient reports shortness of breath and cough has improved.  Doing well on room air this morning.  Received blood transfusion yesterday, denies any symptoms of anemia at this time    Review of Systems   Respiratory:  Positive for cough, shortness of breath and wheezing.    Gastrointestinal:  Negative for blood in stool and vomiting.   Neurological:  Negative for weakness.     Objective:     Vital Signs (Most Recent):  Temp: 98.1 °F (36.7 °C) (04/03/24 0807)  Pulse: 87 (04/03/24 0824)  Resp: 19 (04/03/24 0824)  BP: 131/84 (04/03/24 0807)  SpO2: 96 % (04/03/24 0825) Vital Signs (24h Range):  Temp:  [98 °F (36.7 °C)-99.4 °F (37.4 °C)] 98.1 °F (36.7 °C)  Pulse:  [] 87  Resp:  [16-20] 19  SpO2:  [95 %-100 %] 96 %  BP: ()/(64-84) 131/84     Weight: 81.6 kg (180 lb)  Body mass index is 24.41 kg/m².    Intake/Output Summary (Last 24 hours) at 4/3/2024 0935  Last data filed at 4/2/2024 1855  Gross per 24 hour   Intake 1013.2 ml   Output 450 ml   Net 563.2 ml           Physical Exam  Constitutional:       General: He is not in acute distress.  HENT:      Head: Normocephalic.      Right Ear: External ear normal.      Left Ear: External ear normal.      Nose: Nose normal.   Cardiovascular:      Rate and Rhythm: Normal rate.   Pulmonary:      Breath sounds: Rhonchi  present. Improved   Abdominal:      Palpations: Abdomen is soft.      Tenderness: There is no abdominal tenderness.   Musculoskeletal:      Right lower leg: No edema.      Left lower leg: No edema.   Skin:     General: Skin is warm.   Neurological:      General: No focal deficit present.      Mental Status: He is alert and oriented to person, place, and time.   Psychiatric:         Mood and Affect: Mood normal.             Significant Labs: All pertinent labs within the past 24 hours have been reviewed.

## 2024-04-03 NOTE — NURSING
Pt AAO x 4, c/o abdominal cramping this evening. PRN medication given. L hand IV intact and flushes well. Pt received 1 U PRBC today. Repeat CBC done. Pt worked with PT/OT. Pt is dypsneic on exertion. BSC in the room. No acute distress noted. Bed low and locked, call light in reach.

## 2024-04-03 NOTE — PLAN OF CARE
Discharge Plan A and Plan B have been determined by review of patient's clinical status, future medical and therapeutic needs, and coverage/benefits for post-acute care in coordination with multidisciplinary team members.     04/03/24 1313   Post-Acute Status   Post-Acute Authorization Placement   Post-Acute Placement Status Set-up Complete/Auth obtained  (SNF auth A [id number])   HME Status Pending Qualifying Diagnosis  (rolling walker)   Home Health Status Set-up Complete/Auth obtained  ([facility])   Coverage [facility] MGD [facility]ARE [facility] SECURE SNP -   Discharge Delays (!) Procedure Scheduling (IR, OR, Labs, Echo, Cath, Echo, EEG)  (monitoring labs: H/h)   Discharge Plan   [facility] [phone][facility]   Discharge Plan B Home Health  ([facility] [place] [facility]))     CM met with patient to discuss any changes in discharge planning.  Patients plan is to dc to SNF and then HH after SNF.     ARTURO: 4/4/24    Discharge Recommendations:  moderate intensity therapy    1245 pm  CM spoke with patient has decided to go to SNF facility. Patient explained that is is deconditioned and will require more therapy services to increase endurance and stabilize his gait.  Patient is at a high risk for falls.      1:00  pm  CM spoke with Malik at [facility] O: [phone] and authorization has been approved for SNF. Auth # [id number]    1:15 pm  DEBBIE spoke with Mary at [facility] [phone] and updated that patient has been approved for SNF and authorization has been faxed and received per Mary FLYNN confirmation.  DEBBIE updated Mary that a letter in reference to the open MSP claim, has been faxed to Coordination of Benefits & Recovery to close the MSP claim    4:21 pm   COVID ordered today for admission to [facility] [phone] and CM confirmed with Mary FLYNN that the patient does not need a ppd placement if there is a recent  CXR.  CXR uploaded in [facility] from 3/28/24.          Sabine Oreilly RN  Case Management  [facility]  [phone]

## 2024-04-03 NOTE — PLAN OF CARE
Problem: Adult Inpatient Plan of Care  Goal: Plan of Care Review  Outcome: Ongoing, Progressing  Goal: Patient-Specific Goal (Individualized)  Outcome: Ongoing, Progressing  Goal: Absence of Hospital-Acquired Illness or Injury  Outcome: Ongoing, Progressing  Goal: Optimal Comfort and Wellbeing  Outcome: Ongoing, Progressing  Goal: Readiness for Transition of Care  Outcome: Ongoing, Progressing     Problem: Fluid Imbalance (Pneumonia)  Goal: Fluid Balance  Outcome: Ongoing, Progressing     Problem: Infection (Pneumonia)  Goal: Resolution of Infection Signs and Symptoms  Outcome: Ongoing, Progressing     Problem: Respiratory Compromise (Pneumonia)  Goal: Effective Oxygenation and Ventilation  Outcome: Ongoing, Progressing     Problem: Skin Injury Risk Increased  Goal: Skin Health and Integrity  Outcome: Ongoing, Progressing     Problem: Impaired Wound Healing  Goal: Optimal Wound Healing  Outcome: Ongoing, Progressing     Problem: Fall Injury Risk  Goal: Absence of Fall and Fall-Related Injury  Outcome: Ongoing, Progressing   NO complaints or request, no injuries this shift, rested well throught the night. Will continue to monitor

## 2024-04-03 NOTE — PLAN OF CARE
Post-Acute Therapy Recommendation: moderate intensity     Evaluation completed today. PT goals appropriate.    Please continue Progressive Mobility Protocol as appropriate.    Appropriate transfer level with nursing staff: inroom ambulation with CGA and RW    4/3/2024    Problem: Physical Therapy  Goal: Physical Therapy Goal  Description: Goals to be met by: 2024     Patient will increase functional independence with mobility by performin. Sit to stand transfer with Supervision  2. Gait  x 100 feet with Supervision using RW or LRAD with standing rest breaks prn.   3. Ascend/descend 4 stair with left Handrails Stand-by Assistance using LRAD.   4. Stand for 3 minutes with Supervision using LRAD while performing dynamic task with no worsening SOB  5. Lower extremity exercise program x30 reps per handout, with independence    Outcome: Ongoing, Progressing

## 2024-04-03 NOTE — PLAN OF CARE
Discharge Plan A and Plan B have been determined by review of patient's clinical status, future medical and therapeutic needs, and coverage/benefits for post-acute care in coordination with multidisciplinary team members.      04/03/24 1130   Post-Acute Status   Post-Acute Authorization Home Health;Placement   Post-Acute Placement Status Patient declined/refused  (SNF)   HME Status Pending Qualifying Diagnosis  (rolling walker)   Home Health Status Referrals Sent  (Home health)   Coverage [facility] Resources/Appts/Education Provided Appointments scheduled and added to AVS   Discharge Delays None known at this time   Discharge Plan   Discharge Plan A Home Health   Discharge Plan B Group home     Met with patient  to review discharge recommendation of  home health  and is agreeable to plan    Patient/family provided list of facilities in-network with patient's payor plan. Providers that are owned, operated, or affiliated with [facility] are included on the list.     Notified that referral sent to below listed facilities from in-network list based on proximity to home/family support:   [facility] [place] ([facility] Health) Phone: [phone]   Response: Yes, willing to accept patient     Patient/family instructed to identify preference.    Preferred Facility: (if more than 1, listed in order of descending preference)  No verbalized preference     If an additional preferred facility not listed above is identified, additional referral to be sent. If above facilities unable to accept, will send additional referrals to in-network providers.      DEBBIE faxed support documentation for  authorization @ [facility] O: [phone] F: [phone]    Your fax has been successfully sent to [phone] at [phone].  ------------------------------------------------------------  From:  "[id number]  ------------------------------------------------------------  4/3/2024 12:28:55 PM Transmission Record          Sent to [phone] with remote ID "[id number]"          Result: (0/339;0/0) Success          Page record: 1 - 12          Elapsed time: 04:27 on channel 4     Sabine Oreilly RN  Case Management  [facility]  [phone]    "
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
## 2024-04-03 NOTE — PROGRESS NOTES
[facility] - Stepdown Flex ([place] [id number])  [facility] Medicine  Progress Note    Patient Name: Fransico Montalvo  MRN: [id number]  Patient Class: IP- Inpatient   Admission Date: 3/28/2024  Length of Stay: 6 days  Attending Physician: Jake Guzman MD  Primary Care Provider: [facility]        Subjective:     Principal Problem:Pneumonia        HPI:  68 year old gentleman who is a known case of f alcohol abuse, BPH, cirrhosis, hypertension, esophageal varices, gastritis and tobacco abuse presenting with shortness of breath that started about 4 days ago. It was accompanied by a dry cough and dizziness as well as generalized weakness. He reported no fever, sputum, hemoptysis, bleeding from any orifice. He reported no changes in stool color. Patient did not complain of chest pain, seizures, syncope, or motor weakness. He denied a trauma and falls. He noted that he was diagnosed with pneumonia 3 days  ago and was prescribed antibiotics. Patient was afebrile in the ER: BP: 125/80 SpO2: 89% on venturi mask . He was  found to be hypoxemic with end-tidal CO2 of 20 and hypoxemic to 79% requiring immediate  oxygen support at the EMS. His hemoglobin was found to be around 4 and 3 pints of PRBC were  scheduled for transfusion. By the time of our arrival his second bag was flowing.  Patient is to be admitted as a case of CAP, Anemia, and acute hypoxemic respiratory failure.  He received two pints PRBC in the ER, maintaining approproriate saturation of SpO2 with venturi mask on, given a shot of Vancomycin in the Er and placed on CAP coverage antibiotics.      Patient appears to be stable for stepdown to hospital medicine teams.  Maintaining appropriate vitals with minimal support.    Overview/Hospital Course:  Patient was admitted for symptomatic anemia, received blood transfusion.  GI was consulted and patient underwent colonoscopy which was unremarkable.  Reports significant smoking history and was treated for  presumed COPD exacerbation and community-acquired pneumonia. OT recommends moderate intensity therapy    Interval History:  Patient reports shortness of breath and cough has improved.  Doing well on room air this morning.  Received blood transfusion yesterday, denies any symptoms of anemia at this time    Review of Systems   Respiratory:  Positive for cough, shortness of breath and wheezing.    Gastrointestinal:  Negative for blood in stool and vomiting.   Neurological:  Negative for weakness.     Objective:     Vital Signs (Most Recent):  Temp: 98.1 °F (36.7 °C) (04/03/24 0807)  Pulse: 87 (04/03/24 0824)  Resp: 19 (04/03/24 0824)  BP: 131/84 (04/03/24 0807)  SpO2: 96 % (04/03/24 0825) Vital Signs (24h Range):  Temp:  [98 °F (36.7 °C)-99.4 °F (37.4 °C)] 98.1 °F (36.7 °C)  Pulse:  [] 87  Resp:  [16-20] 19  SpO2:  [95 %-100 %] 96 %  BP: ()/(64-84) 131/84     Weight: 81.6 kg (180 lb)  Body mass index is 24.41 kg/m².    Intake/Output Summary (Last 24 hours) at 4/3/2024 0935  Last data filed at 4/2/2024 1855  Gross per 24 hour   Intake 1013.2 ml   Output 450 ml   Net 563.2 ml           Physical Exam  Constitutional:       General: He is not in acute distress.  HENT:      Head: Normocephalic.      Right Ear: External ear normal.      Left Ear: External ear normal.      Nose: Nose normal.   Cardiovascular:      Rate and Rhythm: Normal rate.   Pulmonary:      Breath sounds: Rhonchi  present. Improved   Abdominal:      Palpations: Abdomen is soft.      Tenderness: There is no abdominal tenderness.   Musculoskeletal:      Right lower leg: No edema.      Left lower leg: No edema.   Skin:     General: Skin is warm.   Neurological:      General: No focal deficit present.      Mental Status: He is alert and oriented to person, place, and time.   Psychiatric:         Mood and Affect: Mood normal.             Significant Labs: All pertinent labs within the past 24 hours have been reviewed.        Assessment/Plan:      *  Pneumonia  Noted on chest x-ray and Accounting for hypoxia.  We will do cough expectorant and nebs for pulmonary toileting.  Incentive spirometry.  Respiratory panel.  Continue with azithromycin and ceftriaxone.  Sputum culture.  Oxygen as needed  3/30  Continue with antibiotics and supportive care  4/1  Patient reports significant smoking history, still has significant rhonchi on examination though reports symptoms have improved, concerns for possible COPD, outpatient pulmonary referral.  We will give a trial of prednisone and start inhaler.  Nicotine patch as needed   4/2 continue with steroids and nebulizer.  Outpatient pulmonary referral.  We will do 1 more day of antibiotics  4/3 improving, currently on room air.  We will continue with steroids and nebulization.  PT OT recommends moderate intensity.        Anemia  Acute blood loss anemia  Current CBC reviewed-   Lab Results   Component Value Date    HGB 8.3 (L) 04/03/2024    HCT 27.8 (L) 04/03/2024     Monitor serial CBC and transfuse if patient becomes hemodynamically unstable, symptomatic or H/H drops below 7/21.    3/29  Concerns for GI bleed.  Continue with Protonix 40 mg b.i.d..  Follow up with GI recommendations. Continue  to monitor hemoglobin  3/30  We will transfuse a unit of packed red cells today.  GI plans to scope on Monday.  Continue with Protonix.  Continue to monitor hemoglobin  3/31  GI notified of melena, hemoglobin stable, continue to monitor.  Continue with Protonix, follow up with GI  4/1  For scoping today.  Follow up with GI   4/2  Status post colonoscopy, GI signed off.  We will switch to home oral Protonix.  Patient experiences symptoms of anemia, previous hemoglobin around 8/9.  We will transfuse a unit of PRBC  4/3  Status post blood transfusion(1 unit) yesterday.  Asymptomatic of anemia at this time.    Debility  OT recommends moderate intensity therapy.  Follow up with case management     COPD exacerbation  See 1.  Assessment    Cirrhosis  History of alcohol use and reported varices with banding.  GI consulted.  We will continue thiamine and folic acid.  No history of alcohol withdrawal symptoms.  Chronic bi/pancytopenia    GI bleed  See 2. Assessment      CKD Stage 3  Currently at baseline      VTE Risk Mitigation (From admission, onward)           Ordered     Place sequential compression device  Until discontinued         03/28/24 1121     IP VTE LOW RISK PATIENT  Once         03/28/24 1121                    Discharge Planning   ARTURO: 4/4/2024     Code Status: Full Code   Is the patient medically ready for discharge?: Yes    Reason for patient still in hospital (select all that apply): Patient trending condition  Discharge Plan A: Skilled Nursing Facility ([facility] [phone])   Discharge Delays: None known at this time              Jake Guzman MD  Department of Hospital Medicine   [facility] - Stepdown Flex ([place])

## 2024-04-03 NOTE — NURSING
Home Oxygen Evaluation    Date Performed: 4/3/2024    1) Patient's Home O2 Sat on room air, while at rest: 86%        If O2 sats on room air at rest are 88% or below, patient qualifies. No additional testing needed. Document N/A in steps 2 and 3. If 89% or above, complete steps 2.      2) Patient's O2 Sat on room air while exercising: N/A        If O2 sats on room air while exercising remain 89% or above patient does not qualify, no further testing needed Document N/A in step 3. If O2 sats on room air while exercising are 88% or below, continue to step 3.      3) Patient's O2 Sat while exercising on O2: N/A at N/A LPM         (Must show improvement from #2 for patients to qualify)    If O2 sats improve on oxygen, patient qualifies for portable oxygen. If not, the patient does not qualify.

## 2024-04-03 NOTE — PT/OT/SLP PROGRESS
"Occupational Therapy   Treatment    Name: Fransico Montalvo  MRN: [id number]  Admitting Diagnosis:  Pneumonia  2 Days Post-Op    Recommendations:     Discharge Recommendations: Moderate Intensity Therapy  Discharge Equipment Recommendations:  walker, rolling  Barriers to discharge:       Assessment:     Fransico Montalvo is a 68 y.o. male with a medical diagnosis of Pneumonia.  He presents with the following performance deficits affecting function: weakness, impaired endurance, impaired self care skills, impaired functional mobility, gait instability, impaired balance, decreased coordination, decreased lower extremity function, decreased upper extremity function, decreased safety awareness, impaired cardiopulmonary response to activity.     Pt ambulating with RW room without O2. He presents with SOB with short distances. Pt also experiencing slight LOB to R side with ambulation and mentions that he can't push through his L wrist as much on the RW.     Rehab Prognosis:  Good; patient would benefit from acute skilled OT services to address these deficits and reach maximum level of function.       Plan:     Patient to be seen 3 x/week to address the above listed problems via self-care/home management, therapeutic activities, therapeutic exercises, neuromuscular re-education  Plan of Care Expires: 04/16/24  Plan of Care Reviewed with: patient    Subjective     Chief Complaint: SOB  Patient/Family Comments/goals: "I just get light headed"  Pain/Comfort:  Pain Rating 1: 0/10  Pain Rating Post-Intervention 1: 0/10    Objective:     Communicated with: RN prior to session.  Patient found HOB elevated with telemetry, pulse ox (continuous) upon OT entry to room.  A client care conference was completed by the OTR and the BAZAN prior to treatment by the BAZAN to discuss the patient's POC and current status.    General Precautions: Standard, fall    Orthopedic Precautions:N/A  Braces: N/A  Respiratory Status: Room air     Occupational " Performance:     Bed Mobility:    Patient completed Scooting/Bridging with stand by assistance  Patient completed Supine to Sit with stand by assistance     Functional Mobility/Transfers:  Patient completed Sit <> Stand Transfer with contact guard assistance  with  rolling walker   Patient completed Bed <> Chair Transfer using Step Transfer technique with contact guard assistance with rolling walker  Patient completed Toilet Transfer Step Transfer technique with contact guard assistance with  rolling walker  Functional Mobility: pt ambulating ~12ft x 2 trials, seated rest break between trials, with CGA using RW. One LOB to R side experienced with Min A to recover. Pt experiencing SOB with ambulation.     Activities of Daily Living:  Pt deferred      [facility] 6 Click ADL: 17    Treatment & Education:  Pt educated on OT POC and frequency during hospital stay.   Pt educated on proper hand placement and techniques for RW mgmt to improve safety awareness.   Pt educated on importance of OOB activity to improve function and activity tolerance.  Pt educated on pacing techniques to improve safety and endurance.   Addressed all patient questions/concerns within BAZAN scope of practice.     Patient left up in chair with all lines intact, call button in reach, and RN notified    GOALS:   Multidisciplinary Problems       Occupational Therapy Goals          Problem: Occupational Therapy    Goal Priority Disciplines Outcome Interventions   Occupational Therapy Goal     OT, PT/OT Ongoing, Progressing    Description: Goals to be met by: 4/16/24     Patient will increase functional independence with ADLs by performing:    LE Dressing with Supervision.  Grooming while standing at sink with Stand-by Assistance.  Toileting from toilet with Stand-by Assistance for hygiene and clothing management.   Toilet transfer to toilet with Stand-by Assistance.  [place] with BUE HEP to improve activity tolerance to complete ADLs and IADLs  Within  home ambulation distances with  supervision and LRAD necessary to complete ADLs.                           Time Tracking:     OT Date of Treatment: 04/03/24  OT Start Time: 0905  OT Stop Time: 0928  OT Total Time (min): 23 min    Billable Minutes:Therapeutic Activity 23    OT/GRICELDA: GRICELDA     Number of GRICELDA visits since last OT visit: 1    4/3/2024

## 2024-04-04 VITALS
OXYGEN SATURATION: 91 % | HEIGHT: 72 IN | SYSTOLIC BLOOD PRESSURE: 149 MMHG | WEIGHT: 180 LBS | BODY MASS INDEX: 24.38 KG/M2 | HEART RATE: 102 BPM | RESPIRATION RATE: 20 BRPM | DIASTOLIC BLOOD PRESSURE: 95 MMHG | TEMPERATURE: 99 F

## 2024-04-04 PROBLEM — R09.02 HYPOXEMIA: Status: ACTIVE | Noted: 2024-04-04

## 2024-04-04 PROBLEM — R06.02 SOB (SHORTNESS OF BREATH): Status: RESOLVED | Noted: 2024-04-04 | Resolved: 2024-04-04

## 2024-04-04 PROBLEM — J96.01 ACUTE HYPOXEMIC RESPIRATORY FAILURE: Status: ACTIVE | Noted: 2024-04-04

## 2024-04-04 PROBLEM — R06.02 SOB (SHORTNESS OF BREATH): Status: ACTIVE | Noted: 2024-04-04

## 2024-04-04 PROBLEM — D64.9 ANEMIA: Status: ACTIVE | Noted: 2024-04-04

## 2024-04-04 LAB
ALBUMIN SERPL BCP-MCNC: 3 G/DL (ref 3.5–5.2)
ALP SERPL-CCNC: 58 U/L (ref 55–135)
ALT SERPL W/O P-5'-P-CCNC: 24 U/L (ref 10–44)
ANION GAP SERPL CALC-SCNC: 8 MMOL/L (ref 8–16)
AST SERPL-CCNC: 33 U/L (ref 10–40)
BASOPHILS # BLD AUTO: 0 K/UL (ref 0–0.2)
BASOPHILS # BLD AUTO: 0 K/UL (ref 0–0.2)
BASOPHILS NFR BLD: 0 % (ref 0–1.9)
BASOPHILS NFR BLD: 0 % (ref 0–1.9)
BILIRUB SERPL-MCNC: 0.7 MG/DL (ref 0.1–1)
BUN SERPL-MCNC: 27 MG/DL (ref 8–23)
CALCIUM SERPL-MCNC: 8.7 MG/DL (ref 8.7–10.5)
CHLORIDE SERPL-SCNC: 104 MMOL/L (ref 95–110)
CO2 SERPL-SCNC: 22 MMOL/L (ref 23–29)
CREAT SERPL-MCNC: 1.4 MG/DL (ref 0.5–1.4)
DIFFERENTIAL METHOD BLD: ABNORMAL
DIFFERENTIAL METHOD BLD: ABNORMAL
EOSINOPHIL # BLD AUTO: 0 K/UL (ref 0–0.5)
EOSINOPHIL # BLD AUTO: 0 K/UL (ref 0–0.5)
EOSINOPHIL NFR BLD: 0 % (ref 0–8)
EOSINOPHIL NFR BLD: 0 % (ref 0–8)
ERYTHROCYTE [DISTWIDTH] IN BLOOD BY AUTOMATED COUNT: 23.1 % (ref 11.5–14.5)
ERYTHROCYTE [DISTWIDTH] IN BLOOD BY AUTOMATED COUNT: 23.5 % (ref 11.5–14.5)
EST. GFR  (NO RACE VARIABLE): 54.4 ML/MIN/1.73 M^2
GLUCOSE SERPL-MCNC: 98 MG/DL (ref 70–110)
HCT VFR BLD AUTO: 27.8 % (ref 40–54)
HCT VFR BLD AUTO: 28.1 % (ref 40–54)
HGB BLD-MCNC: 8.1 G/DL (ref 14–18)
HGB BLD-MCNC: 8.3 G/DL (ref 14–18)
IMM GRANULOCYTES # BLD AUTO: 0.02 K/UL (ref 0–0.04)
IMM GRANULOCYTES # BLD AUTO: 0.03 K/UL (ref 0–0.04)
IMM GRANULOCYTES NFR BLD AUTO: 0.6 % (ref 0–0.5)
IMM GRANULOCYTES NFR BLD AUTO: 0.7 % (ref 0–0.5)
LYMPHOCYTES # BLD AUTO: 0.5 K/UL (ref 1–4.8)
LYMPHOCYTES # BLD AUTO: 1 K/UL (ref 1–4.8)
LYMPHOCYTES NFR BLD: 13.2 % (ref 18–48)
LYMPHOCYTES NFR BLD: 24.4 % (ref 18–48)
MAGNESIUM SERPL-MCNC: 1.6 MG/DL (ref 1.6–2.6)
MCH RBC QN AUTO: 24.2 PG (ref 27–31)
MCH RBC QN AUTO: 24.7 PG (ref 27–31)
MCHC RBC AUTO-ENTMCNC: 29.1 G/DL (ref 32–36)
MCHC RBC AUTO-ENTMCNC: 29.5 G/DL (ref 32–36)
MCV RBC AUTO: 83 FL (ref 82–98)
MCV RBC AUTO: 84 FL (ref 82–98)
MONOCYTES # BLD AUTO: 0.2 K/UL (ref 0.3–1)
MONOCYTES # BLD AUTO: 0.4 K/UL (ref 0.3–1)
MONOCYTES NFR BLD: 6.8 % (ref 4–15)
MONOCYTES NFR BLD: 8.4 % (ref 4–15)
NEUTROPHILS # BLD AUTO: 2.8 K/UL (ref 1.8–7.7)
NEUTROPHILS # BLD AUTO: 2.8 K/UL (ref 1.8–7.7)
NEUTROPHILS NFR BLD: 66.5 % (ref 38–73)
NEUTROPHILS NFR BLD: 79.4 % (ref 38–73)
NRBC BLD-RTO: 0 /100 WBC
NRBC BLD-RTO: 0 /100 WBC
PHOSPHATE SERPL-MCNC: 2.8 MG/DL (ref 2.7–4.5)
PLATELET # BLD AUTO: 110 K/UL (ref 150–450)
PLATELET # BLD AUTO: 126 K/UL (ref 150–450)
PMV BLD AUTO: 10.3 FL (ref 9.2–12.9)
PMV BLD AUTO: 10.8 FL (ref 9.2–12.9)
POTASSIUM SERPL-SCNC: 3.9 MMOL/L (ref 3.5–5.1)
PROT SERPL-MCNC: 6.5 G/DL (ref 6–8.4)
RBC # BLD AUTO: 3.35 M/UL (ref 4.6–6.2)
RBC # BLD AUTO: 3.36 M/UL (ref 4.6–6.2)
SODIUM SERPL-SCNC: 134 MMOL/L (ref 136–145)
WBC # BLD AUTO: 3.55 K/UL (ref 3.9–12.7)
WBC # BLD AUTO: 4.18 K/UL (ref 3.9–12.7)

## 2024-04-04 PROCEDURE — 63600175 PHARM REV CODE 636 W HCPCS: Performed by: INTERNAL MEDICINE

## 2024-04-04 PROCEDURE — 83735 ASSAY OF MAGNESIUM: CPT | Performed by: STUDENT IN AN ORGANIZED HEALTH CARE EDUCATION/TRAINING PROGRAM

## 2024-04-04 PROCEDURE — 99900035 HC TECH TIME PER 15 MIN (STAT)

## 2024-04-04 PROCEDURE — 25000242 PHARM REV CODE 250 ALT 637 W/ HCPCS: Performed by: INTERNAL MEDICINE

## 2024-04-04 PROCEDURE — 25000003 PHARM REV CODE 250: Performed by: INTERNAL MEDICINE

## 2024-04-04 PROCEDURE — 97116 GAIT TRAINING THERAPY: CPT | Mod: CQ

## 2024-04-04 PROCEDURE — 25000242 PHARM REV CODE 250 ALT 637 W/ HCPCS: Performed by: STUDENT IN AN ORGANIZED HEALTH CARE EDUCATION/TRAINING PROGRAM

## 2024-04-04 PROCEDURE — 94640 AIRWAY INHALATION TREATMENT: CPT

## 2024-04-04 PROCEDURE — 36415 COLL VENOUS BLD VENIPUNCTURE: CPT | Performed by: STUDENT IN AN ORGANIZED HEALTH CARE EDUCATION/TRAINING PROGRAM

## 2024-04-04 PROCEDURE — 94761 N-INVAS EAR/PLS OXIMETRY MLT: CPT

## 2024-04-04 PROCEDURE — 80053 COMPREHEN METABOLIC PANEL: CPT | Performed by: STUDENT IN AN ORGANIZED HEALTH CARE EDUCATION/TRAINING PROGRAM

## 2024-04-04 PROCEDURE — 94799 UNLISTED PULMONARY SVC/PX: CPT | Mod: XB

## 2024-04-04 PROCEDURE — 85025 COMPLETE CBC W/AUTO DIFF WBC: CPT | Performed by: STUDENT IN AN ORGANIZED HEALTH CARE EDUCATION/TRAINING PROGRAM

## 2024-04-04 PROCEDURE — 25000003 PHARM REV CODE 250: Performed by: HOSPITALIST

## 2024-04-04 PROCEDURE — 84100 ASSAY OF PHOSPHORUS: CPT | Performed by: STUDENT IN AN ORGANIZED HEALTH CARE EDUCATION/TRAINING PROGRAM

## 2024-04-04 RX ORDER — LANOLIN ALCOHOL/MO/W.PET/CERES
100 CREAM (GRAM) TOPICAL DAILY
Qty: 30 TABLET | Refills: 0
Start: 2024-04-04 | End: 2024-05-04

## 2024-04-04 RX ORDER — IPRATROPIUM BROMIDE AND ALBUTEROL SULFATE 2.5; .5 MG/3ML; MG/3ML
3 SOLUTION RESPIRATORY (INHALATION)
Status: DISCONTINUED | OUTPATIENT
Start: 2024-04-04 | End: 2024-04-04 | Stop reason: HOSPADM

## 2024-04-04 RX ORDER — FLUTICASONE FUROATE AND VILANTEROL 100; 25 UG/1; UG/1
1 POWDER RESPIRATORY (INHALATION) DAILY
Qty: 30 EACH | Refills: 0
Start: 2024-04-05 | End: 2024-05-20

## 2024-04-04 RX ORDER — ACETAMINOPHEN 325 MG/1
650 TABLET ORAL EVERY 8 HOURS PRN
Refills: 0 | Status: ON HOLD
Start: 2024-04-04 | End: 2024-05-14 | Stop reason: HOSPADM

## 2024-04-04 RX ORDER — TALC
6 POWDER (GRAM) TOPICAL NIGHTLY PRN
Refills: 0
Start: 2024-04-04 | End: 2024-05-20

## 2024-04-04 RX ORDER — FOLIC ACID 1 MG/1
1 TABLET ORAL DAILY
Refills: 0
Start: 2024-04-04 | End: 2025-04-04

## 2024-04-04 RX ORDER — DICYCLOMINE HYDROCHLORIDE 10 MG/1
10 CAPSULE ORAL 3 TIMES DAILY PRN
Start: 2024-04-04 | End: 2024-05-04

## 2024-04-04 RX ORDER — IPRATROPIUM BROMIDE AND ALBUTEROL SULFATE 2.5; .5 MG/3ML; MG/3ML
3 SOLUTION RESPIRATORY (INHALATION) EVERY 6 HOURS PRN
Qty: 75 ML | Refills: 0
Start: 2024-04-04 | End: 2024-05-20

## 2024-04-04 RX ORDER — ONDANSETRON 8 MG/1
8 TABLET, ORALLY DISINTEGRATING ORAL EVERY 8 HOURS PRN
Refills: 30
Start: 2024-04-04 | End: 2024-04-25

## 2024-04-04 RX ADMIN — GUAIFENESIN 200 MG: 200 SOLUTION ORAL at 06:04

## 2024-04-04 RX ADMIN — GUAIFENESIN 200 MG: 200 SOLUTION ORAL at 09:04

## 2024-04-04 RX ADMIN — PANTOPRAZOLE SODIUM 40 MG: 40 TABLET, DELAYED RELEASE ORAL at 09:04

## 2024-04-04 RX ADMIN — Medication 100 MG: at 09:04

## 2024-04-04 RX ADMIN — FLUTICASONE FUROATE AND VILANTEROL TRIFENATATE 1 PUFF: 100; 25 POWDER RESPIRATORY (INHALATION) at 09:04

## 2024-04-04 RX ADMIN — FOLIC ACID 1 MG: 1 TABLET ORAL at 09:04

## 2024-04-04 RX ADMIN — GUAIFENESIN 200 MG: 200 SOLUTION ORAL at 05:04

## 2024-04-04 RX ADMIN — GUAIFENESIN 200 MG: 200 SOLUTION ORAL at 01:04

## 2024-04-04 RX ADMIN — ACETAMINOPHEN 650 MG: 325 TABLET ORAL at 12:04

## 2024-04-04 RX ADMIN — IPRATROPIUM BROMIDE AND ALBUTEROL SULFATE 3 ML: 2.5; .5 SOLUTION RESPIRATORY (INHALATION) at 02:04

## 2024-04-04 RX ADMIN — PREDNISONE 40 MG: 20 TABLET ORAL at 09:04

## 2024-04-04 RX ADMIN — IPRATROPIUM BROMIDE AND ALBUTEROL SULFATE 3 ML: 2.5; .5 SOLUTION RESPIRATORY (INHALATION) at 12:04

## 2024-04-04 RX ADMIN — DICYCLOMINE HYDROCHLORIDE 10 MG: 10 CAPSULE ORAL at 12:04

## 2024-04-04 NOTE — PT/OT/SLP PROGRESS
Physical Therapy Treatment    Patient Name:  Fransico Montalvo   MRN:  [id number]    Recommendations:     Discharge Recommendations: Moderate Intensity Therapy  Discharge Equipment Recommendations: walker, rolling  Barriers to discharge: None    Assessment:     Fransico Montalvo is a 68 y.o. male admitted with a medical diagnosis of Pneumonia.  He presents with the following impairments/functional limitations: weakness, impaired endurance, impaired self care skills, impaired functional mobility, decreased safety awareness, decreased lower extremity function, decreased upper extremity function, impaired cardiopulmonary response to activity Pt tolerated treatment session well today. Pt was able to increase total distance ambulated. Pt did report mild SOB, yet symptoms resolved with rest and pursed lip breathing. Pt did have a nasal canula hooked up to wall O2, Writing PTA asked RN if pt needed to have it donned and RN reporting pt did not have to have it. Patient remains appropriate for continued skilled services within the acute environment and goals remain appropriate.   .    Rehab Prognosis: Good; patient would benefit from acute skilled PT services to address these deficits and reach maximum level of function.    Recent Surgery: Procedure(s) (LRB):  COLONOSCOPY (N/A) 3 Days Post-Op    Plan:     During this hospitalization, patient to be seen 4 x/week to address the identified rehab impairments via gait training, therapeutic activities, therapeutic exercises, neuromuscular re-education and progress toward the following goals:    Plan of Care Expires:  05/03/24    Subjective     Chief Complaint: Mild SOB after ambulation  Patient/Family Comments/goals: Pt agreeable to PT   Pain/Comfort:  Pain Rating 1: 0/10      Objective:     Communicated with RN  prior to session.  Patient found supine with pulse ox (continuous), telemetry upon PT entry to room.     General Precautions: Standard, fall  Orthopedic Precautions: N/A  Braces:  N/A  Respiratory Status: Room air     Functional Mobility:  Bed Mobility:     Supine to Sit: contact guard assistance    Transfers:     Sit to Stand x 2 :  contact guard assistance with rolling walker  Gait: 20 ft +20 ft + 20 ft close CGA with RW   Pt ambulated with mild instability, yet no LOB    Pt performed 10 repetitions of seated B LE exercises consisting of: Marching, LAQ, ABD/ADD, heel raises, and toe raises.         AM-PAC 6 CLICK MOBILITY  Turning over in bed (including adjusting bedclothes, sheets and blankets)?: 3  Sitting down on and standing up from a chair with arms (e.g., wheelchair, bedside commode, etc.): 3  Moving from lying on back to sitting on the side of the bed?: 3  Moving to and from a bed to a chair (including a wheelchair)?: 3  Need to walk in hospital room?: 3  Climbing 3-5 steps with a railing?: 2  Basic Mobility Total Score: 17       Treatment & Education:  Therapist provided instruction and educated for safety during transfers and gait training. As well as proper body mechanics, energy conservation, and fall prevention strategies during tasks listed above, and the effects of prolonged immobility and the importance of performing EOB/OOB activity and exercises to promote healing and reduce recovery time.       Patient left up in chair with all lines intact, call button in reach, Rn notified, and  present..    GOALS:   Multidisciplinary Problems       Physical Therapy Goals          Problem: Physical Therapy    Goal Priority Disciplines Outcome Goal Variances Interventions   Physical Therapy Goal     PT, PT/OT Ongoing, Progressing     Description: Goals to be met by: 2024     Patient will increase functional independence with mobility by performin. Sit to stand transfer with Supervision  2. Gait  x 100 feet with Supervision using RW or LRAD with standing rest breaks prn.   3. Ascend/descend 4 stair with left Handrails Stand-by Assistance using LRAD.   4. Stand for 3  minutes with Supervision using LRAD while performing dynamic task with no worsening SOB  5. Lower extremity exercise program x30 reps per handout, with independence                         Time Tracking:     PT Received On: 04/04/24  PT Start Time: 1202     PT Stop Time: 1223  PT Total Time (min): 21 min     Billable Minutes: Gait Training 15    Treatment Type: Treatment  PT/PTA: PTA     Number of PTA visits since last PT visit: 1 04/04/2024

## 2024-04-04 NOTE — NURSING
Patient being transferred to [facility]. Report called to JYOTI Barboza. All belongings collected and sent with patient. IV removed with tip intact.

## 2024-04-04 NOTE — PLAN OF CARE
SNF HOME ORDERS    04/04/2024  [facility]  [facility]IKA BLACKWELL - STEPDOWN FLEX ([place])  [address]  [facility] [address]  Dept: [phone]  Loc: [phone]     Admit to SNF:  Skilled Nursing Facility  [facility]     Diagnoses:  Active Hospital Problems    Diagnosis  POA    *Pneumonia [J18.9]  Yes    Acute hypoxemic respiratory failure [J96.01]  Yes    COPD exacerbation [J44.1]  Yes    Debility [R53.81]  Yes    Hyponatremia [E87.1]  Yes    GI bleed [K92.2]  Yes    Cirrhosis [K74.60]  Yes    Acute blood loss anemia [D62]  Yes    [place] (acute kidney injury) [N17.9]  Yes    Essential hypertension [I10]  Yes     Chronic    CKD Stage 3 [N18.30]  Yes     Chronic    Elevated troponin [R79.89]  Yes      Resolved Hospital Problems    Diagnosis Date Resolved POA    SOB (shortness of breath) [R06.02] 04/04/2024 Yes    Acute hypoxemic respiratory failure [J96.01] 04/03/2024 Yes    Anemia of chronic disease [D63.8] 03/28/2024 Yes     Chronic       Patient is homebound due to:  Pneumonia    Allergies:Review of patient's allergies indicates:  No Known Allergies    Vitals:  Routine    Diet: 2 gram sodium diet    Activities:   Activity as tolerated  Ambulates with rolling walker    Goals of Care Treatment Preferences:  Code Status: Full Code      Labs:  Recheck CBC and CMP in 1 week    Nursing Precautions:  Fall    Consults:   PT to evaluate and treat- 5 times a week and OT to evaluate and treat- 5 times a week     Miscellaneous Care: Nasal cannula O2- 2L  Otherwise N/A                     Medications: Discontinue all previous medication orders, if any. See new list below.     Medication List        START taking these medications      acetaminophen 325 MG tablet  Commonly known as: TYLENOL  Take 2 tablets (650 mg total) by mouth every 8 (eight) hours as needed for Pain.     albuterol-ipratropium 2.5 mg-0.5 mg/3 mL nebulizer solution  Commonly known as: DUO-NEB  Take 3 mLs by nebulization every 6 (six)  hours as needed for Wheezing or Shortness of Breath. Rescue     dicyclomine 10 MG capsule  Commonly known as: BENTYL  Take 1 capsule (10 mg total) by mouth 3 (three) times daily as needed (abdominal pain).     fluticasone furoate-vilanteroL 100-25 mcg/dose diskus inhaler  Commonly known as: BREO  Inhale 1 puff into the lungs once daily. Controller  Start taking on: April 5, 2024     folic acid 1 MG tablet  Commonly known as: FOLVITE  Take 1 tablet (1 mg total) by mouth once daily.     melatonin 3 mg tablet  Commonly known as: MELATIN  Take 2 tablets (6 mg total) by mouth nightly as needed for Insomnia.     ondansetron 8 MG Tbdl  Commonly known as: ZOFRAN-ODT  Take 1 tablet (8 mg total) by mouth every 8 (eight) hours as needed (Nausea).     thiamine 100 MG tablet  Take 1 tablet (100 mg total) by mouth once daily.            CONTINUE taking these medications      EScitalopram oxalate 10 MG tablet  Commonly known as: LEXAPRO  Take 10 mg by mouth once daily.     pantoprazole 40 MG tablet  Commonly known as: PROTONIX  Take 1 tablet (40 mg total) by mouth once daily.            STOP taking these medications      tamsulosin 0.4 mg Cap  Commonly known as: FLOMAX                Immunizations Administered as of 4/4/2024       Name Date Dose VIS Date Route Exp Date    COVID-19, MRNA, LN-S, PF (Moderna) 7/30/2021 -- -- Intramuscular --    Site: Left arm     : [employer]     Lot: [id number]     External: Auto Reconciled From Outside Source     Comment: Adminis     COVID-19, MRNA, LN-S PF (Moderna) 6/25/2021 -- -- Intramuscular --    Site: Right arm     : Modern[employer]     Lot: [id number]     External: Auto Reconciled From Outside Source     Comment: Adminjordyn Martínez MD  Department of Hospital Medicine  [facility]  04/04/2024

## 2024-04-04 NOTE — PLAN OF CARE
Problem: Adult Inpatient Plan of Care  Goal: Plan of Care Review  4/4/2024 1223 by Fabiana Mcnally RN  Outcome: Met  Goal: Patient-Specific Goal (Individualized)  4/4/2024 1223 by Fabiana Mcnally RN  Outcome: Met  Goal: Absence of Hospital-Acquired Illness or Injury  4/4/2024 1223 by Fabiana Mcnally RN  Outcome: Met  4/4/2024 0809 by Fabiana Mcnally RN  Outcome: Ongoing, Progressing  Goal: Optimal Comfort and Wellbeing  4/4/2024 1223 by Fabiana Mcnally RN  Outcome: Met  Goal: Readiness for Transition of Care  4/4/2024 1223 by Fabiana Mcnally RN  Outcome: Met     Problem: Fluid Imbalance (Pneumonia)  Goal: Fluid Balance  4/4/2024 1223 by Fabiana Mcnally RN  Outcome: Met    Problem: Infection (Pneumonia)  Goal: Resolution of Infection Signs and Symptoms  4/4/2024 1223 by Fabiana Mcnlaly RN  Outcome: Met     Problem: Respiratory Compromise (Pneumonia)  Goal: Effective Oxygenation and Ventilation  4/4/2024 1223 by Fabiana Mcnally RN  Outcome: Met     Problem: Skin Injury Risk Increased  Goal: Skin Health and Integrity  4/4/2024 1223 by Fabiana Mcnally RN  Outcome: Met     Problem: Impaired Wound Healing  Goal: Optimal Wound Healing  4/4/2024 1223 by Fabiana Mcnally RN  Outcome: Met     Problem: Fall Injury Risk  Goal: Absence of Fall and Fall-Related Injury  4/4/2024 1223 by Fabiana Mcnally RN  Outcome: Met     Problem: Fluid and Electrolyte Imbalance (Acute Kidney Injury/Impairment)  Goal: Fluid and Electrolyte Balance  4/4/2024 1223 by Fabiana Mcnally RN  Outcome: Met     Problem: Oral Intake Inadequate (Acute Kidney Injury/Impairment)  Goal: Optimal Nutrition Intake  4/4/2024 1223 by Fabiana Mcnally RN  Outcome: Met    Problem: Renal Function Impairment (Acute Kidney Injury/Impairment)  Goal: Effective Renal Function  4/4/2024 1223 by Fabiana Mcnally RN  Outcome: Met

## 2024-04-04 NOTE — PLAN OF CARE
[facility] - Stepdown Flex ([place])  Discharge Final Note    Primary Care Provider: [facility]    Expected Discharge Date: 4/4/2024    Final Discharge Note (most recent)       Final Note - 04/04/24 1305          Post-Acute Status    Post-Acute Authorization Placement;Home Health     Post-Acute Placement Status Set-up Complete/Auth obtained   [id number]    HME Status Pending Qualifying Diagnosis     Home Health Status Set-up Complete/Auth obtained     Coverage [facility] SECURE SNP -     Patient choice form signed by patient/caregiver List from CMS Compare     Discharge Delays None known at this time                     Important Message from Medicare  Important Message from Medicare regarding Discharge Appeal Rights: Given to patient/caregiver, Explained to patient/caregiver, Signed/date by patient/caregiver     Date IMM was signed: 04/04/24  Time IMM was signed: 1111    Contact Info       [facility] [address] [address] [phone]       Next Steps: Follow up    Dimitris Guadarrama [place]    [address] [place] [id number] [phone]       Next Steps: Follow up              CM met with patient  and discussed discharge plans, patient to SNF. No medications delivered to bedside. No  HME/DME delivered  to bedside and  no follow up appointment[s] needed.   Transportation provided by [employer] via [employer].      Sabine Oreilly RN  Case Management  [facility]  [phone]

## 2024-04-05 NOTE — ASSESSMENT & PLAN NOTE
-No official history of COPD but extensive smoking history and rhonchi on exam  -Outpatient Pulm referral  -Started on Brio with PRN nebs

## 2024-04-05 NOTE — DISCHARGE SUMMARY
[facility] - Stepdown Flex ([place] [id number])  [facility]  Discharge Summary      Patient Name: Fransico Montalvo  MRN: [id number]  ILIANA: [id number]  Patient Class: IP- Inpatient  Admission Date: 3/28/2024  Hospital Length of Stay: 7 days  Discharge Date and Time: 4/4/2024  6:55 PM  Attending Physician: No att. providers found   Discharging Provider: Sharon Martínez MD  Primary Care Provider: [facility] Medicine Team: [facility] D Sharon Martínez MD  Primary Care Team: [facility] D    HPI:   68 year old gentleman who is a known case of f alcohol abuse, BPH, cirrhosis, hypertension, esophageal varices, gastritis and tobacco abuse presenting with shortness of breath that started about 4 days ago. It was accompanied by a dry cough and dizziness as well as generalized weakness. He reported no fever, sputum, hemoptysis, bleeding from any orifice. He reported no changes in stool color. Patient did not complain of chest pain, seizures, syncope, or motor weakness. He denied a trauma and falls. He noted that he was diagnosed with pneumonia 3 days  ago and was prescribed antibiotics. Patient was afebrile in the ER: BP: 125/80 SpO2: 89% on venturi mask . He was  found to be hypoxemic with end-tidal CO2 of 20 and hypoxemic to 79% requiring immediate  oxygen support at the EMS. His hemoglobin was found to be around 4 and 3 pints of PRBC were  scheduled for transfusion. By the time of our arrival his second bag was flowing.  Patient is to be admitted as a case of CAP, Anemia, and acute hypoxemic respiratory failure.  He received two pints PRBC in the ER, maintaining approproriate saturation of SpO2 with venturi mask on, given a shot of Vancomycin in the Er and placed on CAP coverage antibiotics.      Patient appears to be stable for stepdown to hospital medicine teams.  Maintaining appropriate vitals with minimal support.    Procedure(s) (LRB):  COLONOSCOPY (N/A)      Hospital Course:    -Patient was admitted for symptomatic anemia, received blood transfusion.  GI was consulted- EGD in Oct 2023 was normal. They recommend c-scope. and patient underwent colonoscopy which was unremarkable.  Reports significant smoking history and was treated for presumed COPD exacerbation and community-acquired pneumonia. Patient has been on regularly scheduled nebulizer treatments. Started on steroids 4/01. He completed 3 days of Azithro and 6 days of Rocephin. It appears patient was transfused 3 units of pRBC on day of admission. 2 more units since then, last on 4/02. Since then Hgb has been stable around 8.2.   -PT/OT valuated the patient, recommend moderate intensity therapy. Patient wavered on whether he wanted to go to care home or SNF. Eventually patient decided on SNF placement. Appreciate CM assistance. Patient accepted to [facility] and will discharge today. Pt deemed appropriate for discharge. Plan discussed with pt, who was agreeable; medications were discussed and reviewed. Outpatient follow-up discussed and scheduled when able. ER precautions were given. Patient discharged.     Physical Exam  Vitals reviewed.  Nursing notes reviewed  GEN: NAD   HEENT: Normocephalic, atraumatic. PERRLA, EOMI  CV: RRR, no murmurs/rubs/gallops  Lungs: CTAB, no rubs/rhonchi/rales, non-labored breathing on room air  Abd: soft, Protuberant. non-tender to palpation. No guarding  Msk: No muscular deformities noted  Skin: No rashes or erythema noted  Neuro: Alert and oriented x3. No focal neurologic deficits  Psych: normal affect and mood     Goals of Care Treatment Preferences:  Code Status: Full Code      Consults:   Consults (From admission, onward)          Status Ordering Provider     Inpatient consult to Critical Care Medicine  Once        Provider:  (Not yet assigned)    Completed CHARLEEN RYAN            Pulmonary  * Pneumonia  -Noted on chest x-ray and Accounting for hypoxia. Incentive spirometry. Respiratory  panel negative.  Completed 3 days of azithromycin and 6 of ceftriaxone.        Acute hypoxemic respiratory failure  -likely related to both pneumonia and anemia. See treatment of both  -Resolved- pt stable on room air.     COPD exacerbation  -No official history of COPD but extensive smoking history and rhonchi on exam  -Outpatient Pulm referral  -Started on Brio with PRN nebs    Cardiac/Vascular  Essential hypertension  -BP controlled of therapy    Renal/  ISRAEL (acute kidney injury)  -Cr elevated on presentation. Cr back near baseline    CKD Stage 3  -Cr baseline appears to be around 1.2. Cr near baseline.     Oncology  Acute blood loss anemia  -Patient's anemia is currently controlled. Has received 5 units of PRBCs, last on 4/01.   Current CBC reviewed-   Lab Results   Component Value Date    HGB 8.3 (L) 04/04/2024    HCT 28.1 (L) 04/04/2024     -Unclear etiology at this time. Suspect GI bleed. See below. Could also have component of generation issue. Needs outpatient workup and follow up.     GI  GI bleed  -GI was consulted- EGD in Oct 2023 was normal. They recommend c-scope. and patient underwent colonoscopy which did not show source of bleeding  -Continue daily PPI  -GI is concerned for Hgb generation issue. I have concern for possible upper source which may have resolved spontaneously. Would recommend repeat EGD if issue recurs.         Final Active Diagnoses:    Diagnosis Date Noted POA    PRINCIPAL PROBLEM:  Pneumonia [J18.9] 03/29/2024 Yes    Acute hypoxemic respiratory failure [J96.01] 04/04/2024 Yes    COPD exacerbation [J44.1] 04/03/2024 Yes    Debility [R53.81] 04/03/2024 Yes    Hyponatremia [E87.1] 04/01/2024 Yes    GI bleed [K92.2] 03/29/2024 Yes    Cirrhosis [K74.60] 03/29/2024 Yes    Acute blood loss anemia [D62] 07/29/2019 Yes    ISRAEL (acute kidney injury) [N17.9] 07/29/2019 Yes    Essential hypertension [I10] 07/28/2019 Yes     Chronic    CKD Stage 3 [N18.30] 07/30/2018 Yes     Chronic    Elevated  troponin [R79.89] 07/29/2018 Yes      Problems Resolved During this Admission:    Diagnosis Date Noted Date Resolved POA    SOB (shortness of breath) [R06.02] 04/04/2024 04/04/2024 Yes    Acute hypoxemic respiratory failure [J96.01] 03/28/2024 04/03/2024 Yes    Anemia of chronic disease [D63.8] 07/30/2018 03/28/2024 Yes     Chronic       Discharged Condition: stable    Disposition: Skilled Nursing Facility    Follow Up:   Follow-up Information       [facility]    Contact information:  [address] [facility] [place] [address] [phone]             [facility]imitris Guadarr[facility] [place] Follow up.    Contact information:  [address] [facility] [facility] [place] [address] [phone]                         PCP  Pulm  GI    Patient Instructions:      Ambulatory referral/consult to Pulmonology   Standing Status: Future   Referral Priority: Routine Referral Type: Consultation   Referral Reason: Specialty Services Required   Requested Specialty: Pulmonary Disease   Number of Visits Requested: 1     Diet Adult Regular     Order Specific Question Answer Comments   Na restriction, if any: 2gNa      Notify your health care provider if you experience any of the following:  difficulty breathing or increased cough     Activity as tolerated       Significant Diagnostic Studies: Labs: CMP   Recent Labs   Lab 04/03/24  0555 04/04/24  0715    134*   K 3.9 3.9    104   CO2 22* 22*    98   BUN 23 27*   CREATININE 1.3 1.4   CALCIUM 8.8 8.7   PROT 6.7 6.5   ALBUMIN 3.1* 3.0*   BILITOT 0.8 0.7   ALKPHOS 61 58   AST 20 33   ALT 16 24   ANIONGAP 9 8    and CBC   Recent Labs   Lab 04/03/24  1828 04/03/24  2344 04/04/24  0715   WBC 3.50* 3.55* 4.18   HGB 8.8* 8.1* 8.3*   HCT 30.5* 27.8* 28.1*   * 110* 126*       Pending Diagnostic Studies:       None           Medications:  Reconciled Home Medications:      Medication List        START taking these medications      acetaminophen 325 MG  tablet  Commonly known as: TYLENOL  Take 2 tablets (650 mg total) by mouth every 8 (eight) hours as needed for Pain.     albuterol-ipratropium 2.5 mg-0.5 mg/3 mL nebulizer solution  Commonly known as: DUO-NEB  Take 3 mLs by nebulization every 6 (six) hours as needed for Wheezing or Shortness of Breath. Rescue     dicyclomine 10 MG capsule  Commonly known as: BENTYL  Take 1 capsule (10 mg total) by mouth 3 (three) times daily as needed (abdominal pain).     fluticasone furoate-vilanteroL 100-25 mcg/dose diskus inhaler  Commonly known as: BREO  Inhale 1 puff into the lungs once daily. Controller  Start taking on: April 5, 2024     folic acid 1 MG tablet  Commonly known as: FOLVITE  Take 1 tablet (1 mg total) by mouth once daily.     melatonin 3 mg tablet  Commonly known as: MELATIN  Take 2 tablets (6 mg total) by mouth nightly as needed for Insomnia.     ondansetron 8 MG Tbdl  Commonly known as: ZOFRAN-ODT  Take 1 tablet (8 mg total) by mouth every 8 (eight) hours as needed (Nausea).     thiamine 100 MG tablet  Take 1 tablet (100 mg total) by mouth once daily.            CONTINUE taking these medications      EScitalopram oxalate 10 MG tablet  Commonly known as: LEXAPRO  Take 10 mg by mouth once daily.     pantoprazole 40 MG tablet  Commonly known as: PROTONIX  Take 1 tablet (40 mg total) by mouth once daily.            STOP taking these medications      tamsulosin 0.4 mg Cap  Commonly known as: FLOMAX                  Time spent on the discharge of patient: Approximately 45 minutes of time spent on discharge, including examining the patient, providing discharge instructions, arranging follow up, and documentation.    Sharon Martínez MD  Department of Hospital Medicine  [facility]  04/04/2024

## 2024-04-05 NOTE — ASSESSMENT & PLAN NOTE
-GI was consulted- EGD in Oct 2023 was normal. They recommend c-scope. and patient underwent colonoscopy which did not show source of bleeding  -Continue daily PPI  -GI is concerned for Hgb generation issue. I have concern for possible upper source which may have resolved spontaneously. Would recommend repeat EGD if issue recurs.

## 2024-04-05 NOTE — ASSESSMENT & PLAN NOTE
-Noted on chest x-ray and Accounting for hypoxia. Incentive spirometry. Respiratory panel negative.  Completed 3 days of azithromycin and 6 of ceftriaxone.

## 2024-04-05 NOTE — ASSESSMENT & PLAN NOTE
-Patient's anemia is currently controlled. Has received 5 units of PRBCs, last on 4/01.   Current CBC reviewed-   Lab Results   Component Value Date    HGB 8.3 (L) 04/04/2024    HCT 28.1 (L) 04/04/2024     -Unclear etiology at this time. Suspect GI bleed. See below. Could also have component of generation issue. Needs outpatient workup and follow up.

## 2024-04-05 NOTE — ASSESSMENT & PLAN NOTE
-likely related to both pneumonia and anemia. See treatment of both  -Resolved- pt stable on room air.

## 2024-04-18 ENCOUNTER — HOSPITAL ENCOUNTER (INPATIENT)
Facility: HOSPITAL | Age: 69
LOS: 4 days | Discharge: NURSING, REHAB OR LONG-TERM CARE | DRG: 378 | End: 2024-04-23
Attending: EMERGENCY MEDICINE | Admitting: HOSPITALIST
Payer: MEDICARE

## 2024-04-18 DIAGNOSIS — K92.2 GIB (GASTROINTESTINAL BLEEDING): ICD-10-CM

## 2024-04-18 DIAGNOSIS — D62 ACUTE BLOOD LOSS ANEMIA: Primary | ICD-10-CM

## 2024-04-18 DIAGNOSIS — R07.9 CHEST PAIN: ICD-10-CM

## 2024-04-18 DIAGNOSIS — K92.1 MELENA: ICD-10-CM

## 2024-04-18 DIAGNOSIS — R06.02 SHORTNESS OF BREATH: ICD-10-CM

## 2024-04-18 DIAGNOSIS — K92.1 GASTROINTESTINAL HEMORRHAGE WITH MELENA: ICD-10-CM

## 2024-04-18 PROBLEM — J43.8 OTHER EMPHYSEMA: Chronic | Status: ACTIVE | Noted: 2024-04-18

## 2024-04-18 PROBLEM — J90 PLEURAL EFFUSION: Chronic | Status: ACTIVE | Noted: 2024-04-18

## 2024-04-18 PROBLEM — D64.9 NORMOCYTIC ANEMIA: Chronic | Status: ACTIVE | Noted: 2024-04-18

## 2024-04-18 LAB
ABO + RH BLD: NORMAL
ALBUMIN SERPL BCP-MCNC: 3.1 G/DL (ref 3.5–5.2)
ALP SERPL-CCNC: 78 U/L (ref 55–135)
ALT SERPL W/O P-5'-P-CCNC: 12 U/L (ref 10–44)
ANION GAP SERPL CALC-SCNC: 7 MMOL/L (ref 8–16)
AST SERPL-CCNC: 25 U/L (ref 10–40)
BASOPHILS # BLD AUTO: 0.02 K/UL (ref 0–0.2)
BASOPHILS # BLD AUTO: 0.03 K/UL (ref 0–0.2)
BASOPHILS NFR BLD: 0.4 % (ref 0–1.9)
BASOPHILS NFR BLD: 0.7 % (ref 0–1.9)
BILIRUB SERPL-MCNC: 0.7 MG/DL (ref 0.1–1)
BILIRUB UR QL STRIP: NEGATIVE
BLD GP AB SCN CELLS X3 SERPL QL: NORMAL
BNP SERPL-MCNC: 4361 PG/ML (ref 0–99)
BUN SERPL-MCNC: 24 MG/DL (ref 8–23)
BUN SERPL-MCNC: 35 MG/DL (ref 6–30)
CALCIUM SERPL-MCNC: 8.7 MG/DL (ref 8.7–10.5)
CHLORIDE SERPL-SCNC: 107 MMOL/L (ref 95–110)
CHLORIDE SERPL-SCNC: 109 MMOL/L (ref 95–110)
CLARITY UR REFRACT.AUTO: CLEAR
CO2 SERPL-SCNC: 26 MMOL/L (ref 23–29)
COLOR UR AUTO: YELLOW
CREAT SERPL-MCNC: 1 MG/DL (ref 0.5–1.4)
CREAT SERPL-MCNC: 1.2 MG/DL (ref 0.5–1.4)
DIFFERENTIAL METHOD BLD: ABNORMAL
DIFFERENTIAL METHOD BLD: ABNORMAL
EOSINOPHIL # BLD AUTO: 0 K/UL (ref 0–0.5)
EOSINOPHIL # BLD AUTO: 0 K/UL (ref 0–0.5)
EOSINOPHIL NFR BLD: 0.9 % (ref 0–8)
EOSINOPHIL NFR BLD: 1 % (ref 0–8)
ERYTHROCYTE [DISTWIDTH] IN BLOOD BY AUTOMATED COUNT: 22.1 % (ref 11.5–14.5)
ERYTHROCYTE [DISTWIDTH] IN BLOOD BY AUTOMATED COUNT: 22.1 % (ref 11.5–14.5)
EST. GFR  (NO RACE VARIABLE): >60 ML/MIN/1.73 M^2
ETHANOL SERPL-MCNC: <10 MG/DL
GLUCOSE SERPL-MCNC: 92 MG/DL (ref 70–110)
GLUCOSE SERPL-MCNC: 95 MG/DL (ref 70–110)
GLUCOSE UR QL STRIP: NEGATIVE
HCT VFR BLD AUTO: 26.5 % (ref 40–54)
HCT VFR BLD AUTO: 28.2 % (ref 40–54)
HCT VFR BLD CALC: 29 %PCV (ref 36–54)
HGB BLD-MCNC: 7.3 G/DL (ref 14–18)
HGB BLD-MCNC: 7.7 G/DL (ref 14–18)
HGB UR QL STRIP: ABNORMAL
IMM GRANULOCYTES # BLD AUTO: 0.01 K/UL (ref 0–0.04)
IMM GRANULOCYTES # BLD AUTO: 0.01 K/UL (ref 0–0.04)
IMM GRANULOCYTES NFR BLD AUTO: 0.2 % (ref 0–0.5)
IMM GRANULOCYTES NFR BLD AUTO: 0.2 % (ref 0–0.5)
INR PPP: 1.2 (ref 0.8–1.2)
KETONES UR QL STRIP: NEGATIVE
LEUKOCYTE ESTERASE UR QL STRIP: NEGATIVE
LYMPHOCYTES # BLD AUTO: 0.8 K/UL (ref 1–4.8)
LYMPHOCYTES # BLD AUTO: 0.9 K/UL (ref 1–4.8)
LYMPHOCYTES NFR BLD: 18.3 % (ref 18–48)
LYMPHOCYTES NFR BLD: 22.1 % (ref 18–48)
MCH RBC QN AUTO: 24 PG (ref 27–31)
MCH RBC QN AUTO: 24 PG (ref 27–31)
MCHC RBC AUTO-ENTMCNC: 27.3 G/DL (ref 32–36)
MCHC RBC AUTO-ENTMCNC: 27.5 G/DL (ref 32–36)
MCV RBC AUTO: 87 FL (ref 82–98)
MCV RBC AUTO: 88 FL (ref 82–98)
MICROSCOPIC COMMENT: NORMAL
MONOCYTES # BLD AUTO: 0.4 K/UL (ref 0.3–1)
MONOCYTES # BLD AUTO: 0.4 K/UL (ref 0.3–1)
MONOCYTES NFR BLD: 8.5 % (ref 4–15)
MONOCYTES NFR BLD: 9.5 % (ref 4–15)
NEUTROPHILS # BLD AUTO: 2.8 K/UL (ref 1.8–7.7)
NEUTROPHILS # BLD AUTO: 3.3 K/UL (ref 1.8–7.7)
NEUTROPHILS NFR BLD: 66.5 % (ref 38–73)
NEUTROPHILS NFR BLD: 71.7 % (ref 38–73)
NITRITE UR QL STRIP: NEGATIVE
NRBC BLD-RTO: 0 /100 WBC
NRBC BLD-RTO: 0 /100 WBC
OHS QRS DURATION: 100 MS
OHS QTC CALCULATION: 452 MS
PH UR STRIP: 5 [PH] (ref 5–8)
PLATELET # BLD AUTO: 224 K/UL (ref 150–450)
PLATELET # BLD AUTO: 71 K/UL (ref 150–450)
PMV BLD AUTO: 10.5 FL (ref 9.2–12.9)
PMV BLD AUTO: 10.6 FL (ref 9.2–12.9)
POC IONIZED CALCIUM: 1.18 MMOL/L (ref 1.06–1.42)
POC TCO2 (MEASURED): 30 MMOL/L (ref 23–29)
POTASSIUM BLD-SCNC: 5.7 MMOL/L (ref 3.5–5.1)
POTASSIUM SERPL-SCNC: 5.1 MMOL/L (ref 3.5–5.1)
PROT SERPL-MCNC: 7 G/DL (ref 6–8.4)
PROT UR QL STRIP: NEGATIVE
PROTHROMBIN TIME: 12.8 SEC (ref 9–12.5)
RBC # BLD AUTO: 3.04 M/UL (ref 4.6–6.2)
RBC # BLD AUTO: 3.21 M/UL (ref 4.6–6.2)
RBC #/AREA URNS AUTO: 0 /HPF (ref 0–4)
SAMPLE: ABNORMAL
SODIUM BLD-SCNC: 142 MMOL/L (ref 136–145)
SODIUM SERPL-SCNC: 142 MMOL/L (ref 136–145)
SP GR UR STRIP: 1.02 (ref 1–1.03)
SPECIMEN OUTDATE: NORMAL
URN SPEC COLLECT METH UR: ABNORMAL
WBC # BLD AUTO: 4.2 K/UL (ref 3.9–12.7)
WBC # BLD AUTO: 4.6 K/UL (ref 3.9–12.7)
WBC #/AREA URNS AUTO: 0 /HPF (ref 0–5)

## 2024-04-18 PROCEDURE — G0378 HOSPITAL OBSERVATION PER HR: HCPCS

## 2024-04-18 PROCEDURE — 86920 COMPATIBILITY TEST SPIN: CPT | Performed by: EMERGENCY MEDICINE

## 2024-04-18 PROCEDURE — 93010 ELECTROCARDIOGRAM REPORT: CPT | Mod: ,,, | Performed by: INTERNAL MEDICINE

## 2024-04-18 PROCEDURE — 63600175 PHARM REV CODE 636 W HCPCS: Performed by: EMERGENCY MEDICINE

## 2024-04-18 PROCEDURE — 80048 BASIC METABOLIC PNL TOTAL CA: CPT | Mod: XB

## 2024-04-18 PROCEDURE — 93005 ELECTROCARDIOGRAM TRACING: CPT

## 2024-04-18 PROCEDURE — 85025 COMPLETE CBC W/AUTO DIFF WBC: CPT | Mod: 91 | Performed by: STUDENT IN AN ORGANIZED HEALTH CARE EDUCATION/TRAINING PROGRAM

## 2024-04-18 PROCEDURE — 82077 ASSAY SPEC XCP UR&BREATH IA: CPT | Performed by: STUDENT IN AN ORGANIZED HEALTH CARE EDUCATION/TRAINING PROGRAM

## 2024-04-18 PROCEDURE — 99285 EMERGENCY DEPT VISIT HI MDM: CPT | Mod: 25

## 2024-04-18 PROCEDURE — 81001 URINALYSIS AUTO W/SCOPE: CPT | Performed by: EMERGENCY MEDICINE

## 2024-04-18 PROCEDURE — 25000003 PHARM REV CODE 250: Performed by: STUDENT IN AN ORGANIZED HEALTH CARE EDUCATION/TRAINING PROGRAM

## 2024-04-18 PROCEDURE — 83880 ASSAY OF NATRIURETIC PEPTIDE: CPT | Performed by: STUDENT IN AN ORGANIZED HEALTH CARE EDUCATION/TRAINING PROGRAM

## 2024-04-18 PROCEDURE — 96365 THER/PROPH/DIAG IV INF INIT: CPT

## 2024-04-18 PROCEDURE — 85025 COMPLETE CBC W/AUTO DIFF WBC: CPT | Performed by: EMERGENCY MEDICINE

## 2024-04-18 PROCEDURE — C9113 INJ PANTOPRAZOLE SODIUM, VIA: HCPCS | Performed by: EMERGENCY MEDICINE

## 2024-04-18 PROCEDURE — 25000003 PHARM REV CODE 250: Performed by: EMERGENCY MEDICINE

## 2024-04-18 PROCEDURE — 80053 COMPREHEN METABOLIC PANEL: CPT | Performed by: EMERGENCY MEDICINE

## 2024-04-18 PROCEDURE — 36430 TRANSFUSION BLD/BLD COMPNT: CPT

## 2024-04-18 PROCEDURE — 96375 TX/PRO/DX INJ NEW DRUG ADDON: CPT

## 2024-04-18 PROCEDURE — 86850 RBC ANTIBODY SCREEN: CPT | Performed by: EMERGENCY MEDICINE

## 2024-04-18 PROCEDURE — 85610 PROTHROMBIN TIME: CPT | Performed by: STUDENT IN AN ORGANIZED HEALTH CARE EDUCATION/TRAINING PROGRAM

## 2024-04-18 PROCEDURE — 63600175 PHARM REV CODE 636 W HCPCS: Performed by: STUDENT IN AN ORGANIZED HEALTH CARE EDUCATION/TRAINING PROGRAM

## 2024-04-18 RX ORDER — TALC
6 POWDER (GRAM) TOPICAL NIGHTLY PRN
Status: DISCONTINUED | OUTPATIENT
Start: 2024-04-18 | End: 2024-04-23 | Stop reason: HOSPADM

## 2024-04-18 RX ORDER — NALOXONE HCL 0.4 MG/ML
0.02 VIAL (ML) INJECTION
Status: DISCONTINUED | OUTPATIENT
Start: 2024-04-18 | End: 2024-04-23 | Stop reason: HOSPADM

## 2024-04-18 RX ORDER — FLUTICASONE FUROATE AND VILANTEROL 100; 25 UG/1; UG/1
1 POWDER RESPIRATORY (INHALATION) DAILY
Status: DISCONTINUED | OUTPATIENT
Start: 2024-04-19 | End: 2024-04-23 | Stop reason: HOSPADM

## 2024-04-18 RX ORDER — PANTOPRAZOLE SODIUM 40 MG/10ML
40 INJECTION, POWDER, LYOPHILIZED, FOR SOLUTION INTRAVENOUS 2 TIMES DAILY
Status: DISCONTINUED | OUTPATIENT
Start: 2024-04-19 | End: 2024-04-20

## 2024-04-18 RX ORDER — IBUPROFEN 200 MG
24 TABLET ORAL
Status: DISCONTINUED | OUTPATIENT
Start: 2024-04-18 | End: 2024-04-23 | Stop reason: HOSPADM

## 2024-04-18 RX ORDER — PANTOPRAZOLE SODIUM 40 MG/10ML
80 INJECTION, POWDER, LYOPHILIZED, FOR SOLUTION INTRAVENOUS
Status: COMPLETED | OUTPATIENT
Start: 2024-04-18 | End: 2024-04-18

## 2024-04-18 RX ORDER — ACETAMINOPHEN 325 MG/1
650 TABLET ORAL EVERY 8 HOURS PRN
Status: DISCONTINUED | OUTPATIENT
Start: 2024-04-18 | End: 2024-04-23 | Stop reason: HOSPADM

## 2024-04-18 RX ORDER — THIAMINE HCL 100 MG
100 TABLET ORAL DAILY
Status: DISCONTINUED | OUTPATIENT
Start: 2024-04-19 | End: 2024-04-23 | Stop reason: HOSPADM

## 2024-04-18 RX ORDER — GLUCAGON 1 MG
1 KIT INJECTION
Status: DISCONTINUED | OUTPATIENT
Start: 2024-04-18 | End: 2024-04-23 | Stop reason: HOSPADM

## 2024-04-18 RX ORDER — SODIUM CHLORIDE 0.9 % (FLUSH) 0.9 %
10 SYRINGE (ML) INJECTION
Status: DISCONTINUED | OUTPATIENT
Start: 2024-04-18 | End: 2024-04-23 | Stop reason: HOSPADM

## 2024-04-18 RX ORDER — IPRATROPIUM BROMIDE AND ALBUTEROL SULFATE 2.5; .5 MG/3ML; MG/3ML
3 SOLUTION RESPIRATORY (INHALATION) EVERY 6 HOURS PRN
Status: DISCONTINUED | OUTPATIENT
Start: 2024-04-18 | End: 2024-04-23 | Stop reason: HOSPADM

## 2024-04-18 RX ORDER — SODIUM CHLORIDE 0.9 % (FLUSH) 0.9 %
1-10 SYRINGE (ML) INJECTION EVERY 12 HOURS PRN
Status: DISCONTINUED | OUTPATIENT
Start: 2024-04-18 | End: 2024-04-23 | Stop reason: HOSPADM

## 2024-04-18 RX ORDER — HYDROCODONE BITARTRATE AND ACETAMINOPHEN 500; 5 MG/1; MG/1
TABLET ORAL
Status: DISCONTINUED | OUTPATIENT
Start: 2024-04-18 | End: 2024-04-23 | Stop reason: HOSPADM

## 2024-04-18 RX ORDER — ACETAMINOPHEN 325 MG/1
650 TABLET ORAL EVERY 4 HOURS PRN
Status: DISCONTINUED | OUTPATIENT
Start: 2024-04-18 | End: 2024-04-23 | Stop reason: HOSPADM

## 2024-04-18 RX ORDER — IBUPROFEN 200 MG
16 TABLET ORAL
Status: DISCONTINUED | OUTPATIENT
Start: 2024-04-18 | End: 2024-04-23 | Stop reason: HOSPADM

## 2024-04-18 RX ORDER — ONDANSETRON 8 MG/1
8 TABLET, ORALLY DISINTEGRATING ORAL EVERY 8 HOURS PRN
Status: DISCONTINUED | OUTPATIENT
Start: 2024-04-18 | End: 2024-04-23 | Stop reason: HOSPADM

## 2024-04-18 RX ORDER — ALUMINUM HYDROXIDE, MAGNESIUM HYDROXIDE, AND SIMETHICONE 1200; 120; 1200 MG/30ML; MG/30ML; MG/30ML
30 SUSPENSION ORAL 4 TIMES DAILY PRN
Status: DISCONTINUED | OUTPATIENT
Start: 2024-04-18 | End: 2024-04-23 | Stop reason: HOSPADM

## 2024-04-18 RX ORDER — POLYETHYLENE GLYCOL 3350 17 G/17G
17 POWDER, FOR SOLUTION ORAL DAILY PRN
Status: DISCONTINUED | OUTPATIENT
Start: 2024-04-18 | End: 2024-04-23 | Stop reason: HOSPADM

## 2024-04-18 RX ORDER — ESCITALOPRAM OXALATE 10 MG/1
10 TABLET ORAL DAILY
Status: DISCONTINUED | OUTPATIENT
Start: 2024-04-19 | End: 2024-04-23 | Stop reason: HOSPADM

## 2024-04-18 RX ORDER — FUROSEMIDE 10 MG/ML
60 INJECTION INTRAMUSCULAR; INTRAVENOUS ONCE
Status: COMPLETED | OUTPATIENT
Start: 2024-04-18 | End: 2024-04-18

## 2024-04-18 RX ORDER — SIMETHICONE 80 MG
1 TABLET,CHEWABLE ORAL 4 TIMES DAILY PRN
Status: DISCONTINUED | OUTPATIENT
Start: 2024-04-18 | End: 2024-04-23 | Stop reason: HOSPADM

## 2024-04-18 RX ADMIN — SODIUM CHLORIDE: 9 INJECTION, SOLUTION INTRAVENOUS at 06:04

## 2024-04-18 RX ADMIN — CEFTRIAXONE 1 G: 1 INJECTION, POWDER, FOR SOLUTION INTRAMUSCULAR; INTRAVENOUS at 08:04

## 2024-04-18 RX ADMIN — FUROSEMIDE 60 MG: 10 INJECTION, SOLUTION INTRAMUSCULAR; INTRAVENOUS at 08:04

## 2024-04-18 RX ADMIN — PANTOPRAZOLE SODIUM 80 MG: 40 INJECTION, POWDER, FOR SOLUTION INTRAVENOUS at 04:04

## 2024-04-18 NOTE — ASSESSMENT & PLAN NOTE
Fairly recent diagnosis of likely COPD/emphysema, without evidence of acute exacerbation.  We will continue Breo along with jak Mata.  Sat goal 88% or greater.

## 2024-04-18 NOTE — ASSESSMENT & PLAN NOTE
Chest x-ray reveals bilateral pleural effusions with evidence of pulmonary vascular congestion.  Most recent TTE 11/2022 shows normal LVEF, without evidence of diastolic dysfunction.  Pleural effusion appears somewhat chronic based on prior chest x-ray.  Suspect some volume overload, which may be secondary to recent blood administration and cirrhosis.  We will check BNP, and consider rechecking updated TTE.  We will give a dose of Lasix for now as I expect he will need blood transfusion.  Monitor for signs and symptoms of respiratory distress.

## 2024-04-18 NOTE — ASSESSMENT & PLAN NOTE
Chronic, however worsened over baseline. Likely  multifactorial from liver disease and acute blood loss.  Currently without indication for platelet transfusion.  We will monitor on serial CBC.

## 2024-04-18 NOTE — ASSESSMENT & PLAN NOTE
Presents with complaints of melena, and hemoglobin noted to be slightly decreased from prior.  He has a history of cirrhosis with esophageal varices per chart, however cannot find documentation of this.  Last EGD in our system was 10/2023, and had no signs of active bleeding.  Had a normal colonoscopy 04/01/2024 without evidence of active or stigmata of bleeding.  We will start PPI b.i.d., prophylactic ceftriaxone given his known cirrhosis, and consult GI for further recommendations.  Hold anticoagulation.  Trend CBC as below, and transfuse 2 maintain hemoglobin 7 or greater. Will likely require repeat EGD.

## 2024-04-18 NOTE — ASSESSMENT & PLAN NOTE
Known chronic normocytic anemia, requiring 5U RBC during recent admission for hypotension and symptomatic anemia.  Recent baseline around 8.1-8.3, and presents with hemoglobin 7.7 with complaints of melena.  He is currently hemodynamically stable.  Given this, we will trend CBC and transfuse to maintain hemoglobin 7 or greater were unless he exhibits hemodynamic instability.  Hold anticoagulation.

## 2024-04-18 NOTE — ED NOTES
..I-STAT Chem-8+ Results:   Value Reference Range   Sodium 142 136-145 mmol/L   Potassium  5.7 3.5-5.1 mmol/L   Chloride 107  mmol/L   Ionized Calcium 1.18 1.06-1.42 mmol/L   CO2 (measured) 30 23-29 mmol/L   Glucose 95  mg/dL   BUN 35 6-30 mg/dL   Creatinine 1.2 0.5-1.4 mg/dL   Hematocrit 29 36-54%

## 2024-04-18 NOTE — ED PROVIDER NOTES
Encounter Date: 4/18/2024       History     Chief Complaint   Patient presents with    Melena     Arrives via EMS with c/o rectal bleeding that started today, coming from [place]      HPI  69 y/o M with medical history of HTN, COPD on home O2, alcoholic cirrhosis with esophageal varices- although last EGD documented in 10/23 with no e/o varices and BPH presents with complaint of bloody stools. States he was recently admitted to the hospital and was transfused blood because of a low blood count., was treated for PNA, colonoscopy completed which he was told was ok  Pt was doing well but then yesterday evening noted a dark bloody stool. Has had 2 additional bloody stools today. Has upper abdominal pain earlier today but not at this time. No nausea or vomiting.  No lightheadedness or dizziness now but does feel lightheaded when he stands up. No chest pain  He is on home O2 at baseline- currently residing at [facility] as he was deconditioned after last hospitalization.    Review of patient's allergies indicates:  No Known Allergies    Past Medical History:   Diagnosis Date    Alcohol abuse     BPH (benign prostatic hyperplasia)     Cirrhosis 06/21/2018    pt states that he was diagnosed a week ago during his last hospital visit    Closed fracture of left distal radius 7/19/2023    Gastritis     Hypertension     Renal disorder      Past Surgical History:   Procedure Laterality Date    CAPSULOTOMY OF JOINT Right 7/29/2019    Procedure: CAPSULOTOMY, JOINT;  Surgeon: Raj Grossman MD;  Location: [facility] OR [address] [place] [address];  Service: Orthopedics;  Laterality: Right;    COLONOSCOPY N/A 4/1/2024    Procedure: COLONOSCOPY;  Surgeon: Scarlett Whitt MD;  Location: [facility] ([address] [place] [address]);  Service: Gastroenterology;  Laterality: N/A;    ESOPHAGOGASTRODUODENOSCOPY N/A 10/25/2023    Procedure: EGD (ESOPHAGOGASTRODUODENOSCOPY);  Surgeon: Lux Rome MD;  Location: [facility] ([address] [place] [address]);  Service: Endoscopy;  Laterality: N/A;     PERCUTANEOUS PINNING OF HIP Right 7/29/2019    Procedure: PINNING, HIP, PERCUTANEOUS- synthes cannulated screws- hana table- C arm door side-;  Surgeon: Raj Grossman MD;  Location: [facility] OR [address] [place] [address];  Service: Orthopedics;  Laterality: Right;     Family History   Problem Relation Name Age of Onset    Hypertension Father      Heart disease Father      Heart disease Brother       Social History     Tobacco Use    Smoking status: Every Day     Current packs/day: 1.00     Types: Cigarettes    Smokeless tobacco: Never   Substance Use Topics    Alcohol use: Yes     Comment: (former drinker; 2months sober) this visit admits drinking a couple beers today    Drug use: No       Physical Exam     Initial Vitals [04/18/24 1305]   BP Pulse Resp Temp SpO2   130/89 109 19 97.8 °F (36.6 °C) 100 %      MAP       --         Physical Exam    Nursing note and vitals reviewed.  Constitutional: He appears well-developed and well-nourished. No distress.   HENT:   Head: Normocephalic and atraumatic.   Neck: Neck supple.   Cardiovascular:            Tachycardia, regular rhythm   Pulmonary/Chest: He has rales.   Abdominal: Abdomen is soft. He exhibits no distension. There is abdominal tenderness.   Genitourinary: Rectum:      Guaiac result positive.   Guaiac positive stool. : Acceptable.   Genitourinary Comments: Melanotic stool     Musculoskeletal:         General: No tenderness or edema.      Cervical back: Neck supple.     Neurological: He is alert and oriented to person, place, and time.   Skin: Skin is warm and dry.         ED Course   Procedures  Labs Reviewed   CBC W/ AUTO DIFFERENTIAL - Abnormal; Notable for the following components:       Result Value    RBC 3.21 (*)     Hemoglobin 7.7 (*)     Hematocrit 28.2 (*)     MCH 24.0 (*)     MCHC 27.3 (*)     RDW 22.1 (*)     Platelets 71 (*)     Lymph # 0.8 (*)     All other components within normal limits   COMPREHENSIVE METABOLIC PANEL - Abnormal; Notable for the  following components:    BUN 24 (*)     Albumin 3.1 (*)     Anion Gap 7 (*)     All other components within normal limits   URINALYSIS, REFLEX TO URINE CULTURE - Abnormal; Notable for the following components:    Occult Blood UA 1+ (*)     All other components within normal limits    Narrative:     Specimen Source->Urine   B-TYPE NATRIURETIC PEPTIDE - Abnormal; Notable for the following components:    BNP 4,361 (*)     All other components within normal limits   CBC W/ AUTO DIFFERENTIAL - Abnormal; Notable for the following components:    RBC 3.04 (*)     Hemoglobin 7.3 (*)     Hematocrit 26.5 (*)     MCH 24.0 (*)     MCHC 27.5 (*)     RDW 22.1 (*)     Lymph # 0.9 (*)     All other components within normal limits   PROTIME-INR - Abnormal; Notable for the following components:    Prothrombin Time 12.8 (*)     All other components within normal limits   ISTAT PROCEDURE - Abnormal; Notable for the following components:    POC BUN 35 (*)     POC Potassium 5.7 (*)     POC TCO2 (MEASURED) 30 (*)     POC Hematocrit 29 (*)     All other components within normal limits   URINALYSIS MICROSCOPIC    Narrative:     Specimen Source->Urine   ALCOHOL,MEDICAL (ETHANOL)   TYPE & SCREEN   ISTAT CHEM8   PREPARE RBC SOFT     EKG Readings: (Independently Interpreted)   Sinus tachykardia, rate 105, no SABA     ECG Results              EKG 12-lead (Final result)        Collection Time Result Time QRS Duration OHS QTC Calculation    04/18/24 14:22:20 04/18/24 15:48:33 100 452                     Final result by Interface, Lab In [facility] (04/18/24 15:48:38)                   Narrative:    Test Reason : K92.2,    Vent. Rate : 105 BPM     Atrial Rate : 105 BPM     P-R Int : 208 ms          QRS Dur : 100 ms      QT Int : 342 ms       P-R-T Axes : 042 056 156 degrees     QTc Int : 452 ms    Sinus tachycardia  Low septal forces ; Abnormal R wave progression in the precordial leads   -Possible Anterior infarct ,age undetermined  Abnormal ECG  When  compared with ECG of 28-MAR-2024 08:31,  Sinus rhythm has replaced Junctional rhythm  ST no longer depressed in Anterior leads  T wave inversion no longer evident in Lateral leads  Confirmed by Mariano MIRANDA MD ([id number]) on 4/18/2024 3:48:32 PM    Referred By: NAHEED   SELF           Confirmed By:Mariano MIRANDA MD                                  Imaging Results              X-Ray Chest AP Portable (Final result)  Result time 04/18/24 17:19:08      Final result by Og Baker MD (04/18/24 17:19:08)                   Impression:      Increase in right-sided pleural effusion and new left-sided pleural effusion along with pulmonary vascular congestion and pulmonary interstitial edema, suggestive of fluid overload state.      Electronically signed by: Og Baker MD  Date:    04/18/2024  Time:    17:19               Narrative:    EXAMINATION:  XR CHEST AP PORTABLE    CLINICAL HISTORY:  Shortness of breath    TECHNIQUE:  Single frontal view of the chest was performed.    COMPARISON:  03/28/2024.    FINDINGS:  Monitoring EKG leads are present.  The trachea is unremarkable.  There is enlargement of the cardiac silhouette.  There is increase in the right-sided pleural effusion.  There is a new left-sided pleural effusion.  There is no evidence of a pneumothorax.  There is no evidence of pneumomediastinum.  There is pulmonary interstitial edema along with worsening pulmonary vascular congestion.  There are degenerative changes in the osseous structures.                                    X-Rays:   Independently Interpreted Readings:   Chest X-Ray: Cardiomegaly present.  Increased vascular markings consistent with CHF are present. Bilateral pleural effusion present.     Medications   0.9%  NaCl infusion (for blood administration) ( Intravenous New Bag 4/18/24 0484)   sodium chloride 0.9% flush 10 mL (has no administration in time range)   melatonin tablet 6 mg (has no administration in time range)   0.9%  NaCl infusion (for blood  administration) (has no administration in time range)   pantoprazole injection 40 mg (has no administration in time range)   cefTRIAXone (Rocephin) 1 g in dextrose 5 % in water (D5W) 100 mL IVPB (MB+) (0 g Intravenous Stopped 4/18/24 2053)   albuterol-ipratropium 2.5 mg-0.5 mg/3 mL nebulizer solution 3 mL (has no administration in time range)   EScitalopram oxalate tablet 10 mg (has no administration in time range)   fluticasone furoate-vilanteroL 100-25 mcg/dose diskus inhaler 1 puff (has no administration in time range)   thiamine tablet 100 mg (has no administration in time range)   sodium chloride 0.9% flush 1-10 mL (has no administration in time range)   ondansetron disintegrating tablet 8 mg (has no administration in time range)   polyethylene glycol packet 17 g (has no administration in time range)   acetaminophen tablet 650 mg (has no administration in time range)   simethicone chewable tablet 80 mg (has no administration in time range)   aluminum-magnesium hydroxide-simethicone 200-200-20 mg/5 mL suspension 30 mL (has no administration in time range)   acetaminophen tablet 650 mg (has no administration in time range)   naloxone 0.4 mg/mL injection 0.02 mg (has no administration in time range)   glucose chewable tablet 16 g (has no administration in time range)   glucose chewable tablet 24 g (has no administration in time range)   glucagon (human recombinant) injection 1 mg (has no administration in time range)   pantoprazole injection 80 mg (80 mg Intravenous Given 4/18/24 1612)   furosemide injection 60 mg (60 mg Intravenous Given 4/18/24 2005)       Medical Decision Making  9 y/o M HTN, COPD on home O2, alcoholic cirrhosis with esophageal varices- although last EGD documented in 10/23 with no e/o varices and BPH, recent colonoscopy unremarkable. Grossly bloody stool on exam. Concern for UGIB  Protonix ordered. Anticipate admission    4:08 PM  Normal renal function.. Hb 7,7, was discharged on 4/4 with Hb  of 8.3. Given grossly bloody stool will order 1U prbc. Pt also noting increased SOB at this time. CXR ordered  Admit to hospital medicine. GI consult    Amount and/or Complexity of Data Reviewed  External Data Reviewed: labs.  Labs: ordered. Decision-making details documented in ED Course.  Radiology: ordered.  ECG/medicine tests: ordered and independent interpretation performed. Decision-making details documented in ED Course.    Risk  OTC drugs.  Prescription drug management.                                Clinical Impression:  Final diagnoses:  [K92.2] GIB (gastrointestinal bleeding)  [R06.02] Shortness of breath          ED Disposition Condition    Observation Stable                Norma Orta MD  04/18/24 0861

## 2024-04-18 NOTE — ASSESSMENT & PLAN NOTE
Patient with known Cirrhosis with Child's class B (based on most recent hospitalization data). Co-morbidities are present and inclusive of ascites, esophageal varices, and anemia/pancytopenia.  MELD-Na score calculated; MELD 3.0: 19 at 4/1/2024  2:26 AM  MELD-Na: 19 at 4/1/2024  2:26 AM  Calculated from:  Serum Creatinine: 1.6 mg/dL at 4/1/2024  2:26 AM  Serum Sodium: 132 mmol/L at 4/1/2024  2:26 AM  Total Bilirubin: 0.9 mg/dL (Using min of 1 mg/dL) at 4/1/2024  2:26 AM  Serum Albumin: 3.1 g/dL at 4/1/2024  2:26 AM  INR(ratio): 1.4 at 3/30/2024  5:13 AM  Age at listing (hypothetical): 68 years  Sex: Male at 4/1/2024  2:26 AM      Continue chronic meds. Etiology likely ETOH. Will avoid any hepatotoxic meds, and monitor CBC/CMP/INR for synthetic function.     Appreciate GI recommendations.

## 2024-04-18 NOTE — ASSESSMENT & PLAN NOTE
Renal function remains mildly improved over recent baseline. Avoid nephrotoxic agents as able, and renally dose all meds as applicable.

## 2024-04-18 NOTE — ASSESSMENT & PLAN NOTE
Chronic multifactorial anemia noted.  Lab anemia workup will likely be of little benefit given recent transfusion of 5U earlier this month. We will continue home folic acid after likely EGD. Monitor as above.

## 2024-04-19 ENCOUNTER — ANESTHESIA EVENT (OUTPATIENT)
Dept: ENDOSCOPY | Facility: HOSPITAL | Age: 69
DRG: 378 | End: 2024-04-19
Payer: MEDICARE

## 2024-04-19 ENCOUNTER — ANESTHESIA (OUTPATIENT)
Dept: ENDOSCOPY | Facility: HOSPITAL | Age: 69
DRG: 378 | End: 2024-04-19
Payer: MEDICARE

## 2024-04-19 LAB
ALBUMIN SERPL BCP-MCNC: 2.9 G/DL (ref 3.5–5.2)
ALP SERPL-CCNC: 75 U/L (ref 55–135)
ALT SERPL W/O P-5'-P-CCNC: 9 U/L (ref 10–44)
ANION GAP SERPL CALC-SCNC: 11 MMOL/L (ref 8–16)
AST SERPL-CCNC: 17 U/L (ref 10–40)
BASOPHILS # BLD AUTO: 0.03 K/UL (ref 0–0.2)
BASOPHILS NFR BLD: 0.7 % (ref 0–1.9)
BASOPHILS NFR BLD: 0.8 % (ref 0–1.9)
BASOPHILS NFR BLD: 0.9 % (ref 0–1.9)
BILIRUB SERPL-MCNC: 0.8 MG/DL (ref 0.1–1)
BLD PROD TYP BPU: NORMAL
BLOOD UNIT EXPIRATION DATE: NORMAL
BLOOD UNIT TYPE CODE: 5100
BLOOD UNIT TYPE: NORMAL
BUN SERPL-MCNC: 24 MG/DL (ref 8–23)
CALCIUM SERPL-MCNC: 9.1 MG/DL (ref 8.7–10.5)
CHLORIDE SERPL-SCNC: 103 MMOL/L (ref 95–110)
CO2 SERPL-SCNC: 27 MMOL/L (ref 23–29)
CODING SYSTEM: NORMAL
CREAT SERPL-MCNC: 1.1 MG/DL (ref 0.5–1.4)
CROSSMATCH INTERPRETATION: NORMAL
DIFFERENTIAL METHOD BLD: ABNORMAL
DISPENSE STATUS: NORMAL
EOSINOPHIL # BLD AUTO: 0 K/UL (ref 0–0.5)
EOSINOPHIL NFR BLD: 0.5 % (ref 0–8)
EOSINOPHIL NFR BLD: 0.8 % (ref 0–8)
EOSINOPHIL NFR BLD: 1.2 % (ref 0–8)
ERYTHROCYTE [DISTWIDTH] IN BLOOD BY AUTOMATED COUNT: 22.3 % (ref 11.5–14.5)
ERYTHROCYTE [DISTWIDTH] IN BLOOD BY AUTOMATED COUNT: 22.5 % (ref 11.5–14.5)
ERYTHROCYTE [DISTWIDTH] IN BLOOD BY AUTOMATED COUNT: 22.7 % (ref 11.5–14.5)
EST. GFR  (NO RACE VARIABLE): >60 ML/MIN/1.73 M^2
GLUCOSE SERPL-MCNC: 73 MG/DL (ref 70–110)
HCT VFR BLD AUTO: 24 % (ref 40–54)
HCT VFR BLD AUTO: 25.2 % (ref 40–54)
HCT VFR BLD AUTO: 26.2 % (ref 40–54)
HGB BLD-MCNC: 6.6 G/DL (ref 14–18)
HGB BLD-MCNC: 7 G/DL (ref 14–18)
HGB BLD-MCNC: 7.2 G/DL (ref 14–18)
IMM GRANULOCYTES # BLD AUTO: 0.01 K/UL (ref 0–0.04)
IMM GRANULOCYTES # BLD AUTO: 0.01 K/UL (ref 0–0.04)
IMM GRANULOCYTES # BLD AUTO: 0.02 K/UL (ref 0–0.04)
IMM GRANULOCYTES NFR BLD AUTO: 0.2 % (ref 0–0.5)
IMM GRANULOCYTES NFR BLD AUTO: 0.3 % (ref 0–0.5)
IMM GRANULOCYTES NFR BLD AUTO: 0.6 % (ref 0–0.5)
LYMPHOCYTES # BLD AUTO: 0.8 K/UL (ref 1–4.8)
LYMPHOCYTES # BLD AUTO: 0.9 K/UL (ref 1–4.8)
LYMPHOCYTES # BLD AUTO: 0.9 K/UL (ref 1–4.8)
LYMPHOCYTES NFR BLD: 20.8 % (ref 18–48)
LYMPHOCYTES NFR BLD: 23.9 % (ref 18–48)
LYMPHOCYTES NFR BLD: 24.1 % (ref 18–48)
MCH RBC QN AUTO: 22.8 PG (ref 27–31)
MCH RBC QN AUTO: 23.5 PG (ref 27–31)
MCH RBC QN AUTO: 24 PG (ref 27–31)
MCHC RBC AUTO-ENTMCNC: 27.5 G/DL (ref 32–36)
MCHC RBC AUTO-ENTMCNC: 27.5 G/DL (ref 32–36)
MCHC RBC AUTO-ENTMCNC: 27.8 G/DL (ref 32–36)
MCV RBC AUTO: 83 FL (ref 82–98)
MCV RBC AUTO: 85 FL (ref 82–98)
MCV RBC AUTO: 87 FL (ref 82–98)
MONOCYTES # BLD AUTO: 0.3 K/UL (ref 0.3–1)
MONOCYTES # BLD AUTO: 0.4 K/UL (ref 0.3–1)
MONOCYTES # BLD AUTO: 0.4 K/UL (ref 0.3–1)
MONOCYTES NFR BLD: 10.3 % (ref 4–15)
MONOCYTES NFR BLD: 9 % (ref 4–15)
MONOCYTES NFR BLD: 9.6 % (ref 4–15)
NEUTROPHILS # BLD AUTO: 2.2 K/UL (ref 1.8–7.7)
NEUTROPHILS # BLD AUTO: 2.3 K/UL (ref 1.8–7.7)
NEUTROPHILS # BLD AUTO: 2.8 K/UL (ref 1.8–7.7)
NEUTROPHILS NFR BLD: 63.2 % (ref 38–73)
NEUTROPHILS NFR BLD: 64.3 % (ref 38–73)
NEUTROPHILS NFR BLD: 68.8 % (ref 38–73)
NRBC BLD-RTO: 0 /100 WBC
PLATELET # BLD AUTO: 155 K/UL (ref 150–450)
PLATELET # BLD AUTO: 163 K/UL (ref 150–450)
PLATELET # BLD AUTO: 188 K/UL (ref 150–450)
PMV BLD AUTO: 10.8 FL (ref 9.2–12.9)
PMV BLD AUTO: 11.2 FL (ref 9.2–12.9)
PMV BLD AUTO: 11.3 FL (ref 9.2–12.9)
POTASSIUM SERPL-SCNC: 3.9 MMOL/L (ref 3.5–5.1)
PROT SERPL-MCNC: 6.5 G/DL (ref 6–8.4)
RBC # BLD AUTO: 2.89 M/UL (ref 4.6–6.2)
RBC # BLD AUTO: 2.98 M/UL (ref 4.6–6.2)
RBC # BLD AUTO: 3 M/UL (ref 4.6–6.2)
SODIUM SERPL-SCNC: 141 MMOL/L (ref 136–145)
TRANS ERYTHROCYTES VOL PATIENT: NORMAL ML
WBC # BLD AUTO: 3.4 K/UL (ref 3.9–12.7)
WBC # BLD AUTO: 3.56 K/UL (ref 3.9–12.7)
WBC # BLD AUTO: 4.09 K/UL (ref 3.9–12.7)

## 2024-04-19 PROCEDURE — 63600175 PHARM REV CODE 636 W HCPCS: Performed by: STUDENT IN AN ORGANIZED HEALTH CARE EDUCATION/TRAINING PROGRAM

## 2024-04-19 PROCEDURE — 36415 COLL VENOUS BLD VENIPUNCTURE: CPT | Performed by: STUDENT IN AN ORGANIZED HEALTH CARE EDUCATION/TRAINING PROGRAM

## 2024-04-19 PROCEDURE — 25000003 PHARM REV CODE 250: Performed by: NURSE ANESTHETIST, CERTIFIED REGISTERED

## 2024-04-19 PROCEDURE — 63600175 PHARM REV CODE 636 W HCPCS: Performed by: NURSE ANESTHETIST, CERTIFIED REGISTERED

## 2024-04-19 PROCEDURE — D9220A PRA ANESTHESIA: Mod: CRNA,,, | Performed by: NURSE ANESTHETIST, CERTIFIED REGISTERED

## 2024-04-19 PROCEDURE — 85025 COMPLETE CBC W/AUTO DIFF WBC: CPT | Mod: 91 | Performed by: STUDENT IN AN ORGANIZED HEALTH CARE EDUCATION/TRAINING PROGRAM

## 2024-04-19 PROCEDURE — 94761 N-INVAS EAR/PLS OXIMETRY MLT: CPT

## 2024-04-19 PROCEDURE — P9021 RED BLOOD CELLS UNIT: HCPCS | Performed by: EMERGENCY MEDICINE

## 2024-04-19 PROCEDURE — 99223 1ST HOSP IP/OBS HIGH 75: CPT | Mod: 25,,, | Performed by: INTERNAL MEDICINE

## 2024-04-19 PROCEDURE — C9113 INJ PANTOPRAZOLE SODIUM, VIA: HCPCS | Performed by: STUDENT IN AN ORGANIZED HEALTH CARE EDUCATION/TRAINING PROGRAM

## 2024-04-19 PROCEDURE — 99900035 HC TECH TIME PER 15 MIN (STAT)

## 2024-04-19 PROCEDURE — 27000221 HC OXYGEN, UP TO 24 HOURS

## 2024-04-19 PROCEDURE — 43270 EGD LESION ABLATION: CPT | Performed by: INTERNAL MEDICINE

## 2024-04-19 PROCEDURE — 0W3P8ZZ CONTROL BLEEDING IN GASTROINTESTINAL TRACT, VIA NATURAL OR ARTIFICIAL OPENING ENDOSCOPIC: ICD-10-PCS | Performed by: INTERNAL MEDICINE

## 2024-04-19 PROCEDURE — 25000242 PHARM REV CODE 250 ALT 637 W/ HCPCS: Performed by: STUDENT IN AN ORGANIZED HEALTH CARE EDUCATION/TRAINING PROGRAM

## 2024-04-19 PROCEDURE — 37000009 HC ANESTHESIA EA ADD 15 MINS: Performed by: INTERNAL MEDICINE

## 2024-04-19 PROCEDURE — 27202087 HC PROBE, APC: Performed by: INTERNAL MEDICINE

## 2024-04-19 PROCEDURE — 43270 EGD LESION ABLATION: CPT | Mod: ,,, | Performed by: INTERNAL MEDICINE

## 2024-04-19 PROCEDURE — D9220A PRA ANESTHESIA: Mod: ANES,,, | Performed by: ANESTHESIOLOGY

## 2024-04-19 PROCEDURE — 80053 COMPREHEN METABOLIC PANEL: CPT | Performed by: STUDENT IN AN ORGANIZED HEALTH CARE EDUCATION/TRAINING PROGRAM

## 2024-04-19 PROCEDURE — 30233N1 TRANSFUSION OF NONAUTOLOGOUS RED BLOOD CELLS INTO PERIPHERAL VEIN, PERCUTANEOUS APPROACH: ICD-10-PCS | Performed by: INTERNAL MEDICINE

## 2024-04-19 PROCEDURE — 21400001 HC TELEMETRY ROOM

## 2024-04-19 PROCEDURE — 37000008 HC ANESTHESIA 1ST 15 MINUTES: Performed by: INTERNAL MEDICINE

## 2024-04-19 PROCEDURE — 25000003 PHARM REV CODE 250: Performed by: STUDENT IN AN ORGANIZED HEALTH CARE EDUCATION/TRAINING PROGRAM

## 2024-04-19 RX ORDER — LIDOCAINE HYDROCHLORIDE 20 MG/ML
INJECTION INTRAVENOUS
Status: DISCONTINUED | OUTPATIENT
Start: 2024-04-19 | End: 2024-04-19

## 2024-04-19 RX ORDER — GLUCAGON 1 MG
KIT INJECTION
Status: DISCONTINUED | OUTPATIENT
Start: 2024-04-19 | End: 2024-04-19

## 2024-04-19 RX ORDER — DICYCLOMINE HYDROCHLORIDE 10 MG/1
10 CAPSULE ORAL 4 TIMES DAILY PRN
Status: DISCONTINUED | OUTPATIENT
Start: 2024-04-19 | End: 2024-04-23 | Stop reason: HOSPADM

## 2024-04-19 RX ORDER — FENTANYL CITRATE 50 UG/ML
25 INJECTION, SOLUTION INTRAMUSCULAR; INTRAVENOUS EVERY 5 MIN PRN
Status: DISCONTINUED | OUTPATIENT
Start: 2024-04-19 | End: 2024-04-19 | Stop reason: HOSPADM

## 2024-04-19 RX ORDER — PROPOFOL 10 MG/ML
VIAL (ML) INTRAVENOUS CONTINUOUS PRN
Status: DISCONTINUED | OUTPATIENT
Start: 2024-04-19 | End: 2024-04-19

## 2024-04-19 RX ORDER — PROPOFOL 10 MG/ML
VIAL (ML) INTRAVENOUS
Status: DISCONTINUED | OUTPATIENT
Start: 2024-04-19 | End: 2024-04-19

## 2024-04-19 RX ORDER — HYDROCODONE BITARTRATE AND ACETAMINOPHEN 500; 5 MG/1; MG/1
TABLET ORAL
Status: DISCONTINUED | OUTPATIENT
Start: 2024-04-19 | End: 2024-04-23 | Stop reason: HOSPADM

## 2024-04-19 RX ORDER — ONDANSETRON HYDROCHLORIDE 2 MG/ML
4 INJECTION, SOLUTION INTRAVENOUS DAILY PRN
Status: DISCONTINUED | OUTPATIENT
Start: 2024-04-19 | End: 2024-04-19 | Stop reason: HOSPADM

## 2024-04-19 RX ORDER — POTASSIUM CHLORIDE 20 MEQ/1
20 TABLET, EXTENDED RELEASE ORAL DAILY
Status: ON HOLD | COMMUNITY
End: 2024-04-20 | Stop reason: HOSPADM

## 2024-04-19 RX ADMIN — LIDOCAINE HYDROCHLORIDE 50 MG: 20 INJECTION INTRAVENOUS at 11:04

## 2024-04-19 RX ADMIN — GLUCAGON 0.25 MG: 1 INJECTION, POWDER, LYOPHILIZED, FOR SOLUTION INTRAMUSCULAR; INTRAVENOUS at 11:04

## 2024-04-19 RX ADMIN — ACETAMINOPHEN 650 MG: 325 TABLET ORAL at 09:04

## 2024-04-19 RX ADMIN — PROPOFOL 150 MCG/KG/MIN: 10 INJECTION, EMULSION INTRAVENOUS at 11:04

## 2024-04-19 RX ADMIN — DICYCLOMINE HYDROCHLORIDE 10 MG: 10 CAPSULE ORAL at 11:04

## 2024-04-19 RX ADMIN — PROPOFOL 50 MG: 10 INJECTION, EMULSION INTRAVENOUS at 11:04

## 2024-04-19 RX ADMIN — PANTOPRAZOLE SODIUM 40 MG: 40 INJECTION, POWDER, FOR SOLUTION INTRAVENOUS at 09:04

## 2024-04-19 RX ADMIN — FLUTICASONE FUROATE AND VILANTEROL TRIFENATATE 1 PUFF: 100; 25 POWDER RESPIRATORY (INHALATION) at 08:04

## 2024-04-19 RX ADMIN — ESCITALOPRAM OXALATE 10 MG: 10 TABLET ORAL at 08:04

## 2024-04-19 RX ADMIN — ACETAMINOPHEN 650 MG: 325 TABLET ORAL at 08:04

## 2024-04-19 RX ADMIN — SODIUM CHLORIDE: 0.9 INJECTION, SOLUTION INTRAVENOUS at 10:04

## 2024-04-19 RX ADMIN — CEFTRIAXONE 1 G: 1 INJECTION, POWDER, FOR SOLUTION INTRAMUSCULAR; INTRAVENOUS at 09:04

## 2024-04-19 RX ADMIN — ACETAMINOPHEN 650 MG: 325 TABLET ORAL at 04:04

## 2024-04-19 RX ADMIN — PROPOFOL 20 MG: 10 INJECTION, EMULSION INTRAVENOUS at 11:04

## 2024-04-19 RX ADMIN — THIAMINE HCL TAB 100 MG 100 MG: 100 TAB at 08:04

## 2024-04-19 RX ADMIN — PANTOPRAZOLE SODIUM 40 MG: 40 INJECTION, POWDER, FOR SOLUTION INTRAVENOUS at 04:04

## 2024-04-19 NOTE — PLAN OF CARE
[facility] - Med Surg ([facility])  Initial Discharge Assessment       Primary Care Provider: [facility]    Admission Diagnosis: Shortness of breath [R06.02]  GIB (gastrointestinal bleeding) [K92.2]  Chest pain [R07.9]    Admission Date: 4/18/2024  Expected Discharge Date: 4/20/2024    Transition of Care Barriers: (P) None    Payor: [employer] [facility] / Plan: [employer] / Product Type: Medicare Advantage /     Extended Emergency Contact Information  Primary Emergency Contact: Ina Quintero   [place]  Mobile Phone: [phone]  Relation: Friend  Secondary Emergency Contact: Tonya Lewis   [facility]  Home Phone: [phone]  Relation: Friend    Discharge Plan A: (P) Other, Return to nursing home (SnF return)         [id number] - [place] - [address]  [address]  [place] [address]  Phone: [phone] Fax: [phone]    [id number] - [place] - [address] AT [facility] [address]  [address] [place] [address]  Phone: [phone] Fax: [phone]    [facility]  [address]  [facility] [address]  Phone: [phone] Fax: [phone]      Initial Assessment (most recent)       Adult Discharge Assessment - 04/19/24 1640          Discharge Assessment    Assessment Type Discharge Planning Assessment (P)      Confirmed/corrected address, phone number and insurance Yes (P)      Confirmed Demographics Correct on Facesheet (P)      Source of Information patient;health record (P)      Communicated ARTURO with patient/caregiver Yes (P)      People in Home facility resident (P)      Do you expect to return to your current living situation? Yes (P)      Prior to hospitilization cognitive status: Alert/Oriented (P)      Current cognitive status: Alert/Oriented (P)      Readmission within 30 days? Yes (P)      Patient currently being followed by  outpatient case management? No (P)      Do you currently have service(s) that help you manage your care at home? No (P)      Do you take prescription medications? Yes (P)      Do you have prescription coverage? Yes (P)      Do you have any problems affording any of your prescribed medications? No (P)      Is the patient taking medications as prescribed? yes (P)      How do you get to doctors appointments? health plan transportation (P)      Discharge Plan A Other;Return to nursing home (P)    SnF return    DME Needed Upon Discharge  none (P)      Discharge Plan discussed with: Patient (P)      Transition of Care Barriers None (P)         Physical Activity    On average, how many days per week do you engage in moderate to strenuous exercise (like a brisk walk)? 0 days (P)      On average, how many minutes do you engage in exercise at this level? 0 min (P)         Financial Resource Strain    How hard is it for you to pay for the very basics like food, housing, medical care, and heating? Not very hard (P)         Housing Stability    In the last 12 months, was there a time when you were not able to pay the mortgage or rent on time? No (P)      In the past 12 months, how many times have you moved where you were living? 2 (P)      At any time in the past 12 months, were you homeless or living in a shelter (including now)? No (P)         Transportation Needs    In the past 12 months, has lack of transportation kept you from medical appointments or from getting medications? No (P)      In the past 12 months, has lack of transportation kept you from meetings, work, or from getting things needed for daily living? No (P)         Food Insecurity    Within the past 12 months, you worried that your food would run out before you got the money to buy more. Never true (P)         Stress    Do you feel stress - tense, restless, nervous, or anxious, or unable to sleep at night because your mind is troubled all the time - these days?  To some extent (P)         Social Connections    In a typical week, how many times do you talk on the phone with family, friends, or neighbors? Once a week (P)      How often do you get together with friends or relatives? Once a week (P)      How often do you attend Jew or Church services? Never (P)      Do you belong to any clubs or organizations such as Jew groups, unions, fraternal or athletic groups, or school groups? No (P)      How often do you attend meetings of the clubs or organizations you belong to? Never (P)         Alcohol Use    Q1: How often do you have a drink containing alcohol? Patient declined (P)    has a drinking history documented

## 2024-04-19 NOTE — ANESTHESIA PREPROCEDURE EVALUATION
04/19/2024  Fransico Montalvo is a 68 y.o., male.  Pre-operative evaluation for Procedure(s) (LRB):  EGD (ESOPHAGOGASTRODUODENOSCOPY) (N/A)    Fransico Montalvo is a 68 y.o. 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 Grossman    Multiple renal cysts    Cholelithiases    Chronic pain of right knee    Impaired functional mobility and endurance    Syncope    Closed fracture of multiple ribs of left side with routine healing    Closed fracture of left distal radius    Gastrointestinal hemorrhage with melena    Pancreatic, splenic and liver lesion    Therapeutic opioid induced constipation    On deep vein thrombosis (DVT) prophylaxis    Closed fracture of multiple thoracic vertebrae with routine healing    Pneumonia    GI bleed    Cirrhosis    Hyponatremia    COPD exacerbation    Debility    Acute hypoxemic respiratory failure    Other emphysema    Normocytic anemia    Pleural effusion       Past Surgical History:   Procedure Laterality Date    CAPSULOTOMY OF JOINT Right 7/29/2019    Procedure: CAPSULOTOMY, JOINT;  Surgeon: Raj Grossman MD;  Location: [facility] OR [address] [place] [address];  Service: Orthopedics;  Laterality: Right;    COLONOSCOPY N/A 4/1/2024    Procedure: COLONOSCOPY;  Surgeon: Scarlett Whitt MD;  Location: [facility]  ENDO (2ND FLR);  Service: Gastroenterology;  Laterality: N/A;    ESOPHAGOGASTRODUODENOSCOPY N/A 10/25/2023    Procedure: EGD (ESOPHAGOGASTRODUODENOSCOPY);  Surgeon: Lux Rome MD;  Location: [facility] (2ND FLR);  Service: Endoscopy;  Laterality: N/A;    PERCUTANEOUS PINNING OF HIP Right 7/29/2019    Procedure: PINNING, HIP, PERCUTANEOUS- synthes cannulated screws- hana table- C arm door side-;  Surgeon: Raj Grossman MD;  Location: [facility] OR 2ND FLR;  Service: Orthopedics;  Laterality: Right;       Social History     Socioeconomic History    Marital status: Single   Tobacco Use    Smoking status: Every Day     Current packs/day: 1.00     Types: Cigarettes    Smokeless tobacco: Never   Substance and Sexual Activity    Alcohol use: Yes     Comment: (former drinker; 2months sober) this visit admits drinking a couple beers today    Drug use: No     Social Determinants of Health     Financial Resource Strain: Low Risk  (3/30/2024)    Overall Financial Resource Strain (CARDIA)     Difficulty of Paying Living Expenses: Not hard at all   Food Insecurity: No Food Insecurity (3/30/2024)    Hunger Vital Sign     Worried About Running Out of Food in the Last Year: Never true     Ran Out of Food in the Last Year: Never true   Transportation Needs: No Transportation Needs (3/30/2024)    PRAPARE - Transportation     Lack of Transportation (Medical): No     Lack of Transportation (Non-Medical): No   Physical Activity: Insufficiently Active (3/30/2024)    Exercise Vital Sign     Days of Exercise per Week: 4 days     Minutes of Exercise per Session: 20 min   Stress: Stress Concern Present (3/30/2024)    [employer] [place] [employer] - Occupational Stress Questionnaire     Feeling of Stress : To some extent   Social Connections: Socially Isolated (3/30/2024)    Social Connection and Isolation Panel [NHANES]     Frequency of Communication with Friends and Family: Once a week     Frequency of Social Gatherings  with Friends and Family: Once a week     Attends Gnosticism Services: Never     Active Member of Clubs or Organizations: No     Attends Club or Organization Meetings: Never     Marital Status: Never    Housing Stability: Low Risk  (3/30/2024)    Housing Stability Vital Sign     Unable to Pay for Housing in the Last Year: No     Number of Places Lived in the Last Year: 1     Unstable Housing in the Last Year: No       No current facility-administered medications on file prior to encounter.     Current Outpatient Medications on File Prior to Encounter   Medication Sig Dispense Refill    acetaminophen (TYLENOL) 325 MG tablet Take 2 tablets (650 mg total) by mouth every 8 (eight) hours as needed for Pain.  0    albuterol-ipratropium (DUO-NEB) 2.5 mg-0.5 mg/3 mL nebulizer solution Take 3 mLs by nebulization every 6 (six) hours as needed for Wheezing or Shortness of Breath. Rescue 75 mL 0    dicyclomine (BENTYL) 10 MG capsule Take 1 capsule (10 mg total) by mouth 3 (three) times daily as needed (abdominal pain).      EScitalopram oxalate (LEXAPRO) 10 MG tablet Take 10 mg by mouth once daily.      fluticasone furoate-vilanteroL (BREO) 100-25 mcg/dose diskus inhaler Inhale 1 puff into the lungs once daily. Controller 30 each 0    folic acid (FOLVITE) 1 MG tablet Take 1 tablet (1 mg total) by mouth once daily.  0    melatonin (MELATIN) 3 mg tablet Take 2 tablets (6 mg total) by mouth nightly as needed for Insomnia.  0    ondansetron (ZOFRAN-ODT) 8 MG TbDL Take 1 tablet (8 mg total) by mouth every 8 (eight) hours as needed (Nausea).  30    pantoprazole (PROTONIX) 40 MG tablet Take 1 tablet (40 mg total) by mouth once daily. 30 tablet 1    thiamine 100 MG tablet Take 1 tablet (100 mg total) by mouth once daily. 30 tablet 0       Review of patient's allergies indicates:  No Known Allergies      CBC:   Recent Labs     04/19/24  0409 04/19/24  0840   WBC 3.40* 3.56*   RBC 3.00* 2.98*   HGB 7.2* 7.0*   HCT 26.2* 25.2*   PLT  155 188   MCV 87 85   MCH 24.0* 23.5*   MCHC 27.5* 27.8*       CMP:   Recent Labs     04/18/24  1437 04/19/24  0409    141   K 5.1 3.9    103   CO2 26 27   BUN 24* 24*   CREATININE 1.0 1.1   GLU 92 73   CALCIUM 8.7 9.1   ALBUMIN 3.1* 2.9*   PROT 7.0 6.5   ALKPHOS 78 75   ALT 12 9*   AST 25 17   BILITOT 0.7 0.8       INR  Recent Labs     04/18/24  1913   INR 1.2         Diagnostic Studies:    EKG:   Results for orders placed or performed during the hospital encounter of 04/18/24   EKG 12-lead    Collection Time: 04/18/24  2:22 PM   Result Value Ref Range    QRS Duration 100 ms    [facility]HS QTC Calculation 452 ms    Narrative    Test Reason : K92.2,    Vent. Rate : 105 BPM     Atrial Rate : 105 BPM     P-R Int : 208 ms          QRS Dur : 100 ms      QT Int : 342 ms       P-R-T Axes : 042 056 156 degrees     QTc Int : 452 ms    Sinus tachycardia  Low septal forces ; Abnormal R wave progression in the precordial leads   -Possible Anterior infarct ,age undetermined  Abnormal ECG  When compared with ECG of 28-MAR-2024 08:31,  Sinus rhythm has replaced Junctional rhythm  ST no longer depressed in Anterior leads  T wave inversion no longer evident in Lateral leads  Confirmed by Mariano MIRANDA MD ([id number]) on 4/18/2024 3:48:32 PM    Referred By: [email or web address]   SELF           Confirmed By:Mariano MIRANDA MD       TTE:  Results for orders placed or performed during the hospital encounter of 11/02/22   Echo   Result Value Ref Range    AV mean gradient 3 mmHg    Ao peak alfredo 1.15 m/s    Ao VTI 19.09 cm    IVS 1.03 0.6 - 1.1 cm    LA size 4.05 cm    Left Atrium Major Axis 5.19 cm    Left Atrium Minor Axis 5.61 cm    LVIDd 5.40 3.5 - 6.0 cm    LVIDs 3.65 2.1 - 4.0 cm    LVOT diameter 2.34 cm    LVOT peak VTI 11.45 cm    Posterior Wall 0.88 0.6 - 1.1 cm    MV Peak A Alfredo 0.55 m/s    E wave deceleration time 285.49 msec    MV Peak E Alfredo 0.32 m/s    RA Major Axis 4.39 cm    RA Width 3.47 cm    RVDD 2.99 cm    TR Max Alfredo 2.33 m/s    TDI LATERAL  0.06 m/s    TDI SEPTAL 0.05 m/s    LA WIDTH 4.04 cm    MV stenosis pressure 1/2 time 82.79 ms    LV Diastolic Volume 150.99 mL    LV Systolic Volume 56.36 mL    LVOT peak marilee 0.75 m/s    LA volume (mod) 64.33 cm3    LV LATERAL E/E' RATIO 5.33 m/s    LV SEPTAL E/E' RATIO 6.40 m/s    FS 32 %    LA volume 74.99 cm3    LV mass 194.58 g    Left Ventricle Relative Wall Thickness 0.33 cm    AV valve area 2.58 cm2    AV Velocity Ratio 0.65     AV index (prosthetic) 0.60     MV valve area p 1/2 method 2.66 cm2    E/A ratio 0.58     Mean e' 0.06 m/s    LVOT area 4.3 cm2    LVOT stroke volume 49.22 cm3    AV peak gradient 5 mmHg    E/E' ratio 5.82 m/s    LV Systolic Volume Index 27.8 mL/m2    LV Diastolic Volume Index 74.38 mL/m2    LA Volume Index 36.9 mL/m2    LV Mass Index 96 g/m2    Triscuspid Valve Regurgitation Peak Gradient 22 mmHg    LA Volume Index (Mod) 31.7 mL/m2    BSA 2.03 m2    Right Atrial Pressure (from IVC) 3 mmHg    EF 60 %    TV resting pulmonary artery pressure 25 mmHg    Narrative    · Technically challenging study with poor acoustic windows.  · The left ventricle is normal in size with normal systolic function.  · The estimated ejection fraction is 60%.  · Normal right ventricular size with normal right ventricular systolic   function.  · The estimated PA systolic pressure is 25 mmHg.  · Normal central venous pressure (3 mmHg).        EF   Date Value Ref Range Status   11/03/2022 60 % Final      Results for orders placed or performed during the hospital encounter of 07/29/18   2D echo with color flow doppler   Result Value Ref Range    EF + QEF 55 55 - 65    Mitral Valve Regurgitation TRIVIAL     Diastolic Dysfunction No     Est. PA Systolic Pressure 29.21     Tricuspid Valve Regurgitation TRIVIAL        RASHID:  No results found for this or any previous visit.    Stress Test:  No results found for this or any previous visit.       LHC:  No results found for this or any previous visit.       PFT:  No results  "found for: "FEV1", "FVC", "[id number]", "TLC", "DLCO"     ALLERGIES:   Review of patient's allergies indicates:  No Known Allergies  LDA:          Lines/Drains/Airways       Peripheral Intravenous Line  Duration                  Peripheral IV - Single Lumen 04/18/24 1437 20 G Posterior;Right Hand <1 day                   Anesthesia Evaluation      Airway   Mallampati: III  TM distance: > 6 cm  Neck ROM: Normal ROM  Dental    (+) Edentulous    Pulmonary    (+) COPD  Cardiovascular   (+) hypertension    Rate: Normal    Neuro/Psych      GI/Hepatic/Renal    (+) liver disease    Endo/Other    Abdominal                           Pre-op Assessment    I have reviewed the Patient Summary Reports.     I have reviewed the Nursing Notes. I have reviewed the NPO Status.   I have reviewed the Medications.     Review of Systems  Anesthesia Hx:  No problems with previous Anesthesia             Denies Family Hx of Anesthesia complications.    Denies Personal Hx of Anesthesia complications.                    Cardiovascular:     Hypertension                                        Pulmonary:   COPD      Requiring 3L continuous O2; bilateral pleural effusions R>L; diuresed 3L yesterday               Hepatic/GI:      Liver Disease,  Cirrhosis; GIB          Neurological:  Neurology Normal                                          Physical Exam  General: Cachexia and Jaundice    Airway:  Mallampati: III   Mouth Opening: Normal  TM Distance: > 6 cm  Tongue: Normal  Neck ROM: Normal ROM    Dental:  Edentulous    Chest/Lungs:  Normal Respiratory Rate  3L continuous O2; no increased WOB  Heart:  Rate: Normal        Anesthesia Plan  Type of Anesthesia, risks & benefits discussed:    Anesthesia Type: Gen Natural Airway, MAC  Intra-op Monitoring Plan: Standard ASA Monitors  Post Op Pain Control Plan: multimodal analgesia and IV/PO Opioids PRN  Induction:  IV  Airway Plan: Direct, Post-Induction  Informed Consent: Informed consent signed with the " Patient and all parties understand the risks and agree with anesthesia plan.  All questions answered. Patient consented to blood products? Yes  ASA Score: 4  Day of Surgery Review of History & Physical: H&P Update referred to the surgeon/provider.    Ready For Surgery From Anesthesia Perspective.     .

## 2024-04-19 NOTE — TREATMENT PLAN
GI Treatment Plan Note:    Impression:            - Normal esophagus.                          - Congestive gastropathy.                          - Normal examined duodenum.                          - A single non-bleeding angioectasia in the                          jejunum. Treated with argon plasma coagulation                          (APC).                          - No specimens collected.     Recommendation:        - Return patient to hospital [facility] for ongoing care.                          - Continue present medications.                          - Resume regular diet.     We will sign off at this time.     Silvana Arreola DO  Gastroenterology PGY-4

## 2024-04-19 NOTE — SUBJECTIVE & OBJECTIVE
Past Medical History:   Diagnosis Date    Alcohol abuse     BPH (benign prostatic hyperplasia)     Cirrhosis 06/21/2018    pt states that he was diagnosed a week ago during his last hospital visit    Closed fracture of left distal radius 7/19/2023    Gastritis     Hypertension     Renal disorder        Past Surgical History:   Procedure Laterality Date    CAPSULOTOMY OF JOINT Right 7/29/2019    Procedure: CAPSULOTOMY, JOINT;  Surgeon: Raj Grossman MD;  Location: [facility] OR [address] [place] [address];  Service: Orthopedics;  Laterality: Right;    COLONOSCOPY N/A 4/1/2024    Procedure: COLONOSCOPY;  Surgeon: Scarlett Whitt MD;  [facility]  Service: Gastroenterology;  Laterality: N/A;    ESOPHAGOGASTRODUODENOSCOPY N/A 10/25/2023    Procedure: EGD (ESOPHAGOGASTRODUODENOSCOPY);  Surgeon: Lux Rome MD;  [facility]  Service: Endoscopy;  Laterality: N/A;    PERCUTANEOUS PINNING OF HIP Right 7/29/2019    Procedure: PINNING, HIP, PERCUTANEOUS- synthes cannulated screws- hana table- C arm door side-;  Surgeon: Raj Grossman MD;  Location: [facility] OR [address] [place] [address];  Service: Orthopedics;  Laterality: Right;       Review of patient's allergies indicates:  No Known Allergies  Family History       Problem Relation (Age of Onset)    Heart disease Father, Brother    Hypertension Father          Tobacco Use    Smoking status: Every Day     Current packs/day: 1.00     Types: Cigarettes    Smokeless tobacco: Never   Substance and Sexual Activity    Alcohol use: Yes     Comment: (former drinker; 2months sober) this visit admits drinking a couple beers today    Drug use: No    Sexual activity: Not on file     Review of Systems   Constitutional:  Negative for chills and fever.   HENT:  Negative for ear pain and sinus pain.    Eyes:  Negative for pain.   Respiratory:  Positive for shortness of breath. Negative for cough.    Cardiovascular:  Negative for chest pain and leg swelling.   Gastrointestinal:   Positive for abdominal pain and blood in stool. Negative for constipation, diarrhea, nausea, rectal pain and vomiting.   Genitourinary:  Negative for dysuria and flank pain.   Musculoskeletal:  Negative for arthralgias, back pain, joint swelling, myalgias and neck pain.   Neurological:  Positive for weakness. Negative for numbness and headaches.   Psychiatric/Behavioral:  Negative for agitation, dysphoric mood and sleep disturbance. The patient is not nervous/anxious.      Objective:     Vital Signs (Most Recent):  Temp: 97.8 °F (36.6 °C) (04/19/24 0827)  Pulse: 95 (04/19/24 0850)  Resp: 18 (04/19/24 0850)  BP: 128/82 (04/19/24 0827)  SpO2: 100 % (04/19/24 0827) Vital Signs (24h Range):  Temp:  [97.7 °F (36.5 °C)-98.5 °F (36.9 °C)] 97.8 °F (36.6 °C)  Pulse:  [] 95  Resp:  [18-24] 18  SpO2:  [93 %-100 %] 100 %  BP: (114-137)/(72-89) 128/82     Weight: 86.2 kg (190 lb) (04/18/24 1305)  Body mass index is 25.77 kg/m².      Intake/Output Summary (Last 24 hours) at 4/19/2024 0856  Last data filed at 4/19/2024 0420  Gross per 24 hour   Intake 100 ml   Output 2900 ml   Net -2800 ml       Lines/Drains/Airways       Peripheral Intravenous Line  Duration                  Peripheral IV - Single Lumen 04/18/24 1437 20 G Posterior;Right Hand <1 day                     Physical Exam  Vitals and nursing note reviewed.   Constitutional:       General: He is not in acute distress.     Appearance: Normal appearance. He is ill-appearing. He is not toxic-appearing or diaphoretic.   HENT:      Head: Normocephalic and atraumatic.      Right Ear: External ear normal.      Left Ear: External ear normal.      Nose: Nose normal.      Mouth/Throat:      Mouth: Mucous membranes are moist.   Eyes:      General: No scleral icterus.        Right eye: No discharge.         Left eye: No discharge.      Extraocular Movements: Extraocular movements intact.   Cardiovascular:      Rate and Rhythm: Normal rate.      Heart sounds: Normal heart  sounds. No murmur heard.     No friction rub.   Pulmonary:      Effort: Pulmonary effort is normal.      Comments: 3L NC  Abdominal:      General: Abdomen is flat. Bowel sounds are normal. There is no distension.      Tenderness: There is abdominal tenderness.   Genitourinary:     Comments: ARCELIA positive for dark, black stool  Musculoskeletal:         General: No swelling.      Cervical back: Normal range of motion.      Right lower leg: No edema.      Left lower leg: No edema.   Skin:     General: Skin is warm and dry.      Coloration: Skin is pale. Skin is not jaundiced.      Findings: No bruising.   Neurological:      General: No focal deficit present.      Mental Status: He is alert and oriented to person, place, and time.      Motor: No weakness.   Psychiatric:         Mood and Affect: Mood normal.         Behavior: Behavior normal.         Thought Content: Thought content normal.          Significant Labs:  All pertinent lab results from the last 24 hours have been reviewed.    Significant Imaging:  Imaging results within the past 24 hours have been reviewed.

## 2024-04-19 NOTE — PLAN OF CARE
Discharge Planning Assessment:  Patient admitted on: 4-18-24  Chart reviewed, Care plan discussed with treatment team,   attending Dr Bonilla  PCP updated in Epic: at [facility]      Current Dispo: return to [facility] when medically cleared  Currently on his SnF days at [facility]. Will need a new PHN auth if he returns on Sunday or Monday, can return to [facility] tomorrow with the   Snf benefits (72 hour window of return per PHN)  Case management to follow for plans and arrangements

## 2024-04-19 NOTE — SUBJECTIVE & OBJECTIVE
Interval History:   4/19: Patient heading to scope during rounds.     Review of Systems   Constitutional:  Negative for chills and fever.   HENT:  Negative for ear pain and sinus pain.    Eyes:  Negative for pain.   Respiratory:  Positive for shortness of breath. Negative for cough.    Cardiovascular:  Negative for chest pain and leg swelling.   Gastrointestinal:  Positive for abdominal pain and blood in stool. Negative for constipation, diarrhea, nausea, rectal pain and vomiting.   Genitourinary:  Negative for dysuria and flank pain.   Musculoskeletal:  Negative for arthralgias, back pain, joint swelling, myalgias and neck pain.   Neurological:  Positive for weakness. Negative for numbness and headaches.   Psychiatric/Behavioral:  Negative for agitation, dysphoric mood and sleep disturbance. The patient is not nervous/anxious.      Objective:     Vital Signs (Most Recent):  Temp: 97.2 °F (36.2 °C) (04/19/24 1025)  Pulse: 100 (04/19/24 1132)  Resp: 16 (04/19/24 1025)  BP: 120/76 (04/19/24 1025)  SpO2: 100 % (2L NC) (04/19/24 1025) Vital Signs (24h Range):  Temp:  [97.2 °F (36.2 °C)-98.5 °F (36.9 °C)] 97.2 °F (36.2 °C)  Pulse:  [] 100  Resp:  [16-24] 16  SpO2:  [93 %-100 %] 100 %  BP: (114-137)/(72-89) 120/76     Weight: 86.2 kg (190 lb)  Body mass index is 25.77 kg/m².    Intake/Output Summary (Last 24 hours) at 4/19/2024 1135  Last data filed at 4/19/2024 1135  Gross per 24 hour   Intake 350 ml   Output 2900 ml   Net -2550 ml         Physical Exam  Vitals and nursing note reviewed.   Constitutional:       General: He is not in acute distress.     Appearance: Normal appearance. He is ill-appearing. He is not toxic-appearing or diaphoretic.   HENT:      Head: Normocephalic and atraumatic.      Right Ear: External ear normal.      Left Ear: External ear normal.      Nose: Nose normal.      Mouth/Throat:      Mouth: Mucous membranes are moist.   Eyes:      General: No scleral icterus.        Right eye: No  discharge.         Left eye: No discharge.      Extraocular Movements: Extraocular movements intact.   Cardiovascular:      Rate and Rhythm: Normal rate.      Heart sounds: Normal heart sounds. No murmur heard.     No friction rub.   Pulmonary:      Effort: Pulmonary effort is normal.      Comments: 3L NC  Abdominal:      General: Abdomen is flat. Bowel sounds are normal. There is no distension.      Tenderness: There is abdominal tenderness.   Genitourinary:     Comments: ARCELIA positive for dark, black stool  Musculoskeletal:         General: No swelling.      Cervical back: Normal range of motion.      Right lower leg: No edema.      Left lower leg: No edema.   Skin:     General: Skin is warm and dry.      Coloration: Skin is pale. Skin is not jaundiced.      Findings: No bruising.   Neurological:      General: No focal deficit present.      Mental Status: He is alert and oriented to person, place, and time.      Motor: No weakness.   Psychiatric:         Mood and Affect: Mood normal.         Behavior: Behavior normal.         Thought Content: Thought content normal.             Significant Labs: All pertinent labs within the past 24 hours have been reviewed.    Significant Imaging: I have reviewed all pertinent imaging results/findings within the past 24 hours.

## 2024-04-19 NOTE — PROVATION PATIENT INSTRUCTIONS
Discharge Summary/Instructions after an Endoscopic Procedure  Patient Name: Fransico Montalvo  Patient MRN: [id number]  Patient YOB: 1955 Friday, April 19, 2024  Raj Roberts MD  Dear patient,  As a result of recent federal legislation (The Federal Cures Act), you may   receive lab or pathology results from your procedure in your [facility]   account before your physician is able to contact you. Your physician or   their representative will relay the results to you with their   recommendations at their soonest availability.  Thank you,  RESTRICTIONS:  During your procedure today, you received medications for sedation.  These   medications may affect your judgment, balance and coordination.  Therefore,   for 24 hours, you have the following restrictions:   - DO NOT drive a car, operate machinery, make legal/financial decisions,   sign important papers or drink alcohol.    ACTIVITY:  Today: no heavy lifting, straining or running due to procedural   sedation/anesthesia.  The following day: return to full activity including work.  DIET:  Eat and drink normally unless instructed otherwise.     TREATMENT FOR COMMON SIDE EFFECTS:  - Mild abdominal pain, nausea, belching, bloating or excessive gas:  rest,   eat lightly and use a heating pad.  - Sore Throat: treat with throat lozenges and/or gargle with warm salt   water.  - Because air was used during the procedure, expelling large amounts of air   from your rectum or belching is normal.  - If a bowel prep was taken, you may not have a bowel movement for 1-3 days.    This is normal.  SYMPTOMS TO WATCH FOR AND REPORT TO YOUR PHYSICIAN:  1. Abdominal pain or bloating, other than gas cramps.  2. Chest pain.  3. Back pain.  4. Signs of infection such as: chills or fever occurring within 24 hours   after the procedure.  5. Rectal bleeding, which would show as bright red, maroon, or black stools.   (A tablespoon of blood from the rectum is not serious, especially if    hemorrhoids are present.)  6. Vomiting.  7. Weakness or dizziness.  GO DIRECTLY TO THE NEAREST EMERGENCY ROOM IF YOU HAVE ANY OF THE FOLLOWING:      Difficulty breathing              Chills and/or fever over 101 F   Persistent vomiting and/or vomiting blood   Severe abdominal pain   Severe chest pain   Black, tarry stools   Bleeding- more than one tablespoon   Any other symptom or condition that you feel may need urgent attention  Your doctor recommends these additional instructions:  If any biopsies were taken, your doctors clinic will contact you in 1 to 2   weeks with any results.  - Return patient to hospital [facility] for ongoing care.   - Continue present medications.   - Resume regular diet.  For questions, problems or results please call your physician - Raj Roberts MD at Work:  [phone].  [facility], EMERGENCY ROOM PHONE NUMBER: [phone]  IF A COMPLICATION OR EMERGENCY SITUATION ARISES AND YOU ARE UNABLE TO REACH   YOUR PHYSICIAN - GO DIRECTLY TO THE EMERGENCY ROOM.  Raj Roberts MD  4/19/2024 11:56:31 AM  This report has been verified and signed electronically.  Dear patient,  As a result of recent federal legislation (The Federal Cures Act), you may   receive lab or pathology results from your procedure in your [facility]   account before your physician is able to contact you. Your physician or   their representative will relay the results to you with their   recommendations at their soonest availability.  Thank you,  PROVATION

## 2024-04-19 NOTE — TRANSFER OF CARE
Anesthesia Transfer of Care Note    Patient: Fransico Montalvo    Procedure(s) Performed: Procedure(s) (LRB):  EGD (ESOPHAGOGASTRODUODENOSCOPY) (N/A)    Patient location: PACU    Anesthesia Type: general    Transport from OR: Transported from OR on room air with adequate spontaneous ventilation    Post pain: adequate analgesia    Post assessment: no apparent anesthetic complications and tolerated procedure well    Post vital signs: stable    Level of consciousness: awake, alert and oriented    Nausea/Vomiting: no nausea/vomiting    Complications: none    Transfer of care protocol was followed      Last vitals: Visit Vitals  /53 (Patient Position: Lying)   Pulse 93   Temp 36.2 °C (97.2 °F) (Temporal)   Resp 16   Wt 86.2 kg (190 lb)   SpO2 100%   BMI 25.77 kg/m²

## 2024-04-19 NOTE — PLAN OF CARE
SW attempted DPA at the bedside. Patient not seen at that time due to out of room.    SW will re-attempt.                MEÑO Brown, LMSW  [facility]  Case Management  Ext. [phone]

## 2024-04-19 NOTE — PHARMACY MED REC
"  Admission Medication History     The home medication history was taken by Jaclyn Hightower.    You may go to "Admission" then "Reconcile Home Medications" tabs to review and/or act upon these items.     The home medication list has been updated by the Pharmacy department.   Please read ALL comments highlighted in yellow.   Please address this information as you see fit.    Feel free to contact us if you have any questions or require assistance.      Medications listed below were obtained from: [facility]  Current Outpatient Medications on File Prior to Encounter   Medication Sig    acetaminophen (TYLENOL) 325 MG tablet Take 2 tablets (650 mg total) by mouth every 8 (eight) hours as needed for Pain.      albuterol-ipratropium (DUO-NEB) 2.5 mg-0.5 mg/3 mL nebulizer solution Take 3 mLs by nebulization every 6 (six) hours as needed for Wheezing or Shortness of Breath. Rescue      dicyclomine (BENTYL) 10 MG capsule Take 1 capsule (10 mg total) by mouth 3 (three) times daily as needed (abdominal pain).      EScitalopram oxalate (LEXAPRO) 10 MG tablet   Take 10 mg by mouth once daily.    fluticasone furoate-vilanteroL (BREO) 100-25 mcg/dose diskus inhaler   Inhale 1 puff into the lungs once daily. Controller    folic acid (FOLVITE) 1 MG tablet   Take 1 tablet (1 mg total) by mouth once daily.    melatonin (MELATIN) 3 mg tablet   Take 2 tablets (6 mg total) by mouth nightly as needed for Insomnia.    ondansetron (ZOFRAN-ODT) 8 MG TbDL Take 1 tablet (8 mg total) by mouth every 8 (eight) hours as needed (Nausea).      pantoprazole (PROTONIX) 40 MG tablet   Take 1 tablet (40 mg total) by mouth once daily.    potassium chloride SA (K-DUR,KLOR-CON) 20 MEQ tablet   Take 20 mEq by mouth once daily.    thiamine 100 MG tablet Take 1 tablet (100 mg total) by mouth once daily.       Jaclyn Hightower  EXT [id number]               .          "

## 2024-04-19 NOTE — H&P
"[facility] - Emergency Dept  [facility]  History & Physical    Patient Name: Fransico Montalvo  MRN: [id number]  Patient Class: OP- Observation  Admission Date: 4/18/2024  Attending Physician: Hilario Szymanski MD   Primary Care Provider: [facility]         Patient information was obtained from patient and past medical records.     Subjective:     Principal Problem:Gastrointestinal hemorrhage with melena    Chief Complaint:   Chief Complaint   Patient presents with    Melena     Arrives via EMS with c/o rectal bleeding that started today, coming from [place]         HPI: Fransico Montalvo is a 68 y.o. male with history of EtOH cirrhosis with ascites, chronic multifactorial anemia with thrombocytopenia, history of EtOH abuse, presumed COPD, CKD 3, and HTN who presents from [place] [facility] for rectal bleeding.     He reports that approximately 3 days ago, he noted the onset of "black" stools. This was with associated crampy abdominal pain that improved with defecation. Since that time, he has continued to have multiple black bowel movements per day. He is not on blood thinners or aspirin. This happened months ago, however spontaneously resolved.     He also complains of 2-3 weeks of shortness of breath, particularly with exertion and not resolved with oxygen. Denies LE edema or chest pain.     Of note, he was recently discharged (4/4/24) from hospitalization here where he was hospitalized for 7 days, treated for acute on chronic anemia requiring total 5U RBC transfusion, hypotension, and presumed PNA with COPD exacerbation. Underwent colonoscopy that admission, which was grossly normal. He was discharged to SNF on oxygen for hypoxia.         Past Medical History:   Diagnosis Date    Alcohol abuse     BPH (benign prostatic hyperplasia)     Cirrhosis 06/21/2018    pt states that he was diagnosed a week ago during his last hospital visit    Closed fracture of left distal radius 7/19/2023    Gastritis  "    Hypertension     Renal disorder      Objective:     Vital Signs (Most Recent):  Temp: 97.8 °F (36.6 °C) (04/18/24 1305)  Pulse: 110 (04/18/24 1911)  Resp: (!) 24 (04/18/24 1911)  BP: 120/74 (04/18/24 1911)  SpO2: 95 % (04/18/24 1911) Vital Signs (24h Range):  Temp:  [97.8 °F (36.6 °C)] 97.8 °F (36.6 °C)  Pulse:  [103-110] 110  Resp:  [19-24] 24  SpO2:  [95 %-100 %] 95 %  BP: (120-137)/(74-89) 120/74     Weight: 86.2 kg (190 lb)  Body mass index is 25.77 kg/m².     Physical Exam  Vitals and nursing note reviewed.   Constitutional:       General: He is not in acute distress.     Appearance: He is well-developed. He is not diaphoretic.   HENT:      Head: Normocephalic and atraumatic.   Eyes:      General: No scleral icterus.     Conjunctiva/sclera: Conjunctivae normal.   Neck:      Vascular: No JVD.   Cardiovascular:      Rate and Rhythm: Regular rhythm. Tachycardia present.   Pulmonary:      Breath sounds: No wheezing or rales.      Comments: Mild tachypnea without respiratory distress, decreased breath sounds in BL bases, sats 95% and greater on room air during my exam  Abdominal:      General: There is no distension.      Tenderness: There is no abdominal tenderness.   Musculoskeletal:      Right lower leg: No edema.      Left lower leg: No edema.   Skin:     Coloration: Skin is not jaundiced or pale.   Neurological:      Mental Status: He is alert and oriented to person, place, and time.      Motor: No weakness or abnormal muscle tone.   Psychiatric:         Mood and Affect: Mood normal.         Behavior: Behavior normal.                Significant Labs: All pertinent labs within the past 24 hours have been reviewed.  CBC:   Recent Labs   Lab 04/18/24  1437 04/18/24  1447 04/18/24 1913   WBC 4.60  --  4.20   HGB 7.7*  --  7.3*   HCT 28.2* 29* 26.5*   PLT 71*  --  224     CMP:   Recent Labs   Lab 04/18/24  1437      K 5.1      CO2 26   GLU 92   BUN 24*   CREATININE 1.0   CALCIUM 8.7   PROT 7.0    ALBUMIN 3.1*   BILITOT 0.7   ALKPHOS 78   AST 25   ALT 12   ANIONGAP 7*       Significant Imaging: I have reviewed all pertinent imaging results/findings within the past 24 hours.    Assessment/Plan:     * Gastrointestinal hemorrhage with melena  Presents with complaints of melena, and hemoglobin noted to be slightly decreased from prior.  He has a history of cirrhosis with esophageal varices per chart, however cannot find documentation of this.  Last EGD in our system was 10/2023, and had no signs of active bleeding.  Had a normal colonoscopy 04/01/2024 without evidence of active or stigmata of bleeding.  We will start PPI b.i.d., prophylactic ceftriaxone given his known cirrhosis, and consult GI for further recommendations.  Hold anticoagulation.  Trend CBC as below, and transfuse 2 maintain hemoglobin 7 or greater. Will likely require repeat EGD.       Acute blood loss anemia  Known chronic normocytic anemia, requiring 5U RBC during recent admission for hypotension and symptomatic anemia.  Recent baseline around 8.1-8.3, and presents with hemoglobin 7.7 with complaints of melena.  He is currently hemodynamically stable.  Given this, we will trend CBC and transfuse to maintain hemoglobin 7 or greater were unless he exhibits hemodynamic instability.  Hold anticoagulation.    Alcoholic cirrhosis  Patient with known Cirrhosis with Child's class B (based on most recent hospitalization data). Co-morbidities are present and inclusive of ascites, esophageal varices, and anemia/pancytopenia.  MELD-Na score calculated; MELD 3.0: 19 at 4/1/2024  2:26 AM  MELD-Na: 19 at 4/1/2024  2:26 AM  Calculated from:  Serum Creatinine: 1.6 mg/dL at 4/1/2024  2:26 AM  Serum Sodium: 132 mmol/L at 4/1/2024  2:26 AM  Total Bilirubin: 0.9 mg/dL (Using min of 1 mg/dL) at 4/1/2024  2:26 AM  Serum Albumin: 3.1 g/dL at 4/1/2024  2:26 AM  INR(ratio): 1.4 at 3/30/2024  5:13 AM  Age at listing (hypothetical): 68 years  Sex: Male at 4/1/2024  2:26  AM      Continue chronic meds. Etiology likely ETOH. Will avoid any hepatotoxic meds, and monitor CBC/CMP/INR for synthetic function.     Appreciate GI recommendations.     Pleural effusion  Chest x-ray reveals bilateral pleural effusions with evidence of pulmonary vascular congestion.  Most recent TTE 11/2022 shows normal LVEF, without evidence of diastolic dysfunction.  Pleural effusion appears somewhat chronic based on prior chest x-ray.  Suspect some volume overload, which may be secondary to recent blood administration and cirrhosis.  We will check BNP, and consider rechecking updated TTE.  We will give a dose of Lasix for now as I expect he will need blood transfusion.  Monitor for signs and symptoms of respiratory distress.    Normocytic anemia  Chronic multifactorial anemia noted.  Lab anemia workup will likely be of little benefit given recent transfusion of 5U earlier this month. We will continue home folic acid after likely EGD. Monitor as above.     Other emphysema  Fairly recent diagnosis of likely COPD/emphysema, without evidence of acute exacerbation.  We will continue Breo along with p.r.n. DuoNebs.  Sat goal 88% or greater.    Essential hypertension  Hold home antihypertensives for now given ongoing GI bleed.  Can restart as indicated.    Thrombocytopenia  Chronic, however worsened over baseline. Likely  multifactorial from liver disease and acute blood loss.  Currently without indication for platelet transfusion.  We will monitor on serial CBC.      CKD Stage 3  Renal function remains mildly improved over recent baseline. Avoid nephrotoxic agents as able, and renally dose all meds as applicable.    Tobacco abuse  Reports that he has quit smoking. Continued cessation encouraged.       VTE Risk Mitigation (From admission, onward)           Ordered     IP VTE HIGH RISK PATIENT  Once         04/18/24 1819     Place sequential compression device  Until discontinued         04/18/24 1819     Reason for  No Pharmacological VTE Prophylaxis  Once        Question:  Reasons:  Answer:  Active Bleeding    04/18/24 1819     Place sequential compression device  Until discontinued         04/18/24 1811     Place sequential compression device  Until discontinued         04/18/24 1755                       On 04/18/2024, patient should be placed in hospital observation services under my care.             Felice Dasilva MD  Department of Hospital Medicine  [facility] - Emergency Dept

## 2024-04-19 NOTE — ASSESSMENT & PLAN NOTE
68 yoM with history of melena with negative colonoscopy and EGD prior admitted for darkstool with anemia concerning for a GI bleed. Patient only had a colonoscopy on prior admission due to prior EGD being negative.     - Plan for EGD today  - Trend Hgb q12hrs. Transfuse for Hgb <7, unless otherwise indicated  - Maintain IV access with 2 large bore Ivs  - Intravascular resuscitation/support with IVFs   - maintain NPO  - Hold all NSAIDs and anticoagulants, unless contraindicated  - Bolus IV pantoprazole 80mg followed by 40mg BID  - Please correct any coagulopathy with platelets and FFP for goal of platelets >50K and INR <2.0  - Please notify GI team if there is significant change in patient's clinical status

## 2024-04-19 NOTE — CONSULTS
[facility] - [facility] Surg ([place] [address])  Gastroenterology  Consult Note    Patient Name: Fransico Montalvo  MRN: [id number]  Admission Date: 4/18/2024  Hospital Length of Stay: 0 days  Code Status: Full Code   Attending Provider: Claudia Bonilla MD   Consulting Provider: Orlin Guzman MD  Primary Care Physician: [facility]  Principal Problem:Gastrointestinal hemorrhage with melena    Inpatient consult to Gastroenterology  Consult performed by: Orlin Guzman MD  Consult ordered by: Felice Dasilva MD        Subjective:     HPI:  This is a 68 year old gentleman with history of 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     Patient states that he has ongoing dark and black stools that started about 3-4 days ago with concomitant abdominal pain with 3 episodes per day. His most recent episodes were positive for bright red blood with black stools. He denies any hematemesis, nausea, or vomiting. He does complain of some shortness of breath with exertion but denies any swelling or chest pain. He is currently not taking any antiplatelet or anticoagulation. He denies any NSAID use and drinking.    Upon chart review, patient was discharged from [facility] on 4/4 for similar symptoms. Patient had significant GI bleed and anemia with pneumonia and hypotension for which he underwent colonoscopy as he had an EGD last October which was normal.    Today, patient is borderline tachycardic but is normotensive satting 100% on 3L NC. Hgb noted to be 7.2 with BUN/Cr or 24/1.1.     Past Medical History:   Diagnosis Date    Alcohol abuse     BPH (benign prostatic hyperplasia)     Cirrhosis 06/21/2018    pt states that he was diagnosed a week ago during his last hospital visit    Closed fracture of left distal radius 7/19/2023    Gastritis     Hypertension     Renal disorder        Past Surgical History:   Procedure Laterality Date    CAPSULOTOMY OF  JOINT Right 7/29/2019    Procedure: CAPSULOTOMY, JOINT;  Surgeon: Raj Grossman MD;  [facility];  Service: Orthopedics;  Laterality: Right;    COLONOSCOPY N/A 4/1/2024    Procedure: COLONOSCOPY;  Surgeon: Scarlett Whitt MD;  Location: [facility] ENDO (2ND FLR);  Service: Gastroenterology;  Laterality: N/A;    ESOPHAGOGASTRODUODENOSCOPY N/A 10/25/2023    Procedure: EGD (ESOPHAGOGASTRODUODENOSCOPY);  Surgeon: Lux Rome MD;  Location: [facility] ENDO (2ND FLR);  Service: Endoscopy;  Laterality: N/A;    PERCUTANEOUS PINNING OF HIP Right 7/29/2019    Procedure: PINNING, HIP, PERCUTANEOUS- synthes cannulated screws- hana table- C arm door side-;  Surgeon: Raj Grossman MD;  [facility];  Service: Orthopedics;  Laterality: Right;       Review of patient's allergies indicates:  No Known Allergies  Family History       Problem Relation (Age of Onset)    Heart disease Father, Brother    Hypertension Father          Tobacco Use    Smoking status: Every Day     Current packs/day: 1.00     Types: Cigarettes    Smokeless tobacco: Never   Substance and Sexual Activity    Alcohol use: Yes     Comment: (former drinker; 2months sober) this visit admits drinking a couple beers today    Drug use: No    Sexual activity: Not on file     Review of Systems   Constitutional:  Negative for chills and fever.   HENT:  Negative for ear pain and sinus pain.    Eyes:  Negative for pain.   Respiratory:  Positive for shortness of breath. Negative for cough.    Cardiovascular:  Negative for chest pain and leg swelling.   Gastrointestinal:  Positive for abdominal pain and blood in stool. Negative for constipation, diarrhea, nausea, rectal pain and vomiting.   Genitourinary:  Negative for dysuria and flank pain.   Musculoskeletal:  Negative for arthralgias, back pain, joint swelling, myalgias and neck pain.   Neurological:  Positive for weakness. Negative for numbness and headaches.   Psychiatric/Behavioral:   Negative for agitation, dysphoric mood and sleep disturbance. The patient is not nervous/anxious.      Objective:     Vital Signs (Most Recent):  Temp: 97.8 °F (36.6 °C) (04/19/24 0827)  Pulse: 95 (04/19/24 0850)  Resp: 18 (04/19/24 0850)  BP: 128/82 (04/19/24 0827)  SpO2: 100 % (04/19/24 0827) Vital Signs (24h Range):  Temp:  [97.7 °F (36.5 °C)-98.5 °F (36.9 °C)] 97.8 °F (36.6 °C)  Pulse:  [] 95  Resp:  [18-24] 18  SpO2:  [93 %-100 %] 100 %  BP: (114-137)/(72-89) 128/82     Weight: 86.2 kg (190 lb) (04/18/24 1305)  Body mass index is 25.77 kg/m².      Intake/Output Summary (Last 24 hours) at 4/19/2024 0856  Last data filed at 4/19/2024 0420  Gross per 24 hour   Intake 100 ml   Output 2900 ml   Net -2800 ml       Lines/Drains/Airways       Peripheral Intravenous Line  Duration                  Peripheral IV - Single Lumen 04/18/24 1437 20 G Posterior;Right Hand <1 day                     Physical Exam  Vitals and nursing note reviewed.   Constitutional:       General: He is not in acute distress.     Appearance: Normal appearance. He is ill-appearing. He is not toxic-appearing or diaphoretic.   HENT:      Head: Normocephalic and atraumatic.      Right Ear: External ear normal.      Left Ear: External ear normal.      Nose: Nose normal.      Mouth/Throat:      Mouth: Mucous membranes are moist.   Eyes:      General: No scleral icterus.        Right eye: No discharge.         Left eye: No discharge.      Extraocular Movements: Extraocular movements intact.   Cardiovascular:      Rate and Rhythm: Normal rate.      Heart sounds: Normal heart sounds. No murmur heard.     No friction rub.   Pulmonary:      Effort: Pulmonary effort is normal.      Comments: 3L NC  Abdominal:      General: Abdomen is flat. Bowel sounds are normal. There is no distension.      Tenderness: There is abdominal tenderness.   Genitourinary:     Comments: ARCELIA positive for dark, black stool  Musculoskeletal:         General: No swelling.       Cervical back: Normal range of motion.      Right lower leg: No edema.      Left lower leg: No edema.   Skin:     General: Skin is warm and dry.      Coloration: Skin is pale. Skin is not jaundiced.      Findings: No bruising.   Neurological:      General: No focal deficit present.      Mental Status: He is alert and oriented to person, place, and time.      Motor: No weakness.   Psychiatric:         Mood and Affect: Mood normal.         Behavior: Behavior normal.         Thought Content: Thought content normal.          Significant Labs:  All pertinent lab results from the last 24 hours have been reviewed.    Significant Imaging:  Imaging results within the past 24 hours have been reviewed.  Assessment/Plan:     GI  * Gastrointestinal hemorrhage with melena  68 yoM with history of melena with negative colonoscopy and EGD prior admitted for [facility] with anemia concerning for a GI bleed. Patient only had a colonoscopy on prior admission due to prior EGD being negative.     - Plan for EGD today  - Trend Hgb q12hrs. Transfuse for Hgb <7, unless otherwise indicated  - Maintain IV access with 2 large bore Ivs  - Intravascular resuscitation/support with IVFs   - maintain NPO  - Hold all NSAIDs and anticoagulants, unless contraindicated  - Bolus IV pantoprazole 80mg followed by 40mg BID  - Please correct any coagulopathy with platelets and FFP for goal of platelets >50K and INR <2.0  - Please notify GI team if there is significant change in patient's clinical status          Thank you for your consult. I will follow-up with patient. Please contact us if you have any additional questions.    Orlin Guzman MD  Gastroenterology  [facility] - Med Surg ([facility]

## 2024-04-19 NOTE — NURSING
Patient arrived on unit by stretcher fully oriented at baseline. Responds to assessment questions appropriately. Patient unable to reposition form stretcher to bed. Patient requiring assistance of 2 people to slide to bed. Patient on room air . Patient repositioned for comfort and placed on condom cath for eliminating. Partient oriented to room and call light use for safety.Patient aware of NPO STATUS.  Patient expressed understanding. Bed in lowest position call light and personal item in reach.

## 2024-04-19 NOTE — NURSING TRANSFER
Nursing Transfer Note      4/19/2024   12:05 PM    Nurse giving handoff:Kendall SHEETS Rn  Nurse receiving handoff:Adeel Connor    Reason patient is being transferred: postop    Transfer To: [facility]    Transfer via stretcher    Transfer with cardiac monitoring    Transported by transport    Transfer Vital Signs:  Blood Pressure:see flowsheet  Heart Rate:  O2:  Temperature:  Respirations:    Telemetry: yes  Order for Tele Monitor? Yes    Additional Lines: Oxygen      Medicines sent: n/a    Any special needs or follow-up needed:     Patient belongings transferred with patient:  n/a    Chart send with patient: Yes    Notified: n/a    Patient reassessed at: 4/19/24  1  Upon arrival to floor: bed in lowest position

## 2024-04-19 NOTE — HPI
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     Patient states that he has ongoing dark and black stools that started about 3-4 days ago with concomitant abdominal pain with 3 episodes per day. His most recent episodes were positive for bright red blood with black stools. He denies any hematemesis, nausea, or vomiting. He does complain of some shortness of breath with exertion but denies any swelling or chest pain. He is currently not taking any antiplatelet or anticoagulation. He denies any NSAID use and drinking.    Upon chart review, patient was discharged from [facility] on 4/4 for similar symptoms. Patient had significant GI bleed and anemia with pneumonia and hypotension for which he underwent colonoscopy as he had an EGD last October which was normal.    Today, patient is borderline tachycardic but is normotensive satting 100% on 3L NC. Hgb noted to be 7.2 with BUN/Cr or 24/1.1.

## 2024-04-19 NOTE — ED NOTES
Telemetry Verification   Patient placed on Telemetry Box  Verified with War Room  Tech    Box # [id number]   Rate 105   Rhythm Sinus tach

## 2024-04-20 LAB
BASOPHILS # BLD AUTO: 0.04 K/UL (ref 0–0.2)
BASOPHILS NFR BLD: 1.3 % (ref 0–1.9)
DIFFERENTIAL METHOD BLD: ABNORMAL
EOSINOPHIL # BLD AUTO: 0 K/UL (ref 0–0.5)
EOSINOPHIL NFR BLD: 1 % (ref 0–8)
ERYTHROCYTE [DISTWIDTH] IN BLOOD BY AUTOMATED COUNT: 21.5 % (ref 11.5–14.5)
HCT VFR BLD AUTO: 26.5 % (ref 40–54)
HGB BLD-MCNC: 8 G/DL (ref 14–18)
IMM GRANULOCYTES # BLD AUTO: 0.01 K/UL (ref 0–0.04)
IMM GRANULOCYTES NFR BLD AUTO: 0.3 % (ref 0–0.5)
LYMPHOCYTES # BLD AUTO: 0.9 K/UL (ref 1–4.8)
LYMPHOCYTES NFR BLD: 27.5 % (ref 18–48)
MCH RBC QN AUTO: 24.4 PG (ref 27–31)
MCHC RBC AUTO-ENTMCNC: 30.2 G/DL (ref 32–36)
MCV RBC AUTO: 81 FL (ref 82–98)
MONOCYTES # BLD AUTO: 0.3 K/UL (ref 0.3–1)
MONOCYTES NFR BLD: 11 % (ref 4–15)
NEUTROPHILS # BLD AUTO: 1.8 K/UL (ref 1.8–7.7)
NEUTROPHILS NFR BLD: 58.9 % (ref 38–73)
NRBC BLD-RTO: 0 /100 WBC
PLATELET # BLD AUTO: 167 K/UL (ref 150–450)
PMV BLD AUTO: 10.3 FL (ref 9.2–12.9)
RBC # BLD AUTO: 3.28 M/UL (ref 4.6–6.2)
WBC # BLD AUTO: 3.09 K/UL (ref 3.9–12.7)

## 2024-04-20 PROCEDURE — 36415 COLL VENOUS BLD VENIPUNCTURE: CPT | Performed by: STUDENT IN AN ORGANIZED HEALTH CARE EDUCATION/TRAINING PROGRAM

## 2024-04-20 PROCEDURE — 85025 COMPLETE CBC W/AUTO DIFF WBC: CPT | Performed by: STUDENT IN AN ORGANIZED HEALTH CARE EDUCATION/TRAINING PROGRAM

## 2024-04-20 PROCEDURE — 63600175 PHARM REV CODE 636 W HCPCS: Performed by: STUDENT IN AN ORGANIZED HEALTH CARE EDUCATION/TRAINING PROGRAM

## 2024-04-20 PROCEDURE — 21400001 HC TELEMETRY ROOM

## 2024-04-20 PROCEDURE — 25000003 PHARM REV CODE 250: Performed by: STUDENT IN AN ORGANIZED HEALTH CARE EDUCATION/TRAINING PROGRAM

## 2024-04-20 PROCEDURE — C9113 INJ PANTOPRAZOLE SODIUM, VIA: HCPCS | Performed by: STUDENT IN AN ORGANIZED HEALTH CARE EDUCATION/TRAINING PROGRAM

## 2024-04-20 RX ORDER — PANTOPRAZOLE SODIUM 40 MG/1
40 TABLET, DELAYED RELEASE ORAL DAILY
Status: DISCONTINUED | OUTPATIENT
Start: 2024-04-20 | End: 2024-04-20

## 2024-04-20 RX ORDER — PANTOPRAZOLE SODIUM 40 MG/1
40 TABLET, DELAYED RELEASE ORAL DAILY
Status: DISCONTINUED | OUTPATIENT
Start: 2024-04-21 | End: 2024-04-23 | Stop reason: HOSPADM

## 2024-04-20 RX ADMIN — DICYCLOMINE HYDROCHLORIDE 10 MG: 10 CAPSULE ORAL at 07:04

## 2024-04-20 RX ADMIN — ACETAMINOPHEN 650 MG: 325 TABLET ORAL at 08:04

## 2024-04-20 RX ADMIN — PANTOPRAZOLE SODIUM 40 MG: 40 INJECTION, POWDER, FOR SOLUTION INTRAVENOUS at 08:04

## 2024-04-20 RX ADMIN — ESCITALOPRAM OXALATE 10 MG: 10 TABLET ORAL at 08:04

## 2024-04-20 RX ADMIN — FLUTICASONE FUROATE AND VILANTEROL TRIFENATATE 1 PUFF: 100; 25 POWDER RESPIRATORY (INHALATION) at 08:04

## 2024-04-20 RX ADMIN — THIAMINE HCL TAB 100 MG 100 MG: 100 TAB at 08:04

## 2024-04-20 RX ADMIN — DICYCLOMINE HYDROCHLORIDE 10 MG: 10 CAPSULE ORAL at 08:04

## 2024-04-20 RX ADMIN — DICYCLOMINE HYDROCHLORIDE 10 MG: 10 CAPSULE ORAL at 03:04

## 2024-04-20 NOTE — PLAN OF CARE
Pt with 1 episode of melena this shift.       Problem: Adult Inpatient Plan of Care  Goal: Plan of Care Review  Outcome: Ongoing, Progressing  Goal: Patient-Specific Goal (Individualized)  Outcome: Ongoing, Progressing  Goal: Absence of Hospital-Acquired Illness or Injury  Outcome: Ongoing, Progressing  Goal: Optimal Comfort and Wellbeing  Outcome: Ongoing, Progressing  Goal: Readiness for Transition of Care  Outcome: Ongoing, Progressing     Problem: Adjustment to Illness (Gastrointestinal Bleeding)  Goal: Optimal Coping with Acute Illness  Outcome: Ongoing, Progressing     Problem: Bleeding (Gastrointestinal Bleeding)  Goal: Hemostasis  Outcome: Ongoing, Progressing     Problem: Fluid and Electrolyte Imbalance (Acute Kidney Injury/Impairment)  Goal: Fluid and Electrolyte Balance  Outcome: Ongoing, Progressing     Problem: Oral Intake Inadequate (Acute Kidney Injury/Impairment)  Goal: Optimal Nutrition Intake  Outcome: Ongoing, Progressing     Problem: Renal Function Impairment (Acute Kidney Injury/Impairment)  Goal: Effective Renal Function  Outcome: Ongoing, Progressing     Problem: Fluid Imbalance (Pneumonia)  Goal: Fluid Balance  Outcome: Ongoing, Progressing     Problem: Infection (Pneumonia)  Goal: Resolution of Infection Signs and Symptoms  Outcome: Ongoing, Progressing     Problem: Respiratory Compromise (Pneumonia)  Goal: Effective Oxygenation and Ventilation  Outcome: Ongoing, Progressing     Problem: Skin Injury Risk Increased  Goal: Skin Health and Integrity  Outcome: Ongoing, Progressing

## 2024-04-20 NOTE — PLAN OF CARE
[facility]     Department of Hospital Medicine     [address] [place] [address]     [phone] after hours  [phone] fax       NURSING HOME ORDERS    Patient Name: Fransico Montalvo  YOB: [birth year]    Admit to Nursing Home:      Skilled Bed      Code: full                                                Diagnoses:  Active Hospital Problems    Diagnosis  POA    *Gastrointestinal hemorrhage with melena [K92.1]  Yes    Other emphysema [J43.8]  Yes     Chronic    Normocytic anemia [D64.9]  Yes     Chronic    Pleural effusion [J90]  Yes     Chronic    Acute blood loss anemia [D62]  Yes    Essential hypertension [I10]  Yes     Chronic    Thrombocytopenia [D69.6]  Yes     Chronic    Alcoholic cirrhosis [K70.30]  Yes     Chronic    CKD Stage 3 [N18.30]  Yes     Chronic    Tobacco abuse [Z72.0]  Yes     Chronic      Resolved Hospital Problems   No resolved problems to display.       Patient is homebound due to:  Gastrointestinal hemorrhage with melena    Allergies:Review of patient's allergies indicates:  No Known Allergies    Vitals:       Every shift (Skilled Nursing patients)    Diet: 2 gram sodium diet     Acitivities: - Up in a chair each morning as tolerated  - Ambulate with assistance to bathroom  - Scheduled walks once each shift (every 8 hours)  - May ambulate independently  - May use walker, cane, or self-propelled wheelchair       - Weight bearing: as tolerated    LABS:  Per facility protocol     Nursing Precautions:  - Aspiration precautions:             - Total assistance with meals            -  Upright 90 degrees befor during and after meals             -  Suction at bedside          - Fall precautions per nursing home protocol   - Seizure precaution per [facility] protocol   - Decubitus precautions:        -  for positioning   - Pressure reducing foam mattress   - Turn patient every two hours. Use wedge pillows to anchor  patient    CONSULTS:    Physical Therapy to evaluate and treat     Occupational Therapy to evaluate and treat     Speech Therapy  to evaluate and treat     Nutrition to evaluate and recommend diet     MISCELLANEOUS CARE:          Routine Skin for Bedridden Patients:  Apply moisture barrier cream to all    skin folds and wet areas in perineal area daily and after baths and                           all bowel movements.    Medications: Discontinue all previous medication orders, if any. See new list below.      Current Discharge Medication List        CONTINUE these medications which have NOT CHANGED    Details   acetaminophen (TYLENOL) 325 MG tablet Take 2 tablets (650 mg total) by mouth every 8 (eight) hours as needed for Pain.  Refills: 0      albuterol-ipratropium (DUO-NEB) 2.5 mg-0.5 mg/3 mL nebulizer solution Take 3 mLs by nebulization every 6 (six) hours as needed for Wheezing or Shortness of Breath. Rescue  Qty: 75 mL, Refills: 0      dicyclomine (BENTYL) 10 MG capsule Take 1 capsule (10 mg total) by mouth 3 (three) times daily as needed (abdominal pain).      EScitalopram oxalate (LEXAPRO) 10 MG tablet Take 10 mg by mouth once daily.      fluticasone furoate-vilanteroL (BREO) 100-25 mcg/dose diskus inhaler Inhale 1 puff into the lungs once daily. Controller  Qty: 30 each, Refills: 0      folic acid (FOLVITE) 1 MG tablet Take 1 tablet (1 mg total) by mouth once daily.  Refills: 0      melatonin (MELATIN) 3 mg tablet Take 2 tablets (6 mg total) by mouth nightly as needed for Insomnia.  Refills: 0      ondansetron (ZOFRAN-ODT) 8 MG TbDL Take 1 tablet (8 mg total) by mouth every 8 (eight) hours as needed (Nausea).  Refills: 30      pantoprazole (PROTONIX) 40 MG tablet Take 1 tablet (40 mg total) by mouth once daily.  Qty: 30 tablet, Refills: 1      thiamine 100 MG tablet Take 1 tablet (100 mg total) by mouth once daily.  Qty: 30 tablet, Refills: 0                 _________________________________  Claudia CORTES  MD Irene  04/20/2024

## 2024-04-20 NOTE — SUBJECTIVE & OBJECTIVE
Interval History:   4/20: some blood in stool overnight but Hg improved    Review of Systems   Constitutional:  Negative for chills and fever.   HENT:  Negative for ear pain and sinus pain.    Eyes:  Negative for pain.   Respiratory:  Positive for shortness of breath. Negative for cough.    Cardiovascular:  Negative for chest pain and leg swelling.   Gastrointestinal:  Positive for abdominal pain and blood in stool. Negative for constipation, diarrhea, nausea, rectal pain and vomiting.   Genitourinary:  Negative for dysuria and flank pain.   Musculoskeletal:  Negative for arthralgias, back pain, joint swelling, myalgias and neck pain.   Neurological:  Positive for weakness. Negative for numbness and headaches.   Psychiatric/Behavioral:  Negative for agitation, dysphoric mood and sleep disturbance. The patient is not nervous/anxious.      Objective:     Vital Signs (Most Recent):  Temp: 98.3 °F (36.8 °C) (04/20/24 1134)  Pulse: 108 (04/20/24 1134)  Resp: 19 (04/20/24 1134)  BP: 125/81 (04/20/24 1134)  SpO2: 97 % (04/20/24 1134) Vital Signs (24h Range):  Temp:  [97.3 °F (36.3 °C)-98.9 °F (37.2 °C)] 98.3 °F (36.8 °C)  Pulse:  [] 108  Resp:  [18-20] 19  SpO2:  [96 %-99 %] 97 %  BP: (103-139)/(63-95) 125/81     Weight: 87 kg (191 lb 12.8 oz)  Body mass index is 26.01 kg/m².    Intake/Output Summary (Last 24 hours) at 4/20/2024 1357  Last data filed at 4/20/2024 1323  Gross per 24 hour   Intake 1150 ml   Output 375 ml   Net 775 ml         Physical Exam  Vitals and nursing note reviewed.   Constitutional:       General: He is not in acute distress.     Appearance: Normal appearance. He is ill-appearing. He is not toxic-appearing or diaphoretic.   HENT:      Head: Normocephalic and atraumatic.      Right Ear: External ear normal.      Left Ear: External ear normal.      Nose: Nose normal.      Mouth/Throat:      Mouth: Mucous membranes are moist.   Eyes:      General: No scleral icterus.        Right eye: No  discharge.         Left eye: No discharge.      Extraocular Movements: Extraocular movements intact.   Cardiovascular:      Rate and Rhythm: Normal rate.      Heart sounds: Normal heart sounds. No murmur heard.     No friction rub.   Pulmonary:      Effort: Pulmonary effort is normal.      Comments: 3L NC  Abdominal:      General: Abdomen is flat. Bowel sounds are normal. There is no distension.      Tenderness: There is abdominal tenderness.   Genitourinary:     Comments: ARCELIA positive for dark, black stool  Musculoskeletal:         General: No swelling.      Cervical back: Normal range of motion.      Right lower leg: No edema.      Left lower leg: No edema.   Skin:     General: Skin is warm and dry.      Coloration: Skin is pale. Skin is not jaundiced.      Findings: No bruising.   Neurological:      General: No focal deficit present.      Mental Status: He is alert and oriented to person, place, and time.      Motor: No weakness.   Psychiatric:         Mood and Affect: Mood normal.         Behavior: Behavior normal.         Thought Content: Thought content normal.             Significant Labs: All pertinent labs within the past 24 hours have been reviewed.    Significant Imaging: I have reviewed all pertinent imaging results/findings within the past 24 hours.

## 2024-04-20 NOTE — PROGRESS NOTES
"[facility] - Med Surg ([address] Medicine  Progress Note    Patient Name: Fransico Montalvo  MRN: [id number]  Patient Class: IP- Inpatient   Admission Date: 4/18/2024  Length of Stay: 1 days  Attending Physician: Claudia Bonilla MD  Primary Care Provider: [facility]:     Principal Problem:Gastrointestinal hemorrhage with melena        HPI:  Fransico Montalvo is a 68 y.o. male with history of EtOH cirrhosis with ascites, chronic multifactorial anemia with thrombocytopenia, history of EtOH abuse, presumed COPD, CKD 3, and HTN who presents from [facility] for rectal bleeding.     He reports that approximately 3 days ago, he noted the onset of "black" stools. This was with associated crampy abdominal pain that improved with defecation. Since that time, he has continued to have multiple black bowel movements per day. He is not on blood thinners or aspirin. This happened months ago, however spontaneously resolved.     He also complains of 2-3 weeks of shortness of breath, particularly with exertion and not resolved with oxygen. Denies LE edema or chest pain.     Of note, he was recently discharged (4/4/24) from hospitalization here where he was hospitalized for 7 days, treated for acute on chronic anemia requiring total 5U RBC transfusion, hypotension, and presumed PNA with COPD exacerbation. Underwent colonoscopy that admission, which was grossly normal. He was discharged to SNF on oxygen for hypoxia.         Overview/Hospital Course:  No notes on file    Interval History:   4/20: some blood in stool overnight but Hg improved    Review of Systems   Constitutional:  Negative for chills and fever.   HENT:  Negative for ear pain and sinus pain.    Eyes:  Negative for pain.   Respiratory:  Positive for shortness of breath. Negative for cough.    Cardiovascular:  Negative for chest pain and leg swelling.   Gastrointestinal:  Positive for abdominal pain and blood in stool. Negative for " constipation, diarrhea, nausea, rectal pain and vomiting.   Genitourinary:  Negative for dysuria and flank pain.   Musculoskeletal:  Negative for arthralgias, back pain, joint swelling, myalgias and neck pain.   Neurological:  Positive for weakness. Negative for numbness and headaches.   Psychiatric/Behavioral:  Negative for agitation, dysphoric mood and sleep disturbance. The patient is not nervous/anxious.      Objective:     Vital Signs (Most Recent):  Temp: 98.3 °F (36.8 °C) (04/20/24 1134)  Pulse: 108 (04/20/24 1134)  Resp: 19 (04/20/24 1134)  BP: 125/81 (04/20/24 1134)  SpO2: 97 % (04/20/24 1134) Vital Signs (24h Range):  Temp:  [97.3 °F (36.3 °C)-98.9 °F (37.2 °C)] 98.3 °F (36.8 °C)  Pulse:  [] 108  Resp:  [18-20] 19  SpO2:  [96 %-99 %] 97 %  BP: (103-139)/(63-95) 125/81     Weight: 87 kg (191 lb 12.8 oz)  Body mass index is 26.01 kg/m².    Intake/Output Summary (Last 24 hours) at 4/20/2024 1357  Last data filed at 4/20/2024 1323  Gross per 24 hour   Intake 1150 ml   Output 375 ml   Net 775 ml         Physical Exam  Vitals and nursing note reviewed.   Constitutional:       General: He is not in acute distress.     Appearance: Normal appearance. He is ill-appearing. He is not toxic-appearing or diaphoretic.   HENT:      Head: Normocephalic and atraumatic.      Right Ear: External ear normal.      Left Ear: External ear normal.      Nose: Nose normal.      Mouth/Throat:      Mouth: Mucous membranes are moist.   Eyes:      General: No scleral icterus.        Right eye: No discharge.         Left eye: No discharge.      Extraocular Movements: Extraocular movements intact.   Cardiovascular:      Rate and Rhythm: Normal rate.      Heart sounds: Normal heart sounds. No murmur heard.     No friction rub.   Pulmonary:      Effort: Pulmonary effort is normal.      Comments: 3L NC  Abdominal:      General: Abdomen is flat. Bowel sounds are normal. There is no distension.      Tenderness: There is abdominal  tenderness.   Genitourinary:     Comments: ARCELIA positive for dark, black stool  Musculoskeletal:         General: No swelling.      Cervical back: Normal range of motion.      Right lower leg: No edema.      Left lower leg: No edema.   Skin:     General: Skin is warm and dry.      Coloration: Skin is pale. Skin is not jaundiced.      Findings: No bruising.   Neurological:      General: No focal deficit present.      Mental Status: He is alert and oriented to person, place, and time.      Motor: No weakness.   Psychiatric:         Mood and Affect: Mood normal.         Behavior: Behavior normal.         Thought Content: Thought content normal.             Significant Labs: All pertinent labs within the past 24 hours have been reviewed.    Significant Imaging: I have reviewed all pertinent imaging results/findings within the past 24 hours.    Assessment/Plan:      * Gastrointestinal hemorrhage with melena  Presents with complaints of melena, and hemoglobin noted to be slightly decreased from prior.  He has a history of cirrhosis with esophageal varices per chart, however cannot find documentation of this.  Last EGD in our system was 10/2023, and had no signs of active bleeding.  Had a normal colonoscopy 04/01/2024 without evidence of active or stigmata of bleeding.  We will start PPI b.i.d., prophylactic ceftriaxone given his known cirrhosis, and consult GI for further recommendations.  Hold anticoagulation.  Trend CBC as below, and transfuse 2 maintain hemoglobin 7 or greater. Will likely require repeat EGD.       Pleural effusion  Chest x-ray reveals bilateral pleural effusions with evidence of pulmonary vascular congestion.  Most recent TTE 11/2022 shows normal LVEF, without evidence of diastolic dysfunction.  Pleural effusion appears somewhat chronic based on prior chest x-ray.  Suspect some volume overload, which may be secondary to recent blood administration and cirrhosis.  We will check BNP, and consider  rechecking updated TTE.  We will give a dose of Lasix for now as I expect he will need blood transfusion.  Monitor for signs and symptoms of respiratory distress.    Normocytic anemia  Chronic multifactorial anemia noted.  Lab anemia workup will likely be of little benefit given recent transfusion of 5U earlier this month. We will continue home folic acid after likely EGD. Monitor as above.     Other emphysema  Fairly recent diagnosis of likely COPD/emphysema, without evidence of acute exacerbation.  We will continue Breo along with p.r.n. Adolfo.  Sat goal 88% or greater.    Acute blood loss anemia  Known chronic normocytic anemia, requiring 5U RBC during recent admission for hypotension and symptomatic anemia.  Recent baseline around 8.1-8.3, and presents with hemoglobin 7.7 with complaints of melena.  He is currently hemodynamically stable.  Given this, we will trend CBC and transfuse to maintain hemoglobin 7 or greater were unless he exhibits hemodynamic instability.  Hold anticoagulation.    Essential hypertension  Hold home antihypertensives for now given ongoing GI bleed.  Can restart as indicated.    Alcoholic cirrhosis  Patient with known Cirrhosis with Child's class B (based on most recent hospitalization data). Co-morbidities are present and inclusive of ascites, esophageal varices, and anemia/pancytopenia.  MELD-Na score calculated; MELD 3.0: 19 at 4/1/2024  2:26 AM  MELD-Na: 19 at 4/1/2024  2:26 AM  Calculated from:  Serum Creatinine: 1.6 mg/dL at 4/1/2024  2:26 AM  Serum Sodium: 132 mmol/L at 4/1/2024  2:26 AM  Total Bilirubin: 0.9 mg/dL (Using min of 1 mg/dL) at 4/1/2024  2:26 AM  Serum Albumin: 3.1 g/dL at 4/1/2024  2:26 AM  INR(ratio): 1.4 at 3/30/2024  5:13 AM  Age at listing (hypothetical): 68 years  Sex: Male at 4/1/2024  2:26 AM      Continue chronic meds. Etiology likely ETOH. Will avoid any hepatotoxic meds, and monitor CBC/CMP/INR for synthetic function.     Appreciate GI recommendations.      Thrombocytopenia  Chronic, however worsened over baseline. Likely  multifactorial from liver disease and acute blood loss.  Currently without indication for platelet transfusion.  We will monitor on serial CBC.      CKD Stage 3  Renal function remains mildly improved over recent baseline. Avoid nephrotoxic agents as able, and renally dose all meds as applicable.    Tobacco abuse  Reports that he has quit smoking. Continued cessation encouraged.       VTE Risk Mitigation (From admission, onward)           Ordered     IP VTE HIGH RISK PATIENT  Once         04/18/24 1819     Place sequential compression device  Until discontinued         04/18/24 1819     Reason for No Pharmacological VTE Prophylaxis  Once        Question:  Reasons:  Answer:  Active Bleeding    04/18/24 1819     Place sequential compression device  Until discontinued         04/18/24 1811     Place sequential compression device  Until discontinued         04/18/24 1755                    Discharge Planning   ARTURO: 4/20/2024     Code Status: Full Code   Is the patient medically ready for discharge?:     Reason for patient still in hospital (select all that apply): Patient trending condition  Discharge Plan A: Other, Return to nursing home ([facility] return)                  Claudia Bonilla MD  Department of Hospital Medicine   [facility] Surg ([place])

## 2024-04-20 NOTE — PLAN OF CARE
9:45 AM  LCSW attempted to contact DON/House Sup at [facility] to inquire about pt returning in 72 hour auth time frame. Had to leave a message. Will continue to follow.     Rin Galeas LCSW  Case Management/[facility]  [phone]    11:35 AM  Attempted to contact don.house sup again, no asnwer. LCSW to follow.     Rin Galeas LCSW  Case Management/[facility]  [phone]

## 2024-04-21 LAB
ANISOCYTOSIS BLD QL SMEAR: SLIGHT
BASOPHILS # BLD AUTO: 0.04 K/UL (ref 0–0.2)
BASOPHILS NFR BLD: 1.2 % (ref 0–1.9)
DIFFERENTIAL METHOD BLD: ABNORMAL
EOSINOPHIL # BLD AUTO: 0 K/UL (ref 0–0.5)
EOSINOPHIL NFR BLD: 0.9 % (ref 0–8)
ERYTHROCYTE [DISTWIDTH] IN BLOOD BY AUTOMATED COUNT: 21.2 % (ref 11.5–14.5)
HCT VFR BLD AUTO: 25.5 % (ref 40–54)
HGB BLD-MCNC: 7.3 G/DL (ref 14–18)
HYPOCHROMIA BLD QL SMEAR: ABNORMAL
IMM GRANULOCYTES # BLD AUTO: 0.02 K/UL (ref 0–0.04)
IMM GRANULOCYTES NFR BLD AUTO: 0.6 % (ref 0–0.5)
LYMPHOCYTES # BLD AUTO: 0.7 K/UL (ref 1–4.8)
LYMPHOCYTES NFR BLD: 21.2 % (ref 18–48)
MCH RBC QN AUTO: 23.2 PG (ref 27–31)
MCHC RBC AUTO-ENTMCNC: 28.6 G/DL (ref 32–36)
MCV RBC AUTO: 81 FL (ref 82–98)
MONOCYTES # BLD AUTO: 0.3 K/UL (ref 0.3–1)
MONOCYTES NFR BLD: 9.6 % (ref 4–15)
NEUTROPHILS # BLD AUTO: 2.2 K/UL (ref 1.8–7.7)
NEUTROPHILS NFR BLD: 66.5 % (ref 38–73)
NRBC BLD-RTO: 0 /100 WBC
OVALOCYTES BLD QL SMEAR: ABNORMAL
PLATELET # BLD AUTO: 144 K/UL (ref 150–450)
PLATELET BLD QL SMEAR: ABNORMAL
PMV BLD AUTO: 10.9 FL (ref 9.2–12.9)
POIKILOCYTOSIS BLD QL SMEAR: SLIGHT
POLYCHROMASIA BLD QL SMEAR: ABNORMAL
RBC # BLD AUTO: 3.15 M/UL (ref 4.6–6.2)
WBC # BLD AUTO: 3.35 K/UL (ref 3.9–12.7)

## 2024-04-21 PROCEDURE — 27000221 HC OXYGEN, UP TO 24 HOURS

## 2024-04-21 PROCEDURE — 36415 COLL VENOUS BLD VENIPUNCTURE: CPT | Performed by: STUDENT IN AN ORGANIZED HEALTH CARE EDUCATION/TRAINING PROGRAM

## 2024-04-21 PROCEDURE — 25000003 PHARM REV CODE 250: Performed by: HOSPITALIST

## 2024-04-21 PROCEDURE — 85025 COMPLETE CBC W/AUTO DIFF WBC: CPT | Performed by: STUDENT IN AN ORGANIZED HEALTH CARE EDUCATION/TRAINING PROGRAM

## 2024-04-21 PROCEDURE — 94761 N-INVAS EAR/PLS OXIMETRY MLT: CPT

## 2024-04-21 PROCEDURE — 99900035 HC TECH TIME PER 15 MIN (STAT)

## 2024-04-21 PROCEDURE — 94640 AIRWAY INHALATION TREATMENT: CPT

## 2024-04-21 PROCEDURE — 25000003 PHARM REV CODE 250: Performed by: STUDENT IN AN ORGANIZED HEALTH CARE EDUCATION/TRAINING PROGRAM

## 2024-04-21 PROCEDURE — 21400001 HC TELEMETRY ROOM

## 2024-04-21 RX ORDER — OXYCODONE HYDROCHLORIDE 5 MG/1
5 TABLET ORAL EVERY 6 HOURS PRN
Status: DISCONTINUED | OUTPATIENT
Start: 2024-04-21 | End: 2024-04-23 | Stop reason: HOSPADM

## 2024-04-21 RX ORDER — TRAZODONE HYDROCHLORIDE 50 MG/1
50 TABLET ORAL NIGHTLY PRN
Status: DISCONTINUED | OUTPATIENT
Start: 2024-04-21 | End: 2024-04-23 | Stop reason: HOSPADM

## 2024-04-21 RX ADMIN — OXYCODONE 5 MG: 5 TABLET ORAL at 07:04

## 2024-04-21 RX ADMIN — OXYCODONE 5 MG: 5 TABLET ORAL at 04:04

## 2024-04-21 RX ADMIN — FLUTICASONE FUROATE AND VILANTEROL TRIFENATATE 1 PUFF: 100; 25 POWDER RESPIRATORY (INHALATION) at 08:04

## 2024-04-21 RX ADMIN — ESCITALOPRAM OXALATE 10 MG: 10 TABLET ORAL at 09:04

## 2024-04-21 RX ADMIN — DICYCLOMINE HYDROCHLORIDE 10 MG: 10 CAPSULE ORAL at 07:04

## 2024-04-21 RX ADMIN — PANTOPRAZOLE SODIUM 40 MG: 40 TABLET, DELAYED RELEASE ORAL at 09:04

## 2024-04-21 RX ADMIN — DICYCLOMINE HYDROCHLORIDE 10 MG: 10 CAPSULE ORAL at 06:04

## 2024-04-21 RX ADMIN — THIAMINE HCL TAB 100 MG 100 MG: 100 TAB at 09:04

## 2024-04-21 NOTE — SUBJECTIVE & OBJECTIVE
Interval History:   4/21: Bleeding resolved. On regular diet. Pending morning CBC.     Review of Systems   Constitutional:  Negative for chills and fever.   HENT:  Negative for ear pain and sinus pain.    Eyes:  Negative for pain.   Respiratory:  Positive for shortness of breath. Negative for cough.    Cardiovascular:  Negative for chest pain and leg swelling.   Gastrointestinal:  Positive for abdominal pain and blood in stool. Negative for constipation, diarrhea, nausea, rectal pain and vomiting.   Genitourinary:  Negative for dysuria and flank pain.   Musculoskeletal:  Negative for arthralgias, back pain, joint swelling, myalgias and neck pain.   Neurological:  Positive for weakness. Negative for numbness and headaches.   Psychiatric/Behavioral:  Negative for agitation, dysphoric mood and sleep disturbance. The patient is not nervous/anxious.      Objective:     Vital Signs (Most Recent):  Temp: 97.8 °F (36.6 °C) (04/21/24 0504)  Pulse: 99 (04/21/24 0504)  Resp: 18 (04/21/24 0504)  BP: (!) 136/90 (04/21/24 0504)  SpO2: 99 % (04/21/24 0504) Vital Signs (24h Range):  Temp:  [97.4 °F (36.3 °C)-98.6 °F (37 °C)] 97.8 °F (36.6 °C)  Pulse:  [] 99  Resp:  [17-19] 18  SpO2:  [97 %-99 %] 99 %  BP: (124-136)/(81-98) 136/90     Weight: 87 kg (191 lb 12.8 oz)  Body mass index is 26.01 kg/m².    Intake/Output Summary (Last 24 hours) at 4/21/2024 0536  Last data filed at 4/21/2024 0057  Gross per 24 hour   Intake 720 ml   Output 450 ml   Net 270 ml         Physical Exam  Vitals and nursing note reviewed.   Constitutional:       General: He is not in acute distress.     Appearance: Normal appearance. He is ill-appearing. He is not toxic-appearing or diaphoretic.   HENT:      Head: Normocephalic and atraumatic.      Right Ear: External ear normal.      Left Ear: External ear normal.      Nose: Nose normal.      Mouth/Throat:      Mouth: Mucous membranes are moist.   Eyes:      General: No scleral icterus.        Right eye:  No discharge.         Left eye: No discharge.      Extraocular Movements: Extraocular movements intact.   Cardiovascular:      Rate and Rhythm: Normal rate.      Heart sounds: Normal heart sounds. No murmur heard.     No friction rub.   Pulmonary:      Effort: Pulmonary effort is normal.      Comments: 3L NC  Abdominal:      General: Abdomen is flat. Bowel sounds are normal. There is no distension.      Tenderness: There is abdominal tenderness.   Genitourinary:     Comments: ARCELIA positive for dark, black stool  Musculoskeletal:         General: No swelling.      Cervical back: Normal range of motion.      Right lower leg: No edema.      Left lower leg: No edema.   Skin:     General: Skin is warm and dry.      Coloration: Skin is pale. Skin is not jaundiced.      Findings: No bruising.   Neurological:      General: No focal deficit present.      Mental Status: He is alert and oriented to person, place, and time.      Motor: No weakness.   Psychiatric:         Mood and Affect: Mood normal.         Behavior: Behavior normal.         Thought Content: Thought content normal.             Significant Labs: All pertinent labs within the past 24 hours have been reviewed.    Significant Imaging: I have reviewed all pertinent imaging results/findings within the past 24 hours.

## 2024-04-21 NOTE — PROGRESS NOTES
"[facility] - Med Surg ([address] Medicine  Progress Note    Patient Name: Fransico Montalvo  MRN: [id number]  Patient Class: IP- Inpatient   Admission Date: 4/18/2024  Length of Stay: 2 days  Attending Physician: Claudia Bonilla MD  Primary Care Provider: [facility]:     Principal Problem:Melena        HPI:  Fransico Montalvo is a 68 y.o. male with history of EtOH cirrhosis with ascites, chronic multifactorial anemia with thrombocytopenia, history of EtOH abuse, presumed COPD, CKD 3, and HTN who presents from [facility] for rectal bleeding.     He reports that approximately 3 days ago, he noted the onset of "black" stools. This was with associated crampy abdominal pain that improved with defecation. Since that time, he has continued to have multiple black bowel movements per day. He is not on blood thinners or aspirin. This happened months ago, however spontaneously resolved.     He also complains of 2-3 weeks of shortness of breath, particularly with exertion and not resolved with oxygen. Denies LE edema or chest pain.     Of note, he was recently discharged (4/4/24) from hospitalization here where he was hospitalized for 7 days, treated for acute on chronic anemia requiring total 5U RBC transfusion, hypotension, and presumed PNA with COPD exacerbation. Underwent colonoscopy that admission, which was grossly normal. He was discharged to SNF on oxygen for hypoxia.         Overview/Hospital Course:  No notes on file    Interval History:   4/21: Bleeding resolved. On regular diet. Pending morning CBC.     Review of Systems   Constitutional:  Negative for chills and fever.   HENT:  Negative for ear pain and sinus pain.    Eyes:  Negative for pain.   Respiratory:  Positive for shortness of breath. Negative for cough.    Cardiovascular:  Negative for chest pain and leg swelling.   Gastrointestinal:  Positive for abdominal pain and blood in stool. Negative for constipation, diarrhea, " nausea, rectal pain and vomiting.   Genitourinary:  Negative for dysuria and flank pain.   Musculoskeletal:  Negative for arthralgias, back pain, joint swelling, myalgias and neck pain.   Neurological:  Positive for weakness. Negative for numbness and headaches.   Psychiatric/Behavioral:  Negative for agitation, dysphoric mood and sleep disturbance. The patient is not nervous/anxious.      Objective:     Vital Signs (Most Recent):  Temp: 97.8 °F (36.6 °C) (04/21/24 0504)  Pulse: 99 (04/21/24 0504)  Resp: 18 (04/21/24 0504)  BP: (!) 136/90 (04/21/24 0504)  SpO2: 99 % (04/21/24 0504) Vital Signs (24h Range):  Temp:  [97.4 °F (36.3 °C)-98.6 °F (37 °C)] 97.8 °F (36.6 °C)  Pulse:  [] 99  Resp:  [17-19] 18  SpO2:  [97 %-99 %] 99 %  BP: (124-136)/(81-98) 136/90     Weight: 87 kg (191 lb 12.8 oz)  Body mass index is 26.01 kg/m².    Intake/Output Summary (Last 24 hours) at 4/21/2024 0536  Last data filed at 4/21/2024 0057  Gross per 24 hour   Intake 720 ml   Output 450 ml   Net 270 ml         Physical Exam  Vitals and nursing note reviewed.   Constitutional:       General: He is not in acute distress.     Appearance: Normal appearance. He is ill-appearing. He is not toxic-appearing or diaphoretic.   HENT:      Head: Normocephalic and atraumatic.      Right Ear: External ear normal.      Left Ear: External ear normal.      Nose: Nose normal.      Mouth/Throat:      Mouth: Mucous membranes are moist.   Eyes:      General: No scleral icterus.        Right eye: No discharge.         Left eye: No discharge.      Extraocular Movements: Extraocular movements intact.   Cardiovascular:      Rate and Rhythm: Normal rate.      Heart sounds: Normal heart sounds. No murmur heard.     No friction rub.   Pulmonary:      Effort: Pulmonary effort is normal.      Comments: 3L NC  Abdominal:      General: Abdomen is flat. Bowel sounds are normal. There is no distension.      Tenderness: There is abdominal tenderness.   Genitourinary:      Comments: ARCELIA positive for dark, black stool  Musculoskeletal:         General: No swelling.      Cervical back: Normal range of motion.      Right lower leg: No edema.      Left lower leg: No edema.   Skin:     General: Skin is warm and dry.      Coloration: Skin is pale. Skin is not jaundiced.      Findings: No bruising.   Neurological:      General: No focal deficit present.      Mental Status: He is alert and oriented to person, place, and time.      Motor: No weakness.   Psychiatric:         Mood and Affect: Mood normal.         Behavior: Behavior normal.         Thought Content: Thought content normal.             Significant Labs: All pertinent labs within the past 24 hours have been reviewed.    Significant Imaging: I have reviewed all pertinent imaging results/findings within the past 24 hours.    Assessment/Plan:      * Melena  Presents with complaints of melena, and hemoglobin noted to be slightly decreased from prior.  He has a history of cirrhosis with esophageal varices per chart, however cannot find documentation of this.  Last EGD in our system was 10/2023, and had no signs of active bleeding.  Had a normal colonoscopy 04/01/2024 without evidence of active or stigmata of bleeding.  We will start PPI b.i.d., prophylactic ceftriaxone given his known cirrhosis, and consult GI for further recommendations.  Hold anticoagulation.  Trend CBC as below, and transfuse 2 maintain hemoglobin 7 or greater. Will likely require repeat EGD.       Pleural effusion  Chest x-ray reveals bilateral pleural effusions with evidence of pulmonary vascular congestion.  Most recent TTE 11/2022 shows normal LVEF, without evidence of diastolic dysfunction.  Pleural effusion appears somewhat chronic based on prior chest x-ray.  Suspect some volume overload, which may be secondary to recent blood administration and cirrhosis.  We will check BNP, and consider rechecking updated TTE.  We will give a dose of Lasix for now as I expect  he will need blood transfusion.  Monitor for signs and symptoms of respiratory distress.    Normocytic anemia  Chronic multifactorial anemia noted.  Lab anemia workup will likely be of little benefit given recent transfusion of 5U earlier this month. We will continue home folic acid after likely EGD. Monitor as above.     Other emphysema  Fairly recent diagnosis of likely COPD/emphysema, without evidence of acute exacerbation.  We will continue Breo along with p.r.n. DuoNebs.  Sat goal 88% or greater.    Acute blood loss anemia  Known chronic normocytic anemia, requiring 5U RBC during recent admission for hypotension and symptomatic anemia.  Recent baseline around 8.1-8.3, and presents with hemoglobin 7.7 with complaints of melena.  He is currently hemodynamically stable.  Given this, we will trend CBC and transfuse to maintain hemoglobin 7 or greater were unless he exhibits hemodynamic instability.  Hold anticoagulation.    Essential hypertension  Hold home antihypertensives for now given ongoing GI bleed.  Can restart as indicated.    Alcoholic cirrhosis  Patient with known Cirrhosis with Child's class B (based on most recent hospitalization data). Co-morbidities are present and inclusive of ascites, esophageal varices, and anemia/pancytopenia.  MELD-Na score calculated; MELD 3.0: 19 at 4/1/2024  2:26 AM  MELD-Na: 19 at 4/1/2024  2:26 AM  Calculated from:  Serum Creatinine: 1.6 mg/dL at 4/1/2024  2:26 AM  Serum Sodium: 132 mmol/L at 4/1/2024  2:26 AM  Total Bilirubin: 0.9 mg/dL (Using min of 1 mg/dL) at 4/1/2024  2:26 AM  Serum Albumin: 3.1 g/dL at 4/1/2024  2:26 AM  INR(ratio): 1.4 at 3/30/2024  5:13 AM  Age at listing (hypothetical): 68 years  Sex: Male at 4/1/2024  2:26 AM      Continue chronic meds. Etiology likely ETOH. Will avoid any hepatotoxic meds, and monitor CBC/CMP/INR for synthetic function.     Appreciate GI recommendations.     Thrombocytopenia  Chronic, however worsened over baseline. Likely   multifactorial from liver disease and acute blood loss.  Currently without indication for platelet transfusion.  We will monitor on serial CBC.      CKD Stage 3  Renal function remains mildly improved over recent baseline. Avoid nephrotoxic agents as able, and renally dose all meds as applicable.    Tobacco abuse  Reports that he has quit smoking. Continued cessation encouraged.       VTE Risk Mitigation (From admission, onward)           Ordered     IP VTE HIGH RISK PATIENT  Once         04/18/24 1819     Place sequential compression device  Until discontinued         04/18/24 1819     Reason for No Pharmacological VTE Prophylaxis  Once        Question:  Reasons:  Answer:  Active Bleeding    04/18/24 1819     Place sequential compression device  Until discontinued         04/18/24 1811     Place sequential compression device  Until discontinued         04/18/24 1755                    Discharge Planning   ARTURO: 4/20/2024     Code Status: Full Code   Is the patient medically ready for discharge?:     Reason for patient still in hospital (select all that apply): Patient trending condition  Discharge Plan A: Other, Return to nursing home (SnF return)                  Claudia Bonilla MD  Department of Hospital Medicine   [facility] - [facility] Surg ([place])

## 2024-04-21 NOTE — PLAN OF CARE
Problem: Adult Inpatient Plan of Care  Goal: Plan of Care Review  4/21/2024 0451 by Abida Bronson, LPN  Outcome: Ongoing, Progressing  4/21/2024 0451 by Abida Bronson, LPN  Outcome: Ongoing, Progressing  Goal: Patient-Specific Goal (Individualized)  4/21/2024 0451 by Abida Bronson, LPN  Outcome: Ongoing, Progressing  4/21/2024 0451 by Abida Bronson, LPN  Outcome: Ongoing, Progressing  Goal: Absence of Hospital-Acquired Illness or Injury  4/21/2024 0451 by Abida Bronson, LPN  Outcome: Ongoing, Progressing  4/21/2024 0451 by Abida Bronson, LPN  Outcome: Ongoing, Progressing  Goal: Optimal Comfort and Wellbeing  4/21/2024 0451 by Abida Bronson, LPN  Outcome: Ongoing, Progressing  4/21/2024 0451 by Abida Bronson, LPN  Outcome: Ongoing, Progressing  Goal: Readiness for Transition of Care  4/21/2024 0451 by Abida Bronson, LPN  Outcome: Ongoing, Progressing  4/21/2024 0451 by Abida Bronson, LPN  Outcome: Ongoing, Progressing     Problem: Adjustment to Illness (Gastrointestinal Bleeding)  Goal: Optimal Coping with Acute Illness  4/21/2024 0451 by Abida Bronson, LPN  Outcome: Ongoing, Progressing  4/21/2024 0451 by Abida Bronson, LPN  Outcome: Ongoing, Progressing     Problem: Bleeding (Gastrointestinal Bleeding)  Goal: Hemostasis  4/21/2024 0451 by Abida Bronson, LPN  Outcome: Ongoing, Progressing  4/21/2024 0451 by Abida Bronson, LPN  Outcome: Ongoing, Progressing     Problem: Fluid and Electrolyte Imbalance (Acute Kidney Injury/Impairment)  Goal: Fluid and Electrolyte Balance  4/21/2024 0451 by Abida Bronson, LPN  Outcome: Ongoing, Progressing  4/21/2024 0451 by Abida Bronson, LPN  Outcome: Ongoing, Progressing     Problem: Oral Intake Inadequate (Acute Kidney Injury/Impairment)  Goal: Optimal Nutrition Intake  4/21/2024 0451 by Abida Bronson LPN  Outcome: Ongoing, Progressing  4/21/2024 0451 by Abida Bronson,  LPN  Outcome: Ongoing, Progressing     Problem: Renal Function Impairment (Acute Kidney Injury/Impairment)  Goal: Effective Renal Function  4/21/2024 0451 by Abida Bronson, LPN  Outcome: Ongoing, Progressing  4/21/2024 0451 by Abida Bronson, LPN  Outcome: Ongoing, Progressing     Problem: Fluid Imbalance (Pneumonia)  Goal: Fluid Balance  4/21/2024 0451 by Abida Bronson, LPN  Outcome: Ongoing, Progressing  4/21/2024 0451 by Abida Bronson, LPN  Outcome: Ongoing, Progressing     Problem: Infection (Pneumonia)  Goal: Resolution of Infection Signs and Symptoms  4/21/2024 0451 by Abida Bronson, LPN  Outcome: Ongoing, Progressing  4/21/2024 0451 by Abida Bronson, LPN  Outcome: Ongoing, Progressing     Problem: Respiratory Compromise (Pneumonia)  Goal: Effective Oxygenation and Ventilation  4/21/2024 0451 by Abida Bronson, LPN  Outcome: Ongoing, Progressing  4/21/2024 0451 by Abida Bronson, LPN  Outcome: Ongoing, Progressing     Problem: Skin Injury Risk Increased  Goal: Skin Health and Integrity  4/21/2024 0451 by Abida Bronson, LPN  Outcome: Ongoing, Progressing  4/21/2024 0451 by Abida Bronson, LPN  Outcome: Ongoing, Progressing   Pt alert & able to express needs.  C/o abdominal pain.  Bentyl given as ordered with good relief , but patient requested a stronger med for full relief.  (On call will let primary MD discuss with patient.)  Possible discharge home to [facility].  Telemetry discontinued.     Safety maintained.  Bed in low position,  call  light in reach.

## 2024-04-22 LAB
BASOPHILS # BLD AUTO: 0.02 K/UL (ref 0–0.2)
BASOPHILS NFR BLD: 0.7 % (ref 0–1.9)
DIFFERENTIAL METHOD BLD: ABNORMAL
EOSINOPHIL # BLD AUTO: 0.1 K/UL (ref 0–0.5)
EOSINOPHIL NFR BLD: 1.7 % (ref 0–8)
ERYTHROCYTE [DISTWIDTH] IN BLOOD BY AUTOMATED COUNT: 21.2 % (ref 11.5–14.5)
HCT VFR BLD AUTO: 25.9 % (ref 40–54)
HGB BLD-MCNC: 7.4 G/DL (ref 14–18)
IMM GRANULOCYTES # BLD AUTO: 0.01 K/UL (ref 0–0.04)
IMM GRANULOCYTES NFR BLD AUTO: 0.3 % (ref 0–0.5)
LYMPHOCYTES # BLD AUTO: 0.7 K/UL (ref 1–4.8)
LYMPHOCYTES NFR BLD: 24.5 % (ref 18–48)
MCH RBC QN AUTO: 24 PG (ref 27–31)
MCHC RBC AUTO-ENTMCNC: 28.6 G/DL (ref 32–36)
MCV RBC AUTO: 84 FL (ref 82–98)
MONOCYTES # BLD AUTO: 0.3 K/UL (ref 0.3–1)
MONOCYTES NFR BLD: 11.2 % (ref 4–15)
NEUTROPHILS # BLD AUTO: 1.8 K/UL (ref 1.8–7.7)
NEUTROPHILS NFR BLD: 61.6 % (ref 38–73)
NRBC BLD-RTO: 0 /100 WBC
PLATELET # BLD AUTO: 114 K/UL (ref 150–450)
PMV BLD AUTO: 10.2 FL (ref 9.2–12.9)
RBC # BLD AUTO: 3.08 M/UL (ref 4.6–6.2)
WBC # BLD AUTO: 2.86 K/UL (ref 3.9–12.7)

## 2024-04-22 PROCEDURE — 94761 N-INVAS EAR/PLS OXIMETRY MLT: CPT

## 2024-04-22 PROCEDURE — 27000221 HC OXYGEN, UP TO 24 HOURS

## 2024-04-22 PROCEDURE — 94640 AIRWAY INHALATION TREATMENT: CPT

## 2024-04-22 PROCEDURE — 85025 COMPLETE CBC W/AUTO DIFF WBC: CPT | Performed by: STUDENT IN AN ORGANIZED HEALTH CARE EDUCATION/TRAINING PROGRAM

## 2024-04-22 PROCEDURE — 36415 COLL VENOUS BLD VENIPUNCTURE: CPT | Performed by: STUDENT IN AN ORGANIZED HEALTH CARE EDUCATION/TRAINING PROGRAM

## 2024-04-22 PROCEDURE — 25000003 PHARM REV CODE 250: Performed by: HOSPITALIST

## 2024-04-22 PROCEDURE — 21400001 HC TELEMETRY ROOM

## 2024-04-22 PROCEDURE — 25000003 PHARM REV CODE 250: Performed by: STUDENT IN AN ORGANIZED HEALTH CARE EDUCATION/TRAINING PROGRAM

## 2024-04-22 PROCEDURE — 99900035 HC TECH TIME PER 15 MIN (STAT)

## 2024-04-22 RX ADMIN — OXYCODONE 5 MG: 5 TABLET ORAL at 11:04

## 2024-04-22 RX ADMIN — ESCITALOPRAM OXALATE 10 MG: 10 TABLET ORAL at 09:04

## 2024-04-22 RX ADMIN — DICYCLOMINE HYDROCHLORIDE 10 MG: 10 CAPSULE ORAL at 08:04

## 2024-04-22 RX ADMIN — DICYCLOMINE HYDROCHLORIDE 10 MG: 10 CAPSULE ORAL at 04:04

## 2024-04-22 RX ADMIN — THIAMINE HCL TAB 100 MG 100 MG: 100 TAB at 09:04

## 2024-04-22 RX ADMIN — OXYCODONE 5 MG: 5 TABLET ORAL at 08:04

## 2024-04-22 RX ADMIN — PANTOPRAZOLE SODIUM 40 MG: 40 TABLET, DELAYED RELEASE ORAL at 09:04

## 2024-04-22 RX ADMIN — OXYCODONE 5 MG: 5 TABLET ORAL at 04:04

## 2024-04-22 RX ADMIN — FLUTICASONE FUROATE AND VILANTEROL TRIFENATATE 1 PUFF: 100; 25 POWDER RESPIRATORY (INHALATION) at 08:04

## 2024-04-22 NOTE — PLAN OF CARE
Discharge Plan A and Plan B have been determined by review of patient's clinical status, future medical and therapeutic needs, and coverage/benefits for post-acute care in coordination with multidisciplinary team members.    04/22/24 1204   Post-Acute Status   Post-Acute Authorization Placement   Post-Acute Placement Status Pending payor review/awaiting authorization (if required)   Discharge Plan   Discharge Plan A Skilled Nursing Facility   Discharge Plan B Return to Nursing Home     FERNANDO sent orders to [facility] via care port. FERNANDO phoned Sofia (admissions) to notify that patient ready for dc Today, orders sent via care port. Sofia states that patient is accepted to return Today but will need PHN auth prior to return. FERNANDO submitted PHN auth request via hard fax. Patient's dc to SNF pending post-acute provider review.    2:00pm  FERNANDO received notification from N that auth not approved due to no PT/OT eval. FERNANDO requested that MD place orders. Patient's dc to SNF pending PT/OT eval/insurance auth.    FERNANDO will continue to follow.                        MEÑO Brown, LMSW  [facility]  Case Management  Ext. [phone]

## 2024-04-22 NOTE — SUBJECTIVE & OBJECTIVE
Interval History:   4/22: no acute issues reported overnight. Medically appropriate to return to [facility]    Review of Systems   Constitutional:  Negative for chills and fever.   HENT:  Negative for ear pain and sinus pain.    Eyes:  Negative for pain.   Respiratory:  Positive for shortness of breath. Negative for cough.    Cardiovascular:  Negative for chest pain and leg swelling.   Gastrointestinal:  Positive for abdominal pain and blood in stool. Negative for constipation, diarrhea, nausea, rectal pain and vomiting.   Genitourinary:  Negative for dysuria and flank pain.   Musculoskeletal:  Negative for arthralgias, back pain, joint swelling, myalgias and neck pain.   Neurological:  Positive for weakness. Negative for numbness and headaches.   Psychiatric/Behavioral:  Negative for agitation, dysphoric mood and sleep disturbance. The patient is not nervous/anxious.      Objective:     Vital Signs (Most Recent):  Temp: 98.3 °F (36.8 °C) (04/22/24 1150)  Pulse: 101 (04/22/24 1150)  Resp: 18 (04/22/24 1150)  BP: 122/77 (04/22/24 1150)  SpO2: 98 % (04/22/24 1150) Vital Signs (24h Range):  Temp:  [97 °F (36.1 °C)-98.6 °F (37 °C)] 98.3 °F (36.8 °C)  Pulse:  [] 101  Resp:  [17-19] 18  SpO2:  [97 %-100 %] 98 %  BP: (122-148)/() 122/77     Weight: 87 kg (191 lb 12.8 oz)  Body mass index is 26.01 kg/m².    Intake/Output Summary (Last 24 hours) at 4/22/2024 1226  Last data filed at 4/22/2024 1136  Gross per 24 hour   Intake 760 ml   Output 950 ml   Net -190 ml         Physical Exam  Vitals and nursing note reviewed.   Constitutional:       General: He is not in acute distress.     Appearance: Normal appearance. He is ill-appearing. He is not toxic-appearing or diaphoretic.   HENT:      Head: Normocephalic and atraumatic.      Right Ear: External ear normal.      Left Ear: External ear normal.      Nose: Nose normal.      Mouth/Throat:      Mouth: Mucous membranes are moist.   Eyes:      General: No scleral icterus.         Right eye: No discharge.         Left eye: No discharge.      Extraocular Movements: Extraocular movements intact.   Cardiovascular:      Rate and Rhythm: Normal rate.      Heart sounds: Normal heart sounds. No murmur heard.     No friction rub.   Pulmonary:      Effort: Pulmonary effort is normal.      Comments: 3L NC  Abdominal:      General: Abdomen is flat. Bowel sounds are normal. There is no distension.      Tenderness: There is abdominal tenderness.   Genitourinary:     Comments: ARCELIA positive for dark, black stool  Musculoskeletal:         General: No swelling.      Cervical back: Normal range of motion.      Right lower leg: No edema.      Left lower leg: No edema.   Skin:     General: Skin is warm and dry.      Coloration: Skin is pale. Skin is not jaundiced.      Findings: No bruising.   Neurological:      General: No focal deficit present.      Mental Status: He is alert and oriented to person, place, and time.      Motor: No weakness.   Psychiatric:         Mood and Affect: Mood normal.         Behavior: Behavior normal.         Thought Content: Thought content normal.             Significant Labs: All pertinent labs within the past 24 hours have been reviewed.    Significant Imaging: I have reviewed all pertinent imaging results/findings within the past 24 hours.

## 2024-04-22 NOTE — PROGRESS NOTES
"[facility] - Med Surg ([address] Medicine  Progress Note    Patient Name: Fransico Montalvo  MRN: [id number]  Patient Class: IP- Inpatient   Admission Date: 4/18/2024  Length of Stay: 3 days  Attending Physician: Claudia Bonilla MD  Primary Care Provider: [facility]:     Principal Problem:Melena        HPI:  Fransico Montalvo is a 68 y.o. male with history of EtOH cirrhosis with ascites, chronic multifactorial anemia with thrombocytopenia, history of EtOH abuse, presumed COPD, CKD 3, and HTN who presents from [facility] for rectal bleeding.     He reports that approximately 3 days ago, he noted the onset of "black" stools. This was with associated crampy abdominal pain that improved with defecation. Since that time, he has continued to have multiple black bowel movements per day. He is not on blood thinners or aspirin. This happened months ago, however spontaneously resolved.     He also complains of 2-3 weeks of shortness of breath, particularly with exertion and not resolved with oxygen. Denies LE edema or chest pain.     Of note, he was recently discharged (4/4/24) from hospitalization here where he was hospitalized for 7 days, treated for acute on chronic anemia requiring total 5U RBC transfusion, hypotension, and presumed PNA with COPD exacerbation. Underwent colonoscopy that admission, which was grossly normal. He was discharged to SNF on oxygen for hypoxia.         Overview/Hospital Course:  No notes on file    Interval History:   4/22: no acute issues reported overnight. Medically appropriate to return to [facility]    Review of Systems   Constitutional:  Negative for chills and fever.   HENT:  Negative for ear pain and sinus pain.    Eyes:  Negative for pain.   Respiratory:  Positive for shortness of breath. Negative for cough.    Cardiovascular:  Negative for chest pain and leg swelling.   Gastrointestinal:  Positive for abdominal pain and blood in stool. Negative for " constipation, diarrhea, nausea, rectal pain and vomiting.   Genitourinary:  Negative for dysuria and flank pain.   Musculoskeletal:  Negative for arthralgias, back pain, joint swelling, myalgias and neck pain.   Neurological:  Positive for weakness. Negative for numbness and headaches.   Psychiatric/Behavioral:  Negative for agitation, dysphoric mood and sleep disturbance. The patient is not nervous/anxious.      Objective:     Vital Signs (Most Recent):  Temp: 98.3 °F (36.8 °C) (04/22/24 1150)  Pulse: 101 (04/22/24 1150)  Resp: 18 (04/22/24 1150)  BP: 122/77 (04/22/24 1150)  SpO2: 98 % (04/22/24 1150) Vital Signs (24h Range):  Temp:  [97 °F (36.1 °C)-98.6 °F (37 °C)] 98.3 °F (36.8 °C)  Pulse:  [] 101  Resp:  [17-19] 18  SpO2:  [97 %-100 %] 98 %  BP: (122-148)/() 122/77     Weight: 87 kg (191 lb 12.8 oz)  Body mass index is 26.01 kg/m².    Intake/Output Summary (Last 24 hours) at 4/22/2024 1226  Last data filed at 4/22/2024 1136  Gross per 24 hour   Intake 760 ml   Output 950 ml   Net -190 ml         Physical Exam  Vitals and nursing note reviewed.   Constitutional:       General: He is not in acute distress.     Appearance: Normal appearance. He is ill-appearing. He is not toxic-appearing or diaphoretic.   HENT:      Head: Normocephalic and atraumatic.      Right Ear: External ear normal.      Left Ear: External ear normal.      Nose: Nose normal.      Mouth/Throat:      Mouth: Mucous membranes are moist.   Eyes:      General: No scleral icterus.        Right eye: No discharge.         Left eye: No discharge.      Extraocular Movements: Extraocular movements intact.   Cardiovascular:      Rate and Rhythm: Normal rate.      Heart sounds: Normal heart sounds. No murmur heard.     No friction rub.   Pulmonary:      Effort: Pulmonary effort is normal.      Comments: 3L NC  Abdominal:      General: Abdomen is flat. Bowel sounds are normal. There is no distension.      Tenderness: There is abdominal  tenderness.   Genitourinary:     Comments: ARCELIA positive for dark, black stool  Musculoskeletal:         General: No swelling.      Cervical back: Normal range of motion.      Right lower leg: No edema.      Left lower leg: No edema.   Skin:     General: Skin is warm and dry.      Coloration: Skin is pale. Skin is not jaundiced.      Findings: No bruising.   Neurological:      General: No focal deficit present.      Mental Status: He is alert and oriented to person, place, and time.      Motor: No weakness.   Psychiatric:         Mood and Affect: Mood normal.         Behavior: Behavior normal.         Thought Content: Thought content normal.             Significant Labs: All pertinent labs within the past 24 hours have been reviewed.    Significant Imaging: I have reviewed all pertinent imaging results/findings within the past 24 hours.    Assessment/Plan:      * Melena  Presents with complaints of melena, and hemoglobin noted to be slightly decreased from prior.  He has a history of cirrhosis with esophageal varices per chart, however cannot find documentation of this.  Last EGD in our system was 10/2023, and had no signs of active bleeding.  Had a normal colonoscopy 04/01/2024 without evidence of active or stigmata of bleeding.  We will start PPI b.i.d., prophylactic ceftriaxone given his known cirrhosis, and consult GI for further recommendations.  Hold anticoagulation.  Trend CBC as below, and transfuse 2 maintain hemoglobin 7 or greater. Will likely require repeat EGD.       Pleural effusion  Chest x-ray reveals bilateral pleural effusions with evidence of pulmonary vascular congestion.  Most recent TTE 11/2022 shows normal LVEF, without evidence of diastolic dysfunction.  Pleural effusion appears somewhat chronic based on prior chest x-ray.  Suspect some volume overload, which may be secondary to recent blood administration and cirrhosis.  We will check BNP, and consider rechecking updated TTE.  We will give a  dose of Lasix for now as I expect he will need blood transfusion.  Monitor for signs and symptoms of respiratory distress.    Normocytic anemia  Chronic multifactorial anemia noted.  Lab anemia workup will likely be of little benefit given recent transfusion of 5U earlier this month. We will continue home folic acid after likely EGD. Monitor as above.     Other emphysema  Fairly recent diagnosis of likely COPD/emphysema, without evidence of acute exacerbation.  We will continue Breo along with p.r.n. DuoNebs.  Sat goal 88% or greater.    Acute blood loss anemia  Known chronic normocytic anemia, requiring 5U RBC during recent admission for hypotension and symptomatic anemia.  Recent baseline around 8.1-8.3, and presents with hemoglobin 7.7 with complaints of melena.  He is currently hemodynamically stable.  Given this, we will trend CBC and transfuse to maintain hemoglobin 7 or greater were unless he exhibits hemodynamic instability.  Hold anticoagulation.    Essential hypertension  Hold home antihypertensives for now given ongoing GI bleed.  Can restart as indicated.    Alcoholic cirrhosis  Patient with known Cirrhosis with Child's class B (based on most recent hospitalization data). Co-morbidities are present and inclusive of ascites, esophageal varices, and anemia/pancytopenia.  MELD-Na score calculated; MELD 3.0: 19 at 4/1/2024  2:26 AM  MELD-Na: 19 at 4/1/2024  2:26 AM  Calculated from:  Serum Creatinine: 1.6 mg/dL at 4/1/2024  2:26 AM  Serum Sodium: 132 mmol/L at 4/1/2024  2:26 AM  Total Bilirubin: 0.9 mg/dL (Using min of 1 mg/dL) at 4/1/2024  2:26 AM  Serum Albumin: 3.1 g/dL at 4/1/2024  2:26 AM  INR(ratio): 1.4 at 3/30/2024  5:13 AM  Age at listing (hypothetical): 68 years  Sex: Male at 4/1/2024  2:26 AM      Continue chronic meds. Etiology likely ETOH. Will avoid any hepatotoxic meds, and monitor CBC/CMP/INR for synthetic function.     Appreciate GI recommendations.     Thrombocytopenia  Chronic, however  worsened over baseline. Likely  multifactorial from liver disease and acute blood loss.  Currently without indication for platelet transfusion.  We will monitor on serial CBC.      CKD Stage 3  Renal function remains mildly improved over recent baseline. Avoid nephrotoxic agents as able, and renally dose all meds as applicable.    Tobacco abuse  Reports that he has quit smoking. Continued cessation encouraged.       VTE Risk Mitigation (From admission, onward)           Ordered     IP VTE HIGH RISK PATIENT  Once         04/18/24 1819     Place sequential compression device  Until discontinued         04/18/24 1819     Reason for No Pharmacological VTE Prophylaxis  Once        Question:  Reasons:  Answer:  Active Bleeding    04/18/24 1819                    Discharge Planning   ARTURO: 4/22/2024     Code Status: Full Code   Is the patient medically ready for discharge?:     Reason for patient still in hospital (select all that apply): Patient trending condition  Discharge Plan A: Skilled Nursing Facility                  Claudia Bonilla MD  Department of Hospital Medicine   [facility] S[facility] ([place])

## 2024-04-22 NOTE — PLAN OF CARE
Problem: Adult Inpatient Plan of Care  Goal: Plan of Care Review  Outcome: Ongoing, Progressing  Goal: Patient-Specific Goal (Individualized)  Outcome: Ongoing, Progressing  Goal: Absence of Hospital-Acquired Illness or Injury  Outcome: Ongoing, Progressing  Goal: Optimal Comfort and Wellbeing  Outcome: Ongoing, Progressing  Goal: Readiness for Transition of Care  Outcome: Ongoing, Progressing     Problem: Adjustment to Illness (Gastrointestinal Bleeding)  Goal: Optimal Coping with Acute Illness  Outcome: Ongoing, Progressing     Problem: Bleeding (Gastrointestinal Bleeding)  Goal: Hemostasis  Outcome: Ongoing, Progressing     Problem: Fluid and Electrolyte Imbalance (Acute Kidney Injury/Impairment)  Goal: Fluid and Electrolyte Balance  Outcome: Ongoing, Progressing     Problem: Skin Injury Risk Increased  Goal: Skin Health and Integrity  Outcome: Ongoing, Progressing     Problem: Fall Injury Risk  Goal: Absence of Fall and Fall-Related Injury  Outcome: Ongoing, Progressing      No chief complaint on file.      PAST INTERVAL HISTORY:      1.  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    2.  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    3.  Dr. Mikael Velázquez, recommended surgical resection.Pt.declined.    4.  Sought a second opinion with Dr. Marshall Mercedes again declined recommended surgery     5   Concurrent chemo radiation. The patient declined to consent to adjuvant Cisplatin therapy due to toxicities of hair loss.    6.  4/25/12-6/22/12 erbitux with concurrent XRT (XRT 7000cGy 4/30/2012-6/18/2012)    7. 9/5/12 progressive disease on PET and clinically in Right mandible and supraclavicular lymph node    8. Cisplatin 5FU and erbitux started 9/24/12 x 3 cycles last cycle given on 11/5/12 with response to treatment but unacceptable toxicity of fatigue spending much of time in bed or in chair    9. Continued weekly erbitux as single agent     10 progressive disease in mandible    11. Restarted dose reduced cisplatin and 5FU on 1/7/13    12. 03/29/2013 Resection of residual mass Anterior mandible and floor of mouth, resection:Residual / recurrent invasive squamous cell carcinoma. Size of tumor focus: At least 0.5 cm.  Depth of invasion: 1.5 mm. AJCC TNM: rpT1. Note: The specimen shows extensive treatment effect with superficial epithelial atypia consistent with high grade dysplasia and a small focus of invasive carcinoma with 1.5  mm depth of invasion. There is no evidence of perineural invasion or lymphovascular invasion. The representative sections of bone and bone margins are negative for malignancy.   13. 05/10/13: Taken back to the operating room for washout of wound and repeat closure  14. 12/22/2013 taken back to OR for further surgical revision by Dr Lopes at [facility]  15. 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 09/09/2016 Left gingiva labial sulcus with reconstructed skin paddle, excisional  Biopsy: Recurrent invasive squamous cell carcinoma, a 1.8 cm focus of moderately differentiated invasive squamous cell cancer with a 2mm depth of invasion  17. 10/26/2016 fall leading to hip fracture  18. 11/2016 diagnosed with MI and CHF      CURRENT INTERVAL HISTORY:  Mena is here today for f/u of her head and neck cancer, chronic pain and chronic osteonecrosis of the jaw.  She states she has not been feeling well recently she states she is more jittery andshe gets dizzy easily.  She has not been taking her regular medications, she states she didn't think they were important.   She continues to have drainage from her jaw  She meets with Dr Gonzales next week   She feels she is coughing more often   REVIEW OF SYSTEMS:    No notes on file    VITALS:  There were no vitals taken for this visit.    EXAM:    CONSTITUTIONAL:  Alert, cooperative, oriented.  Mood and affect appropriate.  Appears close to chronologic age.  Well nourished.  Well developed.  HEAD:  surgical scarring and disfigurement lower face  potruding tongue draining fistulae  EYES:  Conjunctivae and sclerae are clear and without icterus.    ENMT:  Surgical changes of head and neck tongue thickening and protruding from mouth  enlarged tongue  NECK:  Radiation changes with woody induration and telangectasia.  HEMATOLOGIC/LYMPHATIC:   No tender or palpable lymph nodes in the cervical, supraclavicular, or axillary  area.  RESPIRATORY:  Coarse rhonchi bilaterally  CARDIOVASCULAR:   Regular rate and rhythm tachycardic  CHEST:  Chest is symmetric, course rhonchi bilaterally  ABDOMEN: mildly tender diffusely, soft, positive bowel sounds, no masses, umbilical hernia  BACK/SPINE:  positive kyphosis,  EXTREMITIES:  Trace edema b lower extremities.           LAB RESULTS:   Lab Results   Component Value  Date/Time    WBC 11.8 (H) 11/30/2018 10:35 AM    HGB 12.3 11/30/2018 10:35 AM    HCT 38.3 11/30/2018 10:35 AM     11/30/2018 10:35 AM     11/13/2013 01:03 PM     08/24/2012 10:28 AM     Lab Results   Component Value Date/Time    SODIUM 141 11/30/2018 10:35 AM    POTASSIUM 4.3 11/30/2018 10:35 AM    CHLORIDE 108 (H) 11/30/2018 10:35 AM    CO2 27 11/30/2018 10:35 AM    CO2 29 08/24/2012 10:28 AM    BUN 33 (H) 11/30/2018 10:35 AM    CREATININE 0.93 11/30/2018 10:35 AM    GLUCOSE 122 (H) 11/30/2018 10:35 AM    MG 1.1 (L) 03/11/2013 08:52 AM    CALCIUM 9.3 11/30/2018 10:35 AM    CALCIUM 9.1 08/24/2012 10:28 AM    TOTPROTEIN 7.8 11/30/2018 10:35 AM    ALBUMIN 3.4 (L) 11/30/2018 10:35 AM    AST 22 11/30/2018 10:35 AM    GPT 21 11/30/2018 10:35 AM    BILIRUBIN 0.3 11/30/2018 10:35 AM    ALKPT 87 11/30/2018 10:35 AM    FERR 35 08/02/2013 02:02 PM           ASSESSMENT AND PLAN:  Mena was seen today for head neck cancer.  1. chronic mandible infection as per Dr Gonzalez, ID at [facility]. She is following with ENT to discuss any further surgeries, however she would need to be a little stronger, and for that she would need a feeding tube which she declines.   2. Pain management, continue with current medications Mena is satisfied with her pain control,   3. PT has been completed.  4. Dietary she follows with a dietician,. we have discussed a feeding tube as I believe she has chronic aspiration, causing recurrent admissions for pneumonia/pneumonitis, she declines this strongly.  5. Mena is taking her thyroid replacement medication as prescribed, she follows with PCP, however she has not been taking any hypertensives or cardiac medications.  I strongly recommended she go to the ER given the elevated BP and her symptoms, even offering to walk her up stairs to the ER.  She will not go she is accompanied by her friend and both of us tried to convince Mena but she will not go.   6. Head and neck cancer no  clinical signs or symptoms of recurrent cancer at this time. .  7. We discussed calcium with vitamin d and a bisphosphonate for bone health as Mena has compression fractures and is on chronic prednisone.  DEXA not performed she has not gone for this test but is willing to try again .      8. Coarse rhonchi elevated WBC's possibility of aspiration I will give a course of clindamycin ( amoxicillin allergy - hives).        Addendum: after much cojoling Mena will go to ER but states she will stay a maximum of 2 hours then she is leaving.  I called and discussed with ER her hypertension and symptoms.       Cancer of connective and soft tissue of head, face, and neck          25 Minutes were spent with patient >50% of that time spent in  counseling.

## 2024-04-22 NOTE — ANESTHESIA POSTPROCEDURE EVALUATION
Anesthesia Post Evaluation    Patient: Fransico Montalvo    Procedure(s) Performed: Procedure(s) (LRB):  EGD (ESOPHAGOGASTRODUODENOSCOPY) (N/A)    Final Anesthesia Type: general      Patient location during evaluation: PACU  Patient participation: Yes- Able to Participate  Level of consciousness: awake and alert  Post-procedure vital signs: reviewed and stable  Pain management: adequate  Airway patency: patent    PONV status at discharge: No PONV  Anesthetic complications: no      Cardiovascular status: hemodynamically stable  Respiratory status: spontaneous ventilation  Follow-up not needed.              Vitals Value Taken Time   /77 04/22/24 1150   Temp 36.8 °C (98.3 °F) 04/22/24 1150   Pulse 101 04/22/24 1150   Resp 18 04/22/24 1150   SpO2 98 % 04/22/24 1150         Event Time   Out of Recovery 12:16:00         Pain/Margareth Score: Pain Rating Prior to Med Admin: 6 (4/22/2024 11:34 AM)  Pain Rating Post Med Admin: 5 (4/21/2024 11:03 AM)

## 2024-04-22 NOTE — PLAN OF CARE
Problem: Adult Inpatient Plan of Care  Goal: Plan of Care Review  Outcome: Ongoing, Progressing  Goal: Patient-Specific Goal (Individualized)  Outcome: Ongoing, Progressing  Goal: Absence of Hospital-Acquired Illness or Injury  Outcome: Ongoing, Progressing  Goal: Optimal Comfort and Wellbeing  Outcome: Ongoing, Progressing  Goal: Readiness for Transition of Care  Outcome: Ongoing, Progressing     Problem: Adjustment to Illness (Gastrointestinal Bleeding)  Goal: Optimal Coping with Acute Illness  Outcome: Ongoing, Progressing     Problem: Bleeding (Gastrointestinal Bleeding)  Goal: Hemostasis  Outcome: Ongoing, Progressing     Problem: Fluid and Electrolyte Imbalance (Acute Kidney Injury/Impairment)  Goal: Fluid and Electrolyte Balance  Outcome: Ongoing, Progressing     Problem: Renal Function Impairment (Acute Kidney Injury/Impairment)  Goal: Effective Renal Function  Outcome: Ongoing, Progressing     Problem: Respiratory Compromise (Pneumonia)  Goal: Effective Oxygenation and Ventilation  Outcome: Ongoing, Progressing     Problem: Skin Injury Risk Increased  Goal: Skin Health and Integrity  Outcome: Ongoing, Progressing

## 2024-04-22 NOTE — NURSING
Complete linen , bed, and gown change. Barrier cream applied.   BM smear on bed.  NO blood noted.

## 2024-04-23 VITALS
RESPIRATION RATE: 18 BRPM | HEART RATE: 98 BPM | WEIGHT: 191.81 LBS | OXYGEN SATURATION: 99 % | BODY MASS INDEX: 25.98 KG/M2 | HEIGHT: 72 IN | SYSTOLIC BLOOD PRESSURE: 139 MMHG | TEMPERATURE: 98 F | DIASTOLIC BLOOD PRESSURE: 93 MMHG

## 2024-04-23 LAB
ANISOCYTOSIS BLD QL SMEAR: SLIGHT
BASOPHILS # BLD AUTO: 0.02 K/UL (ref 0–0.2)
BASOPHILS NFR BLD: 0.7 % (ref 0–1.9)
DIFFERENTIAL METHOD BLD: ABNORMAL
EOSINOPHIL # BLD AUTO: 0 K/UL (ref 0–0.5)
EOSINOPHIL NFR BLD: 1.4 % (ref 0–8)
ERYTHROCYTE [DISTWIDTH] IN BLOOD BY AUTOMATED COUNT: 21.1 % (ref 11.5–14.5)
HCT VFR BLD AUTO: 25.8 % (ref 40–54)
HGB BLD-MCNC: 7.5 G/DL (ref 14–18)
HYPOCHROMIA BLD QL SMEAR: ABNORMAL
IMM GRANULOCYTES # BLD AUTO: 0.01 K/UL (ref 0–0.04)
IMM GRANULOCYTES NFR BLD AUTO: 0.3 % (ref 0–0.5)
LYMPHOCYTES # BLD AUTO: 0.7 K/UL (ref 1–4.8)
LYMPHOCYTES NFR BLD: 23.3 % (ref 18–48)
MCH RBC QN AUTO: 24.4 PG (ref 27–31)
MCHC RBC AUTO-ENTMCNC: 29.1 G/DL (ref 32–36)
MCV RBC AUTO: 84 FL (ref 82–98)
MONOCYTES # BLD AUTO: 0.4 K/UL (ref 0.3–1)
MONOCYTES NFR BLD: 12 % (ref 4–15)
NEUTROPHILS # BLD AUTO: 1.8 K/UL (ref 1.8–7.7)
NEUTROPHILS NFR BLD: 62.3 % (ref 38–73)
NRBC BLD-RTO: 0 /100 WBC
OVALOCYTES BLD QL SMEAR: ABNORMAL
PLATELET # BLD AUTO: 140 K/UL (ref 150–450)
PMV BLD AUTO: 11.2 FL (ref 9.2–12.9)
POIKILOCYTOSIS BLD QL SMEAR: SLIGHT
POLYCHROMASIA BLD QL SMEAR: ABNORMAL
RBC # BLD AUTO: 3.07 M/UL (ref 4.6–6.2)
WBC # BLD AUTO: 2.92 K/UL (ref 3.9–12.7)

## 2024-04-23 PROCEDURE — 97161 PT EVAL LOW COMPLEX 20 MIN: CPT

## 2024-04-23 PROCEDURE — 97116 GAIT TRAINING THERAPY: CPT

## 2024-04-23 PROCEDURE — 97530 THERAPEUTIC ACTIVITIES: CPT

## 2024-04-23 PROCEDURE — 85025 COMPLETE CBC W/AUTO DIFF WBC: CPT | Performed by: STUDENT IN AN ORGANIZED HEALTH CARE EDUCATION/TRAINING PROGRAM

## 2024-04-23 PROCEDURE — 94761 N-INVAS EAR/PLS OXIMETRY MLT: CPT

## 2024-04-23 PROCEDURE — 25000003 PHARM REV CODE 250: Performed by: STUDENT IN AN ORGANIZED HEALTH CARE EDUCATION/TRAINING PROGRAM

## 2024-04-23 PROCEDURE — 94640 AIRWAY INHALATION TREATMENT: CPT

## 2024-04-23 PROCEDURE — 25000003 PHARM REV CODE 250: Performed by: HOSPITALIST

## 2024-04-23 PROCEDURE — 36415 COLL VENOUS BLD VENIPUNCTURE: CPT | Performed by: STUDENT IN AN ORGANIZED HEALTH CARE EDUCATION/TRAINING PROGRAM

## 2024-04-23 PROCEDURE — 97165 OT EVAL LOW COMPLEX 30 MIN: CPT

## 2024-04-23 PROCEDURE — 27000221 HC OXYGEN, UP TO 24 HOURS

## 2024-04-23 PROCEDURE — 97535 SELF CARE MNGMENT TRAINING: CPT

## 2024-04-23 RX ADMIN — OXYCODONE 5 MG: 5 TABLET ORAL at 06:04

## 2024-04-23 RX ADMIN — THIAMINE HCL TAB 100 MG 100 MG: 100 TAB at 08:04

## 2024-04-23 RX ADMIN — FLUTICASONE FUROATE AND VILANTEROL TRIFENATATE 1 PUFF: 100; 25 POWDER RESPIRATORY (INHALATION) at 07:04

## 2024-04-23 RX ADMIN — PANTOPRAZOLE SODIUM 40 MG: 40 TABLET, DELAYED RELEASE ORAL at 08:04

## 2024-04-23 RX ADMIN — DICYCLOMINE HYDROCHLORIDE 10 MG: 10 CAPSULE ORAL at 06:04

## 2024-04-23 RX ADMIN — ESCITALOPRAM OXALATE 10 MG: 10 TABLET ORAL at 08:04

## 2024-04-23 NOTE — PLAN OF CARE
PT evaluation complete.     Problem: Physical Therapy  Goal: Physical Therapy Goal  Description: Goals to be met by: 24     Patient will increase functional independence with mobility by performin. Supine to sit with supervision   2. Sit to stand transfer with Supervision  3. Bed to chair transfer with Supervision using Rolling Walker  4. Gait x150 feet with Supervision using Rolling Walker  5. Lower extremity exercise program x30 reps, with assistance as needed    Outcome: Progressing

## 2024-04-23 NOTE — PLAN OF CARE
Discharge Plan A and Plan B have been determined by review of patient's clinical status, future medical and therapeutic needs, and coverage/benefits for post-acute care in coordination with multidisciplinary team members.    04/23/24 1453   Post-Acute Status   Post-Acute Authorization Placement   Post-Acute Placement Status Pending post-acute provider review/more information requested   Discharge Plan   Discharge Plan A Skilled Nursing Facility   Discharge Plan B Return to Nursing Home     FERNANDO sent PT/OT eval to [facility] for SNF auth processing. FERNANDO phoned Rin (N) to review. Rin confirmed that auth has been approved for patient admit to SNF. FERNANDO sent updated orders to [facility]. FERNANDO phoned Sofia (admissions) to notify of the above. Per Sofia, will review and confirm w/ SW if able to admit Today.      Sofia confirmed that patient able to admit. FERNANDO placed [facility] orders for patient transport to SNF. Patient/family notified and agreeable to dc plan.     FERNANDO will continue to follow.                                      MEÑO Brown, LMSW  [facility]  Case Management  Ext. [phone]

## 2024-04-23 NOTE — SUBJECTIVE & OBJECTIVE
Interval History:   4/23: weakness reported today.     Review of Systems   Constitutional:  Negative for chills and fever.   HENT:  Negative for ear pain and sinus pain.    Eyes:  Negative for pain.   Respiratory:  Positive for shortness of breath. Negative for cough.    Cardiovascular:  Negative for chest pain and leg swelling.   Gastrointestinal:  Positive for abdominal pain and blood in stool. Negative for constipation, diarrhea, nausea, rectal pain and vomiting.   Genitourinary:  Negative for dysuria and flank pain.   Musculoskeletal:  Negative for arthralgias, back pain, joint swelling, myalgias and neck pain.   Neurological:  Positive for weakness. Negative for numbness and headaches.   Psychiatric/Behavioral:  Negative for agitation, dysphoric mood and sleep disturbance. The patient is not nervous/anxious.      Objective:     Vital Signs (Most Recent):  Temp: 97.9 °F (36.6 °C) (04/23/24 1138)  Pulse: 105 (04/23/24 1138)  Resp: 18 (04/23/24 1138)  BP: 118/76 (04/23/24 1138)  SpO2: 98 % (04/23/24 1138) Vital Signs (24h Range):  Temp:  [97.8 °F (36.6 °C)-98.5 °F (36.9 °C)] 97.9 °F (36.6 °C)  Pulse:  [] 105  Resp:  [17-19] 18  SpO2:  [96 %-100 %] 98 %  BP: (118-140)/(76-91) 118/76     Weight: 87 kg (191 lb 12.8 oz)  Body mass index is 26.01 kg/m².    Intake/Output Summary (Last 24 hours) at 4/23/2024 1442  Last data filed at 4/23/2024 1239  Gross per 24 hour   Intake 1060 ml   Output 675 ml   Net 385 ml         Physical Exam  Vitals and nursing note reviewed.   Constitutional:       General: He is not in acute distress.     Appearance: Normal appearance. He is ill-appearing. He is not toxic-appearing or diaphoretic.   HENT:      Head: Normocephalic and atraumatic.      Right Ear: External ear normal.      Left Ear: External ear normal.      Nose: Nose normal.      Mouth/Throat:      Mouth: Mucous membranes are moist.   Eyes:      General: No scleral icterus.        Right eye: No discharge.         Left eye:  No discharge.      Extraocular Movements: Extraocular movements intact.   Cardiovascular:      Rate and Rhythm: Normal rate.      Heart sounds: Normal heart sounds. No murmur heard.     No friction rub.   Pulmonary:      Effort: Pulmonary effort is normal.      Comments: 3L NC  Abdominal:      General: Abdomen is flat. Bowel sounds are normal. There is no distension.      Tenderness: There is abdominal tenderness.   Genitourinary:     Comments: ARCELIA positive for dark, black stool  Musculoskeletal:         General: No swelling.      Cervical back: Normal range of motion.      Right lower leg: No edema.      Left lower leg: No edema.   Skin:     General: Skin is warm and dry.      Coloration: Skin is pale. Skin is not jaundiced.      Findings: No bruising.   Neurological:      General: No focal deficit present.      Mental Status: He is alert and oriented to person, place, and time.      Motor: No weakness.   Psychiatric:         Mood and Affect: Mood normal.         Behavior: Behavior normal.         Thought Content: Thought content normal.             Significant Labs: All pertinent labs within the past 24 hours have been reviewed.    Significant Imaging: I have reviewed all pertinent imaging results/findings within the past 24 hours.

## 2024-04-23 NOTE — PT/OT/SLP EVAL
"Occupational Therapy   Evaluation & Treatment    Name: Fransico Montalvo  MRN: [id number]  Admitting Diagnosis: Melena  Recent Surgery: Procedure(s) (LRB):  EGD (ESOPHAGOGASTRODUODENOSCOPY) (N/A) 4 Days Post-Op    Recommendations:     Discharge Recommendations: Moderate Intensity Therapy  Discharge Equipment Recommendations:  bedside commode, bath bench, grab bar, rollator  Barriers to discharge:  Decreased caregiver support    Assessment:     Fransico Montalvo is a 68 y.o. male with a medical diagnosis of Melena. Performance deficits affecting function: weakness, impaired endurance, impaired self care skills, impaired functional mobility, impaired balance, impaired cognition, decreased coordination, decreased upper extremity function, decreased safety awareness, impaired cardiopulmonary response to activity. Pt agreeable to session and motivated to return to [facility]. Pt tolerated ~15 minutes of static sitting EOB with CGA and physical/verbal cueing required for alignment of hips, maintaining upright posture, and breathing techniques. /70 and 113/68 while seated and 119/79 in supine. O2 dropped to ~91% during EOB sitting but elevated back to % when pt instructed to take deep breaths. Pt demonstrated fatigue after activity and requested to lay back down at end of session.    Rehab Prognosis: Good; patient would benefit from acute skilled OT services to address these deficits and reach maximum level of function.       Plan:     Patient to be seen 3 x/week to address the above listed problems via self-care/home management, therapeutic activities, therapeutic exercises, neuromuscular re-education  Plan of Care Expires: 05/07/24  Plan of Care Reviewed with:      Subjective     Chief Complaint: dizziness/wooziness  Patient/Family Comments/goals: "I'm feeling like I might faint"    Occupational Profile:  Living Environment: Pt lives in a [facility] with 4 SABA with a tub/shower combo that has grab bars.  Previous level of function: " Pt using a single point cane for ambulation as needed. Pt reports he was mostly IND with ADLs and ambulation before hospital admission. Pt reports being fearful of bathing/showering because that has been the cause for many previous falls.   Roles and Routines: Pt is retired. Pt no longer drives. Pt reports ambulating outside to smoke 4-5 cigarettes per day.  Equipment Used at Home: grab bar, cane, straight - Pt stated that he used to have a shower chair and a rollator but no longer is in possession of that DME.   Assistance upon Discharge: Pt indicated that those at previous living facility are not helping him with ADLs.     Pain/Comfort:  Pain Rating 1: 0/10    Patients cultural, spiritual, Moravian conflicts given the current situation: no    Objective:     Communicated with: Nursing prior to session.  Patient found supine with peripheral IV, oxygen upon OT entry to room.    General Precautions: Standard, fall  Orthopedic Precautions: N/A  Braces: N/A  Respiratory Status: Nasal cannula, flow 2 L/min    Occupational Performance:    Bed Mobility:    Patient completed Supine to Sit with stand by assistance  Patient completed Sit to Supine with stand by assistance  Patient completed bridging x3 to change soiled draw in supine.   Patient completed one scoot toward HOB    Activities of Daily Living:  Grooming: stand by assistance to wash face with washcloth, rinse mouth with mouthwash into basin - both tasks pt semi-recumbent w HOB elevated  Upper Body Dressing: minimum assistance for donning hospital gown around back  Lower Body Dressing: maximum assistance for donning sock - pt able to doff sock; however lost his balance and fatigued when crossing leg to don - OT finished task for pt    Cognitive/Visual Perceptual:  Cognitive/Psychosocial Skills:     -       Oriented to: Person, Place, Situation, and pt able to name month of year but stated year was 2025   -       Safety awareness/insight to disability: impaired and  pt provides unreliable report of PLOF and activity tolerance to avoid falls     Physical Exam:  Postural examination/scapula alignment:    -       Lateral weight shift of hips (toward the R) - physical cueing provided to lower L hip to rest on the bed in sitting  Sensation:    -       Intact  Upper Extremity Range of Motion:     -       Right Upper Extremity: WFL  -       Left Upper Extremity: WFL   Strength:    -       Right Upper Extremity: WFL  -       Left Upper Extremity: WFL  Fine Motor Coordination: Pt able to oppose to all fingers besides SF.     AMPAC 6 Click ADL:  AMPAC Total Score: 16    Treatment & Education:  Educated pt on breathing techniques and benefits of oxygenation to the brain. Provided education on activity tolerance and safety awareness to prevent future falls. Educated pt on POC, recommendations for DME, and OT role in acute setting.     Patient left HOB elevated with all lines intact, call button in reach, and bed alarm on    GOALS:   Multidisciplinary Problems       Occupational Therapy Goals          Problem: Occupational Therapy    Goal Priority Disciplines Outcome Interventions   Occupational Therapy Goal     OT, PT/OT Progressing    Description: Goals to be met by: 5/7/24.     Patient will increase functional independence with ADLs by performing:    UE Dressing with Supervision.  LE Dressing with Supervision.  Grooming while standing at sink with Supervision.  Toileting from toilet with Supervision for hygiene and clothing management.   Supine to sit with Supervision.  Stand pivot transfers with Supervision.  Toilet transfer to toilet with Supervision.  Upper extremity exercise program x10 reps per handout, with independence.                         History:     Past Medical History:   Diagnosis Date    Alcohol abuse     BPH (benign prostatic hyperplasia)     Cirrhosis 06/21/2018    pt states that he was diagnosed a week ago during his last hospital visit    Closed fracture of left  distal radius 7/19/2023    Gastritis     Hypertension     Renal disorder          Past Surgical History:   Procedure Laterality Date    CAPSULOTOMY OF JOINT Right 7/29/2019    Procedure: CAPSULOTOMY, JOINT;  Surgeon: Raj Grossman MD;  [facility];  Service: Orthopedics;  Laterality: Right;    COLONOSCOPY N/A 4/1/2024    Procedure: COLONOSCOPY;  Surgeon: Scarlett Whitt MD;  Location: [facility] (2ND FLR);  Service: Gastroenterology;  Laterality: N/A;    ESOPHAGOGASTRODUODENOSCOPY N/A 10/25/2023    Procedure: EGD (ESOPHAGOGASTRODUODENOSCOPY);  Surgeon: Lux Rome MD;  Location: [facility] ENDO (2ND FLR);  Service: Endoscopy;  Laterality: N/A;    ESOPHAGOGASTRODUODENOSCOPY N/A 4/19/2024    Procedure: EGD (ESOPHAGOGASTRODUODENOSCOPY);  Surgeon: Raj Roberts MD;  [facility]  Service: Endoscopy;  Laterality: N/A;    PERCUTANEOUS PINNING OF HIP Right 7/29/2019    Procedure: PINNING, HIP, PERCUTANEOUS- synthes cannulated screws- hana table- C arm door side-;  Surgeon: Raj Grossman MD;  Location: [facility] OR [address] [place] [address];  Service: Orthopedics;  Laterality: Right;       Time Tracking:     OT Date of Treatment: 04/23/24  OT Start Time: 0901  OT Stop Time: 0943  OT Total Time (min): 42 min    Billable Minutes:Evaluation 10  Self Care/Home Management 10  Therapeutic Activity 22    4/23/2024

## 2024-04-23 NOTE — NURSING
Report given to Brooke at [facility], transport being set up by [facility]. Patient aware of dc.

## 2024-04-23 NOTE — PROGRESS NOTES
"[facility] - Med Surg ([address] Medicine  Progress Note    Patient Name: Fransico Montalvo  MRN: [id number]  Patient Class: IP- Inpatient   Admission Date: 4/18/2024  Length of Stay: 4 days  Attending Physician: Claudia Bonilla MD  Primary Care Provider: [facility]:     Principal Problem:Melena        HPI:  Fransico Montalvo is a 68 y.o. male with history of EtOH cirrhosis with ascites, chronic multifactorial anemia with thrombocytopenia, history of EtOH abuse, presumed COPD, CKD 3, and HTN who presents from [facility] for rectal bleeding.     He reports that approximately 3 days ago, he noted the onset of "black" stools. This was with associated crampy abdominal pain that improved with defecation. Since that time, he has continued to have multiple black bowel movements per day. He is not on blood thinners or aspirin. This happened months ago, however spontaneously resolved.     He also complains of 2-3 weeks of shortness of breath, particularly with exertion and not resolved with oxygen. Denies LE edema or chest pain.     Of note, he was recently discharged (4/4/24) from hospitalization here where he was hospitalized for 7 days, treated for acute on chronic anemia requiring total 5U RBC transfusion, hypotension, and presumed PNA with COPD exacerbation. Underwent colonoscopy that admission, which was grossly normal. He was discharged to SNF on oxygen for hypoxia.         Overview/Hospital Course:  No notes on file    Interval History:   4/23: weakness reported today.     Review of Systems   Constitutional:  Negative for chills and fever.   HENT:  Negative for ear pain and sinus pain.    Eyes:  Negative for pain.   Respiratory:  Positive for shortness of breath. Negative for cough.    Cardiovascular:  Negative for chest pain and leg swelling.   Gastrointestinal:  Positive for abdominal pain and blood in stool. Negative for constipation, diarrhea, nausea, rectal pain and vomiting. "   Genitourinary:  Negative for dysuria and flank pain.   Musculoskeletal:  Negative for arthralgias, back pain, joint swelling, myalgias and neck pain.   Neurological:  Positive for weakness. Negative for numbness and headaches.   Psychiatric/Behavioral:  Negative for agitation, dysphoric mood and sleep disturbance. The patient is not nervous/anxious.      Objective:     Vital Signs (Most Recent):  Temp: 97.9 °F (36.6 °C) (04/23/24 1138)  Pulse: 105 (04/23/24 1138)  Resp: 18 (04/23/24 1138)  BP: 118/76 (04/23/24 1138)  SpO2: 98 % (04/23/24 1138) Vital Signs (24h Range):  Temp:  [97.8 °F (36.6 °C)-98.5 °F (36.9 °C)] 97.9 °F (36.6 °C)  Pulse:  [] 105  Resp:  [17-19] 18  SpO2:  [96 %-100 %] 98 %  BP: (118-140)/(76-91) 118/76     Weight: 87 kg (191 lb 12.8 oz)  Body mass index is 26.01 kg/m².    Intake/Output Summary (Last 24 hours) at 4/23/2024 1442  Last data filed at 4/23/2024 1239  Gross per 24 hour   Intake 1060 ml   Output 675 ml   Net 385 ml         Physical Exam  Vitals and nursing note reviewed.   Constitutional:       General: He is not in acute distress.     Appearance: Normal appearance. He is ill-appearing. He is not toxic-appearing or diaphoretic.   HENT:      Head: Normocephalic and atraumatic.      Right Ear: External ear normal.      Left Ear: External ear normal.      Nose: Nose normal.      Mouth/Throat:      Mouth: Mucous membranes are moist.   Eyes:      General: No scleral icterus.        Right eye: No discharge.         Left eye: No discharge.      Extraocular Movements: Extraocular movements intact.   Cardiovascular:      Rate and Rhythm: Normal rate.      Heart sounds: Normal heart sounds. No murmur heard.     No friction rub.   Pulmonary:      Effort: Pulmonary effort is normal.      Comments: 3L NC  Abdominal:      General: Abdomen is flat. Bowel sounds are normal. There is no distension.      Tenderness: There is abdominal tenderness.   Genitourinary:     Comments: ARCELIA positive for  dark, black stool  Musculoskeletal:         General: No swelling.      Cervical back: Normal range of motion.      Right lower leg: No edema.      Left lower leg: No edema.   Skin:     General: Skin is warm and dry.      Coloration: Skin is pale. Skin is not jaundiced.      Findings: No bruising.   Neurological:      General: No focal deficit present.      Mental Status: He is alert and oriented to person, place, and time.      Motor: No weakness.   Psychiatric:         Mood and Affect: Mood normal.         Behavior: Behavior normal.         Thought Content: Thought content normal.             Significant Labs: All pertinent labs within the past 24 hours have been reviewed.    Significant Imaging: I have reviewed all pertinent imaging results/findings within the past 24 hours.    Assessment/Plan:      * Melena  Presents with complaints of melena, and hemoglobin noted to be slightly decreased from prior.  He has a history of cirrhosis with esophageal varices per chart, however cannot find documentation of this.  Last EGD in our system was 10/2023, and had no signs of active bleeding.  Had a normal colonoscopy 04/01/2024 without evidence of active or stigmata of bleeding.  We will start PPI b.i.d., prophylactic ceftriaxone given his known cirrhosis, and consult GI for further recommendations.  Hold anticoagulation.  Trend CBC as below, and transfuse 2 maintain hemoglobin 7 or greater. Will likely require repeat EGD.       Pleural effusion  Chest x-ray reveals bilateral pleural effusions with evidence of pulmonary vascular congestion.  Most recent TTE 11/2022 shows normal LVEF, without evidence of diastolic dysfunction.  Pleural effusion appears somewhat chronic based on prior chest x-ray.  Suspect some volume overload, which may be secondary to recent blood administration and cirrhosis.  We will check BNP, and consider rechecking updated TTE.  We will give a dose of Lasix for now as I expect he will need blood  transfusion.  Monitor for signs and symptoms of respiratory distress.    Normocytic anemia  Chronic multifactorial anemia noted.  Lab anemia workup will likely be of little benefit given recent transfusion of 5U earlier this month. We will continue home folic acid after likely EGD. Monitor as above.     Other emphysema  Fairly recent diagnosis of likely COPD/emphysema, without evidence of acute exacerbation.  We will continue Breo along with p.r.n. DuoNebs.  Sat goal 88% or greater.    Acute blood loss anemia  Known chronic normocytic anemia, requiring 5U RBC during recent admission for hypotension and symptomatic anemia.  Recent baseline around 8.1-8.3, and presents with hemoglobin 7.7 with complaints of melena.  He is currently hemodynamically stable.  Given this, we will trend CBC and transfuse to maintain hemoglobin 7 or greater were unless he exhibits hemodynamic instability.  Hold anticoagulation.    Essential hypertension  Hold home antihypertensives for now given ongoing GI bleed.  Can restart as indicated.    Alcoholic cirrhosis  Patient with known Cirrhosis with Child's class B (based on most recent hospitalization data). Co-morbidities are present and inclusive of ascites, esophageal varices, and anemia/pancytopenia.  MELD-Na score calculated; MELD 3.0: 19 at 4/1/2024  2:26 AM  MELD-Na: 19 at 4/1/2024  2:26 AM  Calculated from:  Serum Creatinine: 1.6 mg/dL at 4/1/2024  2:26 AM  Serum Sodium: 132 mmol/L at 4/1/2024  2:26 AM  Total Bilirubin: 0.9 mg/dL (Using min of 1 mg/dL) at 4/1/2024  2:26 AM  Serum Albumin: 3.1 g/dL at 4/1/2024  2:26 AM  INR(ratio): 1.4 at 3/30/2024  5:13 AM  Age at listing (hypothetical): 68 years  Sex: Male at 4/1/2024  2:26 AM      Continue chronic meds. Etiology likely ETOH. Will avoid any hepatotoxic meds, and monitor CBC/CMP/INR for synthetic function.     Appreciate GI recommendations.     Thrombocytopenia  Chronic, however worsened over baseline. Likely  multifactorial from  liver disease and acute blood loss.  Currently without indication for platelet transfusion.  We will monitor on serial CBC.      CKD Stage 3  Renal function remains mildly improved over recent baseline. Avoid nephrotoxic agents as able, and renally dose all meds as applicable.    Tobacco abuse  Reports that he has quit smoking. Continued cessation encouraged.       VTE Risk Mitigation (From admission, onward)           Ordered     IP VTE HIGH RISK PATIENT  Once         04/18/24 1819     Place sequential compression device  Until discontinued         04/18/24 1819     Reason for No Pharmacological VTE Prophylaxis  Once        Question:  Reasons:  Answer:  Active Bleeding    04/18/24 1819                    Discharge Planning   ARTURO: 4/23/2024     Code Status: Full Code   Is the patient medically ready for discharge?:     Reason for patient still in hospital (select all that apply): Patient trending condition  Discharge Plan A: Skilled Nursing Facility                  Claudia Bonilla MD  Department of Hospital Medicine   [facility] - Med Surg ([place])

## 2024-04-23 NOTE — NURSING
Pt left via [facility] w/c transport in stable condition with personal belongings including wallet that was in lock box. Piv removed.

## 2024-04-23 NOTE — PLAN OF CARE
Problem: Occupational Therapy  Goal: Occupational Therapy Goal  Description: Goals to be met by: 5/7/24.     Patient will increase functional independence with ADLs by performing:    UE Dressing with Supervision.  LE Dressing with Supervision.  Grooming while standing at sink with Supervision.  Toileting from toilet with Supervision for hygiene and clothing management.   Supine to sit with Supervision.  Stand pivot transfers with Supervision.  Toilet transfer to toilet with Supervision.  Upper extremity exercise program x10 reps per handout, with independence.    Outcome: Progressing   OT eval completed.

## 2024-04-23 NOTE — PT/OT/SLP EVAL
Physical Therapy Evaluation and Treatment    Patient Name: Frasnico Montalvo   MRN: [id number]  Recent Surgery: Procedure(s) (LRB):  EGD (ESOPHAGOGASTRODUODENOSCOPY) (N/A) 4 Days Post-Op    Recommendations:     Discharge Recommendations: Moderate Intensity Therapy   Discharge Equipment Recommendations: walker, rolling   Barriers to discharge:  per patient he is currently homeless    Justification for Walker HME  The mobility limitation cannot be sufficiently resolved by the use of a cane. Patient's functional mobility deficit can be sufficiently resolved with the use of a walker. Patient's mobility limitation significantly impairs their ability to participate in one of more activities of daily living. The use of a walker will significantly improve the patients ability to participate in MRADLS and the patient will use it on regular basis in the home.      Assessment:     Fransico Montalvo is a 68 y.o. male admitted with a medical diagnosis of Melena. He presents with the following impairments/functional limitations: weakness, impaired endurance, impaired functional mobility, gait instability, impaired balance, decreased safety awareness, impaired cardiopulmonary response to activity. Patient tolerated PT session with minimal complaint of dizziness. He is okay to ambulate with 1 person assistance and RW while in the hospital. Patient currently demonstrates a need for moderate intensity therapy on a daily basis post acute secondary to a decline in functional status due to illness.      Rehab Prognosis: Good; patient would benefit from acute PT services to address these deficits and reach maximum level of function.    Plan:     During this hospitalization, patient to be seen 3 x/week to address the above listed problems via gait training, therapeutic activities, therapeutic exercises    Plan of Care Expires: 05/23/24    Subjective     Chief Complaint: Dizzy this morning when tried to get out of bed.   Patient Comments/Goals: To stop  falling.   Pain/Comfort:  Pain Rating 1: 0/10    Social History:  Living Environment: Patient used to live in a group home with 4 steps and a left handrail to enter. Per patient, he is not going back to live here so he is currently homeless. Also, he has a lot of falls due to being dizzy.   Prior Level of Function: Prior to admission, patient was modified independent with ADLs using straight cane for mobility.  Equipment Used at Home: cane, straight  Assistance Upon Discharge:  ?    Objective:     Communicated with RN prior to session. Patient found HOB elevated with oxygen (brace on left wrist from fall ~1 year ago per patient) upon PT entry to room.    General Precautions: Standard, fall   Orthopedic Precautions: N/A   Braces: N/A    Respiratory Status: Nasal cannula, flow 2.5 L/min    Exams:  Cognition: Patient is oriented to Person, Place, Time, Situation  RLE ROM: WFL  RLE Strength:  grossly 3+/5  LLE ROM: WFL  LLE Strength:  grossly 3+/5      Functional Mobility:  Gait belt applied - Yes  Bed Mobility  Scooting: stand by assistance  Supine to Sit: stand by assistance   Transfers  Sit to Stand: contact guard assistance with rolling walker x2 from bed   Gait  Patient ambulated 8 side steps to the left on 1st trial and 4ft from bed to bedside chair on 2nd trial with rolling walker and contact guard assistance. Patient demonstrates decreased step length and flexed posture.     Therapeutic Activities and Exercises:   Patient educated in:  -PT role and POC  -safety with transfers including hand placement  -gait sequencing and RW management  -OOB activity to maximize recovery including ambulating with nursing staff assistance and RW while in the hospital       AM-PAC 6 CLICK MOBILITY  Total Score:17    Patient left up in chair with all lines intact, call button in reach, and RN and PCT notified that patient was left up in bedside chair.    GOALS:   Multidisciplinary Problems       Physical Therapy Goals           Problem: Physical Therapy    Goal Priority Disciplines Outcome Goal Variances Interventions   Physical Therapy Goal     PT, PT/OT Progressing     Description: Goals to be met by: 24     Patient will increase functional independence with mobility by performin. Supine to sit with supervision   2. Sit to stand transfer with Supervision  3. Bed to chair transfer with Supervision using Rolling Walker  4. Gait x150 feet with Supervision using Rolling Walker  5. Lower extremity exercise program x30 reps, with assistance as needed                         History:     Past Medical History:   Diagnosis Date    Alcohol abuse     BPH (benign prostatic hyperplasia)     Cirrhosis 2018    pt states that he was diagnosed a week ago during his last hospital visit    Closed fracture of left distal radius 2023    Gastritis     Hypertension     Renal disorder        Past Surgical History:   Procedure Laterality Date    CAPSULOTOMY OF JOINT Right 2019    Procedure: CAPSULOTOMY, JOINT;  Surgeon: Raj Grossman MD;  Location: [facility] OR [address] [place] [address];  Service: Orthopedics;  Laterality: Right;    COLONOSCOPY N/A 2024    Procedure: COLONOSCOPY;  Surgeon: Scarlett Whitt MD;  Location: [facility] ([address] [place] [address]);  Service: Gastroenterology;  Laterality: N/A;    ESOPHAGOGASTRODUODENOSCOPY N/A 10/25/2023    Procedure: EGD (ESOPHAGOGASTRODUODENOSCOPY);  Surgeon: Lux Rome MD;  Location: [facility] ([address] [place] [address]);  Service: Endoscopy;  Laterality: N/A;    ESOPHAGOGASTRODUODENOSCOPY N/A 2024    Procedure: EGD (ESOPHAGOGASTRODUODENOSCOPY);  Surgeon: Raj Roberts MD;  Location: [facility] ([address] [place] [address]);  Service: Endoscopy;  Laterality: N/A;    PERCUTANEOUS PINNING OF HIP Right 2019    Procedure: PINNING, HIP, PERCUTANEOUS- synthes cannulated screws- hana table- C arm door side-;  Surgeon: Raj Grossman MD;  Location: [facility] OR [address] [place] [address];  Service: Orthopedics;  Laterality: Right;       Time Tracking:     PT  Received On: 04/23/24  PT Start Time: 1133  PT Stop Time: 1151  PT Total Time (min): 18 min     Billable Minutes: Evaluation 8 and Gait Training 10    4/23/2024

## 2024-04-23 NOTE — PLAN OF CARE
Problem: Adult Inpatient Plan of Care  Goal: Plan of Care Review  Outcome: Progressing  Goal: Patient-Specific Goal (Individualized)  Outcome: Progressing  Goal: Absence of Hospital-Acquired Illness or Injury  Outcome: Progressing  Goal: Optimal Comfort and Wellbeing  Outcome: Progressing  Goal: Readiness for Transition of Care  Outcome: Progressing     Problem: Adjustment to Illness (Gastrointestinal Bleeding)  Goal: Optimal Coping with Acute Illness  Outcome: Progressing     Problem: Bleeding (Gastrointestinal Bleeding)  Goal: Hemostasis  Outcome: Progressing     Problem: Fluid and Electrolyte Imbalance (Acute Kidney Injury/Impairment)  Goal: Fluid and Electrolyte Balance  Outcome: Progressing     Problem: Oral Intake Inadequate (Acute Kidney Injury/Impairment)  Goal: Optimal Nutrition Intake  Outcome: Progressing     Problem: Renal Function Impairment (Acute Kidney Injury/Impairment)  Goal: Effective Renal Function  Outcome: Progressing     Problem: Fluid Imbalance (Pneumonia)  Goal: Fluid Balance  Outcome: Progressing     Problem: Infection (Pneumonia)  Goal: Resolution of Infection Signs and Symptoms  Outcome: Progressing     Problem: Respiratory Compromise (Pneumonia)  Goal: Effective Oxygenation and Ventilation  Outcome: Progressing     Problem: Skin Injury Risk Increased  Goal: Skin Health and Integrity  Outcome: Progressing   Pt AAO X 4; able to express needs.  Pain controlled with PRN meds.  Possible discharge back to NH today.  No bloody stools noted during this shift.  Safety maintained.  Bed in low position,  call  light in reach.

## 2024-04-24 NOTE — PLAN OF CARE
[facility] - Med Surg ([facility])  Discharge Final Note    Primary Care Provider: [facility]    Expected Discharge Date: 4/23/2024    Final Discharge Note (most recent)       Final Note - 04/24/24 0949          Final Note    Assessment Type Final Discharge Note (P)      Anticipated Discharge Disposition Skilled Nursing Facility (P)      What phone number can be called within the next 1-3 days to see how you are doing after discharge? [phone] (P)         Post-Acute Status    Post-Acute Authorization Placement (P)      Post-Acute Placement Status Set-up Complete/Auth obtained (P)                      Important Message from Medicare             Contact Info       [facility]   Specialty: Nursing Home Agency, SNF Agency   Relationship: PCP - General    [address] [facility] [place] [address]   Phone: [phone]       Next Steps: Follow up          Patient dc to [facility].                MEÑO Brown, LMSW  [facility]  Case Management  Ext. [phone]

## 2024-04-28 NOTE — DISCHARGE SUMMARY
"DISCHARGE SUMMARY  Hospital Medicine    Team: [facility]    Patient Name: Fransico Montalvo  YOB: 1955    Admit Date: 4/18/2024    Discharge Date: 4/23/2024    Discharge Attending Physician: Claudia Bonilla    Admitting Resident    Diagnoses:  Active Hospital Problems    Diagnosis  POA    *Melena [K92.1]  Yes    Other emphysema [J43.8]  Yes     Chronic    Normocytic anemia [D64.9]  Yes     Chronic    Pleural effusion [J90]  Yes     Chronic    Shortness of breath [R06.02]  Yes    Acute blood loss anemia [D62]  Yes    Essential hypertension [I10]  Yes     Chronic    Thrombocytopenia [D69.6]  Yes     Chronic    Alcoholic cirrhosis [K70.30]  Yes     Chronic    CKD Stage 3 [N18.30]  Yes     Chronic    Tobacco abuse [Z72.0]  Yes     Chronic      Resolved Hospital Problems   No resolved problems to display.       Discharged Condition: admit problems have stabilized       HOSPITAL COURSE:      Patient seen and examined on date of discharge. 45 min spent on face to face time and medical decision making      Initial Presentation:    Fransico Montalvo is a 68 y.o. male with history of EtOH cirrhosis with ascites, chronic multifactorial anemia with thrombocytopenia, history of EtOH abuse, presumed COPD, CKD 3, and HTN who presents from [facility] for rectal bleeding.      He reports that approximately 3 days ago, he noted the onset of "black" stools. This was with associated crampy abdominal pain that improved with defecation. Since that time, he has continued to have multiple black bowel movements per day. He is not on blood thinners or aspirin. This happened months ago, however spontaneously resolved.      He also complains of 2-3 weeks of shortness of breath, particularly with exertion and not resolved with oxygen. Denies LE edema or chest pain.      Of note, he was recently discharged (4/4/24) from hospitalization here where he was hospitalized for 7 days, treated for acute on chronic anemia requiring total 5U RBC " transfusion, hypotension, and presumed PNA with COPD exacerbation. Underwent colonoscopy that admission, which was grossly normal. He was discharged to SNF on oxygen for hypoxia.     Course of Principle Problem for Admission:    Melena  Presents with complaints of melena, and hemoglobin noted to be slightly decreased from prior.  He has a history of cirrhosis with esophageal varices per chart, however cannot find documentation of this.  Last EGD in our system was 10/2023, and had no signs of active bleeding.  Had a normal colonoscopy 04/01/2024 without evidence of active or stigmata of bleeding.    Patient received 1 U PRBC on admission   EGD was completed with on AVM in the jejunum identified and treated with argon laser  Melena resolved and patient was resumed on regular diet without issue.          CONSULTS: GI    Last CBC/BMP/HgbA1c (if applicable):  Recent Results (from the past 336 hour(s))   CBC auto differential    Collection Time: 04/23/24  4:27 AM   Result Value Ref Range    WBC 2.92 (L) 3.90 - 12.70 K/uL    Hemoglobin 7.5 (L) 14.0 - 18.0 g/dL    Hematocrit 25.8 (L) 40.0 - 54.0 %    Platelets 140 (L) 150 - 450 K/uL   CBC auto differential    Collection Time: 04/22/24  6:01 AM   Result Value Ref Range    WBC 2.86 (L) 3.90 - 12.70 K/uL    Hemoglobin 7.4 (L) 14.0 - 18.0 g/dL    Hematocrit 25.9 (L) 40.0 - 54.0 %    Platelets 114 (L) 150 - 450 K/uL   CBC auto differential    Collection Time: 04/21/24  7:04 AM   Result Value Ref Range    WBC 3.35 (L) 3.90 - 12.70 K/uL    Hemoglobin 7.3 (L) 14.0 - 18.0 g/dL    Hematocrit 25.5 (L) 40.0 - 54.0 %    Platelets 144 (L) 150 - 450 K/uL     No results found for this or any previous visit (from the past 336 hour(s)).  Lab Results   Component Value Date    HGBA1C 4.2 07/28/2019       Disposition:  Home         Future Scheduled Appointments:  No future appointments.    Follow-up Plans from This Hospitalization:      Discharge Medication List:        Medication List         CONTINUE taking these medications      acetaminophen 325 MG tablet  Commonly known as: TYLENOL  Take 2 tablets (650 mg total) by mouth every 8 (eight) hours as needed for Pain.     albuterol-ipratropium 2.5 mg-0.5 mg/3 mL nebulizer solution  Commonly known as: DUO-NEB  Take 3 mLs by nebulization every 6 (six) hours as needed for Wheezing or Shortness of Breath. Rescue     dicyclomine 10 MG capsule  Commonly known as: BENTYL  Take 1 capsule (10 mg total) by mouth 3 (three) times daily as needed (abdominal pain).     EScitalopram oxalate 10 MG tablet  Commonly known as: LEXAPRO     fluticasone furoate-vilanteroL 100-25 mcg/dose diskus inhaler  Commonly known as: BREO  Inhale 1 puff into the lungs once daily. Controller     folic acid 1 MG tablet  Commonly known as: FOLVITE  Take 1 tablet (1 mg total) by mouth once daily.     melatonin 3 mg tablet  Commonly known as: MELATIN  Take 2 tablets (6 mg total) by mouth nightly as needed for Insomnia.     pantoprazole 40 MG tablet  Commonly known as: PROTONIX  Take 1 tablet (40 mg total) by mouth once daily.     thiamine 100 MG tablet  Take 1 tablet (100 mg total) by mouth once daily.            STOP taking these medications      potassium chloride SA 20 MEQ tablet  Commonly known as: K-DUR,KLOR-CON            ASK your doctor about these medications      ondansetron 8 MG Tbdl  Commonly known as: ZOFRAN-ODT  Take 1 tablet (8 mg total) by mouth every 8 (eight) hours as needed (Nausea).  Ask about: Should I take this medication?              Patient Instructions:  No discharge procedures on file.    Signing Physician:  Claudia Bonilla MD

## 2024-05-01 ENCOUNTER — HOSPITAL ENCOUNTER (EMERGENCY)
Facility: HOSPITAL | Age: 69
Discharge: HOME | End: 2024-05-01
Attending: EMERGENCY MEDICINE
Payer: MEDICARE

## 2024-05-01 VITALS
SYSTOLIC BLOOD PRESSURE: 117 MMHG | DIASTOLIC BLOOD PRESSURE: 83 MMHG | TEMPERATURE: 98 F | WEIGHT: 195 LBS | HEIGHT: 72 IN | HEART RATE: 87 BPM | RESPIRATION RATE: 19 BRPM | BODY MASS INDEX: 26.41 KG/M2 | OXYGEN SATURATION: 99 %

## 2024-05-01 DIAGNOSIS — D64.9 CHRONIC ANEMIA: Primary | ICD-10-CM

## 2024-05-01 DIAGNOSIS — R06.02 SHORTNESS OF BREATH: ICD-10-CM

## 2024-05-01 LAB
ABO + RH BLD: NORMAL
ALBUMIN SERPL BCP-MCNC: 2.7 G/DL (ref 3.5–5.2)
ALP SERPL-CCNC: 105 U/L (ref 55–135)
ALT SERPL W/O P-5'-P-CCNC: 10 U/L (ref 10–44)
ANION GAP SERPL CALC-SCNC: 7 MMOL/L (ref 8–16)
AST SERPL-CCNC: 22 U/L (ref 10–40)
BASOPHILS # BLD AUTO: 0.02 K/UL (ref 0–0.2)
BASOPHILS NFR BLD: 0.8 % (ref 0–1.9)
BILIRUB SERPL-MCNC: 0.6 MG/DL (ref 0.1–1)
BLD GP AB SCN CELLS X3 SERPL QL: NORMAL
BUN SERPL-MCNC: 15 MG/DL (ref 8–23)
CALCIUM SERPL-MCNC: 8.8 MG/DL (ref 8.7–10.5)
CHLORIDE SERPL-SCNC: 102 MMOL/L (ref 95–110)
CO2 SERPL-SCNC: 27 MMOL/L (ref 23–29)
CREAT SERPL-MCNC: 1.1 MG/DL (ref 0.5–1.4)
DIFFERENTIAL METHOD BLD: ABNORMAL
EOSINOPHIL # BLD AUTO: 0 K/UL (ref 0–0.5)
EOSINOPHIL NFR BLD: 1.2 % (ref 0–8)
ERYTHROCYTE [DISTWIDTH] IN BLOOD BY AUTOMATED COUNT: 20.6 % (ref 11.5–14.5)
EST. GFR  (NO RACE VARIABLE): >60 ML/MIN/1.73 M^2
GLUCOSE SERPL-MCNC: 108 MG/DL (ref 70–110)
HCT VFR BLD AUTO: 26.4 % (ref 40–54)
HCV AB SERPL QL IA: NORMAL
HGB BLD-MCNC: 7.2 G/DL (ref 14–18)
HIV 1+2 AB+HIV1 P24 AG SERPL QL IA: NORMAL
IMM GRANULOCYTES # BLD AUTO: 0 K/UL (ref 0–0.04)
IMM GRANULOCYTES NFR BLD AUTO: 0 % (ref 0–0.5)
LYMPHOCYTES # BLD AUTO: 0.9 K/UL (ref 1–4.8)
LYMPHOCYTES NFR BLD: 33.1 % (ref 18–48)
MCH RBC QN AUTO: 22.9 PG (ref 27–31)
MCHC RBC AUTO-ENTMCNC: 27.3 G/DL (ref 32–36)
MCV RBC AUTO: 84 FL (ref 82–98)
MONOCYTES # BLD AUTO: 0.3 K/UL (ref 0.3–1)
MONOCYTES NFR BLD: 11.5 % (ref 4–15)
NEUTROPHILS # BLD AUTO: 1.4 K/UL (ref 1.8–7.7)
NEUTROPHILS NFR BLD: 53.4 % (ref 38–73)
NRBC BLD-RTO: 0 /100 WBC
PLATELET # BLD AUTO: 160 K/UL (ref 150–450)
PLATELET BLD QL SMEAR: ABNORMAL
PMV BLD AUTO: 10.8 FL (ref 9.2–12.9)
POTASSIUM SERPL-SCNC: 4.1 MMOL/L (ref 3.5–5.1)
PROT SERPL-MCNC: 6.9 G/DL (ref 6–8.4)
RBC # BLD AUTO: 3.15 M/UL (ref 4.6–6.2)
SODIUM SERPL-SCNC: 136 MMOL/L (ref 136–145)
SPECIMEN OUTDATE: NORMAL
WBC # BLD AUTO: 2.6 K/UL (ref 3.9–12.7)

## 2024-05-01 PROCEDURE — 86803 HEPATITIS C AB TEST: CPT | Performed by: PHYSICIAN ASSISTANT

## 2024-05-01 PROCEDURE — 87389 HIV-1 AG W/HIV-1&-2 AB AG IA: CPT | Performed by: PHYSICIAN ASSISTANT

## 2024-05-01 PROCEDURE — 85025 COMPLETE CBC W/AUTO DIFF WBC: CPT | Performed by: EMERGENCY MEDICINE

## 2024-05-01 PROCEDURE — 86850 RBC ANTIBODY SCREEN: CPT | Performed by: EMERGENCY MEDICINE

## 2024-05-01 PROCEDURE — 99284 EMERGENCY DEPT VISIT MOD MDM: CPT | Mod: 25

## 2024-05-01 PROCEDURE — 80053 COMPREHEN METABOLIC PANEL: CPT | Performed by: EMERGENCY MEDICINE

## 2024-05-01 NOTE — ED PROVIDER NOTES
Encounter Date: 5/1/2024       History     Chief Complaint   Patient presents with    Abnormal Labs     Arrives from [facility] for low hemoglobin. AAOx3. SOB     68 y.o. male presenting to the ED via EMS from [facility] due to concern for low hemoglobin. Pt states that he has felt more fatigued and recently was admitted to the hospital for the same compliant and transfused. Patient arrives on 4L NC and states he feels more short of breath.  He denies chest pain, abdominal pain.  Denies black or bloody stool.  Denies hematemesis.  Reports he was recently admitted for bloody stool and transfused.  Reports he has not had any episodes of black stool since his recent admission.  Denies fevers, chills, nausea, vomiting.      Review of patient's allergies indicates:  No Known Allergies  Past Medical History:   Diagnosis Date    Alcohol abuse     BPH (benign prostatic hyperplasia)     Cirrhosis 06/21/2018    pt states that he was diagnosed a week ago during his last hospital visit    Closed fracture of left distal radius 7/19/2023    Gastritis     Hypertension     Renal disorder      Past Surgical History:   Procedure Laterality Date    CAPSULOTOMY OF JOINT Right 7/29/2019    Procedure: CAPSULOTOMY, JOINT;  Surgeon: Raj Grossman MD;  Location: [address];  Service: Orthopedics;  Laterality: Right;    COLONOSCOPY N/A 4/1/2024    Procedure: COLONOSCOPY;  Surgeon: Scarlett Whitt MD;  Location: [address]);  Service: Gastroenterology;  Laterality: N/A;    ESOPHAGOGASTRODUODENOSCOPY N/A 10/25/2023    Procedure: EGD (ESOPHAGOGASTRODUODENOSCOPY);  Surgeon: Lux Rome MD;  Location: [facility] ([address] [place] [address]);  Service: Endoscopy;  Laterality: N/A;    ESOPHAGOGASTRODUODENOSCOPY N/A 4/19/2024    Procedure: EGD (ESOPHAGOGASTRODUODENOSCOPY);  Surgeon: Raj Roberts MD;  Location: [facility] ([address] [place] [address]);  Service: Endoscopy;  Laterality: N/A;    PERCUTANEOUS PINNING OF HIP Right 7/29/2019    Procedure:  PINNING, HIP, PERCUTANEOUS- synthes cannulated screws- zacha table- C arm door side-;  Surgeon: Raj Grossman MD;  Location: [facility] OR [address] [place] [address];  Service: Orthopedics;  Laterality: Right;     Family History   Problem Relation Name Age of Onset    Hypertension Father      Heart disease Father      Heart disease Brother       Social History     Tobacco Use    Smoking status: Every Day     Current packs/day: 1.00     Types: Cigarettes    Smokeless tobacco: Never   Substance Use Topics    Alcohol use: Yes     Comment: (former drinker; 2months sober) this visit admits drinking a couple beers today    Drug use: No     Review of Systems   Gastrointestinal:  Negative for abdominal pain, blood in stool, nausea and vomiting.   Hematological:  Does not bruise/bleed easily.       Physical Exam     Initial Vitals [05/01/24 1441]   BP Pulse Resp Temp SpO2   135/86 94 18 97.4 °F (36.3 °C) 99 %      MAP       --         Physical Exam    Nursing note and vitals reviewed.  Constitutional: He appears well-developed and well-nourished. He is not diaphoretic. No distress.   HENT:   Head: Normocephalic and atraumatic.   Eyes: Conjunctivae and EOM are normal. Pupils are equal, round, and reactive to light. No scleral icterus.   Neck: Neck supple.   Normal range of motion.  Cardiovascular:  Normal rate, regular rhythm, normal heart sounds and intact distal pulses.     Exam reveals no gallop and no friction rub.       No murmur heard.  Pulmonary/Chest: Breath sounds normal. No stridor. No respiratory distress. He has no wheezes. He has no rhonchi. He has no rales.   Abdominal: Abdomen is soft. Bowel sounds are normal. He exhibits no distension. There is no abdominal tenderness. There is no rebound and no guarding.   Musculoskeletal:         General: No tenderness or edema. Normal range of motion.      Cervical back: Normal range of motion and neck supple.     Neurological: He is alert and oriented to person, place, and time. He has normal  strength. No cranial nerve deficit. GCS score is 15. GCS eye subscore is 4. GCS verbal subscore is 5. GCS motor subscore is 6.   Skin: Skin is warm and dry. No rash noted.   Psychiatric: He has a normal mood and affect. His behavior is normal.         ED Course   Procedures  Labs Reviewed   CBC W/ AUTO DIFFERENTIAL - Abnormal; Notable for the following components:       Result Value    WBC 2.60 (*)     RBC 3.15 (*)     Hemoglobin 7.2 (*)     Hematocrit 26.4 (*)     MCH 22.9 (*)     MCHC 27.3 (*)     RDW 20.6 (*)     Gran # (ANC) 1.4 (*)     Lymph # 0.9 (*)     Platelet Estimate Clumped (*)     All other components within normal limits   COMPREHENSIVE METABOLIC PANEL - Abnormal; Notable for the following components:    Albumin 2.7 (*)     Anion Gap 7 (*)     All other components within normal limits   HIV 1 / 2 ANTIBODY    Narrative:     Release to patient->Immediate   HEPATITIS C ANTIBODY    Narrative:     Release to patient->Immediate   TYPE & SCREEN     EKG Readings: (Independently Interpreted)   Initial Reading: No STEMI. Rhythm: Normal Sinus Rhythm. Heart Rate: 95.   No ST segment elevation or depression concerning for acute ischemia.          Imaging Results              X-Ray Chest AP Portable (Final result)  Result time 05/01/24 20:19:59      Final result by Hilario Perez MD (05/01/24 20:19:59)                   Impression:      Grossly stable right-sided moderate pleural effusion and smaller effusion on the left.    Similar pulmonary interstitial edema and pulmonary vascular congestion.    Electronically signed by resident: Johnson Shafer  Date:    05/01/2024  Time:    19:51    Electronically signed by: Hilario Perez MD  Date:    05/01/2024  Time:    20:19               Narrative:    EXAMINATION:  XR CHEST AP PORTABLE    CLINICAL HISTORY:  Shortness of breath    TECHNIQUE:  Single frontal view of the chest was performed.    COMPARISON:  Multiple prior XR chest most recent 04/18/2024.    FINDINGS:  ECG leads  overlie the chest.    Mediastinal structures are unchanged.  Cardiomediastinal silhouette is unchanged.    Right-sided moderate pleural effusion, grossly stable compared to prior radiograph.  Small left pleural effusion, stable.    Bilateral interstitial opacities with pulmonary vascular congestion, similar to prior radiograph.  No large focal consolidation.  No pneumothorax.    Soft tissues are within normal limits.  Osseous structures are unchanged.                                       Medications - No data to display  Medical Decision Making  Amount and/or Complexity of Data Reviewed  Radiology: ordered.                          Medical Decision Making:   Initial Assessment:   68-year-old male presenting with concern for low hemoglobin.  On exam he is well-appearing in no acute distress.  Vitals normal.  Rectal exam without gross blood.  Scant sample inadequate for guaiac.  Hemoglobin unchanged from discharged 10 days ago.  Patient notes baseline shortness of breath, no new findings.  Chest x-ray at baseline.  Doubt new acute GI bleed.  Believe he is safe for discharge home.  Discussed return precautions  Differential Diagnosis:   Chronic anemia, lower GI bleed, peptic ulcer disease.             Clinical Impression:  Final diagnoses:  [R06.02] Shortness of breath  [D64.9] Chronic anemia (Primary)          ED Disposition Condition    Discharge Stable          ED Prescriptions    None       Follow-up Information       Follow up With Specialties Details Why Contact [facility] Agency, SNF Agency Schedule an appointment as soon as possible for a visit   [address] [facility]   [facility] [id number] [phone]      [facility] - Emergency Dept Emergency Medicine  As needed, If symptoms worsen [address]  [facility] [address] [phone]             Rishabh De Los Santos MD  05/01/24 4168

## 2024-05-01 NOTE — ED TRIAGE NOTES
Fransico Montalvo, an 68 y.o. male presents to the ED via EMS for complaints of low H&H from [facility]. Pt states that he has felt more fatigued and recently was admitted to the hospital for the same compliant and transfused. Patient arrives on 4L NC and states he feels more short of breath. Pt arrives with a wrist brace on and states he fx his wrist 8m ago.       Chief Complaint   Patient presents with    Abnormal Labs     Arrives from [facility] for low hemoglobin. AAOx3. SOB     Review of patient's allergies indicates:  No Known Allergies  Past Medical History:   Diagnosis Date    Alcohol abuse     BPH (benign prostatic hyperplasia)     Cirrhosis 06/21/2018    pt states that he was diagnosed a week ago during his last hospital visit    Closed fracture of left distal radius 7/19/2023    Gastritis     Hypertension     Renal disorder

## 2024-05-02 LAB
OHS QRS DURATION: 100 MS
OHS QTC CALCULATION: 447 MS

## 2024-05-02 NOTE — DISCHARGE INSTRUCTIONS
You were seen in the emergency department for a low blood count.  Your exam and labs are reassuring.  Your labs are the same as they were a few weeks ago.  We do not believe he have any significant source of bleeding.  Please follow-up with your primary care provider.  Please return for any new or worsening shortness of breath, dizziness, passing out, or you become concerned in any other way.

## 2024-05-02 NOTE — ED NOTES
Received bedside report from JYOTI Rodrigues. Assumed care of pt at this time  Pt AAOx3, resting comfortably in bed, NAD, respirations E/UL, updated on POC, wheels locked and in low position, call bell with in reach, Comfort positioning and restroom needs were addressed. Necessary items were placed with in reach and was advised when a reassessment would take place.

## 2024-05-09 ENCOUNTER — HOSPITAL ENCOUNTER (INPATIENT)
Facility: HOSPITAL | Age: 69
LOS: 6 days | Discharge: NURSING, REHAB OR LONG-TERM CARE | DRG: 270 | End: 2024-05-15
Attending: EMERGENCY MEDICINE | Admitting: STUDENT IN AN ORGANIZED HEALTH CARE EDUCATION/TRAINING PROGRAM
Payer: MEDICARE

## 2024-05-09 DIAGNOSIS — K92.2 GI BLEED: ICD-10-CM

## 2024-05-09 DIAGNOSIS — I50.9 CHF (CONGESTIVE HEART FAILURE): ICD-10-CM

## 2024-05-09 DIAGNOSIS — J44.9 CHRONIC OBSTRUCTIVE PULMONARY DISEASE, UNSPECIFIED COPD TYPE: ICD-10-CM

## 2024-05-09 DIAGNOSIS — K92.1 HEMATOCHEZIA: ICD-10-CM

## 2024-05-09 DIAGNOSIS — I25.10 CORONARY ARTERY DISEASE, UNSPECIFIED VESSEL OR LESION TYPE, UNSPECIFIED WHETHER ANGINA PRESENT, UNSPECIFIED WHETHER NATIVE OR TRANSPLANTED HEART: ICD-10-CM

## 2024-05-09 DIAGNOSIS — K74.60 HEPATIC CIRRHOSIS, UNSPECIFIED HEPATIC CIRRHOSIS TYPE, UNSPECIFIED WHETHER ASCITES PRESENT: ICD-10-CM

## 2024-05-09 DIAGNOSIS — R10.13 EPIGASTRIC ABDOMINAL PAIN: Primary | ICD-10-CM

## 2024-05-09 DIAGNOSIS — R07.9 CHEST PAIN: ICD-10-CM

## 2024-05-09 DIAGNOSIS — I50.20 HFREF (HEART FAILURE WITH REDUCED EJECTION FRACTION): ICD-10-CM

## 2024-05-09 PROBLEM — E87.5 HYPERKALEMIA: Status: ACTIVE | Noted: 2024-05-09

## 2024-05-09 PROBLEM — D64.9 ACUTE ON CHRONIC ANEMIA: Status: ACTIVE | Noted: 2019-07-29

## 2024-05-09 PROBLEM — R79.89 ELEVATED BRAIN NATRIURETIC PEPTIDE (BNP) LEVEL: Status: ACTIVE | Noted: 2024-05-09

## 2024-05-09 LAB
ABO + RH BLD: NORMAL
ALBUMIN SERPL BCP-MCNC: 2.9 G/DL (ref 3.5–5.2)
ALP SERPL-CCNC: 119 U/L (ref 55–135)
ALT SERPL W/O P-5'-P-CCNC: 16 U/L (ref 10–44)
AMYLASE SERPL-CCNC: 1256 U/L (ref 20–110)
ANION GAP SERPL CALC-SCNC: 5 MMOL/L (ref 8–16)
ANION GAP SERPL CALC-SCNC: 6 MMOL/L (ref 8–16)
ANION GAP SERPL CALC-SCNC: 7 MMOL/L (ref 8–16)
AST SERPL-CCNC: 62 U/L (ref 10–40)
BASOPHILS # BLD AUTO: 0.02 K/UL (ref 0–0.2)
BASOPHILS NFR BLD: 0.5 % (ref 0–1.9)
BILIRUB SERPL-MCNC: 0.7 MG/DL (ref 0.1–1)
BLD GP AB SCN CELLS X3 SERPL QL: NORMAL
BNP SERPL-MCNC: 3448 PG/ML (ref 0–99)
BUN SERPL-MCNC: 16 MG/DL (ref 8–23)
BUN SERPL-MCNC: 16 MG/DL (ref 8–23)
BUN SERPL-MCNC: 18 MG/DL (ref 8–23)
BUN SERPL-MCNC: 22 MG/DL (ref 6–30)
CALCIUM SERPL-MCNC: 8.4 MG/DL (ref 8.7–10.5)
CALCIUM SERPL-MCNC: 8.8 MG/DL (ref 8.7–10.5)
CALCIUM SERPL-MCNC: 9 MG/DL (ref 8.7–10.5)
CHLORIDE SERPL-SCNC: 97 MMOL/L (ref 95–110)
CHLORIDE SERPL-SCNC: 98 MMOL/L (ref 95–110)
CHLORIDE SERPL-SCNC: 99 MMOL/L (ref 95–110)
CHLORIDE SERPL-SCNC: 99 MMOL/L (ref 95–110)
CO2 SERPL-SCNC: 29 MMOL/L (ref 23–29)
CO2 SERPL-SCNC: 30 MMOL/L (ref 23–29)
CO2 SERPL-SCNC: 33 MMOL/L (ref 23–29)
CREAT SERPL-MCNC: 1 MG/DL (ref 0.5–1.4)
CREAT SERPL-MCNC: 1.2 MG/DL (ref 0.5–1.4)
DIFFERENTIAL METHOD BLD: ABNORMAL
EOSINOPHIL # BLD AUTO: 0 K/UL (ref 0–0.5)
EOSINOPHIL NFR BLD: 0.2 % (ref 0–8)
ERYTHROCYTE [DISTWIDTH] IN BLOOD BY AUTOMATED COUNT: 19.6 % (ref 11.5–14.5)
EST. GFR  (NO RACE VARIABLE): >60 ML/MIN/1.73 M^2
ETHANOL SERPL-MCNC: <10 MG/DL
GLUCOSE SERPL-MCNC: 102 MG/DL (ref 70–110)
GLUCOSE SERPL-MCNC: 112 MG/DL (ref 70–110)
GLUCOSE SERPL-MCNC: 89 MG/DL (ref 70–110)
GLUCOSE SERPL-MCNC: 91 MG/DL (ref 70–110)
HCT VFR BLD AUTO: 25.3 % (ref 40–54)
HCT VFR BLD CALC: 25 %PCV (ref 36–54)
HGB BLD-MCNC: 7 G/DL (ref 14–18)
IMM GRANULOCYTES # BLD AUTO: 0.03 K/UL (ref 0–0.04)
IMM GRANULOCYTES NFR BLD AUTO: 0.7 % (ref 0–0.5)
INR PPP: 1.2 (ref 0.8–1.2)
LIPASE SERPL-CCNC: 758 U/L (ref 4–60)
LYMPHOCYTES # BLD AUTO: 0.7 K/UL (ref 1–4.8)
LYMPHOCYTES NFR BLD: 17.3 % (ref 18–48)
MCH RBC QN AUTO: 22.6 PG (ref 27–31)
MCHC RBC AUTO-ENTMCNC: 27.7 G/DL (ref 32–36)
MCV RBC AUTO: 82 FL (ref 82–98)
MONOCYTES # BLD AUTO: 0.4 K/UL (ref 0.3–1)
MONOCYTES NFR BLD: 9.9 % (ref 4–15)
NEUTROPHILS # BLD AUTO: 2.9 K/UL (ref 1.8–7.7)
NEUTROPHILS NFR BLD: 71.4 % (ref 38–73)
NRBC BLD-RTO: 0 /100 WBC
OHS QRS DURATION: 100 MS
OHS QTC CALCULATION: 446 MS
PLATELET # BLD AUTO: 188 K/UL (ref 150–450)
PMV BLD AUTO: 10.5 FL (ref 9.2–12.9)
POC IONIZED CALCIUM: 1.09 MMOL/L (ref 1.06–1.42)
POC TCO2 (MEASURED): 32 MMOL/L (ref 23–29)
POTASSIUM BLD-SCNC: 5.8 MMOL/L (ref 3.5–5.1)
POTASSIUM SERPL-SCNC: 4.3 MMOL/L (ref 3.5–5.1)
POTASSIUM SERPL-SCNC: 5.5 MMOL/L (ref 3.5–5.1)
POTASSIUM SERPL-SCNC: 5.7 MMOL/L (ref 3.5–5.1)
PROT SERPL-MCNC: 7.9 G/DL (ref 6–8.4)
PROTHROMBIN TIME: 13 SEC (ref 9–12.5)
RBC # BLD AUTO: 3.1 M/UL (ref 4.6–6.2)
SAMPLE: ABNORMAL
SODIUM BLD-SCNC: 136 MMOL/L (ref 136–145)
SODIUM SERPL-SCNC: 134 MMOL/L (ref 136–145)
SODIUM SERPL-SCNC: 135 MMOL/L (ref 136–145)
SODIUM SERPL-SCNC: 136 MMOL/L (ref 136–145)
SPECIMEN OUTDATE: NORMAL
WBC # BLD AUTO: 4.04 K/UL (ref 3.9–12.7)

## 2024-05-09 PROCEDURE — 93005 ELECTROCARDIOGRAM TRACING: CPT

## 2024-05-09 PROCEDURE — C9113 INJ PANTOPRAZOLE SODIUM, VIA: HCPCS | Performed by: EMERGENCY MEDICINE

## 2024-05-09 PROCEDURE — 99285 EMERGENCY DEPT VISIT HI MDM: CPT | Mod: 25

## 2024-05-09 PROCEDURE — 20600001 HC STEP DOWN PRIVATE ROOM

## 2024-05-09 PROCEDURE — 27000221 HC OXYGEN, UP TO 24 HOURS

## 2024-05-09 PROCEDURE — 94761 N-INVAS EAR/PLS OXIMETRY MLT: CPT

## 2024-05-09 PROCEDURE — 80053 COMPREHEN METABOLIC PANEL: CPT | Performed by: EMERGENCY MEDICINE

## 2024-05-09 PROCEDURE — 80048 BASIC METABOLIC PNL TOTAL CA: CPT | Mod: 91,XB

## 2024-05-09 PROCEDURE — 36415 COLL VENOUS BLD VENIPUNCTURE: CPT

## 2024-05-09 PROCEDURE — 96375 TX/PRO/DX INJ NEW DRUG ADDON: CPT

## 2024-05-09 PROCEDURE — 80047 BASIC METABLC PNL IONIZED CA: CPT

## 2024-05-09 PROCEDURE — 96374 THER/PROPH/DIAG INJ IV PUSH: CPT

## 2024-05-09 PROCEDURE — 85025 COMPLETE CBC W/AUTO DIFF WBC: CPT | Performed by: EMERGENCY MEDICINE

## 2024-05-09 PROCEDURE — 25500020 PHARM REV CODE 255: Performed by: EMERGENCY MEDICINE

## 2024-05-09 PROCEDURE — 93010 ELECTROCARDIOGRAM REPORT: CPT | Mod: ,,, | Performed by: INTERNAL MEDICINE

## 2024-05-09 PROCEDURE — 86850 RBC ANTIBODY SCREEN: CPT | Performed by: EMERGENCY MEDICINE

## 2024-05-09 PROCEDURE — 63600175 PHARM REV CODE 636 W HCPCS

## 2024-05-09 PROCEDURE — 25000003 PHARM REV CODE 250

## 2024-05-09 PROCEDURE — 82150 ASSAY OF AMYLASE: CPT

## 2024-05-09 PROCEDURE — 63600175 PHARM REV CODE 636 W HCPCS: Performed by: EMERGENCY MEDICINE

## 2024-05-09 PROCEDURE — 85610 PROTHROMBIN TIME: CPT

## 2024-05-09 PROCEDURE — 82077 ASSAY SPEC XCP UR&BREATH IA: CPT

## 2024-05-09 PROCEDURE — C9113 INJ PANTOPRAZOLE SODIUM, VIA: HCPCS

## 2024-05-09 PROCEDURE — 83690 ASSAY OF LIPASE: CPT

## 2024-05-09 PROCEDURE — 83880 ASSAY OF NATRIURETIC PEPTIDE: CPT

## 2024-05-09 RX ORDER — IPRATROPIUM BROMIDE AND ALBUTEROL SULFATE 2.5; .5 MG/3ML; MG/3ML
3 SOLUTION RESPIRATORY (INHALATION) EVERY 6 HOURS PRN
Status: DISCONTINUED | OUTPATIENT
Start: 2024-05-09 | End: 2024-05-15 | Stop reason: HOSPADM

## 2024-05-09 RX ORDER — FUROSEMIDE 10 MG/ML
20 INJECTION INTRAMUSCULAR; INTRAVENOUS ONCE
Status: COMPLETED | OUTPATIENT
Start: 2024-05-09 | End: 2024-05-09

## 2024-05-09 RX ORDER — PANTOPRAZOLE SODIUM 40 MG/10ML
80 INJECTION, POWDER, LYOPHILIZED, FOR SOLUTION INTRAVENOUS
Status: COMPLETED | OUTPATIENT
Start: 2024-05-09 | End: 2024-05-09

## 2024-05-09 RX ORDER — PANTOPRAZOLE SODIUM 40 MG/10ML
40 INJECTION, POWDER, LYOPHILIZED, FOR SOLUTION INTRAVENOUS 2 TIMES DAILY
Status: DISCONTINUED | OUTPATIENT
Start: 2024-05-09 | End: 2024-05-13

## 2024-05-09 RX ORDER — FLUTICASONE FUROATE AND VILANTEROL 100; 25 UG/1; UG/1
1 POWDER RESPIRATORY (INHALATION) DAILY
Status: DISCONTINUED | OUTPATIENT
Start: 2024-05-10 | End: 2024-05-15 | Stop reason: HOSPADM

## 2024-05-09 RX ORDER — ESCITALOPRAM OXALATE 10 MG/1
10 TABLET ORAL DAILY
Status: DISCONTINUED | OUTPATIENT
Start: 2024-05-10 | End: 2024-05-15 | Stop reason: HOSPADM

## 2024-05-09 RX ORDER — NALOXONE HCL 0.4 MG/ML
0.02 VIAL (ML) INJECTION
Status: DISCONTINUED | OUTPATIENT
Start: 2024-05-09 | End: 2024-05-15 | Stop reason: HOSPADM

## 2024-05-09 RX ORDER — SODIUM CHLORIDE 0.9 % (FLUSH) 0.9 %
10 SYRINGE (ML) INJECTION EVERY 12 HOURS PRN
Status: DISCONTINUED | OUTPATIENT
Start: 2024-05-09 | End: 2024-05-15 | Stop reason: HOSPADM

## 2024-05-09 RX ORDER — ACETAMINOPHEN 325 MG/1
650 TABLET ORAL EVERY 8 HOURS PRN
Status: DISCONTINUED | OUTPATIENT
Start: 2024-05-09 | End: 2024-05-15

## 2024-05-09 RX ORDER — ONDANSETRON HYDROCHLORIDE 2 MG/ML
4 INJECTION, SOLUTION INTRAVENOUS EVERY 8 HOURS PRN
Status: DISCONTINUED | OUTPATIENT
Start: 2024-05-09 | End: 2024-05-15 | Stop reason: HOSPADM

## 2024-05-09 RX ORDER — PANTOPRAZOLE SODIUM 40 MG/1
40 TABLET, DELAYED RELEASE ORAL 2 TIMES DAILY
Status: DISCONTINUED | OUTPATIENT
Start: 2024-05-09 | End: 2024-05-09

## 2024-05-09 RX ORDER — THIAMINE HCL 100 MG
100 TABLET ORAL DAILY
Status: DISCONTINUED | OUTPATIENT
Start: 2024-05-10 | End: 2024-05-15 | Stop reason: HOSPADM

## 2024-05-09 RX ORDER — FOLIC ACID 1 MG/1
1 TABLET ORAL DAILY
Status: DISCONTINUED | OUTPATIENT
Start: 2024-05-10 | End: 2024-05-15 | Stop reason: HOSPADM

## 2024-05-09 RX ORDER — DICYCLOMINE HYDROCHLORIDE 10 MG/1
10 CAPSULE ORAL EVERY 8 HOURS PRN
Status: ON HOLD | COMMUNITY
End: 2024-05-14 | Stop reason: HOSPADM

## 2024-05-09 RX ORDER — TALC
6 POWDER (GRAM) TOPICAL NIGHTLY PRN
Status: DISCONTINUED | OUTPATIENT
Start: 2024-05-09 | End: 2024-05-15 | Stop reason: HOSPADM

## 2024-05-09 RX ORDER — PANTOPRAZOLE SODIUM 40 MG/1
40 TABLET, DELAYED RELEASE ORAL DAILY
Status: DISCONTINUED | OUTPATIENT
Start: 2024-05-10 | End: 2024-05-09

## 2024-05-09 RX ORDER — ONDANSETRON 8 MG/1
8 TABLET, ORALLY DISINTEGRATING ORAL EVERY 8 HOURS PRN
Status: ON HOLD | COMMUNITY
End: 2024-05-14 | Stop reason: HOSPADM

## 2024-05-09 RX ORDER — LANOLIN ALCOHOL/MO/W.PET/CERES
100 CREAM (GRAM) TOPICAL DAILY
COMMUNITY

## 2024-05-09 RX ORDER — MORPHINE SULFATE 2 MG/ML
2 INJECTION, SOLUTION INTRAMUSCULAR; INTRAVENOUS
Status: COMPLETED | OUTPATIENT
Start: 2024-05-09 | End: 2024-05-09

## 2024-05-09 RX ORDER — PROCHLORPERAZINE MALEATE 5 MG
10 TABLET ORAL EVERY 6 HOURS PRN
Status: DISCONTINUED | OUTPATIENT
Start: 2024-05-09 | End: 2024-05-15 | Stop reason: HOSPADM

## 2024-05-09 RX ADMIN — IOHEXOL 100 ML: 350 INJECTION, SOLUTION INTRAVENOUS at 03:05

## 2024-05-09 RX ADMIN — PANTOPRAZOLE SODIUM 80 MG: 40 INJECTION, POWDER, FOR SOLUTION INTRAVENOUS at 02:05

## 2024-05-09 RX ADMIN — OCTREOTIDE ACETATE 50 MCG/HR: 500 INJECTION, SOLUTION INTRAVENOUS; SUBCUTANEOUS at 10:05

## 2024-05-09 RX ADMIN — FUROSEMIDE 20 MG: 10 INJECTION, SOLUTION INTRAMUSCULAR; INTRAVENOUS at 09:05

## 2024-05-09 RX ADMIN — PANTOPRAZOLE SODIUM 40 MG: 40 INJECTION, POWDER, FOR SOLUTION INTRAVENOUS at 09:05

## 2024-05-09 RX ADMIN — MORPHINE SULFATE 2 MG: 2 INJECTION, SOLUTION INTRAMUSCULAR; INTRAVENOUS at 02:05

## 2024-05-09 RX ADMIN — CEFTRIAXONE 1 G: 1 INJECTION, POWDER, FOR SOLUTION INTRAMUSCULAR; INTRAVENOUS at 09:05

## 2024-05-09 RX ADMIN — SODIUM ZIRCONIUM CYCLOSILICATE 10 G: 10 POWDER, FOR SUSPENSION ORAL at 09:05

## 2024-05-09 NOTE — H&P
"  [facility] - Emergency Dept  [facility] Medicine  History & Physical    Patient Name: Fransico Montalvo  MRN: [id number]  Patient Class: IP- Inpatient  Admission Date: 5/9/2024  Attending Physician: Harriet Bermudez MD   Primary Care Provider: [facility]    Patient information was obtained from patient, past medical records, and ER records.     Subjective:     Principal Problem:Hematochezia    Chief Complaint:   Chief Complaint   Patient presents with    Abdominal Pain     Upper abdominal pain with blood in stool since this am        HPI: Fransico Montalvo is a 67 yo M with PMH of EtOH cirrhosis, presumed COPD, multifactorial anemia, CKD3, HTN, BPH, previous esophageal varices, and gastritis who presented from SNF c/o bloody stool. Per patient, he noted dark red blood from his rectum since this morning (states it is mixed in stool and also on its own.) Also endorses new upper abdominal pain which started after eating food yesterday evening. Over the past month, he has experienced fatigue, SOB, and two pre-syncopal episodes. Of note, patient with two recent admissions: 3/28-4/4 for CAP and symptomatic anemia (s/p 5 U pRBCs) and 4/18-4/23 for melena (s/p 1 U pRBCs.) Of note, EGD on 10/25/2023 and colonoscopy on 4/1/2024 were unremarkable. EGD on 4/19 showed "single non-bleeding angioectasia in the jejunum; treated with APC."    Upon arrival to the ED, /77, , RR 18, SpO2 100% on RA, and T 97.7 F. Labs significant for Hgb 7.0 and K 5.5 (5.8 on repeat). Physical exam in the ED pertinent for bright red blood present in rectum, epigastric tenderness, and pallor. EKG NSR. CTA without active hemorrhage, ascites, bilateral pleural effusion (loculated on the right), and a lateral pancreatic head enhancing lesion. He received 80 mg IV Protonix x 1 and 2 mg IV morphine x 1. CTA pending.     Past Medical History:   Diagnosis Date    Alcohol abuse     BPH (benign prostatic hyperplasia)     Cirrhosis 06/21/2018    pt states " that he was diagnosed a week ago during his last hospital visit    Closed fracture of left distal radius 7/19/2023    Gastritis     Hypertension     Renal disorder        Past Surgical History:   Procedure Laterality Date    CAPSULOTOMY OF JOINT Right 7/29/2019    Procedure: CAPSULOTOMY, JOINT;  Surgeon: Raj Grossman MD;  [facility];  Service: Orthopedics;  Laterality: Right;    COLONOSCOPY N/A 4/1/2024    Procedure: COLONOSCOPY;  Surgeon: Scarlett Whitt MD;  Location: [facility] ENDO (2ND FLR);  Service: Gastroenterology;  Laterality: N/A;    ESOPHAGOGASTRODUODENOSCOPY N/A 10/25/2023    Procedure: EGD (ESOPHAGOGASTRODUODENOSCOPY);  Surgeon: Lux Rome MD;  Location: [facility] ENDO (2ND FLR);  Service: Endoscopy;  Laterality: N/A;    ESOPHAGOGASTRODUODENOSCOPY N/A 4/19/2024    Procedure: EGD (ESOPHAGOGASTRODUODENOSCOPY);  Surgeon: Raj Roberts MD;  [facility]  Service: Endoscopy;  Laterality: N/A;    PERCUTANEOUS PINNING OF HIP Right 7/29/2019    Procedure: PINNING, HIP, PERCUTANEOUS- synthes cannulated screws- hana table- C arm door side-;  Surgeon: Raj Grossman MD;  Location: [facility] OR [address] [place] [address];  Service: Orthopedics;  Laterality: Right;       Review of patient's allergies indicates:  No Known Allergies    No current facility-administered medications on file prior to encounter.     Current Outpatient Medications on File Prior to Encounter   Medication Sig    acetaminophen (TYLENOL) 325 MG tablet Take 2 tablets (650 mg total) by mouth every 8 (eight) hours as needed for Pain.    albuterol-ipratropium (DUO-NEB) 2.5 mg-0.5 mg/3 mL nebulizer solution Take 3 mLs by nebulization every 6 (six) hours as needed for Wheezing or Shortness of Breath. Rescue    dicyclomine (BENTYL) 10 MG capsule Take 10 mg by mouth every 8 (eight) hours as needed (Abdomnial pain).    EScitalopram oxalate (LEXAPRO) 10 MG tablet Take 10 mg by mouth once daily.    fluticasone furoate-vilanteroL  (BREO) 100-25 mcg/dose diskus inhaler Inhale 1 puff into the lungs once daily. Controller    folic acid (FOLVITE) 1 MG tablet Take 1 tablet (1 mg total) by mouth once daily.    melatonin (MELATIN) 3 mg tablet Take 2 tablets (6 mg total) by mouth nightly as needed for Insomnia.    ondansetron (ZOFRAN-ODT) 8 MG TbDL Take 8 mg by mouth every 8 (eight) hours as needed (Nausea).    pantoprazole (PROTONIX) 40 MG tablet Take 1 tablet (40 mg total) by mouth once daily.    thiamine 100 MG tablet Take 100 mg by mouth once daily.     Family History       Problem Relation (Age of Onset)    Heart disease Father, Brother    Hypertension Father          Tobacco Use    Smoking status: Every Day     Current packs/day: 1.00     Types: Cigarettes    Smokeless tobacco: Never   Substance and Sexual Activity    Alcohol use: Yes     Comment: (former drinker; 2months sober) this visit admits drinking a couple beers today    Drug use: No    Sexual activity: Not on file     Review of Systems   Constitutional:  Positive for fatigue. Negative for chills and fever.   Respiratory:  Positive for shortness of breath (x 1 month).    Cardiovascular:  Negative for chest pain, palpitations and leg swelling.   Gastrointestinal:  Positive for abdominal distention, abdominal pain, anal bleeding, blood in stool, constipation, diarrhea and nausea. Negative for vomiting.   Skin:  Positive for pallor.     Objective:     Vital Signs (Most Recent):  Temp: 98.5 °F (36.9 °C) (05/09/24 1647)  Pulse: 101 (05/09/24 1647)  Resp: 20 (05/09/24 1647)  BP: 117/74 (05/09/24 1647)  SpO2: 100 % (05/09/24 1647) Vital Signs (24h Range):  Temp:  [97.7 °F (36.5 °C)-98.5 °F (36.9 °C)] 98.5 °F (36.9 °C)  Pulse:  [100-103] 101  Resp:  [18-22] 20  SpO2:  [100 %] 100 %  BP: (117-128)/(74-89) 117/74        There is no height or weight on file to calculate BMI.     Physical Exam  Vitals and nursing note reviewed.   Constitutional:       Appearance: He is normal weight. He is  "ill-appearing.   HENT:      Head: Normocephalic and atraumatic.   Cardiovascular:      Rate and Rhythm: Normal rate and regular rhythm.   Pulmonary:      Effort: Pulmonary effort is normal. No respiratory distress.      Breath sounds: Normal breath sounds.   Abdominal:      General: Abdomen is flat. There is distension.      Palpations: Abdomen is soft.      Tenderness: There is abdominal tenderness (epigastric).   Musculoskeletal:      Right lower leg: No edema.      Left lower leg: No edema.   Skin:     General: Skin is warm and dry.      Coloration: Skin is pale.   Neurological:      General: No focal deficit present.      Mental Status: He is alert and oriented to person, place, and time. Mental status is at baseline.                Significant Labs: All pertinent labs within the past 24 hours have been reviewed.    Significant Imaging: I have reviewed all pertinent imaging results/findings within the past 24 hours.  Assessment/Plan:     * Hematochezia  Patient presenting for evaluation of dark red blood in stool since morning of admission with associated upper abdominal pain. Two recent admissions: 3/28-4/4 for CAP and symptomatic anemia (s/p 5 U pRBCs) and 4/18-4/23 for melena (s/p 1 U pRBCs.) Of note, EGD on 10/25/2023 and colonoscopy on 4/1/2024 were unremarkable. EGD on 4/19 showed "single non-bleeding angioectasia in the jejunum; treated with APC."  CTA without acute hemorrhage. Received Protonix 80mg IV x 1 in the ED.       Recent Labs     05/09/24  1426 05/09/24  1637   HGB 7.0*  --    BUN 16 16   CREATININE 1.0 1.0       - GI consulted, appreciate recs  - Clear liquid diet, NPO at midnight for possible intervention  - Continue Protonix 40 mg IV BID  - Start octreotide gtt @ 50 mcg/hr  - Start Rocephin 1 g IV q12h  - Monitor for hemodynamic instability with associated large volume hematochezia, consider STAT CTA and IR consult for embolization if positive  - Maintain IV access with 2 large bore IVs, PICC " line and/or Central line NOT adequate if actively resuscitating patient with blood products. Continuous cardiac monitoring for hemodynamic instability. Intravascular resuscitation/support with IVFs. Cross and type blood, obtain consent if not already completed.Transfuse pRBC for Hgb < 7, unless otherwise indicated. Correct any coagulopathy with platelets and FFP to a goal of platelets >50K and INR <2.0. Hold all NSAIDs and anticoagulants, unless contraindicated    Hyperkalemia  This patient has hyperkalemia which is uncontrolled. We will monitor for arrhythmias with EKG or continuous telemetry. We will treat the hyperkalemia with Potassium Binders, Calcium gluconate, IV insulin and dextrose, Nebulized albuterol sulfate, and Furosemide. The likely etiology of the hyperkalemia is  unknown .  The patients latest potassium has been reviewed and the results are listed below  Recent Labs   Lab 05/09/24  1426   K 5.5*       Elevated brain natriuretic peptide (BNP) level  3/28/2024 BNP 2,932 and 4/18/2024 BNP 4,361; no TTE since 2022.    - F/u repeat BNP  - F/u TTE    Alcoholic cirrhosis  Recent Labs     05/09/24  1426   BILITOT 0.7   AST 62*   ALT 16   ALKPHOS 119      ALBUMIN 2.9*     Patient with known Alcoholic Cirrhosis. Co-morbidities are present and inclusive of ascites, portal hypertension, and anemia/pancytopenia.    MELD-Na score calculated; MELD 3.0: 10 at 4/19/2024  4:09 AM  MELD-Na: 9 at 4/19/2024  4:09 AM  Calculated from:  Serum Creatinine: 1.1 mg/dL at 4/19/2024  4:09 AM  Serum Sodium: 141 mmol/L (Using max of 137 mmol/L) at 4/19/2024  4:09 AM  Total Bilirubin: 0.8 mg/dL (Using min of 1 mg/dL) at 4/19/2024  4:09 AM  Serum Albumin: 2.9 g/dL at 4/19/2024  4:09 AM  INR(ratio): 1.2 at 4/18/2024  7:13 PM  Age at listing (hypothetical): 68 years  Sex: Male at 4/19/2024  4:09 AM    During admission, reported he quit alcohol one month ago.     PLAN:  - Will avoid any hepatotoxic meds, and monitor CBC/CMP/INR  for synthetic function.   - IR for therapeutic paracentesis    CKD Stage 3  Creatinine at baseline  Creatinine stable for now.     Renal function labs reviewed- noted:    Recent Labs     05/09/24  1426   CREATININE 1.0   BUN 16     CrCl cannot be calculated (Unknown ideal weight.). according to latest data.     Monitor UOP and serial BMP and adjust therapy as needed. Renally dose meds. Avoid nephrotoxic medications and procedures.    Acute on chronic anemia  Patient's with Normocytic anemia. Hemoglobin stable. Patient's anemia is currently uncontrolled. Has not received any PRBCs to date. Chronic etiology due to CKD and cirrhosis, exacerbated by acute blood loss from GI bleed.    -Current CBC reviewed-    Recent Labs   Lab 05/09/24  1426   HGB 7.0*     PLAN  - Monitor serial CBC and transfuse if patient becomes hemodynamically unstable, symptomatic or H/H drops below 7/21  - F/u iron studies, reticulocyte count, folate, B12    Presumed COPD (chronic obstructive pulmonary disease)  Patient's COPD is controlled currently.  Patient is currently off COPD Pathway. Continue Breo Ellipta 1 puff daily and PRN Duo-Nebs, will monitor respiratory status closely.     Essential hypertension  Chronic, controlled. Latest blood pressure and vitals reviewed-     Temp:  [97.7 °F (36.5 °C)-98.5 °F (36.9 °C)]   Pulse:  [100-103]   Resp:  [18-22]   BP: (117-128)/(74-89)   SpO2:  [100 %] .     Not on any home medications per chart review    PLAN:  Will utilize p.r.n. blood pressure medication only if patient's blood pressure greater than 180/110 and he develops symptoms such as worsening chest pain or shortness of breath.    Anxiety and depression  Patient has hx of depression which  is currently controlled.     PLAN:  Continue home Lexapro 10 mg po daily. We will not consult psychiatry at this time. Patient does not display psychosis at this time. Continue to monitor closely and adjust plan of care as needed.    BPH (benign prostatic  "hyperplasia)  Stable. Hx of BPH. Was on Tamsulosin previously, but held on recent admission in 03/2024 where there were concerns for low BP    PLAN:  Bladder scan PRN for urinary retention concerns    Pancreatic lesion  5/9 CTA: "Enhancing lesion within the lateral aspect of the pancreatic head, not seen on examination 12/25/2023. In this region however, on the previous exam, there is a hypoattenuating lesion with peripheral calcification. This did not undergo significant enhancement at that time. Solid lesion is of concern. Correlation and follow-up advised."    - F/u PCP      VTE Risk Mitigation (From admission, onward)           Ordered     Reason for No Pharmacological VTE Prophylaxis  Once        Question:  Reasons:  Answer:  Active Bleeding    05/09/24 1609     IP VTE HIGH RISK PATIENT  Once         05/09/24 1609     Place sequential compression device  Until discontinued         05/09/24 1609                                    Awa Rodriguez MD  Department of Hospital Medicine  [facility] - Emergency Dept          "

## 2024-05-09 NOTE — ASSESSMENT & PLAN NOTE
Patient has hx of depression which  is currently controlled.     PLAN:  Continue home Lexapro 10 mg po daily. We will not consult psychiatry at this time. Patient does not display psychosis at this time. Continue to monitor closely and adjust plan of care as needed.

## 2024-05-09 NOTE — SUBJECTIVE & OBJECTIVE
Past Medical History:   Diagnosis Date    Alcohol abuse     BPH (benign prostatic hyperplasia)     Cirrhosis 06/21/2018    pt states that he was diagnosed a week ago during his last hospital visit    Closed fracture of left distal radius 7/19/2023    Gastritis     Hypertension     Renal disorder        Past Surgical History:   Procedure Laterality Date    CAPSULOTOMY OF JOINT Right 7/29/2019    Procedure: CAPSULOTOMY, JOINT;  Surgeon: Raj Grossman MD;  Location: [facility] OR [address] [place] [address];  Service: Orthopedics;  Laterality: Right;    COLONOSCOPY N/A 4/1/2024    Procedure: COLONOSCOPY;  Surgeon: Scarlett Whitt MD;  [facility]  Service: Gastroenterology;  Laterality: N/A;    ESOPHAGOGASTRODUODENOSCOPY N/A 10/25/2023    Procedure: EGD (ESOPHAGOGASTRODUODENOSCOPY);  Surgeon: Lux Rome MD;  [facility]  Service: Endoscopy;  Laterality: N/A;    ESOPHAGOGASTRODUODENOSCOPY N/A 4/19/2024    Procedure: EGD (ESOPHAGOGASTRODUODENOSCOPY);  Surgeon: Raj Roberts MD;  Location: [facility] ([address] [place] [address]);  Service: Endoscopy;  Laterality: N/A;    PERCUTANEOUS PINNING OF HIP Right 7/29/2019    Procedure: PINNING, HIP, PERCUTANEOUS- synthes cannulated screws- hana table- C arm door side-;  Surgeon: Raj Grossman MD;  Location: [facility] OR [address] [place] [address];  Service: Orthopedics;  Laterality: Right;       Review of patient's allergies indicates:  No Known Allergies    No current facility-administered medications on file prior to encounter.     Current Outpatient Medications on File Prior to Encounter   Medication Sig    acetaminophen (TYLENOL) 325 MG tablet Take 2 tablets (650 mg total) by mouth every 8 (eight) hours as needed for Pain.    albuterol-ipratropium (DUO-NEB) 2.5 mg-0.5 mg/3 mL nebulizer solution Take 3 mLs by nebulization every 6 (six) hours as needed for Wheezing or Shortness of Breath. Rescue    dicyclomine (BENTYL) 10 MG capsule Take 10 mg by mouth every 8 (eight) hours as needed  (Abdomnial pain).    EScitalopram oxalate (LEXAPRO) 10 MG tablet Take 10 mg by mouth once daily.    fluticasone furoate-vilanteroL (BREO) 100-25 mcg/dose diskus inhaler Inhale 1 puff into the lungs once daily. Controller    folic acid (FOLVITE) 1 MG tablet Take 1 tablet (1 mg total) by mouth once daily.    melatonin (MELATIN) 3 mg tablet Take 2 tablets (6 mg total) by mouth nightly as needed for Insomnia.    ondansetron (ZOFRAN-ODT) 8 MG TbDL Take 8 mg by mouth every 8 (eight) hours as needed (Nausea).    pantoprazole (PROTONIX) 40 MG tablet Take 1 tablet (40 mg total) by mouth once daily.    thiamine 100 MG tablet Take 100 mg by mouth once daily.     Family History       Problem Relation (Age of Onset)    Heart disease Father, Brother    Hypertension Father          Tobacco Use    Smoking status: Every Day     Current packs/day: 1.00     Types: Cigarettes    Smokeless tobacco: Never   Substance and Sexual Activity    Alcohol use: Yes     Comment: (former drinker; 2months sober) this visit admits drinking a couple beers today    Drug use: No    Sexual activity: Not on file     Review of Systems   Constitutional:  Positive for fatigue. Negative for chills and fever.   Respiratory:  Positive for shortness of breath (x 1 month).    Cardiovascular:  Negative for chest pain, palpitations and leg swelling.   Gastrointestinal:  Positive for abdominal distention, abdominal pain, anal bleeding, blood in stool, constipation, diarrhea and nausea. Negative for vomiting.   Skin:  Positive for pallor.     Objective:     Vital Signs (Most Recent):  Temp: 98.5 °F (36.9 °C) (05/09/24 1647)  Pulse: 101 (05/09/24 1647)  Resp: 20 (05/09/24 1647)  BP: 117/74 (05/09/24 1647)  SpO2: 100 % (05/09/24 1647) Vital Signs (24h Range):  Temp:  [97.7 °F (36.5 °C)-98.5 °F (36.9 °C)] 98.5 °F (36.9 °C)  Pulse:  [100-103] 101  Resp:  [18-22] 20  SpO2:  [100 %] 100 %  BP: (117-128)/(74-89) 117/74        There is no height or weight on file to  calculate BMI.     Physical Exam  Vitals and nursing note reviewed.   Constitutional:       Appearance: He is normal weight. He is ill-appearing.   HENT:      Head: Normocephalic and atraumatic.   Cardiovascular:      Rate and Rhythm: Normal rate and regular rhythm.   Pulmonary:      Effort: Pulmonary effort is normal. No respiratory distress.      Breath sounds: Normal breath sounds.   Abdominal:      General: Abdomen is flat. There is distension.      Palpations: Abdomen is soft.      Tenderness: There is abdominal tenderness (epigastric).   Musculoskeletal:      Right lower leg: No edema.      Left lower leg: No edema.   Skin:     General: Skin is warm and dry.      Coloration: Skin is pale.   Neurological:      General: No focal deficit present.      Mental Status: He is alert and oriented to person, place, and time. Mental status is at baseline.                Significant Labs: All pertinent labs within the past 24 hours have been reviewed.    Significant Imaging: I have reviewed all pertinent imaging results/findings within the past 24 hours.

## 2024-05-09 NOTE — ED TRIAGE NOTES
Fransico Montalvo, an 68 y.o. male presents to the ED via EMS from [facility] for 9/10 upper abd pain and bloody stool since this morning. On 6L oxygen via [place] at baseline. Endorses bloody diarrhea and weakness. Denies CP, SOB, N/V, HA, fever, chills, dizziness, urinary complaints. Reports hx of HTN, lisinopril use. Denies use of blood thinners.      Chief Complaint   Patient presents with    Abdominal Pain     Upper abdominal pain with blood in stool since this am     Review of patient's allergies indicates:  No Known Allergies  Past Medical History:   Diagnosis Date    Alcohol abuse     BPH (benign prostatic hyperplasia)     Cirrhosis 06/21/2018    pt states that he was diagnosed a week ago during his last hospital visit    Closed fracture of left distal radius 7/19/2023    Gastritis     Hypertension     Renal disorder

## 2024-05-09 NOTE — ED PROVIDER NOTES
Encounter Date: 5/9/2024       History     Chief Complaint   Patient presents with    Abdominal Pain     Upper abdominal pain with blood in stool since this am     69 yo M with extensive pmhx including EtOH cirrhosis with ascites, chronic multifactorial anemia with thrombocytopenia, history of EtOH abuse, presumed COPD, CKD 3, HTN, multiple recent admissions for GI bleed presents from nursing home with bloody stool.  Patient was last admitted April 18th of the 23rd for a GI bleed at that time, he was complaining of melena.  He received 1 unit PRBCs, he underwent an EGD that treated in AVM in the jejunum with argon laser.  He was discharged home.  Earlier in the month he had a hospitalization for acute on chronic anemia requiring 5 unit PRBC transfusion, and presumed pneumonia with COPD exacerbation.  His colonoscopy was normal at that time.  Patient notes dark red blood in stool and without stool that began this morning.  He does endorse some weakness.  He also reports some upper abdominal pain.  Upper abdominal pain is present in the midline.  It began after eating food last night.  Severity of the pain was not severe enough to bring him here but the bleeding was alarming so he came he denies drinking alcohol.        Review of patient's allergies indicates:  No Known Allergies  Past Medical History:   Diagnosis Date    Alcohol abuse     BPH (benign prostatic hyperplasia)     Cirrhosis 06/21/2018    pt states that he was diagnosed a week ago during his last hospital visit    Closed fracture of left distal radius 7/19/2023    Gastritis     Hypertension     Renal disorder      Past Surgical History:   Procedure Laterality Date    CAPSULOTOMY OF JOINT Right 7/29/2019    Procedure: CAPSULOTOMY, JOINT;  Surgeon: Raj Grossman MD;  Location: [facility] OR [address] [place] [address];  Service: Orthopedics;  Laterality: Right;    COLONOSCOPY N/A 4/1/2024    Procedure: COLONOSCOPY;  Surgeon: Scarlett Whitt MD;  [facility]   Service: Gastroenterology;  Laterality: N/A;    ESOPHAGOGASTRODUODENOSCOPY N/A 10/25/2023    Procedure: EGD (ESOPHAGOGASTRODUODENOSCOPY);  Surgeon: Lux Rome MD;  [facility]  Service: Endoscopy;  Laterality: N/A;    ESOPHAGOGASTRODUODENOSCOPY N/A 4/19/2024    Procedure: EGD (ESOPHAGOGASTRODUODENOSCOPY);  Surgeon: Raj Roberts MD;  [facility]  Service: Endoscopy;  Laterality: N/A;    PERCUTANEOUS PINNING OF HIP Right 7/29/2019    Procedure: PINNING, HIP, PERCUTANEOUS- synthes cannulated screws- hana table- C arm door side-;  Surgeon: Raj Grossman MD;  [facility];  Service: Orthopedics;  Laterality: Right;     Family History   Problem Relation Name Age of Onset    Hypertension Father      Heart disease Father      Heart disease Brother       Social History     Tobacco Use    Smoking status: Every Day     Current packs/day: 1.00     Types: Cigarettes    Smokeless tobacco: Never   Substance Use Topics    Alcohol use: Yes     Comment: (former drinker; 2months sober) this visit admits drinking a couple beers today    Drug use: No     Review of Systems    Physical Exam     Initial Vitals [05/09/24 1343]   BP Pulse Resp Temp SpO2   123/77 101 18 97.7 °F (36.5 °C) 100 %      MAP       --         Physical Exam    Nursing note and vitals reviewed.  Constitutional: He appears well-developed and well-nourished. He is not diaphoretic. No distress.   Deconditioned and weak   HENT:   Head: Normocephalic and atraumatic.   Eyes: Right eye exhibits no discharge. Left eye exhibits no discharge. No scleral icterus.   Conjunctival pallor   Neck: Neck supple. No JVD present.   Normal range of motion.  Cardiovascular:  Normal rate, regular rhythm and normal heart sounds.     Exam reveals no gallop and no friction rub.       No murmur heard.  Pulmonary/Chest: No respiratory distress. He has no wheezes. He has no rhonchi. He has no rales.   Minimally increased work of  breathing on nasal cannula   Abdominal: Abdomen is soft. He exhibits distension. He exhibits no mass. There is abdominal tenderness in the epigastric area.   No right CVA tenderness.  No left CVA tenderness. There is no rebound and no guarding.   Genitourinary:    Genitourinary Comments: Bright red blood in rectum     Musculoskeletal:         General: No tenderness. Normal range of motion.      Cervical back: Normal range of motion and neck supple.     Neurological: He is alert. He has normal strength. No sensory deficit.   Oriented to year and month but needs to be redirected for date  No asterixis   Skin: Skin is warm and dry. Capillary refill takes 2 to 3 seconds. There is pallor.   Psychiatric: Thought content normal.         ED Course   Procedures  Labs Reviewed   CBC W/ AUTO DIFFERENTIAL - Abnormal; Notable for the following components:       Result Value    RBC 3.10 (*)     Hemoglobin 7.0 (*)     Hematocrit 25.3 (*)     MCH 22.6 (*)     MCHC 27.7 (*)     RDW 19.6 (*)     Immature Granulocytes 0.7 (*)     Lymph # 0.7 (*)     Lymph % 17.3 (*)     All other components within normal limits   COMPREHENSIVE METABOLIC PANEL - Abnormal; Notable for the following components:    Sodium 134 (*)     Potassium 5.5 (*)     Albumin 2.9 (*)     AST 62 (*)     Anion Gap 6 (*)     All other components within normal limits   BASIC METABOLIC PANEL - Abnormal; Notable for the following components:    Potassium 5.7 (*)     CO2 30 (*)     Glucose 112 (*)     Calcium 8.4 (*)     Anion Gap 7 (*)     All other components within normal limits   B-TYPE NATRIURETIC PEPTIDE - Abnormal; Notable for the following components:    BNP 3,448 (*)     All other components within normal limits   LIPASE - Abnormal; Notable for the following components:    Lipase 758 (*)     All other components within normal limits   AMYLASE - Abnormal; Notable for the following components:    Amylase 1,256 (*)     All other components within normal limits    PROTIME-INR - Abnormal; Notable for the following components:    Prothrombin Time 13.0 (*)     All other components within normal limits   ISTAT PROCEDURE - Abnormal; Notable for the following components:    POC Potassium 5.8 (*)     POC TCO2 (MEASURED) 32 (*)     POC Hematocrit 25 (*)     All other components within normal limits   CULTURE, AEROBIC  (SPECIFY SOURCE)   CULTURE, ANAEROBIC   GRAM STAIN   ALCOHOL,MEDICAL (ETHANOL)   ALBUMIN, PERITONEAL, PLEURAL FLUID OR JAMISON DRAINAGE, IN-HOUSE   PROTEIN, PERITONEAL, PLEURAL FLUID OR JAMISON DRAINAGE, IN-HOUSE   LDH, PERITONEAL, PLEURAL FLUID OR JAMISON DRAINAGE, IN-HOUSE   WBC & DIFF, BODY FLUID   TYPE & SCREEN   ISTAT CHEM8     EKG Readings: (Independently Interpreted)   Initial Reading: No STEMI. Rhythm: Normal Sinus Rhythm. Heart Rate: 100. ST Segments: Normal ST Segments. T Waves Flipped: V5 and V6. Axis: Normal.     ECG Results              EKG 12-lead (Final result)        Collection Time Result Time QRS Duration OHS QTC Calculation    05/09/24 14:03:06 05/09/24 15:03:23 100 446                     Final result by Interface, Lab In [facility] (05/09/24 15:03:26)                   Narrative:    Test Reason : K92.2,    Vent. Rate : 100 BPM     Atrial Rate : 100 BPM     P-R Int : 178 ms          QRS Dur : 100 ms      QT Int : 346 ms       P-R-T Axes : 034 045 172 degrees     QTc Int : 446 ms    Normal sinus rhythm  Nonspecific T wave abnormality  Abnormal ECG  When compared with ECG of 01-MAY-2024 16:11,  Nonspecific T wave abnormality now evident in Inferior leads  Confirmed by Mariano MIRANDA MD ([id number]) on 5/9/2024 3:03:22 PM    Referred By: System System           Confirmed By:Mariano MIRANDA MD                                  Imaging Results              X-Ray Chest AP Portable (Final result)  Result time 05/09/24 18:16:24      Final result by Betito Hein MD (05/09/24 18:16:24)                   Impression:      1. Bilateral pleural effusions, appears to have increased on the  right.  Pulmonary parenchymal findings suggest underlying edema.  Developing right lower lung zone consolidation not excluded.      Electronically signed by: Betito Hein MD  Date:    05/09/2024  Time:    18:16               Narrative:    EXAMINATION:  XR CHEST AP PORTABLE    CLINICAL HISTORY:  SOB;    TECHNIQUE:  Single frontal view of the chest was performed.    COMPARISON:  05/01/2024    FINDINGS:  The cardiomediastinal silhouette is prominent, magnified by technique, stable..  There is obscuration of the bilateral costophrenic angles suggesting effusions, may have increased on the right since the previous exam..  The trachea is midline.  The lungs are symmetrically expanded bilaterally with prominent interstitial attenuation bilaterally suggesting edema.  Right lower lung zone consolidation not excluded..  There is no pneumothorax.  The osseous structures are remarkable for degenerative change..  There are remote appearing bilateral rib injuries.                                        CTA Acute GI [place], Abdomen and Pelvis (Final result)  Result time 05/09/24 16:19:47      Final result by Betito Hein MD (05/09/24 16:19:47)                   Impression:      This report was flagged in Epic as abnormal.    1. No contrast extravasation into the large bowel or elsewhere to suggest active site of gastrointestinal hemorrhage.  2. There is liquid stool throughout the large bowel and fluid within the few distal small bowel loops.  Findings could reflect diarrheal illness in the setting of enteritis although correlation is needed.  3. Findings suggesting volume overload including abdominopelvic ascites and bilateral pleural effusions.  There is loculation of the effusion on the right.  4. Enhancing lesion within the lateral aspect of the pancreatic head, not seen on examination 12/25/2023.  In this region however, on the previous exam, there is a hypoattenuating lesion with peripheral calcification.  This did  not undergo significant enhancement at that time.  Solid lesion is of concern.  Correlation and follow-up advised.  5. Cholelithiasis, no secondary findings to suggest acute cholecystitis.  6. Extensive atherosclerotic plaque and calcification of the aorta and its branches as described.  7. Please see above for several additional findings.      Electronically signed by: Betito Hein MD  Date:    05/09/2024  Time:    16:19               Narrative:    EXAMINATION:  CTA ACUTE GI BLEED, ABDOMEN AND PELVIS    CLINICAL HISTORY:  active BRBPR;    TECHNIQUE:  Axial images of the abdomen and pelvis were obtained at 1.25 mm intervals pre and post the administration of 100 cc omni 350 IV contrast following the CTA acute GI bleed abdomen and pelvis protocol.  Precontrast, arterial phase and portal venous phase axial images were reviewed as well as coronal and sagittal reformatted images.    COMPARISON:  CTA chest abdomen and pelvis 10/25/2023    FINDINGS:  Images of the lower thorax are remarkable for bilateral moderate pleural effusions noting some degree of loculation involving the effusion on the right, partially visualized.  There is compressive atelectasis of the bilateral lower lobes.    There are subcentimeter low attenuating lesions within the hepatic parenchyma, too small for characterization.  The spleen is prominent.  The adrenal glands are grossly unremarkable.  There is a focus of arterial enhancement at the level of the pancreatic head laterally, which phase somewhat on the portal venous phase.  This measures 0.9 x 0.9 cm.  This was not seen on examination 10/25/2023.  There is cholelithiasis, no secondary findings to suggest acute cholecystitis.  No significant biliary dilation.  The stomach is decompressed, likely accounting for mild wall thickening.  There are several scattered abdominal lymph nodes.  There is abdominopelvic ascites.    The kidneys enhance symmetrically.  There is bilateral renal vascular  calcification versus nonobstructive nephrolithiasis.  No hydronephrosis.  Several low attenuating lesions arise from the kidneys bilaterally, the majority of which are too small for characterization, the larger of which measure attenuation suggesting cysts.  The bilateral ureters are unremarkable without calculi seen.  The urinary bladder is nondistended.  There is high attenuating material layering posteriorly within the bladder, seen on the noncontrast images, may reflect calcification or sequela of prior contrast administration.  The prostate is prominent.    There is liquid stool within the rectum.  There is scattered liquid stool throughout the remainder of the large bowel.  No large bowel wall thickening.  No large bowel inflammation.  No extravasation of contrast within the large bowel lumen to suggest site of active gastrointestinal hemorrhage.  The terminal ileum is unremarkable.  The appendix is not confidently identified.  There is some wall thickening involving the terminal ileum.  No contrast extravasation into the gastric lumen.  No convincing contrast extravasation into the lumen of the small bowel to suggest site of active gastrointestinal hemorrhage.    There is osteopenia.  Surgical changes are noted of the proximal right femur.  There are degenerative changes of the bilateral femoroacetabular joints, sacroiliac joints, and spine.  There are several remote appearing bilateral rib injuries.  There are compression deformities involving the anterior superior aspects of L2 and L1 similar to the previous examination.  No significant inguinal lymphadenopathy.  There is body wall anasarca.    Vascular details: There is atherosclerotic calcification of the aorta and its branches.  The celiac axis, SMA, bilateral renal arteries, MARYANN, and distal aorto iliac branches are patent noting significant calcification throughout the aortic branches, particularly involving the bilateral internal iliac arteries.  No  aneurysm or dissection.                                       Medications   sodium chloride 0.9% flush 10 mL (has no administration in time range)   naloxone 0.4 mg/mL injection 0.02 mg (has no administration in time range)   ondansetron injection 4 mg (4 mg Intravenous Given 5/10/24 0032)   prochlorperazine tablet 10 mg (has no administration in time range)   melatonin tablet 6 mg (has no administration in time range)   acetaminophen tablet 650 mg (has no administration in time range)   albuterol-ipratropium 2.5 mg-0.5 mg/3 mL nebulizer solution 3 mL (has no administration in time range)   EScitalopram oxalate tablet 10 mg (10 mg Oral Given 5/10/24 0932)   fluticasone furoate-vilanteroL 100-25 mcg/dose diskus inhaler 1 puff (has no administration in time range)   folic acid tablet 1 mg (1 mg Oral Given 5/10/24 0932)   thiamine tablet 100 mg (100 mg Oral Given 5/10/24 0932)   pantoprazole injection 40 mg (40 mg Intravenous Given 5/10/24 0932)   octreotide (SANDOSTATIN) 500 mcg in sodium chloride 0.9% 100 mL infusion (50 mcg/hr Intravenous New Bag 5/9/24 2249)   cefTRIAXone (Rocephin) 1 g in dextrose 5 % in water (D5W) 100 mL IVPB (MB+) (has no administration in time range)   0.9%  NaCl infusion (for blood administration) (has no administration in time range)   magnesium sulfate 2g in water 50mL IVPB (premix) (has no administration in time range)   pantoprazole injection 80 mg (80 mg Intravenous Given 5/9/24 1445)   morphine injection 2 mg (2 mg Intravenous Given 5/9/24 1445)   iohexoL (OMNIPAQUE 350) injection 100 mL (100 mLs Intravenous Given 5/9/24 1525)   furosemide injection 20 mg (20 mg Intravenous Given 5/9/24 2131)   sodium zirconium cyclosilicate packet 10 g (10 g Oral Given 5/9/24 2131)   perflutren protein-A microsphr 0.22 mg/mL IV susp (0.66 mg Intravenous Given 5/10/24 0829)     Medical Decision Making  67 yo M with extensive pmhx including EtOH cirrhosis with ascites, chronic multifactorial anemia with  thrombocytopenia, history of EtOH abuse, presumed COPD, CKD 3, HTN, multiple recent admissions for GI bleed presents from nursing home with bloody stool.      Differential includes, but is not limited to:  Upper GI bleed, lower GI bleed, AVM, symptomatic anemia    Will obtain labs and type and screen.  Patient had recent EGD with no evidence of varices so I do not suspect variceal bleed.  Recent colonoscopy that was normal.  Anticipate admission.  Will administer Protonix.    Reassessment:  I-STAT with hematocrit of 25.  This appears to be improved from previous.  At this time, my shift is coming to a close and the patient was signed out to incoming MD. anticipate admission for GI bleed.        Amount and/or Complexity of Data Reviewed  Labs: ordered.  Radiology: ordered.    Risk  Prescription drug management.  Decision regarding hospitalization.              Attending Attestation:         Attending Critical Care:   Critical Care Times:   ==============================================================  Total Critical Care Time - exclusive of procedural time: 30 minutes.  ==============================================================  Critical care was necessary to treat or prevent imminent or life-threatening deterioration of the following conditions: GI bleeding.                                  Clinical Impression:  Final diagnoses:  [K92.2] GI bleed  [R10.13] Epigastric abdominal pain (Primary)          ED Disposition Condition    Admit                 Jamie Farrell MD  05/09/24 1500       Jamie Farrell MD  05/10/24 1009       Jamie Farrell MD  05/10/24 1010

## 2024-05-09 NOTE — ED NOTES
Tele box 0978 applied to pt. Tech in war room states able to see pt on monitor, rhythm sinus tach with .

## 2024-05-09 NOTE — ASSESSMENT & PLAN NOTE
Creatinine at baseline  Creatinine stable for now.     Renal function labs reviewed- noted:    Recent Labs     05/09/24  1426   CREATININE 1.0   BUN 16     CrCl cannot be calculated (Unknown ideal weight.). according to latest data.     Monitor UOP and serial BMP and adjust therapy as needed. Renally dose meds. Avoid nephrotoxic medications and procedures.

## 2024-05-09 NOTE — ASSESSMENT & PLAN NOTE
Stable. Hx of BPH. Was on Tamsulosin previously, but held on recent admission in 03/2024 where there were concerns for low BP    PLAN:  Bladder scan PRN for urinary retention concerns

## 2024-05-09 NOTE — ASSESSMENT & PLAN NOTE
Patient's with Normocytic anemia. Hemoglobin stable. Patient's anemia is currently uncontrolled. Has not received any PRBCs to date. Chronic etiology due to CKD and cirrhosis, exacerbated by acute blood loss from GI bleed.    -Current CBC reviewed-    Recent Labs   Lab 05/09/24  1426   HGB 7.0*     PLAN  - Monitor serial CBC and transfuse if patient becomes hemodynamically unstable, symptomatic or H/H drops below 7/21  - F/u iron studies, reticulocyte count, folate, B12

## 2024-05-09 NOTE — HPI
"Fransico Montalvo is a 67 yo M with PMH of EtOH cirrhosis c/b esophageal varices s/p banding in 2018, presumed COPD, multifactorial anemia, CAD, HTN, BPH, and gastritis who presented from SNF c/o bloody stool. Per patient, he noted dark red blood from his rectum since this morning (states it is mixed in stool and also on its own.) Also endorses new upper abdominal pain which started after eating food yesterday evening. Over the past month, he has experienced fatigue, SOB, and two pre-syncopal episodes. Of note, patient with two recent admissions: 3/28-4/4 for CAP and symptomatic anemia (s/p 5 U pRBCs) and 4/18-4/23 for melena (s/p 1 U pRBCs.) Of note, EGD on 10/25/2023 and colonoscopy on 4/1/2024 were unremarkable. EGD on 4/19 showed "single non-bleeding angioectasia in the jejunum; treated with APC."    Upon arrival to the ED, /77, , RR 18, SpO2 100% on RA, and T 97.7 F. Labs significant for Hgb 7.0 and K 5.5 (5.8 on repeat). Physical exam in the ED pertinent for bright red blood present in rectum, epigastric tenderness, and pallor. EKG NSR. CTA without active hemorrhage, ascites, bilateral pleural effusion (loculated on the right), and a lateral pancreatic head enhancing lesion. He received 80 mg IV Protonix x 1 and 2 mg IV morphine x 1. CTA pending.   "

## 2024-05-09 NOTE — ED NOTES
I-STAT Chem-8+ Results:   Value Reference Range   Sodium 136 136-145 mmol/L   Potassium  5.8 3.5-5.1 mmol/L   Chloride 98  mmol/L   Ionized Calcium 1.09 1.06-1.42 mmol/L   CO2 (measured) 32 23-29 mmol/L   Glucose 91  mg/dL   BUN 22 6-30 mg/dL   Creatinine 1.2 0.5-1.4 mg/dL   Hematocrit 25 36-54%

## 2024-05-09 NOTE — ASSESSMENT & PLAN NOTE
"Patient presenting for evaluation of dark red blood in stool since morning of admission with associated upper abdominal pain. Two recent admissions: 3/28-4/4 for CAP and symptomatic anemia (s/p 5 U pRBCs) and 4/18-4/23 for melena (s/p 1 U pRBCs.) Of note, EGD on 10/25/2023 and colonoscopy on 4/1/2024 were unremarkable. EGD on 4/19 showed "single non-bleeding angioectasia in the jejunum; treated with APC."  CTA without acute hemorrhage. Received Protonix 80mg IV x 1 in the ED.       Recent Labs     05/09/24  1426   HGB 7.0*   BUN 16   CREATININE 1.0     - GI consulted, appreciate recs  - Clear liquid diet, NPO at midnight for possible intervention  - Continue Protonix 40 mg IV BID  - Monitor for hemodynamic instability with associated large volume hematochezia, consider STAT CTA and IR consult for embolization if positive  - Maintain IV access with 2 large bore IVs, PICC line and/or Central line NOT adequate if actively resuscitating patient with blood products. Continuous cardiac monitoring for hemodynamic instability. Intravascular resuscitation/support with IVFs. Cross and type blood, obtain consent if not already completed.Transfuse pRBC for Hgb < 7, unless otherwise indicated. Correct any coagulopathy with platelets and FFP to a goal of platelets >50K and INR <2.0. Hold all NSAIDs and anticoagulants, unless contraindicated  "

## 2024-05-09 NOTE — ASSESSMENT & PLAN NOTE
This patient has hyperkalemia which is uncontrolled. We will monitor for arrhythmias with EKG or continuous telemetry. We will treat the hyperkalemia with Potassium Binders, Calcium gluconate, IV insulin and dextrose, Nebulized albuterol sulfate, and Furosemide. The likely etiology of the hyperkalemia is  unknown .  The patients latest potassium has been reviewed and the results are listed below  Recent Labs   Lab 05/09/24  1426   K 5.5*

## 2024-05-09 NOTE — ASSESSMENT & PLAN NOTE
Chronic, controlled. Latest blood pressure and vitals reviewed-     Temp:  [97.7 °F (36.5 °C)-98.5 °F (36.9 °C)]   Pulse:  [100-103]   Resp:  [18-22]   BP: (117-128)/(74-89)   SpO2:  [100 %] .     Not on any home medications per chart review    PLAN:  Will utilize p.r.n. blood pressure medication only if patient's blood pressure greater than 180/110 and he develops symptoms such as worsening chest pain or shortness of breath.

## 2024-05-09 NOTE — ASSESSMENT & PLAN NOTE
"Melena     Patient presenting for evaluation of dark red blood in stool since morning of admission with associated upper abdominal pain. Two recent admissions: 3/28-4/4 for CAP and symptomatic anemia (s/p 5 U pRBCs) and 4/18-4/23 for melena (s/p 1 U pRBCs.) Of note, EGD on 10/25/2023 and colonoscopy on 4/1/2024 were unremarkable. EGD on 4/19 showed "single non-bleeding angioectasia in the jejunum; treated with APC."  CTA without acute hemorrhage. Received Protonix 80mg IV x 1 in the ED.       Recent Labs     05/09/24  1637 05/09/24  2159 05/10/24  0720 05/10/24  0721   HGB  --   --  6.3*  --    BUN 16 18  --  19   CREATININE 1.0 1.0  --  1.2     pRBCs transfused: 1 U 5/10    - GI consulted, appreciate recs  - EGD with push enteroscopy today  - Continue Protonix 40 mg IV BID  - Continue octreotide gtt @ 50 mcg/hr  GI to arrange outpatient octreotide injections  - Continue Rocephin 1 g IV q24h  - Monitor for hemodynamic instability with associated large volume hematochezia, consider STAT CTA and IR consult for embolization if positive  - Maintain IV access with 2 large bore IVs,  - Telemetry monitoring  "

## 2024-05-09 NOTE — ASSESSMENT & PLAN NOTE
Recent Labs     05/09/24  1426   BILITOT 0.7   AST 62*   ALT 16   ALKPHOS 119      ALBUMIN 2.9*     Patient with known Alcoholic Cirrhosis. Co-morbidities are present and inclusive of ascites, portal hypertension, and anemia/pancytopenia.    MELD-Na score calculated; MELD 3.0: 10 at 4/19/2024  4:09 AM  MELD-Na: 9 at 4/19/2024  4:09 AM  Calculated from:  Serum Creatinine: 1.1 mg/dL at 4/19/2024  4:09 AM  Serum Sodium: 141 mmol/L (Using max of 137 mmol/L) at 4/19/2024  4:09 AM  Total Bilirubin: 0.8 mg/dL (Using min of 1 mg/dL) at 4/19/2024  4:09 AM  Serum Albumin: 2.9 g/dL at 4/19/2024  4:09 AM  INR(ratio): 1.2 at 4/18/2024  7:13 PM  Age at listing (hypothetical): 68 years  Sex: Male at 4/19/2024  4:09 AM    During admission, reported he quit alcohol one month ago.     PLAN:  - Will avoid any hepatotoxic meds, and monitor CBC/CMP/INR for synthetic function.   - IR for therapeutic paracentesis

## 2024-05-09 NOTE — PHARMACY MED REC
"Admission Medication History     The home medication history was taken by Hamilton Watt.    You may go to "Admission" then "Reconcile Home Medications" tabs to review and/or act upon these items.     The home medication list has been updated by the Pharmacy department.   Please read ALL comments highlighted in yellow.   Please address this information as you see fit.    Feel free to contact us if you have any questions or require assistance.      Medications listed below were obtained from: Nursing home  Current Outpatient Medications on File Prior to Encounter   Medication Sig    acetaminophen (TYLENOL) 325 MG tablet   Take 2 tablets (650 mg total) by mouth every 8 (eight) hours as needed for Pain.    albuterol-ipratropium (DUO-NEB) 2.5 mg-0.5 mg/3 mL nebulizer solution   Take 3 mLs by nebulization every 6 (six) hours as needed for Wheezing or Shortness of Breath. Rescue    dicyclomine (BENTYL) 10 MG capsule   Take 10 mg by mouth every 8 (eight) hours as needed (Abdomnial pain).    EScitalopram oxalate (LEXAPRO) 10 MG tablet   Take 10 mg by mouth once daily.    fluticasone furoate-vilanteroL (BREO) 100-25 mcg/dose diskus inhaler   Inhale 1 puff into the lungs once daily. Controller    folic acid (FOLVITE) 1 MG tablet   Take 1 tablet (1 mg total) by mouth once daily.    melatonin (MELATIN) 3 mg tablet   Take 2 tablets (6 mg total) by mouth nightly as needed for Insomnia.    ondansetron (ZOFRAN-ODT) 8 MG TbDL   Take 8 mg by mouth every 8 (eight) hours as needed (Nausea).    pantoprazole (PROTONIX) 40 MG tablet   Take 1 tablet (40 mg total) by mouth once daily.    thiamine 100 MG tablet Take 100 mg by mouth once daily.       Potential issues to be addressed PRIOR TO DISCHARGE  The listed medications were obtained from another facility ([facility]). The patient may not have been able to fill these prescriptions prior to this admission and may require new scripts upon discharge.           Hamilton Watt  EXT " [id number]                  .

## 2024-05-09 NOTE — ASSESSMENT & PLAN NOTE
Patient's COPD is controlled currently.  Patient is currently off COPD Pathway. Continue Breo Ellipta 1 puff daily and PRN Duo-Nebs, will monitor respiratory status closely.

## 2024-05-09 NOTE — ASSESSMENT & PLAN NOTE
"5/9 CTA: "Enhancing lesion within the lateral aspect of the pancreatic head, not seen on examination 12/25/2023. In this region however, on the previous exam, there is a hypoattenuating lesion with peripheral calcification. This did not undergo significant enhancement at that time. Solid lesion is of concern. Correlation and follow-up advised."    - F/u PCP  "

## 2024-05-10 ENCOUNTER — ANESTHESIA (OUTPATIENT)
Dept: ENDOSCOPY | Facility: HOSPITAL | Age: 69
DRG: 270 | End: 2024-05-10
Payer: MEDICARE

## 2024-05-10 ENCOUNTER — ANESTHESIA EVENT (OUTPATIENT)
Dept: ENDOSCOPY | Facility: HOSPITAL | Age: 69
DRG: 270 | End: 2024-05-10
Payer: MEDICARE

## 2024-05-10 PROBLEM — J98.11 COMPRESSIVE ATELECTASIS: Status: ACTIVE | Noted: 2024-05-10

## 2024-05-10 PROBLEM — K85.90 ACUTE PANCREATITIS: Status: ACTIVE | Noted: 2024-05-10

## 2024-05-10 PROBLEM — I25.10 CAD (CORONARY ARTERY DISEASE): Status: ACTIVE | Noted: 2024-05-10

## 2024-05-10 PROBLEM — J90 BILATERAL PLEURAL EFFUSION: Status: ACTIVE | Noted: 2024-04-18

## 2024-05-10 PROBLEM — I50.20 HFREF (HEART FAILURE WITH REDUCED EJECTION FRACTION): Status: ACTIVE | Noted: 2024-05-10

## 2024-05-10 LAB
AFP SERPL-MCNC: <2 NG/ML (ref 0–8.4)
ALBUMIN SERPL BCP-MCNC: 2.8 G/DL (ref 3.5–5.2)
ALP SERPL-CCNC: 108 U/L (ref 55–135)
ALT SERPL W/O P-5'-P-CCNC: 11 U/L (ref 10–44)
ANION GAP SERPL CALC-SCNC: 12 MMOL/L (ref 8–16)
ASCENDING AORTA: 3.47 CM
AST SERPL-CCNC: 24 U/L (ref 10–40)
AV INDEX (PROSTH): 0.66
AV MEAN GRADIENT: 5 MMHG
AV PEAK GRADIENT: 7 MMHG
AV VALVE AREA BY VELOCITY RATIO: 1.7 CM²
AV VALVE AREA: 2.24 CM²
AV VELOCITY RATIO: 0.5
BASOPHILS # BLD AUTO: 0.01 K/UL (ref 0–0.2)
BASOPHILS # BLD AUTO: 0.02 K/UL (ref 0–0.2)
BASOPHILS NFR BLD: 0.2 % (ref 0–1.9)
BASOPHILS NFR BLD: 0.7 % (ref 0–1.9)
BILIRUB SERPL-MCNC: 0.7 MG/DL (ref 0.1–1)
BLD PROD TYP BPU: NORMAL
BLD PROD TYP BPU: NORMAL
BLOOD UNIT EXPIRATION DATE: NORMAL
BLOOD UNIT EXPIRATION DATE: NORMAL
BLOOD UNIT TYPE CODE: 5100
BLOOD UNIT TYPE CODE: 5100
BLOOD UNIT TYPE: NORMAL
BLOOD UNIT TYPE: NORMAL
BSA FOR ECHO PROCEDURE: 2.13 M2
BUN SERPL-MCNC: 19 MG/DL (ref 8–23)
CALCIUM SERPL-MCNC: 9.1 MG/DL (ref 8.7–10.5)
CANCER AG19-9 SERPL-ACNC: 88.6 U/ML (ref 0–40)
CEA SERPL-MCNC: <1.7 NG/ML (ref 0–5)
CHLORIDE SERPL-SCNC: 96 MMOL/L (ref 95–110)
CO2 SERPL-SCNC: 28 MMOL/L (ref 23–29)
CODING SYSTEM: NORMAL
CODING SYSTEM: NORMAL
CREAT SERPL-MCNC: 1.2 MG/DL (ref 0.5–1.4)
CROSSMATCH INTERPRETATION: NORMAL
CROSSMATCH INTERPRETATION: NORMAL
CV ECHO LV RWT: 0.42 CM
DIFFERENTIAL METHOD BLD: ABNORMAL
DIFFERENTIAL METHOD BLD: ABNORMAL
DISPENSE STATUS: NORMAL
DISPENSE STATUS: NORMAL
DOP CALC AO PEAK VEL: 1.32 M/S
DOP CALC AO VTI: 18.15 CM
DOP CALC LVOT AREA: 3.4 CM2
DOP CALC LVOT DIAMETER: 2.08 CM
DOP CALC LVOT PEAK VEL: 0.66 M/S
DOP CALC LVOT STROKE VOLUME: 40.65 CM3
DOP CALCLVOT PEAK VEL VTI: 11.97 CM
E/E' RATIO: 12.13 M/S
ECHO LV POSTERIOR WALL: 1.18 CM (ref 0.6–1.1)
EOSINOPHIL # BLD AUTO: 0 K/UL (ref 0–0.5)
EOSINOPHIL # BLD AUTO: 0 K/UL (ref 0–0.5)
EOSINOPHIL NFR BLD: 0 % (ref 0–8)
EOSINOPHIL NFR BLD: 0 % (ref 0–8)
ERYTHROCYTE [DISTWIDTH] IN BLOOD BY AUTOMATED COUNT: 18.6 % (ref 11.5–14.5)
ERYTHROCYTE [DISTWIDTH] IN BLOOD BY AUTOMATED COUNT: 19.7 % (ref 11.5–14.5)
EST. GFR  (NO RACE VARIABLE): >60 ML/MIN/1.73 M^2
FERRITIN SERPL-MCNC: 19 NG/ML (ref 20–300)
FOLATE SERPL-MCNC: 15 NG/ML (ref 4–24)
FRACTIONAL SHORTENING: 14 % (ref 28–44)
GLUCOSE SERPL-MCNC: 108 MG/DL (ref 70–110)
HCT VFR BLD AUTO: 23.3 % (ref 40–54)
HCT VFR BLD AUTO: 24.7 % (ref 40–54)
HGB BLD-MCNC: 6.3 G/DL (ref 14–18)
HGB BLD-MCNC: 6.8 G/DL (ref 14–18)
IMM GRANULOCYTES # BLD AUTO: 0.01 K/UL (ref 0–0.04)
IMM GRANULOCYTES # BLD AUTO: 0.01 K/UL (ref 0–0.04)
IMM GRANULOCYTES NFR BLD AUTO: 0.2 % (ref 0–0.5)
IMM GRANULOCYTES NFR BLD AUTO: 0.3 % (ref 0–0.5)
INR PPP: 1.2 (ref 0.8–1.2)
INTERVENTRICULAR SEPTUM: 0.84 CM (ref 0.6–1.1)
IRON SERPL-MCNC: 15 UG/DL (ref 45–160)
LA MAJOR: 4.39 CM
LA MINOR: 4.87 CM
LA WIDTH: 3.45 CM
LDH SERPL L TO P-CCNC: 137 U/L (ref 110–260)
LEFT ATRIUM SIZE: 4.14 CM
LEFT ATRIUM VOLUME INDEX: 26.3 ML/M2
LEFT ATRIUM VOLUME: 56.06 CM3
LEFT INTERNAL DIMENSION IN SYSTOLE: 4.84 CM (ref 2.1–4)
LEFT VENTRICLE DIASTOLIC VOLUME INDEX: 73.16 ML/M2
LEFT VENTRICLE DIASTOLIC VOLUME: 155.83 ML
LEFT VENTRICLE MASS INDEX: 105 G/M2
LEFT VENTRICLE SYSTOLIC VOLUME INDEX: 51.5 ML/M2
LEFT VENTRICLE SYSTOLIC VOLUME: 109.61 ML
LEFT VENTRICULAR INTERNAL DIMENSION IN DIASTOLE: 5.63 CM (ref 3.5–6)
LEFT VENTRICULAR MASS: 224.61 G
LV LATERAL E/E' RATIO: 11.38 M/S
LV SEPTAL E/E' RATIO: 13 M/S
LYMPHOCYTES # BLD AUTO: 0.5 K/UL (ref 1–4.8)
LYMPHOCYTES # BLD AUTO: 0.6 K/UL (ref 1–4.8)
LYMPHOCYTES NFR BLD: 12.7 % (ref 18–48)
LYMPHOCYTES NFR BLD: 17 % (ref 18–48)
MAGNESIUM SERPL-MCNC: 1.5 MG/DL (ref 1.6–2.6)
MCH RBC QN AUTO: 23.1 PG (ref 27–31)
MCH RBC QN AUTO: 23.2 PG (ref 27–31)
MCHC RBC AUTO-ENTMCNC: 27 G/DL (ref 32–36)
MCHC RBC AUTO-ENTMCNC: 27.5 G/DL (ref 32–36)
MCV RBC AUTO: 84 FL (ref 82–98)
MCV RBC AUTO: 85 FL (ref 82–98)
MONOCYTES # BLD AUTO: 0.3 K/UL (ref 0.3–1)
MONOCYTES # BLD AUTO: 0.5 K/UL (ref 0.3–1)
MONOCYTES NFR BLD: 9.2 % (ref 4–15)
MONOCYTES NFR BLD: 9.8 % (ref 4–15)
MV PEAK E VEL: 0.91 M/S
NEUTROPHILS # BLD AUTO: 2.2 K/UL (ref 1.8–7.7)
NEUTROPHILS # BLD AUTO: 3.8 K/UL (ref 1.8–7.7)
NEUTROPHILS NFR BLD: 72.8 % (ref 38–73)
NEUTROPHILS NFR BLD: 77.1 % (ref 38–73)
NRBC BLD-RTO: 0 /100 WBC
NRBC BLD-RTO: 0 /100 WBC
NUM UNITS TRANS PACKED RBC: NORMAL
OHS CV RV/LV RATIO: 0.47 CM
OHS LV EJECTION FRACTION SIMPSONS BIPLANE MOD: 33 %
PHOSPHATE SERPL-MCNC: 3.7 MG/DL (ref 2.7–4.5)
PISA TR MAX VEL: 3.15 M/S
PLATELET # BLD AUTO: 207 K/UL (ref 150–450)
PLATELET # BLD AUTO: 238 K/UL (ref 150–450)
PMV BLD AUTO: 10.3 FL (ref 9.2–12.9)
PMV BLD AUTO: 11.1 FL (ref 9.2–12.9)
POTASSIUM SERPL-SCNC: 4.4 MMOL/L (ref 3.5–5.1)
PROCALCITONIN SERPL IA-MCNC: 0.06 NG/ML
PROT SERPL-MCNC: 6.9 G/DL (ref 6–8.4)
PROTHROMBIN TIME: 13.3 SEC (ref 9–12.5)
RA MAJOR: 4.46 CM
RA PRESSURE ESTIMATED: 15 MMHG
RA WIDTH: 3.22 CM
RBC # BLD AUTO: 2.73 M/UL (ref 4.6–6.2)
RBC # BLD AUTO: 2.93 M/UL (ref 4.6–6.2)
RETICS/RBC NFR AUTO: 1.9 % (ref 0.4–2)
RIGHT VENTRICULAR END-DIASTOLIC DIMENSION: 2.66 CM
RV TB RVSP: 18 MMHG
SATURATED IRON: 4 % (ref 20–50)
SINUS: 3.4 CM
SODIUM SERPL-SCNC: 136 MMOL/L (ref 136–145)
STJ: 2.61 CM
TDI LATERAL: 0.08 M/S
TDI SEPTAL: 0.07 M/S
TDI: 0.08 M/S
TOTAL IRON BINDING CAPACITY: 392 UG/DL (ref 250–450)
TR MAX PG: 40 MMHG
TRANS ERYTHROCYTES VOL PATIENT: NORMAL ML
TRANSFERRIN SERPL-MCNC: 265 MG/DL (ref 200–375)
TRANSFERRIN SERPL-MCNC: 265 MG/DL (ref 200–375)
TRICUSPID ANNULAR PLANE SYSTOLIC EXCURSION: 1.71 CM
TV REST PULMONARY ARTERY PRESSURE: 55 MMHG
VIT B12 SERPL-MCNC: 384 PG/ML (ref 210–950)
WBC # BLD AUTO: 3.06 K/UL (ref 3.9–12.7)
WBC # BLD AUTO: 4.98 K/UL (ref 3.9–12.7)
Z-SCORE OF LEFT VENTRICULAR DIMENSION IN END DIASTOLE: -1.87
Z-SCORE OF LEFT VENTRICULAR DIMENSION IN END SYSTOLE: 1.23

## 2024-05-10 PROCEDURE — 86920 COMPATIBILITY TEST SPIN: CPT

## 2024-05-10 PROCEDURE — P9016 RBC LEUKOCYTES REDUCED: HCPCS

## 2024-05-10 PROCEDURE — 84145 PROCALCITONIN (PCT): CPT | Performed by: STUDENT IN AN ORGANIZED HEALTH CARE EDUCATION/TRAINING PROGRAM

## 2024-05-10 PROCEDURE — 30233P1 TRANSFUSION OF NONAUTOLOGOUS FROZEN RED CELLS INTO PERIPHERAL VEIN, PERCUTANEOUS APPROACH: ICD-10-PCS | Performed by: STUDENT IN AN ORGANIZED HEALTH CARE EDUCATION/TRAINING PROGRAM

## 2024-05-10 PROCEDURE — C9113 INJ PANTOPRAZOLE SODIUM, VIA: HCPCS

## 2024-05-10 PROCEDURE — 43235 EGD DIAGNOSTIC BRUSH WASH: CPT | Mod: ,,, | Performed by: INTERNAL MEDICINE

## 2024-05-10 PROCEDURE — 94799 UNLISTED PULMONARY SVC/PX: CPT

## 2024-05-10 PROCEDURE — 25000003 PHARM REV CODE 250

## 2024-05-10 PROCEDURE — 83615 LACTATE (LD) (LDH) ENZYME: CPT | Performed by: STUDENT IN AN ORGANIZED HEALTH CARE EDUCATION/TRAINING PROGRAM

## 2024-05-10 PROCEDURE — 82105 ALPHA-FETOPROTEIN SERUM: CPT

## 2024-05-10 PROCEDURE — 25000003 PHARM REV CODE 250: Performed by: NURSE ANESTHETIST, CERTIFIED REGISTERED

## 2024-05-10 PROCEDURE — 85610 PROTHROMBIN TIME: CPT

## 2024-05-10 PROCEDURE — 27000221 HC OXYGEN, UP TO 24 HOURS

## 2024-05-10 PROCEDURE — P9021 RED BLOOD CELLS UNIT: HCPCS

## 2024-05-10 PROCEDURE — 63600175 PHARM REV CODE 636 W HCPCS: Performed by: NURSE ANESTHETIST, CERTIFIED REGISTERED

## 2024-05-10 PROCEDURE — 99223 1ST HOSP IP/OBS HIGH 75: CPT | Mod: GC,,, | Performed by: INTERNAL MEDICINE

## 2024-05-10 PROCEDURE — 82607 VITAMIN B-12: CPT

## 2024-05-10 PROCEDURE — 43235 EGD DIAGNOSTIC BRUSH WASH: CPT | Performed by: INTERNAL MEDICINE

## 2024-05-10 PROCEDURE — 25000003 PHARM REV CODE 250: Performed by: STUDENT IN AN ORGANIZED HEALTH CARE EDUCATION/TRAINING PROGRAM

## 2024-05-10 PROCEDURE — 83735 ASSAY OF MAGNESIUM: CPT

## 2024-05-10 PROCEDURE — 99900035 HC TECH TIME PER 15 MIN (STAT)

## 2024-05-10 PROCEDURE — 86301 IMMUNOASSAY TUMOR CA 19-9: CPT

## 2024-05-10 PROCEDURE — 83540 ASSAY OF IRON: CPT

## 2024-05-10 PROCEDURE — 36430 TRANSFUSION BLD/BLD COMPNT: CPT

## 2024-05-10 PROCEDURE — 85025 COMPLETE CBC W/AUTO DIFF WBC: CPT | Performed by: STUDENT IN AN ORGANIZED HEALTH CARE EDUCATION/TRAINING PROGRAM

## 2024-05-10 PROCEDURE — 82728 ASSAY OF FERRITIN: CPT

## 2024-05-10 PROCEDURE — 99223 1ST HOSP IP/OBS HIGH 75: CPT | Mod: 25,,, | Performed by: INTERNAL MEDICINE

## 2024-05-10 PROCEDURE — 94761 N-INVAS EAR/PLS OXIMETRY MLT: CPT

## 2024-05-10 PROCEDURE — 25000242 PHARM REV CODE 250 ALT 637 W/ HCPCS

## 2024-05-10 PROCEDURE — 80053 COMPREHEN METABOLIC PANEL: CPT

## 2024-05-10 PROCEDURE — A4216 STERILE WATER/SALINE, 10 ML: HCPCS | Performed by: STUDENT IN AN ORGANIZED HEALTH CARE EDUCATION/TRAINING PROGRAM

## 2024-05-10 PROCEDURE — 37000009 HC ANESTHESIA EA ADD 15 MINS: Performed by: INTERNAL MEDICINE

## 2024-05-10 PROCEDURE — D9220A PRA ANESTHESIA: Mod: ANES,,, | Performed by: ANESTHESIOLOGY

## 2024-05-10 PROCEDURE — 85045 AUTOMATED RETICULOCYTE COUNT: CPT

## 2024-05-10 PROCEDURE — 63600175 PHARM REV CODE 636 W HCPCS: Mod: JA

## 2024-05-10 PROCEDURE — D9220A PRA ANESTHESIA: Mod: CRNA,,, | Performed by: NURSE ANESTHETIST, CERTIFIED REGISTERED

## 2024-05-10 PROCEDURE — 63600175 PHARM REV CODE 636 W HCPCS: Mod: JG | Performed by: NURSE ANESTHETIST, CERTIFIED REGISTERED

## 2024-05-10 PROCEDURE — 36415 COLL VENOUS BLD VENIPUNCTURE: CPT

## 2024-05-10 PROCEDURE — 37000008 HC ANESTHESIA 1ST 15 MINUTES: Performed by: INTERNAL MEDICINE

## 2024-05-10 PROCEDURE — 20600001 HC STEP DOWN PRIVATE ROOM

## 2024-05-10 PROCEDURE — 94640 AIRWAY INHALATION TREATMENT: CPT

## 2024-05-10 PROCEDURE — 25500020 PHARM REV CODE 255: Performed by: STUDENT IN AN ORGANIZED HEALTH CARE EDUCATION/TRAINING PROGRAM

## 2024-05-10 PROCEDURE — 63600175 PHARM REV CODE 636 W HCPCS: Performed by: STUDENT IN AN ORGANIZED HEALTH CARE EDUCATION/TRAINING PROGRAM

## 2024-05-10 PROCEDURE — 82746 ASSAY OF FOLIC ACID SERUM: CPT

## 2024-05-10 PROCEDURE — 84100 ASSAY OF PHOSPHORUS: CPT

## 2024-05-10 PROCEDURE — 0DJ08ZZ INSPECTION OF UPPER INTESTINAL TRACT, VIA NATURAL OR ARTIFICIAL OPENING ENDOSCOPIC: ICD-10-PCS | Performed by: INTERNAL MEDICINE

## 2024-05-10 PROCEDURE — 82378 CARCINOEMBRYONIC ANTIGEN: CPT

## 2024-05-10 PROCEDURE — 85025 COMPLETE CBC W/AUTO DIFF WBC: CPT | Mod: 91

## 2024-05-10 RX ORDER — ONDANSETRON HYDROCHLORIDE 2 MG/ML
4 INJECTION, SOLUTION INTRAVENOUS DAILY PRN
Status: DISCONTINUED | OUTPATIENT
Start: 2024-05-10 | End: 2024-05-10 | Stop reason: HOSPADM

## 2024-05-10 RX ORDER — ATORVASTATIN CALCIUM 40 MG/1
40 TABLET, FILM COATED ORAL NIGHTLY
Status: DISCONTINUED | OUTPATIENT
Start: 2024-05-10 | End: 2024-05-15 | Stop reason: HOSPADM

## 2024-05-10 RX ORDER — ETOMIDATE 2 MG/ML
INJECTION INTRAVENOUS
Status: DISCONTINUED | OUTPATIENT
Start: 2024-05-10 | End: 2024-05-10

## 2024-05-10 RX ORDER — SODIUM CHLORIDE 0.9 % (FLUSH) 0.9 %
10 SYRINGE (ML) INJECTION EVERY 6 HOURS
Status: DISCONTINUED | OUTPATIENT
Start: 2024-05-10 | End: 2024-05-15 | Stop reason: HOSPADM

## 2024-05-10 RX ORDER — HYDROCODONE BITARTRATE AND ACETAMINOPHEN 500; 5 MG/1; MG/1
TABLET ORAL
Status: DISCONTINUED | OUTPATIENT
Start: 2024-05-10 | End: 2024-05-13

## 2024-05-10 RX ORDER — PROPOFOL 10 MG/ML
VIAL (ML) INTRAVENOUS
Status: DISCONTINUED | OUTPATIENT
Start: 2024-05-10 | End: 2024-05-10

## 2024-05-10 RX ORDER — PROCHLORPERAZINE EDISYLATE 5 MG/ML
5 INJECTION INTRAMUSCULAR; INTRAVENOUS EVERY 30 MIN PRN
Status: DISCONTINUED | OUTPATIENT
Start: 2024-05-10 | End: 2024-05-10 | Stop reason: HOSPADM

## 2024-05-10 RX ORDER — SODIUM CHLORIDE 0.9 % (FLUSH) 0.9 %
10 SYRINGE (ML) INJECTION
Status: DISCONTINUED | OUTPATIENT
Start: 2024-05-10 | End: 2024-05-15 | Stop reason: HOSPADM

## 2024-05-10 RX ORDER — FENTANYL CITRATE 50 UG/ML
25 INJECTION, SOLUTION INTRAMUSCULAR; INTRAVENOUS EVERY 5 MIN PRN
Status: DISCONTINUED | OUTPATIENT
Start: 2024-05-10 | End: 2024-05-10 | Stop reason: HOSPADM

## 2024-05-10 RX ORDER — VASOPRESSIN 20 [USP'U]/ML
INJECTION, SOLUTION INTRAMUSCULAR; SUBCUTANEOUS
Status: DISCONTINUED | OUTPATIENT
Start: 2024-05-10 | End: 2024-05-10

## 2024-05-10 RX ORDER — FUROSEMIDE 10 MG/ML
40 INJECTION INTRAMUSCULAR; INTRAVENOUS ONCE
Status: COMPLETED | OUTPATIENT
Start: 2024-05-10 | End: 2024-05-10

## 2024-05-10 RX ORDER — MORPHINE SULFATE 2 MG/ML
2 INJECTION, SOLUTION INTRAMUSCULAR; INTRAVENOUS ONCE
Status: COMPLETED | OUTPATIENT
Start: 2024-05-10 | End: 2024-05-10

## 2024-05-10 RX ORDER — MAGNESIUM SULFATE HEPTAHYDRATE 40 MG/ML
2 INJECTION, SOLUTION INTRAVENOUS ONCE
Status: COMPLETED | OUTPATIENT
Start: 2024-05-10 | End: 2024-05-10

## 2024-05-10 RX ORDER — PHENYLEPHRINE HYDROCHLORIDE 10 MG/ML
INJECTION INTRAVENOUS
Status: DISCONTINUED | OUTPATIENT
Start: 2024-05-10 | End: 2024-05-10

## 2024-05-10 RX ORDER — LIDOCAINE HYDROCHLORIDE 20 MG/ML
INJECTION INTRAVENOUS
Status: DISCONTINUED | OUTPATIENT
Start: 2024-05-10 | End: 2024-05-10

## 2024-05-10 RX ORDER — FUROSEMIDE 10 MG/ML
40 INJECTION INTRAMUSCULAR; INTRAVENOUS ONCE
Status: COMPLETED | OUTPATIENT
Start: 2024-05-11 | End: 2024-05-11

## 2024-05-10 RX ORDER — MORPHINE SULFATE 2 MG/ML
2 INJECTION, SOLUTION INTRAMUSCULAR; INTRAVENOUS EVERY 6 HOURS PRN
Status: DISCONTINUED | OUTPATIENT
Start: 2024-05-10 | End: 2024-05-14

## 2024-05-10 RX ADMIN — LIDOCAINE HYDROCHLORIDE 100 MG: 20 INJECTION INTRAVENOUS at 02:05

## 2024-05-10 RX ADMIN — PHENYLEPHRINE HYDROCHLORIDE 200 MCG: 10 INJECTION INTRAVENOUS at 02:05

## 2024-05-10 RX ADMIN — Medication 100 MG: at 09:05

## 2024-05-10 RX ADMIN — PANTOPRAZOLE SODIUM 40 MG: 40 INJECTION, POWDER, FOR SOLUTION INTRAVENOUS at 09:05

## 2024-05-10 RX ADMIN — ONDANSETRON 4 MG: 2 INJECTION INTRAMUSCULAR; INTRAVENOUS at 12:05

## 2024-05-10 RX ADMIN — ETOMIDATE 2 MG: 2 INJECTION INTRAVENOUS at 02:05

## 2024-05-10 RX ADMIN — VASOPRESSIN 2 UNITS: 20 INJECTION INTRAVENOUS at 02:05

## 2024-05-10 RX ADMIN — FOLIC ACID 1 MG: 1 TABLET ORAL at 09:05

## 2024-05-10 RX ADMIN — Medication 10 ML: at 06:05

## 2024-05-10 RX ADMIN — FLUTICASONE FUROATE AND VILANTEROL TRIFENATATE 1 PUFF: 100; 25 POWDER RESPIRATORY (INHALATION) at 11:05

## 2024-05-10 RX ADMIN — VASOPRESSIN 1 UNITS: 20 INJECTION INTRAVENOUS at 02:05

## 2024-05-10 RX ADMIN — PHENYLEPHRINE HYDROCHLORIDE 100 MCG: 10 INJECTION INTRAVENOUS at 02:05

## 2024-05-10 RX ADMIN — PANTOPRAZOLE SODIUM 40 MG: 40 INJECTION, POWDER, FOR SOLUTION INTRAVENOUS at 08:05

## 2024-05-10 RX ADMIN — OCTREOTIDE ACETATE 50 MCG/HR: 500 INJECTION, SOLUTION INTRAVENOUS; SUBCUTANEOUS at 12:05

## 2024-05-10 RX ADMIN — OCTREOTIDE ACETATE 50 MCG/HR: 500 INJECTION, SOLUTION INTRAVENOUS; SUBCUTANEOUS at 11:05

## 2024-05-10 RX ADMIN — MORPHINE SULFATE 2 MG: 2 INJECTION, SOLUTION INTRAMUSCULAR; INTRAVENOUS at 12:05

## 2024-05-10 RX ADMIN — CEFTRIAXONE 1 G: 1 INJECTION, POWDER, FOR SOLUTION INTRAMUSCULAR; INTRAVENOUS at 08:05

## 2024-05-10 RX ADMIN — MAGNESIUM SULFATE HEPTAHYDRATE 2 G: 40 INJECTION, SOLUTION INTRAVENOUS at 10:05

## 2024-05-10 RX ADMIN — IRON SUCROSE 500 MG: 20 INJECTION, SOLUTION INTRAVENOUS at 01:05

## 2024-05-10 RX ADMIN — MORPHINE SULFATE 2 MG: 2 INJECTION, SOLUTION INTRAMUSCULAR; INTRAVENOUS at 05:05

## 2024-05-10 RX ADMIN — PROPOFOL 40 MG: 10 INJECTION, EMULSION INTRAVENOUS at 02:05

## 2024-05-10 RX ADMIN — ESCITALOPRAM OXALATE 10 MG: 10 TABLET ORAL at 09:05

## 2024-05-10 RX ADMIN — PROPOFOL 150 MCG/KG/MIN: 10 INJECTION, EMULSION INTRAVENOUS at 02:05

## 2024-05-10 RX ADMIN — SODIUM CHLORIDE: 9 INJECTION, SOLUTION INTRAVENOUS at 02:05

## 2024-05-10 RX ADMIN — HUMAN ALBUMIN MICROSPHERES AND PERFLUTREN 0.66 MG: 10; .22 INJECTION, SOLUTION INTRAVENOUS at 08:05

## 2024-05-10 RX ADMIN — ATORVASTATIN CALCIUM 40 MG: 40 TABLET, FILM COATED ORAL at 08:05

## 2024-05-10 RX ADMIN — FUROSEMIDE 40 MG: 10 INJECTION, SOLUTION INTRAVENOUS at 12:05

## 2024-05-10 NOTE — ASSESSMENT & PLAN NOTE
"Compressive atelectasis     5/9 CTA: "Images of the lower thorax are remarkable for bilateral moderate pleural effusions noting some degree of loculation involving the effusion on the right, partially visualized. There is compressive atelectasis of the bilateral lower lobes."    Patient found to have moderate pleural effusion on imaging. I have personally reviewed and interpreted the following imaging: Xray and CT. A thoracentesis was deferred. Most likely etiology includes Congestive Heart Failure and Cirrhosis. Management to include Diuresis and possible thoracentesis.    - Pulmonology consulted, no plans for thoracentesis at this time  - IV Lasix trial  - Incentive spirometer  "

## 2024-05-10 NOTE — HPI
Mr. Montalvo is a 69 yo M with PMH of EtOH cirrhosis, presumed COPD, multifactorial anemia, previous esophageal varices, and gastritis who presented to [facility] with concern for hematochezia.  Per patient, he noted dark red blood from his rectum since yesterday morning (states it is mixed in stool and also on its own.) Also reports new upper abdominal pain which started after eating food yesterday evening. Over the past month, he has experienced fatigue, SOB, and two pre-syncopal episodes. Pulmonology was consulted for possible thoracentesis of bilateral pleural effusions.     Notable labs were Hgb 6.3, Hct 23.3, WBC 3.06, Amylase 1256, Lipase 758, BNP 3448, and CA 19-9 88.6. TTE showed newly reduced EF 35%-40% with regional wall motion abnormalities present. The patient has had bilateral pleural effusions present on CXR since 04/18, however CXR this admission noted and increase on the R side.

## 2024-05-10 NOTE — ASSESSMENT & PLAN NOTE
Patient reported to have CKD 3 as documented in prior notes. However, review of past labs does not show sustained decrease in GFR > 6 months, just during individual encounters during which patient had [place].

## 2024-05-10 NOTE — SUBJECTIVE & OBJECTIVE
Interval History: NAEON.     Review of Systems   Constitutional:  Positive for fatigue.   Respiratory:  Positive for shortness of breath.    Cardiovascular:  Negative for chest pain and palpitations.   Gastrointestinal:  Positive for abdominal distention and abdominal pain (epigastric).     Objective:     Vital Signs (Most Recent):  Temp: 98.8 °F (37.1 °C) (05/10/24 1159)  Pulse: (!) 132 (05/10/24 1159)  Resp: 16 (05/10/24 1252)  BP: 114/78 (05/10/24 1159)  SpO2: 99 % (05/10/24 1159) Vital Signs (24h Range):  Temp:  [97.7 °F (36.5 °C)-99.1 °F (37.3 °C)] 98.8 °F (37.1 °C)  Pulse:  [] 132  Resp:  [16-28] 16  SpO2:  [96 %-100 %] 99 %  BP: (104-128)/(63-89) 114/78     Weight: 88.5 kg (195 lb)  Body mass index is 25.73 kg/m².    Intake/Output Summary (Last 24 hours) at 5/10/2024 1323  Last data filed at 5/10/2024 1129  Gross per 24 hour   Intake 120 ml   Output 1365 ml   Net -1245 ml         Physical Exam  Vitals and nursing note reviewed.   Constitutional:       General: He is not in acute distress.     Appearance: He is normal weight. He is ill-appearing.   HENT:      Head: Normocephalic and atraumatic.   Cardiovascular:      Rate and Rhythm: Normal rate and regular rhythm.      Heart sounds: Murmur heard.   Pulmonary:      Effort: Pulmonary effort is normal. No respiratory distress.   Abdominal:      General: Abdomen is flat. There is distension.      Palpations: Abdomen is soft.      Tenderness: There is abdominal tenderness (epigastric).   Musculoskeletal:      Right lower leg: Edema (trace to mid-shin) present.      Left lower leg: Edema (trace to mid-shin) present.   Skin:     General: Skin is warm and dry.      Coloration: Skin is pale.   Neurological:      General: No focal deficit present.      Mental Status: He is alert and oriented to person, place, and time. Mental status is at baseline.             Significant Labs: All pertinent labs within the past 24 hours have been reviewed.    Significant  Imaging: I have reviewed all pertinent imaging results/findings within the past 24 hours.

## 2024-05-10 NOTE — PROVATION PATIENT INSTRUCTIONS
Discharge Summary/Instructions after an Endoscopic Procedure  Patient Name: Fransico Montalvo  Patient MRN: [id number]  Patient YOB: 1955  Friday, May 10, 2024  Ashley Trent MD  Dear patient,  As a result of recent federal legislation (The Federal Cures Act), you may   receive lab or pathology results from your procedure in your [facility]   account before your physician is able to contact you. Your physician or   their representative will relay the results to you with their   recommendations at their soonest availability.  Thank you,  RESTRICTIONS:  During your procedure today, you received medications for sedation.  These   medications may affect your judgment, balance and coordination.  Therefore,   for 24 hours, you have the following restrictions:   - DO NOT drive a car, operate machinery, make legal/financial decisions,   sign important papers or drink alcohol.    ACTIVITY:  Today: no heavy lifting, straining or running due to procedural   sedation/anesthesia.  The following day: return to full activity including work.  DIET:  Eat and drink normally unless instructed otherwise.     TREATMENT FOR COMMON SIDE EFFECTS:  - Mild abdominal pain, nausea, belching, bloating or excessive gas:  rest,   eat lightly and use a heating pad.  - Sore Throat: treat with throat lozenges and/or gargle with warm salt   water.  - Because air was used during the procedure, expelling large amounts of air   from your rectum or belching is normal.  - If a bowel prep was taken, you may not have a bowel movement for 1-3 days.    This is normal.  SYMPTOMS TO WATCH FOR AND REPORT TO YOUR PHYSICIAN:  1. Abdominal pain or bloating, other than gas cramps.  2. Chest pain.  3. Back pain.  4. Signs of infection such as: chills or fever occurring within 24 hours   after the procedure.  5. Rectal bleeding, which would show as bright red, maroon, or black stools.   (A tablespoon of blood from the rectum is not serious, especially if    hemorrhoids are present.)  6. Vomiting.  7. Weakness or dizziness.  GO DIRECTLY TO THE NEAREST EMERGENCY ROOM IF YOU HAVE ANY OF THE FOLLOWING:      Difficulty breathing              Chills and/or fever over 101 F   Persistent vomiting and/or vomiting blood   Severe abdominal pain   Severe chest pain   Black, tarry stools   Bleeding- more than one tablespoon   Any other symptom or condition that you feel may need urgent attention  Your doctor recommends these additional instructions:  If any biopsies were taken, your doctors clinic will contact you in 1 to 2   weeks with any results.  - Return patient to hospital [facility] for ongoing care.   - Resume previous diet.   - Continue present medications.   - Recommend CT abdomen and pelvis pancreas protocol to evaluate for a cause   for blood to be coming from the ampulla.  - If large volume GI bleeding occurs then recommend STAT CTA abdomen and   pelvis bleeding protocol.  If positive then recommend IR consult for   possible embolization to the bleeding site.  For questions, problems or results please call your physician - Ashley Trent MD at Work:  [phone].  [facility], EMERGENCY ROOM PHONE NUMBER: [phone]  IF A COMPLICATION OR EMERGENCY SITUATION ARISES AND YOU ARE UNABLE TO REACH   YOUR PHYSICIAN - GO DIRECTLY TO THE EMERGENCY ROOM.  Ashley Trent MD  5/10/2024 3:39:29 PM  This report has been verified and signed electronically.  Dear patient,  As a result of recent federal legislation (The Federal Cures Act), you may   receive lab or pathology results from your procedure in your [facility]   account before your physician is able to contact you. Your physician or   their representative will relay the results to you with their   recommendations at their soonest availability.  Thank you,  PROVATION

## 2024-05-10 NOTE — CONSULTS
"[facility] - Telemetry Stepdown  Gastroenterology  Consult Note    Patient Name: Fransico Montalvo  MRN: [id number]  Admission Date: 5/9/2024  Hospital Length of Stay: 1 days  Code Status: Full Code   Attending Provider: Harriet Bermudez MD   Consulting Provider: Inido Hopkins MD  Primary Care Physician: [facility]  Principal Problem:Hematochezia    Inpatient consult to Gastroenterology  Consult performed by: Indio Hopkins MD  Consult ordered by: Awa Rodriguez MD        Subjective:     HPI:  This is a 68-year-old man with a history of HTN, CKD3, presumed COPD, BPH, gastritis, decompensated EtOH cirrhosis complicated by esophageal variceal bleed and recent angioectasia s/p APC (4/19/24) who is present after noticing "dark red blood around poop and in the bowl" and descibes the stool as "mostly black with a little brown". He denies any hematemesis and vision changes. He reports alternating constipation and diarrhea, feeling short of breath for more than one month, reports passing out twice over the past month, and abdominal tenderness.    On arrival patient was tachycardic 101, normotensive, and requiring 6L NC. On arrival Hgb 7.0 (appears to be baseline over past month 7-8, previous baseline was 8-9), INR 1.2, BUN and Cr unremarkable. Amylase 126, lipase 758. Physical exam in the ED pertinent for bright red blood present in rectum.He was started on protonix, octreotide, and ceftriaxone. Paracentesis ordered. CTA negative for any sign of contrast extravasation indicative of gastrointestinal hemorrhage.    Of note, patient with two recent admissions: 3/28-4/4 for CAP and symptomatic anemia (s/p 5 U pRBCs) and 4/18-4/23 for melena (s/p 1 U pRBCs.)     EGD 4/19/24:  - Normal esophagus. Congestive gastropathy. Normal examined duodenum. A single non-bleeding angioectasia in the jejunum. Treated with argon plasma coagulation (APC).   Colonoscopy 4/1/24: The entire examined colon is normal. The examined portion of " the ileum was normal. The distal rectum and anal verge are normal on retroflexion view.     Past Medical History:   Diagnosis Date    Alcohol abuse     BPH (benign prostatic hyperplasia)     Cirrhosis 06/21/2018    pt states that he was diagnosed a week ago during his last hospital visit    Closed fracture of left distal radius 7/19/2023    Gastritis     Hypertension     Renal disorder        Past Surgical History:   Procedure Laterality Date    CAPSULOTOMY OF JOINT Right 7/29/2019    Procedure: CAPSULOTOMY, JOINT;  Surgeon: Raj Grossman MD;  Location: [facility] OR [facility] FLR;  Service: Orthopedics;  Laterality: Right;    COLONOSCOPY N/A 4/1/2024    Procedure: COLONOSCOPY;  Surgeon: Scarlett Whitt MD;  Location: [facility] ENDO (2ND FLR);  Service: Gastroenterology;  Laterality: N/A;    ESOPHAGOGASTRODUODENOSCOPY N/A 10/25/2023    Procedure: EGD (ESOPHAGOGASTRODUODENOSCOPY);  Surgeon: Lux Rome MD;  Location: [facility] ENDO (2ND FLR);  Service: Endoscopy;  Laterality: N/A;    ESOPHAGOGASTRODUODENOSCOPY N/A 4/19/2024    Procedure: EGD (ESOPHAGOGASTRODUODENOSCOPY);  Surgeon: Raj Roberts MD;  Location: [facility] ENDO (2ND FLR);  Service: Endoscopy;  Laterality: N/A;    PERCUTANEOUS PINNING OF HIP Right 7/29/2019    Procedure: PINNING, HIP, PERCUTANEOUS- synthes cannulated screws- hana table- C arm door side-;  Surgeon: Raj Grossman MD;  Location: [facility] OR [address] [place] [address];  Service: Orthopedics;  Laterality: Right;       Review of patient's allergies indicates:  No Known Allergies  Family History       Problem Relation (Age of Onset)    Heart disease Father, Brother    Hypertension Father          Tobacco Use    Smoking status: Every Day     Current packs/day: 1.00     Types: Cigarettes    Smokeless tobacco: Never   Substance and Sexual Activity    Alcohol use: Yes     Comment: (former drinker; 2months sober) this visit admits drinking a couple beers today    Drug use: No    Sexual activity: Not on file     Review of Systems    Constitutional:  Positive for fatigue.   HENT:  Positive for rhinorrhea and sneezing.    Eyes:  Positive for discharge.   Respiratory:  Positive for cough and shortness of breath.    Gastrointestinal:  Positive for abdominal distention, abdominal pain, anal bleeding, blood in stool, constipation and diarrhea. Negative for rectal pain and vomiting.     Objective:     Vital Signs (Most Recent):  Temp: 98.2 °F (36.8 °C) (05/10/24 0805)  Pulse: 96 (05/10/24 0805)  Resp: 16 (05/10/24 0805)  BP: 107/63 (05/10/24 0805)  SpO2: 100 % (05/10/24 0805) Vital Signs (24h Range):  Temp:  [97.7 °F (36.5 °C)-98.8 °F (37.1 °C)] 98.2 °F (36.8 °C)  Pulse:  [] 96  Resp:  [16-28] 16  SpO2:  [96 %-100 %] 100 %  BP: (104-128)/(63-89) 107/63     Weight: 88.5 kg (195 lb) (05/10/24 0805)  Body mass index is 25.73 kg/m².      Intake/Output Summary (Last 24 hours) at 5/10/2024 0907  Last data filed at 5/10/2024 0538  Gross per 24 hour   Intake 120 ml   Output 850 ml   Net -730 ml       Lines/Drains/Airways       Peripheral Intravenous Line  Duration                  Peripheral IV - Single Lumen 05/09/24 1425 18 G Posterior;Right Hand <1 day                     Physical Exam  Vitals and nursing note reviewed.   Constitutional:       Appearance: He is normal weight. He is ill-appearing.   HENT:      Head: Normocephalic and atraumatic.      Nose: Congestion and rhinorrhea present.      Mouth/Throat:      Mouth: Mucous membranes are dry.   Eyes:      General:         Right eye: Discharge (watery discharge) present.         Left eye: Discharge (watery discharge) present.  Cardiovascular:      Rate and Rhythm: Normal rate and regular rhythm.   Pulmonary:      Effort: Pulmonary effort is normal. No respiratory distress. On 6L oxygen via nasal cannula.  Abdominal:      General: Abdomen is flat. There is distension.      Tenderness: There is abdominal tenderness (epigastric).   Genitourinary:     Comments: Chaperoned by Nurse Poornima Mckeon  noted on indra pad  Melena on ARCELIA  No visible hemorrhoids   Musculoskeletal:      Right lower leg: No edema.      Left lower leg: No edema.   Skin:     General: Skin is warm and dry.      Coloration: Skin is pale.   Neurological:      Mental Status: He is alert and oriented to person, place, and time.          Significant Labs:  CBC:   Recent Labs   Lab 05/09/24  1426 05/09/24  1432 05/10/24  0720   WBC 4.04  --  3.06*   HGB 7.0*  --  6.3*   HCT 25.3* 25* 23.3*     --  238     CMP:   Recent Labs   Lab 05/10/24  0721      CALCIUM 9.1   ALBUMIN 2.8*   PROT 6.9      K 4.4   CO2 28   CL 96   BUN 19   CREATININE 1.2   ALKPHOS 108   ALT 11   AST 24   BILITOT 0.7       Significant Imaging:  CT: I have reviewed all results within the past 24 hours and my personal findings are:  CTA not suggestive of any gastrointestinal hemorrhage.   Assessment/Plan:     GI  * Hematochezia  This is a 68-year-old man presenting with rectal bleeding. Digital rectal exam notable for melena.      EGD 4/19/24:  - Normal esophagus. Congestive gastropathy. Normal examined duodenum. A single non-bleeding angioectasia in the jejunum. Treated with argon plasma coagulation (APC).   Colonoscopy 4/1/24: The entire examined colon is normal. The examined portion of the ileum was normal. The distal rectum and anal verge are normal on retroflexion view.   CTA 5/9/24: No contrast extravasation into the large bowel or elsewhere to suggest active site of gastrointestinal hemorrhage. There is liquid stool throughout the large bowel and fluid within the few distal small bowel loops.  Findings could reflect diarrheal illness in the setting of enteritis although correlation is needed.    Our recommendations are as follows:     Suspect patient is likely bleeding from an AVM given findings from recent endoscopic procedures.  Maintain IV access with at least 2 large IVs (18 gauge or larger)  Continued IVF resuscitation as tolerated with attention  to active co-morbidities.   Give PROTONIX 80 mg once followed by 40 mg IV BID.   Would recommend IV iron infusion while inpatient.  Morning hemoglobin 6.3, recommend transfusion of pRBCs to maintain Hgb >7 g/dL.   Currently not stable for EGD with high oxygen requirement (currently on 6L) and URI.  We will monitor over the weekend, but no current plans for endocscopy given recent colonoscopy 4/1 and EGD 4/19.   We will start the process to schedule him for outpatient octreotide injections.   We will schedule him for outpatient follow-up.   Please correct any coagulopathy with platelets and FFP to a goal of platelets >50K and INR <2.0.   Please promptly notify GI team if there is significant change in patient's clinical status          Thank you for your consult. I will follow-up with patient. Please contact us if you have any additional questions.    Indio Hopkins MD  Gastroenterology  [facility] - Telemetry Stepdown

## 2024-05-10 NOTE — ASSESSMENT & PLAN NOTE
"CAD (coronary artery disease)     3/28/2024 BNP 2,932 and 4/18/2024 BNP 4,361; no TTE since 2022. Prior CT with significant multivessel CAD.    Echo    Result Date: 5/10/2024    Left Ventricle: The left ventricle is mildly dilated. Moderate global   hypokinesis and regional wall motion abnormalities present. There is   moderately reduced systolic function with a visually estimated ejection   fraction of 35 - 40%. Biplane (2D) method of discs ejection fraction is   33%. There is diastolic dysfunction. Elevated left ventricular filling   pressure.    Right Ventricle: Normal right ventricular cavity size. Systolic   function is mildly reduced.    Left Atrium: Left atrium is moderately dilated.    Aortic Valve: There is moderate aortic valve sclerosis. Mildly   restricted motion.    Mitral Valve: There is mild annular dilation present. There is mild   mitral annular calcification present.    Tricuspid Valve: There is moderate annular dilation present. There is   moderate regurgitation.    Pulmonary Artery: The estimated pulmonary artery systolic pressure is   55 mmHg.    IVC/SVC: Elevated venous pressure at 15 mmHg.    Pericardium: Large Bilateral pleural effusions.       - Cardiology consulted, appreciate recs  "Prior CT with significant multivessel CAD concerning for ICM cardiomyopathy exacerbated by current UGIB. Elevated filling pressures on echo are likely largely due to liver disease as he doesn't appear as grossly volume overloaded as expected."  Defer ischemic evaluation with PET stress to outpt as he is not a revascularization candidate at this time with recurrent UGIB  Ambulatory referral to General Cardiology at discharge  - Lasix 40 mg IV x 1 and monitor UOP; goal net negative 1-2 L/daily  - GDMT:  BB: add as tolerated  ACE/ARB/ARNI: Recommend Entresto, if BP too low, try valsartan  SGLT2i: Start Jardiance 10 mg po daily  MRA: add as tolerated  - F/u HbA1c and lipid panel  - Start Lipitor 40 mg po qhs  - " Start ASA when anemia is controlled

## 2024-05-10 NOTE — HOSPITAL COURSE
"Admitted to Hospital Medicine for evaluation of GI bleed concerns and hypoxia over the past month.     Regarding his GI bleed, he was transfused 3 U pRBCs with active melena and hematochezia. EGD on 5/10 noted "blood in the first portion of the duodenum, plumes of blood were noted to be coming from the ampulla". AES and IR reviewed CT pancreas protocol. 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 Of note, both of the radiology reports on the CT pancreas protocol and CTA erroneously call the pseudoaneurysm a solid lesion. This same pseudoaneurysm is also present on CTA c/a/p performed on 10/25/23, however pseudoaneurysm appears thrombosed and therefore does not enhance on this study." He underwent coil embolization of GDA and multiple pancreaticoduodenal branches with IR on 5/13/2024.     Regarding his SOB, Echo revealed EF 35-40% and diastolic dysfunction, patient has no history of CHF. Cardiology was consulted for GDMT initiation; plan for outpatient ischemic evaluation. Pulmonology consulted for bilateral pleural effusions (loculated on right), recommending diuresis with no plans for thoracentesis.     At this time, he is medically stable for discharge with follow-ups with PCP, Hepatology (for cirrhosis), Cardiology (new HFrEF), [facility] (HFrEF), and Pulmonology (suspected COPD).  "

## 2024-05-10 NOTE — CONSULTS
[facility]  Pulmonology  Consult Note    Patient Name: Fransico oMntalvo  MRN: [id number]  Admission Date: 5/9/2024  Hospital Length of Stay: 1 days  Code Status: Full Code  Attending Physician: Harriet Bermudez MD  Primary Care Provider: [facility]   Principal Problem: Hematochezia    Inpatient consult to Pulmonology  Consult performed by: Duy Gross MD  Consult ordered by: Awa Rodriguez MD        Subjective:     HPI:  Mr. Montalvo is a 69 yo M with PMH of EtOH cirrhosis, presumed COPD, multifactorial anemia, previous esophageal varices, and gastritis who [facility] with concern for hematochezia.  Per patient, he noted dark red blood from his rectum since yesterday morning (states it is mixed in stool and also on its own.) Also reports new upper abdominal pain which started after eating food yesterday evening. Over the past month, he has experienced fatigue, SOB, and two pre-syncopal episodes. Pulmonology was consulted for possible thoracentesis of bilateral pleural effusions.     Notable labs were Hgb 6.3, Hct 23.3, WBC 3.06, Amylase 1256, Lipase 758, BNP 3448, and CA 19-9 88.6. TTE showed newly reduced EF 35%-40% with regional wall motion abnormalities present. The patient has had bilateral pleural effusions present on CXR since 04/18, however CXR this admission noted and increase on the R side.     Past Medical History:   Diagnosis Date    Alcohol abuse     BPH (benign prostatic hyperplasia)     Cirrhosis 06/21/2018    pt states that he was diagnosed a week ago during his last hospital visit    Closed fracture of left distal radius 7/19/2023    Gastritis     Hypertension     Renal disorder        Past Surgical History:   Procedure Laterality Date    CAPSULOTOMY OF JOINT Right 7/29/2019    Procedure: CAPSULOTOMY, JOINT;  Surgeon: Raj Grossman MD;  Location: [facility] OR [address] [place] [address];  Service: Orthopedics;  Laterality: Right;    COLONOSCOPY N/A 4/1/2024    Procedure: COLONOSCOPY;  Surgeon: Jose Eduardo  Scarlett TRENT MD;  Location: [facility] ENDO (2ND FLR);  Service: Gastroenterology;  Laterality: N/A;    ESOPHAGOGASTRODUODENOSCOPY N/A 10/25/2023    Procedure: EGD (ESOPHAGOGASTRODUODENOSCOPY);  Surgeon: Lux Rome MD;  Location: [facility] ENDO (2ND FLR);  Service: Endoscopy;  Laterality: N/A;    ESOPHAGOGASTRODUODENOSCOPY N/A 4/19/2024    Procedure: EGD (ESOPHAGOGASTRODUODENOSCOPY);  Surgeon: Raj Roberts MD;  Location: [facility] ENDO (2ND FLR);  Service: Endoscopy;  Laterality: N/A;    PERCUTANEOUS PINNING OF HIP Right 7/29/2019    Procedure: PINNING, HIP, PERCUTANEOUS- synthes cannulated screws- hana table- C arm door side-;  Surgeon: Raj Grossman MD;  Location: [facility] OR 2ND FLR;  Service: Orthopedics;  Laterality: Right;       Review of patient's allergies indicates:  No Known Allergies    Family History       Problem Relation (Age of Onset)    Heart disease Father, Brother    Hypertension Father          Tobacco Use    Smoking status: Every Day     Current packs/day: 1.00     Types: Cigarettes    Smokeless tobacco: Never   Substance and Sexual Activity    Alcohol use: Yes     Comment: (former drinker; 2months sober) this visit admits drinking a couple beers today    Drug use: No    Sexual activity: Not on file         Review of Systems   Constitutional:  Positive for fatigue. Negative for activity change and fever.   HENT:  Negative for rhinorrhea and sneezing.    Eyes:  Positive for discharge.   Respiratory:  Positive for cough and shortness of breath.    Cardiovascular:  Positive for leg swelling. Negative for chest pain.   Gastrointestinal:  Positive for abdominal distention, abdominal pain, anal bleeding, blood in stool, constipation and diarrhea. Negative for rectal pain and vomiting.   Musculoskeletal:  Positive for back pain.   Skin:  Negative for rash and wound.   Psychiatric/Behavioral:  Negative for confusion.      Objective:     Vital Signs (Most Recent):  Temp: 98.8 °F (37.1 °C) (05/10/24  1159)  Pulse: (!) 132 (05/10/24 1159)  Resp: 16 (05/10/24 1159)  BP: 114/78 (05/10/24 1159)  SpO2: 99 % (05/10/24 1159) Vital Signs (24h Range):  Temp:  [97.7 °F (36.5 °C)-99.1 °F (37.3 °C)] 98.8 °F (37.1 °C)  Pulse:  [] 132  Resp:  [16-28] 16  SpO2:  [96 %-100 %] 99 %  BP: (104-128)/(63-89) 114/78     Weight: 88.5 kg (195 lb)  Body mass index is 25.73 kg/m².      Intake/Output Summary (Last 24 hours) at 5/10/2024 1229  Last data filed at 5/10/2024 1129  Gross per 24 hour   Intake 120 ml   Output 1365 ml   Net -1245 ml        Physical Exam  Vitals and nursing note reviewed.   Constitutional:       Appearance: He is normal weight. He is ill-appearing.   HENT:      Head: Normocephalic and atraumatic.   Cardiovascular:      Rate and Rhythm: Regular rhythm. Tachycardia present.   Pulmonary:      Effort: Pulmonary effort is normal. No respiratory distress.      Breath sounds: Examination of the right-middle field reveals decreased breath sounds. Examination of the right-lower field reveals decreased breath sounds. Examination of the left-lower field reveals decreased breath sounds. Decreased breath sounds present.   Abdominal:      General: Abdomen is flat. There is distension.      Palpations: Abdomen is soft.      Tenderness: There is abdominal tenderness (epigastric).   Musculoskeletal:      Right lower leg: No edema.      Left lower leg: No edema.   Skin:     General: Skin is warm and dry.      Coloration: Skin is pale.   Neurological:      General: No focal deficit present.      Mental Status: He is alert and oriented to person, place, and time. Mental status is at baseline.          Vents:       Lines/Drains/Airways       Peripheral Intravenous Line  Duration                  Peripheral IV - Single Lumen 05/09/24 1425 18 G Posterior;Right Hand <1 day         Peripheral IV - Single Lumen 05/10/24 1050 22 G Anterior;Left Shoulder <1 day                    Significant Labs:    CBC/Anemia Profile:  Recent Labs    Lab 05/09/24  1426 05/09/24  1432 05/10/24  0720 05/10/24  0721   WBC 4.04  --  3.06*  --    HGB 7.0*  --  6.3*  --    HCT 25.3* 25* 23.3*  --      --  238  --    MCV 82  --  85  --    RDW 19.6*  --  19.7*  --    IRON  --   --   --  15*   FERRITIN  --   --   --  19*   RETIC  --   --  1.9  --    FOLATE  --   --  15.0  --    [id number]  --   --  384  --         Chemistries:  Recent Labs   Lab 05/09/24  1426 05/09/24  1637 05/09/24  2159 05/10/24  0721   * 136 135* 136   K 5.5* 5.7* 4.3 4.4   CL 99 99 97 96   CO2 29 30* 33* 28   BUN 16 16 18 19   CREATININE 1.0 1.0 1.0 1.2   CALCIUM 9.0 8.4* 8.8 9.1   ALBUMIN 2.9*  --   --  2.8*   PROT 7.9  --   --  6.9   BILITOT 0.7  --   --  0.7   ALKPHOS 119  --   --  108   ALT 16  --   --  11   AST 62*  --   --  24   MG  --   --   --  1.5*   PHOS  --   --   --  3.7       All pertinent labs within the past 24 hours have been reviewed.    Significant Imaging:   I have reviewed all pertinent imaging results/findings within the past 24 hours.  Assessment/Plan:     Pulmonary  Bilateral pleural effusion  68M with bilateral pleural effusions present on imaging since 4/18. Imaging revealed interval increase in size of right pleural effusion. Received lasix 20mg IV on admission. Pulmonology consulted for evaluation of pleural effusion.    RECOMMENDATIONS / PLAN:  -- Recommend further diuresis of bilateral pleural effusions  -- No indication for thoracentesis at this time          Thank you for your consult. Pulmonology will follow-up with patient. Please contact us if you have any additional questions.       Duy Gross MD  Pulmonology  [facility] - Endoscopy

## 2024-05-10 NOTE — ASSESSMENT & PLAN NOTE
68M with bilateral pleural effusions present on imaging since 4/18. Today, imaging shows interval increase in size of right pleural effusion. Received lasix 20mg IV on admission.     RECOMMENDATIONS / PLAN:  -- Recommend further diuresis of bilateral pleural effusions  -- No indication for thoracentesis at this time

## 2024-05-10 NOTE — TREATMENT PLAN
GI Post Procedure Treatment Plan  Procedure Performed: EGD    Impression:              - 1 cm hiatal hernia.   - Normal stomach.   - Blood in the first portion of the duodenum, plumes of blood were noted to be coming from the ampulla.   - Small blebs in the duodenum.   - Normal examined jejunum.   - No specimens collected.     Recommendations:          - Return patient to hospital [facility] for ongoing care.   - Resume previous diet.   - Continue present medications.   - Recommend CT abdomen and pelvis pancreas protocol to evaluate for a cause for blood to be coming from the ampulla.   - If large volume GI bleeding occurs then recommend STAT CTA abdomen and pelvis bleeding protocol. If positive then recommend IR consult for possible embolization to the bleeding site.   - We will continue to follow.    Christine Rodriguez MD  Gastroenterology, PGY-4

## 2024-05-10 NOTE — ANESTHESIA PREPROCEDURE EVALUATION
05/10/2024  Fransico Montalvo is a 68 y.o., male with melena here for EGD.    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 Grossman    Multiple renal cysts    Cholelithiases    Chronic pain of right knee    Impaired functional mobility and endurance    Syncope    Closed fracture of multiple ribs of left side with routine healing    Closed fracture of left distal radius    Melena    Pancreatic lesion    Therapeutic opioid induced constipation    On deep vein thrombosis (DVT) prophylaxis    Closed fracture of multiple thoracic vertebrae with routine healing    Pneumonia    GI bleed    Cirrhosis    Hyponatremia    COPD exacerbation    Debility    Acute hypoxemic respiratory failure    Shortness of breath    Other emphysema    Normocytic anemia    Bilateral pleural effusion    Hematochezia    Presumed COPD (chronic obstructive pulmonary disease)    Hyperkalemia    Compressive atelectasis    HFrEF (heart failure with reduced ejection fraction)    Acute pancreatitis    CAD (coronary artery disease)       Review of patient's allergies indicates:  No Known Allergies    No current facility-administered  medications on file prior to encounter.     Current Outpatient Medications on File Prior to Encounter   Medication Sig Dispense Refill    acetaminophen (TYLENOL) 325 MG tablet Take 2 tablets (650 mg total) by mouth every 8 (eight) hours as needed for Pain.  0    albuterol-ipratropium (DUO-NEB) 2.5 mg-0.5 mg/3 mL nebulizer solution Take 3 mLs by nebulization every 6 (six) hours as needed for Wheezing or Shortness of Breath. Rescue 75 mL 0    dicyclomine (BENTYL) 10 MG capsule Take 10 mg by mouth every 8 (eight) hours as needed (Abdomnial pain).      EScitalopram oxalate (LEXAPRO) 10 MG tablet Take 10 mg by mouth once daily.      fluticasone furoate-vilanteroL (BREO) 100-25 mcg/dose diskus inhaler Inhale 1 puff into the lungs once daily. Controller 30 each 0    folic acid (FOLVITE) 1 MG tablet Take 1 tablet (1 mg total) by mouth once daily.  0    melatonin (MELATIN) 3 mg tablet Take 2 tablets (6 mg total) by mouth nightly as needed for Insomnia.  0    ondansetron (ZOFRAN-ODT) 8 MG TbDL Take 8 mg by mouth every 8 (eight) hours as needed (Nausea).      pantoprazole (PROTONIX) 40 MG tablet Take 1 tablet (40 mg total) by mouth once daily. 30 tablet 1    thiamine 100 MG tablet Take 100 mg by mouth once daily.         Past Surgical History:   Procedure Laterality Date    CAPSULOTOMY OF JOINT Right 7/29/2019    Procedure: CAPSULOTOMY, JOINT;  Surgeon: Raj Grossman MD;  Location: [address];  Service: Orthopedics;  Laterality: Right;    COLONOSCOPY N/A 4/1/2024    Procedure: COLONOSCOPY;  Surgeon: Scarlett Whitt MD;  [facility] ([address] [place] [address]);  Service: Gastroenterology;  Laterality: N/A;    ESOPHAGOGASTRODUODENOSCOPY N/A 10/25/2023    Procedure: EGD (ESOPHAGOGASTRODUODENOSCOPY);  Surgeon: Lux Rome MD;  Location: [facility] ([address] [place] [address]);  Service: Endoscopy;  Laterality: N/A;    ESOPHAGOGASTRODUODENOSCOPY N/A 4/19/2024    Procedure: EGD (ESOPHAGOGASTRODUODENOSCOPY);  Surgeon: Raj Roberts MD;   Location: [facility] ENDO (2ND FLR);  Service: Endoscopy;  Laterality: N/A;    PERCUTANEOUS PINNING OF HIP Right 7/29/2019    Procedure: PINNING, HIP, PERCUTANEOUS- synthes cannulated screws- hana table- C arm door side-;  Surgeon: Raj Grossman MD;  Location: [facility] OR 2ND FLR;  Service: Orthopedics;  Laterality: Right;       Social History     Socioeconomic History    Marital status: Single   Tobacco Use    Smoking status: Every Day     Current packs/day: 1.00     Types: Cigarettes    Smokeless tobacco: Never   Substance and Sexual Activity    Alcohol use: Yes     Comment: (former drinker; 2months sober) this visit admits drinking a couple beers today    Drug use: No     Social Determinants of Health     Financial Resource Strain: Low Risk  (5/10/2024)    Overall Financial Resource Strain (CARDIA)     Difficulty of Paying Living Expenses: Not hard at all   Food Insecurity: No Food Insecurity (5/10/2024)    Hunger Vital Sign     Worried About Running Out of Food in the Last Year: Never true     Ran Out of Food in the Last Year: Never true   Transportation Needs: No Transportation Needs (5/10/2024)    TRANSPORTATION NEEDS     Transportation : No   Physical Activity: Insufficiently Active (5/10/2024)    Exercise Vital Sign     Days of Exercise per Week: 3 days     Minutes of Exercise per Session: 20 min   Stress: No Stress Concern Present (5/10/2024)    [place] [employer] - Occupational Stress Questionnaire     Feeling of Stress : Only a little   Recent Concern: Stress - Stress Concern Present (4/21/2024)    [place] of [employer] - Occupational Stress Questionnaire     Feeling of Stress : To some extent   Housing Stability: High Risk (5/10/2024)    Housing Stability Vital Sign     Unable to Pay for Housing in the Last Year: No     Homeless in the Last Year: No         CBC:   Recent Labs     05/09/24  1426 05/09/24  1432 05/10/24  0720   WBC 4.04  --  3.06*   RBC 3.10*  --  2.73*    HGB 7.0*  --  6.3*   HCT 25.3* 25* 23.3*     --  238   MCV 82  --  85   MCH 22.6*  --  23.1*   MCHC 27.7*  --  27.0*       CMP:   Recent Labs     05/09/24  1426 05/09/24  1637 05/09/24  2159 05/10/24  0721   *   < > 135* 136   K 5.5*   < > 4.3 4.4   CL 99   < > 97 96   CO2 29   < > 33* 28   BUN 16   < > 18 19   CREATININE 1.0   < > 1.0 1.2   GLU 89   < > 102 108   MG  --   --   --  1.5*   PHOS  --   --   --  3.7   CALCIUM 9.0   < > 8.8 9.1   ALBUMIN 2.9*  --   --  2.8*   PROT 7.9  --   --  6.9   ALKPHOS 119  --   --  108   ALT 16  --   --  11   AST 62*  --   --  24   BILITOT 0.7  --   --  0.7    < > = values in this interval not displayed.       INR  Recent Labs     05/09/24  1727 05/10/24  0720   INR 1.2 1.2               2D Echo:  Results for orders placed or performed during the hospital encounter of 07/29/18   2D echo with color flow doppler   Result Value Ref Range    EF + QEF 55 55 - 65    Mitral Valve Regurgitation TRIVIAL     Diastolic Dysfunction No     Est. PA Systolic Pressure 29.21     Tricuspid Valve Regurgitation TRIVIAL            Pre-op Assessment    I have reviewed the Patient Summary Reports.     I have reviewed the Nursing Notes.    I have reviewed the Medications.     Review of Systems  Anesthesia Hx:  No problems with previous Anesthesia                Hematology/Oncology:  Hematology Normal   Oncology Normal                                   EENT/Dental:  EENT/Dental Normal           Cardiovascular:     Hypertension   CAD                                        Pulmonary:  Pulmonary Normal                       Renal/:  Renal/ Normal                 Hepatic/GI:  Hepatic/GI Normal                 Musculoskeletal:  Musculoskeletal Normal                Neurological:  Neurology Normal                                      Endocrine:  Endocrine Normal            Dermatological:  Skin Normal    Psych:  Psychiatric Normal                    Physical Exam  General: Well  nourished    Airway:  Mallampati: II   Mouth Opening: Normal  TM Distance: Normal  Tongue: Normal  Neck ROM: Normal ROM    Chest/Lungs:  Clear to auscultation, Normal Respiratory Rate    Heart:  Rate: Normal  Rhythm: Regular Rhythm  Sounds: Normal        Anesthesia Plan  Type of Anesthesia, risks & benefits discussed:    Anesthesia Type: Gen Natural Airway  Intra-op Monitoring Plan: Standard ASA Monitors  Post Op Pain Control Plan: multimodal analgesia  Induction:  IV  Informed Consent: Informed consent signed with the Patient and all parties understand the risks and agree with anesthesia plan.  All questions answered.   ASA Score: 3  Anesthesia Plan Notes: Plan for general natural airway, discussed anesthetic risks, questions answered    Ready For Surgery From Anesthesia Perspective.     .

## 2024-05-10 NOTE — PROGRESS NOTES
"[facility] - [facility] Medicine  Progress Note    Patient Name: Fransico Montalvo  MRN: [id number]  Patient Class: IP- Inpatient   Admission Date: 5/9/2024  Length of Stay: 1 days  Attending Physician: Harriet Bermudez MD  Primary Care Provider: [facility]:     Principal Problem:Hematochezia    HPI:  Fransico Montalvo is a 67 yo M with PMH of EtOH cirrhosis, presumed COPD, multifactorial anemia, HTN, BPH, previous esophageal varices, and gastritis who presented from SNF c/o bloody stool. Per patient, he noted dark red blood from his rectum since this morning (states it is mixed in stool and also on its own.) Also endorses new upper abdominal pain which started after eating food yesterday evening. Over the past month, he has experienced fatigue, SOB, and two pre-syncopal episodes. Of note, patient with two recent admissions: 3/28-4/4 for CAP and symptomatic anemia (s/p 5 U pRBCs) and 4/18-4/23 for melena (s/p 1 U pRBCs.) Of note, EGD on 10/25/2023 and colonoscopy on 4/1/2024 were unremarkable. EGD on 4/19 showed "single non-bleeding angioectasia in the jejunum; treated with APC."    Upon arrival to the ED, /77, , RR 18, SpO2 100% on RA, and T 97.7 F. Labs significant for Hgb 7.0 and K 5.5 (5.8 on repeat). Physical exam in the ED pertinent for bright red blood present in rectum, epigastric tenderness, and pallor. EKG NSR. CTA without active hemorrhage, ascites, bilateral pleural effusion (loculated on the right), and a lateral pancreatic head enhancing lesion. He received 80 mg IV Protonix x 1 and 2 mg IV morphine x 1. CTA pending.     Overview/Hospital Course:  Admitted to Hospital Medicine for evaluation of GI bleed concerns and hypoxia over the past month. GI was consulted, plan for EGD with push enteroscopy this afternoon; thus far has received 1 U pRBCs. Echo revealed EF 35-40% and diastolic dysfunction, no known history of CHF. Cardiology was consulted for GDMT initiation; plan " for outpatient ischemic evaluation. Pulmonology consulted for bilateral pleural effusions, no plans for thoracentesis at this time. Paracentesis deferred to Monday 5/13 due to Hgb < 7. Enhancing pancreatic head mass with elevated CA 19-9 will need outpatient follow-up.     Interval History: NAEON.     Review of Systems   Constitutional:  Positive for fatigue.   Respiratory:  Positive for shortness of breath.    Cardiovascular:  Negative for chest pain and palpitations.   Gastrointestinal:  Positive for abdominal distention and abdominal pain (epigastric).     Objective:     Vital Signs (Most Recent):  Temp: 98.8 °F (37.1 °C) (05/10/24 1159)  Pulse: (!) 132 (05/10/24 1159)  Resp: 16 (05/10/24 1252)  BP: 114/78 (05/10/24 1159)  SpO2: 99 % (05/10/24 1159) Vital Signs (24h Range):  Temp:  [97.7 °F (36.5 °C)-99.1 °F (37.3 °C)] 98.8 °F (37.1 °C)  Pulse:  [] 132  Resp:  [16-28] 16  SpO2:  [96 %-100 %] 99 %  BP: (104-128)/(63-89) 114/78     Weight: 88.5 kg (195 lb)  Body mass index is 25.73 kg/m².    Intake/Output Summary (Last 24 hours) at 5/10/2024 1323  Last data filed at 5/10/2024 1129  Gross per 24 hour   Intake 120 ml   Output 1365 ml   Net -1245 ml         Physical Exam  Vitals and nursing note reviewed.   Constitutional:       General: He is not in acute distress.     Appearance: He is normal weight. He is ill-appearing.   HENT:      Head: Normocephalic and atraumatic.   Cardiovascular:      Rate and Rhythm: Normal rate and regular rhythm.      Heart sounds: Murmur heard.   Pulmonary:      Effort: Pulmonary effort is normal. No respiratory distress.   Abdominal:      General: Abdomen is flat. There is distension.      Palpations: Abdomen is soft.      Tenderness: There is abdominal tenderness (epigastric).   Musculoskeletal:      Right lower leg: Edema (trace to mid-shin) present.      Left lower leg: Edema (trace to mid-shin) present.   Skin:     General: Skin is warm and dry.      Coloration: Skin is pale.  "  Neurological:      General: No focal deficit present.      Mental Status: He is alert and oriented to person, place, and time. Mental status is at baseline.             Significant Labs: All pertinent labs within the past 24 hours have been reviewed.    Significant Imaging: I have reviewed all pertinent imaging results/findings within the past 24 hours.    Assessment/Plan:      * Hematochezia  Melena     Patient presenting for evaluation of dark red blood in stool since morning of admission with associated upper abdominal pain. Two recent admissions: 3/28-4/4 for CAP and symptomatic anemia (s/p 5 U pRBCs) and 4/18-4/23 for melena (s/p 1 U pRBCs.) Of note, EGD on 10/25/2023 and colonoscopy on 4/1/2024 were unremarkable. EGD on 4/19 showed "single non-bleeding angioectasia in the jejunum; treated with APC."  CTA without acute hemorrhage. Received Protonix 80mg IV x 1 in the ED.       Recent Labs     05/09/24  1637 05/09/24  2159 05/10/24  0720 05/10/24  0721   HGB  --   --  6.3*  --    BUN 16 18  --  19   CREATININE 1.0 1.0  --  1.2     pRBCs transfused: 1 U 5/10    - GI consulted, appreciate recs  - EGD with push enteroscopy today  - Continue Protonix 40 mg IV BID  - Continue octreotide gtt @ 50 mcg/hr  GI to arrange outpatient octreotide injections  - Continue Rocephin 1 g IV q24h  - Monitor for hemodynamic instability with associated large volume hematochezia, consider STAT CTA and IR consult for embolization if positive  - Maintain IV access with 2 large bore IVs,  - Telemetry monitoring    HFrEF (heart failure with reduced ejection fraction)  CAD (coronary artery disease)     3/28/2024 BNP 2,932 and 4/18/2024 BNP 4,361; no TTE since 2022. Prior CT with significant multivessel CAD.    Echo    Result Date: 5/10/2024    Left Ventricle: The left ventricle is mildly dilated. Moderate global   hypokinesis and regional wall motion abnormalities present. There is   moderately reduced systolic function with a visually " "estimated ejection   fraction of 35 - 40%. Biplane (2D) method of discs ejection fraction is   33%. There is diastolic dysfunction. Elevated left ventricular filling   pressure.    Right Ventricle: Normal right ventricular cavity size. Systolic   function is mildly reduced.    Left Atrium: Left atrium is moderately dilated.    Aortic Valve: There is moderate aortic valve sclerosis. Mildly   restricted motion.    Mitral Valve: There is mild annular dilation present. There is mild   mitral annular calcification present.    Tricuspid Valve: There is moderate annular dilation present. There is   moderate regurgitation.    Pulmonary Artery: The estimated pulmonary artery systolic pressure is   55 mmHg.    IVC/SVC: Elevated venous pressure at 15 mmHg.    Pericardium: Large Bilateral pleural effusions.       - Cardiology consulted, appreciate recs  "Prior CT with significant multivessel CAD concerning for ICM cardiomyopathy exacerbated by current UGIB. Elevated filling pressures on echo are likely largely due to liver disease as he doesn't appear as grossly volume overloaded as expected."  Defer ischemic evaluation with PET stress to outpt as he is not a revascularization candidate at this time with recurrent UGIB  Ambulatory referral to General Cardiology at discharge  - Lasix 40 mg IV x 1 and monitor UOP; goal net negative 1-2 L/daily  - GDMT:  BB: add as tolerated  ACE/ARB/ARNI: Recommend Entresto, if BP too low, try valsartan  SGLT2i: Start Jardiance 10 mg po daily  MRA: add as tolerated  - F/u HbA1c and lipid panel  - Start Lipitor 40 mg po qhs  - Start ASA when anemia is controlled    Alcoholic cirrhosis  Recent Labs     05/09/24  1426 05/09/24  1727 05/10/24  0720 05/10/24  0721   BILITOT 0.7  --   --  0.7   AST 62*  --   --  24   ALT 16  --   --  11   ALKPHOS 119  --   --  108     --  238  --    INR  --    < > 1.2  --    ALBUMIN 2.9*  --   --  2.8*    < > = values in this interval not displayed.     Patient " "with known Alcoholic Cirrhosis. Co-morbidities are present and inclusive of ascites, portal hypertension, and anemia/pancytopenia.    MELD-Na score calculated; MELD 3.0: 12 at 5/10/2024  7:21 AM  MELD-Na: 10 at 5/10/2024  7:21 AM  Calculated from:  Serum Creatinine: 1.2 mg/dL at 5/10/2024  7:21 AM  Serum Sodium: 136 mmol/L at 5/10/2024  7:21 AM  Total Bilirubin: 0.7 mg/dL (Using min of 1 mg/dL) at 5/10/2024  7:21 AM  Serum Albumin: 2.8 g/dL at 5/10/2024  7:21 AM  INR(ratio): 1.2 at 5/10/2024  7:20 AM  Age at listing (hypothetical): 68 years  Sex: Male at 5/10/2024  7:21 AM    During admission, reported he quit alcohol one month ago.     PLAN:  - Will avoid any hepatotoxic meds, and monitor CBC/CMP/INR for synthetic function.   - IR for therapeutic paracentesis; deferred until Monday 5/13 due to current Hgb < 7    Hyperkalemia  This patient has hyperkalemia which is uncontrolled. We will monitor for arrhythmias with EKG or continuous telemetry. We will treat the hyperkalemia with Potassium Binders, Calcium gluconate, IV insulin and dextrose, Nebulized albuterol sulfate, and Furosemide. The likely etiology of the hyperkalemia is  unknown .  The patients latest potassium has been reviewed and the results are listed below  Recent Labs   Lab 05/10/24  0721   K 4.4       Bilateral pleural effusion  Compressive atelectasis     5/9 CTA: "Images of the lower thorax are remarkable for bilateral moderate pleural effusions noting some degree of loculation involving the effusion on the right, partially visualized. There is compressive atelectasis of the bilateral lower lobes."    Patient found to have moderate pleural effusion on imaging. I have personally reviewed and interpreted the following imaging: Xray and CT. A thoracentesis was deferred. Most likely etiology includes Congestive Heart Failure and Cirrhosis. Management to include Diuresis and possible thoracentesis.    - Pulmonology consulted, no plans for thoracentesis at " "this time  - IV Lasix trial  - Incentive spirometer    Acute pancreatitis  Patient c/o epigastric pain with lipase 758 and amylase 1256.    - PRN morphine 2 mg IV q6h  - PRN narcan ordered    Acute on chronic anemia  Patient's with Normocytic anemia. Hemoglobin stable. Patient's anemia is currently uncontrolled. Has not received any PRBCs to date. Chronic etiology due to iron deficiency and cirrhosis, exacerbated by acute blood loss from GI bleed.    -Current CBC reviewed-    Recent Labs   Lab 05/09/24  1426 05/10/24  0720   HGB 7.0* 6.3*     PLAN  - Monitor serial CBC and transfuse if patient becomes hemodynamically unstable, symptomatic or H/H drops below 7/21  - Give iron sucrose 500 mg IV x 1    Presumed COPD (chronic obstructive pulmonary disease)  Patient's COPD is controlled currently.  Patient is currently off COPD Pathway. Continue Breo Ellipta 1 puff daily and PRN Duo-Nebs, will monitor respiratory status closely.     Essential hypertension  Chronic, controlled. Latest blood pressure and vitals reviewed-     Temp:  [97.7 °F (36.5 °C)-98.5 °F (36.9 °C)]   Pulse:  [100-103]   Resp:  [18-22]   BP: (117-128)/(74-89)   SpO2:  [100 %] .     Not on any home medications per chart review    PLAN:  Will utilize p.r.n. blood pressure medication only if patient's blood pressure greater than 180/110 and he develops symptoms such as worsening chest pain or shortness of breath.    Anxiety and depression  Patient has hx of depression which  is currently controlled.     PLAN:  Continue home Lexapro 10 mg po daily. We will not consult psychiatry at this time. Patient does not display psychosis at this time. Continue to monitor closely and adjust plan of care as needed.    Pancreatic lesion  5/9 CTA: "Enhancing lesion within the lateral aspect of the pancreatic head, not seen on examination 12/25/2023. In this region however, on the previous exam, there is a hypoattenuating lesion with peripheral calcification. This did not " "undergo significant enhancement at that time. Solid lesion is of concern. Correlation and follow-up advised."     elevated at 88.6. LDH, AFP, CEA WNL.     - F/u PCP    BPH (benign prostatic hyperplasia)  Stable. Hx of BPH. Was on Tamsulosin previously, but held on recent admission in 03/2024 where there were concerns for low BP    PLAN:  Bladder scan PRN for urinary retention concerns    Documented history of CKD Stage 3  Patient reported to have CKD 3 as documented in prior notes. However, review of past labs does not show sustained decrease in GFR > 6 months, just during individual encounters during which patient had [place].     CAD (coronary artery disease)  Patient with known CAD (seen on CT imaging, no interventions have been done) which is controlled Will continue Statin and monitor for S/Sx of angina/ACS. Continue to monitor on telemetry.     See "HFrEF (heart failure with reduced ejection fraction)"    Compressive atelectasis  See "Bilateral pleural effusion"    Melena  See "Hematochezia"      VTE Risk Mitigation (From admission, onward)           Ordered     Reason for No Pharmacological VTE Prophylaxis  Once        Question:  Reasons:  Answer:  Active Bleeding    05/09/24 1609     IP VTE HIGH RISK PATIENT  Once         05/09/24 1609     Place sequential compression device  Until discontinued         05/09/24 1609                    Discharge Planning   ARTURO: 5/13/2024     Code Status: Full Code   Is the patient medically ready for discharge?:     Reason for patient still in hospital (select all that apply): Patient new problem, Patient trending condition, Laboratory test, Treatment, and Consult recommendations  Discharge Plan A: Skilled Nursing Facility        Awa Rodriguez MD  Department of Hospital Medicine   [facility]    "

## 2024-05-10 NOTE — NURSING TRANSFER
Nursing Transfer Note      5/10/2024   4:19 PM    Transfer To: [id number]    Transfer via stretcher    Transfer with 2L O2, cardiac monitoring    Transported by PCT    Transfer Vital Signs: see flowsheet     Additional Lines: Oxygen    4eyes on Skin: yes    Medicines sent: Iron and Octreotide gtt    Chart send with patient: Yes    Notified: No family info available     Patient reassessed at: 1607

## 2024-05-10 NOTE — HPI
"Fransico Montalvo is a 67 yo M with PMH of EtOH cirrhosis, presumed COPD, multifactorial anemia, CKD3, HTN, BPH, previous esophageal varices, and gastritis who presented from SNF c/o bloody stool. Per patient, he noted dark red blood from his rectum since this morning (states it is mixed in stool and also on its own.) Also endorses new upper abdominal pain which started after eating food yesterday evening. Over the past month, he has experienced fatigue, SOB, and two pre-syncopal episodes. Of note, patient with two recent admissions: 3/28-4/4 for CAP and symptomatic anemia (s/p 5 U pRBCs) and 4/18-4/23 for melena (s/p 1 U pRBCs.) Of note, EGD on 10/25/2023 and colonoscopy on 4/1/2024 were unremarkable. EGD on 4/19 showed "single non-bleeding angioectasia in the jejunum; treated with APC."     Upon arrival to the ED, /77, , RR 18, SpO2 100% on RA, and T 97.7 F. Labs significant for Hgb 7.0 and K 5.5 (5.8 on repeat). Physical exam in the ED pertinent for bright red blood present in rectum, epigastric tenderness, and pallor. EKG NSR. CTA without active hemorrhage, ascites, bilateral pleural effusion (loculated on the right), and a lateral pancreatic head enhancing lesion. He received 80 mg IV Protonix x 1 and 2 mg IV morphine x 1. CTA without obvious bleed.     Cardiology consulted for echo showing newly reduced EF 35-40% with global hypokinesis.   "

## 2024-05-10 NOTE — PROGRESS NOTES
IR paracentesis ordered on patient with GI bleed. Hb 6.3. Discussed with Dr. Rodriguez. Will hold off on paracentesis at this time. Will re-evaluate on Monday.     Lauren Robison PA-C  Interventional Radiology  [phone]'

## 2024-05-10 NOTE — ASSESSMENT & PLAN NOTE
Patient c/o epigastric pain with lipase 758 and amylase 1256.    - PRN morphine 2 mg IV q6h  - PRN narcan ordered

## 2024-05-10 NOTE — TRANSFER OF CARE
"Anesthesia Transfer of Care Note    Patient: Fransico Montalvo    Procedure(s) Performed: Procedure(s) (LRB):  EGD (ESOPHAGOGASTRODUODENOSCOPY) (N/A)    Patient location: PACU    Anesthesia Type: general    Transport from OR: Transported from OR on 2-3 L/min O2 by NC with adequate spontaneous ventilation    Post pain: adequate analgesia    Post assessment: no apparent anesthetic complications and tolerated procedure well    Post vital signs: stable    Level of consciousness: awake    Nausea/Vomiting: no nausea/vomiting    Complications: none    Transfer of care protocol was followed    Last vitals: Visit Vitals  /69 (BP Location: Left arm, Patient Position: Lying)   Pulse 96   Temp 36.5 °C (97.7 °F) (Temporal)   Resp 20   Ht 6' 1" (1.854 m)   Wt 88.5 kg (195 lb)   SpO2 (!) 94%   BMI 25.73 kg/m²     "

## 2024-05-10 NOTE — PLAN OF CARE
Problem: Adult Inpatient Plan of Care  Goal: Plan of Care Review  Outcome: Progressing  Goal: Patient-Specific Goal (Individualized)  Outcome: Progressing  Goal: Absence of Hospital-Acquired Illness or Injury  Outcome: Progressing  Goal: Optimal Comfort and Wellbeing  Outcome: Progressing  Goal: Readiness for Transition of Care  Outcome: Progressing     Problem: Acute Kidney Injury/Impairment  Goal: Fluid and Electrolyte Balance  Outcome: Progressing  Goal: Improved Oral Intake  Outcome: Progressing  Goal: Effective Renal Function  Outcome: Progressing     Problem: Skin Injury Risk Increased  Goal: Skin Health and Integrity  Outcome: Progressing

## 2024-05-10 NOTE — ANESTHESIA POSTPROCEDURE EVALUATION
Anesthesia Post Evaluation    Patient: Fransico Montalvo    Procedure(s) Performed: Procedure(s) (LRB):  EGD (ESOPHAGOGASTRODUODENOSCOPY) (N/A)    Final Anesthesia Type: general      Patient location during evaluation: PACU  Patient participation: Yes- Able to Participate  Level of consciousness: awake and alert  Post-procedure vital signs: reviewed and stable  Pain management: adequate  Airway patency: patent    PONV status at discharge: No PONV  Anesthetic complications: no      Cardiovascular status: blood pressure returned to baseline  Respiratory status: unassisted  Hydration status: euvolemic  Follow-up not needed.              Vitals Value Taken Time   /74 05/10/24 1617   Temp 36.6 °C (97.8 °F) 05/10/24 1615   Pulse 88 05/10/24 1621   Resp 22 05/10/24 1621   SpO2 99 % 05/10/24 1621   Vitals shown include unfiled device data.      Event Time   Out of Recovery 16:23:00         Pain/Margareth Score: Pain Rating Prior to Med Admin: 8 (5/10/2024 12:52 PM)  Pain Rating Post Med Admin: 2 (5/9/2024  3:39 PM)  Margareth Score: 9 (5/10/2024  4:00 PM)

## 2024-05-10 NOTE — ASSESSMENT & PLAN NOTE
New Dx on echo today. Prior CT with significant multivessel CAD concerning for ICM cardiomyopathy exacerbated by current UGIB.  Elevated filling pressures on echo are likely largely due to liver disease as he doesn't appear as grossly volume overloaded as expected.    Recommend diuresis with IVP Lasix; goal net negative 1-2L/daily while monitoring I/Os, lytes and renal function  Would introduce Jardiance 10 mg daily as transition to PO Lasix in next 24 hrs  Would then add Toprol, Entresto (or Valsartan if BP low), and Spironolactone  Defer ischemic evaluation with PET stress to outpt as he is not a revascularization candidate at this time with recurrent UGIB  Agree with pRBC resuscitation and IV iron supplementation for CHRISTIAN  Needs general cardiology follow-up on discharge

## 2024-05-10 NOTE — SUBJECTIVE & OBJECTIVE
Past Medical History:   Diagnosis Date    Alcohol abuse     BPH (benign prostatic hyperplasia)     Cirrhosis 06/21/2018    pt states that he was diagnosed a week ago during his last hospital visit    Closed fracture of left distal radius 7/19/2023    Gastritis     Hypertension     Renal disorder        Past Surgical History:   Procedure Laterality Date    CAPSULOTOMY OF JOINT Right 7/29/2019    Procedure: CAPSULOTOMY, JOINT;  Surgeon: Raj Grossman MD;  Location: [facility] OR [address] [place] [address];  Service: Orthopedics;  Laterality: Right;    COLONOSCOPY N/A 4/1/2024    Procedure: COLONOSCOPY;  Surgeon: Scarlett Whitt MD;  Location: [facility] ([address] [place] [address]);  Service: Gastroenterology;  Laterality: N/A;    ESOPHAGOGASTRODUODENOSCOPY N/A 10/25/2023    Procedure: EGD (ESOPHAGOGASTRODUODENOSCOPY);  Surgeon: Lux Rome MD;  Location: [facility] ENDO ([address] [place] [address]);  Service: Endoscopy;  Laterality: N/A;    ESOPHAGOGASTRODUODENOSCOPY N/A 4/19/2024    Procedure: EGD (ESOPHAGOGASTRODUODENOSCOPY);  Surgeon: Raj Roberts MD;  Location: [facility] ([address] [place] [address]);  Service: Endoscopy;  Laterality: N/A;    PERCUTANEOUS PINNING OF HIP Right 7/29/2019    Procedure: PINNING, HIP, PERCUTANEOUS- synthes cannulated screws- hana table- C arm door side-;  Surgeon: Raj Grossman MD;  Location: [address];  Service: Orthopedics;  Laterality: Right;       Review of patient's allergies indicates:  No Known Allergies    Family History       Problem Relation (Age of Onset)    Heart disease Father, Brother    Hypertension Father          Tobacco Use    Smoking status: Every Day     Current packs/day: 1.00     Types: Cigarettes    Smokeless tobacco: Never   Substance and Sexual Activity    Alcohol use: Yes     Comment: (former drinker; 2months sober) this visit admits drinking a couple beers today    Drug use: No    Sexual activity: Not on file         Review of Systems   Constitutional:  Positive for fatigue. Negative for activity change and fever.    HENT:  Negative for rhinorrhea and sneezing.    Eyes:  Positive for discharge.   Respiratory:  Positive for cough and shortness of breath.    Cardiovascular:  Positive for leg swelling. Negative for chest pain.   Gastrointestinal:  Positive for abdominal distention, abdominal pain, anal bleeding, blood in stool, constipation and diarrhea. Negative for rectal pain and vomiting.   Musculoskeletal:  Positive for back pain.   Skin:  Negative for rash and wound.   Psychiatric/Behavioral:  Negative for confusion.      Objective:     Vital Signs (Most Recent):  Temp: 98.8 °F (37.1 °C) (05/10/24 1159)  Pulse: (!) 132 (05/10/24 1159)  Resp: 16 (05/10/24 1159)  BP: 114/78 (05/10/24 1159)  SpO2: 99 % (05/10/24 1159) Vital Signs (24h Range):  Temp:  [97.7 °F (36.5 °C)-99.1 °F (37.3 °C)] 98.8 °F (37.1 °C)  Pulse:  [] 132  Resp:  [16-28] 16  SpO2:  [96 %-100 %] 99 %  BP: (104-128)/(63-89) 114/78     Weight: 88.5 kg (195 lb)  Body mass index is 25.73 kg/m².      Intake/Output Summary (Last 24 hours) at 5/10/2024 1229  Last data filed at 5/10/2024 1129  Gross per 24 hour   Intake 120 ml   Output 1365 ml   Net -1245 ml        Physical Exam  Vitals and nursing note reviewed.   Constitutional:       Appearance: He is normal weight. He is ill-appearing.   HENT:      Head: Normocephalic and atraumatic.   Cardiovascular:      Rate and Rhythm: Regular rhythm. Tachycardia present.   Pulmonary:      Effort: Pulmonary effort is normal. No respiratory distress.      Breath sounds: Examination of the right-middle field reveals decreased breath sounds. Examination of the right-lower field reveals decreased breath sounds. Examination of the left-lower field reveals decreased breath sounds. Decreased breath sounds present.   Abdominal:      General: Abdomen is flat. There is distension.      Palpations: Abdomen is soft.      Tenderness: There is abdominal tenderness (epigastric).   Musculoskeletal:      Right lower leg: No edema.       Left lower leg: No edema.   Skin:     General: Skin is warm and dry.      Coloration: Skin is pale.   Neurological:      General: No focal deficit present.      Mental Status: He is alert and oriented to person, place, and time. Mental status is at baseline.          Vents:       Lines/Drains/Airways       Peripheral Intravenous Line  Duration                  Peripheral IV - Single Lumen 05/09/24 1425 18 G Posterior;Right Hand <1 day         Peripheral IV - Single Lumen 05/10/24 1050 22 G Anterior;Left Shoulder <1 day                    Significant Labs:    CBC/Anemia Profile:  Recent Labs   Lab 05/09/24  1426 05/09/24  1432 05/10/24  0720 05/10/24  0721   WBC 4.04  --  3.06*  --    HGB 7.0*  --  6.3*  --    HCT 25.3* 25* 23.3*  --      --  238  --    MCV 82  --  85  --    RDW 19.6*  --  19.7*  --    IRON  --   --   --  15*   FERRITIN  --   --   --  19*   RETIC  --   --  1.9  --    FOLATE  --   --  15.0  --    [id number]  --   --  384  --         Chemistries:  Recent Labs   Lab 05/09/24  1426 05/09/24  1637 05/09/24  2159 05/10/24  0721   * 136 135* 136   K 5.5* 5.7* 4.3 4.4   CL 99 99 97 96   CO2 29 30* 33* 28   BUN 16 16 18 19   CREATININE 1.0 1.0 1.0 1.2   CALCIUM 9.0 8.4* 8.8 9.1   ALBUMIN 2.9*  --   --  2.8*   PROT 7.9  --   --  6.9   BILITOT 0.7  --   --  0.7   ALKPHOS 119  --   --  108   ALT 16  --   --  11   AST 62*  --   --  24   MG  --   --   --  1.5*   PHOS  --   --   --  3.7       All pertinent labs within the past 24 hours have been reviewed.    Significant Imaging:   I have reviewed all pertinent imaging results/findings within the past 24 hours.

## 2024-05-10 NOTE — ASSESSMENT & PLAN NOTE
"CAD (coronary artery disease)     3/28/2024 BNP 2,932 and 4/18/2024 BNP 4,361; no TTE since 2022. Prior CT with significant multivessel CAD.    Echo    Result Date: 5/10/2024    Left Ventricle: The left ventricle is mildly dilated. Moderate global   hypokinesis and regional wall motion abnormalities present. There is   moderately reduced systolic function with a visually estimated ejection   fraction of 35 - 40%. Biplane (2D) method of discs ejection fraction is   33%. There is diastolic dysfunction. Elevated left ventricular filling   pressure.    Right Ventricle: Normal right ventricular cavity size. Systolic   function is mildly reduced.    Left Atrium: Left atrium is moderately dilated.    Aortic Valve: There is moderate aortic valve sclerosis. Mildly   restricted motion.    Mitral Valve: There is mild annular dilation present. There is mild   mitral annular calcification present.    Tricuspid Valve: There is moderate annular dilation present. There is   moderate regurgitation.    Pulmonary Artery: The estimated pulmonary artery systolic pressure is   55 mmHg.    IVC/SVC: Elevated venous pressure at 15 mmHg.    Pericardium: Large Bilateral pleural effusions.       - Cardiology consulted, appreciate recs  "Prior CT with significant multivessel CAD concerning for ICM cardiomyopathy exacerbated by current UGIB. Elevated filling pressures on echo are likely largely due to liver disease as he doesn't appear as grossly volume overloaded as expected."  Defer ischemic evaluation with PET stress to outpt as he is not a revascularization candidate at this time with recurrent UGIB  Ambulatory referral to General Cardiology at discharge  - Lasix 40 mg PO  and monitor UOP; goal net negative 1-2 L/daily  - GDMT:  BB: started metoprolol 12.5mg XL  ACE/ARB/ARNI: Recommend Entresto, if BP too low, try valsartan  SGLT2i: Start Jardiance 10 mg po daily  MRA: started spironolactone 12.5mg  - Start ASA when anemia is controlled  "

## 2024-05-10 NOTE — PLAN OF CARE
[facility] - Telemetry Stepdown  Initial Discharge Assessment       Primary Care Provider: [facility]    Admission Diagnosis: CHF (congestive heart failure) [I50.9]  Epigastric abdominal pain [R10.13]  GI bleed [K92.2]  Chest pain [R07.9]    Admission Date: 5/9/2024  Expected Discharge Date: 5/12/2024    Transition of Care Barriers: None    Payor: [employer] [facility] / Plan: [employer] / Product Type: Medicare Advantage /     Extended Emergency Contact Information  Primary Emergency Contact: Ina Quintero   [place]  Mobile Phone: [phone]  Relation: Friend  Secondary Emergency Contact: Tonya Lewis   [facility]  Home Phone: [phone]  Relation: Friend    Discharge Plan A: Skilled Nursing Facility  Discharge Plan B: Return to Nursing Home      [id number] [place] - [address]  [address]  [place] [address]  Phone: [phone] Fax: [phone]    [id number] - [place] - [address] AT [facility] [address]  [address] [place] [address]  Phone: [phone] Fax: [phone]    [facility]  [address]  [facility] [address]  Phone: [phone] Fax: [phone]      Initial Assessment (most recent)       Adult Discharge Assessment - 05/10/24 0936          Discharge Assessment    Assessment Type Discharge Planning Assessment     Confirmed/corrected address, phone number and insurance Yes     Confirmed Demographics Correct on Facesheet     Source of Information patient     Communicated ARTURO with patient/caregiver Yes     Reason For Admission CHF     People in Home facility resident     Facility Arrived From: [facility]     Do you expect to return to your current living situation? Yes     Do you have help at home or someone to help you manage your care at home? Yes     Who are your caregiver(s) and their phone number(s)? [facility]  staff     Prior to hospitilization cognitive status: Alert/Oriented     Current cognitive status: Alert/Oriented     Walking or Climbing Stairs ambulation difficulty, requires equipment;ambulation difficulty, assistance 1 person     Dressing/Bathing Difficulty no     Dressing/Bathing bathing difficulty, requires equipment;bathing difficulty, assistance 1 person     Equipment Currently Used at Home none     Readmission within 30 days? Yes     Patient currently being followed by outpatient case management? No     Do you currently have service(s) that help you manage your care at home? No     Do you take prescription medications? Yes     Do you have prescription coverage? Yes     Coverage [facility] resident     Do you have any problems affording any of your prescribed medications? No     Is the patient taking medications as prescribed? yes     Who is going to help you get home at discharge? NH staff     How do you get to doctors appointments? other (see comments)   NH staff    Are you on dialysis? No     Do you take coumadin? No     Discharge Plan A Skilled Nursing Facility     Discharge Plan B Return to Nursing Home     DME Needed Upon Discharge  none     Discharge Plan discussed with: Patient     Transition of Care Barriers None        Physical Activity    On average, how many days per week do you engage in moderate to strenuous exercise (like a brisk walk)? 3 days     On average, how many minutes do you engage in exercise at this level? 20 min        Financial Resource Strain    How hard is it for you to pay for the very basics like food, housing, medical care, and heating? Not hard at all        Housing Stability    In the last 12 months, was there a time when you were not able to pay the mortgage or rent on time? No     At any time in the past 12 months, were you homeless or living in a shelter (including now)? No        Transportation Needs    Has the lack of transportation kept you from medical appointments, meetings,  work or from getting things needed for daily living? No        Food Insecurity    Within the past 12 months, you worried that your food would run out before you got the money to buy more. Never true     Within the past 12 months, the food you bought just didn't last and you didn't have money to get more. Never true        Stress    Do you feel stress - tense, restless, nervous, or anxious, or unable to sleep at night because your mind is troubled all the time - these days? Only a little        Social Isolation    How often do you feel lonely or isolated from those around you?  Rarely        Alcohol Use    Q1: How often do you have a drink containing alcohol? Never     Q2: How many drinks containing alcohol do you have on a typical day when you are drinking? Patient does not drink     Q3: How often do you have six or more drinks on one occasion? Never        Utilities    In the past 12 months has the electric, gas, oil, or water company threatened to shut off services in your home? No        Health Literacy    How often do you need to have someone help you when you read instructions, pamphlets, or other written material from your doctor or pharmacy? Never                      Pt a resident of [facility]. Will return to the NH once D/C.  No other needs noted upon admit.   Discharge Plan A and Plan B have been determined by review of patient's clinical status, future medical and therapeutic needs, and coverage/benefits for post-acute care in coordination with multidisciplinary team members.  Lasha Singh, [facility] [place]    [facility]  [phone]

## 2024-05-10 NOTE — CONSULTS
"[facility] - Telemetry Stepdown  Cardiology  Consult Note    Patient Name: Fransico Montalvo  MRN: [id number]  Admission Date: 5/9/2024  Hospital Length of Stay: 1 days  Code Status: Full Code   Attending Provider: Harriet Bermudez MD   Consulting Provider: Van Kwong MD  Primary Care Physician: [facility]  Principal Problem:Hematochezia    Patient information was obtained from patient and ER records.     Inpatient consult to Cardiology  Consult performed by: Van Kwong MD  Consult ordered by: Awa Rodriguez MD  Reason for consult: HFrEF        Subjective:     Chief Complaint:  Dark stools, SOB     HPI:   Fransico Montalvo is a 67 yo M with PMH of EtOH cirrhosis, presumed COPD, multifactorial anemia, CKD3, HTN, BPH, previous esophageal varices, and gastritis who presented from SN[facility] c/o bloody stool. Per patient, he noted dark red blood from his rectum since this morning (states it is mixed in stool and also on its own.) Also endorses new upper abdominal pain which started after eating food yesterday evening. Over the past month, he has experienced fatigue, SOB, and two pre-syncopal episodes. Of note, patient with two recent admissions: 3/28-4/4 for CAP and symptomatic anemia (s/p 5 U pRBCs) and 4/18-4/23 for melena (s/p 1 U pRBCs.) Of note, EGD on 10/25/2023 and colonoscopy on 4/1/2024 were unremarkable. EGD on 4/19 showed "single non-bleeding angioectasia in the jejunum; treated with APC."     Upon arrival to the ED, /77, , RR 18, SpO2 100% on RA, and T 97.7 F. Labs significant for Hgb 7.0 and K 5.5 (5.8 on repeat). Physical exam in the ED pertinent for bright red blood present in rectum, epigastric tenderness, and pallor. EKG NSR. CTA without active hemorrhage, ascites, bilateral pleural effusion (loculated on the right), and a lateral pancreatic head enhancing lesion. He received 80 mg IV Protonix x 1 and 2 mg IV morphine x 1. CTA without obvious bleed.     Cardiology consulted for echo showing " newly reduced EF 35-40% with global hypokinesis.     Past Medical History:   Diagnosis Date    Alcohol abuse     BPH (benign prostatic hyperplasia)     Cirrhosis 06/21/2018    pt states that he was diagnosed a week ago during his last hospital visit    Closed fracture of left distal radius 7/19/2023    Gastritis     Hypertension     Renal disorder        Past Surgical History:   Procedure Laterality Date    CAPSULOTOMY OF JOINT Right 7/29/2019    Procedure: CAPSULOTOMY, JOINT;  Surgeon: Raj Grossman MD;  [facility];  Service: Orthopedics;  Laterality: Right;    COLONOSCOPY N/A 4/1/2024    Procedure: COLONOSCOPY;  Surgeon: Scarlett Whitt MD;  Location: [facility] ENDO (2ND FLR);  Service: Gastroenterology;  Laterality: N/A;    ESOPHAGOGASTRODUODENOSCOPY N/A 10/25/2023    Procedure: EGD (ESOPHAGOGASTRODUODENOSCOPY);  Surgeon: Lux Rome MD;  [facility]  Service: Endoscopy;  Laterality: N/A;    ESOPHAGOGASTRODUODENOSCOPY N/A 4/19/2024    Procedure: EGD (ESOPHAGOGASTRODUODENOSCOPY);  Surgeon: Raj Roberts MD;  Location: [facility] ENDO (2ND FLR);  Service: Endoscopy;  Laterality: N/A;    PERCUTANEOUS PINNING OF HIP Right 7/29/2019    Procedure: PINNING, HIP, PERCUTANEOUS- synthes cannulated screws- hana table- C arm door side-;  Surgeon: Raj Grossman MD;  Location: [facility] OR [address] [place] [address];  Service: Orthopedics;  Laterality: Right;       Review of patient's allergies indicates:  No Known Allergies    No current facility-administered medications on file prior to encounter.     Current Outpatient Medications on File Prior to Encounter   Medication Sig    acetaminophen (TYLENOL) 325 MG tablet Take 2 tablets (650 mg total) by mouth every 8 (eight) hours as needed for Pain.    albuterol-ipratropium (DUO-NEB) 2.5 mg-0.5 mg/3 mL nebulizer solution Take 3 mLs by nebulization every 6 (six) hours as needed for Wheezing or Shortness of Breath. Rescue    dicyclomine (BENTYL) 10 MG capsule  Take 10 mg by mouth every 8 (eight) hours as needed (Abdomnial pain).    EScitalopram oxalate (LEXAPRO) 10 MG tablet Take 10 mg by mouth once daily.    fluticasone furoate-vilanteroL (BREO) 100-25 mcg/dose diskus inhaler Inhale 1 puff into the lungs once daily. Controller    folic acid (FOLVITE) 1 MG tablet Take 1 tablet (1 mg total) by mouth once daily.    melatonin (MELATIN) 3 mg tablet Take 2 tablets (6 mg total) by mouth nightly as needed for Insomnia.    ondansetron (ZOFRAN-ODT) 8 MG TbDL Take 8 mg by mouth every 8 (eight) hours as needed (Nausea).    pantoprazole (PROTONIX) 40 MG tablet Take 1 tablet (40 mg total) by mouth once daily.    thiamine 100 MG tablet Take 100 mg by mouth once daily.     Family History       Problem Relation (Age of Onset)    Heart disease Father, Brother    Hypertension Father          Tobacco Use    Smoking status: Every Day     Current packs/day: 1.00     Types: Cigarettes    Smokeless tobacco: Never   Substance and Sexual Activity    Alcohol use: Yes     Comment: (former drinker; 2months sober) this visit admits drinking a couple beers today    Drug use: No    Sexual activity: Not on file     Review of Systems   Constitutional: Positive for malaise/fatigue.   Cardiovascular:  Negative for chest pain.   Respiratory:  Positive for shortness of breath.    Musculoskeletal:  Negative for falls.   Gastrointestinal:  Positive for melena and nausea. Negative for vomiting.   All other systems reviewed and are negative.    Objective:     Vital Signs (Most Recent):  Temp: 98.8 °F (37.1 °C) (05/10/24 1159)  Pulse: (!) 132 (05/10/24 1159)  Resp: 16 (05/10/24 1252)  BP: 114/78 (05/10/24 1159)  SpO2: 99 % (05/10/24 1159) Vital Signs (24h Range):  Temp:  [97.7 °F (36.5 °C)-99.1 °F (37.3 °C)] 98.8 °F (37.1 °C)  Pulse:  [] 132  Resp:  [16-28] 16  SpO2:  [96 %-100 %] 99 %  BP: (104-128)/(63-89) 114/78     Weight: 88.5 kg (195 lb)  Body mass index is 25.73 kg/m².    SpO2: 99 %          Intake/Output Summary (Last 24 hours) at 5/10/2024 1317  Last data filed at 5/10/2024 1129  Gross per 24 hour   Intake 120 ml   Output 1365 ml   Net -1245 ml       Lines/Drains/Airways       Peripheral Intravenous Line  Duration                  Peripheral IV - Single Lumen 05/09/24 1425 18 G Posterior;Right Hand <1 day         Peripheral IV - Single Lumen 05/10/24 1050 22 G Anterior;Left Shoulder <1 day                     Physical Exam  Constitutional:       General: He is not in acute distress.     Appearance: Normal appearance.   HENT:      Head: Normocephalic.      Mouth/Throat:      Mouth: Mucous membranes are moist.   Eyes:      Comments: Pale conjunctiva   Cardiovascular:      Rate and Rhythm: Normal rate and regular rhythm.      Pulses: Normal pulses.   Pulmonary:      Effort: Pulmonary effort is normal. No respiratory distress.      Comments: Diminished air movement diffusely  Abdominal:      General: There is no distension.      Palpations: Abdomen is soft.      Tenderness: There is no abdominal tenderness.   Musculoskeletal:      Cervical back: Neck supple.      Right lower leg: No edema.      Left lower leg: No edema.   Skin:     General: Skin is warm.      Coloration: Skin is pale.   Neurological:      General: No focal deficit present.          Significant Labs: BMP:   Recent Labs   Lab 05/09/24  1637 05/09/24  2159 05/10/24  0721   * 102 108    135* 136   K 5.7* 4.3 4.4   CL 99 97 96   CO2 30* 33* 28   BUN 16 18 19   CREATININE 1.0 1.0 1.2   CALCIUM 8.4* 8.8 9.1   MG  --   --  1.5*   , CMP   Recent Labs   Lab 05/09/24  1426 05/09/24  1637 05/09/24  2159 05/10/24  0721   * 136 135* 136   K 5.5* 5.7* 4.3 4.4   CL 99 99 97 96   CO2 29 30* 33* 28   GLU 89 112* 102 108   BUN 16 16 18 19   CREATININE 1.0 1.0 1.0 1.2   CALCIUM 9.0 8.4* 8.8 9.1   PROT 7.9  --   --  6.9   ALBUMIN 2.9*  --   --  2.8*   BILITOT 0.7  --   --  0.7   ALKPHOS 119  --   --  108   AST 62*  --   --  24   ALT  "16  --   --  11   ANIONGAP 6* 7* 5* 12   , CBC   Recent Labs   Lab 05/09/24  1426 05/09/24  1432 05/10/24  0720   WBC 4.04  --  3.06*   HGB 7.0*  --  6.3*   HCT 25.3*   < > 23.3*     --  238    < > = values in this interval not displayed.   , INR   Recent Labs   Lab 05/09/24  1727 05/10/24  0720   INR 1.2 1.2   , Lipid Panel No results for input(s): "CHOL", "HDL", "LDLCALC", "TRIG", "CHOLHDL" in the last 48 hours., and Troponin No results for input(s): "TROPONINI" in the last 48 hours.    Significant Imaging:   TTE 5/10/24    Left Ventricle: The left ventricle is mildly dilated. Moderate global hypokinesis and regional wall motion abnormalities present. There is moderately reduced systolic function with a visually estimated ejection fraction of 35 - 40%. Biplane (2D) method of discs ejection fraction is 33%. There is diastolic dysfunction. Elevated left ventricular filling pressure.    Right Ventricle: Normal right ventricular cavity size. Systolic function is mildly reduced.    Left Atrium: Left atrium is moderately dilated.    Aortic Valve: There is moderate aortic valve sclerosis. Mildly restricted motion.    Mitral Valve: There is mild annular dilation present. There is mild mitral annular calcification present.    Tricuspid Valve: There is moderate annular dilation present. There is moderate regurgitation.    Pulmonary Artery: The estimated pulmonary artery systolic pressure is 55 mmHg.    IVC/SVC: Elevated venous pressure at 15 mmHg.    Pericardium: Large Bilateral pleural effusions.  Assessment and Plan:     HFrEF (heart failure with reduced ejection fraction)  New Dx on echo today. Prior CT with significant multivessel CAD concerning for ICM cardiomyopathy exacerbated by current UGIB.  Elevated filling pressures on echo are likely largely due to liver disease as he doesn't appear as grossly volume overloaded as expected.    Recommend diuresis with IVP Lasix; goal net negative 1-2L/daily while " monitoring I/Os, lytes and renal function  Would introduce Jardiance 10 mg daily as transition to PO Lasix in next 24 hrs  Would then add Toprol, Entresto (or Valsartan if BP low), and Spironolactone  Defer ischemic evaluation with PET stress to outpt as he is not a revascularization candidate at this time with recurrent UGIB  Agree with pRBC resuscitation and IV iron supplementation for CHRISTIAN  Needs general cardiology follow-up on discharge        VTE Risk Mitigation (From admission, onward)           Ordered     Reason for No Pharmacological VTE Prophylaxis  Once        Question:  Reasons:  Answer:  Active Bleeding    05/09/24 1609     IP VTE HIGH RISK PATIENT  Once         05/09/24 1609     Place sequential compression device  Until discontinued         05/09/24 1609                  Case discussed with Dr Jocelyn Hill MD.    Thank you for your consult. I will sign off. Please contact us if you have any additional questions.    Van Kwong MD  Cardiology   [facility] - Telemetry Stepdown

## 2024-05-10 NOTE — ASSESSMENT & PLAN NOTE
"5/9 CTA: "Enhancing lesion within the lateral aspect of the pancreatic head, not seen on examination 12/25/2023. In this region however, on the previous exam, there is a hypoattenuating lesion with peripheral calcification. This did not undergo significant enhancement at that time. Solid lesion is of concern. Correlation and follow-up advised."     elevated at 88.6. LDH, AFP, CEA WNL.     - F/u PCP  "

## 2024-05-10 NOTE — ASSESSMENT & PLAN NOTE
Patient's with Normocytic anemia. Hemoglobin stable. Patient's anemia is currently uncontrolled. Has not received any PRBCs to date. Chronic etiology due to iron deficiency and cirrhosis, exacerbated by acute blood loss from GI bleed.    -Current CBC reviewed-    Recent Labs   Lab 05/09/24  1426 05/10/24  0720   HGB 7.0* 6.3*     PLAN  - Monitor serial CBC and transfuse if patient becomes hemodynamically unstable, symptomatic or H/H drops below 7/21  - Give iron sucrose 500 mg IV x 1

## 2024-05-10 NOTE — SUBJECTIVE & OBJECTIVE
Past Medical History:   Diagnosis Date    Alcohol abuse     BPH (benign prostatic hyperplasia)     Cirrhosis 06/21/2018    pt states that he was diagnosed a week ago during his last hospital visit    Closed fracture of left distal radius 7/19/2023    Gastritis     Hypertension     Renal disorder        Past Surgical History:   Procedure Laterality Date    CAPSULOTOMY OF JOINT Right 7/29/2019    Procedure: CAPSULOTOMY, JOINT;  Surgeon: Raj Grossman MD;  Location: [facility] OR [address] [place] [address];  Service: Orthopedics;  Laterality: Right;    COLONOSCOPY N/A 4/1/2024    Procedure: COLONOSCOPY;  Surgeon: Scarlett Whitt MD;  [facility]  Service: Gastroenterology;  Laterality: N/A;    ESOPHAGOGASTRODUODENOSCOPY N/A 10/25/2023    Procedure: EGD (ESOPHAGOGASTRODUODENOSCOPY);  Surgeon: Lux Rome MD;  [facility]  Service: Endoscopy;  Laterality: N/A;    ESOPHAGOGASTRODUODENOSCOPY N/A 4/19/2024    Procedure: EGD (ESOPHAGOGASTRODUODENOSCOPY);  Surgeon: Raj Roberts MD;  Location: [facility] ([address] [place] [address]);  Service: Endoscopy;  Laterality: N/A;    PERCUTANEOUS PINNING OF HIP Right 7/29/2019    Procedure: PINNING, HIP, PERCUTANEOUS- synthes cannulated screws- hana table- C arm door side-;  Surgeon: Raj Grossman MD;  Location: [facility] OR [address] [place] [address];  Service: Orthopedics;  Laterality: Right;       Review of patient's allergies indicates:  No Known Allergies    No current facility-administered medications on file prior to encounter.     Current Outpatient Medications on File Prior to Encounter   Medication Sig    acetaminophen (TYLENOL) 325 MG tablet Take 2 tablets (650 mg total) by mouth every 8 (eight) hours as needed for Pain.    albuterol-ipratropium (DUO-NEB) 2.5 mg-0.5 mg/3 mL nebulizer solution Take 3 mLs by nebulization every 6 (six) hours as needed for Wheezing or Shortness of Breath. Rescue    dicyclomine (BENTYL) 10 MG capsule Take 10 mg by mouth every 8 (eight) hours as needed  (Abdomnial pain).    EScitalopram oxalate (LEXAPRO) 10 MG tablet Take 10 mg by mouth once daily.    fluticasone furoate-vilanteroL (BREO) 100-25 mcg/dose diskus inhaler Inhale 1 puff into the lungs once daily. Controller    folic acid (FOLVITE) 1 MG tablet Take 1 tablet (1 mg total) by mouth once daily.    melatonin (MELATIN) 3 mg tablet Take 2 tablets (6 mg total) by mouth nightly as needed for Insomnia.    ondansetron (ZOFRAN-ODT) 8 MG TbDL Take 8 mg by mouth every 8 (eight) hours as needed (Nausea).    pantoprazole (PROTONIX) 40 MG tablet Take 1 tablet (40 mg total) by mouth once daily.    thiamine 100 MG tablet Take 100 mg by mouth once daily.     Family History       Problem Relation (Age of Onset)    Heart disease Father, Brother    Hypertension Father          Tobacco Use    Smoking status: Every Day     Current packs/day: 1.00     Types: Cigarettes    Smokeless tobacco: Never   Substance and Sexual Activity    Alcohol use: Yes     Comment: (former drinker; 2months sober) this visit admits drinking a couple beers today    Drug use: No    Sexual activity: Not on file     Review of Systems   Constitutional: Positive for malaise/fatigue.   Cardiovascular:  Negative for chest pain.   Respiratory:  Positive for shortness of breath.    Musculoskeletal:  Negative for falls.   Gastrointestinal:  Positive for melena and nausea. Negative for vomiting.   All other systems reviewed and are negative.    Objective:     Vital Signs (Most Recent):  Temp: 98.8 °F (37.1 °C) (05/10/24 1159)  Pulse: (!) 132 (05/10/24 1159)  Resp: 16 (05/10/24 1252)  BP: 114/78 (05/10/24 1159)  SpO2: 99 % (05/10/24 1159) Vital Signs (24h Range):  Temp:  [97.7 °F (36.5 °C)-99.1 °F (37.3 °C)] 98.8 °F (37.1 °C)  Pulse:  [] 132  Resp:  [16-28] 16  SpO2:  [96 %-100 %] 99 %  BP: (104-128)/(63-89) 114/78     Weight: 88.5 kg (195 lb)  Body mass index is 25.73 kg/m².    SpO2: 99 %         Intake/Output Summary (Last 24 hours) at 5/10/2024 1317  Last  data filed at 5/10/2024 1129  Gross per 24 hour   Intake 120 ml   Output 1365 ml   Net -1245 ml       Lines/Drains/Airways       Peripheral Intravenous Line  Duration                  Peripheral IV - Single Lumen 05/09/24 1425 18 G Posterior;Right Hand <1 day         Peripheral IV - Single Lumen 05/10/24 1050 22 G Anterior;Left Shoulder <1 day                     Physical Exam  Constitutional:       General: He is not in acute distress.     Appearance: Normal appearance.   HENT:      Head: Normocephalic.      Mouth/Throat:      Mouth: Mucous membranes are moist.   Eyes:      Comments: Pale conjunctiva   Cardiovascular:      Rate and Rhythm: Normal rate and regular rhythm.      Pulses: Normal pulses.   Pulmonary:      Effort: Pulmonary effort is normal. No respiratory distress.      Comments: Diminished air movement diffusely  Abdominal:      General: There is no distension.      Palpations: Abdomen is soft.      Tenderness: There is no abdominal tenderness.   Musculoskeletal:      Cervical back: Neck supple.      Right lower leg: No edema.      Left lower leg: No edema.   Skin:     General: Skin is warm.      Coloration: Skin is pale.   Neurological:      General: No focal deficit present.          Significant Labs: BMP:   Recent Labs   Lab 05/09/24  1637 05/09/24  2159 05/10/24  0721   * 102 108    135* 136   K 5.7* 4.3 4.4   CL 99 97 96   CO2 30* 33* 28   BUN 16 18 19   CREATININE 1.0 1.0 1.2   CALCIUM 8.4* 8.8 9.1   MG  --   --  1.5*   , CMP   Recent Labs   Lab 05/09/24  1426 05/09/24  1637 05/09/24  2159 05/10/24  0721   * 136 135* 136   K 5.5* 5.7* 4.3 4.4   CL 99 99 97 96   CO2 29 30* 33* 28   GLU 89 112* 102 108   BUN 16 16 18 19   CREATININE 1.0 1.0 1.0 1.2   CALCIUM 9.0 8.4* 8.8 9.1   PROT 7.9  --   --  6.9   ALBUMIN 2.9*  --   --  2.8*   BILITOT 0.7  --   --  0.7   ALKPHOS 119  --   --  108   AST 62*  --   --  24   ALT 16  --   --  11   ANIONGAP 6* 7* 5* 12   , CBC   Recent Labs   Lab  "05/09/24  1426 05/09/24  1432 05/10/24  0720   WBC 4.04  --  3.06*   HGB 7.0*  --  6.3*   HCT 25.3*   < > 23.3*     --  238    < > = values in this interval not displayed.   , INR   Recent Labs   Lab 05/09/24  1727 05/10/24  0720   INR 1.2 1.2   , Lipid Panel No results for input(s): "CHOL", "HDL", "LDLCALC", "TRIG", "CHOLHDL" in the last 48 hours., and Troponin No results for input(s): "TROPONINI" in the last 48 hours.    Significant Imaging:   TTE 5/10/24    Left Ventricle: The left ventricle is mildly dilated. Moderate global hypokinesis and regional wall motion abnormalities present. There is moderately reduced systolic function with a visually estimated ejection fraction of 35 - 40%. Biplane (2D) method of discs ejection fraction is 33%. There is diastolic dysfunction. Elevated left ventricular filling pressure.    Right Ventricle: Normal right ventricular cavity size. Systolic function is mildly reduced.    Left Atrium: Left atrium is moderately dilated.    Aortic Valve: There is moderate aortic valve sclerosis. Mildly restricted motion.    Mitral Valve: There is mild annular dilation present. There is mild mitral annular calcification present.    Tricuspid Valve: There is moderate annular dilation present. There is moderate regurgitation.    Pulmonary Artery: The estimated pulmonary artery systolic pressure is 55 mmHg.    IVC/SVC: Elevated venous pressure at 15 mmHg.    Pericardium: Large Bilateral pleural effusions.  "

## 2024-05-10 NOTE — ASSESSMENT & PLAN NOTE
Recent Labs     05/09/24  1426 05/09/24  1727 05/10/24  0720 05/10/24  0721   BILITOT 0.7  --   --  0.7   AST 62*  --   --  24   ALT 16  --   --  11   ALKPHOS 119  --   --  108     --  238  --    INR  --    < > 1.2  --    ALBUMIN 2.9*  --   --  2.8*    < > = values in this interval not displayed.     Patient with known Alcoholic Cirrhosis. Co-morbidities are present and inclusive of ascites, portal hypertension, and anemia/pancytopenia.    MELD-Na score calculated; MELD 3.0: 12 at 5/10/2024  7:21 AM  MELD-Na: 10 at 5/10/2024  7:21 AM  Calculated from:  Serum Creatinine: 1.2 mg/dL at 5/10/2024  7:21 AM  Serum Sodium: 136 mmol/L at 5/10/2024  7:21 AM  Total Bilirubin: 0.7 mg/dL (Using min of 1 mg/dL) at 5/10/2024  7:21 AM  Serum Albumin: 2.8 g/dL at 5/10/2024  7:21 AM  INR(ratio): 1.2 at 5/10/2024  7:20 AM  Age at listing (hypothetical): 68 years  Sex: Male at 5/10/2024  7:21 AM    During admission, reported he quit alcohol one month ago.     PLAN:  - Will avoid any hepatotoxic meds, and monitor CBC/CMP/INR for synthetic function.   - IR for therapeutic paracentesis; deferred until Monday 5/13 due to current Hgb < 7

## 2024-05-10 NOTE — HPI
"This is a 68-year-old man with a history of HTN, CKD3, presumed COPD, BPH, gastritis, decompensated EtOH cirrhosis complicated by esophageal variceal bleed and recent angioectasia s/p APC (4/19/24) who is present after noticing "dark red blood around poop and in the bowl" and descibes the stool as "mostly black with a little brown". He denies any hematemesis and vision changes. He reports alternating constipation and diarrhea, feeling short of breath for more than one month, reports passing out twice over the past month, and abdominal tenderness.    On arrival patient was tachycardic 101, normotensive, and requiring 6L NC. On arrival Hgb 7.0 (appears to be baseline over past month 7-8, previous baseline was 8-9), INR 1.2, BUN and Cr unremarkable. Amylase 126, lipase 758. Physical exam in the ED pertinent for bright red blood present in rectum.He was started on protonix, octreotide, and ceftriaxone. Paracentesis ordered. CTA negative for any sign of contrast extravasation indicative of gastrointestinal hemorrhage.    Of note, patient with two recent admissions: 3/28-4/4 for CAP and symptomatic anemia (s/p 5 U pRBCs) and 4/18-4/23 for melena (s/p 1 U pRBCs.)     EGD 4/19/24:  - Normal esophagus. Congestive gastropathy. Normal examined duodenum. A single non-bleeding angioectasia in the jejunum. Treated with argon plasma coagulation (APC).   Colonoscopy 4/1/24: The entire examined colon is normal. The examined portion of the ileum was normal. The distal rectum and anal verge are normal on retroflexion view.   "

## 2024-05-10 NOTE — SUBJECTIVE & OBJECTIVE
Past Medical History:   Diagnosis Date    Alcohol abuse     BPH (benign prostatic hyperplasia)     Cirrhosis 06/21/2018    pt states that he was diagnosed a week ago during his last hospital visit    Closed fracture of left distal radius 7/19/2023    Gastritis     Hypertension     Renal disorder        Past Surgical History:   Procedure Laterality Date    CAPSULOTOMY OF JOINT Right 7/29/2019    Procedure: CAPSULOTOMY, JOINT;  Surgeon: Raj Grossman MD;  Location: [facility] OR [address] [place] [address];  Service: Orthopedics;  Laterality: Right;    COLONOSCOPY N/A 4/1/2024    Procedure: COLONOSCOPY;  Surgeon: Scarlett Whitt MD;  Location: [facility]O ([facility]);  Service: Gastroenterology;  Laterality: N/A;    ESOPHAGOGASTRODUODENOSCOPY N/A 10/25/2023    Procedure: EGD (ESOPHAGOGASTRODUODENOSCOPY);  Surgeon: Lux Rome MD;  [facility]: [facility] ([facility]);  Service: Endoscopy;  Laterality: N/A;    ESOPHAGOGASTRODUODENOSCOPY N/A 4/19/2024    Procedure: EGD (ESOPHAGOGASTRODUODENOSCOPY);  Surgeon: Raj Roberts MD;  Location: [facility] ([address] [place] [address]);  Service: Endoscopy;  Laterality: N/A;    PERCUTANEOUS PINNING OF HIP Right 7/29/2019    Procedure: PINNING, HIP, PERCUTANEOUS- synthes cannulated screws- hana table- C arm door side-;  Surgeon: Raj Grossman MD;  Location: [address];  Service: Orthopedics;  Laterality: Right;       Review of patient's allergies indicates:  No Known Allergies  Family History       Problem Relation (Age of Onset)    Heart disease Father, Brother    Hypertension Father          Tobacco Use    Smoking status: Every Day     Current packs/day: 1.00     Types: Cigarettes    Smokeless tobacco: Never   Substance and Sexual Activity    Alcohol use: Yes     Comment: (former drinker; 2months sober) this visit admits drinking a couple beers today    Drug use: No    Sexual activity: Not on file     Review of Systems   Constitutional:  Positive for fatigue.   HENT:  Positive for rhinorrhea and sneezing.     Eyes:  Positive for discharge.   Respiratory:  Positive for cough and shortness of breath.    Gastrointestinal:  Positive for abdominal distention, abdominal pain, anal bleeding, blood in stool, constipation and diarrhea. Negative for rectal pain and vomiting.     Objective:     Vital Signs (Most Recent):  Temp: 98.2 °F (36.8 °C) (05/10/24 0805)  Pulse: 96 (05/10/24 0805)  Resp: 16 (05/10/24 0805)  BP: 107/63 (05/10/24 0805)  SpO2: 100 % (05/10/24 0805) Vital Signs (24h Range):  Temp:  [97.7 °F (36.5 °C)-98.8 °F (37.1 °C)] 98.2 °F (36.8 °C)  Pulse:  [] 96  Resp:  [16-28] 16  SpO2:  [96 %-100 %] 100 %  BP: (104-128)/(63-89) 107/63     Weight: 88.5 kg (195 lb) (05/10/24 0805)  Body mass index is 25.73 kg/m².      Intake/Output Summary (Last 24 hours) at 5/10/2024 0907  Last data filed at 5/10/2024 0538  Gross per 24 hour   Intake 120 ml   Output 850 ml   Net -730 ml       Lines/Drains/Airways       Peripheral Intravenous Line  Duration                  Peripheral IV - Single Lumen 05/09/24 1425 18 G Posterior;Right Hand <1 day                     Physical Exam  Vitals and nursing note reviewed.   Constitutional:       Appearance: He is normal weight. He is ill-appearing.   HENT:      Head: Normocephalic and atraumatic.      Nose: Congestion and rhinorrhea present.      Mouth/Throat:      Mouth: Mucous membranes are dry.   Eyes:      General:         Right eye: Discharge (watery discharge) present.         Left eye: Discharge (watery discharge) present.  Cardiovascular:      Rate and Rhythm: Normal rate and regular rhythm.   Pulmonary:      Effort: Pulmonary effort is normal. No respiratory distress.      Breath sounds: Normal breath sounds.   Abdominal:      General: Abdomen is flat. There is distension.      Tenderness: There is abdominal tenderness (epigastric).   Genitourinary:     Comments: Chaperoned by Nurse Poornima  Melena noted on indra pad  Melena on ARCELIA  No visible hemorrhoids   Musculoskeletal:       Right lower leg: No edema.      Left lower leg: No edema.   Skin:     General: Skin is warm and dry.      Coloration: Skin is pale.   Neurological:      Mental Status: He is alert and oriented to person, place, and time.          Significant Labs:  CBC:   Recent Labs   Lab 05/09/24  1426 05/09/24  1432 05/10/24  0720   WBC 4.04  --  3.06*   HGB 7.0*  --  6.3*   HCT 25.3* 25* 23.3*     --  238     CMP:   Recent Labs   Lab 05/10/24  0721      CALCIUM 9.1   ALBUMIN 2.8*   PROT 6.9      K 4.4   CO2 28   CL 96   BUN 19   CREATININE 1.2   ALKPHOS 108   ALT 11   AST 24   BILITOT 0.7       Significant Imaging:  CT: I have reviewed all results within the past 24 hours and my personal findings are:  CTA not suggestive of any gastrointestinal hemorrhage.

## 2024-05-10 NOTE — ASSESSMENT & PLAN NOTE
This patient has hyperkalemia which is uncontrolled. We will monitor for arrhythmias with EKG or continuous telemetry. We will treat the hyperkalemia with Potassium Binders, Calcium gluconate, IV insulin and dextrose, Nebulized albuterol sulfate, and Furosemide. The likely etiology of the hyperkalemia is  unknown .  The patients latest potassium has been reviewed and the results are listed below  Recent Labs   Lab 05/10/24  0721   K 4.4

## 2024-05-10 NOTE — ASSESSMENT & PLAN NOTE
This is a 68-year-old man presenting with rectal bleeding. Digital rectal exam notable for melena.      EGD 4/19/24:  - Normal esophagus. Congestive gastropathy. Normal examined duodenum. A single non-bleeding angioectasia in the jejunum. Treated with argon plasma coagulation (APC).   Colonoscopy 4/1/24: The entire examined colon is normal. The examined portion of the ileum was normal. The distal rectum and anal verge are normal on retroflexion view.   CTA 5/9/24: No contrast extravasation into the large bowel or elsewhere to suggest active site of gastrointestinal hemorrhage. There is liquid stool throughout the large bowel and fluid within the few distal small bowel loops.  Findings could reflect diarrheal illness in the setting of enteritis although correlation is needed.    Our recommendations are as follows:     Suspect patient is likely bleeding from an AVM given findings from recent endoscopic procedures.  Maintain IV access with at least 2 large IVs (18 gauge or larger)  Continued IVF resuscitation as tolerated with attention to active co-morbidities.   Give PROTONIX 80 mg once followed by 40 mg IV BID.   Would recommend IV iron infusion while inpatient.  Morning hemoglobin 6.3, recommend transfusion of pRBCs to maintain Hgb >7 g/dL.   Currently not stable for EGD with high oxygen requirement (currently on 6L) and URI.  We will monitor over the weekend, but no current plans for endocscopy given recent colonoscopy 4/1 and EGD 4/19.   We will start the process to schedule him for outpatient octreotide injections.   We will schedule him for outpatient follow-up.   Please correct any coagulopathy with platelets and FFP to a goal of platelets >50K and INR <2.0.   Please promptly notify GI team if there is significant change in patient's clinical status

## 2024-05-10 NOTE — NURSING
Nurses Note -- 4 Eyes      5/10/2024   00:32 AM      Skin assessed during: Admit      [x] No Altered Skin Integrity Present    [x]Prevention Measures Documented      [] Yes- Altered Skin Integrity Present or Discovered   [] LDA Added if Not in Epic (Describe Wound)   [] New Altered Skin Integrity was Present on Admit and Documented in LDA   [] Wound Image Taken    Wound Care Consulted? No    Attending Nurse:  BECKY Vang RN     Second RN/Staff Member:  LIAT MCADAMS

## 2024-05-10 NOTE — ASSESSMENT & PLAN NOTE
"Patient with known CAD (seen on CT imaging, no interventions have been done) which is controlled Will continue Statin and monitor for S/Sx of angina/ACS. Continue to monitor on telemetry.     See "HFrEF (heart failure with reduced ejection fraction)"  "

## 2024-05-11 LAB
ALBUMIN SERPL BCP-MCNC: 2.7 G/DL (ref 3.5–5.2)
ALP SERPL-CCNC: 98 U/L (ref 55–135)
ALT SERPL W/O P-5'-P-CCNC: 10 U/L (ref 10–44)
ANION GAP SERPL CALC-SCNC: 11 MMOL/L (ref 8–16)
ANISOCYTOSIS BLD QL SMEAR: SLIGHT
AST SERPL-CCNC: 22 U/L (ref 10–40)
BASOPHILS # BLD AUTO: 0.02 K/UL (ref 0–0.2)
BASOPHILS # BLD AUTO: 0.02 K/UL (ref 0–0.2)
BASOPHILS # BLD AUTO: 0.03 K/UL (ref 0–0.2)
BASOPHILS NFR BLD: 0.5 % (ref 0–1.9)
BASOPHILS NFR BLD: 0.6 % (ref 0–1.9)
BASOPHILS NFR BLD: 0.9 % (ref 0–1.9)
BILIRUB SERPL-MCNC: 1 MG/DL (ref 0.1–1)
BUN SERPL-MCNC: 20 MG/DL (ref 8–23)
CALCIUM SERPL-MCNC: 8.5 MG/DL (ref 8.7–10.5)
CHLORIDE SERPL-SCNC: 94 MMOL/L (ref 95–110)
CHOLEST SERPL-MCNC: 90 MG/DL (ref 120–199)
CHOLEST/HDLC SERPL: 5.3 {RATIO} (ref 2–5)
CO2 SERPL-SCNC: 30 MMOL/L (ref 23–29)
CREAT SERPL-MCNC: 1.2 MG/DL (ref 0.5–1.4)
DIFFERENTIAL METHOD BLD: ABNORMAL
EOSINOPHIL # BLD AUTO: 0 K/UL (ref 0–0.5)
EOSINOPHIL NFR BLD: 0.5 % (ref 0–8)
EOSINOPHIL NFR BLD: 0.6 % (ref 0–8)
EOSINOPHIL NFR BLD: 0.9 % (ref 0–8)
ERYTHROCYTE [DISTWIDTH] IN BLOOD BY AUTOMATED COUNT: 18.9 % (ref 11.5–14.5)
ERYTHROCYTE [DISTWIDTH] IN BLOOD BY AUTOMATED COUNT: 19.2 % (ref 11.5–14.5)
ERYTHROCYTE [DISTWIDTH] IN BLOOD BY AUTOMATED COUNT: 19.4 % (ref 11.5–14.5)
EST. GFR  (NO RACE VARIABLE): >60 ML/MIN/1.73 M^2
ESTIMATED AVG GLUCOSE: 97 MG/DL (ref 68–131)
GLUCOSE SERPL-MCNC: 113 MG/DL (ref 70–110)
HBA1C MFR BLD: 5 % (ref 4–5.6)
HCT VFR BLD AUTO: 23.5 % (ref 40–54)
HCT VFR BLD AUTO: 24.1 % (ref 40–54)
HCT VFR BLD AUTO: 24.4 % (ref 40–54)
HDLC SERPL-MCNC: 17 MG/DL (ref 40–75)
HDLC SERPL: 18.9 % (ref 20–50)
HGB BLD-MCNC: 7 G/DL (ref 14–18)
HGB BLD-MCNC: 7.1 G/DL (ref 14–18)
HGB BLD-MCNC: 7.4 G/DL (ref 14–18)
HYPOCHROMIA BLD QL SMEAR: ABNORMAL
IMM GRANULOCYTES # BLD AUTO: 0.01 K/UL (ref 0–0.04)
IMM GRANULOCYTES # BLD AUTO: 0.01 K/UL (ref 0–0.04)
IMM GRANULOCYTES # BLD AUTO: 0.04 K/UL (ref 0–0.04)
IMM GRANULOCYTES NFR BLD AUTO: 0.3 % (ref 0–0.5)
IMM GRANULOCYTES NFR BLD AUTO: 0.3 % (ref 0–0.5)
IMM GRANULOCYTES NFR BLD AUTO: 1.2 % (ref 0–0.5)
INR PPP: 1.3 (ref 0.8–1.2)
LDLC SERPL CALC-MCNC: 55 MG/DL (ref 63–159)
LYMPHOCYTES # BLD AUTO: 0.6 K/UL (ref 1–4.8)
LYMPHOCYTES # BLD AUTO: 0.6 K/UL (ref 1–4.8)
LYMPHOCYTES # BLD AUTO: 0.7 K/UL (ref 1–4.8)
LYMPHOCYTES NFR BLD: 16.3 % (ref 18–48)
LYMPHOCYTES NFR BLD: 18 % (ref 18–48)
LYMPHOCYTES NFR BLD: 20.1 % (ref 18–48)
MAGNESIUM SERPL-MCNC: 1.3 MG/DL (ref 1.6–2.6)
MCH RBC QN AUTO: 22.7 PG (ref 27–31)
MCH RBC QN AUTO: 23.1 PG (ref 27–31)
MCH RBC QN AUTO: 23.2 PG (ref 27–31)
MCHC RBC AUTO-ENTMCNC: 29.1 G/DL (ref 32–36)
MCHC RBC AUTO-ENTMCNC: 29.8 G/DL (ref 32–36)
MCHC RBC AUTO-ENTMCNC: 30.7 G/DL (ref 32–36)
MCV RBC AUTO: 74 FL (ref 82–98)
MCV RBC AUTO: 78 FL (ref 82–98)
MCV RBC AUTO: 79 FL (ref 82–98)
MONOCYTES # BLD AUTO: 0.4 K/UL (ref 0.3–1)
MONOCYTES # BLD AUTO: 0.5 K/UL (ref 0.3–1)
MONOCYTES # BLD AUTO: 0.5 K/UL (ref 0.3–1)
MONOCYTES NFR BLD: 12 % (ref 4–15)
MONOCYTES NFR BLD: 13.6 % (ref 4–15)
MONOCYTES NFR BLD: 14.1 % (ref 4–15)
NEUTROPHILS # BLD AUTO: 2.2 K/UL (ref 1.8–7.7)
NEUTROPHILS # BLD AUTO: 2.3 K/UL (ref 1.8–7.7)
NEUTROPHILS # BLD AUTO: 2.5 K/UL (ref 1.8–7.7)
NEUTROPHILS NFR BLD: 65.7 % (ref 38–73)
NEUTROPHILS NFR BLD: 66.1 % (ref 38–73)
NEUTROPHILS NFR BLD: 68.3 % (ref 38–73)
NONHDLC SERPL-MCNC: 73 MG/DL
NRBC BLD-RTO: 0 /100 WBC
PHOSPHATE SERPL-MCNC: 2.8 MG/DL (ref 2.7–4.5)
PLATELET # BLD AUTO: 174 K/UL (ref 150–450)
PLATELET # BLD AUTO: 189 K/UL (ref 150–450)
PLATELET # BLD AUTO: 50 K/UL (ref 150–450)
PLATELET BLD QL SMEAR: ABNORMAL
PMV BLD AUTO: 10 FL (ref 9.2–12.9)
PMV BLD AUTO: 10.5 FL (ref 9.2–12.9)
PMV BLD AUTO: ABNORMAL FL (ref 9.2–12.9)
POIKILOCYTOSIS BLD QL SMEAR: SLIGHT
POLYCHROMASIA BLD QL SMEAR: ABNORMAL
POTASSIUM SERPL-SCNC: 3.7 MMOL/L (ref 3.5–5.1)
PROT SERPL-MCNC: 6.6 G/DL (ref 6–8.4)
PROTHROMBIN TIME: 14.4 SEC (ref 9–12.5)
RBC # BLD AUTO: 3.02 M/UL (ref 4.6–6.2)
RBC # BLD AUTO: 3.08 M/UL (ref 4.6–6.2)
RBC # BLD AUTO: 3.26 M/UL (ref 4.6–6.2)
SODIUM SERPL-SCNC: 135 MMOL/L (ref 136–145)
TRIGL SERPL-MCNC: 90 MG/DL (ref 30–150)
WBC # BLD AUTO: 3.33 K/UL (ref 3.9–12.7)
WBC # BLD AUTO: 3.45 K/UL (ref 3.9–12.7)
WBC # BLD AUTO: 3.69 K/UL (ref 3.9–12.7)

## 2024-05-11 PROCEDURE — C9113 INJ PANTOPRAZOLE SODIUM, VIA: HCPCS

## 2024-05-11 PROCEDURE — 63600175 PHARM REV CODE 636 W HCPCS: Performed by: STUDENT IN AN ORGANIZED HEALTH CARE EDUCATION/TRAINING PROGRAM

## 2024-05-11 PROCEDURE — 63600175 PHARM REV CODE 636 W HCPCS

## 2024-05-11 PROCEDURE — 99900035 HC TECH TIME PER 15 MIN (STAT)

## 2024-05-11 PROCEDURE — 94640 AIRWAY INHALATION TREATMENT: CPT

## 2024-05-11 PROCEDURE — 25000003 PHARM REV CODE 250: Performed by: STUDENT IN AN ORGANIZED HEALTH CARE EDUCATION/TRAINING PROGRAM

## 2024-05-11 PROCEDURE — 25000003 PHARM REV CODE 250

## 2024-05-11 PROCEDURE — 80061 LIPID PANEL: CPT

## 2024-05-11 PROCEDURE — 36415 COLL VENOUS BLD VENIPUNCTURE: CPT

## 2024-05-11 PROCEDURE — 83735 ASSAY OF MAGNESIUM: CPT

## 2024-05-11 PROCEDURE — 84100 ASSAY OF PHOSPHORUS: CPT

## 2024-05-11 PROCEDURE — 80053 COMPREHEN METABOLIC PANEL: CPT

## 2024-05-11 PROCEDURE — 27000221 HC OXYGEN, UP TO 24 HOURS

## 2024-05-11 PROCEDURE — 85025 COMPLETE CBC W/AUTO DIFF WBC: CPT | Mod: 91 | Performed by: STUDENT IN AN ORGANIZED HEALTH CARE EDUCATION/TRAINING PROGRAM

## 2024-05-11 PROCEDURE — 83036 HEMOGLOBIN GLYCOSYLATED A1C: CPT

## 2024-05-11 PROCEDURE — 36415 COLL VENOUS BLD VENIPUNCTURE: CPT | Mod: XB | Performed by: STUDENT IN AN ORGANIZED HEALTH CARE EDUCATION/TRAINING PROGRAM

## 2024-05-11 PROCEDURE — 94761 N-INVAS EAR/PLS OXIMETRY MLT: CPT

## 2024-05-11 PROCEDURE — 25000242 PHARM REV CODE 250 ALT 637 W/ HCPCS

## 2024-05-11 PROCEDURE — 85610 PROTHROMBIN TIME: CPT

## 2024-05-11 PROCEDURE — 20600001 HC STEP DOWN PRIVATE ROOM

## 2024-05-11 PROCEDURE — 85025 COMPLETE CBC W/AUTO DIFF WBC: CPT | Mod: 91

## 2024-05-11 PROCEDURE — A4216 STERILE WATER/SALINE, 10 ML: HCPCS | Performed by: STUDENT IN AN ORGANIZED HEALTH CARE EDUCATION/TRAINING PROGRAM

## 2024-05-11 RX ORDER — FUROSEMIDE 40 MG/1
40 TABLET ORAL DAILY
Status: DISCONTINUED | OUTPATIENT
Start: 2024-05-11 | End: 2024-05-12

## 2024-05-11 RX ORDER — MAGNESIUM SULFATE HEPTAHYDRATE 40 MG/ML
2 INJECTION, SOLUTION INTRAVENOUS
Status: COMPLETED | OUTPATIENT
Start: 2024-05-11 | End: 2024-05-11

## 2024-05-11 RX ADMIN — FUROSEMIDE 40 MG: 10 INJECTION, SOLUTION INTRAVENOUS at 02:05

## 2024-05-11 RX ADMIN — FOLIC ACID 1 MG: 1 TABLET ORAL at 09:05

## 2024-05-11 RX ADMIN — MORPHINE SULFATE 2 MG: 2 INJECTION, SOLUTION INTRAMUSCULAR; INTRAVENOUS at 11:05

## 2024-05-11 RX ADMIN — FUROSEMIDE 40 MG: 40 TABLET ORAL at 11:05

## 2024-05-11 RX ADMIN — EMPAGLIFLOZIN 10 MG: 10 TABLET, FILM COATED ORAL at 09:05

## 2024-05-11 RX ADMIN — CEFTRIAXONE 1 G: 1 INJECTION, POWDER, FOR SOLUTION INTRAMUSCULAR; INTRAVENOUS at 08:05

## 2024-05-11 RX ADMIN — MAGNESIUM SULFATE HEPTAHYDRATE 2 G: 40 INJECTION, SOLUTION INTRAVENOUS at 09:05

## 2024-05-11 RX ADMIN — MORPHINE SULFATE 2 MG: 2 INJECTION, SOLUTION INTRAMUSCULAR; INTRAVENOUS at 04:05

## 2024-05-11 RX ADMIN — Medication 10 ML: at 11:05

## 2024-05-11 RX ADMIN — MORPHINE SULFATE 2 MG: 2 INJECTION, SOLUTION INTRAMUSCULAR; INTRAVENOUS at 09:05

## 2024-05-11 RX ADMIN — FLUTICASONE FUROATE AND VILANTEROL TRIFENATATE 1 PUFF: 100; 25 POWDER RESPIRATORY (INHALATION) at 10:05

## 2024-05-11 RX ADMIN — OCTREOTIDE ACETATE 50 MCG/HR: 500 INJECTION, SOLUTION INTRAVENOUS; SUBCUTANEOUS at 04:05

## 2024-05-11 RX ADMIN — MORPHINE SULFATE 2 MG: 2 INJECTION, SOLUTION INTRAMUSCULAR; INTRAVENOUS at 12:05

## 2024-05-11 RX ADMIN — PANTOPRAZOLE SODIUM 40 MG: 40 INJECTION, POWDER, FOR SOLUTION INTRAVENOUS at 09:05

## 2024-05-11 RX ADMIN — MAGNESIUM SULFATE HEPTAHYDRATE 2 G: 40 INJECTION, SOLUTION INTRAVENOUS at 10:05

## 2024-05-11 RX ADMIN — Medication 10 ML: at 06:05

## 2024-05-11 RX ADMIN — ATORVASTATIN CALCIUM 40 MG: 40 TABLET, FILM COATED ORAL at 08:05

## 2024-05-11 RX ADMIN — PANTOPRAZOLE SODIUM 40 MG: 40 INJECTION, POWDER, FOR SOLUTION INTRAVENOUS at 08:05

## 2024-05-11 RX ADMIN — Medication 100 MG: at 09:05

## 2024-05-11 RX ADMIN — ESCITALOPRAM OXALATE 10 MG: 10 TABLET ORAL at 09:05

## 2024-05-11 NOTE — ASSESSMENT & PLAN NOTE
"Compressive atelectasis     5/9 CTA: "Images of the lower thorax are remarkable for bilateral moderate pleural effusions noting some degree of loculation involving the effusion on the right, partially visualized. There is compressive atelectasis of the bilateral lower lobes."    Patient found to have moderate pleural effusion on imaging. I have personally reviewed and interpreted the following imaging: Xray and CT. A thoracentesis was deferred. Most likely etiology includes Congestive Heart Failure and Cirrhosis. Management to include Diuresis and possible thoracentesis.    - Pulmonology consulted, no plans for thoracentesis at this time  - Lasix trial  - Incentive spirometer  "

## 2024-05-11 NOTE — SUBJECTIVE & OBJECTIVE
Interval History: No acute events overnight, afebrile, hemodynamically stable. Transfused 1u pRbc for Hgb 6.8 and Furosemide 40mg Ivx1 administered with  blood administration.       Objective:     Vital Signs (Most Recent):  Temp: 98.6 °F (37 °C) (05/11/24 0740)  Pulse: 72 (05/11/24 1115)  Resp: 20 (05/11/24 1028)  BP: (!) 113/58 (05/11/24 1113)  SpO2: (!) 94 % (05/11/24 1028) Vital Signs (24h Range):  Temp:  [97.3 °F (36.3 °C)-98.7 °F (37.1 °C)] 98.6 °F (37 °C)  Pulse:  [72-96] 72  Resp:  [17-20] 20  SpO2:  [92 %-100 %] 94 %  BP: (103-117)/(58-79) 113/58     Weight: 88.5 kg (195 lb)  Body mass index is 25.73 kg/m².    Intake/Output Summary (Last 24 hours) at 5/11/2024 1401  Last data filed at 5/11/2024 1100  Gross per 24 hour   Intake 1666.12 ml   Output 1650 ml   Net 16.12 ml         Physical Exam  Vitals and nursing note reviewed.   Constitutional:       General: He is not in acute distress.     Appearance: He is normal weight. He is ill-appearing.   HENT:      Head: Normocephalic and atraumatic.   Cardiovascular:      Rate and Rhythm: Normal rate and regular rhythm.      Heart sounds: Murmur heard.   Pulmonary:      Effort: Pulmonary effort is normal. No respiratory distress.      Breath sounds: No wheezing, rhonchi or rales.   Abdominal:      General: Abdomen is flat. There is distension.      Palpations: Abdomen is soft.      Tenderness: There is abdominal tenderness (epigastric). There is no guarding or rebound.   Musculoskeletal:      Right lower leg: Edema (trace to mid-shin) present.      Left lower leg: Edema (trace to mid-shin) present.   Skin:     General: Skin is warm and dry.      Coloration: Skin is pale.   Neurological:      General: No focal deficit present.      Mental Status: He is alert and oriented to person, place, and time. Mental status is at baseline.               Significant Labs: All pertinent labs within the past 24 hours have been reviewed.  LABS:  Recent Labs   Lab 05/09/24  1767  05/10/24  0721 05/11/24  0551   * 136 135*   K 4.3 4.4 3.7   CL 97 96 94*   CO2 33* 28 30*   BUN 18 19 20   CREATININE 1.0 1.2 1.2    108 113*   ANIONGAP 5* 12 11     Recent Labs   Lab 05/10/24  0721 05/11/24  0551   MG 1.5* 1.3*   PHOS 3.7 2.8     Recent Labs   Lab 05/09/24  1426 05/10/24  0721 05/11/24  0551   AST 62* 24 22   ALT 16 11 10   ALKPHOS 119 108 98   BILITOT 0.7 0.7 1.0   ALBUMIN 2.9* 2.8* 2.7*      Recent Labs   Lab 05/10/24  1823 05/11/24  0551 05/11/24  1110   WBC 4.98 3.33* 3.69*   HGB 6.8* 7.0* 7.1*   HCT 24.7* 23.5* 24.4*    189 174   GRAN 77.1*  3.8 66.1  2.2 68.3  2.5              Significant Imaging: I have reviewed all pertinent imaging results/findings within the past 24 hours.      Inpatient Medications:  Continuous Infusions:   octreotide (SANDOSTATIN) 500 mcg in sodium chloride 0.9% 100 mL infusion  50 mcg/hr Intravenous Continuous 10 mL/hr at 05/10/24 2315 50 mcg/hr at 05/10/24 2315     Scheduled Meds:   atorvastatin  40 mg Oral QHS    cefTRIAXone (Rocephin) IV (PEDS and ADULTS)  1 g Intravenous Q24H    empagliflozin  10 mg Oral Daily    EScitalopram oxalate  10 mg Oral Daily    fluticasone furoate-vilanteroL  1 puff Inhalation Daily    folic acid  1 mg Oral Daily    furosemide  40 mg Oral Daily    metoprolol succinate  12.5 mg Oral Daily    pantoprazole  40 mg Intravenous BID    sodium chloride 0.9%  10 mL Intravenous Q6H    spironolactone  12.5 mg Oral Daily    thiamine  100 mg Oral Daily     PRN Meds:  Current Facility-Administered Medications:     0.9%  NaCl infusion (for blood administration), , Intravenous, Q24H PRN    0.9%  NaCl infusion (for blood administration), , Intravenous, Q24H PRN    acetaminophen, 650 mg, Oral, Q8H PRN    albuterol-ipratropium, 3 mL, Nebulization, Q6H PRN    melatonin, 6 mg, Oral, Nightly PRN    morphine, 2 mg, Intravenous, Q6H PRN    naloxone, 0.02 mg, Intravenous, PRN    ondansetron, 4 mg, Intravenous, Q8H PRN    prochlorperazine,  10 mg, Oral, Q6H PRN    sodium chloride 0.9%, 10 mL, Intravenous, Q12H PRN    Flushing PICC/Midline Protocol, , , Until Discontinued **AND** sodium chloride 0.9%, 10 mL, Intravenous, Q6H **AND** sodium chloride 0.9%, 10 mL, Intravenous, PRN

## 2024-05-11 NOTE — ASSESSMENT & PLAN NOTE
"Melena     Patient presenting for evaluation of dark red blood in stool since morning of admission with associated upper abdominal pain. Two recent admissions: 3/28-4/4 for CAP and symptomatic anemia (s/p 5 U pRBCs) and 4/18-4/23 for melena (s/p 1 U pRBCs.) Of note, EGD on 10/25/2023 and colonoscopy on 4/1/2024 were unremarkable. EGD on 4/19 showed "single non-bleeding angioectasia in the jejunum; treated with APC."  CTA without acute hemorrhage. Received Protonix 80mg IV x 1 in the ED.       Recent Labs     05/09/24  2159 05/10/24  0720 05/10/24  0721 05/10/24  1823 05/11/24  0551   HGB  --    < >  --    < > 7.0*   BUN 18  --  19  --  20   CREATININE 1.0  --  1.2  --  1.2    < > = values in this interval not displayed.       pRBCs transfused: 1 U 5/10    - GI consulted, appreciate recs  - EGD 05/10 noted "blood in the first portion of the duodenum, plumes of blood were noted to be coming from the ampulla". CT pancreas protocol pending.   - Continue Protonix 40 mg IV BID  - Continue octreotide gtt @ 50 mcg/hr  GI to arrange outpatient octreotide injections  - Continue Rocephin 1 g IV q24h  - Monitor for hemodynamic instability with associated large volume hematochezia, consider STAT CTA and IR consult for embolization if positive  - Maintain IV access with 2 large bore IVs,  - Telemetry monitoring  "

## 2024-05-11 NOTE — ASSESSMENT & PLAN NOTE
Recent Labs     05/09/24  1426 05/09/24  1727 05/10/24  0721 05/10/24  1823 05/11/24  0551   BILITOT 0.7  --  0.7  --  1.0   AST 62*  --  24  --  22   ALT 16  --  11  --  10   ALKPHOS 119  --  108  --  98      < >  --    < > 189   INR  --    < >  --   --  1.3*   ALBUMIN 2.9*  --  2.8*  --  2.7*    < > = values in this interval not displayed.     Patient with known Alcoholic Cirrhosis. Co-morbidities are present and inclusive of ascites, portal hypertension, and anemia/pancytopenia.    MELD-Na score calculated; MELD 3.0: 13 at 5/11/2024  5:51 AM  MELD-Na: 11 at 5/11/2024  5:51 AM  Calculated from:  Serum Creatinine: 1.2 mg/dL at 5/11/2024  5:51 AM  Serum Sodium: 135 mmol/L at 5/11/2024  5:51 AM  Total Bilirubin: 1.0 mg/dL at 5/11/2024  5:51 AM  Serum Albumin: 2.7 g/dL at 5/11/2024  5:51 AM  INR(ratio): 1.3 at 5/11/2024  5:51 AM  Age at listing (hypothetical): 68 years  Sex: Male at 5/11/2024  5:51 AM    During admission, reported he quit alcohol one month ago.     PLAN:  - Will avoid any hepatotoxic meds, and monitor CBC/CMP/INR for synthetic function.   - IR for therapeutic paracentesis; deferred until Monday 5/13 due to current Hgb < 7

## 2024-05-11 NOTE — ASSESSMENT & PLAN NOTE
Patient's with Normocytic anemia. Hemoglobin stable. Patient's anemia is currently uncontrolled. Has not received any PRBCs to date. Chronic etiology due to iron deficiency and cirrhosis, exacerbated by acute blood loss from GI bleed.    -Current CBC reviewed-    Recent Labs   Lab 05/10/24  0720 05/10/24  1823 05/11/24  0551   HGB 6.3* 6.8* 7.0*     PLAN  - Monitor serial CBC and transfuse if patient becomes hemodynamically unstable, symptomatic or H/H drops below 7/21  - S/p iron sucrose 500 mg IV x 1

## 2024-05-11 NOTE — CARE UPDATE
"RAPID RESPONSE NURSE CHART REVIEW        Chart Reviewed: 05/11/2024, 10:28 AM    MRN: [id number]  Bed: 8068/8068 A    Dx: Hematochezia    Fransico Montalvo has a past medical history of Alcohol abuse, BPH (benign prostatic hyperplasia), CAD (coronary artery disease), Cirrhosis, Closed fracture of left distal radius, Gastritis, HFrEF (heart failure with reduced ejection fraction), and Hypertension.    Last VS: /67   Pulse 85   Temp 98.6 °F (37 °C) (Oral)   Resp 18   Ht 6' 1" (1.854 m)   Wt 88.5 kg (195 lb)   SpO2 (!) 92%   BMI 25.73 kg/m²     24H Vital Sign Range:  Temp:  [97.3 °F (36.3 °C)-98.9 °F (37.2 °C)]   Pulse:  []   Resp:  [16-20]   BP: (103-118)/(58-79)   SpO2:  [90 %-100 %]     Level of Consciousness (AVPU): alert    Recent Labs     05/10/24  0720 05/10/24  1823 05/11/24  0551   WBC 3.06* 4.98 3.33*   HGB 6.3* 6.8* 7.0*   HCT 23.3* 24.7* 23.5*    207 189       Recent Labs     05/09/24  2159 05/10/24  0721 05/11/24  0551   * 136 135*   K 4.3 4.4 3.7   CL 97 96 94*   CO2 33* 28 30*   BUN 18 19 20   CREATININE 1.0 1.2 1.2    108 113*   PHOS  --  3.7 2.8   MG  --  1.5* 1.3*        No results for input(s): "PH", "PCO2", "PO2", "HCO3", "POCSATURATED", "BE" in the last 72 hours.     OXYGEN:  Flow (L/min) (Oxygen Therapy): 2          MEWS score: 1    Rounding completed with charge JYOTI Angel reports patient has received 1 U of PBRCs every day since arrival. AM labs do indicate a transfusion is necessary today. Plan for paracentesis on mondayNo additional concerns verbalized at this time. Instructed to call [phone] for further concerns or assistance.    Raffi Moyer RN       "

## 2024-05-11 NOTE — NURSING
Patient rested well throughout the shift.  Morphine administered X2, relief noted.  Great UOP  No BM noted.  H/H stable no order for transfusion. \  Octreotide infusing, RA 2L NC.  Bed in lowest position, call light within reach, side rails X3.  Report given to oncoming nurse.

## 2024-05-11 NOTE — ASSESSMENT & PLAN NOTE
"5/9 CTA: "Enhancing lesion within the lateral aspect of the pancreatic head, not seen on examination 12/25/2023. In this region however, on the previous exam, there is a hypoattenuating lesion with peripheral calcification. This did not undergo significant enhancement at that time. Solid lesion is of concern. Correlation and follow-up advised."    -  elevated at 88.6. LDH, AFP, CEA WNL.   - EGD 05/10 noted "blood in the first portion of the duodenum, plumes of blood were noted to be coming from the ampulla".     -CT pancreas protocol pending.   "

## 2024-05-11 NOTE — ASSESSMENT & PLAN NOTE
"Chronic, controlled. Latest blood pressure and vitals reviewed-     Temp:  [97.3 °F (36.3 °C)-98.7 °F (37.1 °C)]   Pulse:  [72-96]   Resp:  [17-20]   BP: (103-117)/(58-79)   SpO2:  [92 %-100 %] .     Not on any home medications per chart review    PLAN:  -See "HFrEF (heart failure with reduced ejection fraction) " A&P  -Will utilize p.r.n. blood pressure medication only if patient's blood pressure greater than 180/110 and he develops symptoms such as worsening chest pain or shortness of breath.  "

## 2024-05-11 NOTE — ASSESSMENT & PLAN NOTE
This patient has hyperkalemia which is uncontrolled. We will monitor for arrhythmias with EKG or continuous telemetry. We will treat the hyperkalemia with Potassium Binders, Calcium gluconate, IV insulin and dextrose, Nebulized albuterol sulfate, and Furosemide. The likely etiology of the hyperkalemia is  unknown .  The patients latest potassium has been reviewed and the results are listed below  Recent Labs   Lab 05/11/24  0551   K 3.7

## 2024-05-11 NOTE — PLAN OF CARE
Problem: Adult Inpatient Plan of Care  Goal: Plan of Care Review  Outcome: Progressing  Goal: Patient-Specific Goal (Individualized)  Outcome: Progressing  Goal: Absence of Hospital-Acquired Illness or Injury  Outcome: Progressing  Goal: Optimal Comfort and Wellbeing  Outcome: Progressing  Goal: Readiness for Transition of Care  Outcome: Progressing     Problem: Acute Kidney Injury/Impairment  Goal: Fluid and Electrolyte Balance  Outcome: Progressing  Goal: Improved Oral Intake  Outcome: Progressing  Goal: Effective Renal Function  Outcome: Progressing     Problem: Pneumonia  Goal: Fluid Balance  Outcome: Progressing  Goal: Resolution of Infection Signs and Symptoms  Outcome: Progressing  Goal: Effective Oxygenation and Ventilation  Outcome: Progressing     Problem: Skin Injury Risk Increased  Goal: Skin Health and Integrity  Outcome: Progressing

## 2024-05-11 NOTE — PROGRESS NOTES
"[facility] - Tele[facility]  Progress Note    Patient Name: Fransico Montalvo  MRN: [id number]  Patient Class: IP- Inpatient   Admission Date: 5/9/2024  Length of Stay: 2 days  Attending Physician: Harriet Bermudez MD  Primary Care Provider: [facility]        Subjective:     Principal Problem:Hematochezia        HPI:  Fransico Montalvo is a 69 yo M with PMH of EtOH cirrhosis, presumed COPD, multifactorial anemia, HTN, BPH, previous esophageal varices, and gastritis who presented from SNF c/o bloody stool. Per patient, he noted dark red blood from his rectum since this morning (states it is mixed in stool and also on its own.) Also endorses new upper abdominal pain which started after eating food yesterday evening. Over the past month, he has experienced fatigue, SOB, and two pre-syncopal episodes. Of note, patient with two recent admissions: 3/28-4/4 for CAP and symptomatic anemia (s/p 5 U pRBCs) and 4/18-4/23 for melena (s/p 1 U pRBCs.) Of note, EGD on 10/25/2023 and colonoscopy on 4/1/2024 were unremarkable. EGD on 4/19 showed "single non-bleeding angioectasia in the jejunum; treated with APC."    Upon arrival to the ED, /77, , RR 18, SpO2 100% on RA, and T 97.7 F. Labs significant for Hgb 7.0 and K 5.5 (5.8 on repeat). Physical exam in the ED pertinent for bright red blood present in rectum, epigastric tenderness, and pallor. EKG NSR. CTA without active hemorrhage, ascites, bilateral pleural effusion (loculated on the right), and a lateral pancreatic head enhancing lesion. He received 80 mg IV Protonix x 1 and 2 mg IV morphine x 1. CTA pending.     Overview/Hospital Course:  Admitted to Hospital Medicine for evaluation of GI bleed concerns and hypoxia over the past month. Echo revealed EF 35-40% and diastolic dysfunction, no known history of CHF. Cardiology was consulted for GDMT initiation; plan for outpatient ischemic evaluation. Pulmonology consulted for bilateral pleural " "effusions, no plans for thoracentesis at this time. Paracentesis deferred to Monday 5/13 due to Hgb < 7. Transfused pRbc for Hgb<7. Enhancing pancreatic head mass with elevated CA 19-9 will need outpatient follow-up. EGD 05/10 noted "blood in the first portion of the duodenum, plumes of blood were noted to be coming from the ampulla". CT pancreas protocol pending.     Interval History: No acute events overnight, afebrile, hemodynamically stable. Transfused 1u pRbc for Hgb 6.8 and Furosemide 40mg IV x1 administered overnight.     Objective:     Vital Signs (Most Recent):  Temp: 98.6 °F (37 °C) (05/11/24 0740)  Pulse: 72 (05/11/24 1115)  Resp: 20 (05/11/24 1028)  BP: (!) 113/58 (05/11/24 1113)  SpO2: (!) 94 % (05/11/24 1028) Vital Signs (24h Range):  Temp:  [97.3 °F (36.3 °C)-98.7 °F (37.1 °C)] 98.6 °F (37 °C)  Pulse:  [72-96] 72  Resp:  [17-20] 20  SpO2:  [92 %-100 %] 94 %  BP: (103-117)/(58-79) 113/58     Weight: 88.5 kg (195 lb)  Body mass index is 25.73 kg/m².    Intake/Output Summary (Last 24 hours) at 5/11/2024 1401  Last data filed at 5/11/2024 1100  Gross per 24 hour   Intake 1666.12 ml   Output 1650 ml   Net 16.12 ml         Physical Exam  Vitals and nursing note reviewed.   Constitutional:       General: He is not in acute distress.     Appearance: He is normal weight. He is ill-appearing.   HENT:      Head: Normocephalic and atraumatic.   Cardiovascular:      Rate and Rhythm: Normal rate and regular rhythm.      Heart sounds: Murmur heard.   Pulmonary:      Effort: Pulmonary effort is normal. No respiratory distress.      Breath sounds: No wheezing, rhonchi or rales.   Abdominal:      General: Abdomen is flat. There is distension.      Palpations: Abdomen is soft.      Tenderness: There is abdominal tenderness (epigastric). There is no guarding or rebound.   Musculoskeletal:      Right lower leg: Edema (trace to mid-shin) present.      Left lower leg: Edema (trace to mid-shin) present.   Skin:     General: " Skin is warm and dry.      Coloration: Skin is pale.   Neurological:      General: No focal deficit present.      Mental Status: He is alert and oriented to person, place, and time. Mental status is at baseline.               Significant Labs: All pertinent labs within the past 24 hours have been reviewed.  LABS:  Recent Labs   Lab 05/09/24  2159 05/10/24  0721 05/11/24  0551   * 136 135*   K 4.3 4.4 3.7   CL 97 96 94*   CO2 33* 28 30*   BUN 18 19 20   CREATININE 1.0 1.2 1.2    108 113*   ANIONGAP 5* 12 11     Recent Labs   Lab 05/10/24  0721 05/11/24  0551   MG 1.5* 1.3*   PHOS 3.7 2.8     Recent Labs   Lab 05/09/24  1426 05/10/24  0721 05/11/24  0551   AST 62* 24 22   ALT 16 11 10   ALKPHOS 119 108 98   BILITOT 0.7 0.7 1.0   ALBUMIN 2.9* 2.8* 2.7*      Recent Labs   Lab 05/10/24  1823 05/11/24  0551 05/11/24  1110   WBC 4.98 3.33* 3.69*   HGB 6.8* 7.0* 7.1*   HCT 24.7* 23.5* 24.4*    189 174   GRAN 77.1*  3.8 66.1  2.2 68.3  2.5              Significant Imaging: I have reviewed all pertinent imaging results/findings within the past 24 hours.      Inpatient Medications:  Continuous Infusions:   octreotide (SANDOSTATIN) 500 mcg in sodium chloride 0.9% 100 mL infusion  50 mcg/hr Intravenous Continuous 10 mL/hr at 05/10/24 2315 50 mcg/hr at 05/10/24 2315     Scheduled Meds:   atorvastatin  40 mg Oral QHS    cefTRIAXone (Rocephin) IV (PEDS and ADULTS)  1 g Intravenous Q24H    empagliflozin  10 mg Oral Daily    EScitalopram oxalate  10 mg Oral Daily    fluticasone furoate-vilanteroL  1 puff Inhalation Daily    folic acid  1 mg Oral Daily    furosemide  40 mg Oral Daily    metoprolol succinate  12.5 mg Oral Daily    pantoprazole  40 mg Intravenous BID    sodium chloride 0.9%  10 mL Intravenous Q6H    spironolactone  12.5 mg Oral Daily    thiamine  100 mg Oral Daily     PRN Meds:  Current Facility-Administered Medications:     0.9%  NaCl infusion (for blood administration), , Intravenous, Q24H PRN    " 0.9%  NaCl infusion (for blood administration), , Intravenous, Q24H PRN    acetaminophen, 650 mg, Oral, Q8H PRN    albuterol-ipratropium, 3 mL, Nebulization, Q6H PRN    melatonin, 6 mg, Oral, Nightly PRN    morphine, 2 mg, Intravenous, Q6H PRN    naloxone, 0.02 mg, Intravenous, PRN    ondansetron, 4 mg, Intravenous, Q8H PRN    prochlorperazine, 10 mg, Oral, Q6H PRN    sodium chloride 0.9%, 10 mL, Intravenous, Q12H PRN    Flushing PICC/Midline Protocol, , , Until Discontinued **AND** sodium chloride 0.9%, 10 mL, Intravenous, Q6H **AND** sodium chloride 0.9%, 10 mL, Intravenous, PRN      Assessment/Plan:      * Hematochezia  Melena     Patient presenting for evaluation of dark red blood in stool since morning of admission with associated upper abdominal pain. Two recent admissions: 3/28-4/4 for CAP and symptomatic anemia (s/p 5 U pRBCs) and 4/18-4/23 for melena (s/p 1 U pRBCs.) Of note, EGD on 10/25/2023 and colonoscopy on 4/1/2024 were unremarkable. EGD on 4/19 showed "single non-bleeding angioectasia in the jejunum; treated with APC."  CTA without acute hemorrhage. Received Protonix 80mg IV x 1 in the ED.       Recent Labs     05/09/24  2159 05/10/24  0720 05/10/24  0721 05/10/24  1823 05/11/24  0551   HGB  --    < >  --    < > 7.0*   BUN 18  --  19  --  20   CREATININE 1.0  --  1.2  --  1.2    < > = values in this interval not displayed.       pRBCs transfused: 1 U 5/10    - GI consulted, appreciate recs  - EGD 05/10 noted "blood in the first portion of the duodenum, plumes of blood were noted to be coming from the ampulla". CT pancreas protocol pending.   - Continue Protonix 40 mg IV BID  - Continue octreotide gtt @ 50 mcg/hr  GI to arrange outpatient octreotide injections  - Continue Rocephin 1 g IV q24h  - Monitor for hemodynamic instability with associated large volume hematochezia, consider STAT CTA and IR consult for embolization if positive  - Maintain IV access with 2 large bore IVs,  - Telemetry " "monitoring    Acute pancreatitis  Patient c/o epigastric pain with lipase 758 and amylase 1256.    - PRN morphine 2 mg IV q6h  - PRN narcan ordered    Pancreatic lesion  5/9 CTA: "Enhancing lesion within the lateral aspect of the pancreatic head, not seen on examination 12/25/2023. In this region however, on the previous exam, there is a hypoattenuating lesion with peripheral calcification. This did not undergo significant enhancement at that time. Solid lesion is of concern. Correlation and follow-up advised."    -  elevated at 88.6. LDH, AFP, CEA WNL.   - EGD 05/10 noted "blood in the first portion of the duodenum, plumes of blood were noted to be coming from the ampulla".     -CT pancreas protocol pending.     Acute on chronic anemia  Patient's with Normocytic anemia. Hemoglobin stable. Patient's anemia is currently uncontrolled. Has not received any PRBCs to date. Chronic etiology due to iron deficiency and cirrhosis, exacerbated by acute blood loss from GI bleed.    -Current CBC reviewed-    Recent Labs   Lab 05/10/24  0720 05/10/24  1823 05/11/24  0551   HGB 6.3* 6.8* 7.0*     PLAN  - Monitor serial CBC and transfuse if patient becomes hemodynamically unstable, symptomatic or H/H drops below 7/21  - S/p iron sucrose 500 mg IV x 1    HFrEF (heart failure with reduced ejection fraction)  CAD (coronary artery disease)     3/28/2024 BNP 2,932 and 4/18/2024 BNP 4,361; no TTE since 2022. Prior CT with significant multivessel CAD.    Echo    Result Date: 5/10/2024    Left Ventricle: The left ventricle is mildly dilated. Moderate global   hypokinesis and regional wall motion abnormalities present. There is   moderately reduced systolic function with a visually estimated ejection   fraction of 35 - 40%. Biplane (2D) method of discs ejection fraction is   33%. There is diastolic dysfunction. Elevated left ventricular filling   pressure.    Right Ventricle: Normal right ventricular cavity size. Systolic   function " "is mildly reduced.    Left Atrium: Left atrium is moderately dilated.    Aortic Valve: There is moderate aortic valve sclerosis. Mildly   restricted motion.    Mitral Valve: There is mild annular dilation present. There is mild   mitral annular calcification present.    Tricuspid Valve: There is moderate annular dilation present. There is   moderate regurgitation.    Pulmonary Artery: The estimated pulmonary artery systolic pressure is   55 mmHg.    IVC/SVC: Elevated venous pressure at 15 mmHg.    Pericardium: Large Bilateral pleural effusions.       - Cardiology consulted, appreciate recs  "Prior CT with significant multivessel CAD concerning for ICM cardiomyopathy exacerbated by current UGIB. Elevated filling pressures on echo are likely largely due to liver disease as he doesn't appear as grossly volume overloaded as expected."  Defer ischemic evaluation with PET stress to outpt as he is not a revascularization candidate at this time with recurrent UGIB  Ambulatory referral to General Cardiology at discharge  - Lasix 40 mg PO  and monitor UOP; goal net negative 1-2 L/daily  - GDMT:  BB: started metoprolol 12.5mg XL  ACE/ARB/ARNI: Recommend Entresto, if BP too low, try valsartan  SGLT2i: Start Jardiance 10 mg po daily  MRA: started spironolactone 12.5mg  - Start ASA when anemia is controlled    CAD (coronary artery disease)  Patient with known CAD (seen on CT imaging, no interventions have been done) which is controlled Will continue Statin and monitor for S/Sx of angina/ACS. Continue to monitor on telemetry.     See "HFrEF (heart failure with reduced ejection fraction)"    Compressive atelectasis  See "Bilateral pleural effusion"    Hyperkalemia  This patient has hyperkalemia which is uncontrolled. We will monitor for arrhythmias with EKG or continuous telemetry. We will treat the hyperkalemia with Potassium Binders, Calcium gluconate, IV insulin and dextrose, Nebulized albuterol sulfate, and Furosemide. The " "likely etiology of the hyperkalemia is  unknown .  The patients latest potassium has been reviewed and the results are listed below  Recent Labs   Lab 05/11/24  0551   K 3.7         Presumed COPD (chronic obstructive pulmonary disease)  Patient's COPD is controlled currently.  Patient is currently off COPD Pathway. Continue Breo Ellipta 1 puff daily and PRN Duo-Nebs, will monitor respiratory status closely.     Bilateral pleural effusion  Compressive atelectasis     5/9 CTA: "Images of the lower thorax are remarkable for bilateral moderate pleural effusions noting some degree of loculation involving the effusion on the right, partially visualized. There is compressive atelectasis of the bilateral lower lobes."    Patient found to have moderate pleural effusion on imaging. I have personally reviewed and interpreted the following imaging: Xray and CT. A thoracentesis was deferred. Most likely etiology includes Congestive Heart Failure and Cirrhosis. Management to include Diuresis and possible thoracentesis.    - Pulmonology consulted, no plans for thoracentesis at this time  - Lasix trial  - Incentive spirometer    Melena  See "Hematochezia"    Essential hypertension  Chronic, controlled. Latest blood pressure and vitals reviewed-     Temp:  [97.3 °F (36.3 °C)-98.7 °F (37.1 °C)]   Pulse:  [72-96]   Resp:  [17-20]   BP: (103-117)/(58-79)   SpO2:  [92 %-100 %] .     Not on any home medications per chart review    PLAN:  -See "HFrEF (heart failure with reduced ejection fraction) " A&P  -Will utilize p.r.n. blood pressure medication only if patient's blood pressure greater than 180/110 and he develops symptoms such as worsening chest pain or shortness of breath.    Anxiety and depression  Patient has hx of depression which  is currently controlled.     PLAN:  Continue home Lexapro 10 mg po daily. We will not consult psychiatry at this time. Patient does not display psychosis at this time. Continue to monitor closely and " adjust plan of care as needed.    Alcoholic cirrhosis  Recent Labs     05/09/24  1426 05/09/24  1727 05/10/24  0721 05/10/24  1823 05/11/24  0551   BILITOT 0.7  --  0.7  --  1.0   AST 62*  --  24  --  22   ALT 16  --  11  --  10   ALKPHOS 119  --  108  --  98      < >  --    < > 189   INR  --    < >  --   --  1.3*   ALBUMIN 2.9*  --  2.8*  --  2.7*    < > = values in this interval not displayed.     Patient with known Alcoholic Cirrhosis. Co-morbidities are present and inclusive of ascites, portal hypertension, and anemia/pancytopenia.    MELD-Na score calculated; MELD 3.0: 13 at 5/11/2024  5:51 AM  MELD-Na: 11 at 5/11/2024  5:51 AM  Calculated from:  Serum Creatinine: 1.2 mg/dL at 5/11/2024  5:51 AM  Serum Sodium: 135 mmol/L at 5/11/2024  5:51 AM  Total Bilirubin: 1.0 mg/dL at 5/11/2024  5:51 AM  Serum Albumin: 2.7 g/dL at 5/11/2024  5:51 AM  INR(ratio): 1.3 at 5/11/2024  5:51 AM  Age at listing (hypothetical): 68 years  Sex: Male at 5/11/2024  5:51 AM    During admission, reported he quit alcohol one month ago.     PLAN:  - Will avoid any hepatotoxic meds, and monitor CBC/CMP/INR for synthetic function.   - IR for therapeutic paracentesis; deferred until Monday 5/13 due to current Hgb < 7    Documented history of CKD Stage 3  Patient reported to have CKD 3 as documented in prior notes. However, review of past labs does not show sustained decrease in GFR > 6 months, just during individual encounters during which patient had [place].     BPH (benign prostatic hyperplasia)  Stable. Hx of BPH. Was on Tamsulosin previously, but held on recent admission in 03/2024 where there were concerns for low BP    PLAN:  Bladder scan PRN for urinary retention concerns      VTE Risk Mitigation (From admission, onward)           Ordered     Reason for No Pharmacological VTE Prophylaxis  Once        Question:  Reasons:  Answer:  Active Bleeding    05/09/24 1609     IP VTE HIGH RISK PATIENT  Once         05/09/24 1609     Place  sequential compression device  Until discontinued         05/09/24 1609                    Discharge Planning   ARTURO: 5/14/2024     Code Status: Full Code   Is the patient medically ready for discharge?:     Reason for patient still in hospital (select all that apply): Patient trending condition, Laboratory test, Treatment, Imaging, and Consult recommendations  Discharge Plan A: Skilled Nursing Facility                  Lux Castellon DO  Department of Hospital Medicine   [facility] - Telemetry Stepdown

## 2024-05-12 PROBLEM — E87.5 HYPERKALEMIA: Status: RESOLVED | Noted: 2024-05-09 | Resolved: 2024-05-12

## 2024-05-12 LAB
ABO + RH BLD: NORMAL
ALBUMIN SERPL BCP-MCNC: 2.5 G/DL (ref 3.5–5.2)
ALP SERPL-CCNC: 89 U/L (ref 55–135)
ALT SERPL W/O P-5'-P-CCNC: 9 U/L (ref 10–44)
ANION GAP SERPL CALC-SCNC: 10 MMOL/L (ref 8–16)
AST SERPL-CCNC: 19 U/L (ref 10–40)
BASOPHILS # BLD AUTO: 0.02 K/UL (ref 0–0.2)
BASOPHILS NFR BLD: 0.6 % (ref 0–1.9)
BASOPHILS NFR BLD: 0.6 % (ref 0–1.9)
BASOPHILS NFR BLD: 0.7 % (ref 0–1.9)
BASOPHILS NFR BLD: 0.7 % (ref 0–1.9)
BILIRUB SERPL-MCNC: 0.6 MG/DL (ref 0.1–1)
BLD GP AB SCN CELLS X3 SERPL QL: NORMAL
BLD PROD TYP BPU: NORMAL
BLOOD UNIT EXPIRATION DATE: NORMAL
BLOOD UNIT TYPE CODE: 5100
BLOOD UNIT TYPE: NORMAL
BUN SERPL-MCNC: 19 MG/DL (ref 8–23)
CALCIUM SERPL-MCNC: 8.2 MG/DL (ref 8.7–10.5)
CHLORIDE SERPL-SCNC: 94 MMOL/L (ref 95–110)
CO2 SERPL-SCNC: 31 MMOL/L (ref 23–29)
CODING SYSTEM: NORMAL
CREAT SERPL-MCNC: 1.2 MG/DL (ref 0.5–1.4)
CROSSMATCH INTERPRETATION: NORMAL
DIFFERENTIAL METHOD BLD: ABNORMAL
DISPENSE STATUS: NORMAL
EOSINOPHIL # BLD AUTO: 0 K/UL (ref 0–0.5)
EOSINOPHIL NFR BLD: 0.7 % (ref 0–8)
EOSINOPHIL NFR BLD: 0.9 % (ref 0–8)
EOSINOPHIL NFR BLD: 1 % (ref 0–8)
EOSINOPHIL NFR BLD: 1.4 % (ref 0–8)
ERYTHROCYTE [DISTWIDTH] IN BLOOD BY AUTOMATED COUNT: 18.9 % (ref 11.5–14.5)
ERYTHROCYTE [DISTWIDTH] IN BLOOD BY AUTOMATED COUNT: 19 % (ref 11.5–14.5)
ERYTHROCYTE [DISTWIDTH] IN BLOOD BY AUTOMATED COUNT: 19 % (ref 11.5–14.5)
ERYTHROCYTE [DISTWIDTH] IN BLOOD BY AUTOMATED COUNT: 19.1 % (ref 11.5–14.5)
EST. GFR  (NO RACE VARIABLE): >60 ML/MIN/1.73 M^2
GLUCOSE SERPL-MCNC: 110 MG/DL (ref 70–110)
HCT VFR BLD AUTO: 23.5 % (ref 40–54)
HCT VFR BLD AUTO: 24.3 % (ref 40–54)
HCT VFR BLD AUTO: 24.4 % (ref 40–54)
HCT VFR BLD AUTO: 25.4 % (ref 40–54)
HGB BLD-MCNC: 6.9 G/DL (ref 14–18)
HGB BLD-MCNC: 7.2 G/DL (ref 14–18)
HGB BLD-MCNC: 7.4 G/DL (ref 14–18)
HGB BLD-MCNC: 7.6 G/DL (ref 14–18)
IMM GRANULOCYTES # BLD AUTO: 0 K/UL (ref 0–0.04)
IMM GRANULOCYTES # BLD AUTO: 0.01 K/UL (ref 0–0.04)
IMM GRANULOCYTES NFR BLD AUTO: 0 % (ref 0–0.5)
IMM GRANULOCYTES NFR BLD AUTO: 0.3 % (ref 0–0.5)
INR PPP: 1.4 (ref 0.8–1.2)
LYMPHOCYTES # BLD AUTO: 0.6 K/UL (ref 1–4.8)
LYMPHOCYTES # BLD AUTO: 0.7 K/UL (ref 1–4.8)
LYMPHOCYTES NFR BLD: 18.8 % (ref 18–48)
LYMPHOCYTES NFR BLD: 21.2 % (ref 18–48)
LYMPHOCYTES NFR BLD: 21.9 % (ref 18–48)
LYMPHOCYTES NFR BLD: 23.3 % (ref 18–48)
MAGNESIUM SERPL-MCNC: 1.7 MG/DL (ref 1.6–2.6)
MCH RBC QN AUTO: 23.8 PG (ref 27–31)
MCH RBC QN AUTO: 23.9 PG (ref 27–31)
MCH RBC QN AUTO: 24.2 PG (ref 27–31)
MCH RBC QN AUTO: 24.4 PG (ref 27–31)
MCHC RBC AUTO-ENTMCNC: 29.4 G/DL (ref 32–36)
MCHC RBC AUTO-ENTMCNC: 29.5 G/DL (ref 32–36)
MCHC RBC AUTO-ENTMCNC: 29.9 G/DL (ref 32–36)
MCHC RBC AUTO-ENTMCNC: 30.5 G/DL (ref 32–36)
MCV RBC AUTO: 80 FL (ref 82–98)
MCV RBC AUTO: 81 FL (ref 82–98)
MONOCYTES # BLD AUTO: 0.4 K/UL (ref 0.3–1)
MONOCYTES # BLD AUTO: 0.5 K/UL (ref 0.3–1)
MONOCYTES NFR BLD: 13.2 % (ref 4–15)
MONOCYTES NFR BLD: 13.6 % (ref 4–15)
MONOCYTES NFR BLD: 14.5 % (ref 4–15)
MONOCYTES NFR BLD: 14.9 % (ref 4–15)
NEUTROPHILS # BLD AUTO: 1.8 K/UL (ref 1.8–7.7)
NEUTROPHILS # BLD AUTO: 2 K/UL (ref 1.8–7.7)
NEUTROPHILS # BLD AUTO: 2 K/UL (ref 1.8–7.7)
NEUTROPHILS # BLD AUTO: 2.2 K/UL (ref 1.8–7.7)
NEUTROPHILS NFR BLD: 59.7 % (ref 38–73)
NEUTROPHILS NFR BLD: 63 % (ref 38–73)
NEUTROPHILS NFR BLD: 63.4 % (ref 38–73)
NEUTROPHILS NFR BLD: 65 % (ref 38–73)
NRBC BLD-RTO: 0 /100 WBC
NUM UNITS TRANS PACKED RBC: NORMAL
PHOSPHATE SERPL-MCNC: 2.4 MG/DL (ref 2.7–4.5)
PLATELET # BLD AUTO: 140 K/UL (ref 150–450)
PLATELET # BLD AUTO: 163 K/UL (ref 150–450)
PLATELET # BLD AUTO: 169 K/UL (ref 150–450)
PLATELET # BLD AUTO: 173 K/UL (ref 150–450)
PMV BLD AUTO: 10.2 FL (ref 9.2–12.9)
PMV BLD AUTO: 10.3 FL (ref 9.2–12.9)
PMV BLD AUTO: 10.5 FL (ref 9.2–12.9)
PMV BLD AUTO: 10.7 FL (ref 9.2–12.9)
POTASSIUM SERPL-SCNC: 3.2 MMOL/L (ref 3.5–5.1)
PROT SERPL-MCNC: 6 G/DL (ref 6–8.4)
PROTHROMBIN TIME: 14.7 SEC (ref 9–12.5)
RBC # BLD AUTO: 2.89 M/UL (ref 4.6–6.2)
RBC # BLD AUTO: 3.03 M/UL (ref 4.6–6.2)
RBC # BLD AUTO: 3.03 M/UL (ref 4.6–6.2)
RBC # BLD AUTO: 3.14 M/UL (ref 4.6–6.2)
SODIUM SERPL-SCNC: 135 MMOL/L (ref 136–145)
SPECIMEN OUTDATE: NORMAL
WBC # BLD AUTO: 2.96 K/UL (ref 3.9–12.7)
WBC # BLD AUTO: 3.04 K/UL (ref 3.9–12.7)
WBC # BLD AUTO: 3.1 K/UL (ref 3.9–12.7)
WBC # BLD AUTO: 3.39 K/UL (ref 3.9–12.7)

## 2024-05-12 PROCEDURE — 63600175 PHARM REV CODE 636 W HCPCS

## 2024-05-12 PROCEDURE — 36410 VNPNXR 3YR/> PHY/QHP DX/THER: CPT

## 2024-05-12 PROCEDURE — 76937 US GUIDE VASCULAR ACCESS: CPT

## 2024-05-12 PROCEDURE — 25000003 PHARM REV CODE 250: Performed by: STUDENT IN AN ORGANIZED HEALTH CARE EDUCATION/TRAINING PROGRAM

## 2024-05-12 PROCEDURE — 36415 COLL VENOUS BLD VENIPUNCTURE: CPT | Mod: XB | Performed by: STUDENT IN AN ORGANIZED HEALTH CARE EDUCATION/TRAINING PROGRAM

## 2024-05-12 PROCEDURE — 25000003 PHARM REV CODE 250

## 2024-05-12 PROCEDURE — P9016 RBC LEUKOCYTES REDUCED: HCPCS

## 2024-05-12 PROCEDURE — 99900035 HC TECH TIME PER 15 MIN (STAT)

## 2024-05-12 PROCEDURE — 94660 CPAP INITIATION&MGMT: CPT

## 2024-05-12 PROCEDURE — 36430 TRANSFUSION BLD/BLD COMPNT: CPT

## 2024-05-12 PROCEDURE — 20600001 HC STEP DOWN PRIVATE ROOM

## 2024-05-12 PROCEDURE — 83735 ASSAY OF MAGNESIUM: CPT

## 2024-05-12 PROCEDURE — 85025 COMPLETE CBC W/AUTO DIFF WBC: CPT | Mod: 91 | Performed by: STUDENT IN AN ORGANIZED HEALTH CARE EDUCATION/TRAINING PROGRAM

## 2024-05-12 PROCEDURE — 85025 COMPLETE CBC W/AUTO DIFF WBC: CPT | Mod: 91

## 2024-05-12 PROCEDURE — 85610 PROTHROMBIN TIME: CPT

## 2024-05-12 PROCEDURE — 94761 N-INVAS EAR/PLS OXIMETRY MLT: CPT

## 2024-05-12 PROCEDURE — 25000242 PHARM REV CODE 250 ALT 637 W/ HCPCS

## 2024-05-12 PROCEDURE — C9113 INJ PANTOPRAZOLE SODIUM, VIA: HCPCS

## 2024-05-12 PROCEDURE — A4216 STERILE WATER/SALINE, 10 ML: HCPCS | Performed by: STUDENT IN AN ORGANIZED HEALTH CARE EDUCATION/TRAINING PROGRAM

## 2024-05-12 PROCEDURE — 36415 COLL VENOUS BLD VENIPUNCTURE: CPT | Mod: XB

## 2024-05-12 PROCEDURE — 86920 COMPATIBILITY TEST SPIN: CPT

## 2024-05-12 PROCEDURE — 36415 COLL VENOUS BLD VENIPUNCTURE: CPT

## 2024-05-12 PROCEDURE — 85025 COMPLETE CBC W/AUTO DIFF WBC: CPT | Performed by: STUDENT IN AN ORGANIZED HEALTH CARE EDUCATION/TRAINING PROGRAM

## 2024-05-12 PROCEDURE — C1751 CATH, INF, PER/CENT/MIDLINE: HCPCS

## 2024-05-12 PROCEDURE — 63600175 PHARM REV CODE 636 W HCPCS: Performed by: STUDENT IN AN ORGANIZED HEALTH CARE EDUCATION/TRAINING PROGRAM

## 2024-05-12 PROCEDURE — 27000190 HC CPAP FULL FACE MASK W/VALVE

## 2024-05-12 PROCEDURE — 80053 COMPREHEN METABOLIC PANEL: CPT

## 2024-05-12 PROCEDURE — 27000221 HC OXYGEN, UP TO 24 HOURS

## 2024-05-12 PROCEDURE — 94799 UNLISTED PULMONARY SVC/PX: CPT

## 2024-05-12 PROCEDURE — 86850 RBC ANTIBODY SCREEN: CPT

## 2024-05-12 PROCEDURE — 25500020 PHARM REV CODE 255: Performed by: STUDENT IN AN ORGANIZED HEALTH CARE EDUCATION/TRAINING PROGRAM

## 2024-05-12 PROCEDURE — 84100 ASSAY OF PHOSPHORUS: CPT

## 2024-05-12 PROCEDURE — 94640 AIRWAY INHALATION TREATMENT: CPT

## 2024-05-12 RX ORDER — POTASSIUM CHLORIDE 20 MEQ/1
40 TABLET, EXTENDED RELEASE ORAL
Status: COMPLETED | OUTPATIENT
Start: 2024-05-12 | End: 2024-05-12

## 2024-05-12 RX ORDER — FUROSEMIDE 10 MG/ML
40 INJECTION INTRAMUSCULAR; INTRAVENOUS ONCE
Status: DISCONTINUED | OUTPATIENT
Start: 2024-05-12 | End: 2024-05-12

## 2024-05-12 RX ORDER — HYDROCODONE BITARTRATE AND ACETAMINOPHEN 500; 5 MG/1; MG/1
TABLET ORAL
Status: DISCONTINUED | OUTPATIENT
Start: 2024-05-12 | End: 2024-05-14

## 2024-05-12 RX ORDER — HYDROCODONE BITARTRATE AND ACETAMINOPHEN 500; 5 MG/1; MG/1
TABLET ORAL
Status: DISCONTINUED | OUTPATIENT
Start: 2024-05-12 | End: 2024-05-13

## 2024-05-12 RX ORDER — MAGNESIUM SULFATE HEPTAHYDRATE 40 MG/ML
2 INJECTION, SOLUTION INTRAVENOUS ONCE
Status: COMPLETED | OUTPATIENT
Start: 2024-05-12 | End: 2024-05-12

## 2024-05-12 RX ADMIN — PANTOPRAZOLE SODIUM 40 MG: 40 INJECTION, POWDER, FOR SOLUTION INTRAVENOUS at 09:05

## 2024-05-12 RX ADMIN — CEFTRIAXONE 1 G: 1 INJECTION, POWDER, FOR SOLUTION INTRAMUSCULAR; INTRAVENOUS at 09:05

## 2024-05-12 RX ADMIN — Medication 10 ML: at 08:05

## 2024-05-12 RX ADMIN — SPIRONOLACTONE 12.5 MG: 25 TABLET ORAL at 09:05

## 2024-05-12 RX ADMIN — DIBASIC SODIUM PHOSPHATE, MONOBASIC POTASSIUM PHOSPHATE AND MONOBASIC SODIUM PHOSPHATE 2 TABLET: 852; 155; 130 TABLET ORAL at 03:05

## 2024-05-12 RX ADMIN — FUROSEMIDE 40 MG: 10 INJECTION, SOLUTION INTRAMUSCULAR; INTRAVENOUS at 02:05

## 2024-05-12 RX ADMIN — EMPAGLIFLOZIN 10 MG: 10 TABLET, FILM COATED ORAL at 09:05

## 2024-05-12 RX ADMIN — MORPHINE SULFATE 2 MG: 2 INJECTION, SOLUTION INTRAMUSCULAR; INTRAVENOUS at 09:05

## 2024-05-12 RX ADMIN — POTASSIUM CHLORIDE 40 MEQ: 1500 TABLET, EXTENDED RELEASE ORAL at 12:05

## 2024-05-12 RX ADMIN — ESCITALOPRAM OXALATE 10 MG: 10 TABLET ORAL at 09:05

## 2024-05-12 RX ADMIN — OCTREOTIDE ACETATE 50 MCG/HR: 500 INJECTION, SOLUTION INTRAVENOUS; SUBCUTANEOUS at 03:05

## 2024-05-12 RX ADMIN — POTASSIUM CHLORIDE 40 MEQ: 1500 TABLET, EXTENDED RELEASE ORAL at 09:05

## 2024-05-12 RX ADMIN — FOLIC ACID 1 MG: 1 TABLET ORAL at 09:05

## 2024-05-12 RX ADMIN — DIBASIC SODIUM PHOSPHATE, MONOBASIC POTASSIUM PHOSPHATE AND MONOBASIC SODIUM PHOSPHATE 2 TABLET: 852; 155; 130 TABLET ORAL at 09:05

## 2024-05-12 RX ADMIN — PANTOPRAZOLE SODIUM 40 MG: 40 INJECTION, POWDER, FOR SOLUTION INTRAVENOUS at 08:05

## 2024-05-12 RX ADMIN — IOHEXOL 100 ML: 350 INJECTION, SOLUTION INTRAVENOUS at 12:05

## 2024-05-12 RX ADMIN — Medication 10 ML: at 03:05

## 2024-05-12 RX ADMIN — FLUTICASONE FUROATE AND VILANTEROL TRIFENATATE 1 PUFF: 100; 25 POWDER RESPIRATORY (INHALATION) at 08:05

## 2024-05-12 RX ADMIN — FUROSEMIDE 40 MG: 10 INJECTION, SOLUTION INTRAVENOUS at 12:05

## 2024-05-12 RX ADMIN — MAGNESIUM SULFATE HEPTAHYDRATE 2 G: 40 INJECTION, SOLUTION INTRAVENOUS at 09:05

## 2024-05-12 RX ADMIN — METOPROLOL SUCCINATE 12.5 MG: 25 TABLET, EXTENDED RELEASE ORAL at 09:05

## 2024-05-12 RX ADMIN — POTASSIUM CHLORIDE 40 MEQ: 1500 TABLET, EXTENDED RELEASE ORAL at 01:05

## 2024-05-12 RX ADMIN — ATORVASTATIN CALCIUM 40 MG: 40 TABLET, FILM COATED ORAL at 08:05

## 2024-05-12 RX ADMIN — Medication 10 ML: at 12:05

## 2024-05-12 RX ADMIN — Medication 100 MG: at 09:05

## 2024-05-12 NOTE — PLAN OF CARE
Problem: Adult Inpatient Plan of Care  Goal: Plan of Care Review  Outcome: Progressing  Goal: Patient-Specific Goal (Individualized)  Outcome: Progressing  Goal: Absence of Hospital-Acquired Illness or Injury  Outcome: Progressing  Goal: Optimal Comfort and Wellbeing  Outcome: Progressing  Goal: Readiness for Transition of Care  Outcome: Progressing     Problem: Acute Kidney Injury/Impairment  Goal: Fluid and Electrolyte Balance  Outcome: Progressing  Goal: Improved Oral Intake  Outcome: Progressing  Goal: Effective Renal Function  Outcome: Progressing     Problem: Pneumonia  Goal: Fluid Balance  Outcome: Progressing  Goal: Resolution of Infection Signs and Symptoms  Outcome: Progressing  Goal: Effective Oxygenation and Ventilation  Outcome: Progressing     Problem: Skin Injury Risk Increased  Goal: Skin Health and Integrity  Outcome: Progressing     Problem: Infection  Goal: Absence of Infection Signs and Symptoms  Outcome: Progressing

## 2024-05-12 NOTE — ASSESSMENT & PLAN NOTE
Patient c/o epigastric pain with lipase 758 and amylase 1256.    - PRN morphine 2 mg IV q6h  - PRN Narcan ordered  - PRN Zofran and Compro for nausea

## 2024-05-12 NOTE — PLAN OF CARE
Problem: Adult Inpatient Plan of Care  Goal: Plan of Care Review  5/12/2024 1558 by Maria Luisa Agustin RN  Outcome: Progressing  5/12/2024 1558 by Maria Luisa Agustin RN  Outcome: Progressing  Goal: Patient-Specific Goal (Individualized)  5/12/2024 1558 by Maria Luisa Agustin RN  Outcome: Progressing  5/12/2024 1558 by Maria Luisa Agustin RN  Outcome: Progressing  Goal: Absence of Hospital-Acquired Illness or Injury  5/12/2024 1558 by Maria Luisa Agustin RN  Outcome: Progressing  5/12/2024 1558 by Maria Luisa Agustin RN  Outcome: Progressing  Goal: Optimal Comfort and Wellbeing  5/12/2024 1558 by Maria Luisa Agustin RN  Outcome: Progressing  5/12/2024 1558 by Maria Luisa Agustin RN  Outcome: Progressing  Goal: Readiness for Transition of Care  5/12/2024 1558 by Maria Luisa Agustin RN  Outcome: Progressing  5/12/2024 1558 by Maria Luisa Agustin RN  Outcome: Progressing     Problem: Acute Kidney Injury/Impairment  Goal: Fluid and Electrolyte Balance  5/12/2024 1558 by Maria Luisa Agustin RN  Outcome: Progressing  5/12/2024 1558 by Maria Luisa Agustin RN  Outcome: Progressing  Goal: Improved Oral Intake  5/12/2024 1558 by Maria Luisa Agustin RN  Outcome: Progressing  5/12/2024 1558 by Maria Luisa Agustin RN  Outcome: Progressing  Goal: Effective Renal Function  5/12/2024 1558 by Maria Luisa Agustin RN  Outcome: Progressing  5/12/2024 1558 by Maria Luisa Agustin RN  Outcome: Progressing     Problem: Pneumonia  Goal: Fluid Balance  5/12/2024 1558 by Maria Luisa Agustin RN  Outcome: Progressing  5/12/2024 1558 by Maria Luisa Agustin RN  Outcome: Progressing  Goal: Resolution of Infection Signs and Symptoms  5/12/2024 1558 by Maria Luisa Agustin RN  Outcome: Progressing  5/12/2024 1558 by Maria Luisa Agustin RN  Outcome: Progressing  Goal: Effective Oxygenation and Ventilation  5/12/2024 1558 by Maria Luisa Agustin RN  Outcome: Progressing  5/12/2024 1558 by Vamsi, Maria Luisa, RN  Outcome: Progressing     Problem: Skin Injury Risk Increased  Goal: Skin Health and Integrity  5/12/2024 1558 by Maria Luisa Agustin RN  Outcome: Progressing  5/12/2024 1558 by Maria Luisa Agustin,  RN  Outcome: Progressing     Problem: Infection  Goal: Absence of Infection Signs and Symptoms  5/12/2024 1558 by Maria Luisa Agustin RN  Outcome: Progressing  5/12/2024 1558 by Maria Luisa Agustin RN  Outcome: Progressing

## 2024-05-12 NOTE — NURSING
"RT called to get update on pt. for ordered CT scan. Pt. Currently has one 22g IV in place. RT stated they are unable to use this catheter size and would need atleast a 20g. This nurse and 2 other nurses attempted for additional IV access with needed catheter size and attempts were unsuccessful. CT contacted for update and response per RT  "if unable to get additional access then we may just have to hold off on him prepping to get scan tonight until we could get a better IV possibly in the am." Order placed for PICC team to access pt for possible midline placement.   "

## 2024-05-12 NOTE — SUBJECTIVE & OBJECTIVE
Interval History: Transfused 1 U pRBC.     Review of Systems   Constitutional:  Positive for fatigue.   Respiratory:  Positive for shortness of breath.    Cardiovascular:  Negative for chest pain.   Gastrointestinal:  Positive for abdominal pain.   Skin:  Positive for pallor.   All other systems reviewed and are negative.    Objective:     Vital Signs (Most Recent):  Temp: 98.1 °F (36.7 °C) (05/12/24 0813)  Pulse: 81 (05/12/24 1107)  Resp: 18 (05/12/24 0921)  BP: 121/73 (05/12/24 0955)  SpO2: 97 % (05/12/24 0825) Vital Signs (24h Range):  Temp:  [97.4 °F (36.3 °C)-98.9 °F (37.2 °C)] 98.1 °F (36.7 °C)  Pulse:  [] 81  Resp:  [16-19] 18  SpO2:  [79 %-100 %] 97 %  BP: (104-127)/(64-78) 121/73     Weight: 88.5 kg (195 lb)  Body mass index is 25.73 kg/m².    Intake/Output Summary (Last 24 hours) at 5/12/2024 1207  Last data filed at 5/12/2024 0021  Gross per 24 hour   Intake 300 ml   Output 1100 ml   Net -800 ml         Physical Exam  Vitals and nursing note reviewed.   Constitutional:       Appearance: Normal appearance. He is normal weight. He is ill-appearing.   HENT:      Head: Normocephalic and atraumatic.   Cardiovascular:      Rate and Rhythm: Normal rate and regular rhythm.   Pulmonary:      Effort: Pulmonary effort is normal. No respiratory distress.      Breath sounds: Rales present.   Abdominal:      General: Abdomen is flat. There is no distension.      Palpations: Abdomen is soft.      Tenderness: There is abdominal tenderness (epigastric).   Musculoskeletal:      Right lower leg: No edema.      Left lower leg: No edema.   Skin:     General: Skin is warm and dry.   Neurological:      General: No focal deficit present.      Mental Status: He is alert and oriented to person, place, and time. Mental status is at baseline.             Significant Labs: All pertinent labs within the past 24 hours have been reviewed.    Significant Imaging: I have reviewed all pertinent imaging results/findings within the  past 24 hours.

## 2024-05-12 NOTE — ASSESSMENT & PLAN NOTE
Recent Labs     05/10/24  0721 05/10/24  1823 05/11/24  0551 05/11/24  1110 05/12/24  0345 05/12/24  0832   BILITOT 0.7  --  1.0  --  0.6  --    AST 24  --  22  --  19  --    ALT 11  --  10  --  9*  --    ALKPHOS 108  --  98  --  89  --    PLT  --    < > 189   < >  --  169   INR  --   --  1.3*  --  1.4*  --    ALBUMIN 2.8*  --  2.7*  --  2.5*  --     < > = values in this interval not displayed.     Patient with known Alcoholic Cirrhosis. Co-morbidities are present and inclusive of ascites, portal hypertension, and anemia/pancytopenia.    MELD-Na score calculated; MELD 3.0: 14 at 5/12/2024  3:45 AM  MELD-Na: 14 at 5/12/2024  3:45 AM  Calculated from:  Serum Creatinine: 1.2 mg/dL at 5/12/2024  3:45 AM  Serum Sodium: 135 mmol/L at 5/12/2024  3:45 AM  Total Bilirubin: 0.6 mg/dL (Using min of 1 mg/dL) at 5/12/2024  3:45 AM  Serum Albumin: 2.5 g/dL at 5/12/2024  3:45 AM  INR(ratio): 1.4 at 5/12/2024  3:45 AM  Age at listing (hypothetical): 68 years  Sex: Male at 5/12/2024  3:45 AM    During admission, reported he quit alcohol one month ago.     PLAN:  - Will avoid any hepatotoxic meds, and monitor CBC/CMP/INR for synthetic function.   - IR for therapeutic paracentesis; deferred until Monday 5/13 due to current Hgb < 7

## 2024-05-12 NOTE — ASSESSMENT & PLAN NOTE
"5/9 CTA: "Enhancing lesion within the lateral aspect of the pancreatic head, not seen on examination 12/25/2023. In this region however, on the previous exam, there is a hypoattenuating lesion with peripheral calcification. This did not undergo significant enhancement at that time. Solid lesion is of concern. Correlation and follow-up advised."     elevated at 88.6. LDH, AFP, CEA WNL.   EGD 5/10 noted "blood in the first portion of the duodenum, plumes of blood were noted to be coming from the ampulla".     -CT pancreas protocol pending  "

## 2024-05-12 NOTE — CONSULTS
RAPID RESPONSE VASCULAR ACCESS NOTE       Bed:8068/8068 A    Single lumen 20G, 10CM midline placed in the right basilic vein. Needle advanced into the vessel under real time ultrasound guidance.    Attempts: 1  Max dwell date: 06/11/24  Lot number: [id number]    Call [phone] for concerns or assistance.    Raffi Moyer RN

## 2024-05-12 NOTE — ASSESSMENT & PLAN NOTE
"CAD (coronary artery disease)     3/28/2024 BNP 2,932 and 4/18/2024 BNP 4,361; no TTE since 2022. Prior CT with significant multivessel CAD.    Echo    Result Date: 5/10/2024    Left Ventricle: The left ventricle is mildly dilated. Moderate global   hypokinesis and regional wall motion abnormalities present. There is   moderately reduced systolic function with a visually estimated ejection   fraction of 35 - 40%. Biplane (2D) method of discs ejection fraction is   33%. There is diastolic dysfunction. Elevated left ventricular filling   pressure.    Right Ventricle: Normal right ventricular cavity size. Systolic   function is mildly reduced.    Left Atrium: Left atrium is moderately dilated.    Aortic Valve: There is moderate aortic valve sclerosis. Mildly   restricted motion.    Mitral Valve: There is mild annular dilation present. There is mild   mitral annular calcification present.    Tricuspid Valve: There is moderate annular dilation present. There is   moderate regurgitation.    Pulmonary Artery: The estimated pulmonary artery systolic pressure is   55 mmHg.    IVC/SVC: Elevated venous pressure at 15 mmHg.    Pericardium: Large Bilateral pleural effusions.       - Cardiology consulted, appreciate recs  "Prior CT with significant multivessel CAD concerning for ICM cardiomyopathy exacerbated by current UGIB. Elevated filling pressures on echo are likely largely due to liver disease as he doesn't appear as grossly volume overloaded as expected."  Defer ischemic evaluation with PET stress to outpt as he is not a revascularization candidate at this time with recurrent UGIB  Ambulatory referral to General Cardiology at discharge  - Continue spot diuresis with IV Lasix; goal net negative 1-2 L/daily  - GDMT:  BB: Continue Toprol 12.5 mg po daily  ACE/ARB/ARNI: Recommend Entresto, if BP too low, try valsartan  SGLT2i: Continue Jardiance 10 mg po daily  MRA: Continue spironolactone 12.5 mg po daily  - Continue Lipitor " 40 mg po qhs  - Start ASA when anemia is controlled

## 2024-05-12 NOTE — PROGRESS NOTES
"[facility] - Telemetry [facility] Medicine  Progress Note    Patient Name: Fransico Montalvo  MRN: [id number]  Patient Class: IP- Inpatient   Admission Date: 5/9/2024  Length of Stay: 3 days  Attending Physician: Harriet Bermudez MD  Primary Care Provider: [facility]        Subjective:     Principal Problem:Hematochezia    HPI:  Fransico Montalvo is a 67 yo M with PMH of EtOH cirrhosis, presumed COPD, multifactorial anemia, HTN, BPH, previous esophageal varices, and gastritis who presented from SNF c/o bloody stool. Per patient, he noted dark red blood from his rectum since this morning (states it is mixed in stool and also on its own.) Also endorses new upper abdominal pain which started after eating food yesterday evening. Over the past month, he has experienced fatigue, SOB, and two pre-syncopal episodes. Of note, patient with two recent admissions: 3/28-4/4 for CAP and symptomatic anemia (s/p 5 U pRBCs) and 4/18-4/23 for melena (s/p 1 U pRBCs.) Of note, EGD on 10/25/2023 and colonoscopy on 4/1/2024 were unremarkable. EGD on 4/19 showed "single non-bleeding angioectasia in the jejunum; treated with APC."    Upon arrival to the ED, /77, , RR 18, SpO2 100% on RA, and T 97.7 F. Labs significant for Hgb 7.0 and K 5.5 (5.8 on repeat). Physical exam in the ED pertinent for bright red blood present in rectum, epigastric tenderness, and pallor. EKG NSR. CTA without active hemorrhage, ascites, bilateral pleural effusion (loculated on the right), and a lateral pancreatic head enhancing lesion. He received 80 mg IV Protonix x 1 and 2 mg IV morphine x 1. CTA pending.     Overview/Hospital Course:  Admitted to Hospital Medicine for evaluation of GI bleed concerns and hypoxia over the past month. Echo revealed EF 35-40% and diastolic dysfunction, no known history of CHF. Cardiology was consulted for GDMT initiation; plan for outpatient ischemic evaluation. Pulmonology consulted for bilateral pleural effusions, no " "plans for thoracentesis at this time. Paracentesis deferred to Monday 5/13 due to Hgb < 7. Transfused pRbc for Hgb<7. Enhancing pancreatic head mass with elevated CA 19-9 will need outpatient follow-up. EGD 05/10 noted "blood in the first portion of the duodenum, plumes of blood were noted to be coming from the ampulla". CT pancreas protocol pending.     Interval History: Transfused 1 U pRBC.     Review of Systems   Constitutional:  Positive for fatigue.   Respiratory:  Positive for shortness of breath.    Cardiovascular:  Negative for chest pain.   Gastrointestinal:  Positive for abdominal pain.   Skin:  Positive for pallor.   All other systems reviewed and are negative.    Objective:     Vital Signs (Most Recent):  Temp: 98.1 °F (36.7 °C) (05/12/24 0813)  Pulse: 81 (05/12/24 1107)  Resp: 18 (05/12/24 0921)  BP: 121/73 (05/12/24 0955)  SpO2: 97 % (05/12/24 0825) Vital Signs (24h Range):  Temp:  [97.4 °F (36.3 °C)-98.9 °F (37.2 °C)] 98.1 °F (36.7 °C)  Pulse:  [] 81  Resp:  [16-19] 18  SpO2:  [79 %-100 %] 97 %  BP: (104-127)/(64-78) 121/73     Weight: 88.5 kg (195 lb)  Body mass index is 25.73 kg/m².    Intake/Output Summary (Last 24 hours) at 5/12/2024 1207  Last data filed at 5/12/2024 0021  Gross per 24 hour   Intake 300 ml   Output 1100 ml   Net -800 ml         Physical Exam  Vitals and nursing note reviewed.   Constitutional:       Appearance: Normal appearance. He is normal weight. He is ill-appearing.   HENT:      Head: Normocephalic and atraumatic.   Cardiovascular:      Rate and Rhythm: Normal rate and regular rhythm.   Pulmonary:      Effort: Pulmonary effort is normal. No respiratory distress.      Breath sounds: Rales present.   Abdominal:      General: Abdomen is flat. There is no distension.      Palpations: Abdomen is soft.      Tenderness: There is abdominal tenderness (epigastric).   Musculoskeletal:      Right lower leg: No edema.      Left lower leg: No edema.   Skin:     General: Skin is " "warm and dry.   Neurological:      General: No focal deficit present.      Mental Status: He is alert and oriented to person, place, and time. Mental status is at baseline.             Significant Labs: All pertinent labs within the past 24 hours have been reviewed.    Significant Imaging: I have reviewed all pertinent imaging results/findings within the past 24 hours.    Assessment/Plan:      * Hematochezia  Melena     Patient presenting for evaluation of dark red blood in stool since morning of admission with associated upper abdominal pain. Two recent admissions: 3/28-4/4 for CAP and symptomatic anemia (s/p 5 U pRBCs) and 4/18-4/23 for melena (s/p 1 U pRBCs.) Of note, EGD on 10/25/2023 and colonoscopy on 4/1/2024 were unremarkable. EGD on 4/19 showed "single non-bleeding angioectasia in the jejunum; treated with APC."  CTA without acute hemorrhage. Received Protonix 80mg IV x 1 in the ED.       Recent Labs     05/10/24  0721 05/10/24  1823 05/11/24  0551 05/11/24  1110 05/12/24  0345 05/12/24  0832   HGB  --    < > 7.0*   < >  --  7.4*   BUN 19  --  20  --  19  --    CREATININE 1.2  --  1.2  --  1.2  --     < > = values in this interval not displayed.       pRBCs transfused this admission: 3 U (as of 5/12)    5/10 EGD: "blood in the first portion of the duodenum, plumes of blood were noted to be coming from the ampulla"    - GI consulted, appreciate recs  - F/u CT pancreas protocol  - Continue Protonix 40 mg IV BID  - Continue octreotide gtt @ 50 mcg/hr  GI to arrange outpatient octreotide injections  - Continue Rocephin 1 g IV q24h  - Monitor for hemodynamic instability with associated large volume hematochezia, consider STAT CTA and IR consult for embolization if positive  - Maintain IV access with 2 large bore IVs  - Telemetry monitoring    HFrEF (heart failure with reduced ejection fraction)  CAD (coronary artery disease)     3/28/2024 BNP 2,932 and 4/18/2024 BNP 4,361; no TTE since 2022. Prior CT with " "significant multivessel CAD.    Echo    Result Date: 5/10/2024    Left Ventricle: The left ventricle is mildly dilated. Moderate global   hypokinesis and regional wall motion abnormalities present. There is   moderately reduced systolic function with a visually estimated ejection   fraction of 35 - 40%. Biplane (2D) method of discs ejection fraction is   33%. There is diastolic dysfunction. Elevated left ventricular filling   pressure.    Right Ventricle: Normal right ventricular cavity size. Systolic   function is mildly reduced.    Left Atrium: Left atrium is moderately dilated.    Aortic Valve: There is moderate aortic valve sclerosis. Mildly   restricted motion.    Mitral Valve: There is mild annular dilation present. There is mild   mitral annular calcification present.    Tricuspid Valve: There is moderate annular dilation present. There is   moderate regurgitation.    Pulmonary Artery: The estimated pulmonary artery systolic pressure is   55 mmHg.    IVC/SVC: Elevated venous pressure at 15 mmHg.    Pericardium: Large Bilateral pleural effusions.       - Cardiology consulted, appreciate recs  "Prior CT with significant multivessel CAD concerning for ICM cardiomyopathy exacerbated by current UGIB. Elevated filling pressures on echo are likely largely due to liver disease as he doesn't appear as grossly volume overloaded as expected."  Defer ischemic evaluation with PET stress to outpt as he is not a revascularization candidate at this time with recurrent UGIB  Ambulatory referral to General Cardiology at discharge  - Continue spot diuresis with IV Lasix; goal net negative 1-2 L/daily  - GDMT:  BB: Continue Toprol 12.5 mg po daily  ACE/ARB/ARNI: Recommend Entresto, if BP too low, try valsartan  SGLT2i: Continue Jardiance 10 mg po daily  MRA: Continue spironolactone 12.5 mg po daily  - Continue Lipitor 40 mg po qhs  - Start ASA when anemia is controlled    Alcoholic cirrhosis  Recent Labs     05/10/24  0721 " "05/10/24  1823 05/11/24  0551 05/11/24  1110 05/12/24  0345 05/12/24  0832   BILITOT 0.7  --  1.0  --  0.6  --    AST 24  --  22  --  19  --    ALT 11  --  10  --  9*  --    ALKPHOS 108  --  98  --  89  --    PLT  --    < > 189   < >  --  169   INR  --   --  1.3*  --  1.4*  --    ALBUMIN 2.8*  --  2.7*  --  2.5*  --     < > = values in this interval not displayed.     Patient with known Alcoholic Cirrhosis. Co-morbidities are present and inclusive of ascites, portal hypertension, and anemia/pancytopenia.    MELD-Na score calculated; MELD 3.0: 14 at 5/12/2024  3:45 AM  MELD-Na: 14 at 5/12/2024  3:45 AM  Calculated from:  Serum Creatinine: 1.2 mg/dL at 5/12/2024  3:45 AM  Serum Sodium: 135 mmol/L at 5/12/2024  3:45 AM  Total Bilirubin: 0.6 mg/dL (Using min of 1 mg/dL) at 5/12/2024  3:45 AM  Serum Albumin: 2.5 g/dL at 5/12/2024  3:45 AM  INR(ratio): 1.4 at 5/12/2024  3:45 AM  Age at listing (hypothetical): 68 years  Sex: Male at 5/12/2024  3:45 AM    During admission, reported he quit alcohol one month ago.     PLAN:  - Will avoid any hepatotoxic meds, and monitor CBC/CMP/INR for synthetic function.   - IR for therapeutic paracentesis; deferred until Monday 5/13 due to current Hgb < 7    Hyperkalemia  RESOLVED.    This patient has hyperkalemia which is uncontrolled. We will monitor for arrhythmias with EKG or continuous telemetry. We will treat the hyperkalemia with Potassium Binders, Calcium gluconate, IV insulin and dextrose, Nebulized albuterol sulfate, and Furosemide. The likely etiology of the hyperkalemia is  unknown .  The patients latest potassium has been reviewed and the results are listed below  Recent Labs   Lab 05/12/24 0345   K 3.2*         Bilateral pleural effusion  Compressive atelectasis     5/9 CTA: "Images of the lower thorax are remarkable for bilateral moderate pleural effusions noting some degree of loculation involving the effusion on the right, partially visualized. There is compressive " "atelectasis of the bilateral lower lobes."    Patient found to have moderate pleural effusion on imaging. I have personally reviewed and interpreted the following imaging: Xray and CT. A thoracentesis was deferred. Most likely etiology includes Congestive Heart Failure and Cirrhosis. Management to include Diuresis and possible thoracentesis.    - Pulmonology consulted, no plans for thoracentesis at this time  - Lasix trial  - Incentive spirometer    Pancreatic lesion  5/9 CTA: "Enhancing lesion within the lateral aspect of the pancreatic head, not seen on examination 12/25/2023. In this region however, on the previous exam, there is a hypoattenuating lesion with peripheral calcification. This did not undergo significant enhancement at that time. Solid lesion is of concern. Correlation and follow-up advised."     elevated at 88.6. LDH, AFP, CEA WNL.   EGD 5/10 noted "blood in the first portion of the duodenum, plumes of blood were noted to be coming from the ampulla".     -CT pancreas protocol pending    Acute pancreatitis  Patient c/o epigastric pain with lipase 758 and amylase 1256.    - PRN morphine 2 mg IV q6h  - PRN Narcan ordered  - PRN Zofran and Compro for nausea    Acute on chronic anemia  Patient's with Normocytic anemia. Hemoglobin stable. Patient's anemia is currently uncontrolled. Has not received any PRBCs to date. Chronic etiology due to iron deficiency and cirrhosis, exacerbated by acute blood loss from GI bleed.    -Current CBC reviewed-    Recent Labs   Lab 05/11/24  1552 05/12/24  0004 05/12/24  0832   HGB 7.4* 6.9* 7.4*     PLAN  - Monitor serial CBC and transfuse if patient becomes hemodynamically unstable, symptomatic or H/H drops below 7/21  - S/p iron sucrose 500 mg IV x 1    Presumed COPD (chronic obstructive pulmonary disease)  Patient's COPD is controlled currently.  Patient is currently off COPD Pathway. Continue Breo Ellipta 1 puff daily and PRN Duo-Nebs, will monitor respiratory " "status closely.     Essential hypertension  Chronic, controlled. Latest blood pressure and vitals reviewed-     Temp:  [97.3 °F (36.3 °C)-98.7 °F (37.1 °C)]   Pulse:  [72-96]   Resp:  [17-20]   BP: (103-117)/(58-79)   SpO2:  [92 %-100 %] .     Not on any home medications per chart review    PLAN:  -See "HFrEF (heart failure with reduced ejection fraction) " A&P  -Will utilize p.r.n. blood pressure medication only if patient's blood pressure greater than 180/110 and he develops symptoms such as worsening chest pain or shortness of breath.    Anxiety and depression  Patient has hx of depression which  is currently controlled.     PLAN:  Continue home Lexapro 10 mg po daily. We will not consult psychiatry at this time. Patient does not display psychosis at this time. Continue to monitor closely and adjust plan of care as needed.    BPH (benign prostatic hyperplasia)  Stable. Hx of BPH. Was on Tamsulosin previously, but held on recent admission in 03/2024 where there were concerns for low BP    PLAN:  Bladder scan PRN for urinary retention concerns    Documented history of CKD Stage 3  Patient reported to have CKD 3 as documented in prior notes. However, review of past labs does not show sustained decrease in GFR > 6 months, just during individual encounters during which patient had [place].     CAD (coronary artery disease)  Patient with known CAD (seen on CT imaging, no interventions have been done) which is controlled Will continue Statin and monitor for S/Sx of angina/ACS. Continue to monitor on telemetry.     See "HFrEF (heart failure with reduced ejection fraction)"    Compressive atelectasis  See "Bilateral pleural effusion"    Melena  See "Hematochezia"      VTE Risk Mitigation (From admission, onward)           Ordered     Reason for No Pharmacological VTE Prophylaxis  Once        Question:  Reasons:  Answer:  Active Bleeding    05/09/24 1609     IP VTE HIGH RISK PATIENT  Once         05/09/24 1609     Place " sequential compression device  Until discontinued         05/09/24 1609                    Discharge Planning   ARTURO: 5/14/2024     Code Status: Full Code   Is the patient medically ready for discharge?:     Reason for patient still in hospital (select all that apply): Patient new problem, Patient trending condition, Laboratory test, Treatment, Imaging, and Consult recommendations  Discharge Plan A: Skilled Nursing Facility        Awa Rodriguez MD  Department of Hospital Medicine   [facility] - Telemetry Stepdown

## 2024-05-12 NOTE — ASSESSMENT & PLAN NOTE
Patient's with Normocytic anemia. Hemoglobin stable. Patient's anemia is currently uncontrolled. Has not received any PRBCs to date. Chronic etiology due to iron deficiency and cirrhosis, exacerbated by acute blood loss from GI bleed.    -Current CBC reviewed-    Recent Labs   Lab 05/11/24  1552 05/12/24  0004 05/12/24  0832   HGB 7.4* 6.9* 7.4*     PLAN  - Monitor serial CBC and transfuse if patient becomes hemodynamically unstable, symptomatic or H/H drops below 7/21  - S/p iron sucrose 500 mg IV x 1

## 2024-05-12 NOTE — ASSESSMENT & PLAN NOTE
"Melena     Patient presenting for evaluation of dark red blood in stool since morning of admission with associated upper abdominal pain. Two recent admissions: 3/28-4/4 for CAP and symptomatic anemia (s/p 5 U pRBCs) and 4/18-4/23 for melena (s/p 1 U pRBCs.) Of note, EGD on 10/25/2023 and colonoscopy on 4/1/2024 were unremarkable. EGD on 4/19 showed "single non-bleeding angioectasia in the jejunum; treated with APC."  CTA without acute hemorrhage. Received Protonix 80mg IV x 1 in the ED.       Recent Labs     05/10/24  0721 05/10/24  1823 05/11/24  0551 05/11/24  1110 05/12/24  0345 05/12/24  0832   HGB  --    < > 7.0*   < >  --  7.4*   BUN 19  --  20  --  19  --    CREATININE 1.2  --  1.2  --  1.2  --     < > = values in this interval not displayed.       pRBCs transfused this admission: 3 U (as of 5/12)    5/10 EGD: "blood in the first portion of the duodenum, plumes of blood were noted to be coming from the ampulla"    - GI consulted, appreciate recs  - F/u CT pancreas protocol  - Continue Protonix 40 mg IV BID  - Continue octreotide gtt @ 50 mcg/hr  GI to arrange outpatient octreotide injections  - Continue Rocephin 1 g IV q24h  - Monitor for hemodynamic instability with associated large volume hematochezia, consider STAT CTA and IR consult for embolization if positive  - Maintain IV access with 2 large bore IVs  - Telemetry monitoring  "

## 2024-05-12 NOTE — ASSESSMENT & PLAN NOTE
RESOLVED.    This patient has hyperkalemia which is uncontrolled. We will monitor for arrhythmias with EKG or continuous telemetry. We will treat the hyperkalemia with Potassium Binders, Calcium gluconate, IV insulin and dextrose, Nebulized albuterol sulfate, and Furosemide. The likely etiology of the hyperkalemia is  unknown .  The patients latest potassium has been reviewed and the results are listed below  Recent Labs   Lab 05/12/24  0345   K 3.2*

## 2024-05-13 LAB
ALBUMIN SERPL BCP-MCNC: 2.6 G/DL (ref 3.5–5.2)
ALP SERPL-CCNC: 91 U/L (ref 55–135)
ALT SERPL W/O P-5'-P-CCNC: 9 U/L (ref 10–44)
ANION GAP SERPL CALC-SCNC: 10 MMOL/L (ref 8–16)
AST SERPL-CCNC: 18 U/L (ref 10–40)
BASOPHILS # BLD AUTO: 0.01 K/UL (ref 0–0.2)
BASOPHILS # BLD AUTO: 0.02 K/UL (ref 0–0.2)
BASOPHILS NFR BLD: 0.3 % (ref 0–1.9)
BASOPHILS NFR BLD: 0.9 % (ref 0–1.9)
BILIRUB SERPL-MCNC: 0.6 MG/DL (ref 0.1–1)
BUN SERPL-MCNC: 17 MG/DL (ref 8–23)
CALCIUM SERPL-MCNC: 8.3 MG/DL (ref 8.7–10.5)
CHLORIDE SERPL-SCNC: 95 MMOL/L (ref 95–110)
CO2 SERPL-SCNC: 33 MMOL/L (ref 23–29)
CREAT SERPL-MCNC: 1.3 MG/DL (ref 0.5–1.4)
DIFFERENTIAL METHOD BLD: ABNORMAL
DIFFERENTIAL METHOD BLD: ABNORMAL
EOSINOPHIL # BLD AUTO: 0 K/UL (ref 0–0.5)
EOSINOPHIL # BLD AUTO: 0 K/UL (ref 0–0.5)
EOSINOPHIL NFR BLD: 0.9 % (ref 0–8)
EOSINOPHIL NFR BLD: 1 % (ref 0–8)
ERYTHROCYTE [DISTWIDTH] IN BLOOD BY AUTOMATED COUNT: 18.8 % (ref 11.5–14.5)
ERYTHROCYTE [DISTWIDTH] IN BLOOD BY AUTOMATED COUNT: 19.7 % (ref 11.5–14.5)
EST. GFR  (NO RACE VARIABLE): 59.5 ML/MIN/1.73 M^2
GLUCOSE SERPL-MCNC: 113 MG/DL (ref 70–110)
HCT VFR BLD AUTO: 26.5 % (ref 40–54)
HCT VFR BLD AUTO: 26.5 % (ref 40–54)
HGB BLD-MCNC: 7.7 G/DL (ref 14–18)
HGB BLD-MCNC: 7.8 G/DL (ref 14–18)
IMM GRANULOCYTES # BLD AUTO: 0.01 K/UL (ref 0–0.04)
IMM GRANULOCYTES # BLD AUTO: 0.01 K/UL (ref 0–0.04)
IMM GRANULOCYTES NFR BLD AUTO: 0.3 % (ref 0–0.5)
IMM GRANULOCYTES NFR BLD AUTO: 0.4 % (ref 0–0.5)
INR PPP: 1.3 (ref 0.8–1.2)
LYMPHOCYTES # BLD AUTO: 0.5 K/UL (ref 1–4.8)
LYMPHOCYTES # BLD AUTO: 0.7 K/UL (ref 1–4.8)
LYMPHOCYTES NFR BLD: 22.9 % (ref 18–48)
LYMPHOCYTES NFR BLD: 23.9 % (ref 18–48)
MAGNESIUM SERPL-MCNC: 1.7 MG/DL (ref 1.6–2.6)
MCH RBC QN AUTO: 24.1 PG (ref 27–31)
MCH RBC QN AUTO: 24.5 PG (ref 27–31)
MCHC RBC AUTO-ENTMCNC: 29.1 G/DL (ref 32–36)
MCHC RBC AUTO-ENTMCNC: 29.4 G/DL (ref 32–36)
MCV RBC AUTO: 83 FL (ref 82–98)
MCV RBC AUTO: 83 FL (ref 82–98)
MONOCYTES # BLD AUTO: 0.3 K/UL (ref 0.3–1)
MONOCYTES # BLD AUTO: 0.4 K/UL (ref 0.3–1)
MONOCYTES NFR BLD: 11.7 % (ref 4–15)
MONOCYTES NFR BLD: 13.9 % (ref 4–15)
NEUTROPHILS # BLD AUTO: 1.4 K/UL (ref 1.8–7.7)
NEUTROPHILS # BLD AUTO: 1.9 K/UL (ref 1.8–7.7)
NEUTROPHILS NFR BLD: 61 % (ref 38–73)
NEUTROPHILS NFR BLD: 62.8 % (ref 38–73)
NRBC BLD-RTO: 0 /100 WBC
NRBC BLD-RTO: 0 /100 WBC
PHOSPHATE SERPL-MCNC: 3.7 MG/DL (ref 2.7–4.5)
PLATELET # BLD AUTO: 156 K/UL (ref 150–450)
PLATELET # BLD AUTO: 181 K/UL (ref 150–450)
PMV BLD AUTO: 10.5 FL (ref 9.2–12.9)
PMV BLD AUTO: 10.6 FL (ref 9.2–12.9)
POTASSIUM SERPL-SCNC: 3.9 MMOL/L (ref 3.5–5.1)
PROT SERPL-MCNC: 6.3 G/DL (ref 6–8.4)
PROTHROMBIN TIME: 13.7 SEC (ref 9–12.5)
RBC # BLD AUTO: 3.19 M/UL (ref 4.6–6.2)
RBC # BLD AUTO: 3.19 M/UL (ref 4.6–6.2)
SODIUM SERPL-SCNC: 138 MMOL/L (ref 136–145)
WBC # BLD AUTO: 2.31 K/UL (ref 3.9–12.7)
WBC # BLD AUTO: 3.09 K/UL (ref 3.9–12.7)

## 2024-05-13 PROCEDURE — 85610 PROTHROMBIN TIME: CPT

## 2024-05-13 PROCEDURE — 04L33DZ OCCLUSION OF HEPATIC ARTERY WITH INTRALUMINAL DEVICE, PERCUTANEOUS APPROACH: ICD-10-PCS | Performed by: STUDENT IN AN ORGANIZED HEALTH CARE EDUCATION/TRAINING PROGRAM

## 2024-05-13 PROCEDURE — 85025 COMPLETE CBC W/AUTO DIFF WBC: CPT | Mod: 91

## 2024-05-13 PROCEDURE — 25000242 PHARM REV CODE 250 ALT 637 W/ HCPCS

## 2024-05-13 PROCEDURE — 80053 COMPREHEN METABOLIC PANEL: CPT

## 2024-05-13 PROCEDURE — 63600175 PHARM REV CODE 636 W HCPCS: Performed by: STUDENT IN AN ORGANIZED HEALTH CARE EDUCATION/TRAINING PROGRAM

## 2024-05-13 PROCEDURE — B4121ZZ FLUOROSCOPY OF HEPATIC ARTERY USING LOW OSMOLAR CONTRAST: ICD-10-PCS | Performed by: STUDENT IN AN ORGANIZED HEALTH CARE EDUCATION/TRAINING PROGRAM

## 2024-05-13 PROCEDURE — 25500020 PHARM REV CODE 255: Performed by: STUDENT IN AN ORGANIZED HEALTH CARE EDUCATION/TRAINING PROGRAM

## 2024-05-13 PROCEDURE — 25000003 PHARM REV CODE 250: Performed by: STUDENT IN AN ORGANIZED HEALTH CARE EDUCATION/TRAINING PROGRAM

## 2024-05-13 PROCEDURE — 99900035 HC TECH TIME PER 15 MIN (STAT)

## 2024-05-13 PROCEDURE — 83735 ASSAY OF MAGNESIUM: CPT

## 2024-05-13 PROCEDURE — A4216 STERILE WATER/SALINE, 10 ML: HCPCS | Performed by: STUDENT IN AN ORGANIZED HEALTH CARE EDUCATION/TRAINING PROGRAM

## 2024-05-13 PROCEDURE — 63600175 PHARM REV CODE 636 W HCPCS: Mod: JA

## 2024-05-13 PROCEDURE — 63600175 PHARM REV CODE 636 W HCPCS

## 2024-05-13 PROCEDURE — 99223 1ST HOSP IP/OBS HIGH 75: CPT | Mod: ,,, | Performed by: PHYSICIAN ASSISTANT

## 2024-05-13 PROCEDURE — 25000003 PHARM REV CODE 250

## 2024-05-13 PROCEDURE — 85025 COMPLETE CBC W/AUTO DIFF WBC: CPT | Performed by: STUDENT IN AN ORGANIZED HEALTH CARE EDUCATION/TRAINING PROGRAM

## 2024-05-13 PROCEDURE — 94660 CPAP INITIATION&MGMT: CPT

## 2024-05-13 PROCEDURE — 20600001 HC STEP DOWN PRIVATE ROOM

## 2024-05-13 PROCEDURE — 94761 N-INVAS EAR/PLS OXIMETRY MLT: CPT

## 2024-05-13 PROCEDURE — 36415 COLL VENOUS BLD VENIPUNCTURE: CPT

## 2024-05-13 PROCEDURE — 99232 SBSQ HOSP IP/OBS MODERATE 35: CPT | Mod: ,,,

## 2024-05-13 PROCEDURE — 84100 ASSAY OF PHOSPHORUS: CPT

## 2024-05-13 RX ORDER — FENTANYL CITRATE 50 UG/ML
INJECTION, SOLUTION INTRAMUSCULAR; INTRAVENOUS
Status: COMPLETED | OUTPATIENT
Start: 2024-05-13 | End: 2024-05-13

## 2024-05-13 RX ORDER — MIDAZOLAM HYDROCHLORIDE 1 MG/ML
INJECTION, SOLUTION INTRAMUSCULAR; INTRAVENOUS
Status: COMPLETED | OUTPATIENT
Start: 2024-05-13 | End: 2024-05-13

## 2024-05-13 RX ORDER — MAGNESIUM SULFATE HEPTAHYDRATE 40 MG/ML
2 INJECTION, SOLUTION INTRAVENOUS ONCE
Status: COMPLETED | OUTPATIENT
Start: 2024-05-13 | End: 2024-05-13

## 2024-05-13 RX ORDER — LIDOCAINE HYDROCHLORIDE 10 MG/ML
INJECTION INFILTRATION; PERINEURAL
Status: COMPLETED | OUTPATIENT
Start: 2024-05-13 | End: 2024-05-13

## 2024-05-13 RX ORDER — FUROSEMIDE 20 MG/1
20 TABLET ORAL DAILY
Status: DISCONTINUED | OUTPATIENT
Start: 2024-05-13 | End: 2024-05-15 | Stop reason: HOSPADM

## 2024-05-13 RX ADMIN — FUROSEMIDE 20 MG: 20 TABLET ORAL at 02:05

## 2024-05-13 RX ADMIN — MORPHINE SULFATE 2 MG: 2 INJECTION, SOLUTION INTRAMUSCULAR; INTRAVENOUS at 09:05

## 2024-05-13 RX ADMIN — FENTANYL CITRATE 25 MCG: 50 INJECTION, SOLUTION INTRAMUSCULAR; INTRAVENOUS at 11:05

## 2024-05-13 RX ADMIN — Medication 10 ML: at 02:05

## 2024-05-13 RX ADMIN — MORPHINE SULFATE 2 MG: 2 INJECTION, SOLUTION INTRAMUSCULAR; INTRAVENOUS at 08:05

## 2024-05-13 RX ADMIN — EMPAGLIFLOZIN 10 MG: 10 TABLET, FILM COATED ORAL at 02:05

## 2024-05-13 RX ADMIN — OCTREOTIDE ACETATE 50 MCG/HR: 500 INJECTION, SOLUTION INTRAVENOUS; SUBCUTANEOUS at 02:05

## 2024-05-13 RX ADMIN — FENTANYL CITRATE 25 MCG: 50 INJECTION, SOLUTION INTRAMUSCULAR; INTRAVENOUS at 10:05

## 2024-05-13 RX ADMIN — FOLIC ACID 1 MG: 1 TABLET ORAL at 02:05

## 2024-05-13 RX ADMIN — ESCITALOPRAM OXALATE 10 MG: 10 TABLET ORAL at 02:05

## 2024-05-13 RX ADMIN — METOPROLOL SUCCINATE 12.5 MG: 25 TABLET, EXTENDED RELEASE ORAL at 02:05

## 2024-05-13 RX ADMIN — MIDAZOLAM HYDROCHLORIDE 0.5 MG: 2 INJECTION, SOLUTION INTRAMUSCULAR; INTRAVENOUS at 11:05

## 2024-05-13 RX ADMIN — Medication 10 ML: at 06:05

## 2024-05-13 RX ADMIN — IOHEXOL 100 ML: 300 INJECTION, SOLUTION INTRAVENOUS at 12:05

## 2024-05-13 RX ADMIN — FENTANYL CITRATE 50 MCG: 50 INJECTION, SOLUTION INTRAMUSCULAR; INTRAVENOUS at 11:05

## 2024-05-13 RX ADMIN — ATORVASTATIN CALCIUM 40 MG: 40 TABLET, FILM COATED ORAL at 09:05

## 2024-05-13 RX ADMIN — MIDAZOLAM HYDROCHLORIDE 1 MG: 2 INJECTION, SOLUTION INTRAMUSCULAR; INTRAVENOUS at 10:05

## 2024-05-13 RX ADMIN — MORPHINE SULFATE 2 MG: 2 INJECTION, SOLUTION INTRAMUSCULAR; INTRAVENOUS at 03:05

## 2024-05-13 RX ADMIN — LIDOCAINE HYDROCHLORIDE 4 ML: 10 INJECTION, SOLUTION INFILTRATION; PERINEURAL at 10:05

## 2024-05-13 RX ADMIN — MAGNESIUM SULFATE HEPTAHYDRATE 2 G: 40 INJECTION, SOLUTION INTRAVENOUS at 03:05

## 2024-05-13 RX ADMIN — SPIRONOLACTONE 12.5 MG: 25 TABLET ORAL at 02:05

## 2024-05-13 RX ADMIN — CEFTRIAXONE 1 G: 1 INJECTION, POWDER, FOR SOLUTION INTRAMUSCULAR; INTRAVENOUS at 06:05

## 2024-05-13 RX ADMIN — Medication 100 MG: at 02:05

## 2024-05-13 NOTE — PROGRESS NOTES
[facility] - Telemetry Stepdown  Gastroenterology  Progress Note    Patient Name: Fransico Montalvo  MRN: [id number]  Admission Date: 5/9/2024  Hospital Length of Stay: 4 days  Code Status: Full Code   Attending Provider: Harriet Bermudez MD  Consulting Provider: Zayda Alanis NP  Primary Care Physician: [facility]  Principal Problem: Hematochezia    Subjective:     Interval History: Followed up with patient for ongoing GI bleeding with blood coming from ampulla noted during EGD. After chart review, it appears that patient will be undergoing IR embolization. Reports BM with blood in his stool overnight. Blood counts remain stable. Vitals stable.     Review of Systems   Constitutional:  Positive for activity change and fatigue. Negative for appetite change and fever.   HENT:  Negative for sore throat and trouble swallowing.    Gastrointestinal:  Positive for abdominal pain and blood in stool. Negative for abdominal distention, anal bleeding, constipation, diarrhea, nausea, rectal pain and vomiting.   Skin:  Negative for color change and pallor.   Neurological:  Positive for weakness. Negative for dizziness and syncope.     Objective:     Vital Signs (Most Recent):  Temp: 97.5 °F (36.4 °C) (05/13/24 0733)  Pulse: 75 (05/13/24 1021)  Resp: 14 (05/13/24 1021)  BP: 125/79 (05/13/24 1021)  SpO2: 100 % (05/13/24 1021) Vital Signs (24h Range):  Temp:  [97.3 °F (36.3 °C)-98 °F (36.7 °C)] 97.5 °F (36.4 °C)  Pulse:  [72-82] 75  Resp:  [14-20] 14  SpO2:  [88 %-100 %] 100 %  BP: (112-135)/(77-85) 125/79     Weight: 88.5 kg (195 lb) (05/10/24 0805)  Body mass index is 25.73 kg/m².      Intake/Output Summary (Last 24 hours) at 5/13/2024 1023  Last data filed at 5/12/2024 2131  Gross per 24 hour   Intake 360 ml   Output 1625 ml   Net -1265 ml       Lines/Drains/Airways       Peripheral Intravenous Line  Duration                  Midline Catheter - Single Lumen 05/12/24 0900 Right basilic vein (medial side of arm) 20g x 10cm 1  day                     Physical Exam  Vitals reviewed.   Constitutional:       Appearance: He is normal weight. He is ill-appearing.   HENT:      Mouth/Throat:      Mouth: Mucous membranes are moist.      Pharynx: Oropharynx is clear.   Eyes:      General: No scleral icterus.  Abdominal:      General: Bowel sounds are normal. There is no distension.      Palpations: Abdomen is soft.      Tenderness: There is abdominal tenderness.   Skin:     General: Skin is warm.      Coloration: Skin is pale.   Neurological:      Mental Status: He is alert.          Significant Labs:  CBC:   Recent Labs   Lab 05/12/24 1952 05/12/24 2124 05/13/24  0534   WBC 2.96* 3.10* 2.31*   HGB 7.2* 7.6* 7.7*   HCT 24.4* 25.4* 26.5*   * 163 181     CMP:   Recent Labs   Lab 05/13/24  0534   *   CALCIUM 8.3*   ALBUMIN 2.6*   PROT 6.3      K 3.9   CO2 33*   CL 95   BUN 17   CREATININE 1.3   ALKPHOS 91   ALT 9*   AST 18   BILITOT 0.6         Significant Imaging:  Imaging results within the past 24 hours have been reviewed.  Assessment/Plan:     GI  * Hematochezia  This is a 68-year-old man presenting with rectal bleeding. Digital rectal exam notable for melena.      EGD 5/10/2024: Blood coming from ampulla. Recommended CT A/P Panc Protocol which noted abnormality on the pancreas concerning for possible pseudoaneurysm.   Colonoscopy 4/1/24: The entire examined colon is normal. The examined portion of the ileum was normal. The distal rectum and anal verge are normal on retroflexion view.   CTA 5/9/24: No contrast extravasation into the large bowel or elsewhere to suggest active site of gastrointestinal hemorrhage. There is liquid stool throughout the large bowel and fluid within the few distal small bowel loops.  Findings could reflect diarrheal illness in the setting of enteritis although correlation is needed.    Our recommendations are as follows:     Pending IR Embolization for intervention of bleeding likely coming from  pancreas.  Please promptly notify GI team if there is significant change in patient's clinical status          Thank you for your consult. After careful review of labs and patient current status with the GI staff and fellow, it has been decided that I will sign off. Please contact us if you have any additional questions.    Zayda Alanis NP  Gastroenterology  [facility] - Telemetry Stepdown

## 2024-05-13 NOTE — ASSESSMENT & PLAN NOTE
Recent Labs     05/12/24  0345 05/12/24  0832 05/13/24  0534 05/13/24  1704 05/14/24  0244 05/14/24  0834   BILITOT 0.6  --  0.6  --  0.6  --    AST 19  --  18  --  17  --    ALT 9*  --  9*  --  7*  --    ALKPHOS 89  --  91  --  85  --    PLT  --    < > 181   < >  --  138*   INR 1.4*  --  1.3*  --  1.2  --    ALBUMIN 2.5*  --  2.6*  --  2.6*  --     < > = values in this interval not displayed.     Patient with known Alcoholic Cirrhosis. Co-morbidities are present and inclusive of ascites, portal hypertension, and anemia/pancytopenia.    MELD-Na score calculated; MELD 3.0: 12 at 5/14/2024  2:44 AM  MELD-Na: 10 at 5/14/2024  2:44 AM  Calculated from:  Serum Creatinine: 1.2 mg/dL at 5/14/2024  2:44 AM  Serum Sodium: 136 mmol/L at 5/14/2024  2:44 AM  Total Bilirubin: 0.6 mg/dL (Using min of 1 mg/dL) at 5/14/2024  2:44 AM  Serum Albumin: 2.6 g/dL at 5/14/2024  2:44 AM  INR(ratio): 1.2 at 5/14/2024  2:44 AM  Age at listing (hypothetical): 68 years  Sex: Male at 5/14/2024  2:44 AM    During admission, reported he quit alcohol one month ago.     PLAN:  - Will avoid any hepatotoxic meds, and monitor CBC/CMP/INR for synthetic function.   - IR for diagnostic/therapeutic paracentesis --> 5/13 US by IR did not show sufficient ascites for paracentesis

## 2024-05-13 NOTE — NURSING
MTSU Plan of Care Note  Dx: HEMATOCHEZIA    Shift Events: IR procedure/embolization, D/C ocreotide    Goals of Care: pain mgmt, re-orient    Neuro: A/Ox2-4, waxes and wanes    Vital Signs: VSS    Respiratory: 2-5L NC    Diet: Full liquid    Is patient tolerating current diet? yes    GTTS: abx    Urine Output/Bowel Movement: Adequate UOP, no BM    Drains/Tubes/Tube Feeds (include total output/shift): none    Lines: R UA midline, no drawback      Accuchecks:none    Skin: R groin site     Fall Risk Score: see chart    Activity level? 2A pivot    Any scheduled procedures? none    Any safety concerns? Fall risk    Other: Q8 CBC

## 2024-05-13 NOTE — CONSULTS
IR Embolization Consult Note  Interventional Radiology    Consult Requested By: Awa Rodriguez MD  Reason for Consult: pancreatic pseudoaneurysm embolization    SUBJECTIVE:     Chief Complaint: GIB with associated pancreatic pseudoaneurysm    History of Present Illness:  Fransico Montalvo is a 68 y.o. male with a PMHx of decompensated ETOH cirrhosis c/b esophageal variceal bleed and recent angioectasia s/p APC 4/19/24, HTN, CKD 3, CAD, and COPD who was admitted on 5/9/24 for hematochezia and new onset CHF. Hospital course notable for CTA negative for contrast extravasation, EGD on 5/10/24 which revealed blood within the first portion of the duodenum with plumes of blood coming from the ampulla, IV diuresis and initiation of GDMT for treatment of CHF. Interventional Radiology has been consulted for IR angiogram with embolization for management of  pancreatic pseudoaneurysm . Pt has had recent imaging including a  CT c/a/p pancreas protocol  on 5/12/24 which revealed a pseudoaneurysm measuring 1.2 x 0.8 x 1.0 cm within the lateral aspect of the pancreatic head. Pseudoaneurysm is also clearly visualized on CTA a/p performed on 5/9/24. Of note, both the radiology reports from the CT pancreas protocol and CTA erroneously call the pseudoaneurysm a solid lesion. Per [facility] note, the pancreatic pseudoaneurysm is most likely the source of the bleeding from the pt's ampulla. Per chart, pt had bloody Bms overnight. The pt's H/H is currently 7.7/26.5 from 6.9/23.5. Pt has recevied a total of 3 pRBC this admission, he last received 1 u pRBC early yesterday morning. The pt is hemodynamically stable, HR 70-80s, -130s/70-80s. He does not take any anticoagulants.       Scheduled Meds:   atorvastatin  40 mg Oral QHS    cefTRIAXone (Rocephin) IV (PEDS and ADULTS)  1 g Intravenous Q24H    empagliflozin  10 mg Oral Daily    EScitalopram oxalate  10 mg Oral Daily    fluticasone furoate-vilanteroL  1 puff Inhalation Daily    folic acid  1 mg  Oral Daily    furosemide  20 mg Oral Daily    magnesium sulfate IVPB  2 g Intravenous Once    metoprolol succinate  12.5 mg Oral Daily    pantoprazole  40 mg Intravenous BID    sodium chloride 0.9%  10 mL Intravenous Q6H    spironolactone  12.5 mg Oral Daily    thiamine  100 mg Oral Daily     Continuous Infusions:   octreotide (SANDOSTATIN) 500 mcg in sodium chloride 0.9% 100 mL infusion  50 mcg/hr Intravenous Continuous 10 mL/hr at 05/13/24 0239 50 mcg/hr at 05/13/24 0239     PRN Meds:  Current Facility-Administered Medications:     0.9%  NaCl infusion (for blood administration), , Intravenous, Q24H PRN    acetaminophen, 650 mg, Oral, Q8H PRN    albuterol-ipratropium, 3 mL, Nebulization, Q6H PRN    melatonin, 6 mg, Oral, Nightly PRN    morphine, 2 mg, Intravenous, Q6H PRN    naloxone, 0.02 mg, Intravenous, PRN    ondansetron, 4 mg, Intravenous, Q8H PRN    prochlorperazine, 10 mg, Oral, Q6H PRN    sodium chloride 0.9%, 10 mL, Intravenous, Q12H PRN    Flushing PICC/Midline Protocol, , , Until Discontinued **AND** sodium chloride 0.9%, 10 mL, Intravenous, Q6H **AND** sodium chloride 0.9%, 10 mL, Intravenous, PRN    Review of patient's allergies indicates:  No Known Allergies    Past Medical History:   Diagnosis Date    Alcohol abuse     BPH (benign prostatic hyperplasia)     CAD (coronary artery disease)     Cirrhosis 06/21/2018    pt states that he was diagnosed a week ago during his last hospital visit    Closed fracture of left distal radius 07/19/2023    Gastritis     HFrEF (heart failure with reduced ejection fraction) 05/10/2024    Hypertension      Past Surgical History:   Procedure Laterality Date    CAPSULOTOMY OF JOINT Right 7/29/2019    Procedure: CAPSULOTOMY, JOINT;  Surgeon: Raj Grossman MD;  Location: [facility] OR [address] [place] [address];  Service: Orthopedics;  Laterality: Right;    COLONOSCOPY N/A 4/1/2024    Procedure: COLONOSCOPY;  Surgeon: Scarlett Whitt MD;  [facility]  Service:  Gastroenterology;  Laterality: N/A;    ESOPHAGOGASTRODUODENOSCOPY N/A 10/25/2023    Procedure: EGD (ESOPHAGOGASTRODUODENOSCOPY);  Surgeon: Lux Rome MD;  [facility]  Service: Endoscopy;  Laterality: N/A;    ESOPHAGOGASTRODUODENOSCOPY N/A 4/19/2024    Procedure: EGD (ESOPHAGOGASTRODUODENOSCOPY);  Surgeon: Raj Roberts MD;  [facility]  Service: Endoscopy;  Laterality: N/A;    ESOPHAGOGASTRODUODENOSCOPY N/A 5/10/2024    Procedure: EGD (ESOPHAGOGASTRODUODENOSCOPY);  Surgeon: Ashley Trent MD;  [facility]  Service: Endoscopy;  Laterality: N/A;    PERCUTANEOUS PINNING OF HIP Right 7/29/2019    Procedure: PINNING, HIP, PERCUTANEOUS- synthes cannulated screws- hana table- C arm door side-;  Surgeon: Raj Grossman MD;  Location: [facility] OR 2ND FLR;  Service: Orthopedics;  Laterality: Right;     Family History   Problem Relation Name Age of Onset    Hypertension Father      Heart disease Father      Heart disease Brother       Social History     Tobacco Use    Smoking status: Every Day     Current packs/day: 1.00     Types: Cigarettes    Smokeless tobacco: Never   Substance Use Topics    Alcohol use: Yes     Comment: (former drinker; 2months sober) this visit admits drinking a couple beers today    Drug use: No       OBJECTIVE:     Vital Signs (Most Recent)  Temp: 97.5 °F (36.4 °C) (05/13/24 0733)  Pulse: 82 (05/13/24 0850)  Resp: 20 (05/13/24 0850)  BP: 112/77 (05/13/24 0850)  SpO2: 97 % (05/13/24 0850)    Physical Exam:  Physical Exam  Vitals and nursing note reviewed.   Constitutional:       General: He is not in acute distress.     Appearance: He is ill-appearing.   HENT:      Head: Normocephalic and atraumatic.   Eyes:      Extraocular Movements: Extraocular movements intact.      Conjunctiva/sclera: Conjunctivae normal.      Pupils: Pupils are equal, round, and reactive to light.   Cardiovascular:      Rate and Rhythm: Normal rate.    Pulmonary:      Effort: Pulmonary effort is normal. No respiratory distress.   Abdominal:      General: Abdomen is flat. There is no distension.   Skin:     General: Skin is warm and dry.      Coloration: Skin is not jaundiced.   Neurological:      General: No focal deficit present.      Mental Status: He is alert and oriented to person, place, and time.   Psychiatric:         Mood and Affect: Mood normal.         Behavior: Behavior normal.         Thought Content: Thought content normal.         Judgment: Judgment normal.         Laboratory  I have reviewed all pertinent lab results within the past 24 hours.  CBC:   Recent Labs   Lab 05/13/24  0534   WBC 2.31*   RBC 3.19*   HGB 7.7*   HCT 26.5*      MCV 83   MCH 24.1*   MCHC 29.1*     BMP:   Recent Labs   Lab 05/13/24  0534   *      K 3.9   CL 95   CO2 33*   BUN 17   CREATININE 1.3   CALCIUM 8.3*   MG 1.7     CMP:   Recent Labs   Lab 05/13/24  0534   *   CALCIUM 8.3*   ALBUMIN 2.6*   PROT 6.3      K 3.9   CO2 33*   CL 95   BUN 17   CREATININE 1.3   ALKPHOS 91   ALT 9*   AST 18   BILITOT 0.6     LFTs:   Recent Labs   Lab 05/13/24  0534   ALT 9*   AST 18   ALKPHOS 91   BILITOT 0.6   PROT 6.3   ALBUMIN 2.6*     Coagulation:   Recent Labs   Lab 05/13/24  0534   LABPROT 13.7*   INR 1.3*     Microbiology Results (last 7 days)       Procedure Component Value Units Date/Time    Aerobic culture [[id number]]     Order Status: No result Specimen: Ascites     Culture, Anaerobic [[id number]]     Order Status: No result Specimen: Ascites     Gram stain [[id number]]     Order Status: No result Specimen: Ascites             ASA/Mallampati  ASA: 3  Mallampati: 2    Imaging:  Recent imaging studies including  CT c/a/p pancreas protocol  on 5/12/24 which was independently reviewed by Roscoe Pulliam MD.     EXAMINATION:  CT ABDOMEN PELVIS WITH CONTRAST (PANCREAS PROTOCOL)     CLINICAL HISTORY:  Abdominal mass, intra-abdominal neoplasm suspected;      TECHNIQUE:  CT abdomen/pelvis performed after administration of 100 mL IV Omnipaque 350 as per pancreas protocol.  Coronal and sagittal reformats provided.     COMPARISON:  CTA abdomen pelvis 05/09/2024     FINDINGS:  Heart: Normal in size. No pericardial effusion.  Calcification about the aortic annulus and aortic root.  Multi-vessel calcific atherosclerosis of the coronary arteries.     Lungs: Moderate to large bilateral pleural effusions, increased in size compared to 05/09/2024 with adjacent compressive atelectasis.     Liver: Normal in size with subtle nodular contour.  Subcentimeter hypoattenuating nonenhancing lesion in the inferior anterior right hepatic lobe (302-109), too small to characterize.  Small volume perihepatic ascites.     Gallbladder: Normally distended with few calcified gallstones the largest measuring 1.2 cm.  Minimal pericholecystic fluid in the setting of small volume ascites.  No convincing pericholecystic inflammatory change or gallbladder wall thickening.     Bile Ducts: Stable prominence of common bile duct up to 1.0 cm.  No evidence of intrahepatic biliary ductal dilatation.     Pancreas: 1.2 x 0.8 x 1.0 cm (AP x TV x CC) arterial and to lesser extent portal venous phase enhancing lesion in the lateral aspect of the pancreatic head (301-174, 2-62).  Lesion does not appear to involve the pancreatic duct.  No pancreatic ductal dilatation or peripancreatic fat stranding.     Spleen: Enlarged measuring 12.2 cm in the craniocaudal dimension with heterogeneous attenuation arterial phase imaging.  No focal mass lesion.     Adrenals: Unremarkable.     Kidneys/Ureters: Normal in size and location with bilateral symmetric enhancement.  Bilateral nonenhancing renal cysts, some of which are minimally complex with thin internal septation.  Additional subcentimeter nonenhancing cystic lesions, too small to characterize.  Punctate few left lower pole nephroliths (302-120), no obstructing  nephrolithiasis.  No hydronephrosis.  No ureteral dilatation.     Bladder: The bladder is well distended with dependent layering excreted contrast.  No focal abnormality.     Reproductive organs: Mildly enlarged.     Peritoneum: Small volume perihepatic and perisplenic ascites with layering along the right paracolic gutter.  Prominent mesenteric lymph nodes, nonenlarged by size criteria.  No intraperitoneal free air.     Retroperitoneum: Prominent retroperitoneal lymph nodes, nonenlarged by size criteria.     Bowel/Mesentery: Stomach is unremarkable. Small bowel is normal caliber without obstruction or evidence of inflammation.  Appendix not definitively visualized although there is no inflammation in the expected region. Large bowel is normal caliber without obstruction or focal wall thickening or evidence of inflammation.  Liquid stool within the rectum and sigmoid colon.     Abdominal wall:  Diffuse subcutaneous body wall edema.  Tiny fat containing umbilical hernia.     Vasculature: Advanced aortoiliac calcific atherosclerosis noting mild infrarenal ectasia.  No aneurysmal dilatation.     Bones: Degenerative changes of the spine and bilateral sacroiliac joints.  Several remote appearing rib fractures.  Chronic appearing compression deformity of T6, T12 and L2.  No suspicious osseous lesion.  Postoperative change at the right femur.     Impression:     1.2 cm enhancing lesion in the pancreatic head, as discussed on recent CTA.     Moderate to large bilateral pleural effusions, slightly increased in size compared to 05/09/2024.     Small volume perihepatic and perisplenic ascites.     Cholelithiasis.     Additional stable findings as above.     This report was flagged in Epic as abnormal.     Electronically signed by resident: Keren Sutton  Date:                                            05/12/2024  Time:                                           12:46     Electronically signed by: Sloan Galindo  Date:                                             05/12/2024  Time:                                           13:52    ASSESSMENT/PLAN:     Assessment:  68 y.o. male with a PMHx of decompensated ETOH cirrhosis c/b esophageal variceal bleed and recent angioectasia s/p APC 4/19/24, HTN, CKD 3, CAD, and COPD who has been referred to IR for angiogram with embolization of a pancreatic pseudoaneurysm  for management of his ongoing GIB. Case reviewed with Dr. Pulliam. Both CT c/a/p pancreas protocol on 5/12/24 and CTA a/p on 5/9/24  revealed a pseudoaneurysm measuring 1.2 x 0.8 x 1.0 cm within the lateral aspect of the pancreatic head. Of note, both of the radiology reports on the CT pancreas protocol and CTA erroneously call the pseudoaneurysm a solid lesion. This same pseudoaneurysm is also present on CTA c/a/p performed on 10/25/23, however pseudoaneurysm appears thrombosed and therefore does not enhance on this study. Agree with [facility] and [facility] that best course of action would be to embolize said pancreatic pseudoaneurysm. The procedure was discussed in great detail with the patient including thorough explanations of the potential risks and benefits of angiogram with embolization of the pancreatic pseudoaneurysm . Risks include but are not limited to sepsis, severe infection, hemorrhage, damage to the artery, damage to surrounding organs, pseudoaneurysm, non target embolization, contrast allergy and death. The patient is a candidate for angiogram with embolization of the pancreatic pseudoaneurysm  under moderate sedation. Plan discussed with ordering physician and pt who verbalized understanding of the plan and would like to proceed.    Plan:  Will proceed with angiogram with embolization of the pancreatic pseudoaneurysm under moderate sedation on 5/12/24.   Please keep pt NPO   Anticoagulation history reviewed.   Coagulation labs reviewed.  Thank you for the consult. Please contact with questions via Epic secure chat or spectra  link    Alejandrina Vernon PA-C  Interventional Radiology  Spectra: [phone]\

## 2024-05-13 NOTE — ASSESSMENT & PLAN NOTE
This is a 68-year-old man presenting with rectal bleeding. Digital rectal exam notable for melena.      EGD 5/10/2024: Blood coming from ampulla. Recommended CT A/P Panc Protocol which noted abnormality on the pancreas concerning for possible pseudoaneurysm.   Colonoscopy 4/1/24: The entire examined colon is normal. The examined portion of the ileum was normal. The distal rectum and anal verge are normal on retroflexion view.   CTA 5/9/24: No contrast extravasation into the large bowel or elsewhere to suggest active site of gastrointestinal hemorrhage. There is liquid stool throughout the large bowel and fluid within the few distal small bowel loops.  Findings could reflect diarrheal illness in the setting of enteritis although correlation is needed.    Our recommendations are as follows:     Pending IR Embolization for intervention of bleeding likely coming from pancreas.  Please promptly notify GI team if there is significant change in patient's clinical status

## 2024-05-13 NOTE — ASSESSMENT & PLAN NOTE
"5/9 CTA: "Enhancing lesion within the lateral aspect of the pancreatic head, not seen on examination 12/25/2023. In this region however, on the previous exam, there is a hypoattenuating lesion with peripheral calcification. This did not undergo significant enhancement at that time. Solid lesion is of concern. Correlation and follow-up advised."     elevated at 88.6. LDH, AFP, CEA WNL.   EGD 5/10 noted "blood in the first portion of the duodenum, plumes of blood were noted to be coming from the ampulla".     5/12 CT pancreas protocol reviewed by [facility] and IR - enhancing lesion is likely a pancreatic pseudoaneurysm, not a solid mass  "

## 2024-05-13 NOTE — CONSULTS
"[facility]   Consult Note    Attending: Harriet Bermudez MD   Admit Date: 5/9/2024  Today's Date: 05/13/2024    SUBJECTIVE:     HPI:  Mr. Fransico Montalvo is a 68 year old male for whom AES is consulted for evaluation of a pancreatic lesion. 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      Hospital Course:  Patient was admitted to [facility] on 5/9 for management of GI bleeding associated with melena, hematochezia, and upper abdominal pain of less than one day duration. Initial labs were notable for Hgb of 7 with evidence of iron deficiency and elevated lipase (758). He was also noted to have evidence of new onset CHF with elevated BNP (3000) and ECHO showing combined CHF (EF 35-40%). CTA A/P on admission was negative for active GI bleeding, but did note an enchancing lesion within the lateral aspect of the pancreatic head. GI was consulted and patient underwent EGD on 5/10 that was notable for blood coming from ampulla. Patient continued to experience hematochezia and CT A/P with pancreas protocol was obtained on 5/12 that showed a 1.2 X 0.8 X 1 cm arterial lesion in the pancreatic head. AES consulted for evaluation.     On initial bedside interview, patient reports continued passage of maroon colored stool. He reports history of recurrent epigastric pain "for a while" that is not related to PO intake. He denies undergoing prior endoscopic evaluation of his pancreas, prior upper abdominal surgeries, or FH of gastric, colon, liver, or pancreatic cancer.       Review of patient's allergies indicates:  No Known Allergies    Past Medical History:   Diagnosis Date    Alcohol abuse     BPH (benign prostatic hyperplasia)     CAD (coronary artery disease)  "    Cirrhosis 06/21/2018    pt states that he was diagnosed a week ago during his last hospital visit    Closed fracture of left distal radius 07/19/2023    Gastritis     HFrEF (heart failure with reduced ejection fraction) 05/10/2024    Hypertension      Past Surgical History:   Procedure Laterality Date    CAPSULOTOMY OF JOINT Right 7/29/2019    Procedure: CAPSULOTOMY, JOINT;  Surgeon: Raj Grossman MD;  Location: [facility] OR [address] [place] [address];  Service: Orthopedics;  Laterality: Right;    COLONOSCOPY N/A 4/1/2024    Procedure: COLONOSCOPY;  Surgeon: Scarlett Whitt MD;  [facility]  Service: Gastroenterology;  Laterality: N/A;    ESOPHAGOGASTRODUODENOSCOPY N/A 10/25/2023    Procedure: EGD (ESOPHAGOGASTRODUODENOSCOPY);  Surgeon: Lux Rome MD;  [facility]  Service: Endoscopy;  Laterality: N/A;    ESOPHAGOGASTRODUODENOSCOPY N/A 4/19/2024    Procedure: EGD (ESOPHAGOGASTRODUODENOSCOPY);  Surgeon: Raj Roberts MD;  [facility]  Service: Endoscopy;  Laterality: N/A;    ESOPHAGOGASTRODUODENOSCOPY N/A 5/10/2024    Procedure: EGD (ESOPHAGOGASTRODUODENOSCOPY);  Surgeon: Ashley Trent MD;  [facility]  Service: Endoscopy;  Laterality: N/A;    PERCUTANEOUS PINNING OF HIP Right 7/29/2019    Procedure: PINNING, HIP, PERCUTANEOUS- [facility] door side-;  Surgeon: Raj Grossman MD;  Location: [facility] OR [address] [place] [address];  Service: Orthopedics;  Laterality: Right;     Family History   Problem Relation Name Age of Onset    Hypertension Father      Heart disease Father      Heart disease Brother       Social History     Tobacco Use    Smoking status: Every Day     Current packs/day: 1.00     Types: Cigarettes    Smokeless tobacco: Never   Substance Use Topics    Alcohol use: Yes     Comment: (former drinker; 2months sober) this visit admits drinking a couple beers today    Drug use: No       All home medications  reviewed.    Review of Systems   Constitutional:  Positive for malaise/fatigue. Negative for chills, fever and weight loss.   HENT:  Negative for congestion and sore throat.    Eyes:  Negative for blurred vision and double vision.   Respiratory:  Negative for cough, sputum production and shortness of breath.    Cardiovascular:  Negative for chest pain, palpitations and leg swelling.   Gastrointestinal:  Positive for abdominal pain and blood in stool. Negative for diarrhea, heartburn, melena, nausea and vomiting.   Genitourinary:  Negative for dysuria and urgency.   Musculoskeletal:  Negative for back pain, myalgias and neck pain.   Neurological:  Positive for weakness. Negative for dizziness, tingling, sensory change and headaches.   Endo/Heme/Allergies:  Negative for polydipsia.       OBJECTIVE:     Vital Signs Trends/History Reviewed  Vitals:    05/13/24 0432 05/13/24 0733 05/13/24 0803 05/13/24 0850   BP: 124/77 135/85  112/77   BP Location:  Left arm     Patient Position:  Lying     Pulse: 78 80  82   Resp: 18 18 18 20   Temp: 98 °F (36.7 °C) 97.5 °F (36.4 °C)     TempSrc: Oral Oral     SpO2: (!) 88% 98%  97%   Weight:       Height:           Physical Exam  Constitutional:       Appearance: Normal appearance.   Eyes:      General: No scleral icterus.  Cardiovascular:      Rate and Rhythm: Normal rate and regular rhythm.      Pulses: Normal pulses.      Heart sounds: Normal heart sounds.   Pulmonary:      Effort: Pulmonary effort is normal. No respiratory distress.      Breath sounds: Examination of the right-lower field reveals decreased breath sounds. Examination of the left-lower field reveals decreased breath sounds. Decreased breath sounds present.   Abdominal:      General: Bowel sounds are normal. There is distension.      Palpations: Abdomen is soft. There is fluid wave.      Tenderness: There is no abdominal tenderness.   Skin:     General: Skin is warm and dry.      Coloration: Skin is not jaundiced.    Neurological:      Mental Status: He is alert and oriented to person, place, and time.           Laboratory: All labs results within last 24 hours have been reviewed.     Imaging: All imaging results within the last 24 hours have been reviewed.     Infusions:     octreotide (SANDOSTATIN) 500 mcg in sodium chloride 0.9% 100 mL infusion  50 mcg/hr Intravenous Continuous 10 mL/hr at 05/13/24 0239 50 mcg/hr at 05/13/24 0239     Scheduled Medications:    atorvastatin  40 mg Oral QHS    cefTRIAXone (Rocephin) IV (PEDS and ADULTS)  1 g Intravenous Q24H    empagliflozin  10 mg Oral Daily    EScitalopram oxalate  10 mg Oral Daily    fluticasone furoate-vilanteroL  1 puff Inhalation Daily    folic acid  1 mg Oral Daily    furosemide  20 mg Oral Daily    magnesium sulfate IVPB  2 g Intravenous Once    metoprolol succinate  12.5 mg Oral Daily    pantoprazole  40 mg Intravenous BID    sodium chloride 0.9%  10 mL Intravenous Q6H    spironolactone  12.5 mg Oral Daily    thiamine  100 mg Oral Daily       PRN Medications:     Current Facility-Administered Medications:     0.9%  NaCl infusion (for blood administration), , Intravenous, Q24H PRN    acetaminophen, 650 mg, Oral, Q8H PRN    albuterol-ipratropium, 3 mL, Nebulization, Q6H PRN    melatonin, 6 mg, Oral, Nightly PRN    morphine, 2 mg, Intravenous, Q6H PRN    naloxone, 0.02 mg, Intravenous, PRN    ondansetron, 4 mg, Intravenous, Q8H PRN    prochlorperazine, 10 mg, Oral, Q6H PRN    sodium chloride 0.9%, 10 mL, Intravenous, Q12H PRN    Flushing PICC/Midline Protocol, , , Until Discontinued **AND** sodium chloride 0.9%, 10 mL, Intravenous, Q6H **AND** sodium chloride 0.9%, 10 mL, Intravenous, PRN    ASSESSMENT:      This is a 68 year old male with a PMH significant for reported ETOH cirrhosis (associated with ascites; no prior evaluation by hepatology), recurrent pancreatitis (diagnosed initially in 7/2018 and associated with suspected pseudocyst with pseudoaneurysm based on  chart review), and reported COPD (no further PFT's on file) who was admitted to [facility] on 5/9 for management of GI bleeding (melena and hematochezia) and CHF exacerbation. He is status post EGD on 5/10 with evidence of active bleeding from ampulla. CTA A/P and CT A/P with pancreas protocols since admission concerning for arterial lesion of pancreatic head. AES consulted on 5/12 for evaluation.     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 In the context of EGD on 5/10 showing bleeding from ampulla, this is the most likely cause of his GI bleeding.   -Patient will need evaluation by IR for consideration of embolization for further treatment of bleeding. We are unable to treat this endoscopically.   -NPO until IR evaluation.   -Trend CBC and transfuse for Hgb <7 and platelets <50.     Thank you for allowing us to participate in the care of this patient. Please call with questions.        Reji Rowley MD, PGY-VI  Gastroenterology Fellow  [facility]

## 2024-05-13 NOTE — ASSESSMENT & PLAN NOTE
Patient's with Normocytic anemia. Hemoglobin stable. Patient's anemia is currently uncontrolled. Has not received any PRBCs to date. Chronic etiology due to iron deficiency and cirrhosis, exacerbated by acute blood loss from GI bleed.    -Current CBC reviewed-    Recent Labs   Lab 05/12/24 1952 05/12/24 2124 05/13/24  0534   HGB 7.2* 7.6* 7.7*     PLAN  - Monitor serial CBC and transfuse if patient becomes hemodynamically unstable, symptomatic or H/H drops below 7/21  - S/p iron sucrose 500 mg IV x 1

## 2024-05-13 NOTE — SUBJECTIVE & OBJECTIVE
Subjective:     Interval History: Followed up with patient for ongoing GI bleeding with blood coming from ampulla noted during EGD. After chart review, it appears that patient will be undergoing IR embolization. Reports BM with blood in his stool overnight. Blood counts remain stable. Vitals stable.     Review of Systems   Constitutional:  Positive for activity change and fatigue. Negative for appetite change and fever.   HENT:  Negative for sore throat and trouble swallowing.    Gastrointestinal:  Positive for abdominal pain and blood in stool. Negative for abdominal distention, anal bleeding, constipation, diarrhea, nausea, rectal pain and vomiting.   Skin:  Negative for color change and pallor.   Neurological:  Positive for weakness. Negative for dizziness and syncope.     Objective:     Vital Signs (Most Recent):  Temp: 97.5 °F (36.4 °C) (05/13/24 0733)  Pulse: 75 (05/13/24 1021)  Resp: 14 (05/13/24 1021)  BP: 125/79 (05/13/24 1021)  SpO2: 100 % (05/13/24 1021) Vital Signs (24h Range):  Temp:  [97.3 °F (36.3 °C)-98 °F (36.7 °C)] 97.5 °F (36.4 °C)  Pulse:  [72-82] 75  Resp:  [14-20] 14  SpO2:  [88 %-100 %] 100 %  BP: (112-135)/(77-85) 125/79     Weight: 88.5 kg (195 lb) (05/10/24 0805)  Body mass index is 25.73 kg/m².      Intake/Output Summary (Last 24 hours) at 5/13/2024 1023  Last data filed at 5/12/2024 2131  Gross per 24 hour   Intake 360 ml   Output 1625 ml   Net -1265 ml       Lines/Drains/Airways       Peripheral Intravenous Line  Duration                  Midline Catheter - Single Lumen 05/12/24 0900 Right basilic vein (medial side of arm) 20g x 10cm 1 day                     Physical Exam  Vitals reviewed.   Constitutional:       Appearance: He is normal weight. He is ill-appearing.   HENT:      Mouth/Throat:      Mouth: Mucous membranes are moist.      Pharynx: Oropharynx is clear.   Eyes:      General: No scleral icterus.  Abdominal:      General: Bowel sounds are normal. There is no distension.       Palpations: Abdomen is soft.      Tenderness: There is abdominal tenderness.   Skin:     General: Skin is warm.      Coloration: Skin is pale.   Neurological:      Mental Status: He is alert.          Significant Labs:  CBC:   Recent Labs   Lab 05/12/24 1952 05/12/24 2124 05/13/24  0534   WBC 2.96* 3.10* 2.31*   HGB 7.2* 7.6* 7.7*   HCT 24.4* 25.4* 26.5*   * 163 181     CMP:   Recent Labs   Lab 05/13/24  0534   *   CALCIUM 8.3*   ALBUMIN 2.6*   PROT 6.3      K 3.9   CO2 33*   CL 95   BUN 17   CREATININE 1.3   ALKPHOS 91   ALT 9*   AST 18   BILITOT 0.6         Significant Imaging:  Imaging results within the past 24 hours have been reviewed.

## 2024-05-13 NOTE — NURSING
End of shift note:  Patient rested well, SABA for CT  Two dark red BM's noted, linen and gown changed.  Morphine X1, relief noted.  Report given to oncoming nurse.

## 2024-05-13 NOTE — PT/OT/SLP PROGRESS
Occupational Therapy      Patient Name:  Fransico Montalvo   MRN:  [id number]    Patient not seen today secondary to away at IR for procedure for both am and pm attempts. Will attempt 05-14 5/13/2024

## 2024-05-13 NOTE — NURSING
"Pt. Is incontinent of bowel and had a BM with a copious amount of only dark red blood. Pt. Is asymptomatic V/S WNL. NADN.  Voices no concerns or complaints. When asked how he was feeling he stated "actually a little better then I have been."  JUAN Rutledge on call MD was notified and updated on pt.'s current status. New orders were placed and acknowledged. Will cont. To monitor.   "

## 2024-05-13 NOTE — PLAN OF CARE
Pt arrived to IR Room 189  for Embolization of Pancreatic Pseudoaneurysm. Pt came to the room on O2 @ 2L via NC. Pt oriented to unit and staff. Plan of care reviewed with patient, patient verbalizes understanding. Comfort measures utilized. Pt safely transferred from stretcher to procedural table. Fall risk reviewed with patient, fall risk interventions maintained. Positioner pillows utilized to minimize pressure points. Blankets applied. Pt prepped and draped utilizing standard sterile technique. Timeouts completed utilizing standard universal time-out, per department and facility policy.

## 2024-05-13 NOTE — PROCEDURES
IR Post-Procedure Note    Pre Op Diagnosis: pancreatic PSA    Post Op Diagnosis: same    Procedure: GDA and pancreaticoduodenal branch embolization    Procedure Performed by: Danica Ponce MD    Written Informed Consent Obtained: Yes    Specimen Removed: None    Estimated Blood Loss:  Minimal    Findings:     RCFA access and celiac angiography for pancreatic PSA and hemorrhage.    Coil embolization of GDA and multiple pancreaticoduodenal branches feeding PSA.      The patient tolerated procedure well.  Please see Imaging report for further details.      Danica Ponce MD

## 2024-05-13 NOTE — ASSESSMENT & PLAN NOTE
"CAD (coronary artery disease)     3/28/2024 BNP 2,932 and 4/18/2024 BNP 4,361; no TTE since 2022. Prior CT with significant multivessel CAD.    Echo    Result Date: 5/10/2024    Left Ventricle: The left ventricle is mildly dilated. Moderate global   hypokinesis and regional wall motion abnormalities present. There is   moderately reduced systolic function with a visually estimated ejection   fraction of 35 - 40%. Biplane (2D) method of discs ejection fraction is   33%. There is diastolic dysfunction. Elevated left ventricular filling   pressure.    Right Ventricle: Normal right ventricular cavity size. Systolic   function is mildly reduced.    Left Atrium: Left atrium is moderately dilated.    Aortic Valve: There is moderate aortic valve sclerosis. Mildly   restricted motion.    Mitral Valve: There is mild annular dilation present. There is mild   mitral annular calcification present.    Tricuspid Valve: There is moderate annular dilation present. There is   moderate regurgitation.    Pulmonary Artery: The estimated pulmonary artery systolic pressure is   55 mmHg.    IVC/SVC: Elevated venous pressure at 15 mmHg.    Pericardium: Large Bilateral pleural effusions.       - Cardiology consulted, appreciate recs  "Prior CT with significant multivessel CAD concerning for ICM cardiomyopathy exacerbated by current UGIB. Elevated filling pressures on echo are likely largely due to liver disease as he doesn't appear as grossly volume overloaded as expected."  Defer ischemic evaluation with PET stress to outpt as he is not a revascularization candidate at this time with recurrent UGIB  Ambulatory referral to General Cardiology at discharge  - GDMT:  BB: Increase Toprol to 25 mg po daily  ACE/ARB/ARNI: Start valsartan 40 mg po BID  SGLT2i: Continue Jardiance 10 mg po daily  MRA: Continue spironolactone 12.5 mg po daily  - Continue Lasix 20 mg po daily   - Continue Lipitor 40 mg po qhs  - Start ASA upon resolution of acute " anemia

## 2024-05-13 NOTE — PLAN OF CARE
Pt arrived to [facility] room 3 via stretcher w/ transporter. AAO x4. Name//allergies/procedure verified. Pt will be monitored by RN throughout procedure.

## 2024-05-13 NOTE — H&P
See IR consult dated 5/13/24    Alejandrina Vernon PA-C  Interventional Radiology   Spectra: [id number]

## 2024-05-13 NOTE — SUBJECTIVE & OBJECTIVE
Interval History: Melena x 2 yesterday. About 300 cc of BRBPR at 8:30PM. CBC > 7.     Review of Systems   Constitutional:  Positive for fatigue.   Respiratory:  Positive for shortness of breath (improved).    Gastrointestinal:  Positive for abdominal distention, abdominal pain, anal bleeding and diarrhea (melena). Negative for nausea and vomiting.   Skin:  Positive for pallor.   All other systems reviewed and are negative.    Objective:     Vital Signs (Most Recent):  Temp: 97.5 °F (36.4 °C) (05/13/24 0733)  Pulse: 80 (05/13/24 0733)  Resp: 18 (05/13/24 0803)  BP: 135/85 (05/13/24 0733)  SpO2: 98 % (05/13/24 0733) Vital Signs (24h Range):  Temp:  [97.3 °F (36.3 °C)-98 °F (36.7 °C)] 97.5 °F (36.4 °C)  Pulse:  [72-82] 80  Resp:  [18] 18  SpO2:  [88 %-99 %] 98 %  BP: (117-135)/(73-85) 135/85     Weight: 88.5 kg (195 lb)  Body mass index is 25.73 kg/m².    Intake/Output Summary (Last 24 hours) at 5/13/2024 0841  Last data filed at 5/12/2024 2131  Gross per 24 hour   Intake 360 ml   Output 1875 ml   Net -1515 ml         Physical Exam  Vitals and nursing note reviewed.   Constitutional:       Appearance: He is normal weight. He is ill-appearing.   HENT:      Head: Normocephalic and atraumatic.   Cardiovascular:      Rate and Rhythm: Normal rate and regular rhythm.      Heart sounds: Murmur heard.   Pulmonary:      Effort: Pulmonary effort is normal. No respiratory distress.   Abdominal:      General: Abdomen is flat. There is distension.      Palpations: Abdomen is soft.      Tenderness: There is abdominal tenderness (epigastric).   Musculoskeletal:      Right lower leg: No edema.      Left lower leg: No edema.   Skin:     General: Skin is warm and dry.      Coloration: Skin is pale.   Neurological:      General: No focal deficit present.      Mental Status: He is alert and oriented to person, place, and time. Mental status is at baseline.             Significant Labs: All pertinent labs within the past 24 hours have been  reviewed.    Significant Imaging: I have reviewed all pertinent imaging results/findings within the past 24 hours.

## 2024-05-13 NOTE — PROGRESS NOTES
"[facility] - Telemetry [facility] Medicine  Progress Note    Patient Name: Fransico Montalvo  MRN: [id number]  Patient Class: IP- Inpatient   Admission Date: 5/9/2024  Length of Stay: 4 days  Attending Physician: Harriet Bermudez MD  Primary Care Provider: [facility]        Subjective:     Principal Problem:Hematochezia      HPI:  Fransico Montalvo is a 67 yo M with PMH of EtOH cirrhosis, presumed COPD, multifactorial anemia, HTN, BPH, previous esophageal varices, and gastritis who presented from SNF c/o bloody stool. Per patient, he noted dark red blood from his rectum since this morning (states it is mixed in stool and also on its own.) Also endorses new upper abdominal pain which started after eating food yesterday evening. Over the past month, he has experienced fatigue, SOB, and two pre-syncopal episodes. Of note, patient with two recent admissions: 3/28-4/4 for CAP and symptomatic anemia (s/p 5 U pRBCs) and 4/18-4/23 for melena (s/p 1 U pRBCs.) Of note, EGD on 10/25/2023 and colonoscopy on 4/1/2024 were unremarkable. EGD on 4/19 showed "single non-bleeding angioectasia in the jejunum; treated with APC."    Upon arrival to the ED, /77, , RR 18, SpO2 100% on RA, and T 97.7 F. Labs significant for Hgb 7.0 and K 5.5 (5.8 on repeat). Physical exam in the ED pertinent for bright red blood present in rectum, epigastric tenderness, and pallor. EKG NSR. CTA without active hemorrhage, ascites, bilateral pleural effusion (loculated on the right), and a lateral pancreatic head enhancing lesion. He received 80 mg IV Protonix x 1 and 2 mg IV morphine x 1. CTA pending.     Overview/Hospital Course:  Admitted to Hospital Medicine for evaluation of GI bleed concerns and hypoxia over the past month. Echo revealed EF 35-40% and diastolic dysfunction, no known history of CHF. Cardiology was consulted for GDMT initiation; plan for outpatient ischemic evaluation. Pulmonology consulted for bilateral pleural effusions, " "no plans for thoracentesis at this time. Paracentesis deferred to Monday 5/13 due to Hgb < 7. Transfused pRbc for Hgb<7. Enhancing pancreatic head mass with elevated CA 19-9 will need outpatient follow-up. EGD 05/10 noted "blood in the first portion of the duodenum, plumes of blood were noted to be coming from the ampulla". 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 IR consulted for embolization.     Interval History: Melena x 2 yesterday. About 300 cc of BRBPR at 8:30PM. CBC > 7.     Review of Systems   Constitutional:  Positive for fatigue.   Respiratory:  Positive for shortness of breath (improved).    Gastrointestinal:  Positive for abdominal distention, abdominal pain, anal bleeding and diarrhea (melena). Negative for nausea and vomiting.   Skin:  Positive for pallor.   All other systems reviewed and are negative.    Objective:     Vital Signs (Most Recent):  Temp: 97.5 °F (36.4 °C) (05/13/24 0733)  Pulse: 80 (05/13/24 0733)  Resp: 18 (05/13/24 0803)  BP: 135/85 (05/13/24 0733)  SpO2: 98 % (05/13/24 0733) Vital Signs (24h Range):  Temp:  [97.3 °F (36.3 °C)-98 °F (36.7 °C)] 97.5 °F (36.4 °C)  Pulse:  [72-82] 80  Resp:  [18] 18  SpO2:  [88 %-99 %] 98 %  BP: (117-135)/(73-85) 135/85     Weight: 88.5 kg (195 lb)  Body mass index is 25.73 kg/m².    Intake/Output Summary (Last 24 hours) at 5/13/2024 0841  Last data filed at 5/12/2024 2131  Gross per 24 hour   Intake 360 ml   Output 1875 ml   Net -1515 ml         Physical Exam  Vitals and nursing note reviewed.   Constitutional:       Appearance: He is normal weight. He is ill-appearing.   HENT:      Head: Normocephalic and atraumatic.   Cardiovascular:      Rate and Rhythm: Normal rate and regular rhythm.      Heart sounds: Murmur heard.   Pulmonary:      Effort: Pulmonary effort is normal. No respiratory distress.   Abdominal:      General: Abdomen is flat. There is distension.      Palpations: Abdomen is soft.      " "Tenderness: There is abdominal tenderness (epigastric).   Musculoskeletal:      Right lower leg: No edema.      Left lower leg: No edema.   Skin:     General: Skin is warm and dry.      Coloration: Skin is pale.   Neurological:      General: No focal deficit present.      Mental Status: He is alert and oriented to person, place, and time. Mental status is at baseline.             Significant Labs: All pertinent labs within the past 24 hours have been reviewed.    Significant Imaging: I have reviewed all pertinent imaging results/findings within the past 24 hours.    Assessment/Plan:      * Hematochezia  Melena     Patient presenting for evaluation of dark red blood in stool since morning of admission with associated upper abdominal pain. Two recent admissions: 3/28-4/4 for CAP and symptomatic anemia (s/p 5 U pRBCs) and 4/18-4/23 for melena (s/p 1 U pRBCs.) Of note, EGD on 10/25/2023 and colonoscopy on 4/1/2024 were unremarkable. EGD on 4/19 showed "single non-bleeding angioectasia in the jejunum; treated with APC."  CTA without acute hemorrhage. Received Protonix 80mg IV x 1 in the ED.       Recent Labs     05/11/24  0551 05/11/24  1110 05/12/24  0345 05/12/24  0832 05/13/24  0534   HGB 7.0*   < >  --    < > 7.7*   BUN 20  --  19  --  17   CREATININE 1.2  --  1.2  --  1.3    < > = values in this interval not displayed.       pRBCs transfused this admission: 3 U (as of 5/12)    5/10 EGD: "blood in the first portion of the duodenum, plumes of blood were noted to be coming from the ampulla"    Per IR note dated 5/13: "Case reviewed with Dr. Pulliam. Both CT c/a/p pancreas protocol on 5/12/24 and CTA a/p on 5/9/24  revealed a pseudoaneurysm measuring 1.2 x 0.8 x 1.0 cm within the lateral aspect of the pancreatic head. Of note, both of the radiology reports on the CT pancreas protocol and CTA erroneously call the pseudoaneurysm a solid lesion. This same pseudoaneurysm is also present on CTA c/a/p performed on 10/25/23, " "however pseudoaneurysm appears thrombosed and therefore does not enhance on this study. Agree with HM and AES that best course of action would be to embolize said pancreatic pseudoaneurysm."    - GI consulted, appreciate recs  - AES consulted, appreciate recs  - IR consulted for embolization  - D/C Protonix 40 mg IV BID  - D/C octreotide gtt   - Continue Rocephin 1 g IV q24h x 7 days  - Monitor for hemodynamic instability with associated large volume hematochezia, consider STAT CTA and IR consult for embolization if positive  - Maintain IV access with 2 large bore IVs  - Telemetry monitoring    HFrEF (heart failure with reduced ejection fraction)  CAD (coronary artery disease)     3/28/2024 BNP 2,932 and 4/18/2024 BNP 4,361; no TTE since 2022. Prior CT with significant multivessel CAD.    Echo    Result Date: 5/10/2024    Left Ventricle: The left ventricle is mildly dilated. Moderate global   hypokinesis and regional wall motion abnormalities present. There is   moderately reduced systolic function with a visually estimated ejection   fraction of 35 - 40%. Biplane (2D) method of discs ejection fraction is   33%. There is diastolic dysfunction. Elevated left ventricular filling   pressure.    Right Ventricle: Normal right ventricular cavity size. Systolic   function is mildly reduced.    Left Atrium: Left atrium is moderately dilated.    Aortic Valve: There is moderate aortic valve sclerosis. Mildly   restricted motion.    Mitral Valve: There is mild annular dilation present. There is mild   mitral annular calcification present.    Tricuspid Valve: There is moderate annular dilation present. There is   moderate regurgitation.    Pulmonary Artery: The estimated pulmonary artery systolic pressure is   55 mmHg.    IVC/SVC: Elevated venous pressure at 15 mmHg.    Pericardium: Large Bilateral pleural effusions.       - Cardiology consulted, appreciate recs  "Prior CT with significant multivessel CAD concerning for ICM " "cardiomyopathy exacerbated by current UGIB. Elevated filling pressures on echo are likely largely due to liver disease as he doesn't appear as grossly volume overloaded as expected."  Defer ischemic evaluation with PET stress to outpt as he is not a revascularization candidate at this time with recurrent UGIB  Ambulatory referral to General Cardiology at discharge  - Continue spot diuresis with IV Lasix; goal net negative 1-2 L/daily  - GDMT:  BB: Continue Toprol 12.5 mg po daily  ACE/ARB/ARNI: Recommend Entresto, if BP too low, try valsartan  SGLT2i: Continue Jardiance 10 mg po daily  MRA: Continue spironolactone 12.5 mg po daily  - Start Lasix 20 mg po daily   - Continue Lipitor 40 mg po qhs  - Start ASA when anemia is controlled    Alcoholic cirrhosis  Recent Labs     05/11/24  0551 05/11/24  1110 05/12/24  0345 05/12/24  0832 05/13/24  0534   BILITOT 1.0  --  0.6  --  0.6   AST 22  --  19  --  18   ALT 10  --  9*  --  9*   ALKPHOS 98  --  89  --  91      < >  --    < > 181   INR 1.3*  --  1.4*  --  1.3*   ALBUMIN 2.7*  --  2.5*  --  2.6*    < > = values in this interval not displayed.       Patient with known Alcoholic Cirrhosis. Co-morbidities are present and inclusive of ascites, portal hypertension, and anemia/pancytopenia.    MELD-Na score calculated; MELD 3.0: 13 at 5/13/2024  5:34 AM  MELD-Na: 12 at 5/13/2024  5:34 AM  Calculated from:  Serum Creatinine: 1.3 mg/dL at 5/13/2024  5:34 AM  Serum Sodium: 138 mmol/L (Using max of 137 mmol/L) at 5/13/2024  5:34 AM  Total Bilirubin: 0.6 mg/dL (Using min of 1 mg/dL) at 5/13/2024  5:34 AM  Serum Albumin: 2.6 g/dL at 5/13/2024  5:34 AM  INR(ratio): 1.3 at 5/13/2024  5:34 AM  Age at listing (hypothetical): 68 years  Sex: Male at 5/13/2024  5:34 AM    During admission, reported he quit alcohol one month ago.     PLAN:  - Will avoid any hepatotoxic meds, and monitor CBC/CMP/INR for synthetic function.   - IR for diagnostic/therapeutic paracentesis --> 5/13 US by " "IR did not show sufficient ascites for paracentesis    Bilateral pleural effusion  Compressive atelectasis     5/9 CTA: "Images of the lower thorax are remarkable for bilateral moderate pleural effusions noting some degree of loculation involving the effusion on the right, partially visualized. There is compressive atelectasis of the bilateral lower lobes."    Patient found to have moderate pleural effusion on imaging. I have personally reviewed and interpreted the following imaging: Xray and CT. A thoracentesis was deferred. Most likely etiology includes Congestive Heart Failure and Cirrhosis. Management to include Diuresis and possible thoracentesis.    - Pulmonology consulted, no plans for thoracentesis at this time  - Lasix trial  - Incentive spirometer    Pancreatic lesion  5/9 CTA: "Enhancing lesion within the lateral aspect of the pancreatic head, not seen on examination 12/25/2023. In this region however, on the previous exam, there is a hypoattenuating lesion with peripheral calcification. This did not undergo significant enhancement at that time. Solid lesion is of concern. Correlation and follow-up advised."     elevated at 88.6. LDH, AFP, CEA WNL.   EGD 5/10 noted "blood in the first portion of the duodenum, plumes of blood were noted to be coming from the ampulla".     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    - PRN morphine 2 mg IV q6h  - PRN Narcan ordered  - PRN Zofran and Compro for nausea    Acute on chronic anemia  Patient's with Normocytic anemia. Hemoglobin stable. Patient's anemia is currently uncontrolled. Has not received any PRBCs to date. Chronic etiology due to iron deficiency and cirrhosis, exacerbated by acute blood loss from GI bleed.    -Current CBC reviewed-    Recent Labs   Lab 05/12/24 1952 05/12/24 2124 " "05/13/24  0534   HGB 7.2* 7.6* 7.7*     PLAN  - Monitor serial CBC and transfuse if patient becomes hemodynamically unstable, symptomatic or H/H drops below 7/21  - S/p iron sucrose 500 mg IV x 1    Presumed COPD (chronic obstructive pulmonary disease)  Patient's COPD is controlled currently.  Patient is currently off COPD Pathway. Continue Breo Ellipta 1 puff daily and PRN Duo-Nebs, will monitor respiratory status closely.     Essential hypertension  Chronic, controlled. Latest blood pressure and vitals reviewed-     Temp:  [97.3 °F (36.3 °C)-98.7 °F (37.1 °C)]   Pulse:  [72-96]   Resp:  [17-20]   BP: (103-117)/(58-79)   SpO2:  [92 %-100 %] .     Not on any home medications per chart review    PLAN:  -See "HFrEF (heart failure with reduced ejection fraction) " A&P  -Will utilize p.r.n. blood pressure medication only if patient's blood pressure greater than 180/110 and he develops symptoms such as worsening chest pain or shortness of breath.    Anxiety and depression  Patient has hx of depression which  is currently controlled.     PLAN:  Continue home Lexapro 10 mg po daily. We will not consult psychiatry at this time. Patient does not display psychosis at this time. Continue to monitor closely and adjust plan of care as needed.    BPH (benign prostatic hyperplasia)  Stable. Hx of BPH. Was on Tamsulosin previously, but held on recent admission in 03/2024 where there were concerns for low BP    PLAN:  Bladder scan PRN for urinary retention concerns    Documented history of CKD Stage 3  Patient reported to have CKD 3 as documented in prior notes. However, review of past labs does not show sustained decrease in GFR > 6 months, just during individual encounters during which patient had [place].     CAD (coronary artery disease)  Patient with known CAD (seen on CT imaging, no interventions have been done) which is controlled Will continue Statin and monitor for S/Sx of angina/ACS. Continue to monitor on telemetry.     See " ""HFrEF (heart failure with reduced ejection fraction)"    Compressive atelectasis  See "Bilateral pleural effusion"    Melena  See "Hematochezia"      VTE Risk Mitigation (From admission, onward)           Ordered     Reason for No Pharmacological VTE Prophylaxis  Once        Question:  Reasons:  Answer:  Active Bleeding    05/09/24 1609     IP VTE HIGH RISK PATIENT  Once         05/09/24 1609     Place sequential compression device  Until discontinued         05/09/24 1609                    Discharge Planning   ARTURO: 5/15/2024     Code Status: Full Code   Is the patient medically ready for discharge?:     Reason for patient still in hospital (select all that apply): Patient new problem, Laboratory test, Treatment, Imaging, and Consult recommendations  Discharge Plan A: Skilled Nursing Facility        Awa Rodriguez MD  Department of Hospital Medicine   [facility] - Telemetry Stepdown    "

## 2024-05-13 NOTE — ASSESSMENT & PLAN NOTE
Recent Labs     05/11/24  0551 05/11/24  1110 05/12/24  0345 05/12/24  0832 05/13/24  0534   BILITOT 1.0  --  0.6  --  0.6   AST 22  --  19  --  18   ALT 10  --  9*  --  9*   ALKPHOS 98  --  89  --  91      < >  --    < > 181   INR 1.3*  --  1.4*  --  1.3*   ALBUMIN 2.7*  --  2.5*  --  2.6*    < > = values in this interval not displayed.     Patient with known Alcoholic Cirrhosis. Co-morbidities are present and inclusive of ascites, portal hypertension, and anemia/pancytopenia.    MELD-Na score calculated; MELD 3.0: 13 at 5/13/2024  5:34 AM  MELD-Na: 12 at 5/13/2024  5:34 AM  Calculated from:  Serum Creatinine: 1.3 mg/dL at 5/13/2024  5:34 AM  Serum Sodium: 138 mmol/L (Using max of 137 mmol/L) at 5/13/2024  5:34 AM  Total Bilirubin: 0.6 mg/dL (Using min of 1 mg/dL) at 5/13/2024  5:34 AM  Serum Albumin: 2.6 g/dL at 5/13/2024  5:34 AM  INR(ratio): 1.3 at 5/13/2024  5:34 AM  Age at listing (hypothetical): 68 years  Sex: Male at 5/13/2024  5:34 AM    During admission, reported he quit alcohol one month ago.     PLAN:  - Will avoid any hepatotoxic meds, and monitor CBC/CMP/INR for synthetic function.   - IR for diagnostic/therapeutic paracentesis --> 5/13 US by IR did not show ascites for paracentesis

## 2024-05-13 NOTE — ASSESSMENT & PLAN NOTE
"Melena     Patient presenting for evaluation of dark red blood in stool since morning of admission with associated upper abdominal pain. Two recent admissions: 3/28-4/4 for CAP and symptomatic anemia (s/p 5 U pRBCs) and 4/18-4/23 for melena (s/p 1 U pRBCs.) Of note, EGD on 10/25/2023 and colonoscopy on 4/1/2024 were unremarkable. EGD on 4/19 showed "single non-bleeding angioectasia in the jejunum; treated with APC."  CTA without acute hemorrhage. Received Protonix 80mg IV x 1 in the ED.       Recent Labs     05/11/24  0551 05/11/24  1110 05/12/24  0345 05/12/24  0832 05/13/24  0534   HGB 7.0*   < >  --    < > 7.7*   BUN 20  --  19  --  17   CREATININE 1.2  --  1.2  --  1.3    < > = values in this interval not displayed.       pRBCs transfused this admission: 3 U (as of 5/12)    5/10 EGD: "blood in the first portion of the duodenum, plumes of blood were noted to be coming from the ampulla"    Per IR note dated 5/13: "Both CT c/a/p pancreas protocol on 5/12/24 and CTA a/p on 5/9/24  revealed a pseudoaneurysm measuring 1.2 x 0.8 x 1.0 cm within the lateral aspect of the pancreatic head. Of note, both of the radiology reports on the CT pancreas protocol and CTA erroneously call the pseudoaneurysm a solid lesion. This same pseudoaneurysm is also present on CTA c/a/p performed on 10/25/23, however pseudoaneurysm appears thrombosed and therefore does not enhance on this study. Agree with HM and AES that best course of action would be to embolize said pancreatic pseudoaneurysm."    S/P coil embolization of GDA and multiple pancreaticoduodenal branches with IR on 5/13/2024    - GI signed off  - AES signed off  - D/C Rocephin 1 g IV q24h (completed 5-day course)  - Monitor for hemodynamic instability with associated large volume hematochezia, consider STAT CTA and IR consult for embolization if positive  - Maintain IV access with 2 large bore IVs  - Telemetry monitoring  "

## 2024-05-13 NOTE — NURSING
Pseudoaneurysm complete. Pt tolerated well. VSS. No signs or symptoms of distress noted.Vascade closure device in place. Hemostasis 1155. Pt to remain flat until 1355. Pt will be transferred to MPU bed escorted by RN and report to RN on arrival and called to floor RN.

## 2024-05-13 NOTE — ASSESSMENT & PLAN NOTE
"Melena     Patient presenting for evaluation of dark red blood in stool since morning of admission with associated upper abdominal pain. Two recent admissions: 3/28-4/4 for CAP and symptomatic anemia (s/p 5 U pRBCs) and 4/18-4/23 for melena (s/p 1 U pRBCs.) Of note, EGD on 10/25/2023 and colonoscopy on 4/1/2024 were unremarkable. EGD on 4/19 showed "single non-bleeding angioectasia in the jejunum; treated with APC."  CTA without acute hemorrhage. Received Protonix 80mg IV x 1 in the ED.       Recent Labs     05/11/24  0551 05/11/24  1110 05/12/24  0345 05/12/24  0832 05/13/24  0534   HGB 7.0*   < >  --    < > 7.7*   BUN 20  --  19  --  17   CREATININE 1.2  --  1.2  --  1.3    < > = values in this interval not displayed.     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

## 2024-05-13 NOTE — SEDATION DOCUMENTATION
Being relieved for lunch by JYOTI Miller. Leaving with her 0.5mg of Midazolam & 50 mcg of Fentanyl.

## 2024-05-13 NOTE — PLAN OF CARE
No ascites noted on bedside ultrasound. Pt will return to inpt room via stretcher w/ transporter. Report called to primary nurse, [place].

## 2024-05-14 LAB
ALBUMIN SERPL BCP-MCNC: 2.6 G/DL (ref 3.5–5.2)
ALP SERPL-CCNC: 85 U/L (ref 55–135)
ALT SERPL W/O P-5'-P-CCNC: 7 U/L (ref 10–44)
ANION GAP SERPL CALC-SCNC: 12 MMOL/L (ref 8–16)
AST SERPL-CCNC: 17 U/L (ref 10–40)
BASOPHILS # BLD AUTO: 0.02 K/UL (ref 0–0.2)
BASOPHILS # BLD AUTO: 0.03 K/UL (ref 0–0.2)
BASOPHILS # BLD AUTO: 0.03 K/UL (ref 0–0.2)
BASOPHILS NFR BLD: 0.5 % (ref 0–1.9)
BASOPHILS NFR BLD: 0.6 % (ref 0–1.9)
BASOPHILS NFR BLD: 0.6 % (ref 0–1.9)
BILIRUB SERPL-MCNC: 0.6 MG/DL (ref 0.1–1)
BUN SERPL-MCNC: 15 MG/DL (ref 8–23)
CALCIUM SERPL-MCNC: 8.2 MG/DL (ref 8.7–10.5)
CHLORIDE SERPL-SCNC: 92 MMOL/L (ref 95–110)
CO2 SERPL-SCNC: 32 MMOL/L (ref 23–29)
CREAT SERPL-MCNC: 1.2 MG/DL (ref 0.5–1.4)
DIFFERENTIAL METHOD BLD: ABNORMAL
EOSINOPHIL # BLD AUTO: 0 K/UL (ref 0–0.5)
EOSINOPHIL # BLD AUTO: 0.1 K/UL (ref 0–0.5)
EOSINOPHIL # BLD AUTO: 0.1 K/UL (ref 0–0.5)
EOSINOPHIL NFR BLD: 0.7 % (ref 0–8)
EOSINOPHIL NFR BLD: 1.3 % (ref 0–8)
EOSINOPHIL NFR BLD: 1.3 % (ref 0–8)
ERYTHROCYTE [DISTWIDTH] IN BLOOD BY AUTOMATED COUNT: 19.9 % (ref 11.5–14.5)
ERYTHROCYTE [DISTWIDTH] IN BLOOD BY AUTOMATED COUNT: 20.4 % (ref 11.5–14.5)
ERYTHROCYTE [DISTWIDTH] IN BLOOD BY AUTOMATED COUNT: 20.6 % (ref 11.5–14.5)
EST. GFR  (NO RACE VARIABLE): >60 ML/MIN/1.73 M^2
GLUCOSE SERPL-MCNC: 105 MG/DL (ref 70–110)
HCT VFR BLD AUTO: 27.3 % (ref 40–54)
HCT VFR BLD AUTO: 27.7 % (ref 40–54)
HCT VFR BLD AUTO: 30.2 % (ref 40–54)
HGB BLD-MCNC: 7.6 G/DL (ref 14–18)
HGB BLD-MCNC: 7.9 G/DL (ref 14–18)
HGB BLD-MCNC: 8.5 G/DL (ref 14–18)
HYPOCHROMIA BLD QL SMEAR: ABNORMAL
IMM GRANULOCYTES # BLD AUTO: 0.01 K/UL (ref 0–0.04)
IMM GRANULOCYTES NFR BLD AUTO: 0.2 % (ref 0–0.5)
INR PPP: 1.2 (ref 0.8–1.2)
LYMPHOCYTES # BLD AUTO: 0.5 K/UL (ref 1–4.8)
LYMPHOCYTES # BLD AUTO: 0.7 K/UL (ref 1–4.8)
LYMPHOCYTES # BLD AUTO: 0.8 K/UL (ref 1–4.8)
LYMPHOCYTES NFR BLD: 12.3 % (ref 18–48)
LYMPHOCYTES NFR BLD: 15.8 % (ref 18–48)
LYMPHOCYTES NFR BLD: 16 % (ref 18–48)
MAGNESIUM SERPL-MCNC: 2 MG/DL (ref 1.6–2.6)
MCH RBC QN AUTO: 24 PG (ref 27–31)
MCH RBC QN AUTO: 24.1 PG (ref 27–31)
MCH RBC QN AUTO: 24.2 PG (ref 27–31)
MCHC RBC AUTO-ENTMCNC: 27.8 G/DL (ref 32–36)
MCHC RBC AUTO-ENTMCNC: 28.1 G/DL (ref 32–36)
MCHC RBC AUTO-ENTMCNC: 28.5 G/DL (ref 32–36)
MCV RBC AUTO: 84 FL (ref 82–98)
MCV RBC AUTO: 86 FL (ref 82–98)
MCV RBC AUTO: 86 FL (ref 82–98)
MONOCYTES # BLD AUTO: 0.4 K/UL (ref 0.3–1)
MONOCYTES # BLD AUTO: 0.5 K/UL (ref 0.3–1)
MONOCYTES # BLD AUTO: 0.6 K/UL (ref 0.3–1)
MONOCYTES NFR BLD: 11.4 % (ref 4–15)
MONOCYTES NFR BLD: 13 % (ref 4–15)
MONOCYTES NFR BLD: 9.8 % (ref 4–15)
NEUTROPHILS # BLD AUTO: 3.2 K/UL (ref 1.8–7.7)
NEUTROPHILS # BLD AUTO: 3.4 K/UL (ref 1.8–7.7)
NEUTROPHILS # BLD AUTO: 3.4 K/UL (ref 1.8–7.7)
NEUTROPHILS NFR BLD: 68.9 % (ref 38–73)
NEUTROPHILS NFR BLD: 70.7 % (ref 38–73)
NEUTROPHILS NFR BLD: 76.5 % (ref 38–73)
NRBC BLD-RTO: 0 /100 WBC
PHOSPHATE SERPL-MCNC: 4.1 MG/DL (ref 2.7–4.5)
PLATELET # BLD AUTO: 136 K/UL (ref 150–450)
PLATELET # BLD AUTO: 138 K/UL (ref 150–450)
PLATELET # BLD AUTO: 162 K/UL (ref 150–450)
PLATELET BLD QL SMEAR: ABNORMAL
PMV BLD AUTO: 10.2 FL (ref 9.2–12.9)
PMV BLD AUTO: 10.7 FL (ref 9.2–12.9)
PMV BLD AUTO: 11.6 FL (ref 9.2–12.9)
POCT GLUCOSE: 121 MG/DL (ref 70–110)
POLYCHROMASIA BLD QL SMEAR: ABNORMAL
POTASSIUM SERPL-SCNC: 3.9 MMOL/L (ref 3.5–5.1)
PROT SERPL-MCNC: 6.3 G/DL (ref 6–8.4)
PROTHROMBIN TIME: 13.3 SEC (ref 9–12.5)
RBC # BLD AUTO: 3.16 M/UL (ref 4.6–6.2)
RBC # BLD AUTO: 3.29 M/UL (ref 4.6–6.2)
RBC # BLD AUTO: 3.51 M/UL (ref 4.6–6.2)
SODIUM SERPL-SCNC: 136 MMOL/L (ref 136–145)
WBC # BLD AUTO: 4.39 K/UL (ref 3.9–12.7)
WBC # BLD AUTO: 4.63 K/UL (ref 3.9–12.7)
WBC # BLD AUTO: 4.75 K/UL (ref 3.9–12.7)

## 2024-05-14 PROCEDURE — 20600001 HC STEP DOWN PRIVATE ROOM

## 2024-05-14 PROCEDURE — 85025 COMPLETE CBC W/AUTO DIFF WBC: CPT | Mod: 91

## 2024-05-14 PROCEDURE — 25000003 PHARM REV CODE 250

## 2024-05-14 PROCEDURE — 36415 COLL VENOUS BLD VENIPUNCTURE: CPT | Mod: XB

## 2024-05-14 PROCEDURE — A4216 STERILE WATER/SALINE, 10 ML: HCPCS | Performed by: STUDENT IN AN ORGANIZED HEALTH CARE EDUCATION/TRAINING PROGRAM

## 2024-05-14 PROCEDURE — 97162 PT EVAL MOD COMPLEX 30 MIN: CPT

## 2024-05-14 PROCEDURE — 63600175 PHARM REV CODE 636 W HCPCS

## 2024-05-14 PROCEDURE — 97535 SELF CARE MNGMENT TRAINING: CPT

## 2024-05-14 PROCEDURE — 80053 COMPREHEN METABOLIC PANEL: CPT

## 2024-05-14 PROCEDURE — 25000003 PHARM REV CODE 250: Performed by: STUDENT IN AN ORGANIZED HEALTH CARE EDUCATION/TRAINING PROGRAM

## 2024-05-14 PROCEDURE — 83735 ASSAY OF MAGNESIUM: CPT

## 2024-05-14 PROCEDURE — A4216 STERILE WATER/SALINE, 10 ML: HCPCS

## 2024-05-14 PROCEDURE — 97166 OT EVAL MOD COMPLEX 45 MIN: CPT

## 2024-05-14 PROCEDURE — 85610 PROTHROMBIN TIME: CPT

## 2024-05-14 PROCEDURE — 25000242 PHARM REV CODE 250 ALT 637 W/ HCPCS

## 2024-05-14 PROCEDURE — 94660 CPAP INITIATION&MGMT: CPT

## 2024-05-14 PROCEDURE — 84100 ASSAY OF PHOSPHORUS: CPT

## 2024-05-14 PROCEDURE — 99900035 HC TECH TIME PER 15 MIN (STAT)

## 2024-05-14 PROCEDURE — 97530 THERAPEUTIC ACTIVITIES: CPT

## 2024-05-14 RX ORDER — METOPROLOL SUCCINATE 25 MG/1
25 TABLET, EXTENDED RELEASE ORAL DAILY
Start: 2024-05-15 | End: 2025-05-15

## 2024-05-14 RX ORDER — SACUBITRIL AND VALSARTAN 24; 26 MG/1; MG/1
1 TABLET, FILM COATED ORAL 2 TIMES DAILY
Qty: 60 TABLET | Refills: 11 | Status: SHIPPED | OUTPATIENT
Start: 2024-05-14 | End: 2024-05-14 | Stop reason: HOSPADM

## 2024-05-14 RX ORDER — FUROSEMIDE 20 MG/1
20 TABLET ORAL DAILY
Start: 2024-05-15 | End: 2025-05-15

## 2024-05-14 RX ORDER — SPIRONOLACTONE 25 MG/1
12.5 TABLET ORAL DAILY
Start: 2024-05-15 | End: 2025-05-15

## 2024-05-14 RX ORDER — ATORVASTATIN CALCIUM 40 MG/1
40 TABLET, FILM COATED ORAL NIGHTLY
Start: 2024-05-14 | End: 2025-05-14

## 2024-05-14 RX ORDER — METOPROLOL SUCCINATE 25 MG/1
25 TABLET, EXTENDED RELEASE ORAL DAILY
Status: DISCONTINUED | OUTPATIENT
Start: 2024-05-15 | End: 2024-05-15 | Stop reason: HOSPADM

## 2024-05-14 RX ORDER — MORPHINE SULFATE 2 MG/ML
1 INJECTION, SOLUTION INTRAMUSCULAR; INTRAVENOUS EVERY 6 HOURS PRN
Status: DISCONTINUED | OUTPATIENT
Start: 2024-05-14 | End: 2024-05-15

## 2024-05-14 RX ADMIN — Medication 100 MG: at 09:05

## 2024-05-14 RX ADMIN — VALSARTAN 40 MG: 40 TABLET, FILM COATED ORAL at 09:05

## 2024-05-14 RX ADMIN — METOPROLOL SUCCINATE 12.5 MG: 25 TABLET, EXTENDED RELEASE ORAL at 10:05

## 2024-05-14 RX ADMIN — FLUTICASONE FUROATE AND VILANTEROL TRIFENATATE 1 PUFF: 100; 25 POWDER RESPIRATORY (INHALATION) at 09:05

## 2024-05-14 RX ADMIN — Medication 10 ML: at 12:05

## 2024-05-14 RX ADMIN — FUROSEMIDE 20 MG: 20 TABLET ORAL at 09:05

## 2024-05-14 RX ADMIN — EMPAGLIFLOZIN 10 MG: 10 TABLET, FILM COATED ORAL at 09:05

## 2024-05-14 RX ADMIN — MORPHINE SULFATE 2 MG: 2 INJECTION, SOLUTION INTRAMUSCULAR; INTRAVENOUS at 03:05

## 2024-05-14 RX ADMIN — METOPROLOL SUCCINATE 12.5 MG: 25 TABLET, EXTENDED RELEASE ORAL at 09:05

## 2024-05-14 RX ADMIN — ATORVASTATIN CALCIUM 40 MG: 40 TABLET, FILM COATED ORAL at 09:05

## 2024-05-14 RX ADMIN — VALSARTAN 40 MG: 40 TABLET, FILM COATED ORAL at 10:05

## 2024-05-14 RX ADMIN — SPIRONOLACTONE 12.5 MG: 25 TABLET ORAL at 11:05

## 2024-05-14 RX ADMIN — ESCITALOPRAM OXALATE 10 MG: 10 TABLET ORAL at 09:05

## 2024-05-14 RX ADMIN — FOLIC ACID 1 MG: 1 TABLET ORAL at 09:05

## 2024-05-14 RX ADMIN — Medication 10 ML: at 11:05

## 2024-05-14 RX ADMIN — Medication 10 ML: at 06:05

## 2024-05-14 NOTE — CARE UPDATE
"RAPID RESPONSE NURSE CHART REVIEW        Chart Reviewed: 05/13/2024, 11:52 PM    MRN: [id number]  Bed: 8068/8068 A    Dx: Hematochezia    Fransico Montalvo has a past medical history of Alcohol abuse, BPH (benign prostatic hyperplasia), CAD (coronary artery disease), Cirrhosis, Closed fracture of left distal radius, Gastritis, HFrEF (heart failure with reduced ejection fraction), and Hypertension.    Last VS: /86   Pulse (!) 148   Temp 98.1 °F (36.7 °C) (Oral)   Resp 17   Ht 6' 1" (1.854 m)   Wt 88.5 kg (195 lb)   SpO2 (!) 90%   BMI 25.73 kg/m²     24H Vital Sign Range:  Temp:  [97.5 °F (36.4 °C)-98.1 °F (36.7 °C)]   Pulse:  []   Resp:  [13-22]   BP: (110-144)/(71-86)   SpO2:  [88 %-100 %]     Level of Consciousness (AVPU): alert    Recent Labs     05/12/24  2124 05/13/24  0534 05/13/24  1704   WBC 3.10* 2.31* 3.09*   HGB 7.6* 7.7* 7.8*   HCT 25.4* 26.5* 26.5*    181 156       Recent Labs     05/11/24  0551 05/12/24  0345 05/13/24  0534   * 135* 138   K 3.7 3.2* 3.9   CL 94* 94* 95   CO2 30* 31* 33*   BUN 20 19 17   CREATININE 1.2 1.2 1.3   * 110 113*   PHOS 2.8 2.4* 3.7   MG 1.3* 1.7 1.7        No results for input(s): "PH", "PCO2", "PO2", "HCO3", "POCSATURATED", "BE" in the last 72 hours.     OXYGEN:  Flow (L/min) (Oxygen Therapy): 2  Oxygen Concentration (%): 99       MEWS score: 4    Bedside RNZuri contacted for tachycardia, hypoxia reports will go check on patient. No additional concerns verbalized at this time. Instructed to call [phone] for further concerns or assistance.    MARYLOU Baird RN       "

## 2024-05-14 NOTE — PT/OT/SLP EVAL
Occupational Therapy   Evaluation and Tx    Name: Fransico Montalvo  MRN: [id number]  Admitting Diagnosis: Hematochezia  Recent Surgery: Procedure(s) (LRB):  EGD (ESOPHAGOGASTRODUODENOSCOPY) (N/A) 4 Days Post-Op    Recommendations:     Discharge Recommendations: Moderate Intensity Therapy  Discharge Equipment Recommendations:  bedside commode, shower chair  Barriers to discharge:  None    Assessment:     Fransico Montalvo is a 68 y.o. male with a medical diagnosis of Hematochezia.  He presents with the following performance deficits affecting function: weakness, impaired endurance, impaired self care skills, impaired functional mobility, gait instability, impaired balance, pain, decreased safety awareness, impaired cardiopulmonary response to activity.  Pt tolerated session well, which focused on evaluating pt's current functional status 2/2 recent admit. He was able to perform bed mobility well and demo good sitting balance, however was limited with OOB mobility 2/2 reports of SOB and discomfort in neck/back. Pt's O2 WFL when sitting EOB, but noted to be breathing out of mouth and was cued to facilitate deep breathing, however SpO2 with difficulty reading when standing. He was able to tolerate ~1 min static standing before requesting to return to sitting EOB. Pt was able to perform some therex with BUE for improving shoulder/neck discomfort, which he was educated on performing outside of therapy session. Pt would benefit from continued skilled acute OT services in order to maximize (I) and with ADLs and functional mobility to ensure safe return to PLOF in the least restrictive environment. OT recommending moderate intensity therapy once pt is medically appropriate for d/c.       Rehab Prognosis: Good; patient would benefit from acute skilled OT services to address these deficits and reach maximum level of function.       Plan:     Patient to be seen 4 x/week to address the above listed problems via self-care/home management,  "therapeutic activities, therapeutic exercises, neuromuscular re-education  Plan of Care Expires: 06/13/24  Plan of Care Reviewed with: patient    Subjective   "I do high jumps on the weekend"  Chief Complaint: Neck pain;SOB   Patient/Family Comments/goals: return to PLOF     Occupational Profile:  Living Environment: Pt admitted from SNF  Previous level of function: Pt reports he was using a RW and w/c for mobility. Required some assistance for ADLs.   Roles and Routines: From [place]   Equipment Used at Home: walker, rolling, wheelchair  Assistance upon Discharge: Unknown     Pain/Comfort:  Pain Rating 1: other (see comments) (not rated)  Location - Orientation 1: generalized  Location 1: back  Pain Addressed 1: Reposition, Distraction, Cessation of Activity  Pain Rating Post-Intervention 1: other (see comments) (not rated)    Patients cultural, spiritual, Bahai conflicts given the current situation: no    Objective:     Communicated with: RN prior to session.  Patient found HOB elevated with oxygen upon OT entry to room.    General Precautions: Standard, fall  Orthopedic Precautions: N/A  Braces: N/A  Respiratory Status: Nasal cannula, flow 3 L/min    Occupational Performance:    Bed Mobility:    Patient completed Scooting/Bridging with stand by assistance  Patient completed Supine to Sit with stand by assistance  Patient completed Sit to Supine with stand by assistance    Functional Mobility/Transfers:  Patient completed Sit <> Stand Transfer with contact guard assistance  with  rolling walker    Required cues for hand placement   Functional Mobility: Deferred 2/2 pt reporting SOB and unable to obtain accurate SpO2. Able to tolerate ~1 min in static standing with CGA for safety.     Activities of Daily Living:  Grooming: set-up assistance for facial hygiene   Upper Body Dressing: minimum assistance for doff/donning gown   Lower Body Dressing: contact guard assistance for safety with 1 sock; Mod A for 2nd " sock 2/2 pt reporting SOB     Cognitive/Visual Perceptual:  Cognitive/Psychosocial Skills:     -       Oriented to: Person, Place, Situation, and required some cues for time   -       Follows Commands/attention:Easily distracted and Follows one-step commands  -       Communication: clear/fluent  -       Memory: Impaired   -       Safety awareness/insight to disability: impaired   Visual/Perceptual:      -Intact      Physical Exam:  Balance:    -           Static sitting balance: SBA  - Dynamic sitting balance: SBA-CGA  - Static standing balance: CGA with RW  - Dynamic standing balance:  Deferred   Postural examination/scapula alignment:    -       Rounded shoulders  -       Forward head  Skin integrity: Visible skin intact  Edema:  None noted  Sensation:    -       Intact  Upper Extremity Range of Motion:     -       Right Upper Extremity: WFL  -       Left Upper Extremity: WFL  Upper Extremity Strength:    -       Right Upper Extremity: WFL  -       Left Upper Extremity: WFL   Strength:    -       Right Upper Extremity: WFL  -       Left Upper Extremity: WFL  Fine Motor Coordination:    -       Intact  Left hand, manipulation of objects and Right hand, manipulation of objects    AMPAC 6 Click ADL:  AMPAC Total Score: 18    Treatment & Education:   Pt  educated on:   Role of OT, POC, and d/c planning.   Various therex pt can perform outside of therapy session to increase functional endurance and strength for overall improved independence in occupations of choice.   Safe transfer techniques and proper body mechanics for fall prevention and improved independence with functional transfers   Importance of OOB activities to increase endurance and tolerance for increased participation in daily ADLs.   Utilizing the call bell to request for assistance with all functional mobility to ensure safety during hospital stay.      Pt verbalized understanding and all questions were addressed within the scope of OT.     Patient  left HOB elevated with all lines intact, call button in reach, and RN notified    GOALS:   Multidisciplinary Problems       Occupational Therapy Goals          Problem: Occupational Therapy    Goal Priority Disciplines Outcome Interventions   Occupational Therapy Goal     OT, PT/OT Progressing    Description: Goals to be met by: 5/28/2024     Patient will increase functional independence with ADLs by performing:    UE Dressing with Supervision.  LE Dressing with Supervision.  Grooming while standing at sink with Supervision.  Toileting from bedside commode with Contact Guard Assistance for hygiene and clothing management.   Toilet transfer to bedside commode with Contact Guard Assistance.  Upper extremity exercise program x10 reps per handout, with independence.                         History:     Past Medical History:   Diagnosis Date    Alcohol abuse     BPH (benign prostatic hyperplasia)     CAD (coronary artery disease)     Cirrhosis 06/21/2018    pt states that he was diagnosed a week ago during his last hospital visit    Closed fracture of left distal radius 07/19/2023    Gastritis     HFrEF (heart failure with reduced ejection fraction) 05/10/2024    Hypertension          Past Surgical History:   Procedure Laterality Date    CAPSULOTOMY OF JOINT Right 7/29/2019    Procedure: CAPSULOTOMY, JOINT;  Surgeon: Raj Grossman MD;  Location[facility] OR [address] [place] [address];  Service: Orthopedics;  Laterality: Right;    COLONOSCOPY N/A 4/1/2024    Procedure: COLONOSCOPY;  Surgeon: Scarlett Whitt MD;  Location: [facility] ([address] [place] [address]);  Service: Gastroenterology;  Laterality: N/A;    ESOPHAGOGASTRODUODENOSCOPY N/A 10/25/2023    Procedure: EGD (ESOPHAGOGASTRODUODENOSCOPY);  Surgeon: Lux Rome MD;  [facility]  Service: Endoscopy;  Laterality: N/A;    ESOPHAGOGASTRODUODENOSCOPY N/A 4/19/2024    Procedure: EGD (ESOPHAGOGASTRODUODENOSCOPY);  Surgeon: Raj Roberts MD;  Location: [facility] ([address] [place] [address]);   Service: Endoscopy;  Laterality: N/A;    ESOPHAGOGASTRODUODENOSCOPY N/A 5/10/2024    Procedure: EGD (ESOPHAGOGASTRODUODENOSCOPY);  Surgeon: Ashley Trent MD;  Location: [facility] ([address] [place] [address]);  Service: Endoscopy;  Laterality: N/A;    PERCUTANEOUS PINNING OF HIP Right 7/29/2019    Procedure: PINNING, HIP, PERCUTANEOUS- synthes cannulated screws- hana table- C arm door side-;  Surgeon: Raj Grossman MD;  Location: [address];  Service: Orthopedics;  Laterality: Right;       Time Tracking:     OT Date of Treatment: 05/14/24  OT Start Time: 0956  OT Stop Time: 1022  OT Total Time (min): 26 min    Billable Minutes:Evaluation 12  Self Care/Home Management 14    5/14/2024  Co-evaluation/treatment performed due to patient's multiple deficits requiring two skilled therapists to appropriately and safely assess patient's strength, endurance, functional mobility, and ADL performance while facilitating functional tasks in addition to accommodating for patient's activity tolerance and medical acuity.

## 2024-05-14 NOTE — ASSESSMENT & PLAN NOTE
Patient's with Normocytic anemia. Hemoglobin stable. Patient's anemia is currently controlled. Has received 3 U pRBCs this admission. Chronic etiology due to iron deficiency and cirrhosis, exacerbated by acute blood loss from GI bleed.    -Current CBC reviewed-    Recent Labs   Lab 05/13/24  1704 05/13/24  2359 05/14/24  0834   HGB 7.8* 7.9* 8.5*     PLAN  - Monitor serial CBC and transfuse if patient becomes hemodynamically unstable, symptomatic or H/H drops below 7/21  - S/p iron sucrose 500 mg IV x 1

## 2024-05-14 NOTE — PLAN OF CARE
NURSING HOME ORDERS    05/15/2024  [facility]  MARIKA BLACKWELL - TELEMETRY STEPDOWN  [address]  [facility] [address]  Dept: [phone]  Loc: [phone]     Admit to Nursing Home:  Skilled Nursing Facility    Diagnoses:  Active Hospital Problems    Diagnosis  POA    HFrEF (heart failure with reduced ejection fraction) [I50.20]  Yes     Priority: 1 - High    Alcoholic cirrhosis [K70.30]  Yes     Priority: 1 - High     Chronic    Bilateral pleural effusion [J90]  Yes     Priority: 2     Pancreatic lesion [K86.9]  Yes     Priority: 2     Presumed COPD (chronic obstructive pulmonary disease) [J44.9]  Yes     Priority: 5     Anxiety and depression [F41.9, F32.A]  Yes     Priority: 6      Chronic    Essential hypertension [I10]  Yes     Priority: 6      Chronic    BPH (benign prostatic hyperplasia) [N40.0]  Yes     Priority: 7      Chronic    Documented history of CKD Stage 3 [N18.30]  Yes     Priority: 8      Chronic    Compressive atelectasis [J98.11]  Yes    CAD (coronary artery disease) [I25.10]  No      Resolved Hospital Problems    Diagnosis Date Resolved POA    *Hematochezia [K92.1] 05/15/2024 Yes     Priority: 1 - High    Hyperkalemia [E87.5] 05/12/2024 Yes     Priority: 2     Acute pancreatitis [K85.90] 05/15/2024 Yes     Priority: 3     Acute on chronic anemia [D64.9] 05/15/2024 Yes     Priority: 4     Melena [K92.1] 05/15/2024 Yes       Patient is homebound due to:  CHF    Allergies:Review of patient's allergies indicates:  No Known Allergies    Vitals:  Routine    Diet: cardiac diet, fluid restriction: 1.5 L, and 2 gram sodium diet    Activities:   Activity as tolerated    Goals of Care Treatment Preferences:  Code Status: Full Code      Labs:  CBC: Twice a week. Frequency to be modified per SNF physician    Nursing Precautions:  Fall and Pressure ulcer prevention    Consults:   PT to evaluate and treat and OT to evaluate and treat- Frequency to be determined based  on  initial evaluation    Miscellaneous Care: Routine Skin for Bedridden Patients:  Apply moisture barrier cream to all  CHF Care: Daily Weight with notification of MD/NP of 2lb or > increase in 24 hours    v/s and O2 sat every shift    Oxygen as needed for sats <90%    Report abnormal breath sounds to MD/NP    Edema checks q shift- notify MD/NP of increased edema    Task segmentation by nursing for daily care to decrease exertion    CHF education to include diet ,medication, and CHF flags for MD notification           Medications: Discontinue all previous medication orders, if any. See new list below.     Medication List        START taking these medications      aspirin 81 MG EC tablet  Commonly known as: ECOTRIN  Take 1 tablet (81 mg total) by mouth once daily.     atorvastatin 40 MG tablet  Commonly known as: LIPITOR  Take 1 tablet (40 mg total) by mouth every evening.     empagliflozin 10 mg tablet  Commonly known as: Jardiance  Take 1 tablet (10 mg total) by mouth once daily.     furosemide 20 MG tablet  Commonly known as: LASIX  Take 1 tablet (20 mg total) by mouth once daily.     lactulose 10 gram/15 mL solution  Commonly known as: CHRONULAC  Take 30 mLs (20 g total) by mouth 3 (three) times daily as needed (Titrate to 2-3 bowel movements a day).     metoprolol succinate 25 MG 24 hr tablet  Commonly known as: TOPROL-XL  Take 1 tablet (25 mg total) by mouth once daily.     spironolactone 25 MG tablet  Commonly known as: ALDACTONE  Take 0.5 tablets (12.5 mg total) by mouth once daily.     valsartan 40 MG tablet  Commonly known as: DIOVAN  Take 1 tablet (40 mg total) by mouth 2 (two) times daily.            CHANGE how you take these medications      acetaminophen 500 MG tablet  Commonly known as: TYLENOL  Take 1 tablet (500 mg total) by mouth every 6 (six) hours as needed for Pain.  What changed:   medication strength  how much to take  when to take this            CONTINUE taking these medications       albuterol-ipratropium 2.5 mg-0.5 mg/3 mL nebulizer solution  Commonly known as: DUO-NEB  Take 3 mLs by nebulization every 6 (six) hours as needed for Wheezing or Shortness of Breath. Rescue     EScitalopram oxalate 10 MG tablet  Commonly known as: LEXAPRO  Take 10 mg by mouth once daily.     fluticasone furoate-vilanteroL 100-25 mcg/dose diskus inhaler  Commonly known as: BREO  Inhale 1 puff into the lungs once daily. Controller     folic acid 1 MG tablet  Commonly known as: FOLVITE  Take 1 tablet (1 mg total) by mouth once daily.     melatonin 3 mg tablet  Commonly known as: MELATIN  Take 2 tablets (6 mg total) by mouth nightly as needed for Insomnia.     thiamine 100 MG tablet  Take 100 mg by mouth once daily.            STOP taking these medications      dicyclomine 10 MG capsule  Commonly known as: BENTYL     ondansetron 8 MG Tbdl  Commonly known as: ZOFRAN-ODT     pantoprazole 40 MG tablet  Commonly known as: PROTONIX              _________________________________  Awa Rodriguez MD  05/15/2024

## 2024-05-14 NOTE — PLAN OF CARE
OT evaluation completed. OT POC and goals established.     Problem: Occupational Therapy  Goal: Occupational Therapy Goal  Description: Goals to be met by: 5/28/2024     Patient will increase functional independence with ADLs by performing:    UE Dressing with Supervision.  LE Dressing with Supervision.  Grooming while standing at sink with Supervision.  Toileting from bedside commode with Contact Guard Assistance for hygiene and clothing management.   Toilet transfer to bedside commode with Contact Guard Assistance.  Upper extremity exercise program x10 reps per handout, with independence.    Outcome: Progressing

## 2024-05-14 NOTE — ASSESSMENT & PLAN NOTE
"Compressive atelectasis     5/9 CTA: "Images of the lower thorax are remarkable for bilateral moderate pleural effusions noting some degree of loculation involving the effusion on the right, partially visualized. There is compressive atelectasis of the bilateral lower lobes."    Patient found to have moderate pleural effusion on imaging. I have personally reviewed and interpreted the following imaging: Xray and CT. A thoracentesis was deferred. Most likely etiology includes Congestive Heart Failure and Cirrhosis. Management to include Diuresis and possible thoracentesis.    - Pulmonology consulted, no plans for thoracentesis at this time  - Incentive spirometer  "

## 2024-05-14 NOTE — TREATMENT PLAN
AES Treatment Plan    Fransico Montalvo is a 68 y.o. male admitted to hospital 5/9/2024 (Hospital Day: 6) due to Hematochezia.     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 Post-procedure, Hgb stable at 8.5 from 7-8 yesterday without PRBC given since 5/12.     Objective  Temp:  [97.6 °F (36.4 °C)-99.8 °F (37.7 °C)] 99.8 °F (37.7 °C) (05/14 0718)  Pulse:  [72-82] 78 (05/14 0718)  BP: (108-144)/(70-86) 128/76 (05/14 0718)  Resp:  [13-22] 19 (05/14 0718)  SpO2:  [85 %-100 %] 85 % (05/14 0718)    Laboratory  Lab Results   Component Value Date    WBC 4.63 05/14/2024    HGB 8.5 (L) 05/14/2024    HCT 30.2 (L) 05/14/2024    MCV 86 05/14/2024     (L) 05/14/2024       Lab Results   Component Value Date    ALT 7 (L) 05/14/2024    AST 17 05/14/2024    ALKPHOS 85 05/14/2024    BILITOT 0.6 05/14/2024       Assessment  This is a 68 year old male with a PMH significant for reported ETOH cirrhosis (associated with ascites; no prior evaluation by hepatology), recurrent pancreatitis (diagnosed initially in 7/2018 and associated with suspected pseudocyst with pseudoaneurysm based on chart review), and reported COPD (no further PFT's on file) who was admitted to [facility] on 5/9 for management of GI bleeding (melena and hematochezia) and CHF exacerbation. He is status post EGD on 5/10 with evidence of active bleeding from ampulla. CTA A/P and CT A/P with pancreas protocols since admission concerning for arterial lesion of pancreatic head. AES consulted on 5/12 for evaluation and recommended IR evaluation with concern for bleeding from hemosuccus pancreaticus. 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 Post-procedure Hgb stable without need for PRBC.     Recommendations    -Continue to monitor bowel movement color.    -Continue to trend CBC and transfuse for Hgb <7 and platelets <50.   -No endoscopic interventions planned from AES standpoint.     Thank you for involving us in the care of Fransico Montalvo. We are signing off. Please call with any additional questions, concerns or changes in the patient's clinical status.        Reji Rowley MD, PGY-VI  Gastroenterology Fellow  [facility]

## 2024-05-14 NOTE — PLAN OF CARE
Problem: Physical Therapy  Goal: Physical Therapy Goal  Description: Goals to be met by: 24     Patient will increase functional independence with mobility by performin. Supine to sit with Supervision  2. Sit to supine with Supervision  3. Sit to stand transfer with Supervision  4. Bed to chair transfer with Supervision using Rolling Walker or LRAD  5. Gait  x 50 feet with Supervision using Rolling Walker.   6. Stand for 5 minutes with Supervision using Rolling Walker  7. Lower extremity exercise program x15 reps per handout, with assistance as needed    PT Evaluation completed 2024   PT goals and POC established.     Outcome: Progressing

## 2024-05-14 NOTE — ASSESSMENT & PLAN NOTE
Patient c/o epigastric pain with lipase 758 and amylase 1256.    - PRN morphine 1 mg IV q6h  - PRN Narcan ordered  - PRN Zofran and Compro for nausea

## 2024-05-14 NOTE — PLAN OF CARE
[facility] - Telemetry Stepdown  Discharge Reassessment    Primary Care Provider: [facility]    Expected Discharge Date: 5/15/2024    Reassessment (most recent)       Discharge Reassessment - 05/14/24 0902          Discharge Reassessment    Assessment Type Discharge Planning Reassessment     Did the patient's condition or plan change since previous assessment? Yes     Discharge Plan discussed with: Patient     Communicated ARUTRO with patient/caregiver Yes     Discharge Plan A Skilled Nursing Facility     Discharge Plan B Skilled Nursing Facility     DME Needed Upon Discharge  none     Transition of Care Barriers None     Why the patient remains in the hospital Requires continued medical care        Post-Acute Status    Post-Acute Authorization Placement     Post-Acute Placement Status --   Pt not medically ready. Was at [facility] prior to admit    Discharge Delays None known at this time                   Pt still not medically ready for D/C. Updated clinicals sent to [facility].  Discharge Plan A and Plan B have been determined by review of patient's clinical status, future medical and therapeutic needs, and coverage/benefits for post-acute care in coordination with multidisciplinary team members.  Lasha Singh, [facility]    [facility]  [phone]

## 2024-05-14 NOTE — SUBJECTIVE & OBJECTIVE
Interval History: Overnight, became tachycardic in 140s, self-resoved. Asymptomatic.     Review of Systems   Constitutional:  Positive for fatigue.   Gastrointestinal:  Negative for anal bleeding and blood in stool.   Neurological:  Positive for weakness (generalized).   All other systems reviewed and are negative.    Objective:     Vital Signs (Most Recent):  Temp: 99.8 °F (37.7 °C) (05/14/24 0718)  Pulse: (!) 147 (05/14/24 0946)  Resp: 20 (05/14/24 0946)  BP: 128/76 (05/14/24 0718)  SpO2: (!) 87 % (05/14/24 0946) Vital Signs (24h Range):  Temp:  [97.6 °F (36.4 °C)-99.8 °F (37.7 °C)] 99.8 °F (37.7 °C)  Pulse:  [] 147  Resp:  [13-22] 20  SpO2:  [85 %-100 %] 87 %  BP: (108-144)/(70-86) 128/76     Weight: 88.5 kg (195 lb)  Body mass index is 25.73 kg/m².    Intake/Output Summary (Last 24 hours) at 5/14/2024 0922  Last data filed at 5/14/2024 0413  Gross per 24 hour   Intake 180 ml   Output 600 ml   Net -420 ml         Physical Exam  Vitals and nursing note reviewed.   Constitutional:       General: He is not in acute distress.     Appearance: He is normal weight. He is ill-appearing (chronically).   HENT:      Head: Normocephalic and atraumatic.   Cardiovascular:      Rate and Rhythm: Normal rate and regular rhythm.      Heart sounds: Murmur heard.   Pulmonary:      Effort: Pulmonary effort is normal. No respiratory distress.      Comments: Breathing comfortably on room air  Decreased breath sounds B/L lung bases  Abdominal:      General: Abdomen is flat. There is distension.      Palpations: Abdomen is soft.      Tenderness: There is no abdominal tenderness.   Musculoskeletal:      Right lower leg: No edema.      Left lower leg: No edema.   Skin:     General: Skin is warm and dry.   Neurological:      General: No focal deficit present.      Mental Status: He is alert and oriented to person, place, and time. Mental status is at baseline.             Significant Labs: All pertinent labs within the past 24 hours  have been reviewed.    Significant Imaging: I have reviewed all pertinent imaging results/findings within the past 24 hours.

## 2024-05-14 NOTE — PT/OT/SLP EVAL
"Physical Therapy   Co-Evaluation    Patient Name:  Fransico Montalvo   MRN:  [id number]    Recommendations:     Discharge Recommendations: Moderate Intensity Therapy   Discharge Equipment Recommendations: bedside commode, shower chair   Barriers to discharge: None    Assessment:     Fransico Montalvo is a 68 y.o. male admitted with a medical diagnosis of Hematochezia.  He presents with the following impairments/functional limitations: weakness, impaired endurance, impaired balance, impaired cardiopulmonary response to activity, gait instability, decreased lower extremity function, impaired functional mobility Patient with good participation during therapy evaluation, limitations primarily include SOB/impaired endurance. O2 saturation monitored throughout session and maintained appropriately when reading could be obtained. He demonstrates ability to complete bed mobility, and one STS transfer with RW and light assistance. Patient will continue to benefit from skilled PT during this admit to address BLE strength and endurance deficits, and maximize independence with functional mobility.    Rehab Prognosis: Good; patient would benefit from acute skilled PT services to address these deficits and reach maximum level of function.    Recent Surgery: Procedure(s) (LRB):  EGD (ESOPHAGOGASTRODUODENOSCOPY) (N/A) 4 Days Post-Op    Plan:     During this hospitalization, patient to be seen 4 x/week to address the identified rehab impairments via gait training, therapeutic activities, therapeutic exercises, neuromuscular re-education and progress toward the following goals:    Plan of Care Expires:  06/13/24    Subjective     Chief Complaint: "I was doing some therapy before I got here"  Patient/Family Comments/goals: "I will never go back to the group home"  Pain/Comfort:  Pain Rating 1:  (patient did not rate)  Location - Orientation 1: generalized  Location 1: back  Pain Addressed 1: Reposition, Distraction, Cessation of Activity  Pain " Rating Post-Intervention 1:  (patient did not rate)    Patients cultural, spiritual, Scientology conflicts given the current situation: no    Living Environment:  Patient admitted from [facility], and was living in group home prior to that.  Prior to admission, patients level of function was requiring light assist for functional mobility with RW or W/C and ADLs.  Equipment used at home: walker, rolling, wheelchair.  DME owned (not currently used): none.  Upon discharge, patient will have assistance from Staff.    Objective:     Communicated with RN prior to session.  Patient found HOB elevated with telemetry, oxygen  upon PT entry to room.    General Precautions: Standard, fall  Orthopedic Precautions:N/A   Braces: N/A  Respiratory Status: Nasal cannula, flow 3 L/min    Exams:  Cognitive Exam:  Patient is oriented to Person and Place  Postural Exam:  Patient presented with the following abnormalities:    -       Rounded shoulders  -       Forward head  Skin Integrity/Edema:      -       Skin integrity: Visible skin intact  RLE ROM: WFL  RLE Strength: WFL  LLE ROM: WFL  LLE Strength: WFL    Functional Mobility:  Bed Mobility:     Scooting: stand by assistance  Supine to Sit: stand by assistance  Sit to Supine: stand by assistance  Transfers:     Sit to Stand:  contact guard assistance with rolling walker  Balance: sitting: good at EOB but reports SOB, O2 monitored, standing: approx 1 min with CGA and RW, education on pursed lip breathing with poor carryover noted      AM-PAC 6 CLICK MOBILITY  Total Score:13       Treatment & Education:  Patient educated on calling for assistance for any needs to improve overall safety awareness.  Patient educated on role of PT in acute care, PT POC, and PT goals.  Patient required co-evaluation with OT for highest quality care d/t decreased activity tolerance and increased level of assistance necessary.    Patient left HOB elevated with all lines intact, call button in reach, bed  alarm on, and RN notified.    GOALS:   Multidisciplinary Problems       Physical Therapy Goals          Problem: Physical Therapy    Goal Priority Disciplines Outcome Goal Variances Interventions   Physical Therapy Goal     PT, PT/OT Progressing     Description: Goals to be met by: 24     Patient will increase functional independence with mobility by performin. Supine to sit with Supervision  2. Sit to supine with Supervision  3. Sit to stand transfer with Supervision  4. Bed to chair transfer with Supervision using Rolling Walker or LRAD  5. Gait  x 50 feet with Supervision using Rolling Walker.   6. Stand for 5 minutes with Supervision using Rolling Walker  7. Lower extremity exercise program x15 reps per handout, with assistance as needed                         History:     Past Medical History:   Diagnosis Date    Alcohol abuse     BPH (benign prostatic hyperplasia)     CAD (coronary artery disease)     Cirrhosis 2018    pt states that he was diagnosed a week ago during his last hospital visit    Closed fracture of left distal radius 2023    Gastritis     HFrEF (heart failure with reduced ejection fraction) 05/10/2024    Hypertension        Past Surgical History:   Procedure Laterality Date    CAPSULOTOMY OF JOINT Right 2019    Procedure: CAPSULOTOMY, JOINT;  Surgeon: Raj Grossman MD;  Location: [facility] OR [address] [place] [address];  Service: Orthopedics;  Laterality: Right;    COLONOSCOPY N/A 2024    Procedure: COLONOSCOPY;  Surgeon: Scarlett Whitt MD;  Location: [facility] ([address] [place] [address]);  Service: Gastroenterology;  Laterality: N/A;    ESOPHAGOGASTRODUODENOSCOPY N/A 10/25/2023    Procedure: EGD (ESOPHAGOGASTRODUODENOSCOPY);  Surgeon: Lux Rome MD;  Location: [facility] ([address] [place] [address]);  Service: Endoscopy;  Laterality: N/A;    ESOPHAGOGASTRODUODENOSCOPY N/A 2024    Procedure: EGD (ESOPHAGOGASTRODUODENOSCOPY);  Surgeon: Raj Roberts MD;  Location: [facility] ([address] [place] [address]);  Service:  Endoscopy;  Laterality: N/A;    ESOPHAGOGASTRODUODENOSCOPY N/A 5/10/2024    Procedure: EGD (ESOPHAGOGASTRODUODENOSCOPY);  Surgeon: Ashley Trent MD;  Location: [facility] ([address] [place] [address]);  Service: Endoscopy;  Laterality: N/A;    PERCUTANEOUS PINNING OF HIP Right 7/29/2019    Procedure: PINNING, HIP, PERCUTANEOUS- synthes cannulated screws- hana table- C arm door side-;  Surgeon: Raj Grossman MD;  Location: [address];  Service: Orthopedics;  Laterality: Right;       Time Tracking:     PT Received On: 05/14/24  PT Start Time: 0957     PT Stop Time: 1022  PT Total Time (min): 25 min     Billable Minutes: Evaluation 12 and Therapeutic Activity 13      05/14/2024

## 2024-05-14 NOTE — PLAN OF CARE
05/14/24 1436   Post-Acute Status   Post-Acute Authorization Placement   Post-Acute Placement Status Pending payor review/awaiting authorization (if required)   Discharge Delays None known at this time   Discharge Plan   Discharge Plan A Skilled Nursing Facility   Discharge Plan B Return to Nursing Home     Sent clinicals to N for authorization of SNF.  Met with patient to review discharge recommendation of SNF and is agreeable to plan    Patient/family provided list of facilities in-network with patient's payor plan. Providers that are owned, operated, or affiliated with [facility] are included on the list.     Notified that referral sent to below listed facilities from in-network list based on proximity to home/family support:   [facility]  2.  3.  4.  5. (can send more than 5)    Patient/family instructed to identify preference.    Preferred Facility: (if more than 1, listed in order of descending preference)  [facility] (Pt came from there and wishes to return)    If an additional preferred facility not listed above is identified, additional referral to be sent. If above facilities unable to accept, will send additional referrals to in-network providers.    Discharge Plan A and Plan B have been determined by review of patient's clinical status, future medical and therapeutic needs, and coverage/benefits for post-acute care in coordination with multidisciplinary team members.  Lasha Singh, [facility]    [facility]  [phone]

## 2024-05-14 NOTE — PROGRESS NOTES
"[facility] Medicine  Progress Note    Patient Name: Fransico Montalvo  MRN: [id number]  Patient Class: IP- Inpatient   Admission Date: 5/9/2024  Length of Stay: 5 days  Attending Physician: Harriet Bermudez MD  Primary Care Provider: [facility]        Subjective:     Principal Problem:Hematochezia      HPI:  Fransico Montalvo is a 69 yo M with PMH of EtOH cirrhosis c/b esophageal varices s/p banding in 2018, presumed COPD, multifactorial anemia, HTN, BPH, and gastritis who presented from SNF c/o bloody stool. Per patient, he noted dark red blood from his rectum since this morning (states it is mixed in stool and also on its own.) Also endorses new upper abdominal pain which started after eating food yesterday evening. Over the past month, he has experienced fatigue, SOB, and two pre-syncopal episodes. Of note, patient with two recent admissions: 3/28-4/4 for CAP and symptomatic anemia (s/p 5 U pRBCs) and 4/18-4/23 for melena (s/p 1 U pRBCs.) Of note, EGD on 10/25/2023 and colonoscopy on 4/1/2024 were unremarkable. EGD on 4/19 showed "single non-bleeding angioectasia in the jejunum; treated with APC."    Upon arrival to the ED, /77, , RR 18, SpO2 100% on RA, and T 97.7 F. Labs significant for Hgb 7.0 and K 5.5 (5.8 on repeat). Physical exam in the ED pertinent for bright red blood present in rectum, epigastric tenderness, and pallor. EKG NSR. CTA without active hemorrhage, ascites, bilateral pleural effusion (loculated on the right), and a lateral pancreatic head enhancing lesion. He received 80 mg IV Protonix x 1 and 2 mg IV morphine x 1. CTA pending.     Overview/Hospital Course:  Admitted to Hospital Medicine for evaluation of GI bleed concerns and hypoxia over the past month.     Regarding his GI bleed, he was transfused 3 U pRBCs with active melena and hematochezia. EGD on 5/10 noted "blood in the first portion of the duodenum, plumes of blood were noted to be coming from the " "ampulla". AES and IR reviewed CT pancreas protocol. 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 Of note, both of the radiology reports on the CT pancreas protocol and CTA erroneously call the pseudoaneurysm a solid lesion. This same pseudoaneurysm is also present on CTA c/a/p performed on 10/25/23, however pseudoaneurysm appears thrombosed and therefore does not enhance on this study." He underwent coil embolization of GDA and multiple pancreaticoduodenal branches with IR on 5/13/2024.     Regarding his SOB, Echo revealed EF 35-40% and diastolic dysfunction, patient has no history of CHF. Cardiology was consulted for GDMT initiation; plan for outpatient ischemic evaluation. Pulmonology consulted for bilateral pleural effusions (loculated on right), recommending diuresis with no plans for thoracentesis.     Interval History: Overnight, became tachycardic in 140s, self-resoved. Asymptomatic.     Review of Systems   Constitutional:  Positive for fatigue.   Gastrointestinal:  Negative for anal bleeding and blood in stool.   Neurological:  Positive for weakness (generalized).   All other systems reviewed and are negative.    Objective:     Vital Signs (Most Recent):  Temp: 99.8 °F (37.7 °C) (05/14/24 0718)  Pulse: (!) 147 (05/14/24 0946)  Resp: 20 (05/14/24 0946)  BP: 128/76 (05/14/24 0718)  SpO2: (!) 87 % (05/14/24 0946) Vital Signs (24h Range):  Temp:  [97.6 °F (36.4 °C)-99.8 °F (37.7 °C)] 99.8 °F (37.7 °C)  Pulse:  [] 147  Resp:  [13-22] 20  SpO2:  [85 %-100 %] 87 %  BP: (108-144)/(70-86) 128/76     Weight: 88.5 kg (195 lb)  Body mass index is 25.73 kg/m².    Intake/Output Summary (Last 24 hours) at 5/14/2024 0949  Last data filed at 5/14/2024 0413  Gross per 24 hour   Intake 180 ml   Output 600 ml   Net -420 ml         Physical Exam  Vitals and nursing note " "reviewed.   Constitutional:       General: He is not in acute distress.     Appearance: He is normal weight. He is ill-appearing (chronically).   HENT:      Head: Normocephalic and atraumatic.   Cardiovascular:      Rate and Rhythm: Normal rate and regular rhythm.      Heart sounds: Murmur heard.   Pulmonary:      Effort: Pulmonary effort is normal. No respiratory distress.      Comments: Breathing comfortably on room air  Decreased breath sounds B/L lung bases  Abdominal:      General: Abdomen is flat. There is distension.      Palpations: Abdomen is soft.      Tenderness: There is no abdominal tenderness.   Musculoskeletal:      Right lower leg: No edema.      Left lower leg: No edema.   Skin:     General: Skin is warm and dry.   Neurological:      General: No focal deficit present.      Mental Status: He is alert and oriented to person, place, and time. Mental status is at baseline.             Significant Labs: All pertinent labs within the past 24 hours have been reviewed.    Significant Imaging: I have reviewed all pertinent imaging results/findings within the past 24 hours.    Assessment/Plan:      * Hematochezia  Melena     Patient presenting for evaluation of dark red blood in stool since morning of admission with associated upper abdominal pain. Two recent admissions: 3/28-4/4 for CAP and symptomatic anemia (s/p 5 U pRBCs) and 4/18-4/23 for melena (s/p 1 U pRBCs.) Of note, EGD on 10/25/2023 and colonoscopy on 4/1/2024 were unremarkable. EGD on 4/19 showed "single non-bleeding angioectasia in the jejunum; treated with APC."  CTA without acute hemorrhage. Received Protonix 80mg IV x 1 in the ED.       Recent Labs     05/11/24  0551 05/11/24  1110 05/12/24  0345 05/12/24  0832 05/13/24  0534   HGB 7.0*   < >  --    < > 7.7*   BUN 20  --  19  --  17   CREATININE 1.2  --  1.2  --  1.3    < > = values in this interval not displayed.       pRBCs transfused this admission: 3 U (as of 5/12)    5/10 EGD: "blood in " "the first portion of the duodenum, plumes of blood were noted to be coming from the ampulla"    Per IR note dated 5/13: "Both CT c/a/p pancreas protocol on 5/12/24 and CTA a/p on 5/9/24  revealed a pseudoaneurysm measuring 1.2 x 0.8 x 1.0 cm within the lateral aspect of the pancreatic head. Of note, both of the radiology reports on the CT pancreas protocol and CTA erroneously call the pseudoaneurysm a solid lesion. This same pseudoaneurysm is also present on CTA c/a/p performed on 10/25/23, however pseudoaneurysm appears thrombosed and therefore does not enhance on this study. Agree with HM and AES that best course of action would be to embolize said pancreatic pseudoaneurysm."    S/P coil embolization of GDA and multiple pancreaticoduodenal branches with IR on 5/13/2024    - GI signed off  - AES signed off  - D/C Rocephin 1 g IV q24h (completed 5-day course)  - Monitor for hemodynamic instability with associated large volume hematochezia, consider STAT CTA and IR consult for embolization if positive  - Maintain IV access with 2 large bore IVs  - Telemetry monitoring    HFrEF (heart failure with reduced ejection fraction)  CAD (coronary artery disease)     3/28/2024 BNP 2,932 and 4/18/2024 BNP 4,361; no TTE since 2022. Prior CT with significant multivessel CAD.    Echo    Result Date: 5/10/2024    Left Ventricle: The left ventricle is mildly dilated. Moderate global   hypokinesis and regional wall motion abnormalities present. There is   moderately reduced systolic function with a visually estimated ejection   fraction of 35 - 40%. Biplane (2D) method of discs ejection fraction is   33%. There is diastolic dysfunction. Elevated left ventricular filling   pressure.    Right Ventricle: Normal right ventricular cavity size. Systolic   function is mildly reduced.    Left Atrium: Left atrium is moderately dilated.    Aortic Valve: There is moderate aortic valve sclerosis. Mildly   restricted motion.    Mitral Valve: " "There is mild annular dilation present. There is mild   mitral annular calcification present.    Tricuspid Valve: There is moderate annular dilation present. There is   moderate regurgitation.    Pulmonary Artery: The estimated pulmonary artery systolic pressure is   55 mmHg.    IVC/SVC: Elevated venous pressure at 15 mmHg.    Pericardium: Large Bilateral pleural effusions.       - Cardiology consulted, appreciate recs  "Prior CT with significant multivessel CAD concerning for ICM cardiomyopathy exacerbated by current UGIB. Elevated filling pressures on echo are likely largely due to liver disease as he doesn't appear as grossly volume overloaded as expected."  Defer ischemic evaluation with PET stress to outpt as he is not a revascularization candidate at this time with recurrent UGIB  Ambulatory referral to General Cardiology at discharge  - GDMT:  BB: Increase Toprol to 25 mg po daily  ACE/ARB/ARNI: Start valsartan 40 mg po BID  SGLT2i: Continue Jardiance 10 mg po daily  MRA: Continue spironolactone 12.5 mg po daily  - Continue Lasix 20 mg po daily   - Continue Lipitor 40 mg po qhs  - Start ASA upon resolution of acute anemia    Alcoholic cirrhosis  Recent Labs     05/12/24  0345 05/12/24  0832 05/13/24  0534 05/13/24  1704 05/14/24  0244 05/14/24  0834   BILITOT 0.6  --  0.6  --  0.6  --    AST 19  --  18  --  17  --    ALT 9*  --  9*  --  7*  --    ALKPHOS 89  --  91  --  85  --    PLT  --    < > 181   < >  --  138*   INR 1.4*  --  1.3*  --  1.2  --    ALBUMIN 2.5*  --  2.6*  --  2.6*  --     < > = values in this interval not displayed.     Patient with known Alcoholic Cirrhosis. Co-morbidities are present and inclusive of ascites, portal hypertension, and anemia/pancytopenia.    MELD-Na score calculated; MELD 3.0: 12 at 5/14/2024  2:44 AM  MELD-Na: 10 at 5/14/2024  2:44 AM  Calculated from:  Serum Creatinine: 1.2 mg/dL at 5/14/2024  2:44 AM  Serum Sodium: 136 mmol/L at 5/14/2024  2:44 AM  Total Bilirubin: 0.6 " "mg/dL (Using min of 1 mg/dL) at 5/14/2024  2:44 AM  Serum Albumin: 2.6 g/dL at 5/14/2024  2:44 AM  INR(ratio): 1.2 at 5/14/2024  2:44 AM  Age at listing (hypothetical): 68 years  Sex: Male at 5/14/2024  2:44 AM    During admission, reported he quit alcohol one month ago.     PLAN:  - Will avoid any hepatotoxic meds, and monitor CBC/CMP/INR for synthetic function.   - IR for diagnostic/therapeutic paracentesis --> 5/13 US by IR did not show sufficient ascites for paracentesis    Bilateral pleural effusion  Compressive atelectasis     5/9 CTA: "Images of the lower thorax are remarkable for bilateral moderate pleural effusions noting some degree of loculation involving the effusion on the right, partially visualized. There is compressive atelectasis of the bilateral lower lobes."    Patient found to have moderate pleural effusion on imaging. I have personally reviewed and interpreted the following imaging: Xray and CT. A thoracentesis was deferred. Most likely etiology includes Congestive Heart Failure and Cirrhosis. Management to include Diuresis and possible thoracentesis.    - Pulmonology consulted, no plans for thoracentesis at this time  - Incentive spirometer    Pancreatic lesion  5/9 CTA: "Enhancing lesion within the lateral aspect of the pancreatic head, not seen on examination 12/25/2023. In this region however, on the previous exam, there is a hypoattenuating lesion with peripheral calcification. This did not undergo significant enhancement at that time. Solid lesion is of concern. Correlation and follow-up advised."     elevated at 88.6. LDH, AFP, CEA WNL.   EGD 5/10 noted "blood in the first portion of the duodenum, plumes of blood were noted to be coming from the ampulla".     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    - PRN morphine " "1 mg IV q6h  - PRN Narcan ordered  - PRN Zofran and Compro for nausea    Acute on chronic anemia  Patient's with Normocytic anemia. Hemoglobin stable. Patient's anemia is currently controlled. Has received 3 U pRBCs this admission. Chronic etiology due to iron deficiency and cirrhosis, exacerbated by acute blood loss from GI bleed.    -Current CBC reviewed-    Recent Labs   Lab 05/13/24  1704 05/13/24  2359 05/14/24  0834   HGB 7.8* 7.9* 8.5*     PLAN  - Monitor serial CBC and transfuse if patient becomes hemodynamically unstable, symptomatic or H/H drops below 7/21  - S/p iron sucrose 500 mg IV x 1    Presumed COPD (chronic obstructive pulmonary disease)  Patient's COPD is controlled currently.  Patient is currently off COPD Pathway. Continue Breo Ellipta 1 puff daily and PRN Duo-Nebs, will monitor respiratory status closely.     Essential hypertension  Chronic, controlled. Latest blood pressure and vitals reviewed-     Temp:  [97.3 °F (36.3 °C)-98.7 °F (37.1 °C)]   Pulse:  [72-96]   Resp:  [17-20]   BP: (103-117)/(58-79)   SpO2:  [92 %-100 %] .     Not on any home medications per chart review    PLAN:  -See "HFrEF (heart failure with reduced ejection fraction) " A&P  -Will utilize p.r.n. blood pressure medication only if patient's blood pressure greater than 180/110 and he develops symptoms such as worsening chest pain or shortness of breath.    Anxiety and depression  Patient has hx of depression which  is currently controlled.     PLAN:  Continue home Lexapro 10 mg po daily. We will not consult psychiatry at this time. Patient does not display psychosis at this time. Continue to monitor closely and adjust plan of care as needed.    BPH (benign prostatic hyperplasia)  Stable. Hx of BPH. Was on Tamsulosin previously, but held on recent admission in 03/2024 where there were concerns for low BP    PLAN:  Bladder scan PRN for urinary retention concerns    Documented history of CKD Stage 3  Patient reported to have " "CKD 3 as documented in prior notes. However, review of past labs does not show sustained decrease in GFR > 6 months, just during individual encounters during which patient had [place].     CAD (coronary artery disease)  Patient with known CAD (seen on CT imaging, no interventions have been done) which is controlled Will continue Statin and monitor for S/Sx of angina/ACS. Continue to monitor on telemetry.     See "HFrEF (heart failure with reduced ejection fraction)"    Compressive atelectasis  See "Bilateral pleural effusion"    Melena  See "Hematochezia"      VTE Risk Mitigation (From admission, onward)           Ordered     Reason for No Pharmacological VTE Prophylaxis  Once        Question:  Reasons:  Answer:  Active Bleeding    05/09/24 1609     IP VTE HIGH RISK PATIENT  Once         05/09/24 1609     Place sequential compression device  Until discontinued         05/09/24 1609                    Discharge Planning   ARTURO: 5/15/2024     Code Status: Full Code   Is the patient medically ready for discharge?:     Reason for patient still in hospital (select all that apply): Patient trending condition, Laboratory test, and PT / OT recommendations  Discharge Plan A: Skilled Nursing Facility   Discharge Delays: None known at this time      Awa Rodriguez MD  Department of Hospital Medicine   [facility] - Telemetry Stepdown    "

## 2024-05-15 VITALS
HEIGHT: 73 IN | BODY MASS INDEX: 25.84 KG/M2 | WEIGHT: 195 LBS | OXYGEN SATURATION: 100 % | TEMPERATURE: 99 F | SYSTOLIC BLOOD PRESSURE: 104 MMHG | RESPIRATION RATE: 19 BRPM | DIASTOLIC BLOOD PRESSURE: 54 MMHG | HEART RATE: 63 BPM

## 2024-05-15 PROBLEM — K92.1 MELENA: Status: RESOLVED | Noted: 2023-10-25 | Resolved: 2024-05-15

## 2024-05-15 PROBLEM — K92.1 HEMATOCHEZIA: Status: RESOLVED | Noted: 2024-05-09 | Resolved: 2024-05-15

## 2024-05-15 PROBLEM — D64.9 ACUTE ON CHRONIC ANEMIA: Status: RESOLVED | Noted: 2019-07-29 | Resolved: 2024-05-15

## 2024-05-15 PROBLEM — K85.90 ACUTE PANCREATITIS: Status: RESOLVED | Noted: 2024-05-10 | Resolved: 2024-05-15

## 2024-05-15 LAB
ALBUMIN SERPL BCP-MCNC: 2.5 G/DL (ref 3.5–5.2)
ALLENS TEST: ABNORMAL
ALP SERPL-CCNC: 82 U/L (ref 55–135)
ALT SERPL W/O P-5'-P-CCNC: <5 U/L (ref 10–44)
ANION GAP SERPL CALC-SCNC: 8 MMOL/L (ref 8–16)
AST SERPL-CCNC: 17 U/L (ref 10–40)
BASOPHILS # BLD AUTO: 0.02 K/UL (ref 0–0.2)
BASOPHILS # BLD AUTO: 0.02 K/UL (ref 0–0.2)
BASOPHILS NFR BLD: 0.5 % (ref 0–1.9)
BASOPHILS NFR BLD: 0.5 % (ref 0–1.9)
BILIRUB SERPL-MCNC: 0.8 MG/DL (ref 0.1–1)
BUN SERPL-MCNC: 14 MG/DL (ref 8–23)
CALCIUM SERPL-MCNC: 8.1 MG/DL (ref 8.7–10.5)
CHLORIDE SERPL-SCNC: 91 MMOL/L (ref 95–110)
CO2 SERPL-SCNC: 31 MMOL/L (ref 23–29)
CREAT SERPL-MCNC: 1.4 MG/DL (ref 0.5–1.4)
DELSYS: ABNORMAL
DIFFERENTIAL METHOD BLD: ABNORMAL
DIFFERENTIAL METHOD BLD: ABNORMAL
EOSINOPHIL # BLD AUTO: 0.1 K/UL (ref 0–0.5)
EOSINOPHIL # BLD AUTO: 0.1 K/UL (ref 0–0.5)
EOSINOPHIL NFR BLD: 1.7 % (ref 0–8)
EOSINOPHIL NFR BLD: 1.8 % (ref 0–8)
ERYTHROCYTE [DISTWIDTH] IN BLOOD BY AUTOMATED COUNT: 20.9 % (ref 11.5–14.5)
ERYTHROCYTE [DISTWIDTH] IN BLOOD BY AUTOMATED COUNT: 21.1 % (ref 11.5–14.5)
EST. GFR  (NO RACE VARIABLE): 54.4 ML/MIN/1.73 M^2
GLUCOSE SERPL-MCNC: 98 MG/DL (ref 70–110)
HCO3 UR-SCNC: 37.9 MMOL/L (ref 24–28)
HCT VFR BLD AUTO: 26 % (ref 40–54)
HCT VFR BLD AUTO: 28.5 % (ref 40–54)
HCT VFR BLD CALC: 29 %PCV (ref 36–54)
HGB BLD-MCNC: 7.5 G/DL (ref 14–18)
HGB BLD-MCNC: 8.5 G/DL (ref 14–18)
IMM GRANULOCYTES # BLD AUTO: 0.01 K/UL (ref 0–0.04)
IMM GRANULOCYTES # BLD AUTO: 0.01 K/UL (ref 0–0.04)
IMM GRANULOCYTES NFR BLD AUTO: 0.2 % (ref 0–0.5)
IMM GRANULOCYTES NFR BLD AUTO: 0.3 % (ref 0–0.5)
INR PPP: 1.3 (ref 0.8–1.2)
LYMPHOCYTES # BLD AUTO: 0.7 K/UL (ref 1–4.8)
LYMPHOCYTES # BLD AUTO: 0.8 K/UL (ref 1–4.8)
LYMPHOCYTES NFR BLD: 17.7 % (ref 18–48)
LYMPHOCYTES NFR BLD: 19.9 % (ref 18–48)
MAGNESIUM SERPL-MCNC: 1.7 MG/DL (ref 1.6–2.6)
MCH RBC QN AUTO: 24.3 PG (ref 27–31)
MCH RBC QN AUTO: 25.1 PG (ref 27–31)
MCHC RBC AUTO-ENTMCNC: 28.8 G/DL (ref 32–36)
MCHC RBC AUTO-ENTMCNC: 29.8 G/DL (ref 32–36)
MCV RBC AUTO: 84 FL (ref 82–98)
MCV RBC AUTO: 84 FL (ref 82–98)
MONOCYTES # BLD AUTO: 0.4 K/UL (ref 0.3–1)
MONOCYTES # BLD AUTO: 0.4 K/UL (ref 0.3–1)
MONOCYTES NFR BLD: 10 % (ref 4–15)
MONOCYTES NFR BLD: 10.7 % (ref 4–15)
NEUTROPHILS # BLD AUTO: 2.6 K/UL (ref 1.8–7.7)
NEUTROPHILS # BLD AUTO: 2.9 K/UL (ref 1.8–7.7)
NEUTROPHILS NFR BLD: 66.8 % (ref 38–73)
NEUTROPHILS NFR BLD: 69.9 % (ref 38–73)
NRBC BLD-RTO: 0 /100 WBC
NRBC BLD-RTO: 0 /100 WBC
PCO2 BLDA: 66.2 MMHG (ref 35–45)
PH SMN: 7.37 [PH] (ref 7.35–7.45)
PHOSPHATE SERPL-MCNC: 3.4 MG/DL (ref 2.7–4.5)
PLATELET # BLD AUTO: 121 K/UL (ref 150–450)
PLATELET # BLD AUTO: 148 K/UL (ref 150–450)
PMV BLD AUTO: 10 FL (ref 9.2–12.9)
PMV BLD AUTO: 10.3 FL (ref 9.2–12.9)
PO2 BLDA: 19 MMHG (ref 40–60)
POC BE: 13 MMOL/L
POC IONIZED CALCIUM: 1.14 MMOL/L (ref 1.06–1.42)
POC SATURATED O2: 25 % (ref 95–100)
POC TCO2: 40 MMOL/L (ref 24–29)
POTASSIUM BLD-SCNC: 4.5 MMOL/L (ref 3.5–5.1)
POTASSIUM SERPL-SCNC: 3.8 MMOL/L (ref 3.5–5.1)
PROT SERPL-MCNC: 6.1 G/DL (ref 6–8.4)
PROTHROMBIN TIME: 13.8 SEC (ref 9–12.5)
RBC # BLD AUTO: 3.09 M/UL (ref 4.6–6.2)
RBC # BLD AUTO: 3.38 M/UL (ref 4.6–6.2)
SAMPLE: ABNORMAL
SITE: ABNORMAL
SODIUM BLD-SCNC: 135 MMOL/L (ref 136–145)
SODIUM SERPL-SCNC: 130 MMOL/L (ref 136–145)
WBC # BLD AUTO: 3.91 K/UL (ref 3.9–12.7)
WBC # BLD AUTO: 4.18 K/UL (ref 3.9–12.7)

## 2024-05-15 PROCEDURE — 25000003 PHARM REV CODE 250: Performed by: STUDENT IN AN ORGANIZED HEALTH CARE EDUCATION/TRAINING PROGRAM

## 2024-05-15 PROCEDURE — 99900035 HC TECH TIME PER 15 MIN (STAT)

## 2024-05-15 PROCEDURE — 94761 N-INVAS EAR/PLS OXIMETRY MLT: CPT | Mod: XB

## 2024-05-15 PROCEDURE — 84100 ASSAY OF PHOSPHORUS: CPT

## 2024-05-15 PROCEDURE — 83735 ASSAY OF MAGNESIUM: CPT

## 2024-05-15 PROCEDURE — 85610 PROTHROMBIN TIME: CPT

## 2024-05-15 PROCEDURE — 25000003 PHARM REV CODE 250

## 2024-05-15 PROCEDURE — 80053 COMPREHEN METABOLIC PANEL: CPT

## 2024-05-15 PROCEDURE — 25000242 PHARM REV CODE 250 ALT 637 W/ HCPCS

## 2024-05-15 PROCEDURE — 36415 COLL VENOUS BLD VENIPUNCTURE: CPT

## 2024-05-15 PROCEDURE — 27000221 HC OXYGEN, UP TO 24 HOURS

## 2024-05-15 PROCEDURE — 85025 COMPLETE CBC W/AUTO DIFF WBC: CPT

## 2024-05-15 PROCEDURE — 82803 BLOOD GASES ANY COMBINATION: CPT

## 2024-05-15 RX ORDER — ASPIRIN 81 MG/1
81 TABLET ORAL DAILY
Status: DISCONTINUED | OUTPATIENT
Start: 2024-05-15 | End: 2024-05-15

## 2024-05-15 RX ORDER — MAGNESIUM SULFATE HEPTAHYDRATE 40 MG/ML
2 INJECTION, SOLUTION INTRAVENOUS ONCE
Status: DISCONTINUED | OUTPATIENT
Start: 2024-05-15 | End: 2024-05-15

## 2024-05-15 RX ORDER — POTASSIUM CHLORIDE 750 MG/1
30 CAPSULE, EXTENDED RELEASE ORAL ONCE
Status: COMPLETED | OUTPATIENT
Start: 2024-05-15 | End: 2024-05-15

## 2024-05-15 RX ORDER — ACETAMINOPHEN 500 MG
500 TABLET ORAL EVERY 6 HOURS PRN
Start: 2024-05-15 | End: 2024-05-20

## 2024-05-15 RX ORDER — LACTULOSE 10 G/15ML
20 SOLUTION ORAL ONCE
Status: COMPLETED | OUTPATIENT
Start: 2024-05-15 | End: 2024-05-15

## 2024-05-15 RX ORDER — POTASSIUM CHLORIDE 750 MG/1
10 CAPSULE, EXTENDED RELEASE ORAL ONCE
Status: COMPLETED | OUTPATIENT
Start: 2024-05-15 | End: 2024-05-15

## 2024-05-15 RX ORDER — LACTULOSE 10 G/15ML
20 SOLUTION ORAL; RECTAL 3 TIMES DAILY PRN
Qty: 2700 ML | Refills: 3 | Status: SHIPPED | OUTPATIENT
Start: 2024-05-15 | End: 2024-09-12

## 2024-05-15 RX ORDER — ASPIRIN 81 MG/1
81 TABLET ORAL DAILY
Start: 2024-05-15 | End: 2025-05-15

## 2024-05-15 RX ORDER — LANOLIN ALCOHOL/MO/W.PET/CERES
800 CREAM (GRAM) TOPICAL EVERY 4 HOURS
Status: COMPLETED | OUTPATIENT
Start: 2024-05-15 | End: 2024-05-15

## 2024-05-15 RX ORDER — VALSARTAN 40 MG/1
40 TABLET ORAL 2 TIMES DAILY
Status: ON HOLD
Start: 2024-05-15 | End: 2024-05-21

## 2024-05-15 RX ORDER — LACTULOSE 10 G/15ML
20 SOLUTION ORAL 3 TIMES DAILY PRN
Status: DISCONTINUED | OUTPATIENT
Start: 2024-05-15 | End: 2024-05-15 | Stop reason: HOSPADM

## 2024-05-15 RX ORDER — ACETAMINOPHEN 500 MG
500 TABLET ORAL EVERY 6 HOURS PRN
Status: DISCONTINUED | OUTPATIENT
Start: 2024-05-15 | End: 2024-05-15 | Stop reason: HOSPADM

## 2024-05-15 RX ADMIN — FLUTICASONE FUROATE AND VILANTEROL TRIFENATATE 1 PUFF: 100; 25 POWDER RESPIRATORY (INHALATION) at 09:05

## 2024-05-15 RX ADMIN — Medication 800 MG: at 01:05

## 2024-05-15 RX ADMIN — VALSARTAN 40 MG: 40 TABLET, FILM COATED ORAL at 10:05

## 2024-05-15 RX ADMIN — EMPAGLIFLOZIN 10 MG: 10 TABLET, FILM COATED ORAL at 09:05

## 2024-05-15 RX ADMIN — POTASSIUM CHLORIDE 30 MEQ: 750 CAPSULE, EXTENDED RELEASE ORAL at 09:05

## 2024-05-15 RX ADMIN — Medication 100 MG: at 09:05

## 2024-05-15 RX ADMIN — FUROSEMIDE 20 MG: 20 TABLET ORAL at 09:05

## 2024-05-15 RX ADMIN — SPIRONOLACTONE 12.5 MG: 25 TABLET ORAL at 09:05

## 2024-05-15 RX ADMIN — ESCITALOPRAM OXALATE 10 MG: 10 TABLET ORAL at 09:05

## 2024-05-15 RX ADMIN — LACTULOSE 20 G: 20 SOLUTION ORAL at 10:05

## 2024-05-15 RX ADMIN — POTASSIUM CHLORIDE 10 MEQ: 750 CAPSULE, EXTENDED RELEASE ORAL at 09:05

## 2024-05-15 RX ADMIN — FOLIC ACID 1 MG: 1 TABLET ORAL at 09:05

## 2024-05-15 RX ADMIN — Medication 800 MG: at 09:05

## 2024-05-15 RX ADMIN — METOPROLOL SUCCINATE 25 MG: 25 TABLET, EXTENDED RELEASE ORAL at 09:05

## 2024-05-15 RX ADMIN — ACETAMINOPHEN 650 MG: 325 TABLET ORAL at 09:05

## 2024-05-15 NOTE — DISCHARGE SUMMARY
"[facility] Blas - Telemetry [facility] Medicine  Discharge Summary      Patient Name: Fransico Montalvo  MRN: [id number]  ILIANA: [id number]  Patient Class: IP- Inpatient  Admission Date: 5/9/2024  Hospital Length of Stay: 6 days  Discharge Date and Time:  05/15/2024 2:04 PM  Attending Physician: Harriet Bermudez MD   Discharging Provider: Awa Rodriguez MD  Primary Care Provider: [facility] Medicine Team: [facility] MED 4  Primary Care Team: [facility] 4    HPI:   Fransico Montalvo is a 67 yo M with PMH of EtOH cirrhosis c/b esophageal varices s/p banding in 2018, presumed COPD, multifactorial anemia, CAD, HTN, BPH, and gastritis who presented from SNF c/o bloody stool. Per patient, he noted dark red blood from his rectum since this morning (states it is mixed in stool and also on its own.) Also endorses new upper abdominal pain which started after eating food yesterday evening. Over the past month, he has experienced fatigue, SOB, and two pre-syncopal episodes. Of note, patient with two recent admissions: 3/28-4/4 for CAP and symptomatic anemia (s/p 5 U pRBCs) and 4/18-4/23 for melena (s/p 1 U pRBCs.) Of note, EGD on 10/25/2023 and colonoscopy on 4/1/2024 were unremarkable. EGD on 4/19 showed "single non-bleeding angioectasia in the jejunum; treated with APC."    Upon arrival to the ED, /77, , RR 18, SpO2 100% on RA, and T 97.7 F. Labs significant for Hgb 7.0 and K 5.5 (5.8 on repeat). Physical exam in the ED pertinent for bright red blood present in rectum, epigastric tenderness, and pallor. EKG NSR. CTA without active hemorrhage, ascites, bilateral pleural effusion (loculated on the right), and a lateral pancreatic head enhancing lesion. He received 80 mg IV Protonix x 1 and 2 mg IV morphine x 1. CTA pending.     Procedure(s) (LRB):  EGD (ESOPHAGOGASTRODUODENOSCOPY) (N/A)      Hospital Course:   Admitted to Hospital Medicine for evaluation of GI bleed concerns and hypoxia over the past month. " "    Regarding his GI bleed, he was transfused 3 U pRBCs with active melena and hematochezia. EGD on 5/10 noted "blood in the first portion of the duodenum, plumes of blood were noted to be coming from the ampulla". AES and IR reviewed CT pancreas protocol. 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 Of note, both of the radiology reports on the CT pancreas protocol and CTA erroneously call the pseudoaneurysm a solid lesion. This same pseudoaneurysm is also present on CTA c/a/p performed on 10/25/23, however pseudoaneurysm appears thrombosed and therefore does not enhance on this study." He underwent coil embolization of GDA and multiple pancreaticoduodenal branches with IR on 5/13/2024.     Regarding his SOB, Echo revealed EF 35-40% and diastolic dysfunction, patient has no history of CHF. Cardiology was consulted for GDMT initiation; plan for outpatient ischemic evaluation. Pulmonology consulted for bilateral pleural effusions (loculated on right), recommending diuresis with no plans for thoracentesis.     At this time, he is medically stable for discharge with follow-ups with PCP, Hepatology (for cirrhosis), Cardiology (new HFrEF), HFTCC (HFrEF), and Pulmonology (suspected COPD).     Goals of Care Treatment Preferences:  Code Status: Full Code      Consults:   Consults (From admission, onward)          Status Ordering Provider     Inpatient consult to Interventional Radiology  Once        Provider:  (Not yet assigned)    Completed DARIN APODACA     Inpatient consult to Advanced Endoscopy Service (AES)  Once        Provider:  (Not yet assigned)    Completed DARIN APODACA     Inpatient consult to PICC team (NIAS)  Once        Provider:  (Not yet assigned)    Completed JOSELIN KITCHEN     Inpatient consult to Cardiology  Once        Provider:  (Not yet assigned)    " Completed DARIN APODCAA     Inpatient consult to Pulmonology  Once        Provider:  (Not yet assigned)    Completed DARIN APODACA     Inpatient consult to Gastroenterology  Once        Provider:  (Not yet assigned)    Completed PINA, DARIN            Final Active Diagnoses:    Diagnosis Date Noted POA    HFrEF (heart failure with reduced ejection fraction) [I50.20] 05/10/2024 Yes    Alcoholic cirrhosis [K70.30] 07/31/2018 Yes     Chronic    Bilateral pleural effusion [J90] 04/18/2024 Yes    Pancreatic lesion [K86.9] 10/26/2023 Yes    Presumed COPD (chronic obstructive pulmonary disease) [J44.9] 05/09/2024 Yes    Anxiety and depression [F41.9, F32.A] 07/28/2019 Yes     Chronic    Essential hypertension [I10] 07/28/2019 Yes     Chronic    BPH (benign prostatic hyperplasia) [N40.0]  Yes     Chronic    Documented history of CKD Stage 3 [N18.30] 07/30/2018 Yes     Chronic    Compressive atelectasis [J98.11] 05/10/2024 Yes    CAD (coronary artery disease) [I25.10] 05/10/2024 No      Problems Resolved During this Admission:    Diagnosis Date Noted Date Resolved POA    PRINCIPAL PROBLEM:  Hematochezia [K92.1] 05/09/2024 05/15/2024 Yes    Hyperkalemia [E87.5] 05/09/2024 05/12/2024 Yes    Acute pancreatitis [K85.90] 05/10/2024 05/15/2024 Yes    Acute on chronic anemia [D64.9] 07/29/2019 05/15/2024 Yes    Melena [K92.1] 10/25/2023 05/15/2024 Yes       Discharged Condition: stable    Disposition: Skilled Nursing Facility    Follow Up:    Patient Instructions:      Ambulatory referral/consult to Internal Medicine   Standing Status: Future   Referral Priority: Routine Referral Type: Consultation   Referral Reason: Specialty Services Required   Requested Specialty: Internal Medicine   Number of Visits Requested: 1     Ambulatory referral/consult to Cardiology   Standing Status: Future   Referral Priority: Urgent Referral Type: Consultation   Referral Reason: Specialty Services Required   Requested Specialty: Cardiology   Number of  Visits Requested: 1     Ambulatory referral/consult to [facility]   Standing Status: Future   Referral Priority: Routine Referral Type: Consultation   Referral Reason: Specialty Services Required   Requested Specialty: Cardiology   Number of Visits Requested: 1     Ambulatory referral/consult to Hepatology   Standing Status: Future   Referral Priority: Routine Referral Type: Consultation   Referral Reason: Specialty Services Required   Requested Specialty: Hepatology   Number of Visits Requested: 1     Ambulatory referral/consult to Pulmonology   Standing Status: Future   Referral Priority: Routine Referral Type: Consultation   Referral Reason: Specialty Services Required   Requested Specialty: Pulmonary Disease   Number of Visits Requested: 1       Significant Diagnostic Studies: Labs: All labs within the past 24 hours have been reviewed    Pending Diagnostic Studies:       None           Medications:  Reconciled Home Medications:      Medication List        START taking these medications      aspirin 81 MG EC tablet  Commonly known as: ECOTRIN  Take 1 tablet (81 mg total) by mouth once daily.     atorvastatin 40 MG tablet  Commonly known as: LIPITOR  Take 1 tablet (40 mg total) by mouth every evening.     empagliflozin 10 mg tablet  Commonly known as: Jardiance  Take 1 tablet (10 mg total) by mouth once daily.     furosemide 20 MG tablet  Commonly known as: LASIX  Take 1 tablet (20 mg total) by mouth once daily.     lactulose 10 gram/15 mL solution  Commonly known as: CHRONULAC  Take 30 mLs (20 g total) by mouth 3 (three) times daily as needed (Titrate to 2-3 bowel movements a day).     metoprolol succinate 25 MG 24 hr tablet  Commonly known as: TOPROL-XL  Take 1 tablet (25 mg total) by mouth once daily.     spironolactone 25 MG tablet  Commonly known as: ALDACTONE  Take 0.5 tablets (12.5 mg total) by mouth once daily.     valsartan 40 MG tablet  Commonly known as: DIOVAN  Take 1 tablet  (40 mg total) by mouth 2 (two) times daily.            CHANGE how you take these medications      acetaminophen 500 MG tablet  Commonly known as: TYLENOL  Take 1 tablet (500 mg total) by mouth every 6 (six) hours as needed for Pain.  What changed:   medication strength  how much to take  when to take this            CONTINUE taking these medications      albuterol-ipratropium 2.5 mg-0.5 mg/3 mL nebulizer solution  Commonly known as: DUO-NEB  Take 3 mLs by nebulization every 6 (six) hours as needed for Wheezing or Shortness of Breath. Rescue     EScitalopram oxalate 10 MG tablet  Commonly known as: LEXAPRO  Take 10 mg by mouth once daily.     fluticasone furoate-vilanteroL 100-25 mcg/dose diskus inhaler  Commonly known as: BREO  Inhale 1 puff into the lungs once daily. Controller     folic acid 1 MG tablet  Commonly known as: FOLVITE  Take 1 tablet (1 mg total) by mouth once daily.     melatonin 3 mg tablet  Commonly known as: MELATIN  Take 2 tablets (6 mg total) by mouth nightly as needed for Insomnia.     thiamine 100 MG tablet  Take 100 mg by mouth once daily.            STOP taking these medications      dicyclomine 10 MG capsule  Commonly known as: BENTYL     ondansetron 8 MG Tbdl  Commonly known as: ZOFRAN-ODT     pantoprazole 40 MG tablet  Commonly known as: PROTONIX              Indwelling Lines/Drains at time of discharge:   Lines/Drains/Airways       None                   Time spent on the discharge of patient: 35 minutes         Awa Rodriguez MD  Department of Hospital Medicine  [facility] - Telemetry Stepdown

## 2024-05-15 NOTE — ASSESSMENT & PLAN NOTE
RESOLVING    Patient c/o epigastric pain with lipase 758 and amylase 1256.    - PRN Tylenol 500 mg q6h (limited to 2 g per day due to cirrhosis)  - D/C PRN morphine 1 mg IV q6h  - PRN Narcan ordered  - PRN Zofran and Compro for nausea

## 2024-05-15 NOTE — DISCHARGE INSTRUCTIONS
You came to the hospital because you had blood in your stool. We found the source to be from a pancreatic pseudoaneurysm, which has since been coiled. Your blood count has been stable since then.    We also found that you have congestive heart failure - which means your heart does not squeeze as well as it should.    You need to follow up with the following doctors for your chronic problems:  Cardiology (heart doctor, will treat your heart failure)  Hepatologist (liver doctor, will treat your cirrhosis)  Pulmonologist (lung doctor, will treat your COPD)

## 2024-05-15 NOTE — ASSESSMENT & PLAN NOTE
"CAD (coronary artery disease)     3/28/2024 BNP 2,932 and 4/18/2024 BNP 4,361; no TTE since 2022. Prior CT with significant multivessel CAD.    Echo    Result Date: 5/10/2024    Left Ventricle: The left ventricle is mildly dilated. Moderate global   hypokinesis and regional wall motion abnormalities present. There is   moderately reduced systolic function with a visually estimated ejection   fraction of 35 - 40%. Biplane (2D) method of discs ejection fraction is   33%. There is diastolic dysfunction. Elevated left ventricular filling   pressure.    Right Ventricle: Normal right ventricular cavity size. Systolic   function is mildly reduced.    Left Atrium: Left atrium is moderately dilated.    Aortic Valve: There is moderate aortic valve sclerosis. Mildly   restricted motion.    Mitral Valve: There is mild annular dilation present. There is mild   mitral annular calcification present.    Tricuspid Valve: There is moderate annular dilation present. There is   moderate regurgitation.    Pulmonary Artery: The estimated pulmonary artery systolic pressure is   55 mmHg.    IVC/SVC: Elevated venous pressure at 15 mmHg.    Pericardium: Large Bilateral pleural effusions.       - Cardiology consulted, appreciate recs  "Prior CT with significant multivessel CAD concerning for ICM cardiomyopathy exacerbated by current UGIB. Elevated filling pressures on echo are likely largely due to liver disease as he doesn't appear as grossly volume overloaded as expected."  Defer ischemic evaluation with PET stress to outpt as he is not a revascularization candidate at this time with recurrent UGIB  Ambulatory referral to General Cardiology at discharge  - GDMT:  BB: Continue Toprol 25 mg po daily  ACE/ARB/ARNI: Continue valsartan 40 mg po BID  SGLT2i: Continue Jardiance 10 mg po daily  MRA: Continue spironolactone 12.5 mg po daily  - Continue Lasix 20 mg po daily   - Continue Lipitor 40 mg po qhs  - Start ASA when cleared by IR  "

## 2024-05-15 NOTE — PROGRESS NOTES
"[facility] - Telemetry [facility] Medicine  Progress Note    Patient Name: Fransico Montalvo  MRN: [id number]  Patient Class: IP- Inpatient   Admission Date: 5/9/2024  Length of Stay: 6 days  Attending Physician: Harriet Bermudez MD  Primary Care Provider: [facility]        Subjective:     Principal Problem:Hematochezia    HPI:  Fransico Montalvo is a 67 yo M with PMH of EtOH cirrhosis c/b esophageal varices s/p banding in 2018, presumed COPD, multifactorial anemia, HTN, BPH, and gastritis who presented from SNF c/o bloody stool. Per patient, he noted dark red blood from his rectum since this morning (states it is mixed in stool and also on its own.) Also endorses new upper abdominal pain which started after eating food yesterday evening. Over the past month, he has experienced fatigue, SOB, and two pre-syncopal episodes. Of note, patient with two recent admissions: 3/28-4/4 for CAP and symptomatic anemia (s/p 5 U pRBCs) and 4/18-4/23 for melena (s/p 1 U pRBCs.) Of note, EGD on 10/25/2023 and colonoscopy on 4/1/2024 were unremarkable. EGD on 4/19 showed "single non-bleeding angioectasia in the jejunum; treated with APC."    Upon arrival to the ED, /77, , RR 18, SpO2 100% on RA, and T 97.7 F. Labs significant for Hgb 7.0 and K 5.5 (5.8 on repeat). Physical exam in the ED pertinent for bright red blood present in rectum, epigastric tenderness, and pallor. EKG NSR. CTA without active hemorrhage, ascites, bilateral pleural effusion (loculated on the right), and a lateral pancreatic head enhancing lesion. He received 80 mg IV Protonix x 1 and 2 mg IV morphine x 1. CTA pending.     Overview/Hospital Course:  Admitted to Hospital Medicine for evaluation of GI bleed concerns and hypoxia over the past month.     Regarding his GI bleed, he was transfused 3 U pRBCs with active melena and hematochezia. EGD on 5/10 noted "blood in the first portion of the duodenum, plumes of blood were noted to be coming from the " "ampulla". AES and IR reviewed CT pancreas protocol. 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 Of note, both of the radiology reports on the CT pancreas protocol and CTA erroneously call the pseudoaneurysm a solid lesion. This same pseudoaneurysm is also present on CTA c/a/p performed on 10/25/23, however pseudoaneurysm appears thrombosed and therefore does not enhance on this study." He underwent coil embolization of GDA and multiple pancreaticoduodenal branches with IR on 5/13/2024.     Regarding his SOB, Echo revealed EF 35-40% and diastolic dysfunction, patient has no history of CHF. Cardiology was consulted for GDMT initiation; plan for outpatient ischemic evaluation. Pulmonology consulted for bilateral pleural effusions (loculated on right), recommending diuresis with no plans for thoracentesis.     Interval History: Disoriented and confused overnight, mistook RN for his niece.     Review of Systems   Gastrointestinal:  Negative for anal bleeding, blood in stool and diarrhea.   Psychiatric/Behavioral:  Positive for confusion.    All other systems reviewed and are negative.    Objective:     Vital Signs (Most Recent):  Temp: 97.7 °F (36.5 °C) (05/15/24 1151)  Pulse: 69 (05/15/24 1151)  Resp: 19 (05/15/24 1151)  BP: 105/65 (05/15/24 1151)  SpO2: 98 % (05/15/24 1151) Vital Signs (24h Range):  Temp:  [97.7 °F (36.5 °C)-98.4 °F (36.9 °C)] 97.7 °F (36.5 °C)  Pulse:  [67-74] 69  Resp:  [18-19] 19  SpO2:  [93 %-98 %] 98 %  BP: ()/(52-73) 105/65     Weight: 88.5 kg (195 lb)  Body mass index is 25.73 kg/m².    Intake/Output Summary (Last 24 hours) at 5/15/2024 1217  Last data filed at 5/15/2024 1208  Gross per 24 hour   Intake --   Output 1250 ml   Net -1250 ml         Physical Exam  Vitals and nursing note reviewed.   Constitutional:       Appearance: He is " "normal weight. He is ill-appearing.   HENT:      Head: Normocephalic and atraumatic.   Cardiovascular:      Rate and Rhythm: Normal rate and regular rhythm.   Pulmonary:      Effort: Pulmonary effort is normal. No respiratory distress.      Breath sounds: Normal breath sounds.   Abdominal:      General: Abdomen is flat. There is distension.      Palpations: Abdomen is soft.      Tenderness: There is no abdominal tenderness.   Musculoskeletal:      Right lower leg: No edema.      Left lower leg: No edema.   Skin:     General: Skin is warm and dry.   Neurological:      Mental Status: He is alert.      Comments: Oriented x 2 (to self and place)             Significant Labs: All pertinent labs within the past 24 hours have been reviewed.    Significant Imaging: I have reviewed all pertinent imaging results/findings within the past 24 hours.    Assessment/Plan:      * Hematochezia  RESOLVED    Melena     Patient presenting for evaluation of dark red blood in stool since morning of admission with associated upper abdominal pain. Two recent admissions: 3/28-4/4 for CAP and symptomatic anemia (s/p 5 U pRBCs) and 4/18-4/23 for melena (s/p 1 U pRBCs.) Of note, EGD on 10/25/2023 and colonoscopy on 4/1/2024 were unremarkable. EGD on 4/19 showed "single non-bleeding angioectasia in the jejunum; treated with APC."  CTA without acute hemorrhage. Received Protonix 80mg IV x 1 in the ED.       Recent Labs     05/13/24  0534 05/13/24  1704 05/14/24  0244 05/14/24  0834 05/15/24  0600 05/15/24  0909   HGB 7.7*   < >  --    < >  --  8.5*   BUN 17  --  15  --  14  --    CREATININE 1.3  --  1.2  --  1.4  --     < > = values in this interval not displayed.     pRBCs transfused this admission: 3 U (as of 5/12)    5/10 EGD: "blood in the first portion of the duodenum, plumes of blood were noted to be coming from the ampulla"    Per IR note dated 5/13: "Both CT c/a/p pancreas protocol on 5/12/24 and CTA a/p on 5/9/24  revealed a " "pseudoaneurysm measuring 1.2 x 0.8 x 1.0 cm within the lateral aspect of the pancreatic head. Of note, both of the radiology reports on the CT pancreas protocol and CTA erroneously call the pseudoaneurysm a solid lesion. This same pseudoaneurysm is also present on CTA c/a/p performed on 10/25/23, however pseudoaneurysm appears thrombosed and therefore does not enhance on this study. Agree with HM and AES that best course of action would be to embolize said pancreatic pseudoaneurysm."    S/P coil embolization of GDA and multiple pancreaticoduodenal branches with IR on 5/13/2024    - GI signed off  - AES signed off  - D/C Rocephin 1 g IV q24h (completed 5-day course)  - Monitor for hemodynamic instability with associated large volume hematochezia, consider STAT CTA and IR consult for embolization if positive  - Maintain IV access with 2 large bore IVs  - Telemetry monitoring    HFrEF (heart failure with reduced ejection fraction)  CAD (coronary artery disease)     3/28/2024 BNP 2,932 and 4/18/2024 BNP 4,361; no TTE since 2022. Prior CT with significant multivessel CAD.    Echo    Result Date: 5/10/2024    Left Ventricle: The left ventricle is mildly dilated. Moderate global   hypokinesis and regional wall motion abnormalities present. There is   moderately reduced systolic function with a visually estimated ejection   fraction of 35 - 40%. Biplane (2D) method of discs ejection fraction is   33%. There is diastolic dysfunction. Elevated left ventricular filling   pressure.    Right Ventricle: Normal right ventricular cavity size. Systolic   function is mildly reduced.    Left Atrium: Left atrium is moderately dilated.    Aortic Valve: There is moderate aortic valve sclerosis. Mildly   restricted motion.    Mitral Valve: There is mild annular dilation present. There is mild   mitral annular calcification present.    Tricuspid Valve: There is moderate annular dilation present. There is   moderate regurgitation.    " "Pulmonary Artery: The estimated pulmonary artery systolic pressure is   55 mmHg.    IVC/SVC: Elevated venous pressure at 15 mmHg.    Pericardium: Large Bilateral pleural effusions.       - Cardiology consulted, appreciate recs  "Prior CT with significant multivessel CAD concerning for ICM cardiomyopathy exacerbated by current UGIB. Elevated filling pressures on echo are likely largely due to liver disease as he doesn't appear as grossly volume overloaded as expected."  Defer ischemic evaluation with PET stress to outpt as he is not a revascularization candidate at this time with recurrent UGIB  Ambulatory referral to General Cardiology at discharge  - GDMT:  BB: Continue Toprol 25 mg po daily  ACE/ARB/ARNI: Continue valsartan 40 mg po BID  SGLT2i: Continue Jardiance 10 mg po daily  MRA: Continue spironolactone 12.5 mg po daily  - Continue Lasix 20 mg po daily   - Continue Lipitor 40 mg po qhs  - Start ASA when cleared by IR    Alcoholic cirrhosis  Recent Labs     05/13/24  0534 05/13/24  1704 05/14/24  0244 05/14/24  0834 05/15/24  0600 05/15/24  0909   BILITOT 0.6  --  0.6  --  0.8  --    AST 18  --  17  --  17  --    ALT 9*  --  7*  --  <5*  --    ALKPHOS 91  --  85  --  82  --       < >  --    < >  --  121*   INR 1.3*  --  1.2  --  1.3*  --    ALBUMIN 2.6*  --  2.6*  --  2.5*  --     < > = values in this interval not displayed.     Patient with known Alcoholic Cirrhosis. Co-morbidities are present and inclusive of ascites, portal hypertension, and anemia/pancytopenia.    MELD-Na score calculated; MELD 3.0: 19 at 5/15/2024  6:00 AM  MELD-Na: 19 at 5/15/2024  6:00 AM  Calculated from:  Serum Creatinine: 1.4 mg/dL at 5/15/2024  6:00 AM  Serum Sodium: 130 mmol/L at 5/15/2024  6:00 AM  Total Bilirubin: 0.8 mg/dL (Using min of 1 mg/dL) at 5/15/2024  6:00 AM  Serum Albumin: 2.5 g/dL at 5/15/2024  6:00 AM  INR(ratio): 1.3 at 5/15/2024  6:00 AM  Age at listing (hypothetical): 68 years  Sex: Male at 5/15/2024  6:00 " "AM    During admission, reported he quit alcohol one month ago.     PLAN:  - Will avoid any hepatotoxic meds, and monitor CBC/CMP/INR for synthetic function.   - IR for diagnostic/therapeutic paracentesis --> 5/13 US by IR did not show sufficient ascites for paracentesis  - Start lactulose 20 g po TID PRN for 2-3 BMs daily    Bilateral pleural effusion  Compressive atelectasis     5/9 CTA: "Images of the lower thorax are remarkable for bilateral moderate pleural effusions noting some degree of loculation involving the effusion on the right, partially visualized. There is compressive atelectasis of the bilateral lower lobes."    Patient found to have moderate pleural effusion on imaging. I have personally reviewed and interpreted the following imaging: Xray and CT. A thoracentesis was deferred. Most likely etiology includes Congestive Heart Failure and Cirrhosis. Management to include Diuresis and possible thoracentesis.    - Pulmonology consulted, no plans for thoracentesis at this time  - Incentive spirometer    Pancreatic lesion  5/9 CTA: "Enhancing lesion within the lateral aspect of the pancreatic head, not seen on examination 12/25/2023. In this region however, on the previous exam, there is a hypoattenuating lesion with peripheral calcification. This did not undergo significant enhancement at that time. Solid lesion is of concern. Correlation and follow-up advised."     elevated at 88.6. LDH, AFP, CEA WNL.   EGD 5/10 noted "blood in the first portion of the duodenum, plumes of blood were noted to be coming from the ampulla".     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    - PRN Tylenol 500 mg q6h (limited to 2 g per day due to cirrhosis)  - D/C PRN morphine 1 mg IV q6h  - PRN Narcan ordered  - PRN Zofran and Compro for " "nausea    Acute on chronic anemia  Patient's with Normocytic anemia. Hemoglobin stable. Patient's anemia is currently controlled. Has received 3 U pRBCs this admission. Chronic etiology due to iron deficiency and cirrhosis, exacerbated by acute blood loss from GI bleed.    -Current CBC reviewed-    Recent Labs   Lab 05/14/24  1703 05/15/24  0036 05/15/24  0909   HGB 7.6* 7.5* 8.5*     PLAN  - Monitor serial CBC and transfuse if patient becomes hemodynamically unstable, symptomatic or H/H drops below 7/21  - S/p iron sucrose 500 mg IV x 1    Presumed COPD (chronic obstructive pulmonary disease)  Patient's COPD is controlled currently.  Patient is currently off COPD Pathway. Continue Breo Ellipta 1 puff daily and PRN Duo-Nebs, will monitor respiratory status closely.     Essential hypertension  Chronic, controlled. Latest blood pressure and vitals reviewed-     Temp:  [97.3 °F (36.3 °C)-98.7 °F (37.1 °C)]   Pulse:  [72-96]   Resp:  [17-20]   BP: (103-117)/(58-79)   SpO2:  [92 %-100 %] .     Not on any home medications per chart review    PLAN:  -See "HFrEF (heart failure with reduced ejection fraction) " A&P  -Will utilize p.r.n. blood pressure medication only if patient's blood pressure greater than 180/110 and he develops symptoms such as worsening chest pain or shortness of breath.    Anxiety and depression  Patient has hx of depression which  is currently controlled.     PLAN:  Continue home Lexapro 10 mg po daily. We will not consult psychiatry at this time. Patient does not display psychosis at this time. Continue to monitor closely and adjust plan of care as needed.    BPH (benign prostatic hyperplasia)  Stable. Hx of BPH. Was on Tamsulosin previously, but held on recent admission in 03/2024 where there were concerns for low BP    PLAN:  Bladder scan PRN for urinary retention concerns    Documented history of CKD Stage 3  Patient reported to have CKD 3 as documented in prior notes. However, review of past labs " "does not show sustained decrease in GFR > 6 months, just during individual encounters during which patient had [place].     CAD (coronary artery disease)  Patient with known CAD (seen on CT imaging, no interventions have been done) which is controlled Will continue Statin and monitor for S/Sx of angina/ACS. Continue to monitor on telemetry.     See "HFrEF (heart failure with reduced ejection fraction)"    Compressive atelectasis  See "Bilateral pleural effusion"    Melena  See "Hematochezia"      VTE Risk Mitigation (From admission, onward)           Ordered     Reason for No Pharmacological VTE Prophylaxis  Once        Question:  Reasons:  Answer:  Active Bleeding    05/09/24 1609     IP VTE HIGH RISK PATIENT  Once         05/09/24 1609     Place sequential compression device  Until discontinued         05/09/24 1609                    Discharge Planning   ARTURO: 5/15/2024     Code Status: Full Code   Is the patient medically ready for discharge?:     Reason for patient still in hospital (select all that apply): Patient unstable, Patient trending condition, Laboratory test, and Treatment  Discharge Plan A: Skilled Nursing Facility   Discharge Delays: None known at this time        Awa Rodriguez MD  Department of Hospital Medicine   [facility] - Telemetry Stepdown    "

## 2024-05-15 NOTE — SUBJECTIVE & OBJECTIVE
Interval History: Disoriented and confused overnight, mistook RN for his niece.     Review of Systems   Gastrointestinal:  Negative for anal bleeding, blood in stool and diarrhea.   Psychiatric/Behavioral:  Positive for confusion.    All other systems reviewed and are negative.    Objective:     Vital Signs (Most Recent):  Temp: 97.7 °F (36.5 °C) (05/15/24 1151)  Pulse: 69 (05/15/24 1151)  Resp: 19 (05/15/24 1151)  BP: 105/65 (05/15/24 1151)  SpO2: 98 % (05/15/24 1151) Vital Signs (24h Range):  Temp:  [97.7 °F (36.5 °C)-98.4 °F (36.9 °C)] 97.7 °F (36.5 °C)  Pulse:  [67-74] 69  Resp:  [18-19] 19  SpO2:  [93 %-98 %] 98 %  BP: ()/(52-73) 105/65     Weight: 88.5 kg (195 lb)  Body mass index is 25.73 kg/m².    Intake/Output Summary (Last 24 hours) at 5/15/2024 1217  Last data filed at 5/15/2024 1208  Gross per 24 hour   Intake --   Output 1250 ml   Net -1250 ml         Physical Exam  Vitals and nursing note reviewed.   Constitutional:       Appearance: He is normal weight. He is ill-appearing.   HENT:      Head: Normocephalic and atraumatic.   Cardiovascular:      Rate and Rhythm: Normal rate and regular rhythm.   Pulmonary:      Effort: Pulmonary effort is normal. No respiratory distress.      Breath sounds: Normal breath sounds.   Abdominal:      General: Abdomen is flat. There is distension.      Palpations: Abdomen is soft.      Tenderness: There is no abdominal tenderness.   Musculoskeletal:      Right lower leg: No edema.      Left lower leg: No edema.   Skin:     General: Skin is warm and dry.   Neurological:      Mental Status: He is alert.      Comments: Oriented x 2 (to self and place)             Significant Labs: All pertinent labs within the past 24 hours have been reviewed.    Significant Imaging: I have reviewed all pertinent imaging results/findings within the past 24 hours.

## 2024-05-15 NOTE — PLAN OF CARE
Problem: Adult Inpatient Plan of Care  Goal: Plan of Care Review  Outcome: Met  Goal: Patient-Specific Goal (Individualized)  Outcome: Met  Goal: Absence of Hospital-Acquired Illness or Injury  Outcome: Met  Goal: Optimal Comfort and Wellbeing  Outcome: Met  Goal: Readiness for Transition of Care  Outcome: Met     Problem: Acute Kidney Injury/Impairment  Goal: Fluid and Electrolyte Balance  Outcome: Met  Goal: Improved Oral Intake  Outcome: Met  Goal: Effective Renal Function  Outcome: Met     Problem: Pneumonia  Goal: Fluid Balance  Outcome: Met  Goal: Resolution of Infection Signs and Symptoms  Outcome: Met  Goal: Effective Oxygenation and Ventilation  Outcome: Met     Problem: Skin Injury Risk Increased  Goal: Skin Health and Integrity  Outcome: Met     Problem: Infection  Goal: Absence of Infection Signs and Symptoms  Outcome: Met

## 2024-05-15 NOTE — ASSESSMENT & PLAN NOTE
Patient's with Normocytic anemia. Hemoglobin stable. Patient's anemia is currently controlled. Has received 3 U pRBCs this admission. Chronic etiology due to iron deficiency and cirrhosis, exacerbated by acute blood loss from GI bleed.    -Current CBC reviewed-    Recent Labs   Lab 05/14/24  1703 05/15/24  0036 05/15/24  0909   HGB 7.6* 7.5* 8.5*     PLAN  - Monitor serial CBC and transfuse if patient becomes hemodynamically unstable, symptomatic or H/H drops below 7/21  - S/p iron sucrose 500 mg IV x 1

## 2024-05-15 NOTE — PLAN OF CARE
Pt D/C back to [facility] room 4A. Nurse can call report to [phone]. Transport setup by W/C van for 3:30.    [place] [place]    [facility]  [phone]

## 2024-05-15 NOTE — ASSESSMENT & PLAN NOTE
Recent Labs     05/13/24  0534 05/13/24  1704 05/14/24  0244 05/14/24  0834 05/15/24  0600 05/15/24  0909   BILITOT 0.6  --  0.6  --  0.8  --    AST 18  --  17  --  17  --    ALT 9*  --  7*  --  <5*  --    ALKPHOS 91  --  85  --  82  --       < >  --    < >  --  121*   INR 1.3*  --  1.2  --  1.3*  --    ALBUMIN 2.6*  --  2.6*  --  2.5*  --     < > = values in this interval not displayed.     Patient with known Alcoholic Cirrhosis. Co-morbidities are present and inclusive of ascites, portal hypertension, and anemia/pancytopenia.    MELD-Na score calculated; MELD 3.0: 19 at 5/15/2024  6:00 AM  MELD-Na: 19 at 5/15/2024  6:00 AM  Calculated from:  Serum Creatinine: 1.4 mg/dL at 5/15/2024  6:00 AM  Serum Sodium: 130 mmol/L at 5/15/2024  6:00 AM  Total Bilirubin: 0.8 mg/dL (Using min of 1 mg/dL) at 5/15/2024  6:00 AM  Serum Albumin: 2.5 g/dL at 5/15/2024  6:00 AM  INR(ratio): 1.3 at 5/15/2024  6:00 AM  Age at listing (hypothetical): 68 years  Sex: Male at 5/15/2024  6:00 AM    During admission, reported he quit alcohol one month ago.     PLAN:  - Will avoid any hepatotoxic meds, and monitor CBC/CMP/INR for synthetic function.   - IR for diagnostic/therapeutic paracentesis --> 5/13 US by IR did not show sufficient ascites for paracentesis  - Start lactulose 20 g po TID PRN for 2-3 BMs daily

## 2024-05-15 NOTE — ASSESSMENT & PLAN NOTE
"RESOLVED    Melena     Patient presenting for evaluation of dark red blood in stool since morning of admission with associated upper abdominal pain. Two recent admissions: 3/28-4/4 for CAP and symptomatic anemia (s/p 5 U pRBCs) and 4/18-4/23 for melena (s/p 1 U pRBCs.) Of note, EGD on 10/25/2023 and colonoscopy on 4/1/2024 were unremarkable. EGD on 4/19 showed "single non-bleeding angioectasia in the jejunum; treated with APC."  CTA without acute hemorrhage. Received Protonix 80mg IV x 1 in the ED.       Recent Labs     05/13/24  0534 05/13/24  1704 05/14/24  0244 05/14/24  0834 05/15/24  0600 05/15/24  0909   HGB 7.7*   < >  --    < >  --  8.5*   BUN 17  --  15  --  14  --    CREATININE 1.3  --  1.2  --  1.4  --     < > = values in this interval not displayed.     pRBCs transfused this admission: 3 U (as of 5/12)    5/10 EGD: "blood in the first portion of the duodenum, plumes of blood were noted to be coming from the ampulla"    Per IR note dated 5/13: "Both CT c/a/p pancreas protocol on 5/12/24 and CTA a/p on 5/9/24  revealed a pseudoaneurysm measuring 1.2 x 0.8 x 1.0 cm within the lateral aspect of the pancreatic head. Of note, both of the radiology reports on the CT pancreas protocol and CTA erroneously call the pseudoaneurysm a solid lesion. This same pseudoaneurysm is also present on CTA c/a/p performed on 10/25/23, however pseudoaneurysm appears thrombosed and therefore does not enhance on this study. Agree with HM and AES that best course of action would be to embolize said pancreatic pseudoaneurysm."    S/P coil embolization of GDA and multiple pancreaticoduodenal branches with IR on 5/13/2024    - GI signed off  - AES signed off  - D/C Rocephin 1 g IV q24h (completed 5-day course)  - Monitor for hemodynamic instability with associated large volume hematochezia, consider STAT CTA and IR consult for embolization if positive  - Maintain IV access with 2 large bore IVs  - Telemetry monitoring  "

## 2024-05-16 LAB
BLD PROD TYP BPU: NORMAL
BLOOD UNIT EXPIRATION DATE: NORMAL
BLOOD UNIT TYPE CODE: 5100
BLOOD UNIT TYPE: NORMAL
CODING SYSTEM: NORMAL
CROSSMATCH INTERPRETATION: NORMAL
DISPENSE STATUS: NORMAL
TRANS ERYTHROCYTES VOL PATIENT: NORMAL ML

## 2024-05-16 NOTE — PLAN OF CARE
[facility] - Telemetry Stepdown  Discharge Final Note    Primary Care Provider: [facility]    Expected Discharge Date: 5/15/2024    Final Discharge Note (most recent)       Final Note - 05/16/24 0846          Final Note    Assessment Type Final Discharge Note     Anticipated Discharge Disposition Skilled Nursing Facility        Post-Acute Status    Post-Acute Authorization Placement     Post-Acute Placement Status Set-up Complete/Auth obtained     Discharge Delays None known at this time                     Important Message from Medicare  Important Message from Medicare regarding Discharge Appeal Rights: Given to patient/caregiver, Explained to patient/caregiver, Signed/date by patient/caregiver     Date IMM was signed: 05/15/24  Time IMM was signed: 1030      Pt D/C to SNf at [facility]. Pt was a return resident to the NH. Transport setup with W/C van. No other needs noted at this time.  Discharge Plan A and Plan B have been determined by review of patient's clinical status, future medical and therapeutic needs, and coverage/benefits for post-acute care in coordination with multidisciplinary team members.  Lasha Singh, ADELAIDA    [facility]  [phone]

## 2024-05-20 ENCOUNTER — HOSPITAL ENCOUNTER (INPATIENT)
Facility: HOSPITAL | Age: 69
LOS: 1 days | Discharge: NURSING, REHAB OR LONG-TERM CARE | DRG: 641 | End: 2024-05-21
Attending: EMERGENCY MEDICINE | Admitting: STUDENT IN AN ORGANIZED HEALTH CARE EDUCATION/TRAINING PROGRAM
Payer: MEDICARE

## 2024-05-20 DIAGNOSIS — R07.9 CHEST PAIN: ICD-10-CM

## 2024-05-20 DIAGNOSIS — I95.9 HYPOTENSION: ICD-10-CM

## 2024-05-20 DIAGNOSIS — I50.20 HFREF (HEART FAILURE WITH REDUCED EJECTION FRACTION): ICD-10-CM

## 2024-05-20 DIAGNOSIS — D64.9 ANEMIA, UNSPECIFIED TYPE: ICD-10-CM

## 2024-05-20 DIAGNOSIS — I95.9 HYPOTENSION, UNSPECIFIED HYPOTENSION TYPE: ICD-10-CM

## 2024-05-20 DIAGNOSIS — J96.01 ACUTE HYPOXEMIC RESPIRATORY FAILURE: ICD-10-CM

## 2024-05-20 DIAGNOSIS — K92.1 GASTROINTESTINAL HEMORRHAGE WITH MELENA: ICD-10-CM

## 2024-05-20 DIAGNOSIS — J96.01 ACUTE HYPOXIC RESPIRATORY FAILURE: ICD-10-CM

## 2024-05-20 DIAGNOSIS — R55 SYNCOPE: Primary | ICD-10-CM

## 2024-05-20 DIAGNOSIS — K70.30 ALCOHOLIC CIRRHOSIS OF LIVER WITHOUT ASCITES: ICD-10-CM

## 2024-05-20 LAB
ABO + RH BLD: NORMAL
ALBUMIN SERPL BCP-MCNC: 2.7 G/DL (ref 3.5–5.2)
ALLENS TEST: ABNORMAL
ALP SERPL-CCNC: 88 U/L (ref 55–135)
ALT SERPL W/O P-5'-P-CCNC: 12 U/L (ref 10–44)
ANION GAP SERPL CALC-SCNC: 10 MMOL/L (ref 8–16)
AST SERPL-CCNC: 51 U/L (ref 10–40)
BASOPHILS # BLD AUTO: 0.03 K/UL (ref 0–0.2)
BASOPHILS NFR BLD: 0.9 % (ref 0–1.9)
BILIRUB SERPL-MCNC: 0.8 MG/DL (ref 0.1–1)
BILIRUB UR QL STRIP: NEGATIVE
BLD GP AB SCN CELLS X3 SERPL QL: NORMAL
BLD PROD TYP BPU: NORMAL
BLOOD UNIT EXPIRATION DATE: NORMAL
BLOOD UNIT TYPE CODE: 5100
BLOOD UNIT TYPE: NORMAL
BNP SERPL-MCNC: 3099 PG/ML (ref 0–99)
BUN SERPL-MCNC: 15 MG/DL (ref 8–23)
BUN SERPL-MCNC: 20 MG/DL (ref 6–30)
CALCIUM SERPL-MCNC: 8.3 MG/DL (ref 8.7–10.5)
CHLORIDE SERPL-SCNC: 95 MMOL/L (ref 95–110)
CHLORIDE SERPL-SCNC: 95 MMOL/L (ref 95–110)
CLARITY UR REFRACT.AUTO: CLEAR
CO2 SERPL-SCNC: 27 MMOL/L (ref 23–29)
CODING SYSTEM: NORMAL
COLOR UR AUTO: YELLOW
CREAT SERPL-MCNC: 1.4 MG/DL (ref 0.5–1.4)
CREAT SERPL-MCNC: 1.6 MG/DL (ref 0.5–1.4)
CROSSMATCH INTERPRETATION: NORMAL
DIFFERENTIAL METHOD BLD: ABNORMAL
DISPENSE STATUS: NORMAL
EOSINOPHIL # BLD AUTO: 0 K/UL (ref 0–0.5)
EOSINOPHIL NFR BLD: 0.6 % (ref 0–8)
ERYTHROCYTE [DISTWIDTH] IN BLOOD BY AUTOMATED COUNT: 24 % (ref 11.5–14.5)
EST. GFR  (NO RACE VARIABLE): 54.4 ML/MIN/1.73 M^2
GLUCOSE SERPL-MCNC: 91 MG/DL (ref 70–110)
GLUCOSE SERPL-MCNC: 98 MG/DL (ref 70–110)
GLUCOSE UR QL STRIP: ABNORMAL
HCT VFR BLD AUTO: 26.1 % (ref 40–54)
HCT VFR BLD CALC: 37 %PCV (ref 36–54)
HGB BLD-MCNC: 7.6 G/DL (ref 14–18)
HGB UR QL STRIP: NEGATIVE
IMM GRANULOCYTES # BLD AUTO: 0.02 K/UL (ref 0–0.04)
IMM GRANULOCYTES NFR BLD AUTO: 0.6 % (ref 0–0.5)
KETONES UR QL STRIP: NEGATIVE
LACTATE SERPL-SCNC: 0.9 MMOL/L (ref 0.5–2.2)
LDH SERPL L TO P-CCNC: 2.8 MMOL/L (ref 0.5–2.2)
LEUKOCYTE ESTERASE UR QL STRIP: NEGATIVE
LIPASE SERPL-CCNC: 29 U/L (ref 4–60)
LYMPHOCYTES # BLD AUTO: 0.6 K/UL (ref 1–4.8)
LYMPHOCYTES NFR BLD: 19.9 % (ref 18–48)
MAGNESIUM SERPL-MCNC: 1.7 MG/DL (ref 1.6–2.6)
MCH RBC QN AUTO: 25.4 PG (ref 27–31)
MCHC RBC AUTO-ENTMCNC: 29.1 G/DL (ref 32–36)
MCV RBC AUTO: 87 FL (ref 82–98)
MONOCYTES # BLD AUTO: 0.3 K/UL (ref 0.3–1)
MONOCYTES NFR BLD: 8.1 % (ref 4–15)
NEUTROPHILS # BLD AUTO: 2.2 K/UL (ref 1.8–7.7)
NEUTROPHILS NFR BLD: 69.9 % (ref 38–73)
NITRITE UR QL STRIP: NEGATIVE
NRBC BLD-RTO: 0 /100 WBC
NUM UNITS TRANS PACKED RBC: NORMAL
PH UR STRIP: 8 [PH] (ref 5–8)
PLATELET # BLD AUTO: 96 K/UL (ref 150–450)
PMV BLD AUTO: 11.2 FL (ref 9.2–12.9)
POC IONIZED CALCIUM: 0.96 MMOL/L (ref 1.06–1.42)
POC TCO2 (MEASURED): 30 MMOL/L (ref 23–29)
POTASSIUM BLD-SCNC: 4.7 MMOL/L (ref 3.5–5.1)
POTASSIUM SERPL-SCNC: 4.9 MMOL/L (ref 3.5–5.1)
PROT SERPL-MCNC: 7.8 G/DL (ref 6–8.4)
PROT UR QL STRIP: NEGATIVE
RBC # BLD AUTO: 2.99 M/UL (ref 4.6–6.2)
SAMPLE: ABNORMAL
SAMPLE: ABNORMAL
SITE: ABNORMAL
SODIUM BLD-SCNC: 133 MMOL/L (ref 136–145)
SODIUM SERPL-SCNC: 132 MMOL/L (ref 136–145)
SP GR UR STRIP: 1.01 (ref 1–1.03)
SPECIMEN OUTDATE: NORMAL
TROPONIN I SERPL DL<=0.01 NG/ML-MCNC: 0.02 NG/ML (ref 0–0.03)
URN SPEC COLLECT METH UR: ABNORMAL
WBC # BLD AUTO: 3.21 K/UL (ref 3.9–12.7)

## 2024-05-20 PROCEDURE — 96360 HYDRATION IV INFUSION INIT: CPT

## 2024-05-20 PROCEDURE — 83880 ASSAY OF NATRIURETIC PEPTIDE: CPT

## 2024-05-20 PROCEDURE — 36415 COLL VENOUS BLD VENIPUNCTURE: CPT

## 2024-05-20 PROCEDURE — 25000003 PHARM REV CODE 250

## 2024-05-20 PROCEDURE — 63600175 PHARM REV CODE 636 W HCPCS

## 2024-05-20 PROCEDURE — 86920 COMPATIBILITY TEST SPIN: CPT

## 2024-05-20 PROCEDURE — 83605 ASSAY OF LACTIC ACID: CPT

## 2024-05-20 PROCEDURE — 25500020 PHARM REV CODE 255: Performed by: STUDENT IN AN ORGANIZED HEALTH CARE EDUCATION/TRAINING PROGRAM

## 2024-05-20 PROCEDURE — 20600001 HC STEP DOWN PRIVATE ROOM

## 2024-05-20 PROCEDURE — 80053 COMPREHEN METABOLIC PANEL: CPT | Performed by: EMERGENCY MEDICINE

## 2024-05-20 PROCEDURE — 83735 ASSAY OF MAGNESIUM: CPT | Performed by: EMERGENCY MEDICINE

## 2024-05-20 PROCEDURE — 36430 TRANSFUSION BLD/BLD COMPNT: CPT

## 2024-05-20 PROCEDURE — P9016 RBC LEUKOCYTES REDUCED: HCPCS

## 2024-05-20 PROCEDURE — 93010 ELECTROCARDIOGRAM REPORT: CPT | Mod: ,,, | Performed by: INTERNAL MEDICINE

## 2024-05-20 PROCEDURE — 96361 HYDRATE IV INFUSION ADD-ON: CPT

## 2024-05-20 PROCEDURE — 85025 COMPLETE CBC W/AUTO DIFF WBC: CPT | Mod: 91

## 2024-05-20 PROCEDURE — 99291 CRITICAL CARE FIRST HOUR: CPT

## 2024-05-20 PROCEDURE — 81003 URINALYSIS AUTO W/O SCOPE: CPT

## 2024-05-20 PROCEDURE — C9113 INJ PANTOPRAZOLE SODIUM, VIA: HCPCS

## 2024-05-20 PROCEDURE — 30233N1 TRANSFUSION OF NONAUTOLOGOUS RED BLOOD CELLS INTO PERIPHERAL VEIN, PERCUTANEOUS APPROACH: ICD-10-PCS | Performed by: STUDENT IN AN ORGANIZED HEALTH CARE EDUCATION/TRAINING PROGRAM

## 2024-05-20 PROCEDURE — 86850 RBC ANTIBODY SCREEN: CPT

## 2024-05-20 PROCEDURE — 80048 BASIC METABOLIC PNL TOTAL CA: CPT | Mod: XB

## 2024-05-20 PROCEDURE — 93005 ELECTROCARDIOGRAM TRACING: CPT

## 2024-05-20 PROCEDURE — 99900035 HC TECH TIME PER 15 MIN (STAT)

## 2024-05-20 PROCEDURE — 84484 ASSAY OF TROPONIN QUANT: CPT

## 2024-05-20 PROCEDURE — 83690 ASSAY OF LIPASE: CPT

## 2024-05-20 PROCEDURE — 85025 COMPLETE CBC W/AUTO DIFF WBC: CPT

## 2024-05-20 PROCEDURE — 63600175 PHARM REV CODE 636 W HCPCS: Mod: JA

## 2024-05-20 RX ORDER — THIAMINE HCL 100 MG
100 TABLET ORAL DAILY
Status: DISCONTINUED | OUTPATIENT
Start: 2024-05-21 | End: 2024-05-21 | Stop reason: HOSPADM

## 2024-05-20 RX ORDER — HYDROCODONE BITARTRATE AND ACETAMINOPHEN 500; 5 MG/1; MG/1
TABLET ORAL
Status: DISCONTINUED | OUTPATIENT
Start: 2024-05-20 | End: 2024-05-21 | Stop reason: HOSPADM

## 2024-05-20 RX ORDER — ONDANSETRON 4 MG/1
4 TABLET, FILM COATED ORAL EVERY 8 HOURS PRN
Status: DISCONTINUED | OUTPATIENT
Start: 2024-05-20 | End: 2024-05-21 | Stop reason: HOSPADM

## 2024-05-20 RX ORDER — OXYCODONE HYDROCHLORIDE 5 MG/1
5 TABLET ORAL EVERY 6 HOURS PRN
Status: DISCONTINUED | OUTPATIENT
Start: 2024-05-20 | End: 2024-05-21 | Stop reason: HOSPADM

## 2024-05-20 RX ORDER — SODIUM CHLORIDE 0.9 % (FLUSH) 0.9 %
10 SYRINGE (ML) INJECTION EVERY 12 HOURS PRN
Status: DISCONTINUED | OUTPATIENT
Start: 2024-05-20 | End: 2024-05-21 | Stop reason: HOSPADM

## 2024-05-20 RX ORDER — ACETAMINOPHEN 325 MG/1
650 TABLET ORAL EVERY 6 HOURS PRN
Status: DISCONTINUED | OUTPATIENT
Start: 2024-05-20 | End: 2024-05-21 | Stop reason: HOSPADM

## 2024-05-20 RX ORDER — HYDROXYZINE HYDROCHLORIDE 25 MG/1
25 TABLET, FILM COATED ORAL 3 TIMES DAILY PRN
Status: DISCONTINUED | OUTPATIENT
Start: 2024-05-20 | End: 2024-05-21 | Stop reason: HOSPADM

## 2024-05-20 RX ORDER — TALC
6 POWDER (GRAM) TOPICAL NIGHTLY PRN
Status: DISCONTINUED | OUTPATIENT
Start: 2024-05-20 | End: 2024-05-21 | Stop reason: HOSPADM

## 2024-05-20 RX ORDER — PANTOPRAZOLE SODIUM 40 MG/10ML
40 INJECTION, POWDER, LYOPHILIZED, FOR SOLUTION INTRAVENOUS 2 TIMES DAILY
Status: DISCONTINUED | OUTPATIENT
Start: 2024-05-21 | End: 2024-05-21

## 2024-05-20 RX ORDER — ESCITALOPRAM OXALATE 10 MG/1
10 TABLET ORAL DAILY
Status: DISCONTINUED | OUTPATIENT
Start: 2024-05-21 | End: 2024-05-21 | Stop reason: HOSPADM

## 2024-05-20 RX ORDER — FLUTICASONE FUROATE AND VILANTEROL 100; 25 UG/1; UG/1
1 POWDER RESPIRATORY (INHALATION) DAILY
Status: DISCONTINUED | OUTPATIENT
Start: 2024-05-21 | End: 2024-05-21 | Stop reason: HOSPADM

## 2024-05-20 RX ORDER — OXYCODONE HYDROCHLORIDE 5 MG/1
5 TABLET ORAL EVERY 6 HOURS PRN
Status: DISCONTINUED | OUTPATIENT
Start: 2024-05-20 | End: 2024-05-20

## 2024-05-20 RX ORDER — IPRATROPIUM BROMIDE AND ALBUTEROL SULFATE 2.5; .5 MG/3ML; MG/3ML
3 SOLUTION RESPIRATORY (INHALATION) EVERY 6 HOURS PRN
Status: DISCONTINUED | OUTPATIENT
Start: 2024-05-20 | End: 2024-05-21 | Stop reason: HOSPADM

## 2024-05-20 RX ORDER — FOLIC ACID 1 MG/1
1 TABLET ORAL DAILY
Status: DISCONTINUED | OUTPATIENT
Start: 2024-05-21 | End: 2024-05-21 | Stop reason: HOSPADM

## 2024-05-20 RX ORDER — IBUPROFEN 200 MG
24 TABLET ORAL
Status: DISCONTINUED | OUTPATIENT
Start: 2024-05-20 | End: 2024-05-21 | Stop reason: HOSPADM

## 2024-05-20 RX ORDER — ACETAMINOPHEN 500 MG
1000 TABLET ORAL
Status: COMPLETED | OUTPATIENT
Start: 2024-05-20 | End: 2024-05-20

## 2024-05-20 RX ORDER — GLUCAGON 1 MG
1 KIT INJECTION
Status: DISCONTINUED | OUTPATIENT
Start: 2024-05-20 | End: 2024-05-21 | Stop reason: HOSPADM

## 2024-05-20 RX ORDER — NALOXONE HCL 0.4 MG/ML
0.02 VIAL (ML) INJECTION
Status: DISCONTINUED | OUTPATIENT
Start: 2024-05-20 | End: 2024-05-21 | Stop reason: HOSPADM

## 2024-05-20 RX ORDER — IBUPROFEN 200 MG
16 TABLET ORAL
Status: DISCONTINUED | OUTPATIENT
Start: 2024-05-20 | End: 2024-05-21 | Stop reason: HOSPADM

## 2024-05-20 RX ORDER — PANTOPRAZOLE SODIUM 40 MG/10ML
80 INJECTION, POWDER, LYOPHILIZED, FOR SOLUTION INTRAVENOUS ONCE
Status: COMPLETED | OUTPATIENT
Start: 2024-05-20 | End: 2024-05-20

## 2024-05-20 RX ADMIN — OCTREOTIDE ACETATE 50 MCG/HR: 500 INJECTION, SOLUTION INTRAVENOUS; SUBCUTANEOUS at 09:05

## 2024-05-20 RX ADMIN — PANTOPRAZOLE SODIUM 80 MG: 40 INJECTION, POWDER, FOR SOLUTION INTRAVENOUS at 08:05

## 2024-05-20 RX ADMIN — SODIUM CHLORIDE 250 ML: 9 INJECTION, SOLUTION INTRAVENOUS at 03:05

## 2024-05-20 RX ADMIN — OCTREOTIDE ACETATE 50 MCG/HR: 500 INJECTION, SOLUTION INTRAVENOUS; SUBCUTANEOUS at 11:05

## 2024-05-20 RX ADMIN — OXYCODONE 5 MG: 5 TABLET ORAL at 09:05

## 2024-05-20 RX ADMIN — IOHEXOL 100 ML: 350 INJECTION, SOLUTION INTRAVENOUS at 07:05

## 2024-05-20 RX ADMIN — CEFTRIAXONE 1 G: 1 INJECTION, POWDER, FOR SOLUTION INTRAMUSCULAR; INTRAVENOUS at 08:05

## 2024-05-20 RX ADMIN — ONDANSETRON HYDROCHLORIDE 4 MG: 4 TABLET, FILM COATED ORAL at 11:05

## 2024-05-20 RX ADMIN — ACETAMINOPHEN 1000 MG: 500 TABLET ORAL at 03:05

## 2024-05-20 NOTE — ED PROVIDER NOTES
Encounter Date: 5/20/2024       History     Chief Complaint   Patient presents with    Loss of Consciousness     Needles like he passing out in bed, woke up with nausea. Hx of receiving BP.     68-year-old male with history of alcohol use disorder, cirrhosis, CAD, BPH, closed fracture of left distal radius, COPD, gastritis, HTN, and HFreF who presents to the ED for chief complaint of loss of consciousness that occurred earlier today.  Pt reports that this morning he was wheeled to PT in his nursing home when he felt light-headed and then he passed out for 1 minute and subsequently vomited.  He states several episodes of intermittent light-headedness over the past couple months which he stated is probably due to low hemoglobin levels.  Patient has received blood transfusions in the past.  Denies SOB, chest pain, HA, recent falls or head trauma, current N/V. Endorses weakness, fatigue, and intermittent epigastric abdominal pain.  Patient also states he has had melena and hematochezia in the past, last episode of blood in the stool was about 2 days ago. Reports severe abdominal pain last night.  Of note, patient was recently admitted to hospital on 05/09/2024 for for evaluation of GI bleed concerns and hypoxia over the past month.    The history is provided by the patient. No  was used.     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    s/p coil embolization of GDA and  multiple pancreaticoduodenal branches with IR     Past Surgical History:   Procedure Laterality Date    CAPSULOTOMY OF JOINT Right 7/29/2019    Procedure: CAPSULOTOMY, JOINT;  Surgeon: Raj Grossman MD;  Location: [facility] OR 2ND FLR;  Service: Orthopedics;  Laterality: Right;    COLONOSCOPY N/A 4/1/2024    Procedure: COLONOSCOPY;  Surgeon: Scarlett Whitt MD;  Location: [facility] ENDO (2ND FLR);  Service: Gastroenterology;  Laterality: N/A;    ESOPHAGOGASTRODUODENOSCOPY N/A 10/25/2023    Procedure: EGD (ESOPHAGOGASTRODUODENOSCOPY);  Surgeon: Lux Rome MD;  Location: [facility] ENDO (2ND FLR);  Service: Endoscopy;  Laterality: N/A;    ESOPHAGOGASTRODUODENOSCOPY N/A 4/19/2024    Procedure: EGD (ESOPHAGOGASTRODUODENOSCOPY);  Surgeon: Raj Roberts MD;  Location: [facility] ENDO (2ND FLR);  Service: Endoscopy;  Laterality: N/A;    ESOPHAGOGASTRODUODENOSCOPY N/A 5/10/2024    Procedure: EGD (ESOPHAGOGASTRODUODENOSCOPY);  Surgeon: Ashley Trent MD;  Location: [facility] ENDO (2ND FLR);  Service: Endoscopy;  Laterality: N/A;    PERCUTANEOUS PINNING OF HIP Right 7/29/2019    Procedure: PINNING, HIP, PERCUTANEOUS- synthes cannulated screws- hana table- C arm door side-;  Surgeon: Raj Grossman MD;  [facility];  Service: Orthopedics;  Laterality: Right;     Family History   Problem Relation Name Age of Onset    Hypertension Father      Heart disease Father      Heart disease Brother       Social History     Tobacco Use    Smoking status: Every Day     Current packs/day: 1.00     Types: Cigarettes    Smokeless tobacco: Never   Substance Use Topics    Alcohol use: Yes     Comment: (former drinker; 2months sober) this visit admits drinking a couple beers today    Drug use: No     Review of Systems    Physical Exam     Initial Vitals [05/20/24 1226]   BP Pulse Resp Temp SpO2   (!) 90/57 68 18 97.3 °F (36.3 °C) 98 %      MAP       --         Physical Exam    Nursing note and vitals reviewed.  Constitutional: No  distress.   Patient is laying in bed in no apparent distress and is conversating with staff.   HENT:   Head: Normocephalic.   Eyes: Conjunctivae and EOM are normal. No scleral icterus.   Neck:   Normal range of motion.  Cardiovascular:  Normal rate, regular rhythm and normal heart sounds.           No dilated right IVC   Pulmonary/Chest: Breath sounds normal. No respiratory distress. He has no wheezes. He has no rhonchi. He has no rales.   Abdominal: Abdomen is soft. He exhibits no distension. There is no abdominal tenderness. There is no rebound and no guarding.   Genitourinary: Rectum:      Guaiac result negative.   Guaiac negative stool.    Genitourinary Comments: No gross blood on rectal exam.  No anal fissures or abnormalities.  FOBT is negative.     Musculoskeletal:         General: Normal range of motion.      Cervical back: Normal range of motion.     Neurological: He is alert.   Skin: Skin is warm. Capillary refill takes less than 2 seconds.   Psychiatric: He has a normal mood and affect.         ED Course   Procedures  Labs Reviewed   CBC W/ AUTO DIFFERENTIAL - Abnormal; Notable for the following components:       Result Value    WBC 3.21 (*)     RBC 2.99 (*)     Hemoglobin 7.6 (*)     Hematocrit 26.1 (*)     MCH 25.4 (*)     MCHC 29.1 (*)     RDW 24.0 (*)     Platelets 96 (*)     Immature Granulocytes 0.6 (*)     Lymph # 0.6 (*)     All other components within normal limits   B-TYPE NATRIURETIC PEPTIDE - Abnormal; Notable for the following components:    BNP 3,099 (*)     All other components within normal limits    Narrative:     Add on MANDY per MD CHELO Epic order [id number]  14:39  05/20/2024    URINALYSIS, REFLEX TO URINE CULTURE - Abnormal; Notable for the following components:    Glucose, UA 2+ (*)     All other components within normal limits    Narrative:     Specimen Source->Urine   COMPREHENSIVE METABOLIC PANEL - Abnormal; Notable for the following components:    Sodium 132 (*)     Calcium 8.3  (*)     Albumin 2.7 (*)     AST 51 (*)     eGFR 54.4 (*)     All other components within normal limits   ISTAT PROCEDURE - Abnormal; Notable for the following components:    POC Creatinine 1.6 (*)     POC Sodium 133 (*)     POC TCO2 (MEASURED) 30 (*)     POC Ionized Calcium 0.96 (*)     All other components within normal limits   ISTAT LACTATE - Abnormal; Notable for the following components:    POC Lactate 2.80 (*)     All other components within normal limits   TROPONIN I    Narrative:     Add on LIPAS per MD CHELO Epic order [id number]  14:39  05/20/2024    LIPASE   LIPASE    Narrative:     Add on LIPAS per MD CHELO Epic order [id number]  14:39  05/20/2024    MAGNESIUM   LACTIC ACID, PLASMA   TYPE & SCREEN   PREPARE RBC SOFT        ECG Results              EKG 12-lead (Final result)        Collection Time Result Time QRS Duration OHS QTC Calculation    05/20/24 12:41:10 05/21/24 13:20:51 102 463                     Final result by Interface, Lab In [facility] (05/21/24 13:21:01)                   Narrative:    Test Reason : R55,    Vent. Rate : 061 BPM     Atrial Rate : 061 BPM     P-R Int : 154 ms          QRS Dur : 102 ms      QT Int : 460 ms       P-R-T Axes : 000 003 095 degrees     QTc Int : 463 ms    Sinus rhythm with marked sinus arrythmia with occasional Premature  ventricular complexes  Nonspecific ST and T wave abnormality  Prolonged QT  Abnormal ECG  When compared with ECG of 09-MAY-2024 14:03,  Premature ventricular complexes are now Present  Vent. rate has decreased BY  39 BPM  Nonspecific T wave abnormality no longer evident in Inferior leads  T wave inversion now evident in Anterior leads  Confirmed by Roscoe Herr MD ([id number]) on 5/21/2024 1:20:48 PM    Referred By: [email or web address]   SELF           Confirmed By:Roscoe Herr MD                                  Imaging Results              CTA Acute GI [place], Abdomen and Pelvis (Final result)  Result time 05/20/24 21:57:17   Procedure changed from CTA  Abdomen and Pelvis     Final result by Hilario Perez MD (05/20/24 21:57:17)                   Impression:      No evidence of intraluminal contrast extravasation to suggest active gastrointestinal hemorrhage.    Moderate colonic and rectal stool burden which may reflect constipation.  No bowel obstruction or acute inflammation.  Colonic diverticulosis.    Moderate to large volume bilateral pleural effusions with adjacent passive atelectasis, these appear similar to prior exam.    Cirrhosis.  Enhancing 1.5 cm lesion in the right hepatic lobe, not demonstrated on 05/09/2024 CTA.  Given short-term development, possibly relates to perfusion defect.  Consider further evaluation with contrast-enhanced MRI of the abdomen.    Several other stable findings included in the body of the report.    Electronically signed by resident: Johnson Shafer  Date:    05/20/2024  Time:    20:07    Electronically signed by: Hilario Perez MD  Date:    05/20/2024  Time:    21:57               Narrative:    EXAMINATION:  CTA ACUTE GI BLEED, ABDOMEN AND PELVIS    CLINICAL HISTORY:  syncope, anemia, hypotension, recent IR GDA embolization ?intraabdominal bleed;GIB;    TECHNIQUE:  Images were obtained from the base of the neck through the pelvis before and after the administration of  cc of  Omnipaque 350 IV post-contrast images were obtained in the arterial and delayed phases per GI bleed protocol.  Axial, coronal, and sagittal reconstructions were created from the source data, including MIP reconstructions for the arterial phase images.    COMPARISON:  CT abdomen pelvis 05/12/2024.  CTA GI bleed 05/09/2024.    FINDINGS:  LINES/TUBES:  None.    LUNG BASES: Partially imaged heart and pericardium are within normal limits.  Coronary artery calcifications.  Moderate to large volume pleural effusions bilaterally, similar to prior exam.  There is adjacent passive atelectasis.    HEPATOBILIARY: Liver is normal size with nodular contour compatible with  cirrhosis.  Few subcentimeter hypodensities which are too small to characterize.  Enhancing 1.5 cm lesion along the peripheral right hepatic lobe (series 4, image 259), not demonstrated on 05/09/2024 CTA.  No new focal hepatic lesion.  Cholelithiasis without pericholecystic inflammatory change.  No biliary duct dilatation.  The portal veins appear patent.    SPLEEN: Enlarged.  No focal lesion.    PANCREAS: No definite focal masses.  No ductal dilatation.  Previous enhancing focus at the pancreatic head is not visualized, likely secondary to adjacent streak artifact.    ADRENALS: No adrenal nodules.    KIDNEYS/URETERS: Kidneys enhance symmetrically.  Multiple stable simple renal cysts bilaterally.  There are few vascular calcifications at the right renal hilum.  Few punctate hyperattenuating caliceal foci at each kidney suggesting nonobstructing nephroliths as well.  No hydronephrosis.  No solid mass lesion.    PELVIC ORGANS/BLADDER: No bladder wall thickening.  Prostate is normal size.    PERITONEUM / RETROPERITONEUM: No free air or fluid.    LYMPH NODES: No lymphadenopathy.    VESSELS: Extensive calcified atherosclerosis.  No aortic aneurysm or dissection.  The celiac, SMA, MARYANN, and bilateral renal arteries are patent.  There is prominent calcification at the origins of the celiac artery and bilateral renal arteries contributing to up to a moderate degree of stenosis.  Bilateral common, external, and internal arteries are patent without aneurysm.  There are few skip-areas of focal stenosis along the bilateral internal iliac arteries, otherwise the common and external iliac arteries demonstrate no focal stenosis.  Embolization material noted in the right upper quadrant.    GI TRACT: Stomach appears normal given its nondistended state.  Moderate amount of stool noted throughout the colon and rectum.  Several scattered colonic diverticula without diverticulitis.  No bowel distention or wall thickening.  Appendix not  well delineated.  No evidence of intraluminal contrast extravasation to suggest active gastrointestinal hemorrhage.  No bowel pneumatosis.    SOFT TISSUES: There is mild degree of subcutaneous fluid stranding along the bilateral hips.    BONES: Operative change about the proximal right femur.  There is decreased osseous mineralization.  There are remote bilateral posterior rib fractures.  Stable compression deformities involving the T12 and L2 vertebral bodies with mild-to-moderate height loss.  No acute displaced fractures or aggressive osseous lesions.                                       X-Ray Chest AP Portable (Final result)  Result time 05/20/24 14:23:27      Final result by Hilario Perez MD (05/20/24 14:23:27)                   Impression:      Grossly stable findings as above from 05/09/2024 chest radiograph      Electronically signed by: Hilario Perez MD  Date:    05/20/2024  Time:    14:23               Narrative:    EXAMINATION:  XR CHEST AP PORTABLE    CLINICAL HISTORY:  LOC;    TECHNIQUE:  Single frontal view of the chest was performed.    COMPARISON:  Chest radiograph 05/09/2024 and CT abdomen and pelvis 05/12/2024    FINDINGS:  Monitoring leads overlie the chest.  Patient is slightly rotated.  Resolution is limited by body habitus with underpenetration.    Cardiomediastinal silhouette is midline and prominent similar to prior.  Redemonstrated right more than left small to moderate pleural effusions with probable ongoing bibasilar atelectasis/infiltrate.  No apical pneumothorax.  Hilar contours are unchanged.  No acute osseous process seen.  PA and lateral views can be obtained.                                       Medications   sodium chloride 0.9% bolus 250 mL 250 mL (0 mLs Intravenous Stopped 5/20/24 1743)   acetaminophen tablet 1,000 mg (1,000 mg Oral Given 5/20/24 1503)   pantoprazole injection 80 mg (80 mg Intravenous Given 5/20/24 2001)   iohexoL (OMNIPAQUE 350) injection 100 mL (100 mLs  Intravenous Given 5/20/24 1920)     Medical Decision Making  68-year-old male who presents after LOC.  He is hypotensive, other vitals WNL.  On exam, he is laying in bed in no apparent distress.  Extremities are warm.  No abdominal TTP despite epigastric pain.  Please see physical exam above for additional details.  Differential diagnoses include but are not limited to vasovagal syncope, cardiac syncope, GI bleed, anemia, ACS, CHF, pancreatitis.  Moderate suspicion for an internal or GI bleed given the patient's history and episodic bloody stools.  No fever or systemic symptoms to suggest sepsis.  No cool extremities or dilated IVC to suggest cardiogenic shock, will evaluate further with bedside ultrasound.  Ordered labs, CXR, and 250 mL bolus of normal saline.  Administered Tylenol for abdominal pain.  Please see ED course for additional details.    Discussed patient with change of shift ED team.  At time of sign-out, patient is pending labs and likely admission for H/H trending and GI evaluation.    Amount and/or Complexity of Data Reviewed  Labs: ordered. Decision-making details documented in ED Course.  Radiology: ordered. Decision-making details documented in ED Course.  ECG/medicine tests: ordered and independent interpretation performed. Decision-making details documented in ED Course.    Risk  OTC drugs.  Decision regarding hospitalization.              Attending Attestation:   Physician Attestation Statement for Resident:  As the supervising MD   Physician Attestation Statement: I have personally seen and examined this patient.   I agree with the above history.  -:   As the supervising MD I agree with the above PE.     As the supervising MD I agree with the above treatment, course, plan, and disposition.            Attending Critical Care:   Critical Care Times:   Direct Patient Care (initial evaluation, reassessments, and time considering the  case)................................................................25 minutes.   Ordering, Reviewing, and Interpreting Diagnostic Studies...............................................................................................................5 minutes.   Documentation..................................................................................................................................................................................5 minutes.   Consultation with other Physicians. .................................................................................................................................................5 minutes.   ==============================================================  Total Critical Care Time - exclusive of procedural time: 40 minutes.  ==============================================================  Critical care was necessary to treat or prevent imminent or life-threatening deterioration of the following conditions: hypotension.   Critical care was time spent personally by me on the following activities: obtaining history from patient or relative, examination of patient, review of old charts, ordering lab, x-rays, and/or EKG, development of treatment plan with patient or relative, ordering and performing treatments and interventions, evaluation of patient's response to treatment, interpretation of cardiac measurements and re-evaluation of patient's conition.   Critical Care Condition: potentially life-threatening       Attending ED Notes:   STAFF ATTENDING PHYSICIAN NOTE:  I have individually/jointly evaluated Fransico Montalvo and discussed their ED management with the resident physician. I have also reviewed their notes, assessments, and procedures documented.  I was present during all critical portions of any procedure(s) performed on Fransico Montalvo.   ____________________  Rupesh Fine MD, [facility]  Emergency Medicine Staff        ED Course as of 05/24/24 1105   Mon May 20,  2024 1350 EKG shows sinus rhythm with PACs, rate of 63 beats per minute, and no STEMI. [MD]   1433 X-Ray Chest AP Portable  CXR shows right more than left pleural effusions with probable ongoing bibasilar atelectasis/infiltrate.  Findings similar to prior scan. [MD]   1439 Hemoglobin(!): 7.6  H/H has decreased from prior labs 5 days ago. [MD]   1446 BNP(!): 3,099  Elevated BNP, but elevated at baseline [MD]   1537 Performed bedside RUSH exam.  Patient has bilateral interstitial fluid and a poor ejection fraction.  IVC is collapsible and no pericardial tamponade. [MD]      ED Course User Index  [MD] Tyrel Valles MD                           Clinical Impression:  Final diagnoses:  [R55] Syncope (Primary)  [I95.9] Hypotension, unspecified hypotension type  [I95.9] Hypotension          ED Disposition Condition    Admit                 Tyrel Valles MD  Resident  05/20/24 [id number]       Lele Fine MD  05/24/24 7395

## 2024-05-20 NOTE — ED TRIAGE NOTES
Pt arrives to ED after feeling light headed  passing out in the shower. Pt states this has happened several times for a month. Pt passed out in his wheel chair and denies hitting head or falling.

## 2024-05-20 NOTE — ED NOTES
I-STAT Chem-8+ Results:   Value Reference Range   Sodium 133 136-145 mmol/L   Potassium  4.7 3.5-5.1 mmol/L   Chloride 95  mmol/L   Ionized Calcium 0.96 1.06-1.42 mmol/L   CO2 (measured) 30 23-29 mmol/L   Glucose 98  mg/dL   BUN 20 6-30 mg/dL   Creatinine 1.6 0.5-1.4 mg/dL   Hematocrit 37 36-54%

## 2024-05-20 NOTE — PROVIDER PROGRESS NOTES - EMERGENCY DEPT.
Encounter Date: 5/20/2024    ED Physician Progress Notes         Signout     Patient is a 67 yo male with hx of alcohol use disorder, cirrhosis, CAD, BPH, closed fracture of left distal radius, COPD, gastritis, HTN, and HFrEF who presented to the ED due to LOC without head trauma that occurred earlier today. Patient reported episodes of dark, bloody stools. In the ED, found to be anemia with hgb of 7.6 from 8.5 five days ago. He is also hypotensive, however is well appearing. He was given 250ml fluid bolus. On ARCELIA, no gross blood or melena appreciated. Patient signed out to oncoming team pending remainder of labs.    Patient received at signout pending remainder of labs. He was well-appearing despite a low systolic pressure. He was informed to update the team if he begins to feel badly or has additional bloody stools.  When his labs resulted, he was able to be admitted to Hospital Medicine.

## 2024-05-21 VITALS
BODY MASS INDEX: 24.3 KG/M2 | TEMPERATURE: 99 F | OXYGEN SATURATION: 96 % | HEIGHT: 72 IN | SYSTOLIC BLOOD PRESSURE: 113 MMHG | DIASTOLIC BLOOD PRESSURE: 70 MMHG | RESPIRATION RATE: 19 BRPM | HEART RATE: 64 BPM | WEIGHT: 179.44 LBS

## 2024-05-21 LAB
ALBUMIN SERPL BCP-MCNC: 2.5 G/DL (ref 3.5–5.2)
ALP SERPL-CCNC: 79 U/L (ref 55–135)
ALT SERPL W/O P-5'-P-CCNC: 9 U/L (ref 10–44)
ANION GAP SERPL CALC-SCNC: 10 MMOL/L (ref 8–16)
ANISOCYTOSIS BLD QL SMEAR: SLIGHT
AST SERPL-CCNC: 24 U/L (ref 10–40)
BASOPHILS # BLD AUTO: 0.02 K/UL (ref 0–0.2)
BASOPHILS # BLD AUTO: 0.03 K/UL (ref 0–0.2)
BASOPHILS # BLD AUTO: 0.04 K/UL (ref 0–0.2)
BASOPHILS NFR BLD: 0.7 % (ref 0–1.9)
BASOPHILS NFR BLD: 1 % (ref 0–1.9)
BASOPHILS NFR BLD: 1.2 % (ref 0–1.9)
BILIRUB SERPL-MCNC: 1.4 MG/DL (ref 0.1–1)
BUN SERPL-MCNC: 15 MG/DL (ref 8–23)
CALCIUM SERPL-MCNC: 8.2 MG/DL (ref 8.7–10.5)
CHLORIDE SERPL-SCNC: 96 MMOL/L (ref 95–110)
CO2 SERPL-SCNC: 28 MMOL/L (ref 23–29)
CREAT SERPL-MCNC: 1.3 MG/DL (ref 0.5–1.4)
DIFFERENTIAL METHOD BLD: ABNORMAL
EOSINOPHIL # BLD AUTO: 0 K/UL (ref 0–0.5)
EOSINOPHIL NFR BLD: 0.7 % (ref 0–8)
EOSINOPHIL NFR BLD: 0.9 % (ref 0–8)
EOSINOPHIL NFR BLD: 1.1 % (ref 0–8)
ERYTHROCYTE [DISTWIDTH] IN BLOOD BY AUTOMATED COUNT: 23.4 % (ref 11.5–14.5)
ERYTHROCYTE [DISTWIDTH] IN BLOOD BY AUTOMATED COUNT: 23.6 % (ref 11.5–14.5)
ERYTHROCYTE [DISTWIDTH] IN BLOOD BY AUTOMATED COUNT: 23.6 % (ref 11.5–14.5)
EST. GFR  (NO RACE VARIABLE): 59.5 ML/MIN/1.73 M^2
GLUCOSE SERPL-MCNC: 100 MG/DL (ref 70–110)
HCT VFR BLD AUTO: 34.3 % (ref 40–54)
HCT VFR BLD AUTO: 34.8 % (ref 40–54)
HCT VFR BLD AUTO: 35.8 % (ref 40–54)
HGB BLD-MCNC: 10.5 G/DL (ref 14–18)
HGB BLD-MCNC: 10.6 G/DL (ref 14–18)
HGB BLD-MCNC: 10.7 G/DL (ref 14–18)
HYPOCHROMIA BLD QL SMEAR: ABNORMAL
IMM GRANULOCYTES # BLD AUTO: 0 K/UL (ref 0–0.04)
IMM GRANULOCYTES # BLD AUTO: 0.01 K/UL (ref 0–0.04)
IMM GRANULOCYTES # BLD AUTO: 0.01 K/UL (ref 0–0.04)
IMM GRANULOCYTES NFR BLD AUTO: 0 % (ref 0–0.5)
IMM GRANULOCYTES NFR BLD AUTO: 0.3 % (ref 0–0.5)
IMM GRANULOCYTES NFR BLD AUTO: 0.3 % (ref 0–0.5)
LYMPHOCYTES # BLD AUTO: 0.9 K/UL (ref 1–4.8)
LYMPHOCYTES # BLD AUTO: 0.9 K/UL (ref 1–4.8)
LYMPHOCYTES # BLD AUTO: 1 K/UL (ref 1–4.8)
LYMPHOCYTES NFR BLD: 27.9 % (ref 18–48)
LYMPHOCYTES NFR BLD: 31 % (ref 18–48)
LYMPHOCYTES NFR BLD: 33.1 % (ref 18–48)
MAGNESIUM SERPL-MCNC: 1.6 MG/DL (ref 1.6–2.6)
MCH RBC QN AUTO: 25.5 PG (ref 27–31)
MCH RBC QN AUTO: 25.9 PG (ref 27–31)
MCH RBC QN AUTO: 26.1 PG (ref 27–31)
MCHC RBC AUTO-ENTMCNC: 29.6 G/DL (ref 32–36)
MCHC RBC AUTO-ENTMCNC: 30.6 G/DL (ref 32–36)
MCHC RBC AUTO-ENTMCNC: 30.7 G/DL (ref 32–36)
MCV RBC AUTO: 84 FL (ref 82–98)
MCV RBC AUTO: 85 FL (ref 82–98)
MCV RBC AUTO: 86 FL (ref 82–98)
MONOCYTES # BLD AUTO: 0.3 K/UL (ref 0.3–1)
MONOCYTES # BLD AUTO: 0.3 K/UL (ref 0.3–1)
MONOCYTES # BLD AUTO: 0.4 K/UL (ref 0.3–1)
MONOCYTES NFR BLD: 10.7 % (ref 4–15)
MONOCYTES NFR BLD: 11.4 % (ref 4–15)
MONOCYTES NFR BLD: 12.5 % (ref 4–15)
NEUTROPHILS # BLD AUTO: 1.5 K/UL (ref 1.8–7.7)
NEUTROPHILS # BLD AUTO: 1.6 K/UL (ref 1.8–7.7)
NEUTROPHILS # BLD AUTO: 2 K/UL (ref 1.8–7.7)
NEUTROPHILS NFR BLD: 53.7 % (ref 38–73)
NEUTROPHILS NFR BLD: 56.3 % (ref 38–73)
NEUTROPHILS NFR BLD: 57.2 % (ref 38–73)
NRBC BLD-RTO: 0 /100 WBC
OHS QRS DURATION: 102 MS
OHS QRS DURATION: 104 MS
OHS QTC CALCULATION: 463 MS
OHS QTC CALCULATION: 474 MS
OVALOCYTES BLD QL SMEAR: ABNORMAL
PHOSPHATE SERPL-MCNC: 3.6 MG/DL (ref 2.7–4.5)
PLATELET # BLD AUTO: 103 K/UL (ref 150–450)
PLATELET # BLD AUTO: 103 K/UL (ref 150–450)
PLATELET # BLD AUTO: 134 K/UL (ref 150–450)
PLATELET BLD QL SMEAR: ABNORMAL
PMV BLD AUTO: 10 FL (ref 9.2–12.9)
PMV BLD AUTO: 10.4 FL (ref 9.2–12.9)
PMV BLD AUTO: 10.8 FL (ref 9.2–12.9)
POIKILOCYTOSIS BLD QL SMEAR: SLIGHT
POLYCHROMASIA BLD QL SMEAR: ABNORMAL
POTASSIUM SERPL-SCNC: 4 MMOL/L (ref 3.5–5.1)
PROT SERPL-MCNC: 6.5 G/DL (ref 6–8.4)
RBC # BLD AUTO: 4.02 M/UL (ref 4.6–6.2)
RBC # BLD AUTO: 4.13 M/UL (ref 4.6–6.2)
RBC # BLD AUTO: 4.15 M/UL (ref 4.6–6.2)
SODIUM SERPL-SCNC: 134 MMOL/L (ref 136–145)
WBC # BLD AUTO: 2.72 K/UL (ref 3.9–12.7)
WBC # BLD AUTO: 2.9 K/UL (ref 3.9–12.7)
WBC # BLD AUTO: 3.44 K/UL (ref 3.9–12.7)

## 2024-05-21 PROCEDURE — 63600175 PHARM REV CODE 636 W HCPCS: Performed by: STUDENT IN AN ORGANIZED HEALTH CARE EDUCATION/TRAINING PROGRAM

## 2024-05-21 PROCEDURE — 25000242 PHARM REV CODE 250 ALT 637 W/ HCPCS

## 2024-05-21 PROCEDURE — 36415 COLL VENOUS BLD VENIPUNCTURE: CPT | Mod: XB

## 2024-05-21 PROCEDURE — 85025 COMPLETE CBC W/AUTO DIFF WBC: CPT | Mod: 91

## 2024-05-21 PROCEDURE — 94761 N-INVAS EAR/PLS OXIMETRY MLT: CPT

## 2024-05-21 PROCEDURE — 25000003 PHARM REV CODE 250

## 2024-05-21 PROCEDURE — 25000003 PHARM REV CODE 250: Performed by: STUDENT IN AN ORGANIZED HEALTH CARE EDUCATION/TRAINING PROGRAM

## 2024-05-21 PROCEDURE — 97530 THERAPEUTIC ACTIVITIES: CPT

## 2024-05-21 PROCEDURE — 84100 ASSAY OF PHOSPHORUS: CPT

## 2024-05-21 PROCEDURE — C9113 INJ PANTOPRAZOLE SODIUM, VIA: HCPCS

## 2024-05-21 PROCEDURE — 63600175 PHARM REV CODE 636 W HCPCS

## 2024-05-21 PROCEDURE — 80053 COMPREHEN METABOLIC PANEL: CPT

## 2024-05-21 PROCEDURE — 97165 OT EVAL LOW COMPLEX 30 MIN: CPT

## 2024-05-21 PROCEDURE — 36415 COLL VENOUS BLD VENIPUNCTURE: CPT

## 2024-05-21 PROCEDURE — 97162 PT EVAL MOD COMPLEX 30 MIN: CPT

## 2024-05-21 PROCEDURE — 97112 NEUROMUSCULAR REEDUCATION: CPT

## 2024-05-21 PROCEDURE — 94640 AIRWAY INHALATION TREATMENT: CPT

## 2024-05-21 PROCEDURE — 83735 ASSAY OF MAGNESIUM: CPT

## 2024-05-21 RX ORDER — VALSARTAN 40 MG/1
20 TABLET ORAL 2 TIMES DAILY
Start: 2024-05-21 | End: 2025-05-21

## 2024-05-21 RX ORDER — PANTOPRAZOLE SODIUM 40 MG/1
40 TABLET, DELAYED RELEASE ORAL DAILY
Status: DISCONTINUED | OUTPATIENT
Start: 2024-05-21 | End: 2024-05-21 | Stop reason: HOSPADM

## 2024-05-21 RX ORDER — PANTOPRAZOLE SODIUM 40 MG/1
40 TABLET, DELAYED RELEASE ORAL DAILY
Qty: 30 TABLET | Refills: 1 | Status: SHIPPED | OUTPATIENT
Start: 2024-05-21 | End: 2025-05-21

## 2024-05-21 RX ADMIN — FLUTICASONE FUROATE AND VILANTEROL TRIFENATATE 1 PUFF: 100; 25 POWDER RESPIRATORY (INHALATION) at 09:05

## 2024-05-21 RX ADMIN — OCTREOTIDE ACETATE 50 MCG/HR: 500 INJECTION, SOLUTION INTRAVENOUS; SUBCUTANEOUS at 06:05

## 2024-05-21 RX ADMIN — CEFTRIAXONE 2 G: 2 INJECTION, POWDER, FOR SOLUTION INTRAMUSCULAR; INTRAVENOUS at 08:05

## 2024-05-21 RX ADMIN — PANTOPRAZOLE SODIUM 40 MG: 40 INJECTION, POWDER, FOR SOLUTION INTRAVENOUS at 08:05

## 2024-05-21 RX ADMIN — OXYCODONE 5 MG: 5 TABLET ORAL at 08:05

## 2024-05-21 RX ADMIN — FOLIC ACID 1 MG: 1 TABLET ORAL at 08:05

## 2024-05-21 RX ADMIN — ESCITALOPRAM OXALATE 10 MG: 10 TABLET ORAL at 08:05

## 2024-05-21 RX ADMIN — Medication 100 MG: at 08:05

## 2024-05-21 NOTE — ASSESSMENT & PLAN NOTE
Patient with known Cirrhosis. Co-morbidities are present and inclusive of esophageal varices.  MELD-Na score calculated; MELD 3.0: 19 at 5/15/2024  6:00 AM  MELD-Na: 19 at 5/15/2024  6:00 AM  Calculated from:  Serum Creatinine: 1.4 mg/dL at 5/15/2024  6:00 AM  Serum Sodium: 130 mmol/L at 5/15/2024  6:00 AM  Total Bilirubin: 0.8 mg/dL (Using min of 1 mg/dL) at 5/15/2024  6:00 AM  Serum Albumin: 2.5 g/dL at 5/15/2024  6:00 AM  INR(ratio): 1.3 at 5/15/2024  6:00 AM  Age at listing (hypothetical): 68 years  Sex: Male at 5/15/2024  6:00 AM    Holding chronic meds d/t c/f GIB and hypotension. Etiology likely ETOH. Will avoid any hepatotoxic meds, and monitor CBC/CMP/INR for synthetic function.

## 2024-05-21 NOTE — PLAN OF CARE
Problem: Occupational Therapy  Goal: Occupational Therapy Goal  Description: Goals to be met by: 6/4/2024     Patient will increase functional independence with ADLs by performing:    UE Dressing with Stand-by Assistance.  LE Dressing with SBA.  Grooming while seated at sink with Set-up Assistance.  Toileting from toilet with Stand-by Assistance for hygiene and clothing management.   Supine to sit with Supervision.  Step transfer with Stand-by Assistance with RW.  Toilet transfer to toilet with Stand-by Assistance with RW.    Outcome: Progressing     Pt evaluated and OT goals established.

## 2024-05-21 NOTE — PLAN OF CARE
Problem: Adult Inpatient Plan of Care  Goal: Plan of Care Review  Outcome: Progressing  Flowsheets (Taken 5/21/2024 0403)  Plan of Care Reviewed With: patient  Goal: Patient-Specific Goal (Individualized)  Outcome: Progressing  Goal: Absence of Hospital-Acquired Illness or Injury  Outcome: Progressing  Intervention: Identify and Manage Fall Risk  Flowsheets (Taken 5/21/2024 0403)  Safety Promotion/Fall Prevention:   assistive device/personal item within reach   side rails raised x 2  Goal: Optimal Comfort and Wellbeing  Outcome: Progressing  Intervention: Monitor Pain and Promote Comfort  Flowsheets (Taken 5/21/2024 0403)  Pain Management Interventions:   pillow support provided   position adjusted  Goal: Readiness for Transition of Care  Outcome: Not Progressing

## 2024-05-21 NOTE — SUBJECTIVE & OBJECTIVE
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       Past Surgical History:   Procedure Laterality Date    CAPSULOTOMY OF JOINT Right 7/29/2019    Procedure: CAPSULOTOMY, JOINT;  Surgeon: Raj Grossman MD;  [facility];  Service: Orthopedics;  Laterality: Right;    COLONOSCOPY N/A 4/1/2024    Procedure: COLONOSCOPY;  Surgeon: Scarlett Whitt MD;  Location: [facility] (2ND FLR);  Service: Gastroenterology;  Laterality: N/A;    ESOPHAGOGASTRODUODENOSCOPY N/A 10/25/2023    Procedure: EGD (ESOPHAGOGASTRODUODENOSCOPY);  Surgeon: Lux Rome MD;  Location: [facility] (2ND FLR);  Service: Endoscopy;  Laterality: N/A;    ESOPHAGOGASTRODUODENOSCOPY N/A 4/19/2024    Procedure: EGD (ESOPHAGOGASTRODUODENOSCOPY);  Surgeon: Raj Roberts MD;  Location: [facility] (2ND FLR);  Service: Endoscopy;  Laterality: N/A;    ESOPHAGOGASTRODUODENOSCOPY N/A 5/10/2024    Procedure: EGD (ESOPHAGOGASTRODUODENOSCOPY);  Surgeon: Ashley Trent MD;  Location: [facility] ENDO (2ND FLR);  Service: Endoscopy;  Laterality: N/A;    PERCUTANEOUS PINNING OF HIP Right 7/29/2019    Procedure: PINNING, HIP, PERCUTANEOUS- synthes cannulated screws- hana table- C arm door side-;  Surgeon: Raj Grossman MD;  [facility];  Service: Orthopedics;  Laterality: Right;       Review of patient's allergies indicates:  No Known Allergies    No current facility-administered  medications on file prior to encounter.     Current Outpatient Medications on File Prior to Encounter   Medication Sig    acetaminophen (TYLENOL) 500 MG tablet Take 1 tablet (500 mg total) by mouth every 6 (six) hours as needed for Pain.    albuterol-ipratropium (DUO-NEB) 2.5 mg-0.5 mg/3 mL nebulizer solution Take 3 mLs by nebulization every 6 (six) hours as needed for Wheezing or Shortness of Breath. Rescue    aspirin (ECOTRIN) 81 MG EC tablet Take 1 tablet (81 mg total) by mouth once daily.    atorvastatin (LIPITOR) 40 MG tablet Take 1 tablet (40 mg total) by mouth every evening.    empagliflozin (JARDIANCE) 10 mg tablet Take 1 tablet (10 mg total) by mouth once daily.    EScitalopram oxalate (LEXAPRO) 10 MG tablet Take 10 mg by mouth once daily.    fluticasone furoate-vilanteroL (BREO) 100-25 mcg/dose diskus inhaler Inhale 1 puff into the lungs once daily. Controller    folic acid (FOLVITE) 1 MG tablet Take 1 tablet (1 mg total) by mouth once daily.    furosemide (LASIX) 20 MG tablet Take 1 tablet (20 mg total) by mouth once daily.    lactulose (CHRONULAC) 10 gram/15 mL solution Take 30 mLs (20 g total) by mouth 3 (three) times daily as needed (Titrate to 2-3 bowel movements a day).    melatonin (MELATIN) 3 mg tablet Take 2 tablets (6 mg total) by mouth nightly as needed for Insomnia.    metoprolol succinate (TOPROL-XL) 25 MG 24 hr tablet Take 1 tablet (25 mg total) by mouth once daily.    spironolactone (ALDACTONE) 25 MG tablet Take 0.5 tablets (12.5 mg total) by mouth once daily.    thiamine 100 MG tablet Take 100 mg by mouth once daily.    valsartan (DIOVAN) 40 MG tablet Take 1 tablet (40 mg total) by mouth 2 (two) times daily.     Family History       Problem Relation (Age of Onset)    Heart disease Father, Brother    Hypertension Father          Tobacco Use    Smoking status: Every Day     Current packs/day: 1.00     Types: Cigarettes    Smokeless tobacco: Never   Substance and Sexual Activity    Alcohol  use: Yes     Comment: (former drinker; 2months sober) this visit admits drinking a couple beers today    Drug use: No    Sexual activity: Not on file     Review of Systems   Constitutional:  Positive for fatigue.   Respiratory:  Positive for shortness of breath.    Cardiovascular:  Negative for chest pain.   Gastrointestinal:  Positive for abdominal pain, blood in stool and vomiting.   Neurological:  Positive for syncope and weakness.     Objective:     Vital Signs (Most Recent):  Temp: 97.6 °F (36.4 °C) (05/20/24 1937)  Pulse: 66 (05/20/24 1937)  Resp: 20 (05/20/24 1937)  BP: 110/68 (05/20/24 1937)  SpO2: 100 % (05/20/24 1937) Vital Signs (24h Range):  Temp:  [97.3 °F (36.3 °C)-97.6 °F (36.4 °C)] 97.6 °F (36.4 °C)  Pulse:  [60-75] 66  Resp:  [16-20] 20  SpO2:  [96 %-100 %] 100 %  BP: ()/(52-68) 110/68        There is no height or weight on file to calculate BMI.     Physical Exam  Constitutional:       General: He is not in acute distress.     Appearance: Normal appearance. He is ill-appearing.   HENT:      Head: Normocephalic and atraumatic.   Eyes:      Extraocular Movements: Extraocular movements intact.      Conjunctiva/sclera: Conjunctivae normal.   Cardiovascular:      Rate and Rhythm: Normal rate and regular rhythm.      Heart sounds: Normal heart sounds.   Pulmonary:      Effort: Pulmonary effort is normal. No respiratory distress.      Breath sounds: Normal breath sounds. No wheezing or rales.   Abdominal:      Palpations: Abdomen is soft.      Tenderness: There is no abdominal tenderness.   Musculoskeletal:      Right lower leg: No edema.      Left lower leg: No edema.   Skin:     General: Skin is warm and dry.      Coloration: Skin is pale.   Neurological:      Mental Status: He is alert and oriented to person, place, and time.   Psychiatric:         Mood and Affect: Mood normal.         Behavior: Behavior normal.         Thought Content: Thought content normal.         Judgment: Judgment normal.                 Significant Labs: All pertinent labs within the past 24 hours have been reviewed.  CBC:   Recent Labs   Lab 05/20/24  1355 05/20/24  1528   WBC 3.21*  --    HGB 7.6*  --    HCT 26.1* 37   PLT 96*  --      CMP:   Recent Labs   Lab 05/20/24  1520   *   K 4.9   CL 95   CO2 27   GLU 91   BUN 15   CREATININE 1.4   CALCIUM 8.3*   PROT 7.8   ALBUMIN 2.7*   BILITOT 0.8   ALKPHOS 88   AST 51*   ALT 12   ANIONGAP 10       Significant Imaging: I have reviewed all pertinent imaging results/findings within the past 24 hours.    CXR: I have reviewed all pertinent results/findings within the past 24 hours and findings are:     Cardiomediastinal silhouette is midline and prominent similar to prior. Redemonstrated right more than left small to moderate pleural effusions with probable ongoing bibasilar atelectasis/infiltrate. No apical pneumothorax. Hilar contours are unchanged. No acute osseous process seen. PA and lateral views can be obtained.

## 2024-05-21 NOTE — PLAN OF CARE
[facility] - Telemetry Stepdown  Initial Discharge Assessment       Primary Care Provider: [facility]    Admission Diagnosis: Syncope [R55]  Chest pain [R07.9]  Hypotension [I95.9]  Hypotension, unspecified hypotension type [I95.9]  Anemia, unspecified type [D64.9]    Admission Date: 5/20/2024  Expected Discharge Date: 5/22/2024    Transition of Care Barriers: None    Payor: [employer] [facility] / Plan: [employer] / Product Type: Medicare Advantage /     Extended Emergency Contact Information  Primary Emergency Contact: Ina Quintero   [place]  Mobile Phone: [phone]  Relation: Friend  Secondary Emergency Contact: Tonya Lewis   [facility]  Home Phone: [phone]  Relation: Friend    Discharge Plan A: Skilled Nursing Facility  Discharge Plan B: Return to Nursing Home      [id number] [place] - [address]  [address]  [place] [address]  Phone: [phone] Fax: [phone]    [id number] - [place] - [address]  [address] [facility] [address]  [address] [place] [address]  Phone: [phone] Fax: [phone]    [facility] [place] [address]  [facility] [address]  Phone: [phone] Fax: [phone]      Initial Assessment (most recent)       Adult Discharge Assessment - 05/21/24 0937          Discharge Assessment    Assessment Type Discharge Planning Assessment     Confirmed/corrected address, phone number and insurance Yes     Confirmed Demographics Correct on Facesheet     Source of Information patient     Communicated ARTURO with patient/caregiver Yes     Reason For Admission Syncope     People in Home facility resident     Facility Arrived From: [facility]     Do you expect to return to your current living situation? Yes     Prior to hospitilization cognitive status: Alert/Oriented     Current cognitive status: Alert/Oriented     Walking or Climbing  Stairs Difficulty yes     Walking or Climbing Stairs ambulation difficulty, assistance 1 person     Dressing/Bathing Difficulty yes     Dressing/Bathing bathing difficulty, assistance 1 person     Equipment Currently Used at Home none     Readmission within 30 days? Yes     Patient currently being followed by outpatient case management? No     Do you take prescription medications? Yes     Do you have prescription coverage? Yes     Coverage Nursing Home     Do you have any problems affording any of your prescribed medications? No     Is the patient taking medications as prescribed? yes     Who is going to help you get home at discharge? facility resident     How do you get to doctors appointments? other (see comments)   facility resident    Are you on dialysis? No     Do you take coumadin? No     Discharge Plan A Skilled Nursing Facility     Discharge Plan B Return to Nursing Home     DME Needed Upon Discharge  none     Discharge Plan discussed with: Patient     Transition of Care Barriers None        Physical Activity    On average, how many days per week do you engage in moderate to strenuous exercise (like a brisk walk)? 0 days     On average, how many minutes do you engage in exercise at this level? 0 min        Financial Resource Strain    How hard is it for you to pay for the very basics like food, housing, medical care, and heating? Not hard at all        Housing Stability    In the last 12 months, was there a time when you were not able to pay the mortgage or rent on time? No     At any time in the past 12 months, were you homeless or living in a shelter (including now)? No        Transportation Needs    Has the lack of transportation kept you from medical appointments, meetings, work or from getting things needed for daily living? No        Food Insecurity    Within the past 12 months, you worried that your food would run out before you got the money to buy more. Never true     Within the past 12 months, the  food you bought just didn't last and you didn't have money to get more. Never true        Stress    Do you feel stress - tense, restless, nervous, or anxious, or unable to sleep at night because your mind is troubled all the time - these days? Only a little        Social Isolation    How often do you feel lonely or isolated from those around you?  Never        Alcohol Use    Q3: How often do you have six or more drinks on one occasion? Never        Utilities    In the past 12 months has the electric, gas, oil, or water company threatened to shut off services in your home? No        Health Literacy    How often do you need to have someone help you when you read instructions, pamphlets, or other written material from your doctor or pharmacy? Never                     Readmission Assessment (most recent)       Readmission Assessment - 05/21/24 0940          Readmission    Was this a planned readmission? No     Why were you hospitalized in the last 30 days? [facility]     Why were you readmitted? New medical problem     When you left the hospital how did you feel? good     When you left the hospital where did you go? SNF     Did patient/caregiver refused recommended DC plan? No     Tell me about what happened between when you left the hospital and the day you returned. Did well     When did you start not feeling well? day of admit     Did you try to manage your symptoms your self? No     Did you call anyone? Yes     Who did you call? Other (comments)   NH staff    Did you try to see or did see a doctor or nurse before you came? No     Why? Nursing HOme     Did you have  a follow-up appointment on discharge? Yes     Did you go? Yes                      Pt a resident of [facility]. Will return there once medically stable.  Discharge Plan A and Plan B have been determined by review of patient's clinical status, future medical and therapeutic needs, and coverage/benefits for post-acute care in coordination with  multidisciplinary team members.  Lasha Singh FERNANDO    [facility]  [phone]

## 2024-05-21 NOTE — PT/OT/SLP EVAL
Physical Therapy Co-Evaluation and Co-Treatment  Co-evaluation with OT for maximal pt participation, safety, and activity tolerance    Patient Name:  Fransico Montalvo   MRN:  [id number]  Admit Date: 5/20/2024  Admitting Diagnosis:  Syncope   Length of Stay: 1 days  Recent Surgery: * No surgery found *      Recommendations:     Discharge Recommendations:  Moderate Intensity Therapy  Discharge Equipment Recommendations: walker, rolling   Justification for Equipment: The mobility limitation cannot be sufficiently resolved by the use of a cane. Patient's functional mobility deficit can be sufficiently resolved with the use of a Rolling Walker. Patient's mobility limitation significantly impairs their ability to participate in one of more activities of daily living. The use of a Rolling Walker will significantly improve the patient's ability to participate in MRADLS and the patient will use it on regular basis in the home.   Barriers to discharge: None    Assessment:     Fransico Montalvo is a 68 y.o. male admitted with a medical diagnosis of Syncope.  He presents with the following impairments/functional limitations: weakness, gait instability, impaired balance, impaired endurance, impaired self care skills, impaired functional mobility, decreased safety awareness, pain, decreased lower extremity function, impaired cardiopulmonary response to activity.     Pt agreeable to therapy, fear over passing out again, asks many questions about how to prevent a further occurrence. Pt with good mobility, able to step transfer to bedside chair with RW and assistance for safety, can likely tolerate gait trials in subsequent treatments.    Rehab Prognosis: Good; patient would benefit from acute skilled PT services to address these deficits and reach maximum level of function.      Treatment Tolerated: Fairly Well    Highest level of mobility achieved this visit: step transfer to bedside chair with RW and CGA    Activity with RN/PCT: transfer with  "one person assist    Plan:     During this hospitalization, patient to be seen 4 x/week to address the identified rehab impairments via gait training, therapeutic activities, therapeutic exercises, neuromuscular re-education and progress towards the established goals.    Plan of Care Expires:  06/21/24    Subjective     JYOTI Angel notified prior to session. No family present upon PT entrance into room.    Chief Complaint: pain  Patient/Family Comments/goals: "How do I keep from coming back here?"  Pain/Comfort:  Pain Rating 1: 5/10  Location - Orientation 1: generalized  Location 1: abdomen  Pain Addressed 1: Distraction, Reposition, Pre-medicate for activity    Social History:  Residence: lives in a skilled nursing facility.  Support available: NH Staff  Equipment Used: wheelchair  Equipment Owned (not using): None  Prior level of function: assist required for mobility and ADLs  Work: Retired.   Drive: no.       Objective:     Additional staff present: OT Hilario    Patient found HOB elevated with: oxygen, telemetry, pulse ox (continuous), peripheral IV     General Precautions: Standard, fall   Orthopedic Precautions:N/A   Braces: N/A   Body mass index is 24.34 kg/m².  Oxygen Device: Nasal Cannula 2L    Vitals: /70 (BP Location: Left arm, Patient Position: Lying)   Pulse 64   Temp 98.9 °F (37.2 °C) (Oral)   Resp 19   Ht 6' (1.829 m)   Wt 81.4 kg (179 lb 7.3 oz)   SpO2 96%   BMI 24.34 kg/m²     Exams:  Cognition:   Alert and Cooperative  Command following: Follows two-step verbal commands  Fluency: clear/fluent  Hearing: Intact  Vision:  Intact  Skin Integrity: Visible skin intact    Physical Exam:   Edema - None noted  ROM - JALEEL LEs WFL  Strength - JALEEL hips and knees 4/5, JALEEL ankles 5/5    Sensation - Intact to light touch  Coordination - No deficits noted    Outcome Measures:    AM-PAC 6 CLICK MOBILITY  Turning over in bed (including adjusting bedclothes, sheets and blankets)?: 4  Sitting down on and " standing up from a chair with arms (e.g., wheelchair, bedside commode, etc.): 3  Moving from lying on back to sitting on the side of the bed?: 4  Moving to and from a bed to a chair (including a wheelchair)?: 3  Need to walk in hospital room?: 3  Climbing 3-5 steps with a railing?: 3  Basic Mobility Total Score: 20     Functional Mobility:    Bed Mobility:   Supine to Sit: stand by assistance; from R side of bed  Scooting anteriorly to EOB to have both feet planted on floor: stand by assistance    Sitting Balance at Edge of Bed:  Assistance Level Required: Stand-by Assistance  Time: 12 min  Postural deviations noted: no deviations noted    Transfers:   Sit > Stand Transfer: contact guard assistance with rolling walker  Stand > Sit Transfer: contact guard assistance with rolling walker  x1 trials from EOB  Bed > Chair Transfer: 3-Step Transfer technique with contact guard assistance with rolling walker  Chair on patient's R    Standing Balance:  Assistance Level Required: Contact Guard Assistance  Patient used: rolling walker  Time: 3 min  Postural deviations noted: no deviations noted  Encouraged: upright stance      Gait:   Patient ambulated: 3 steps to bedside chair   Patient required: contact guard  Patient used:  rolling walker  Gait Pattern observed: swing-to gait  Gait Deviation(s): decreased step length and decreased ruth  Impairments due to: decreased strength and decreased endurance  all lines remained intact throughout ambulation trial  Gait belt utilized    Therapeutic Exercises:   Static and dynamic sitting balance at EOB  upright posture and sitting tolerance  Static and dynamic standing balance at EOB  standing tolerance, upright stance, and weight shifting    Education:  Time provided for education, counseling and discussion of health disposition in regards to patient's current status  All questions answered within PT scope of practice and to patient's satisfaction  PT role in POC to address  current functional deficits  Pt educated on proper body mechanics, safety techniques, and energy conservation with PT facilitation and cueing throughout session  Call nursing/pct to transfer to chair/use bathroom. Pt stated understanding.    Patient left up in chair with all lines intact and call button in reach.    GOALS:   Multidisciplinary Problems       Physical Therapy Goals          Problem: Physical Therapy    Goal Priority Disciplines Outcome Goal Variances Interventions   Physical Therapy Goal     PT, PT/OT Progressing     Description: Goals to met by 6/4/2024    1. Sit to stand transfer with Stand-by Assistance  2. Bed to chair transfer with Stand-by Assistance using Rolling Walker  3. Gait  x 50 feet with Stand-by Assistance using Rolling Walker   4. Lower extremity exercise program x15 reps per Instruction, with assistance as needed in order to facilitate improvement in functional independence                       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       Past Surgical History:   Procedure Laterality Date    CAPSULOTOMY OF JOINT Right 7/29/2019    Procedure: CAPSULOTOMY, JOINT;  Surgeon: Raj Grossman MD;  Location: [facility] OR [address] [place] [address];  Service: Orthopedics;  Laterality: Right;    COLONOSCOPY N/A 4/1/2024    Procedure: COLONOSCOPY;  Surgeon: Scarlett Whitt MD;  Location: [facility] ([address] [place] [address]);  Service: Gastroenterology;   Laterality: N/A;    ESOPHAGOGASTRODUODENOSCOPY N/A 10/25/2023    Procedure: EGD (ESOPHAGOGASTRODUODENOSCOPY);  Surgeon: Lux Rome MD;  [facility]  Service: Endoscopy;  Laterality: N/A;    ESOPHAGOGASTRODUODENOSCOPY N/A 4/19/2024    Procedure: EGD (ESOPHAGOGASTRODUODENOSCOPY);  Surgeon: Raj Roberts MD;  [facility]  Service: Endoscopy;  Laterality: N/A;    ESOPHAGOGASTRODUODENOSCOPY N/A 5/10/2024    Procedure: EGD (ESOPHAGOGASTRODUODENOSCOPY);  Surgeon: Ashley Trent MD;  [facility]  Service: Endoscopy;  Laterality: N/A;    PERCUTANEOUS PINNING OF HIP Right 7/29/2019    Procedure: PINNING, HIP, PERCUTANEOUS- synthes cannulated screws- hana table- C arm door side-;  Surgeon: Raj Grossman MD;  [facility];  Service: Orthopedics;  Laterality: Right;       Time Tracking:     PT Received On: 05/21/24  PT Start Time: 1308     PT Stop Time: 1328  PT Total Time (min): 20 min     Billable Minutes: Evaluation 1 procedure and Neuromuscular Re-education 9 min    Davie Pérez, PT, DPT  5/21/2024

## 2024-05-21 NOTE — HPI
Mr. Montalvo is a 69yo M with PMHx of alcohol use disorder c/b cirrhosis and esophageal varices s/p banding 2018, CAD, BPH, COPD, HTN, newly diagnosed HFrEF (35-40%) who presented to [facility] for a syncopal event at his nursing home. Reports he had LOC for 1 minute followed by vomiting upon waking, denies hematemesis or head trauma. States he has had intermittent lightheadedness for the past couple months that he believes is 2/2 to known anemia requiring blood transfusions. 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 Has had intermittent melena in this time frame, last episode was 2 days ago with associated epigastric pain.     In the ED, patient hypotensive with MAPs as low as 59, received 250cc IVF bolus d/t HFrEF with some improvement. Requiring 3L NC, does not wear oxygen at home. Hb 7.6, around baseline the past couple months. Plt 96, near around baseline as chronic thrombocytopenia noted on chart review. CMP with Cr 1.4, baseline as of recent 1.0-1.4. BNP 3099, which has been elevated in 2000-4000s since March. Trop wnl. CXR with small-moderate R>L pleural effusions, stable since prior imaging. Patient admitted to  for syncope possibly 2/2 to acute blood loss in the setting of recent GIB and embolization.

## 2024-05-21 NOTE — PHARMACY MED REC
"  Admission Medication History     The home medication history was taken by Jaclyn Hightower.    You may go to "Admission" then "Reconcile Home Medications" tabs to review and/or act upon these items.     The home medication list has been updated by the Pharmacy department.   Please read ALL comments highlighted in yellow.   Please address this information as you see fit.    Feel free to contact us if you have any questions or require assistance.      The medications listed below were removed from the home medication list. Please reorder if appropriate:  Patient reports no longer taking the following medication(s):  Acetaminophen (TYLENOL) 500 MG tablet  Albuterol-ipratropium (DUO-NEB) 2.5 mg-0.5 mg/3 mL nebulizer solution  Melatonin (MELATIN) 3 mg tablet    Medications listed below were obtained from: [employer]  Current Outpatient Medications on File Prior to Encounter   Medication Sig    aspirin (ECOTRIN) 81 MG EC tablet   Take 1 tablet (81 mg total) by mouth once daily.    atorvastatin (LIPITOR) 40 MG tablet   Take 1 tablet (40 mg total) by mouth every evening.    empagliflozin (JARDIANCE) 10 mg tablet   Take 1 tablet (10 mg total) by mouth once daily.    EScitalopram oxalate (LEXAPRO) 10 MG tablet   Take 10 mg by mouth once daily.    fluticasone furoate-vilanteroL (BREO) 100-25 mcg/dose diskus inhaler   Inhale 1 puff into the lungs once daily. Controller    folic acid (FOLVITE) 1 MG tablet   Take 1 tablet (1 mg total) by mouth once daily.    furosemide (LASIX) 20 MG tablet   Take 1 tablet (20 mg total) by mouth once daily.    lactulose (CHRONULAC) 10 gram/15 mL solution Take 30 mLs (20 g total) by mouth 3 (three) times daily as needed (Titrate to 2-3 bowel movements a day).      metoprolol succinate (TOPROL-XL) 25 MG 24 hr tablet   Take 25 mg by mouth once daily. If pulse is less than 60 notify MD If pulse if greater than 120 Notify MD.)    spironolactone (ALDACTONE) 25 MG tablet   " Take 0.5 tablets (12.5 mg total) by mouth once daily.    thiamine 100 MG tablet   Take 100 mg by mouth once daily.    valsartan (DIOVAN) 40 MG tablet Take 40 mg by mouth every 12 (twelve) hours. If Systolic blood pressure is less than 90 or diastolic blood pressure is less than 60 notify MD if SBP >180 OR  Notify MD)       Jaclyn Burfect  EXT [id number]               .

## 2024-05-21 NOTE — PLAN OF CARE
SNF ORDERS    05/21/2024  [facility]  [facility] [address] - TELEMET[facility]  [address]  [facility] [address]  Dept: [phone]  Loc: [phone]     Admit to SNF: [facility]    Diagnoses:  Active Hospital Problems    Diagnosis  POA    *Syncope [R55]  Yes    Acute hypoxic respiratory failure [J96.01]  Yes    HFrEF (heart failure with reduced ejection fraction) [I50.20]  Yes    GI bleed [K92.2]  Yes    Thrombocytopenia [D69.6]  Yes     Chronic    Alcoholic cirrhosis [K70.30]  Yes     Chronic      Resolved Hospital Problems   No resolved problems to display.       Patient is homebound due to:  Syncope    Allergies:Review of patient's allergies indicates:  No Known Allergies    Vitals:  Routine    Diet: cardiac diet, fluid restriction: 1.5 L, and 2 gram sodium diet     Activities:   Activity as tolerated    Goals of Care Treatment Preferences:  Code Status: Full Code      Labs:  CBC: Twice a week. Frequency to be modified per SNF physician     Nursing Precautions:  Fall and Pressure ulcer prevention    Consults:    PT to evaluate and treat and OT to evaluate and treat- Frequency to be determined based  on initial evaluation. Mostly bedbound at NH, may wear abdominal binder during PT/OT for orthostasis if available.     Miscellaneous Care: Routine Skin for Bedridden Patients:  Apply moisture barrier cream to all  CHF Care: Daily Weight with notification of MD/NP of 2lb or > increase in 24 hours    v/s and O2 sat every shift    Oxygen as needed for sats <90%    Report abnormal breath sounds to MD/NP    Edema checks q shift- notify MD/NP of increased edema    Task segmentation by nursing for daily care to decrease exertion    CHF education to include diet ,medication, and CHF flags for MD notification      Medications: Discontinue all previous medication orders, if any. See new list below.     Medication List        START taking these medications      pantoprazole 40 MG  tablet  Commonly known as: PROTONIX  Take 1 tablet (40 mg total) by mouth once daily.            CHANGE how you take these medications      metoprolol succinate 25 MG 24 hr tablet  Commonly known as: TOPROL-XL  Take 1 tablet (25 mg total) by mouth once daily.  What changed: additional instructions     valsartan 40 MG tablet  Commonly known as: DIOVAN  Take 0.5 tablets (20 mg total) by mouth 2 (two) times daily.  What changed: how much to take            CONTINUE taking these medications      aspirin 81 MG EC tablet  Commonly known as: ECOTRIN  Take 1 tablet (81 mg total) by mouth once daily.     atorvastatin 40 MG tablet  Commonly known as: LIPITOR  Take 1 tablet (40 mg total) by mouth every evening.     empagliflozin 10 mg tablet  Commonly known as: Jardiance  Take 1 tablet (10 mg total) by mouth once daily.     EScitalopram oxalate 10 MG tablet  Commonly known as: LEXAPRO  Take 10 mg by mouth once daily.     fluticasone furoate-vilanteroL 100-25 mcg/dose diskus inhaler  Commonly known as: BREO  Inhale 1 puff into the lungs once daily. Controller     folic acid 1 MG tablet  Commonly known as: FOLVITE  Take 1 tablet (1 mg total) by mouth once daily.     furosemide 20 MG tablet  Commonly known as: LASIX  Take 1 tablet (20 mg total) by mouth once daily.     lactulose 10 gram/15 mL solution  Commonly known as: CHRONULAC  Take 30 mLs (20 g total) by mouth 3 (three) times daily as needed (Titrate to 2-3 bowel movements a day).     spironolactone 25 MG tablet  Commonly known as: ALDACTONE  Take 0.5 tablets (12.5 mg total) by mouth once daily.     thiamine 100 MG tablet  Take 100 mg by mouth once daily.                Immunizations Administered as of 5/21/2024       No immunizations on file.                 Some patients may experience side effects after vaccination.  These may include fever, headache, muscle or joint aches.  Most symptoms resolve with 24-48 hours and do not require urgent medical evaluation unless they  persist for more than 72 hours or symptoms are concerning for an unrelated medical condition.          _________________________________  Mariah Donovan MD  05/21/2024

## 2024-05-21 NOTE — ASSESSMENT & PLAN NOTE
Patient with likely COPD given smoking history, does not wear oxygen at home. States he has been feeling SOB mainly with ambulation since his GIB and anemia started a couple months ago. Was able to wean O2 down from 3L to 2L at bedside. Will continue to wean as tolerated. CXR stable since last admission. Low suspicion for COPD exacerbation as patients lungs sound clear, no wheezing noted on exam and patient is not tachypneic. Possible that CHF may be contribute with small-mod bilateral pleural effusions, patient states that he was started on lasix a few days ago to help with fluid in his lungs. Does not appear to be volume overloaded on exam. Will consider IV diuresis as clinically appropriate. SOB/FRY likely 2/2 to anemia.     - wean O2 as tolerated   - consider IV diuresis after blood transfusion if O2 requirements increase

## 2024-05-21 NOTE — PLAN OF CARE
Pt has been accepted back to [facility]. Nurse can call report to [phone]. Transport setup by w/c van for 3pm    Lasha Singh LMSW    [facility]  [phone]

## 2024-05-21 NOTE — ASSESSMENT & PLAN NOTE
Newly dx HFrEF 5/10/2024 Echo:     Left Ventricle: The left ventricle is mildly dilated. Moderate global hypokinesis and regional wall motion abnormalities present. There is moderately reduced systolic function with a visually estimated ejection fraction of 35 - 40%. Biplane (2D) method of discs ejection fraction is 33%. There is diastolic dysfunction. Elevated left ventricular filling pressure.    Right Ventricle: Normal right ventricular cavity size. Systolic function is mildly reduced.    Left Atrium: Left atrium is moderately dilated.    Aortic Valve: There is moderate aortic valve sclerosis. Mildly restricted motion.    Mitral Valve: There is mild annular dilation present. There is mild mitral annular calcification present.    Tricuspid Valve: There is moderate annular dilation present. There is moderate regurgitation.    Pulmonary Artery: The estimated pulmonary artery systolic pressure is 55 mmHg.    IVC/SVC: Elevated venous pressure at 15 mmHg.    Pericardium: Large Bilateral pleural effusions.     Started on GDMT outpatient, holding in the setting of hypotension and possible GIB. Will restart as clinically appropriate.

## 2024-05-21 NOTE — ASSESSMENT & PLAN NOTE
Patient was found to have thrombocytopenia, the likely etiology is secondary to cirrhosis/portal hypertension, will monitor the platelets Daily. Will transfuse if platelet count is <50k (if undergoing surgical procedure or have active bleeding). Hold DVT prophylaxis if platelets are <50k. The patient's platelet results have been reviewed and are listed below.  Recent Labs   Lab 05/20/24  1355   PLT 96*     Chronic, stable

## 2024-05-21 NOTE — ASSESSMENT & PLAN NOTE
See syncope. Patient has acute blood loss due to hemorrhage, the hemorrhage is due to gastrointestinal bleed, patient does have a propensity for bleeding due to coagulapathy 2/2 to cirrhosis, known esophageal varices and pseudoaneurysm s/p IR embo 5/13.. Will trend hemoglobin/hematocrit Every 8 hours, as well as monitor and correct for any coagulation defects. CBC and vital signs have been reviewed and last CBC was noted-   Lab Results   Component Value Date    WBC 3.21 (L) 05/20/2024    HGB 7.6 (L) 05/20/2024    HCT 37 05/20/2024    MCV 87 05/20/2024    PLT 96 (L) 05/20/2024     Will order a type and screen and consent patient for blood transfusion. Will transfuse if Hgb is <7g/dl (<8g/dl in cases of active ACS) or if patient has rapid bleeding leading to hemodynamic instability.    - Continue IV pantoprazole 40mg BID  - Octreotide bolus and gtt  - CTX   - Trend Hgb q8hrs. Transfuse for Hgb > 7, unless otherwise indicated  - Will correct any coagulopathy with platelets and FFP for goal of platelets >50K and INR <2.0  - Maintain IV access with 2 large bore Ivs  - Intravascular resuscitation/support with IVFs   - Hold all NSAIDs and anticoagulants, unless contraindicated

## 2024-05-21 NOTE — PLAN OF CARE
Problem: Physical Therapy  Goal: Physical Therapy Goal  Description: Goals to met by 6/4/2024    1. Sit to stand transfer with Stand-by Assistance  2. Bed to chair transfer with Stand-by Assistance using Rolling Walker  3. Gait  x 50 feet with Stand-by Assistance using Rolling Walker   4. Lower extremity exercise program x15 reps per Instruction, with assistance as needed in order to facilitate improvement in functional independence    PT Eval: 5/21/2024        Resulted

## 2024-05-21 NOTE — DISCHARGE INSTRUCTIONS
Please follow up with hepatology outpatient regarding liver lesion, referral has been placed.     Please stay hydrated especially before physical activity.     Please obtain ultrasound of heart outpatient, order has been placed.

## 2024-05-21 NOTE — HOSPITAL COURSE
Patient admitted for syncope c/f acute blood loss given recent GIB history and IR embolization. CTA AP w/o active extravasation of contrast, new enhancing 1.5cm hepatic lesion noted, c/f perfusion defect. No further episodes of melena since admission, VSS and on RA. Hb 7.6 --> 10.5 with 1u pRBC. Syncope most likely 2/2 to hypovolemia from poor PO intake, diuretic regimen in combination with chronic anemia, as patient reports feeling orthostatic. Mostly bedbound at NH, may wear abdominal binder when working with PT/OT if available. Stable for discharge with hepatology follow up outpatient, PCP f/u for anemia and outpatient echo.

## 2024-05-21 NOTE — PT/OT/SLP EVAL
"Occupational Therapy   Evaluation and Treatment    Co-evaluation with PT to have 2 skilled therapists present to safely assess pt's functional mobility.     Name: Fransico Montalvo  MRN: [id number]  Admitting Diagnosis: Syncope  Recent Surgery: * No surgery found *      Recommendations:     Discharge Recommendations: Moderate Intensity Therapy (return to)  Discharge Equipment Recommendations:  other (see comments) (SNF equipment)  Barriers to discharge:  None    Assessment:     Fransico Montalvo is a 68 y.o. male with a medical diagnosis of Syncope.  Pt tolerated session well and without incident, but he's limited by dizziness/lightheadedness.  He requires assistance with ADLs and mobility.  Return to moderate intensity therapy recommended at d/c to maximize pt's gains in functional independence.  He presents with the following. Performance deficits affecting function: weakness, impaired endurance, impaired self care skills, impaired functional mobility, gait instability, impaired balance, impaired cardiopulmonary response to activity, pain, impaired skin.      Rehab Prognosis: Good; patient would benefit from acute skilled OT services to address these deficits and reach maximum level of function.       Plan:     Patient to be seen 3 x/week to address the above listed problems via self-care/home management, therapeutic activities, therapeutic exercises  Plan of Care Expires: 06/20/24  Plan of Care Reviewed with: patient    Subjective     Chief Complaint: dizziness/lightheadedness  Patient/Family Comments/goals: "When will I be able to get around again without feeling dizzy?"    Occupational Profile:  Living Environment: Pt resides at [facility].    Previous level of function: Pt required assistance with ADLs and mobility.  He required assistance for functional transfers but was Mod I for propelling w/c.  Was getting OT/PT.   Roles and Routines: SNF resident  Equipment Used at Home: other (see comments) (SNF equipment).  Pt " said he's been wearing oxygen the past 2 months.    Assistance upon Discharge: SNF staff    Pain/Comfort:  Pain Rating 1: 5/10  Location - Orientation 1: generalized  Location 1: abdomen  Pain Addressed 1: Pre-medicate for activity, Reposition, Distraction  Pain Rating Post-Intervention 1: 5/10    Patients cultural, spiritual, Sikh conflicts given the current situation: no    Objective:     Communicated with: nurse and PT prior to session.  Patient found HOB elevated with oxygen, telemetry, pulse ox (continuous), peripheral IV (IV not connected and running) upon OT entry to room.    General Precautions: Standard, fall  Orthopedic Precautions: N/A  Braces: N/A  Respiratory Status: Nasal cannula, flow 2 L/min    Occupational Performance:    Bed Mobility:    Patient completed Rolling/Turning to Right with stand by assistance  Patient completed Scooting/Bridging with stand by assistance  Patient completed Supine to Sit with stand by assistance    Functional Mobility/Transfers:  Patient completed Sit <> Stand Transfer from EOB x 1 trial with contact guard assistance  with  rolling walker   Patient completed Bed <> Chair Transfer using Step Transfer technique with contact guard assistance with rolling walker  Functional Mobility: Pt ambulated ~3 ft from EOB to the bedside chair with CGA with RW.  He reported dizziness, which subsided once reclined in the bedside chair.     Activities of Daily Living:  Pt declined to perform ADLs.     Cognitive/Visual Perceptual:  Cognitive/Psychosocial Skills:     -       Oriented to: Person, Place, Time, and Situation   -       Follows Commands/attention:Follows multistep  commands  -       Communication: clear/fluent    Physical Exam:  Skin integrity: small, bloody spots on LLE and R dorsal hand bloody but covered    Dominant hand:    -       R-handed  Upper Extremity Range of Motion:     -       Right Upper Extremity: WFL  -       Left Upper Extremity: WFL  Upper Extremity  Strength:    -       Right Upper Extremity: WFL  -       Left Upper Extremity: WFL   Strength:    -       Right Upper Extremity: WFL  -       Left Upper Extremity: WFL  Fine Motor Coordination:    -       Intact  Left hand thumb/finger opposition skills and Right hand thumb/finger opposition skills    AMPA 6 Click ADL:  AMPAC Total Score: 19    Treatment & Education:  Pt edu on role of OT, POC, safety when performing self care tasks, benefit of performing OOB activity, and safety when performing functional transfers and mobility.    - Self care tasks completed-- as noted above      Patient left up in chair with all lines intact and call button in reach    GOALS:   Multidisciplinary Problems       Occupational Therapy Goals          Problem: Occupational Therapy    Goal Priority Disciplines Outcome Interventions   Occupational Therapy Goal     OT, PT/OT Progressing    Description: Goals to be met by: 6/4/2024     Patient will increase functional independence with ADLs by performing:    UE Dressing with Stand-by Assistance.  LE Dressing with SBA.  Grooming while seated at sink with Set-up Assistance.  Toileting from toilet with Stand-by Assistance for hygiene and clothing management.   Supine to sit with Supervision.  Step transfer with Stand-by Assistance with RW.  Toilet transfer to toilet with Stand-by Assistance with RW.                         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    s/p coil embolization of GDA and  multiple pancreaticoduodenal branches with IR         Past Surgical History:   Procedure Laterality Date    CAPSULOTOMY OF JOINT Right 7/29/2019    Procedure: CAPSULOTOMY, JOINT;  Surgeon: Raj Grossman MD;  Location: [facility] OR 2ND FLR;  Service: Orthopedics;  Laterality: Right;    COLONOSCOPY N/A 4/1/2024    Procedure: COLONOSCOPY;  Surgeon: Scarlett Whitt MD;  Location: [facility] ENDO (2ND FLR);  Service: Gastroenterology;  Laterality: N/A;    ESOPHAGOGASTRODUODENOSCOPY N/A 10/25/2023    Procedure: EGD (ESOPHAGOGASTRODUODENOSCOPY);  Surgeon: Lux Rome MD;  Location: [facility] ENDO (2ND FLR);  Service: Endoscopy;  Laterality: N/A;    ESOPHAGOGASTRODUODENOSCOPY N/A 4/19/2024    Procedure: EGD (ESOPHAGOGASTRODUODENOSCOPY);  Surgeon: Raj Roberts MD;  Location: [facility] ENDO (2ND FLR);  Service: Endoscopy;  Laterality: N/A;    ESOPHAGOGASTRODUODENOSCOPY N/A 5/10/2024    Procedure: EGD (ESOPHAGOGASTRODUODENOSCOPY);  Surgeon: Ashley Trent MD;  Location: [facility] ENDO (2ND FLR);  Service: Endoscopy;  Laterality: N/A;    PERCUTANEOUS PINNING OF HIP Right 7/29/2019    Procedure: PINNING, HIP, PERCUTANEOUS- synthes cannulated screws- hana table- C arm door side-;  Surgeon: Raj Grossman MD;  [facility];  Service: Orthopedics;  Laterality: Right;       Time Tracking:     OT Date of Treatment: 05/21/24  OT Start Time: 1308  OT Stop Time: 1328  OT Total Time (min): 20 min    Billable Minutes:Evaluation 10 min  Therapeutic Activity 10 min    5/21/2024

## 2024-05-21 NOTE — ASSESSMENT & PLAN NOTE
Patient with syncopal event at nursing home, reports lightheadedness/dizziness and weakness prior to the event. Occurred while patient was being helped with bathing. LOC for ~1 minute, denies head trauma. Vomited after w/o hematemesis. Patient with recent GIB s/p GDA embolization on 5/13. Last episode of melena 2 days ago, reports associated epigastric pain that has been present for ~2 months but worse the last few days. Denies NSAID use, uses tylenol for pain. Hypotensive, MAPs low 60s, somewhat improved with 250cc IVF bolus. Does not have overt abdominal TTP on exam. FOBT negative in the ED. Hb 7.6 but is stable. Ddx for syncope include hypovolemia 2/2 acute blood loss vs chronic anemia. CTA AP and 1u pRBC ordered. Loaded with IV PPI, octreotide given h/o esophageal varices.    - f/u CTA AP  - GI consult pending CTA AP results or if patient decompensates  - trend CBC q8h   - loaded with 80mg IV PPI, continue 40mg BID  - loaded with octreotide, start gtt  - CTX started for possible GIB in setting of cirrhosis and known varices

## 2024-05-21 NOTE — NURSING
Nurses Note -- 4 Eyes      5/21/2024   4:54 AM      Skin assessed during: Admit      [x] No Altered Skin Integrity Present    [x]Prevention Measures Documented      [] Yes- Altered Skin Integrity Present or Discovered   [] LDA Added if Not in Epic (Describe Wound)   [] New Altered Skin Integrity was Present on Admit and Documented in LDA   [] Wound Image Taken    Wound Care Consulted? No    Attending Nurse:  Usha Erwin RN/Staff Member:   Rosette MONTES

## 2024-05-21 NOTE — DISCHARGE SUMMARY
[facility] - Telemetry [facility] Medicine  Discharge Summary      Patient Name: Fransico Montalvo  MRN: [id number]  ILIANA: [id number]  Patient Class: IP- Inpatient  Admission Date: 5/20/2024  Hospital Length of Stay: 1 days  Discharge Date and Time:  05/21/2024 12:14 PM  Attending Physician: Tiffany Galaviz MD   Discharging Provider: Mariah Donovan MD  Primary Care Provider: [facility] Medicine Team: [facility]OS[facility] MED 5 Mariah Donovan MD  Primary Care Team: [facility] HOS[facility] MED 5    HPI:   Mr. Montalvo is a 69yo M with PMHx of alcohol use disorder c/b cirrhosis and esophageal varices s/p banding 2018, CAD, BPH, COPD, HTN, newly diagnosed HFrEF (35-40%) who presented to [facility] ED for a syncopal event at his nursing home. Reports he had LOC for 1 minute followed by vomiting upon waking, denies hematemesis or head trauma. States he has had intermittent lightheadedness for the past couple months that he believes is 2/2 to known anemia requiring blood transfusions. 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 Has had intermittent melena in this time frame, last episode was 2 days ago with associated epigastric pain.     In the ED, patient hypotensive with MAPs as low as 59, received 250cc IVF bolus d/t HFrEF with some improvement. Requiring 3L NC, does not wear oxygen at home. Hb 7.6, around baseline the past couple months. Plt 96, near around baseline as chronic thrombocytopenia noted on chart review. CMP with Cr 1.4, baseline as of recent 1.0-1.4. BNP 3099, which has been elevated in 2000-4000s since March. Trop wnl. CXR with small-moderate R>L pleural effusions, stable since prior imaging. Patient admitted to  for syncope possibly 2/2 to acute blood  loss in the setting of recent GIB and embolization.     * No surgery found *      Hospital Course:   Patient admitted for syncope c/f acute blood loss given recent GIB history and IR embolization. CTA AP w/o active extravasation of contrast, new enhancing 1.5cm hepatic lesion noted, c/f perfusion defect. No further episodes of melena since admission, VSS and on RA. Hb 7.6 --> 10.5 with 1u pRBC. Syncope most likely 2/2 to hypovolemia from poor PO intake, diuretic regimen in combination with chronic anemia, as patient reports feeling orthostatic. Mostly bedbound at NH, may wear abdominal binder when working with PT/OT if available. Stable for discharge with hepatology follow up outpatient, PCP f/u for anemia and outpatient echo.      Goals of Care Treatment Preferences:  Code Status: Full Code      Consults:     No new Assessment & Plan notes have been filed under this hospital service since the last note was generated.  Service: Hospital Medicine    Final Active Diagnoses:    Diagnosis Date Noted POA    PRINCIPAL PROBLEM:  Syncope [R55] 11/03/2022 Yes    Acute hypoxic respiratory failure [J96.01] 05/20/2024 Yes    HFrEF (heart failure with reduced ejection fraction) [I50.20] 05/10/2024 Yes    GI bleed [K92.2] 03/29/2024 Yes    Thrombocytopenia [D69.6] 07/31/2018 Yes     Chronic    Alcoholic cirrhosis [K70.30] 07/31/2018 Yes     Chronic      Problems Resolved During this Admission:       Discharged Condition: stable    Disposition:     Follow Up:   Follow-up Information       [facility]    Specialties: Nursing Home Agency, SNF Agency  Why: for anemia  Contact information:  [address] [facility] [address] [phone]               Hepatology [facility] - [facility] Follow up in 1 month(s).    Specialty: Hepatology  Why: for liver lesion  Contact information:  [address]ia Blas  [facility] [address] [phone]  Additional information:  [facility] -  Main Building, 1st floor near Clinic elevator   Please park in [facility] and walk through Clinic elevator hallway                         Patient Instructions:      Ambulatory referral/consult to Hepatology   Standing Status: Future   Referral Priority: Urgent Referral Type: Consultation   Referral Reason: Specialty Services Required   Requested Specialty: Hepatology   Number of Visits Requested: 1     Notify your health care provider if you experience any of the following:  persistent dizziness, light-headedness, or visual disturbances     Notify your health care provider if you experience any of the following:  increased confusion or weakness     Notify your health care provider if you experience any of the following:  difficulty breathing or increased cough     Echo   Standing Status: Future Standing Exp. Date: 05/21/25     Order Specific Question Answer Comments   Release to patient Immediate      Activity as tolerated       Significant Diagnostic Studies: N/A    Pending Diagnostic Studies:       Procedure Component Value Units Date/Time    CBC with Automated Differential [[id number]]     Order Status: Sent Lab Status: No result     Specimen: Blood            Medications:  Reconciled Home Medications:      Medication List        START taking these medications      pantoprazole 40 MG tablet  Commonly known as: PROTONIX  Take 1 tablet (40 mg total) by mouth once daily.            CHANGE how you take these medications      metoprolol succinate 25 MG 24 hr tablet  Commonly known as: TOPROL-XL  Take 1 tablet (25 mg total) by mouth once daily.  What changed: additional instructions     valsartan 40 MG tablet  Commonly known as: DIOVAN  Take 0.5 tablets (20 mg total) by mouth 2 (two) times daily.  What changed: how much to take            CONTINUE taking these medications      aspirin 81 MG EC tablet  Commonly known as: ECOTRIN  Take 1 tablet (81 mg total) by mouth once daily.     atorvastatin 40 MG tablet  Commonly  known as: LIPITOR  Take 1 tablet (40 mg total) by mouth every evening.     empagliflozin 10 mg tablet  Commonly known as: Jardiance  Take 1 tablet (10 mg total) by mouth once daily.     EScitalopram oxalate 10 MG tablet  Commonly known as: LEXAPRO  Take 10 mg by mouth once daily.     fluticasone furoate-vilanteroL 100-25 mcg/dose diskus inhaler  Commonly known as: BREO  Inhale 1 puff into the lungs once daily. Controller     folic acid 1 MG tablet  Commonly known as: FOLVITE  Take 1 tablet (1 mg total) by mouth once daily.     furosemide 20 MG tablet  Commonly known as: LASIX  Take 1 tablet (20 mg total) by mouth once daily.     lactulose 10 gram/15 mL solution  Commonly known as: CHRONULAC  Take 30 mLs (20 g total) by mouth 3 (three) times daily as needed (Titrate to 2-3 bowel movements a day).     spironolactone 25 MG tablet  Commonly known as: ALDACTONE  Take 0.5 tablets (12.5 mg total) by mouth once daily.     thiamine 100 MG tablet  Take 100 mg by mouth once daily.              Indwelling Lines/Drains at time of discharge:   Lines/Drains/Airways       None                   Time spent on the discharge of patient: >60 minutes         Mariah Donovan MD  Department of Hospital Medicine  [facility] - Telemetry Stepdown

## 2024-05-21 NOTE — PLAN OF CARE
Problem: Adult Inpatient Plan of Care  Goal: Plan of Care Review  5/21/2024 1635 by Jeanne Bourne RN  Outcome: Met  5/21/2024 1635 by Jeanne Bourne RN  Outcome: Progressing  Goal: Patient-Specific Goal (Individualized)  5/21/2024 1635 by Jeanne Bourne RN  Outcome: Met  5/21/2024 1635 by Jeanne Bourne RN  Outcome: Progressing  Goal: Absence of Hospital-Acquired Illness or Injury  5/21/2024 1635 by Jeanne Bourne RN  Outcome: Met  5/21/2024 1635 by Jeanne Bourne RN  Outcome: Progressing  Goal: Optimal Comfort and Wellbeing  5/21/2024 1635 by Jeanne Bourne RN  Outcome: Met  5/21/2024 1635 by Jeanne Bourne RN  Outcome: Progressing  Goal: Readiness for Transition of Care  5/21/2024 1635 by Jeanne Bourne RN  Outcome: Met  5/21/2024 1635 by Jeanne Bourne RN  Outcome: Progressing     Problem: Acute Kidney Injury/Impairment  Goal: Fluid and Electrolyte Balance  5/21/2024 1635 by Jeanne Bourne RN  Outcome: Met  5/21/2024 1635 by Jeanne Bourne RN  Outcome: Progressing  Goal: Improved Oral Intake  5/21/2024 1635 by Jeanne Bourne RN  Outcome: Met  5/21/2024 1635 by Jeanne Bourne RN  Outcome: Progressing  Goal: Effective Renal Function  5/21/2024 1635 by Jeanne Bourne RN  Outcome: Met  5/21/2024 1635 by Jeanne Bourne RN  Outcome: Progressing     Problem: Pneumonia  Goal: Fluid Balance  5/21/2024 1635 by Jeanne Bourne RN  Outcome: Met  5/21/2024 1635 by Jeanne Bourne RN  Outcome: Progressing  Goal: Resolution of Infection Signs and Symptoms  5/21/2024 1635 by Jeanne Bourne RN  Outcome: Met  5/21/2024 1635 by Jeanne Bourne RN  Outcome: Progressing  Goal: Effective Oxygenation and Ventilation  5/21/2024 1635 by Jeanne Bourne RN  Outcome: Met  5/21/2024 1635 by Jeanne Bourne RN  Outcome: Progressing     Problem: Skin Injury Risk Increased  Goal: Skin Health and Integrity  5/21/2024 1635 by Jeanne Bourne RN  Outcome: Met  5/21/2024 1635 by Jeanne Bourne RN  Outcome: Progressing

## 2024-05-21 NOTE — H&P
[facility] - Emergency Dept  Hospital Medicine  History & Physical    Patient Name: Fransico Montalvo  MRN: [id number]  Patient Class: IP- Inpatient  Admission Date: 5/20/2024  Attending Physician: Tiffany Galaviz MD   Primary Care Provider: [facility]         Patient information was obtained from patient, past medical records, and ER records.     Subjective:     Principal Problem:Syncope    Chief Complaint:   Chief Complaint   Patient presents with    Loss of Consciousness     Watkins like he passing out in bed, woke up with nausea. Hx of receiving BP.        HPI: Mr. Montalvo is a 69yo M with PMHx of alcohol use disorder c/b cirrhosis and esophageal varices s/p banding 2018, CAD, BPH, COPD, HTN, newly diagnosed HFrEF (35-40%) who presented to [facility] ED for a syncopal event at his nursing home. Reports he had LOC for 1 minute followed by vomiting upon waking, denies hematemesis or head trauma. States he has had intermittent lightheadedness for the past couple months that he believes is 2/2 to known anemia requiring blood transfusions. 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 Has had intermittent melena in this time frame, last episode was 2 days ago with associated epigastric pain.     In the ED, patient hypotensive with MAPs as low as 59, received 250cc IVF bolus d/t HFrEF with some improvement. Requiring 3L NC, does not wear oxygen at home. Hb 7.6, around baseline the past couple months. Plt 96, near around baseline as chronic thrombocytopenia noted on chart review. CMP with Cr 1.4, baseline as of recent 1.0-1.4. BNP 3099, which has been elevated in 2000-4000s since March. Trop wnl. CXR with small-moderate R>L pleural effusions, stable since prior  imaging. Patient admitted to  for syncope possibly 2/2 to acute blood loss in the setting of recent GIB and embolization.     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;  Service: Orthopedics;  Laterality: Right;    COLONOSCOPY N/A 4/1/2024    Procedure: COLONOSCOPY;  Surgeon: Scarlett Whitt MD;  Location: [facility] (2ND FLR);  Service: Gastroenterology;  Laterality: N/A;    ESOPHAGOGASTRODUODENOSCOPY N/A 10/25/2023    Procedure: EGD (ESOPHAGOGASTRODUODENOSCOPY);  Surgeon: Lux Rome MD;  Location: [facility] (2ND FLR);  Service: Endoscopy;  Laterality: N/A;    ESOPHAGOGASTRODUODENOSCOPY N/A 4/19/2024    Procedure: EGD (ESOPHAGOGASTRODUODENOSCOPY);  Surgeon: Raj Roberts MD;  Location: [facility] (2ND FLR);  Service: Endoscopy;  Laterality: N/A;    ESOPHAGOGASTRODUODENOSCOPY N/A 5/10/2024    Procedure: EGD (ESOPHAGOGASTRODUODENOSCOPY);  Surgeon: Ashley Trent MD;  Location: [facility] ENDO (2ND FLR);  Service: Endoscopy;  Laterality: N/A;    PERCUTANEOUS PINNING OF HIP Right 7/29/2019    Procedure: PINNING, HIP, PERCUTANEOUS- synthes cannulated screws- hana table- C arm door side-;  Surgeon: Raj Grossman MD;  [facility];  Service: Orthopedics;   Laterality: Right;       Review of patient's allergies indicates:  No Known Allergies    No current facility-administered medications on file prior to encounter.     Current Outpatient Medications on File Prior to Encounter   Medication Sig    acetaminophen (TYLENOL) 500 MG tablet Take 1 tablet (500 mg total) by mouth every 6 (six) hours as needed for Pain.    albuterol-ipratropium (DUO-NEB) 2.5 mg-0.5 mg/3 mL nebulizer solution Take 3 mLs by nebulization every 6 (six) hours as needed for Wheezing or Shortness of Breath. Rescue    aspirin (ECOTRIN) 81 MG EC tablet Take 1 tablet (81 mg total) by mouth once daily.    atorvastatin (LIPITOR) 40 MG tablet Take 1 tablet (40 mg total) by mouth every evening.    empagliflozin (JARDIANCE) 10 mg tablet Take 1 tablet (10 mg total) by mouth once daily.    EScitalopram oxalate (LEXAPRO) 10 MG tablet Take 10 mg by mouth once daily.    fluticasone furoate-vilanteroL (BREO) 100-25 mcg/dose diskus inhaler Inhale 1 puff into the lungs once daily. Controller    folic acid (FOLVITE) 1 MG tablet Take 1 tablet (1 mg total) by mouth once daily.    furosemide (LASIX) 20 MG tablet Take 1 tablet (20 mg total) by mouth once daily.    lactulose (CHRONULAC) 10 gram/15 mL solution Take 30 mLs (20 g total) by mouth 3 (three) times daily as needed (Titrate to 2-3 bowel movements a day).    melatonin (MELATIN) 3 mg tablet Take 2 tablets (6 mg total) by mouth nightly as needed for Insomnia.    metoprolol succinate (TOPROL-XL) 25 MG 24 hr tablet Take 1 tablet (25 mg total) by mouth once daily.    spironolactone (ALDACTONE) 25 MG tablet Take 0.5 tablets (12.5 mg total) by mouth once daily.    thiamine 100 MG tablet Take 100 mg by mouth once daily.    valsartan (DIOVAN) 40 MG tablet Take 1 tablet (40 mg total) by mouth 2 (two) times daily.     Family History       Problem Relation (Age of Onset)    Heart disease Father, Brother    Hypertension Father          Tobacco Use    Smoking status: Every Day      Current packs/day: 1.00     Types: Cigarettes    Smokeless tobacco: Never   Substance and Sexual Activity    Alcohol use: Yes     Comment: (former drinker; 2months sober) this visit admits drinking a couple beers today    Drug use: No    Sexual activity: Not on file     Review of Systems   Constitutional:  Positive for fatigue.   Respiratory:  Positive for shortness of breath.    Cardiovascular:  Negative for chest pain.   Gastrointestinal:  Positive for abdominal pain, blood in stool and vomiting.   Neurological:  Positive for syncope and weakness.     Objective:     Vital Signs (Most Recent):  Temp: 97.6 °F (36.4 °C) (05/20/24 1937)  Pulse: 66 (05/20/24 1937)  Resp: 20 (05/20/24 1937)  BP: 110/68 (05/20/24 1937)  SpO2: 100 % (05/20/24 1937) Vital Signs (24h Range):  Temp:  [97.3 °F (36.3 °C)-97.6 °F (36.4 °C)] 97.6 °F (36.4 °C)  Pulse:  [60-75] 66  Resp:  [16-20] 20  SpO2:  [96 %-100 %] 100 %  BP: ()/(52-68) 110/68        There is no height or weight on file to calculate BMI.     Physical Exam  Constitutional:       General: He is not in acute distress.     Appearance: Normal appearance. He is ill-appearing.   HENT:      Head: Normocephalic and atraumatic.   Eyes:      Extraocular Movements: Extraocular movements intact.      Conjunctiva/sclera: Conjunctivae normal.   Cardiovascular:      Rate and Rhythm: Normal rate and regular rhythm.      Heart sounds: Normal heart sounds.   Pulmonary:      Effort: Pulmonary effort is normal. No respiratory distress.      Breath sounds: Normal breath sounds. No wheezing or rales.   Abdominal:      Palpations: Abdomen is soft.      Tenderness: There is no abdominal tenderness.   Musculoskeletal:      Right lower leg: No edema.      Left lower leg: No edema.   Skin:     General: Skin is warm and dry.      Coloration: Skin is pale.   Neurological:      Mental Status: He is alert and oriented to person, place, and time.   Psychiatric:         Mood and Affect: Mood normal.          Behavior: Behavior normal.         Thought Content: Thought content normal.         Judgment: Judgment normal.                Significant Labs: All pertinent labs within the past 24 hours have been reviewed.  CBC:   Recent Labs   Lab 05/20/24  1355 05/20/24  1528   WBC 3.21*  --    HGB 7.6*  --    HCT 26.1* 37   PLT 96*  --      CMP:   Recent Labs   Lab 05/20/24  1520   *   K 4.9   CL 95   CO2 27   GLU 91   BUN 15   CREATININE 1.4   CALCIUM 8.3*   PROT 7.8   ALBUMIN 2.7*   BILITOT 0.8   ALKPHOS 88   AST 51*   ALT 12   ANIONGAP 10       Significant Imaging: I have reviewed all pertinent imaging results/findings within the past 24 hours.    CXR: I have reviewed all pertinent results/findings within the past 24 hours and findings are:     Cardiomediastinal silhouette is midline and prominent similar to prior. Redemonstrated right more than left small to moderate pleural effusions with probable ongoing bibasilar atelectasis/infiltrate. No apical pneumothorax. Hilar contours are unchanged. No acute osseous process seen. PA and lateral views can be obtained.       Assessment/Plan:     * Syncope  Patient with syncopal event at nursing home, reports lightheadedness/dizziness and weakness prior to the event. Occurred while patient was being helped with bathing. LOC for ~1 minute, denies head trauma. Vomited after w/o hematemesis. Patient with recent GIB s/p GDA embolization on 5/13. Last episode of melena 2 days ago, reports associated epigastric pain that has been present for ~2 months but worse the last few days. Denies NSAID use, uses tylenol for pain. Hypotensive, MAPs low 60s, somewhat improved with 250cc IVF bolus. Does not have overt abdominal TTP on exam. FOBT negative in the ED. Hb 7.6 but is stable. Ddx for syncope include hypovolemia 2/2 acute blood loss vs chronic anemia. CTA AP and 1u pRBC ordered. Loaded with IV PPI, octreotide given h/o esophageal varices.    - f/u CTA AP  - GI consult pending CTA  AP results or if patient decompensates  - trend CBC q8h   - loaded with 80mg IV PPI, continue 40mg BID  - loaded with octreotide, start gtt  - CTX started for possible GIB in setting of cirrhosis and known varices      Acute hypoxic respiratory failure  Patient with likely COPD given smoking history, does not wear oxygen at home. States he has been feeling SOB mainly with ambulation since his GIB and anemia started a couple months ago. Was able to wean O2 down from 3L to 2L at bedside. Will continue to wean as tolerated. CXR stable since last admission. Low suspicion for COPD exacerbation as patients lungs sound clear, no wheezing noted on exam and patient is not tachypneic. Possible that CHF may be contribute with small-mod bilateral pleural effusions, patient states that he was started on lasix a few days ago to help with fluid in his lungs. Does not appear to be volume overloaded on exam. Will consider IV diuresis as clinically appropriate. SOB/FRY likely 2/2 to anemia.     - wean O2 as tolerated   - consider IV diuresis after blood transfusion if O2 requirements increase    HFrEF (heart failure with reduced ejection fraction)  Newly dx HFrEF 5/10/2024 Echo:     Left Ventricle: The left ventricle is mildly dilated. Moderate global hypokinesis and regional wall motion abnormalities present. There is moderately reduced systolic function with a visually estimated ejection fraction of 35 - 40%. Biplane (2D) method of discs ejection fraction is 33%. There is diastolic dysfunction. Elevated left ventricular filling pressure.    Right Ventricle: Normal right ventricular cavity size. Systolic function is mildly reduced.    Left Atrium: Left atrium is moderately dilated.    Aortic Valve: There is moderate aortic valve sclerosis. Mildly restricted motion.    Mitral Valve: There is mild annular dilation present. There is mild mitral annular calcification present.    Tricuspid Valve: There is moderate annular dilation  present. There is moderate regurgitation.    Pulmonary Artery: The estimated pulmonary artery systolic pressure is 55 mmHg.    IVC/SVC: Elevated venous pressure at 15 mmHg.    Pericardium: Large Bilateral pleural effusions.     Started on GDMT outpatient, holding in the setting of hypotension and possible GIB. Will restart as clinically appropriate.       GI bleed  See syncope. Patient has acute blood loss due to hemorrhage, the hemorrhage is due to gastrointestinal bleed, patient does have a propensity for bleeding due to coagulapathy 2/2 to cirrhosis, known esophageal varices and pseudoaneurysm s/p IR embo 5/13.. Will trend hemoglobin/hematocrit Every 8 hours, as well as monitor and correct for any coagulation defects. CBC and vital signs have been reviewed and last CBC was noted-   Lab Results   Component Value Date    WBC 3.21 (L) 05/20/2024    HGB 7.6 (L) 05/20/2024    HCT 37 05/20/2024    MCV 87 05/20/2024    PLT 96 (L) 05/20/2024     Will order a type and screen and consent patient for blood transfusion. Will transfuse if Hgb is <7g/dl (<8g/dl in cases of active ACS) or if patient has rapid bleeding leading to hemodynamic instability.    - Continue IV pantoprazole 40mg BID  - Octreotide bolus and gtt  - CTX   - Trend Hgb q8hrs. Transfuse for Hgb > 7, unless otherwise indicated  - Will correct any coagulopathy with platelets and FFP for goal of platelets >50K and INR <2.0  - Maintain IV access with 2 large bore Ivs  - Intravascular resuscitation/support with IVFs   - Hold all NSAIDs and anticoagulants, unless contraindicated      Alcoholic cirrhosis  Patient with known Cirrhosis. Co-morbidities are present and inclusive of esophageal varices.  MELD-Na score calculated; MELD 3.0: 19 at 5/15/2024  6:00 AM  MELD-Na: 19 at 5/15/2024  6:00 AM  Calculated from:  Serum Creatinine: 1.4 mg/dL at 5/15/2024  6:00 AM  Serum Sodium: 130 mmol/L at 5/15/2024  6:00 AM  Total Bilirubin: 0.8 mg/dL (Using min of 1 mg/dL) at  5/15/2024  6:00 AM  Serum Albumin: 2.5 g/dL at 5/15/2024  6:00 AM  INR(ratio): 1.3 at 5/15/2024  6:00 AM  Age at listing (hypothetical): 68 years  Sex: Male at 5/15/2024  6:00 AM    Holding chronic meds d/t c/f GIB and hypotension. Etiology likely ETOH. Will avoid any hepatotoxic meds, and monitor CBC/CMP/INR for synthetic function.     Thrombocytopenia  Patient was found to have thrombocytopenia, the likely etiology is secondary to cirrhosis/portal hypertension, will monitor the platelets Daily. Will transfuse if platelet count is <50k (if undergoing surgical procedure or have active bleeding). Hold DVT prophylaxis if platelets are <50k. The patient's platelet results have been reviewed and are listed below.  Recent Labs   Lab 05/20/24  1355   PLT 96*     Chronic, stable      VTE Risk Mitigation (From admission, onward)           Ordered     IP VTE HIGH RISK PATIENT  Once         05/20/24 1831     Place sequential compression device  Until discontinued         05/20/24 1831                                    Mariah Donovan MD  Department of Hospital Medicine  [facility] - Emergency Dept

## 2024-05-22 NOTE — PLAN OF CARE
[facility] - Telemetry Stepdown  Discharge Final Note    Primary Care Provider: [facility]    Expected Discharge Date: 5/21/2024    Final Discharge Note (most recent)       Final Note - 05/22/24 0841          Final Note    Assessment Type Final Discharge Note     Anticipated Discharge Disposition Skilled Nursing Facility        Post-Acute Status    Discharge Delays None known at this time                     Important Message from Medicare             Contact Info       [facility]   Specialty: Nursing Home Agency, SNF Agency   Relationship: PCP - General    [address]   [place] [address]   Phone: [phone]       Next Steps: Follow up    Instructions: for anemia    Hepatology Clinic - [facility]   Specialty: Hepatology    [address]  [facility] [address]   Phone: [phone]       Next Steps: Follow up in 1 month(s)    Instructions: for liver lesion         Pt D/C back to [facility]. Transport setup with W/C van. No other needs noted.  Discharge Plan A and Plan B have been determined by review of patient's clinical status, future medical and therapeutic needs, and coverage/benefits for post-acute care in coordination with multidisciplinary team members.  Lasha Singh, [facility]    [facility]  [phone]

## 2024-05-24 ENCOUNTER — EPISODE CHANGES (OUTPATIENT)
Dept: CARDIOLOGY | Facility: CLINIC | Age: 69
End: 2024-05-24

## 2024-05-24 ENCOUNTER — DOCUMENTATION ONLY (OUTPATIENT)
Dept: CARDIOLOGY | Facility: CLINIC | Age: 69
End: 2024-05-24
Payer: MEDICARE

## 2024-05-24 NOTE — PROGRESS NOTES
Heart Failure Transitional Care Clinic (HFTCC) Team notified of pt referral via Ambulatory Referral to Heart Failure Transitional Care ([id number]).    Patient screened today 5-23-4-2024 by provider and RN for enrollment to program.      Pt was deemed not a candidate for enrollment at this time related to patient has follow up barrier: pt is being discharged to non-[facility] skilled nursing/rehab facility, nursing home or other facility that will be unable to assist pt with participation.  PT returning to [facility]    Pt will require additional follow up planning per primary team.     If pt status, diagnosis, or treatment plan changes , please place AMB referral to Heart Failure Transitional Care Clinic ([id number]) for HFTCC enrollment re-evalution.

## 2024-05-24 NOTE — PHYSICIAN QUERY
Based on your medical judgment and in order to clinically support the documented diagnosis, please document the clinical indicators (signs, symptoms, & treatment) that were utilized to support Acute hypoxic respiratory failure.  The condition is not confirmed and/or it has been ruled out

## 2024-07-01 PROBLEM — J18.9 PNEUMONIA: Status: RESOLVED | Noted: 2024-03-29 | Resolved: 2024-07-01

## 2024-07-08 PROBLEM — J96.01 ACUTE HYPOXEMIC RESPIRATORY FAILURE: Status: RESOLVED | Noted: 2024-04-04 | Resolved: 2024-07-08

## 2024-07-08 PROBLEM — N17.9 AKI (ACUTE KIDNEY INJURY): Status: RESOLVED | Noted: 2019-07-29 | Resolved: 2024-07-08

## 2024-08-07 ENCOUNTER — HOSPITAL ENCOUNTER (INPATIENT)
Facility: HOSPITAL | Age: 69
LOS: 4 days | Discharge: HOME | DRG: 270 | End: 2024-08-11
Attending: EMERGENCY MEDICINE | Admitting: INTERNAL MEDICINE
Payer: MEDICARE

## 2024-08-07 DIAGNOSIS — K62.5 RECTAL BLEED: Primary | ICD-10-CM

## 2024-08-07 DIAGNOSIS — K92.2 GASTROINTESTINAL HEMORRHAGE, UNSPECIFIED GASTROINTESTINAL HEMORRHAGE TYPE: ICD-10-CM

## 2024-08-07 DIAGNOSIS — I85.01 BLEEDING ESOPHAGEAL VARICES, UNSPECIFIED ESOPHAGEAL VARICES TYPE: ICD-10-CM

## 2024-08-07 DIAGNOSIS — I50.20 HFREF (HEART FAILURE WITH REDUCED EJECTION FRACTION): ICD-10-CM

## 2024-08-07 LAB
ABO + RH BLD: NORMAL
ALBUMIN SERPL BCP-MCNC: 3 G/DL (ref 3.5–5.2)
ALP SERPL-CCNC: 91 U/L (ref 55–135)
ALT SERPL W/O P-5'-P-CCNC: 13 U/L (ref 10–44)
ANION GAP SERPL CALC-SCNC: 11 MMOL/L (ref 8–16)
ANISOCYTOSIS BLD QL SMEAR: SLIGHT
AST SERPL-CCNC: 24 U/L (ref 10–40)
BASOPHILS # BLD AUTO: 0 K/UL (ref 0–0.2)
BASOPHILS NFR BLD: 0 % (ref 0–1.9)
BILIRUB SERPL-MCNC: 0.4 MG/DL (ref 0.1–1)
BLD GP AB SCN CELLS X3 SERPL QL: NORMAL
BLD PROD TYP BPU: NORMAL
BLOOD UNIT EXPIRATION DATE: NORMAL
BLOOD UNIT TYPE CODE: 5100
BLOOD UNIT TYPE: NORMAL
BUN SERPL-MCNC: 55 MG/DL (ref 8–23)
CALCIUM SERPL-MCNC: 8.5 MG/DL (ref 8.7–10.5)
CHLORIDE SERPL-SCNC: 101 MMOL/L (ref 95–110)
CO2 SERPL-SCNC: 19 MMOL/L (ref 23–29)
CODING SYSTEM: NORMAL
CREAT SERPL-MCNC: 2.4 MG/DL (ref 0.5–1.4)
CROSSMATCH INTERPRETATION: NORMAL
DACRYOCYTES BLD QL SMEAR: ABNORMAL
DIFFERENTIAL METHOD BLD: ABNORMAL
DISPENSE STATUS: NORMAL
DOHLE BOD BLD QL SMEAR: PRESENT
EOSINOPHIL # BLD AUTO: 0 K/UL (ref 0–0.5)
EOSINOPHIL NFR BLD: 0 % (ref 0–8)
ERYTHROCYTE [DISTWIDTH] IN BLOOD BY AUTOMATED COUNT: 15.9 % (ref 11.5–14.5)
EST. GFR  (NO RACE VARIABLE): 28.5 ML/MIN/1.73 M^2
GIANT PLATELETS BLD QL SMEAR: PRESENT
GLUCOSE SERPL-MCNC: 109 MG/DL (ref 70–110)
HCT VFR BLD AUTO: 13.9 % (ref 40–54)
HGB BLD-MCNC: 4.4 G/DL (ref 14–18)
HYPOCHROMIA BLD QL SMEAR: ABNORMAL
IMM GRANULOCYTES # BLD AUTO: 0.01 K/UL (ref 0–0.04)
IMM GRANULOCYTES NFR BLD AUTO: 0.4 % (ref 0–0.5)
INR PPP: 1.1 (ref 0.8–1.2)
LACTATE SERPL-SCNC: 2.6 MMOL/L (ref 0.5–2.2)
LIPASE SERPL-CCNC: 402 U/L (ref 4–60)
LYMPHOCYTES # BLD AUTO: 0.9 K/UL (ref 1–4.8)
LYMPHOCYTES NFR BLD: 36.5 % (ref 18–48)
MCH RBC QN AUTO: 27.3 PG (ref 27–31)
MCHC RBC AUTO-ENTMCNC: 31.7 G/DL (ref 32–36)
MCV RBC AUTO: 86 FL (ref 82–98)
MONOCYTES # BLD AUTO: 0.3 K/UL (ref 0.3–1)
MONOCYTES NFR BLD: 13.5 % (ref 4–15)
NEUTROPHILS # BLD AUTO: 1.3 K/UL (ref 1.8–7.7)
NEUTROPHILS NFR BLD: 49.6 % (ref 38–73)
NRBC BLD-RTO: 0 /100 WBC
OVALOCYTES BLD QL SMEAR: ABNORMAL
PLATELET # BLD AUTO: 192 K/UL (ref 150–450)
PLATELET BLD QL SMEAR: ABNORMAL
PMV BLD AUTO: 10.7 FL (ref 9.2–12.9)
POIKILOCYTOSIS BLD QL SMEAR: SLIGHT
POLYCHROMASIA BLD QL SMEAR: ABNORMAL
POTASSIUM SERPL-SCNC: 4.7 MMOL/L (ref 3.5–5.1)
PROT SERPL-MCNC: 6.9 G/DL (ref 6–8.4)
PROTHROMBIN TIME: 11.6 SEC (ref 9–12.5)
RBC # BLD AUTO: 1.61 M/UL (ref 4.6–6.2)
SCHISTOCYTES BLD QL SMEAR: ABNORMAL
SCHISTOCYTES BLD QL SMEAR: PRESENT
SMUDGE CELLS BLD QL SMEAR: PRESENT
SODIUM SERPL-SCNC: 131 MMOL/L (ref 136–145)
SPECIMEN OUTDATE: NORMAL
SPHEROCYTES BLD QL SMEAR: ABNORMAL
TARGETS BLD QL SMEAR: ABNORMAL
TOXIC GRANULES BLD QL SMEAR: PRESENT
TRANS ERYTHROCYTES VOL PATIENT: NORMAL ML
WBC # BLD AUTO: 2.52 K/UL (ref 3.9–12.7)

## 2024-08-07 PROCEDURE — 83605 ASSAY OF LACTIC ACID: CPT | Performed by: STUDENT IN AN ORGANIZED HEALTH CARE EDUCATION/TRAINING PROGRAM

## 2024-08-07 PROCEDURE — 12000002 HC ACUTE/MED SURGE SEMI-PRIVATE ROOM

## 2024-08-07 PROCEDURE — 96376 TX/PRO/DX INJ SAME DRUG ADON: CPT | Mod: 59

## 2024-08-07 PROCEDURE — 80053 COMPREHEN METABOLIC PANEL: CPT | Performed by: EMERGENCY MEDICINE

## 2024-08-07 PROCEDURE — 85610 PROTHROMBIN TIME: CPT | Performed by: EMERGENCY MEDICINE

## 2024-08-07 PROCEDURE — 93010 ELECTROCARDIOGRAM REPORT: CPT | Mod: ,,, | Performed by: INTERNAL MEDICINE

## 2024-08-07 PROCEDURE — 96365 THER/PROPH/DIAG IV INF INIT: CPT | Mod: 59

## 2024-08-07 PROCEDURE — 63600175 PHARM REV CODE 636 W HCPCS: Performed by: EMERGENCY MEDICINE

## 2024-08-07 PROCEDURE — 83690 ASSAY OF LIPASE: CPT | Performed by: EMERGENCY MEDICINE

## 2024-08-07 PROCEDURE — 36430 TRANSFUSION BLD/BLD COMPNT: CPT

## 2024-08-07 PROCEDURE — 25000003 PHARM REV CODE 250: Performed by: EMERGENCY MEDICINE

## 2024-08-07 PROCEDURE — 63600175 PHARM REV CODE 636 W HCPCS: Performed by: STUDENT IN AN ORGANIZED HEALTH CARE EDUCATION/TRAINING PROGRAM

## 2024-08-07 PROCEDURE — 25000003 PHARM REV CODE 250: Performed by: STUDENT IN AN ORGANIZED HEALTH CARE EDUCATION/TRAINING PROGRAM

## 2024-08-07 PROCEDURE — 86901 BLOOD TYPING SEROLOGIC RH(D): CPT | Performed by: EMERGENCY MEDICINE

## 2024-08-07 PROCEDURE — 86920 COMPATIBILITY TEST SPIN: CPT | Performed by: EMERGENCY MEDICINE

## 2024-08-07 PROCEDURE — P9021 RED BLOOD CELLS UNIT: HCPCS | Performed by: EMERGENCY MEDICINE

## 2024-08-07 PROCEDURE — 86900 BLOOD TYPING SEROLOGIC ABO: CPT | Performed by: EMERGENCY MEDICINE

## 2024-08-07 PROCEDURE — 80047 BASIC METABLC PNL IONIZED CA: CPT

## 2024-08-07 PROCEDURE — 85025 COMPLETE CBC W/AUTO DIFF WBC: CPT | Performed by: EMERGENCY MEDICINE

## 2024-08-07 PROCEDURE — 99291 CRITICAL CARE FIRST HOUR: CPT | Mod: 25

## 2024-08-07 PROCEDURE — 96368 THER/DIAG CONCURRENT INF: CPT | Mod: 59

## 2024-08-07 PROCEDURE — 93005 ELECTROCARDIOGRAM TRACING: CPT

## 2024-08-07 PROCEDURE — 30233N1 TRANSFUSION OF NONAUTOLOGOUS RED BLOOD CELLS INTO PERIPHERAL VEIN, PERCUTANEOUS APPROACH: ICD-10-PCS | Performed by: EMERGENCY MEDICINE

## 2024-08-07 RX ORDER — THIAMINE HCL 100 MG
100 TABLET ORAL EVERY 8 HOURS
Status: DISCONTINUED | OUTPATIENT
Start: 2024-08-09 | End: 2024-08-08

## 2024-08-07 RX ORDER — ONDANSETRON 8 MG/1
8 TABLET, ORALLY DISINTEGRATING ORAL EVERY 8 HOURS PRN
Status: DISCONTINUED | OUTPATIENT
Start: 2024-08-07 | End: 2024-08-09

## 2024-08-07 RX ORDER — OCTREOTIDE ACETATE 100 UG/ML
50 INJECTION, SOLUTION INTRAVENOUS; SUBCUTANEOUS
Status: COMPLETED | OUTPATIENT
Start: 2024-08-07 | End: 2024-08-07

## 2024-08-07 RX ORDER — SODIUM CHLORIDE 0.9 % (FLUSH) 0.9 %
10 SYRINGE (ML) INJECTION
Status: DISCONTINUED | OUTPATIENT
Start: 2024-08-07 | End: 2024-08-12 | Stop reason: HOSPADM

## 2024-08-07 RX ORDER — PANTOPRAZOLE SODIUM 40 MG/10ML
40 INJECTION, POWDER, LYOPHILIZED, FOR SOLUTION INTRAVENOUS 2 TIMES DAILY
Status: DISCONTINUED | OUTPATIENT
Start: 2024-08-08 | End: 2024-08-09

## 2024-08-07 RX ORDER — FLUTICASONE FUROATE AND VILANTEROL 100; 25 UG/1; UG/1
1 POWDER RESPIRATORY (INHALATION) DAILY
Status: DISCONTINUED | OUTPATIENT
Start: 2024-08-08 | End: 2024-08-12 | Stop reason: HOSPADM

## 2024-08-07 RX ORDER — PANTOPRAZOLE SODIUM 40 MG/10ML
80 INJECTION, POWDER, LYOPHILIZED, FOR SOLUTION INTRAVENOUS
Status: COMPLETED | OUTPATIENT
Start: 2024-08-07 | End: 2024-08-07

## 2024-08-07 RX ORDER — FOLIC ACID 1 MG/1
1 TABLET ORAL DAILY
Status: DISCONTINUED | OUTPATIENT
Start: 2024-08-08 | End: 2024-08-12 | Stop reason: HOSPADM

## 2024-08-07 RX ORDER — MORPHINE SULFATE 4 MG/ML
4 INJECTION, SOLUTION INTRAMUSCULAR; INTRAVENOUS EVERY 4 HOURS PRN
Status: DISCONTINUED | OUTPATIENT
Start: 2024-08-07 | End: 2024-08-08

## 2024-08-07 RX ORDER — PROCHLORPERAZINE EDISYLATE 5 MG/ML
5 INJECTION INTRAMUSCULAR; INTRAVENOUS EVERY 6 HOURS PRN
Status: DISCONTINUED | OUTPATIENT
Start: 2024-08-07 | End: 2024-08-12 | Stop reason: HOSPADM

## 2024-08-07 RX ORDER — OXYCODONE HYDROCHLORIDE 5 MG/1
5 TABLET ORAL EVERY 4 HOURS PRN
Status: DISCONTINUED | OUTPATIENT
Start: 2024-08-07 | End: 2024-08-09

## 2024-08-07 RX ORDER — HYDROCODONE BITARTRATE AND ACETAMINOPHEN 500; 5 MG/1; MG/1
TABLET ORAL
Status: DISCONTINUED | OUTPATIENT
Start: 2024-08-07 | End: 2024-08-08

## 2024-08-07 RX ADMIN — OCTREOTIDE ACETATE 50 MCG/HR: 500 INJECTION, SOLUTION INTRAVENOUS; SUBCUTANEOUS at 07:08

## 2024-08-07 RX ADMIN — SODIUM CHLORIDE 1000 ML: 9 INJECTION, SOLUTION INTRAVENOUS at 06:08

## 2024-08-07 RX ADMIN — OCTREOTIDE ACETATE 50 MCG: 100 INJECTION, SOLUTION INTRAVENOUS; SUBCUTANEOUS at 06:08

## 2024-08-07 RX ADMIN — CEFTRIAXONE 1 G: 1 INJECTION, POWDER, FOR SOLUTION INTRAMUSCULAR; INTRAVENOUS at 06:08

## 2024-08-07 RX ADMIN — PANTOPRAZOLE SODIUM 8 MG/HR: 40 INJECTION, POWDER, FOR SOLUTION INTRAVENOUS at 06:08

## 2024-08-07 RX ADMIN — PANTOPRAZOLE SODIUM 80 MG: 40 INJECTION, POWDER, FOR SOLUTION INTRAVENOUS at 06:08

## 2024-08-07 RX ADMIN — THIAMINE HYDROCHLORIDE 500 MG: 100 INJECTION, SOLUTION INTRAMUSCULAR; INTRAVENOUS at 10:08

## 2024-08-07 NOTE — FIRST PROVIDER EVALUATION
"Medical screening examination initiated.  I have conducted a focused provider triage encounter, findings are as follows:    Brief history of present illness:  68-year-old with history of esophageal varices presents with rectal bleeding.  He has been bleeding for few days.  He has severe abdominal pain.  Paramedics found him with a blood pressure in the 70s.  Responded well to IV fluids.  Now pressure 99/52.    Vitals:    08/07/24 1633   BP: (!) 99/56   Pulse: 86   Resp: 16   Temp: 97.7 °F (36.5 °C)   TempSrc: Oral   SpO2: 99%   Weight: 90.7 kg (200 lb)   Height: 5' 11" (1.803 m)       Pertinent physical exam:  Abdomen nontender.  He appears uncomfortable.  He appears pale with pale conjunctiva.    Brief workup plan:  GI bleed order set initiated.  With varices will start Protonix and octreotide drip    Preliminary workup initiated; this workup will be continued and followed by the physician or advanced practice provider that is assigned to the patient when roomed.  "

## 2024-08-08 ENCOUNTER — ANESTHESIA EVENT (OUTPATIENT)
Dept: INTERVENTIONAL RADIOLOGY/VASCULAR | Facility: HOSPITAL | Age: 69
End: 2024-08-08
Payer: MEDICARE

## 2024-08-08 ENCOUNTER — ANESTHESIA EVENT (OUTPATIENT)
Dept: ENDOSCOPY | Facility: HOSPITAL | Age: 69
DRG: 270 | End: 2024-08-08
Payer: MEDICARE

## 2024-08-08 ENCOUNTER — ANESTHESIA (OUTPATIENT)
Dept: ENDOSCOPY | Facility: HOSPITAL | Age: 69
DRG: 270 | End: 2024-08-08
Payer: MEDICARE

## 2024-08-08 LAB
ALBUMIN SERPL BCP-MCNC: 2.7 G/DL (ref 3.5–5.2)
ALBUMIN SERPL BCP-MCNC: 2.8 G/DL (ref 3.5–5.2)
ALP SERPL-CCNC: 82 U/L (ref 55–135)
ALP SERPL-CCNC: 87 U/L (ref 55–135)
ALT SERPL W/O P-5'-P-CCNC: 12 U/L (ref 10–44)
ALT SERPL W/O P-5'-P-CCNC: 16 U/L (ref 10–44)
AMMONIA PLAS-SCNC: 27 UMOL/L (ref 10–50)
ANION GAP SERPL CALC-SCNC: 11 MMOL/L (ref 8–16)
ANION GAP SERPL CALC-SCNC: 9 MMOL/L (ref 8–16)
AST SERPL-CCNC: 25 U/L (ref 10–40)
AST SERPL-CCNC: 37 U/L (ref 10–40)
AV INDEX (PROSTH): 0.64
AV MEAN GRADIENT: 3 MMHG
AV PEAK GRADIENT: 4 MMHG
AV VALVE AREA BY VELOCITY RATIO: 1.81 CM²
AV VALVE AREA: 2.11 CM²
AV VELOCITY RATIO: 0.55
BASOPHILS # BLD AUTO: 0 K/UL (ref 0–0.2)
BASOPHILS # BLD AUTO: 0.01 K/UL (ref 0–0.2)
BASOPHILS # BLD AUTO: 0.01 K/UL (ref 0–0.2)
BASOPHILS # BLD AUTO: 0.02 K/UL (ref 0–0.2)
BASOPHILS NFR BLD: 0 % (ref 0–1.9)
BASOPHILS NFR BLD: 0.3 % (ref 0–1.9)
BILIRUB SERPL-MCNC: 1.3 MG/DL (ref 0.1–1)
BILIRUB SERPL-MCNC: 1.4 MG/DL (ref 0.1–1)
BLD PROD TYP BPU: NORMAL
BLOOD UNIT EXPIRATION DATE: NORMAL
BLOOD UNIT TYPE CODE: 5100
BLOOD UNIT TYPE: NORMAL
BSA FOR ECHO PROCEDURE: 1.93 M2
BUN SERPL-MCNC: 52 MG/DL (ref 8–23)
BUN SERPL-MCNC: 53 MG/DL (ref 8–23)
BUN SERPL-MCNC: 58 MG/DL (ref 6–30)
CALCIUM SERPL-MCNC: 8.1 MG/DL (ref 8.7–10.5)
CALCIUM SERPL-MCNC: 8.4 MG/DL (ref 8.7–10.5)
CHLORIDE SERPL-SCNC: 102 MMOL/L (ref 95–110)
CHLORIDE SERPL-SCNC: 103 MMOL/L (ref 95–110)
CHLORIDE SERPL-SCNC: 99 MMOL/L (ref 95–110)
CO2 SERPL-SCNC: 15 MMOL/L (ref 23–29)
CO2 SERPL-SCNC: 18 MMOL/L (ref 23–29)
CODING SYSTEM: NORMAL
CREAT SERPL-MCNC: 2.1 MG/DL (ref 0.5–1.4)
CREAT SERPL-MCNC: 2.2 MG/DL (ref 0.5–1.4)
CREAT SERPL-MCNC: 2.7 MG/DL (ref 0.5–1.4)
CREAT UR-MCNC: 35 MG/DL (ref 23–375)
CROSSMATCH INTERPRETATION: NORMAL
CV ECHO LV RWT: 0.47 CM
DIFFERENTIAL METHOD BLD: ABNORMAL
DISPENSE STATUS: NORMAL
DOP CALC AO PEAK VEL: 0.94 M/S
DOP CALC AO VTI: 15.69 CM
DOP CALC LVOT AREA: 3.3 CM2
DOP CALC LVOT DIAMETER: 2.04 CM
DOP CALC LVOT PEAK VEL: 0.52 M/S
DOP CALC LVOT STROKE VOLUME: 33.03 CM3
DOP CALCLVOT PEAK VEL VTI: 10.11 CM
E WAVE DECELERATION TIME: 93.86 MSEC
E/A RATIO: 0.67
E/E' RATIO: 9 M/S
ECHO LV POSTERIOR WALL: 1.09 CM (ref 0.6–1.1)
EOSINOPHIL # BLD AUTO: 0 K/UL (ref 0–0.5)
EOSINOPHIL NFR BLD: 0.1 % (ref 0–8)
EOSINOPHIL NFR BLD: 0.3 % (ref 0–8)
EOSINOPHIL NFR BLD: 0.6 % (ref 0–8)
EOSINOPHIL NFR BLD: 0.7 % (ref 0–8)
ERYTHROCYTE [DISTWIDTH] IN BLOOD BY AUTOMATED COUNT: 14.3 % (ref 11.5–14.5)
ERYTHROCYTE [DISTWIDTH] IN BLOOD BY AUTOMATED COUNT: 14.5 % (ref 11.5–14.5)
ERYTHROCYTE [DISTWIDTH] IN BLOOD BY AUTOMATED COUNT: 14.8 % (ref 11.5–14.5)
ERYTHROCYTE [DISTWIDTH] IN BLOOD BY AUTOMATED COUNT: 14.9 % (ref 11.5–14.5)
EST. GFR  (NO RACE VARIABLE): 31.6 ML/MIN/1.73 M^2
EST. GFR  (NO RACE VARIABLE): 33.4 ML/MIN/1.73 M^2
FRACTIONAL SHORTENING: 29 % (ref 28–44)
GLUCOSE SERPL-MCNC: 111 MG/DL (ref 70–110)
GLUCOSE SERPL-MCNC: 132 MG/DL (ref 70–110)
GLUCOSE SERPL-MCNC: 139 MG/DL (ref 70–110)
GLUCOSE SERPL-MCNC: 159 MG/DL (ref 70–110)
HCO3 UR-SCNC: 19.5 MMOL/L (ref 24–28)
HCT VFR BLD AUTO: 23.2 % (ref 40–54)
HCT VFR BLD AUTO: 24 % (ref 40–54)
HCT VFR BLD AUTO: 24.5 % (ref 40–54)
HCT VFR BLD AUTO: 26.6 % (ref 40–54)
HCT VFR BLD CALC: 25 %PCV (ref 36–54)
HCT VFR BLD CALC: <15 %PCV (ref 36–54)
HGB BLD-MCNC: 7.2 G/DL (ref 14–18)
HGB BLD-MCNC: 7.5 G/DL (ref 14–18)
HGB BLD-MCNC: 7.9 G/DL (ref 14–18)
HGB BLD-MCNC: 8.2 G/DL (ref 14–18)
IMM GRANULOCYTES # BLD AUTO: 0.02 K/UL (ref 0–0.04)
IMM GRANULOCYTES # BLD AUTO: 0.03 K/UL (ref 0–0.04)
IMM GRANULOCYTES NFR BLD AUTO: 0.4 % (ref 0–0.5)
IMM GRANULOCYTES NFR BLD AUTO: 0.5 % (ref 0–0.5)
IMM GRANULOCYTES NFR BLD AUTO: 0.6 % (ref 0–0.5)
IMM GRANULOCYTES NFR BLD AUTO: 0.7 % (ref 0–0.5)
INR PPP: 1.1 (ref 0.8–1.2)
INTERVENTRICULAR SEPTUM: 1.17 CM (ref 0.6–1.1)
LA MAJOR: 3.81 CM
LA MINOR: 4 CM
LACTATE SERPL-SCNC: 1.2 MMOL/L (ref 0.5–2.2)
LACTATE SERPL-SCNC: 1.6 MMOL/L (ref 0.5–2.2)
LEFT ATRIUM SIZE: 4.9 CM
LEFT ATRIUM VOLUME INDEX MOD: 27.5 ML/M2
LEFT ATRIUM VOLUME MOD: 52.99 CM3
LEFT INTERNAL DIMENSION IN SYSTOLE: 3.28 CM (ref 2.1–4)
LEFT VENTRICLE DIASTOLIC VOLUME INDEX: 51.36 ML/M2
LEFT VENTRICLE DIASTOLIC VOLUME: 99.13 ML
LEFT VENTRICLE MASS INDEX: 99 G/M2
LEFT VENTRICLE SYSTOLIC VOLUME INDEX: 22.6 ML/M2
LEFT VENTRICLE SYSTOLIC VOLUME: 43.6 ML
LEFT VENTRICULAR INTERNAL DIMENSION IN DIASTOLE: 4.64 CM (ref 3.5–6)
LEFT VENTRICULAR MASS: 190.8 G
LV LATERAL E/E' RATIO: 7.2 M/S
LV SEPTAL E/E' RATIO: 12 M/S
LYMPHOCYTES # BLD AUTO: 0.5 K/UL (ref 1–4.8)
LYMPHOCYTES # BLD AUTO: 0.8 K/UL (ref 1–4.8)
LYMPHOCYTES # BLD AUTO: 0.9 K/UL (ref 1–4.8)
LYMPHOCYTES # BLD AUTO: 1 K/UL (ref 1–4.8)
LYMPHOCYTES NFR BLD: 12.1 % (ref 18–48)
LYMPHOCYTES NFR BLD: 14.1 % (ref 18–48)
LYMPHOCYTES NFR BLD: 22.8 % (ref 18–48)
LYMPHOCYTES NFR BLD: 32 % (ref 18–48)
MAGNESIUM SERPL-MCNC: 1.9 MG/DL (ref 1.6–2.6)
MCH RBC QN AUTO: 27.3 PG (ref 27–31)
MCH RBC QN AUTO: 27.7 PG (ref 27–31)
MCH RBC QN AUTO: 27.8 PG (ref 27–31)
MCH RBC QN AUTO: 28.2 PG (ref 27–31)
MCHC RBC AUTO-ENTMCNC: 30.8 G/DL (ref 32–36)
MCHC RBC AUTO-ENTMCNC: 31 G/DL (ref 32–36)
MCHC RBC AUTO-ENTMCNC: 31.3 G/DL (ref 32–36)
MCHC RBC AUTO-ENTMCNC: 32.2 G/DL (ref 32–36)
MCV RBC AUTO: 86 FL (ref 82–98)
MCV RBC AUTO: 87 FL (ref 82–98)
MCV RBC AUTO: 90 FL (ref 82–98)
MCV RBC AUTO: 91 FL (ref 82–98)
MONOCYTES # BLD AUTO: 0.3 K/UL (ref 0.3–1)
MONOCYTES # BLD AUTO: 0.5 K/UL (ref 0.3–1)
MONOCYTES NFR BLD: 6.2 % (ref 4–15)
MONOCYTES NFR BLD: 8.1 % (ref 4–15)
MONOCYTES NFR BLD: 8.9 % (ref 4–15)
MONOCYTES NFR BLD: 9.4 % (ref 4–15)
MV PEAK A VEL: 0.54 M/S
MV PEAK E VEL: 0.36 M/S
MV STENOSIS PRESSURE HALF TIME: 27.22 MS
MV VALVE AREA P 1/2 METHOD: 8.08 CM2
NEUTROPHILS # BLD AUTO: 1.7 K/UL (ref 1.8–7.7)
NEUTROPHILS # BLD AUTO: 2.3 K/UL (ref 1.8–7.7)
NEUTROPHILS # BLD AUTO: 2.8 K/UL (ref 1.8–7.7)
NEUTROPHILS # BLD AUTO: 6 K/UL (ref 1.8–7.7)
NEUTROPHILS NFR BLD: 56.9 % (ref 38–73)
NEUTROPHILS NFR BLD: 67.1 % (ref 38–73)
NEUTROPHILS NFR BLD: 76.7 % (ref 38–73)
NEUTROPHILS NFR BLD: 80.9 % (ref 38–73)
NRBC BLD-RTO: 0 /100 WBC
OHS QRS DURATION: 98 MS
OHS QTC CALCULATION: 428 MS
OSMOLALITY SERPL: 293 MOSM/KG (ref 280–300)
OSMOLALITY UR: 433 MOSM/KG (ref 50–1200)
PCO2 BLDA: 35.9 MMHG (ref 35–45)
PH SMN: 7.34 [PH] (ref 7.35–7.45)
PHOSPHATE SERPL-MCNC: 3.8 MG/DL (ref 2.7–4.5)
PLATELET # BLD AUTO: 106 K/UL (ref 150–450)
PLATELET # BLD AUTO: 145 K/UL (ref 150–450)
PLATELET # BLD AUTO: 164 K/UL (ref 150–450)
PLATELET # BLD AUTO: 183 K/UL (ref 150–450)
PMV BLD AUTO: 10 FL (ref 9.2–12.9)
PMV BLD AUTO: 10.4 FL (ref 9.2–12.9)
PMV BLD AUTO: 10.6 FL (ref 9.2–12.9)
PMV BLD AUTO: 10.7 FL (ref 9.2–12.9)
PO2 BLDA: 63 MMHG (ref 40–60)
POC BE: -6 MMOL/L
POC IONIZED CALCIUM: 1.11 MMOL/L (ref 1.06–1.42)
POC IONIZED CALCIUM: 1.18 MMOL/L (ref 1.06–1.42)
POC SATURATED O2: 91 % (ref 95–100)
POC TCO2 (MEASURED): 18 MMOL/L (ref 23–29)
POC TCO2: 21 MMOL/L (ref 24–29)
POTASSIUM BLD-SCNC: 4.6 MMOL/L (ref 3.5–5.1)
POTASSIUM BLD-SCNC: 4.6 MMOL/L (ref 3.5–5.1)
POTASSIUM SERPL-SCNC: 4.3 MMOL/L (ref 3.5–5.1)
POTASSIUM SERPL-SCNC: 5.1 MMOL/L (ref 3.5–5.1)
PROT SERPL-MCNC: 6.2 G/DL (ref 6–8.4)
PROT SERPL-MCNC: 6.8 G/DL (ref 6–8.4)
PROTHROMBIN TIME: 12.4 SEC (ref 9–12.5)
RA PRESSURE ESTIMATED: 3 MMHG
RBC # BLD AUTO: 2.55 M/UL (ref 4.6–6.2)
RBC # BLD AUTO: 2.75 M/UL (ref 4.6–6.2)
RBC # BLD AUTO: 2.85 M/UL (ref 4.6–6.2)
RBC # BLD AUTO: 2.95 M/UL (ref 4.6–6.2)
RV TISSUE DOPPLER FREE WALL SYSTOLIC VELOCITY 1 (APICAL 4 CHAMBER VIEW): 11.14 CM/S
SAMPLE: ABNORMAL
SAMPLE: ABNORMAL
SINUS: 3.41 CM
SODIUM BLD-SCNC: 131 MMOL/L (ref 136–145)
SODIUM BLD-SCNC: 133 MMOL/L (ref 136–145)
SODIUM SERPL-SCNC: 129 MMOL/L (ref 136–145)
SODIUM SERPL-SCNC: 129 MMOL/L (ref 136–145)
SODIUM UR-SCNC: 89 MMOL/L (ref 20–250)
TDI LATERAL: 0.05 M/S
TDI SEPTAL: 0.03 M/S
TDI: 0.04 M/S
TRANS ERYTHROCYTES VOL PATIENT: NORMAL ML
TRICUSPID ANNULAR PLANE SYSTOLIC EXCURSION: 0.76 CM
WBC # BLD AUTO: 2.97 K/UL (ref 3.9–12.7)
WBC # BLD AUTO: 3.38 K/UL (ref 3.9–12.7)
WBC # BLD AUTO: 3.7 K/UL (ref 3.9–12.7)
WBC # BLD AUTO: 7.44 K/UL (ref 3.9–12.7)
Z-SCORE OF LEFT VENTRICULAR DIMENSION IN END DIASTOLE: -1.63
Z-SCORE OF LEFT VENTRICULAR DIMENSION IN END SYSTOLE: -0.2

## 2024-08-08 PROCEDURE — 94640 AIRWAY INHALATION TREATMENT: CPT

## 2024-08-08 PROCEDURE — 94761 N-INVAS EAR/PLS OXIMETRY MLT: CPT

## 2024-08-08 PROCEDURE — 80053 COMPREHEN METABOLIC PANEL: CPT | Mod: 91 | Performed by: STUDENT IN AN ORGANIZED HEALTH CARE EDUCATION/TRAINING PROGRAM

## 2024-08-08 PROCEDURE — 82140 ASSAY OF AMMONIA: CPT | Performed by: STUDENT IN AN ORGANIZED HEALTH CARE EDUCATION/TRAINING PROGRAM

## 2024-08-08 PROCEDURE — 0DJ08ZZ INSPECTION OF UPPER INTESTINAL TRACT, VIA NATURAL OR ARTIFICIAL OPENING ENDOSCOPIC: ICD-10-PCS | Performed by: STUDENT IN AN ORGANIZED HEALTH CARE EDUCATION/TRAINING PROGRAM

## 2024-08-08 PROCEDURE — P9021 RED BLOOD CELLS UNIT: HCPCS | Performed by: ANESTHESIOLOGY

## 2024-08-08 PROCEDURE — 84100 ASSAY OF PHOSPHORUS: CPT | Mod: 91 | Performed by: INTERNAL MEDICINE

## 2024-08-08 PROCEDURE — 86920 COMPATIBILITY TEST SPIN: CPT | Performed by: ANESTHESIOLOGY

## 2024-08-08 PROCEDURE — 25000242 PHARM REV CODE 250 ALT 637 W/ HCPCS: Performed by: STUDENT IN AN ORGANIZED HEALTH CARE EDUCATION/TRAINING PROGRAM

## 2024-08-08 PROCEDURE — 83930 ASSAY OF BLOOD OSMOLALITY: CPT

## 2024-08-08 PROCEDURE — 37000009 HC ANESTHESIA EA ADD 15 MINS: Performed by: STUDENT IN AN ORGANIZED HEALTH CARE EDUCATION/TRAINING PROGRAM

## 2024-08-08 PROCEDURE — 99291 CRITICAL CARE FIRST HOUR: CPT | Mod: GC,,, | Performed by: INTERNAL MEDICINE

## 2024-08-08 PROCEDURE — 25000003 PHARM REV CODE 250: Performed by: ANESTHESIOLOGY

## 2024-08-08 PROCEDURE — 86920 COMPATIBILITY TEST SPIN: CPT | Performed by: STUDENT IN AN ORGANIZED HEALTH CARE EDUCATION/TRAINING PROGRAM

## 2024-08-08 PROCEDURE — 63600175 PHARM REV CODE 636 W HCPCS: Performed by: ANESTHESIOLOGY

## 2024-08-08 PROCEDURE — 25000003 PHARM REV CODE 250: Performed by: STUDENT IN AN ORGANIZED HEALTH CARE EDUCATION/TRAINING PROGRAM

## 2024-08-08 PROCEDURE — P9021 RED BLOOD CELLS UNIT: HCPCS | Performed by: STUDENT IN AN ORGANIZED HEALTH CARE EDUCATION/TRAINING PROGRAM

## 2024-08-08 PROCEDURE — 83735 ASSAY OF MAGNESIUM: CPT | Mod: 91 | Performed by: INTERNAL MEDICINE

## 2024-08-08 PROCEDURE — 85610 PROTHROMBIN TIME: CPT | Performed by: STUDENT IN AN ORGANIZED HEALTH CARE EDUCATION/TRAINING PROGRAM

## 2024-08-08 PROCEDURE — 25000003 PHARM REV CODE 250: Performed by: INTERNAL MEDICINE

## 2024-08-08 PROCEDURE — 37000008 HC ANESTHESIA 1ST 15 MINUTES: Performed by: STUDENT IN AN ORGANIZED HEALTH CARE EDUCATION/TRAINING PROGRAM

## 2024-08-08 PROCEDURE — 63600175 PHARM REV CODE 636 W HCPCS: Performed by: STUDENT IN AN ORGANIZED HEALTH CARE EDUCATION/TRAINING PROGRAM

## 2024-08-08 PROCEDURE — 20000000 HC ICU ROOM

## 2024-08-08 PROCEDURE — 99223 1ST HOSP IP/OBS HIGH 75: CPT | Mod: GC,,, | Performed by: STUDENT IN AN ORGANIZED HEALTH CARE EDUCATION/TRAINING PROGRAM

## 2024-08-08 PROCEDURE — 25000003 PHARM REV CODE 250: Performed by: EMERGENCY MEDICINE

## 2024-08-08 PROCEDURE — 83605 ASSAY OF LACTIC ACID: CPT | Mod: 91 | Performed by: STUDENT IN AN ORGANIZED HEALTH CARE EDUCATION/TRAINING PROGRAM

## 2024-08-08 PROCEDURE — 82570 ASSAY OF URINE CREATININE: CPT

## 2024-08-08 PROCEDURE — 85025 COMPLETE CBC W/AUTO DIFF WBC: CPT | Mod: 91 | Performed by: STUDENT IN AN ORGANIZED HEALTH CARE EDUCATION/TRAINING PROGRAM

## 2024-08-08 PROCEDURE — 25500020 PHARM REV CODE 255: Performed by: STUDENT IN AN ORGANIZED HEALTH CARE EDUCATION/TRAINING PROGRAM

## 2024-08-08 PROCEDURE — 83605 ASSAY OF LACTIC ACID: CPT | Performed by: STUDENT IN AN ORGANIZED HEALTH CARE EDUCATION/TRAINING PROGRAM

## 2024-08-08 PROCEDURE — 25000003 PHARM REV CODE 250: Performed by: NURSE ANESTHETIST, CERTIFIED REGISTERED

## 2024-08-08 PROCEDURE — 25500020 PHARM REV CODE 255: Performed by: INTERNAL MEDICINE

## 2024-08-08 PROCEDURE — 63600175 PHARM REV CODE 636 W HCPCS: Performed by: NURSE ANESTHETIST, CERTIFIED REGISTERED

## 2024-08-08 PROCEDURE — 80053 COMPREHEN METABOLIC PANEL: CPT | Performed by: STUDENT IN AN ORGANIZED HEALTH CARE EDUCATION/TRAINING PROGRAM

## 2024-08-08 PROCEDURE — 63600175 PHARM REV CODE 636 W HCPCS: Mod: JA | Performed by: EMERGENCY MEDICINE

## 2024-08-08 PROCEDURE — 84100 ASSAY OF PHOSPHORUS: CPT | Performed by: STUDENT IN AN ORGANIZED HEALTH CARE EDUCATION/TRAINING PROGRAM

## 2024-08-08 PROCEDURE — 85025 COMPLETE CBC W/AUTO DIFF WBC: CPT | Mod: 91 | Performed by: INTERNAL MEDICINE

## 2024-08-08 PROCEDURE — 04L53DZ OCCLUSION OF SUPERIOR MESENTERIC ARTERY WITH INTRALUMINAL DEVICE, PERCUTANEOUS APPROACH: ICD-10-PCS | Performed by: STUDENT IN AN ORGANIZED HEALTH CARE EDUCATION/TRAINING PROGRAM

## 2024-08-08 PROCEDURE — 83935 ASSAY OF URINE OSMOLALITY: CPT

## 2024-08-08 PROCEDURE — 80048 BASIC METABOLIC PNL TOTAL CA: CPT | Mod: XB | Performed by: INTERNAL MEDICINE

## 2024-08-08 PROCEDURE — 84300 ASSAY OF URINE SODIUM: CPT

## 2024-08-08 PROCEDURE — 83735 ASSAY OF MAGNESIUM: CPT | Performed by: STUDENT IN AN ORGANIZED HEALTH CARE EDUCATION/TRAINING PROGRAM

## 2024-08-08 PROCEDURE — 44360 SMALL BOWEL ENDOSCOPY: CPT | Performed by: STUDENT IN AN ORGANIZED HEALTH CARE EDUCATION/TRAINING PROGRAM

## 2024-08-08 PROCEDURE — 44360 SMALL BOWEL ENDOSCOPY: CPT | Mod: GC,,, | Performed by: STUDENT IN AN ORGANIZED HEALTH CARE EDUCATION/TRAINING PROGRAM

## 2024-08-08 RX ORDER — EPHEDRINE SULFATE 50 MG/ML
INJECTION, SOLUTION INTRAVENOUS
Status: DISCONTINUED | OUTPATIENT
Start: 2024-08-08 | End: 2024-08-09

## 2024-08-08 RX ORDER — PROPOFOL 10 MG/ML
VIAL (ML) INTRAVENOUS
Status: DISCONTINUED | OUTPATIENT
Start: 2024-08-08 | End: 2024-08-08

## 2024-08-08 RX ORDER — MUPIROCIN 20 MG/G
OINTMENT TOPICAL 2 TIMES DAILY
Status: DISCONTINUED | OUTPATIENT
Start: 2024-08-08 | End: 2024-08-12 | Stop reason: HOSPADM

## 2024-08-08 RX ORDER — SUCCINYLCHOLINE CHLORIDE 20 MG/ML
INJECTION INTRAMUSCULAR; INTRAVENOUS
Status: DISCONTINUED | OUTPATIENT
Start: 2024-08-08 | End: 2024-08-09

## 2024-08-08 RX ORDER — ONDANSETRON HYDROCHLORIDE 2 MG/ML
INJECTION, SOLUTION INTRAVENOUS
Status: DISCONTINUED | OUTPATIENT
Start: 2024-08-08 | End: 2024-08-09

## 2024-08-08 RX ORDER — HYDROMORPHONE HYDROCHLORIDE 1 MG/ML
0.5 INJECTION, SOLUTION INTRAMUSCULAR; INTRAVENOUS; SUBCUTANEOUS EVERY 6 HOURS PRN
Status: DISCONTINUED | OUTPATIENT
Start: 2024-08-08 | End: 2024-08-09

## 2024-08-08 RX ORDER — ROCURONIUM BROMIDE 10 MG/ML
INJECTION, SOLUTION INTRAVENOUS
Status: DISCONTINUED | OUTPATIENT
Start: 2024-08-08 | End: 2024-08-09

## 2024-08-08 RX ORDER — SUCCINYLCHOLINE CHLORIDE 20 MG/ML
INJECTION INTRAMUSCULAR; INTRAVENOUS
Status: DISCONTINUED | OUTPATIENT
Start: 2024-08-08 | End: 2024-08-08

## 2024-08-08 RX ORDER — LIDOCAINE HYDROCHLORIDE 20 MG/ML
INJECTION INTRAVENOUS
Status: DISCONTINUED | OUTPATIENT
Start: 2024-08-08 | End: 2024-08-08

## 2024-08-08 RX ORDER — CALCIUM GLUCONATE 20 MG/ML
1 INJECTION, SOLUTION INTRAVENOUS
Status: COMPLETED | OUTPATIENT
Start: 2024-08-09 | End: 2024-08-09

## 2024-08-08 RX ORDER — HEPARIN SOD,PORCINE/0.9 % NACL 1000/500ML
INTRAVENOUS SOLUTION INTRAVENOUS
Status: COMPLETED | OUTPATIENT
Start: 2024-08-08 | End: 2024-08-08

## 2024-08-08 RX ORDER — ESCITALOPRAM OXALATE 5 MG/1
10 TABLET ORAL DAILY
Status: DISCONTINUED | OUTPATIENT
Start: 2024-08-09 | End: 2024-08-12 | Stop reason: HOSPADM

## 2024-08-08 RX ORDER — THIAMINE HCL 100 MG
100 TABLET ORAL DAILY
Status: DISCONTINUED | OUTPATIENT
Start: 2024-08-13 | End: 2024-08-08

## 2024-08-08 RX ORDER — THIAMINE HCL 100 MG
100 TABLET ORAL DAILY
Status: DISCONTINUED | OUTPATIENT
Start: 2024-08-09 | End: 2024-08-12 | Stop reason: HOSPADM

## 2024-08-08 RX ORDER — LIDOCAINE HYDROCHLORIDE 20 MG/ML
INJECTION INTRAVENOUS
Status: DISCONTINUED | OUTPATIENT
Start: 2024-08-08 | End: 2024-08-09

## 2024-08-08 RX ORDER — FENTANYL CITRATE 50 UG/ML
INJECTION, SOLUTION INTRAMUSCULAR; INTRAVENOUS
Status: DISCONTINUED | OUTPATIENT
Start: 2024-08-08 | End: 2024-08-09

## 2024-08-08 RX ORDER — PROPOFOL 10 MG/ML
VIAL (ML) INTRAVENOUS
Status: DISCONTINUED | OUTPATIENT
Start: 2024-08-08 | End: 2024-08-09

## 2024-08-08 RX ORDER — ONDANSETRON 2 MG/ML
INJECTION INTRAMUSCULAR; INTRAVENOUS
Status: DISCONTINUED | OUTPATIENT
Start: 2024-08-08 | End: 2024-08-09

## 2024-08-08 RX ORDER — PHENYLEPHRINE HYDROCHLORIDE 10 MG/ML
INJECTION INTRAVENOUS
Status: DISCONTINUED | OUTPATIENT
Start: 2024-08-08 | End: 2024-08-08

## 2024-08-08 RX ORDER — HYDROCODONE BITARTRATE AND ACETAMINOPHEN 500; 5 MG/1; MG/1
TABLET ORAL
Status: DISCONTINUED | OUTPATIENT
Start: 2024-08-08 | End: 2024-08-09

## 2024-08-08 RX ORDER — PHENYLEPHRINE HYDROCHLORIDE 10 MG/ML
INJECTION INTRAVENOUS
Status: DISCONTINUED | OUTPATIENT
Start: 2024-08-08 | End: 2024-08-09

## 2024-08-08 RX ADMIN — IOHEXOL 200 ML: 300 INJECTION, SOLUTION INTRAVENOUS at 09:08

## 2024-08-08 RX ADMIN — PROCHLORPERAZINE EDISYLATE 5 MG: 5 INJECTION INTRAMUSCULAR; INTRAVENOUS at 10:08

## 2024-08-08 RX ADMIN — SUGAMMADEX 200 MG: 100 INJECTION, SOLUTION INTRAVENOUS at 09:08

## 2024-08-08 RX ADMIN — HYDROMORPHONE HYDROCHLORIDE 0.5 MG: 1 INJECTION, SOLUTION INTRAMUSCULAR; INTRAVENOUS; SUBCUTANEOUS at 01:08

## 2024-08-08 RX ADMIN — FENTANYL CITRATE 100 MCG: 50 INJECTION, SOLUTION INTRAMUSCULAR; INTRAVENOUS at 05:08

## 2024-08-08 RX ADMIN — SUCCINYLCHOLINE CHLORIDE 80 MG: 20 INJECTION, SOLUTION INTRAMUSCULAR; INTRAVENOUS at 02:08

## 2024-08-08 RX ADMIN — PROPOFOL 80 MG: 10 INJECTION, EMULSION INTRAVENOUS at 02:08

## 2024-08-08 RX ADMIN — EPHEDRINE SULFATE 5 MG: 50 INJECTION INTRAVENOUS at 08:08

## 2024-08-08 RX ADMIN — CALCIUM CHLORIDE 0.5 G: 100 INJECTION, SOLUTION INTRAVENOUS at 08:08

## 2024-08-08 RX ADMIN — FLUTICASONE FUROATE AND VILANTEROL TRIFENATATE 1 PUFF: 100; 25 POWDER RESPIRATORY (INHALATION) at 09:08

## 2024-08-08 RX ADMIN — HYDROMORPHONE HYDROCHLORIDE 0.5 MG: 1 INJECTION, SOLUTION INTRAMUSCULAR; INTRAVENOUS; SUBCUTANEOUS at 06:08

## 2024-08-08 RX ADMIN — ONDANSETRON 4 MG: 2 INJECTION INTRAMUSCULAR; INTRAVENOUS at 09:08

## 2024-08-08 RX ADMIN — MUPIROCIN: 20 OINTMENT TOPICAL at 10:08

## 2024-08-08 RX ADMIN — PANTOPRAZOLE SODIUM 40 MG: 40 INJECTION, POWDER, FOR SOLUTION INTRAVENOUS at 08:08

## 2024-08-08 RX ADMIN — EPHEDRINE SULFATE 10 MG: 50 INJECTION INTRAVENOUS at 06:08

## 2024-08-08 RX ADMIN — ROCURONIUM BROMIDE 30 MG: 10 INJECTION INTRAVENOUS at 05:08

## 2024-08-08 RX ADMIN — PROPOFOL 100 MG: 10 INJECTION, EMULSION INTRAVENOUS at 05:08

## 2024-08-08 RX ADMIN — SODIUM CHLORIDE: 0.9 INJECTION, SOLUTION INTRAVENOUS at 02:08

## 2024-08-08 RX ADMIN — ROCURONIUM BROMIDE 20 MG: 10 INJECTION INTRAVENOUS at 07:08

## 2024-08-08 RX ADMIN — CALCIUM CHLORIDE 1 G: 100 INJECTION, SOLUTION INTRAVENOUS at 05:08

## 2024-08-08 RX ADMIN — PROPOFOL 30 MG: 10 INJECTION, EMULSION INTRAVENOUS at 02:08

## 2024-08-08 RX ADMIN — OCTREOTIDE ACETATE 50 MCG/HR: 500 INJECTION, SOLUTION INTRAVENOUS; SUBCUTANEOUS at 01:08

## 2024-08-08 RX ADMIN — PHENYLEPHRINE HYDROCHLORIDE 100 MCG: 10 INJECTION INTRAVENOUS at 02:08

## 2024-08-08 RX ADMIN — FOLIC ACID 1 MG: 1 TABLET ORAL at 08:08

## 2024-08-08 RX ADMIN — THIAMINE HYDROCHLORIDE 500 MG: 100 INJECTION, SOLUTION INTRAMUSCULAR; INTRAVENOUS at 05:08

## 2024-08-08 RX ADMIN — CALCIUM GLUCONATE 1 G: 20 INJECTION, SOLUTION INTRAVENOUS at 11:08

## 2024-08-08 RX ADMIN — LIDOCAINE HYDROCHLORIDE 100 MG: 20 INJECTION INTRAVENOUS at 02:08

## 2024-08-08 RX ADMIN — HUMAN ALBUMIN MICROSPHERES AND PERFLUTREN 0.66 MG: 10; .22 INJECTION, SOLUTION INTRAVENOUS at 12:08

## 2024-08-08 RX ADMIN — EPHEDRINE SULFATE 5 MG: 50 INJECTION INTRAVENOUS at 05:08

## 2024-08-08 RX ADMIN — HYDROMORPHONE HYDROCHLORIDE 0.5 MG: 1 INJECTION, SOLUTION INTRAMUSCULAR; INTRAVENOUS; SUBCUTANEOUS at 11:08

## 2024-08-08 RX ADMIN — THROMBIN, TOPICAL (BOVINE) 20000 UNITS: KIT at 08:08

## 2024-08-08 RX ADMIN — LIDOCAINE HYDROCHLORIDE 100 MG: 20 INJECTION INTRAVENOUS at 05:08

## 2024-08-08 RX ADMIN — CEFTRIAXONE 1 G: 1 INJECTION, POWDER, FOR SOLUTION INTRAMUSCULAR; INTRAVENOUS at 10:08

## 2024-08-08 RX ADMIN — PROCHLORPERAZINE EDISYLATE 5 MG: 5 INJECTION INTRAMUSCULAR; INTRAVENOUS at 02:08

## 2024-08-08 RX ADMIN — ROCURONIUM BROMIDE 10 MG: 10 INJECTION INTRAVENOUS at 08:08

## 2024-08-08 RX ADMIN — PANTOPRAZOLE SODIUM 40 MG: 40 INJECTION, POWDER, FOR SOLUTION INTRAVENOUS at 10:08

## 2024-08-08 RX ADMIN — PHENYLEPHRINE HYDROCHLORIDE 100 MCG: 10 INJECTION INTRAVENOUS at 05:08

## 2024-08-08 RX ADMIN — THROMBIN, TOPICAL (BOVINE) 400 UNITS: KIT at 08:08

## 2024-08-08 RX ADMIN — THERA TABS 1 TABLET: TAB at 08:08

## 2024-08-08 RX ADMIN — HEPARIN SODIUM 1000 ML: 200 INJECTION, SOLUTION INTRAVENOUS at 05:08

## 2024-08-08 RX ADMIN — SUCCINYLCHOLINE CHLORIDE 120 MG: 20 INJECTION, SOLUTION INTRAMUSCULAR; INTRAVENOUS at 05:08

## 2024-08-08 NOTE — EICU
Intervention Initiated From:  [facility] / EICU    Beth intervened regarding:  Documentation and Rounding (Video assessment)    Comments: Nurses Note -- 4 Eyes      8/8/2024   4:33 AM      Skin assessed during: Admit      [x] No Altered Skin Integrity Present    [x]Prevention Measures Documented      [] Yes- Altered Skin Integrity Present or Discovered   [] LDA Added if Not in Epic (Describe Wound)   [] New Altered Skin Integrity was Present on Admit and Documented in LDA   [] Wound Image Taken    Wound Care Consulted? No    Attending Nurse:  Jayla Erwin RN/Staff Member:  Magdalene

## 2024-08-08 NOTE — H&P
[facility] - Emergency Dept  Critical Care Medicine  History & Physical    Patient Name: Fransico Montalvo  MRN: [id number]  Admission Date: 8/7/2024  Hospital Length of Stay: 0 days  Code Status: Full Code  Attending Physician: No att. providers found   Primary Care Provider: [facility]   Principal Problem: Acute pancreatitis    Subjective:     HPI:  Mr. Fransico Montalvo is a 68-year-old man with history of alcohol use disorder, EtOH cirrhosis with esophageal varices s/p banding 2018, CAD, BPH, COPD, HTN, and HFrEF (35-40%) who [place] [facility] [place] his nursing facility due to severe epigastric pain. He reports 4 days of bloody bowel movement and 2 days of nausea and vomiting without hematemesis. He reports recent embolization in his pancreas and history of pain associated with constipation which is relieved with laxatives. His pain that began the night prior to arrival was far worse than his usual amount of pain, he is unable to provide further details.    In the ED, he was found to have Hgb 4.4 for which he received 3 units pRBCs. Per EMS, his initial SBP was in to 70's. He received 1L IVF and was placed on octreotide, pantoprazole 80mg IV and CTX 1g. GI was consulted, recommend CTA if unstable despite transfusion and likely will perform endoscopy in the AM if stable.    Of note, has had multiple recent hospital admissions for symptomatic anemia and GIB. 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 He was also admitted in 06/2024 for syncope thought due to blood loss and hypovolemia in setting of GIB and Lasix use.    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 OR 2ND FLR;  Service: Orthopedics;  Laterality: Right;    COLONOSCOPY N/A 4/1/2024    Procedure: COLONOSCOPY;  Surgeon: Scarlett Whitt MD;  Location: [facility] ENDO (2ND FLR);  Service: Gastroenterology;  Laterality: N/A;    ESOPHAGOGASTRODUODENOSCOPY N/A 10/25/2023    Procedure: EGD (ESOPHAGOGASTRODUODENOSCOPY);  Surgeon: Lux Rome MD;  Location: [facility] ENDO (2ND FLR);  Service: Endoscopy;  Laterality: N/A;    ESOPHAGOGASTRODUODENOSCOPY N/A 4/19/2024    Procedure: EGD (ESOPHAGOGASTRODUODENOSCOPY);  Surgeon: Raj Roberts MD;  Location: [facility] ENDO (2ND FLR);  Service: Endoscopy;  Laterality: N/A;    ESOPHAGOGASTRODUODENOSCOPY N/A 5/10/2024    Procedure: EGD (ESOPHAGOGASTRODUODENOSCOPY);  Surgeon: Ashley Trent MD;  Location: [facility] ENDO (2ND FLR);  Service: Endoscopy;  Laterality: N/A;    PERCUTANEOUS PINNING OF HIP Right 7/29/2019    Procedure: PINNING, HIP, PERCUTANEOUS- synthes cannulated screws- hana table- C arm door side-;  Surgeon: Raj Grossman MD;  [facility];  Service: Orthopedics;  Laterality: Right;       Review of patient's allergies indicates:  No Known Allergies    Family History       Problem Relation (Age of Onset)    Heart disease Father, Brother    Hypertension Father          Tobacco Use    Smoking status: Every Day     Current packs/day: 1.00     Types:  Cigarettes    Smokeless tobacco: Never   Substance and Sexual Activity    Alcohol use: Yes     Comment: (former drinker; 2months sober) this visit admits drinking a couple beers today    Drug use: No    Sexual activity: Not on file      Review of Systems   Constitutional:  Negative for fever.   Respiratory:  Negative for shortness of breath.    Gastrointestinal:  Positive for abdominal pain, blood in stool, nausea and vomiting.   Genitourinary:  Negative for hematuria.   Neurological:  Positive for light-headedness.     Objective:     Vital Signs (Most Recent):  Temp: 98.1 °F (36.7 °C) (08/07/24 2100)  Pulse: 92 (08/07/24 2100)  Resp: 18 (08/07/24 2100)  BP: 121/74 (08/07/24 2100)  SpO2: 99 % (08/07/24 2100) Vital Signs (24h Range):  Temp:  [97.7 °F (36.5 °C)-98.2 °F (36.8 °C)] 98.1 °F (36.7 °C)  Pulse:  [82-96] 92  Resp:  [16-23] 18  SpO2:  [97 %-100 %] 99 %  BP: ()/(56-74) 121/74   Weight: 90.7 kg (200 lb)  Body mass index is 27.89 kg/m².      Intake/Output Summary (Last 24 hours) at 8/7/2024 2124  Last data filed at 8/7/2024 2058  Gross per 24 hour   Intake 1150 ml   Output --   Net 1150 ml          Physical Exam  Vitals and nursing note reviewed.   Constitutional:       General: He is not in acute distress.     Appearance: He is normal weight. He is ill-appearing. He is not toxic-appearing.   HENT:      Head: Normocephalic and atraumatic.   Eyes:      Extraocular Movements: Extraocular movements intact.      Pupils: Pupils are equal, round, and reactive to light.   Cardiovascular:      Rate and Rhythm: Regular rhythm. Tachycardia present.   Pulmonary:      Effort: Pulmonary effort is normal. No respiratory distress.   Musculoskeletal:      Cervical back: Normal range of motion and neck supple.   Neurological:      General: No focal deficit present.      Mental Status: He is alert.   Psychiatric:         Mood and Affect: Mood normal.         Behavior: Behavior normal.            Vents:      Lines/Drains/Airways       Peripheral Intravenous Line  Duration                  Peripheral IV - Single Lumen 08/07/24 18 G Distal;Posterior;Right Forearm <1 day         Peripheral IV - Single Lumen 08/07/24 1824 18 G Anterior;Distal;Left Upper Arm <1 day         Peripheral IV - Single Lumen 08/07/24 1824 18 G Anterior;Left;Proximal Forearm <1 day                  Significant Labs:    CBC/Anemia Profile:  Recent Labs   Lab 08/07/24 1822   WBC 2.52*   HGB 4.4*   HCT 13.9*      MCV 86   RDW 15.9*        Chemistries:  Recent Labs   Lab 08/07/24 1822   *   K 4.7      CO2 19*   BUN 55*   CREATININE 2.4*   CALCIUM 8.5*   ALBUMIN 3.0*   PROT 6.9   BILITOT 0.4   ALKPHOS 91   ALT 13   AST 24       All pertinent labs within the past 24 hours have been reviewed.    Significant Imaging: I have reviewed all pertinent imaging results/findings within the past 24 hours.  Assessment/Plan:     Psychiatric  Alcohol withdrawal  History of EtOH use disorder. No signs of withdrawal on admission.    - CIWA q4 hours  - thiamine IV for Wernicke's  - Folate and multivitamin  - will administer BZD if CIWA > 8  - seizure and fall precautions    Pulmonary  Presumed COPD (chronic obstructive pulmonary disease)  - tobacco cessation  - monitor respiratory status, albuterol and neb prn  - home Breo    Cardiac/Vascular  CAD (coronary artery disease)  Patient with known CAD (seen on CT imaging, no interventions have been done) which is controlled Will continue Statin and monitor for S/Sx of angina/ACS. Continue to monitor on telemetry.     HFrEF (heart failure with reduced ejection fraction)  Newly diagnosed HFrEF in 05/2024.    TTE from 5/10/24: LV mildly dilated. Moderate global hypokinesis and regional wall motion abnormalities present. EF 35 - 40%. RV Systolic function is mildly reduced. Left atrium is moderately dilated. Moderate aortic valve sclerosis; mildly restricted motion. Moderate tricuspid regurgitation. PASP 55  mmHg. Elevated venous pressure at 15 mmHg.    - careful with fluid repletion  - holding GDMT in setting of GIB with soft BP and ISRAEL  - TTE  - Telemetry    Essential hypertension  On valsartan, metoprolol, Jardiance and furosemide at home.    - hold antihypertensives in setting of blood loss / hypovolemia    Renal/  ISRAEL (acute kidney injury)  Cr 2.4 on admission; baseline 1.3. Likely prerenal due to volume depletion. Received 1L IVF in the ED and 3 units pRBCs.    - daily CMP  - avoid nephrotoxic agents  - renally dose medications  - fluid repletion prn    BPH (benign prostatic hyperplasia)  Stable. Hx of BPH. Was on Tamsulosin previously, but held on recent admission in 03/2024 where there were concerns for low BP     PLAN:  - Bladder scan PRN for urinary retention concerns    GI  * Acute pancreatitis  67yo M with PMHx of alcohol use disorder c/b cirrhosis and esophageal varices s/p banding 2018, CAD, BPH, COPD, HTN, and HFrEF (35-40%) who [place] [facility] [place] and anemia due to blood loss. Lipase 402.    Annmarie score 1 on admission due to age > 55.    PLAN:  - Pain control with PRN morphine for severe pain and oxycodone for moderate pain  - careful with fluids given cocurrent HFrEF  - Daily CMP  - Further abdominal imaging as indicated    GI bleed  History of esophageal varices: banding approximately 6 years ago. No varices on most recent endoscopies.     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 Successful coil embolization of several superior pancreaticoduodenal branches and the gastroduodenal artery with cessation of flow to the pseudoaneurysm.    PLAN:  - s/p 3 units pRBCs  - GI consulted, appreciate recs  - on octreotide  - s/p pantoprazole 80 mg IV, then 40 mg IV BID  - CTA with unstable, IR if positive for  active bleeding  - NPO for potential endoscopy in AM  - CBC q6h    Therapeutic opioid induced constipation  - bowel regimen if needed    Alcoholic cirrhosis  MELD 3.0: 22 at 8/7/2024  6:22 PM  MELD-Na: 21 at 8/7/2024  6:22 PM  Calculated from:  Serum Creatinine: 2.4 mg/dL at 8/7/2024  6:22 PM  Serum Sodium: 131 mmol/L at 8/7/2024  6:22 PM  Total Bilirubin: 0.4 mg/dL (Using min of 1 mg/dL) at 8/7/2024  6:22 PM  Serum Albumin: 3.0 g/dL at 8/7/2024  6:22 PM  INR(ratio): 1.1 at 8/7/2024  6:22 PM  Age at listing (hypothetical): 68 years  Sex: Male at 8/7/2024  6:22 PM     - daily INR  - ocreatide x 72 hours and CTX for SBP ppx    Other  Tobacco abuse  - smoking cessation education  - nicotine prn        Critical Care Daily Checklist:    A: Awake: RASS Goal/Actual Goal:    Actual:     B: Spontaneous Breathing Trial Performed?     C: SAT & SBT Coordinated?  N/a                      D: Delirium: CAM-ICU     E: Early Mobility Performed? No   F: Feeding Goal:    Status:     Current Diet Order   Procedures    Diet NPO      AS: Analgesia/Sedation None   T: Thromboembolic Prophylaxis None   H: HOB > 300 Yes   U: Stress Ulcer Prophylaxis (if needed) PPI BID   G: Glucose Control Hypoglycemia Protocol   B: Bowel Function     I: Indwelling Catheter (Lines & Sauer) Necessity None   D: De-escalation of Antimicrobials/Pharmacotherapies On CTX for SBP ppx (8/7/24)    Plan for the day/ETD Admit to MICU    Code Status:  Family/Goals of Care: Full Code  Ongoing       Critical secondary to Patient has a condition that poses threat to life and bodily function: Risk of hemorrhagic shock    Critical care was time spent personally by me on the following activities: development of treatment plan with patient or surrogate and bedside caregivers, discussions with consultants, evaluation of patient's response to treatment, examination of patient, ordering and performing treatments and interventions, ordering and review of laboratory studies,  ordering and review of radiographic studies, pulse oximetry, re-evaluation of patient's condition. This critical care time did not overlap with that of any other provider or involve time for any procedures.     Penny Mata DO  Critical Care Medicine  [facility] - Emergency Dept

## 2024-08-08 NOTE — TRANSFER OF CARE
"Anesthesia Transfer of Care Note    Patient: Fransico Montalvo    Procedure(s) Performed: Procedure(s) (LRB):  EGD (ESOPHAGOGASTRODUODENOSCOPY) (N/A)    Patient location: ICU    Anesthesia Type: general    Transport from OR: Transported from OR on 6-10 L/min O2 by face mask with adequate spontaneous ventilation    Post pain: adequate analgesia    Post assessment: no apparent anesthetic complications and tolerated procedure well    Post vital signs: stable    Level of consciousness: awake, alert and oriented    Nausea/Vomiting: no nausea/vomiting    Complications: none    Transfer of care protocol was followed      Last vitals: Visit Vitals  /68 (BP Location: Right arm, Patient Position: Lying)   Pulse 76   Temp 36.8 °C (98.3 °F) (Oral)   Resp 14   Ht 5' 11" (1.803 m)   Wt 74 kg (163 lb 2.3 oz)   SpO2 98%   BMI 22.75 kg/m²     "

## 2024-08-08 NOTE — PLAN OF CARE
Problem: Adult Inpatient Plan of Care  Goal: Plan of Care Review  Outcome: Progressing  Goal: Patient-Specific Goal (Individualized)  Outcome: Progressing  Goal: Absence of Hospital-Acquired Illness or Injury  Outcome: Progressing  Goal: Optimal Comfort and Wellbeing  Outcome: Progressing  Goal: Readiness for Transition of Care  Outcome: Progressing     Problem: Acute Kidney Injury/Impairment  Goal: Fluid and Electrolyte Balance  Outcome: Progressing  Goal: Improved Oral Intake  Outcome: Progressing  Goal: Effective Renal Function  Outcome: Progressing     Problem: Fall Injury Risk  Goal: Absence of Fall and Fall-Related Injury  Outcome: Progressing     Problem: Skin Injury Risk Increased  Goal: Skin Health and Integrity  Outcome: Progressing     MICU DAILY GOALS     Family/Goals of care/Code Status   Code Status: Full Code    24H Vital Sign Range  Temp:  [97.7 °F (36.5 °C)-98.8 °F (37.1 °C)]   Pulse:  [76-97]   Resp:  [13-26]   BP: ()/(54-87)   SpO2:  [97 %-100 %]      Shift Events (include procedures and significant events)   No acute events throughout shift    AWAKE RASS: Goal - RASS Goal: 0-->alert and calm  Actual - RASS (Higgins Agitation-Sedation Scale): alert and calm    Restraint necessity: Not necessary   BREATHE SBT: Not intubated    Coordinate A & B, analgesics/sedatives Pain: managed   SAT: Not intubated   Delirium CAM-ICU: Overall CAM-ICU: Negative   Early(intubated/ Progressive (non-intubated) Mobility MOVE Screen (INTUBATED ONLY): Not intubated    Activity: Activity Management: Rolling - L1   Feeding/Nutrition Diet order: Diet/Nutrition Received: NPO,     Thrombus DVT prophylaxis: VTE Required Core Measure: Per order contraindicated for SCDs/Anticoagulants   HOB Elevation Head of Bed (HOB) Positioning: HOB elevated   Ulcer Prophylaxis GI: yes   Glucose control managed     Skin Skin assessed during: Daily Assessment    Sacrum intact/not altered? Yes  Heels intact/not altered? Yes  Surgical  wound? No    CHECK ONE!   (no altered skin or altered skin) and sub boxes:  [] No Altered Skin Integrity Present    []Prevention Measures Documented    [] Altered Skin Integrity Present or Discovered   [] LDA present in EPIC, daily doc completed              [] LDA added if not in EPIC (describe wound).                    When describing wound, do not stage, use descriptive words only.    [] Wound Image Taken (required on admit,                   transfer/discharge and every Tuesday)    Wound Care Consulted? No    4 EYES:  Attending Nurse (1st set of eyes):     Second RN/Staff Member (2nd set of eyes):    Bowel Function no issues    Indwelling Catheter Necessity            De-escalation Antibiotics Yes        VS and assessment per flow sheet, patient progressing towards goals as tolerated, plan of care reviewed with [unfilled] and family, all concerns addressed, will continue to monitor.

## 2024-08-08 NOTE — ED TRIAGE NOTES
Fransico Montalvo, a 68 y.o. male presents to the ED from [facility]/ complaint of abdominal pain, pt reports hx of GI bleed and blood transfusions for past year. Pt had bloody brief in place upon arrival to ED, dark red blood. Pt c/o dizziness/weakness    Triage note:  Chief Complaint   Patient presents with    Abdominal Pain    Rectal Bleeding     Pt arrives via EMS c/o feeling faint and dizzy this afternoon with associated n/v/d, 10/10 abdominal pain with dark red blood in stool, upon EMS arrival BP 71/43 received NS bolus; hx GI bleeds x 6 months     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

## 2024-08-08 NOTE — ASSESSMENT & PLAN NOTE
MELD 3.0: 22 at 8/7/2024  6:22 PM  MELD-Na: 21 at 8/7/2024  6:22 PM  Calculated from:  Serum Creatinine: 2.4 mg/dL at 8/7/2024  6:22 PM  Serum Sodium: 131 mmol/L at 8/7/2024  6:22 PM  Total Bilirubin: 0.4 mg/dL (Using min of 1 mg/dL) at 8/7/2024  6:22 PM  Serum Albumin: 3.0 g/dL at 8/7/2024  6:22 PM  INR(ratio): 1.1 at 8/7/2024  6:22 PM  Age at listing (hypothetical): 68 years  Sex: Male at 8/7/2024  6:22 PM     - daily INR  - ocreatide x 72 hours and CTX for SBP ppx

## 2024-08-08 NOTE — ANESTHESIA POSTPROCEDURE EVALUATION
Anesthesia Post Evaluation    Patient: Fransico Montalvo    Procedure(s) Performed: Procedure(s) (LRB):  EGD (ESOPHAGOGASTRODUODENOSCOPY) (N/A)    Final Anesthesia Type: general      Patient location during evaluation: ICU  Patient participation: Yes- Able to Participate  Level of consciousness: awake and alert  Post-procedure vital signs: reviewed and stable  Pain management: adequate  Airway patency: patent    PONV status at discharge: No PONV  Anesthetic complications: no      Cardiovascular status: blood pressure returned to baseline  Respiratory status: unassisted and spontaneous ventilation  Hydration status: euvolemic                Vitals Value Taken Time   /69 08/08/24 1547   Temp 36.7 °C (98 °F) 08/08/24 1505   Pulse 73 08/08/24 1551   Resp 16 08/08/24 1551   SpO2 100 % 08/08/24 1551   Vitals shown include unfiled device data.      No case tracking events are documented in the log.      Pain/Margareth Score: Pain Rating Prior to Med Admin: 10 (8/8/2024  1:10 PM)  Pain Rating Post Med Admin: 0 (8/8/2024  1:31 PM)

## 2024-08-08 NOTE — ASSESSMENT & PLAN NOTE
Patient with known CAD (seen on CT imaging, no interventions have been done) which is controlled Will continue Statin and monitor for S/Sx of angina/ACS. Continue to monitor on telemetry.

## 2024-08-08 NOTE — HPI
Mr. Fransico Montalvo is a 68-year-old man with history of alcohol use disorder, EtOH cirrhosis with esophageal varices s/p banding 2018, CAD, BPH, COPD, HTN, and HFrEF (35-40%) who [place] [facility] [place]7/24 from his nursing facility due to severe epigastric pain. He reports 4 days of bloody bowel movement and 2 days of nausea and vomiting without hematemesis. He reports recent embolization in his pancreas and history of pain associated with constipation which is relieved with laxatives. His pain that began the night prior to arrival was far worse than his usual amount of pain, he is unable to provide further details.    In the ED, he was found to have Hgb 4.4 for which he received 3 units pRBCs. Per EMS, his initial SBP was in to 70's. He received 1L IVF and was placed on octreotide, pantoprazole 80mg IV and CTX 1g. GI was consulted, recommend CTA if unstable despite transfusion and likely will perform endoscopy in the AM if stable.    Of note, has had multiple recent hospital admissions for symptomatic anemia and GIB. 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 He was also admitted in 06/2024 for syncope thought due to blood loss and hypovolemia in setting of GIB and Lasix use.

## 2024-08-08 NOTE — SUBJECTIVE & OBJECTIVE
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       Past Surgical History:   Procedure Laterality Date    CAPSULOTOMY OF JOINT Right 7/29/2019    Procedure: CAPSULOTOMY, JOINT;  Surgeon: Raj Grossman MD;  [facility];  Service: Orthopedics;  Laterality: Right;    COLONOSCOPY N/A 4/1/2024    Procedure: COLONOSCOPY;  Surgeon: Scarlett Whitt MD;  Location: [facility] ENDO (2ND FLR);  Service: Gastroenterology;  Laterality: N/A;    ESOPHAGOGASTRODUODENOSCOPY N/A 10/25/2023    Procedure: EGD (ESOPHAGOGASTRODUODENOSCOPY);  Surgeon: Lux Rome MD;  Location: [facility] (2ND FLR);  Service: Endoscopy;  Laterality: N/A;    ESOPHAGOGASTRODUODENOSCOPY N/A 4/19/2024    Procedure: EGD (ESOPHAGOGASTRODUODENOSCOPY);  Surgeon: Raj Roberts MD;  Location: [facility] (2ND FLR);  Service: Endoscopy;  Laterality: N/A;    ESOPHAGOGASTRODUODENOSCOPY N/A 5/10/2024    Procedure: EGD (ESOPHAGOGASTRODUODENOSCOPY);  Surgeon: Ashley Trent MD;  Location: [facility] ENDO (2ND FLR);  Service: Endoscopy;  Laterality: N/A;    PERCUTANEOUS PINNING OF HIP Right 7/29/2019    Procedure: PINNING, HIP, PERCUTANEOUS- synthes cannulated screws- hana table- C arm door side-;  Surgeon: Raj Grossman MD;  [facility];  Service: Orthopedics;  Laterality: Right;       Review of patient's allergies indicates:  No Known Allergies    Family History       Problem Relation  (Age of Onset)    Heart disease Father, Brother    Hypertension Father          Tobacco Use    Smoking status: Every Day     Current packs/day: 1.00     Types: Cigarettes    Smokeless tobacco: Never   Substance and Sexual Activity    Alcohol use: Yes     Comment: (former drinker; 2months sober) this visit admits drinking a couple beers today    Drug use: No    Sexual activity: Not on file      Review of Systems   Constitutional:  Negative for fever.   Respiratory:  Negative for shortness of breath.    Gastrointestinal:  Positive for abdominal pain, blood in stool, nausea and vomiting.   Genitourinary:  Negative for hematuria.   Neurological:  Positive for light-headedness.     Objective:     Vital Signs (Most Recent):  Temp: 98.1 °F (36.7 °C) (08/07/24 2100)  Pulse: 92 (08/07/24 2100)  Resp: 18 (08/07/24 2100)  BP: 121/74 (08/07/24 2100)  SpO2: 99 % (08/07/24 2100) Vital Signs (24h Range):  Temp:  [97.7 °F (36.5 °C)-98.2 °F (36.8 °C)] 98.1 °F (36.7 °C)  Pulse:  [82-96] 92  Resp:  [16-23] 18  SpO2:  [97 %-100 %] 99 %  BP: ()/(56-74) 121/74   Weight: 90.7 kg (200 lb)  Body mass index is 27.89 kg/m².      Intake/Output Summary (Last 24 hours) at 8/7/2024 2124  Last data filed at 8/7/2024 2058  Gross per 24 hour   Intake 1150 ml   Output --   Net 1150 ml          Physical Exam  Vitals and nursing note reviewed.   Constitutional:       General: He is not in acute distress.     Appearance: He is normal weight. He is ill-appearing. He is not toxic-appearing.   HENT:      Head: Normocephalic and atraumatic.   Eyes:      Extraocular Movements: Extraocular movements intact.      Pupils: Pupils are equal, round, and reactive to light.   Cardiovascular:      Rate and Rhythm: Regular rhythm. Tachycardia present.   Pulmonary:      Effort: Pulmonary effort is normal. No respiratory distress.   Musculoskeletal:      Cervical back: Normal range of motion and neck supple.   Neurological:      General: No focal deficit present.       Mental Status: He is alert.   Psychiatric:         Mood and Affect: Mood normal.         Behavior: Behavior normal.            Vents:     Lines/Drains/Airways       Peripheral Intravenous Line  Duration                  Peripheral IV - Single Lumen 08/07/24 18 G Distal;Posterior;Right Forearm <1 day         Peripheral IV - Single Lumen 08/07/24 1824 18 G Anterior;Distal;Left Upper Arm <1 day         Peripheral IV - Single Lumen 08/07/24 1824 18 G Anterior;Left;Proximal Forearm <1 day                  Significant Labs:    CBC/Anemia Profile:  Recent Labs   Lab 08/07/24  1822   WBC 2.52*   HGB 4.4*   HCT 13.9*      MCV 86   RDW 15.9*        Chemistries:  Recent Labs   Lab 08/07/24  1822   *   K 4.7      CO2 19*   BUN 55*   CREATININE 2.4*   CALCIUM 8.5*   ALBUMIN 3.0*   PROT 6.9   BILITOT 0.4   ALKPHOS 91   ALT 13   AST 24       All pertinent labs within the past 24 hours have been reviewed.    Significant Imaging: I have reviewed all pertinent imaging results/findings within the past 24 hours.

## 2024-08-08 NOTE — HPI
68-year-old man with history of alcohol use disorder, EtOH cirrhosis with esophageal varices s/p banding 2018, CAD, BPH, COPD, HTN, and HFrEF (35-40%) for whom GI was consulted for reports of hematochezia and hematemesis. He reports that about a week ago he began to have constipation which was followed by bloody bowel movements; red blood was mixed into the stool. According to him, the last episode of hematochezia was a week ago. He also reports dizziness, weakness, pre syncope, and abdominal pain.    Patient also reports he vomited blood x1; he cannot recall when this was. Patient is a poor historian and is having a lot of trouble accurately recalling the timeline of events.    In May he was having GIB and EGD showed a hiatal hernia, duodenal blebs, blood coming from the ampulla. The EGD prior in [facility] showed congestive gastropathy and APC to a non bleeding angioectasia in the jejunum. He underwent IR embolization of the bleeding vessel at that time with control of bleeding. Last colonoscopy was also in April and the colon was normal.     On admission, his Hgb was 4.4 and he received multiple transfusions. Blood was noted in his underpants. He has not had any further episodes of bleeding since admission.

## 2024-08-08 NOTE — CONSULTS
[facility] - Cardiac Medical ICU  Gastroenterology  Consult Note    Patient Name: Fransico Montalvo  MRN: [id number]  Admission Date: 8/7/2024  Hospital Length of Stay: 1 days  Code Status: Full Code   Attending Provider: Juan Luis Calvillo MD   Consulting Provider: Issa Sun DO  Primary Care Physician: [facility]  Principal Problem:Acute pancreatitis    Inpatient consult to Gastroenterology  Consult performed by: Issa Sun DO  Consult ordered by: Mick Rodriguez MD        Subjective:     HPI:  68-year-old man with history of alcohol use disorder, EtOH cirrhosis with esophageal varices s/p banding 2018, CAD, BPH, COPD, HTN, and HFrEF (35-40%) for whom GI was consulted for reports of hematochezia and hematemesis. He reports that about a week ago he began to have constipation which was followed by bloody bowel movements; red blood was mixed into the stool. According to him, the last episode of hematochezia was a week ago. He also reports dizziness, weakness, pre syncope, and abdominal pain.    Patient also reports he vomited blood x1; he cannot recall when this was. Patient is a poor historian and is having a lot of trouble accurately recalling the timeline of events.    In May he was having GIB and EGD showed a hiatal hernia, duodenal blebs, blood coming from the ampulla. The EGD prior in [facility] showed congestive gastropathy and APC to a non bleeding angioectasia in the jejunum. He underwent IR embolization of the bleeding vessel at that time with control of bleeding. Last colonoscopy was also in April and the colon was normal.     On admission, his Hgb was 4.4 and he received multiple transfusions. Blood was noted in his underpants. He has not had any further episodes of bleeding since admission.      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       Past Surgical History:   Procedure Laterality Date    CAPSULOTOMY OF JOINT Right 7/29/2019    Procedure: CAPSULOTOMY, JOINT;  Surgeon: Raj Grossman MD;  Location: [facility] OR 2ND FLR;  Service: Orthopedics;  Laterality: Right;    COLONOSCOPY N/A 4/1/2024    Procedure: COLONOSCOPY;  Surgeon: Scarlett Whitt MD;  Location: [facility] ENDO (2ND FLR);  Service: Gastroenterology;  Laterality: N/A;    ESOPHAGOGASTRODUODENOSCOPY N/A 10/25/2023    Procedure: EGD (ESOPHAGOGASTRODUODENOSCOPY);  Surgeon: Lux Rome MD;  Location: [facility] ENDO (2ND FLR);  Service: Endoscopy;  Laterality: N/A;    ESOPHAGOGASTRODUODENOSCOPY N/A 4/19/2024    Procedure: EGD (ESOPHAGOGASTRODUODENOSCOPY);  Surgeon: Raj Roberts MD;  Location: [facility] ENDO (2ND FLR);  Service: Endoscopy;  Laterality: N/A;    ESOPHAGOGASTRODUODENOSCOPY N/A 5/10/2024    Procedure: EGD (ESOPHAGOGASTRODUODENOSCOPY);  Surgeon: Ashley Trent MD;  Location: [facility] ENDO (2ND FLR);  Service: Endoscopy;  Laterality: N/A;    PERCUTANEOUS PINNING OF HIP Right 7/29/2019    Procedure: PINNING, HIP, PERCUTANEOUS- synthes cannulated screws- hana table- C arm door side-;  Surgeon: Raj Grossman MD;  [facility];  Service: Orthopedics;  Laterality: Right;       Review of patient's allergies indicates:  No Known Allergies  Family History       Problem Relation (Age of Onset)    Heart disease Father, Brother    Hypertension Father          Tobacco Use    Smoking status: Every Day     Current packs/day: 1.00     Types: Cigarettes    Smokeless tobacco: Never   Substance and  Sexual Activity    Alcohol use: Yes     Comment: (former drinker; 2months sober) this visit admits drinking a couple beers today    Drug use: No    Sexual activity: Not on file     Review of Systems   Constitutional:  Positive for fatigue. Negative for chills and fever.   Respiratory:  Negative for shortness of breath.    Cardiovascular:  Negative for chest pain.   Gastrointestinal:  Positive for abdominal pain, blood in stool, constipation, nausea and vomiting. Negative for abdominal distention and rectal pain.   Genitourinary:  Negative for dysuria.   Neurological:  Positive for dizziness, weakness and light-headedness.     Objective:     Vital Signs (Most Recent):  Temp: 98.6 °F (37 °C) (08/08/24 0701)  Pulse: 79 (08/08/24 0701)  Resp: 14 (08/08/24 0701)  BP: 127/75 (08/08/24 0701)  SpO2: 100 % (08/08/24 0701) Vital Signs (24h Range):  Temp:  [97.7 °F (36.5 °C)-98.8 °F (37.1 °C)] 98.6 °F (37 °C)  Pulse:  [79-96] 79  Resp:  [14-23] 14  SpO2:  [97 %-100 %] 100 %  BP: ()/(56-87) 127/75     Weight: 72 kg (158 lb 11.7 oz) (08/08/24 0430)  Body mass index is 22.14 kg/m².      Intake/Output Summary (Last 24 hours) at 8/8/2024 0736  Last data filed at 8/8/2024 0707  Gross per 24 hour   Intake 1961.06 ml   Output --   Net 1961.06 ml       Lines/Drains/Airways       Peripheral Intravenous Line  Duration                  Peripheral IV - Single Lumen 08/07/24 18 G Right Forearm 1 day         Peripheral IV - Single Lumen 08/07/24 1824 18 G Left Forearm <1 day         Peripheral IV - Single Lumen 08/07/24 1824 18 G Left Upper Arm <1 day         Peripheral IV - Single Lumen 08/07/24 2008 20 G Left Hand <1 day                     Physical Exam  Exam conducted with a chaperone present.   Constitutional:       General: He is not in acute distress.     Appearance: He is ill-appearing.   HENT:      Head: Normocephalic and atraumatic.   Eyes:      General: No scleral icterus.  Cardiovascular:      Rate and Rhythm: Normal rate.       Pulses: Normal pulses.   Pulmonary:      Effort: Pulmonary effort is normal. No respiratory distress.   Abdominal:      General: Abdomen is flat. There is no distension.      Palpations: Abdomen is soft.      Tenderness: There is no abdominal tenderness. There is no guarding or rebound.   Genitourinary:     Comments: Brown stool on rectal exam  Musculoskeletal:         General: Normal range of motion.   Skin:     General: Skin is warm and dry.   Neurological:      Mental Status: He is alert and oriented to person, place, and time.   Psychiatric:         Mood and Affect: Mood normal.         Behavior: Behavior normal.          Significant Labs:  CBC:   Recent Labs   Lab 08/07/24  1822 08/08/24  0004 08/08/24  0331   WBC 2.52* 7.44 3.70*   HGB 4.4* 8.2* 7.2*   HCT 13.9* 26.6* 23.2*    183 164     CMP:   Recent Labs   Lab 08/08/24  0331   *   CALCIUM 8.1*   ALBUMIN 2.7*   PROT 6.2   *   K 4.3   CO2 18*      BUN 52*   CREATININE 2.1*   ALKPHOS 82   ALT 12   AST 25   BILITOT 1.4*     All pertinent lab results from the last 24 hours have been reviewed.    Significant Imaging:  Imaging results within the past 24 hours have been reviewed.  Assessment/Plan:     GI  GIB (gastrointestinal bleeding)  Mr. Fransico Montalvo is a 68-year-old man with history of alcohol use disorder, EtOH cirrhosis with esophageal varices s/p banding 2018, CAD, BPH, COPD, HTN, and HFrEF (35-40%)  for whom Giw as consulted for reports of hematochezia and hematemesis. Just voer a week ago pt began having constipation then bloody BMs. He has been having these intermittently for the last week. Also reports a single episode of hematemesis but unsure when. Patient is a poor historian and does not have a clear timeline of events. He has a hx of prior GI bleeds,o ne requiring IR embolization to stop bleeding from the ampulla this past May. Most recent EGD, in May, showed hiatal hernia with bleeding from the ampulla. EGD prior in  April showed congestive gastropathy, and a non bleeding AVM in the jejunum. Last colonoscopy was this year and was normal.    On admission his Hgb was 4.4 and he has received multiple nits of pRBCs. He has remained HDS. He has not had any additional episdoes of bleeding since admission. He currently denies any abdominal pain, N/V.    - Plan for EGD today  - Okay to continue octreotide for now  - Trend Hgb q12hrs. Transfuse for Hgb > 7, unless otherwise indicated  - Bolus IV pantoprazole 80mg followed by 40mg BID  - Maintain IV access with 2 large bore Ivs  - Intravascular resuscitation/support with IVFs   - Keep NPO  - Hold all NSAIDs and anticoagulants, unless contraindicated  - Please correct any coagulopathy with platelets and FFP for goal of platelets >50K and INR <2.0  - Please notify GI team if there is significant change in patient's clinical status         Thank you for your consult.     Issa Sun,   Gastroenterology  [facility] - Cardiac Medical ICU

## 2024-08-08 NOTE — PROVATION PATIENT INSTRUCTIONS
Discharge Summary/Instructions after an Endoscopic Procedure  Patient Name: Fransico Montalvo  Patient MRN: [id number]  Patient YOB: 1955 Thursday, August 8, 2024  Lux Rome MD  Dear patient,  As a result of recent federal legislation (The Federal Cures Act), you may   receive lab or pathology results from your procedure in your [facility]   account before your physician is able to contact you. Your physician or   their representative will relay the results to you with their   recommendations at their soonest availability.  Thank you,  RESTRICTIONS:  During your procedure today, you received medications for sedation.  These   medications may affect your judgment, balance and coordination.  Therefore,   for 24 hours, you have the following restrictions:   - DO NOT drive a car, operate machinery, make legal/financial decisions,   sign important papers or drink alcohol.    ACTIVITY:  Today: no heavy lifting, straining or running due to procedural   sedation/anesthesia.  The following day: return to full activity including work.  DIET:  Eat and drink normally unless instructed otherwise.     TREATMENT FOR COMMON SIDE EFFECTS:  - Mild abdominal pain, nausea, belching, bloating or excessive gas:  rest,   eat lightly and use a heating pad.  - Sore Throat: treat with throat lozenges and/or gargle with warm salt   water.  - Because air was used during the procedure, expelling large amounts of air   from your rectum or belching is normal.  - If a bowel prep was taken, you may not have a bowel movement for 1-3 days.    This is normal.  SYMPTOMS TO WATCH FOR AND REPORT TO YOUR PHYSICIAN:  1. Abdominal pain or bloating, other than gas cramps.  2. Chest pain.  3. Back pain.  4. Signs of infection such as: chills or fever occurring within 24 hours   after the procedure.  5. Rectal bleeding, which would show as bright red, maroon, or black stools.   (A tablespoon of blood from the rectum is not serious, especially  if   hemorrhoids are present.)  6. Vomiting.  7. Weakness or dizziness.  GO DIRECTLY TO THE NEAREST EMERGENCY ROOM IF YOU HAVE ANY OF THE FOLLOWING:      Difficulty breathing              Chills and/or fever over 101 F   Persistent vomiting and/or vomiting blood   Severe abdominal pain   Severe chest pain   Black, tarry stools   Bleeding- more than one tablespoon   Any other symptom or condition that you feel may need urgent attention  Your doctor recommends these additional instructions:  If any biopsies were taken, your doctors clinic will contact you in 1 to 2   weeks with any results.  - Return patient to ICU for ongoing care.   - NPO.   - Continue present medications.   - Recommend IR consult for further intervention given active bleeding and   known pancreatic pseudoaneurysm.  For questions, problems or results please call your physician - Lux Rome MD at Work:  [phone].  [facility] [place], EMERGENCY ROOM PHONE NUMBER: [phone]  IF A COMPLICATION OR EMERGENCY SITUATION ARISES AND YOU ARE UNABLE TO REACH   YOUR PHYSICIAN - GO DIRECTLY TO THE EMERGENCY ROOM.  Lux Rome MD  8/8/2024 4:12:41 PM  This report has been verified and signed electronically.  Dear patient,  As a result of recent federal legislation (The Federal Cures Act), you may   receive lab or pathology results from your procedure in your [facility]   account before your physician is able to contact you. Your physician or   their representative will relay the results to you with their   recommendations at their soonest availability.  Thank you,  PROVATION

## 2024-08-08 NOTE — ASSESSMENT & PLAN NOTE
History of EtOH use disorder. No signs of withdrawal on admission.    - CIWA q4 hours  - thiamine IV for Wernicke's  - Folate and multivitamin  - will administer BZD if CIWA > 8  - seizure and fall precautions

## 2024-08-08 NOTE — ANESTHESIA PREPROCEDURE EVALUATION
08/08/2024  Fransico Montalvo is a 68 y.o., male.  [facility]  Anesthesia Pre-Operative Evaluation       Patient Name: Fransico Montalvo  YOB: 1955  MRN: [id number]  [facility]: [id number]      Code Status: Full Code   Date of Procedure: 8/8/2024  Anesthesia: General/MAC Procedure: Procedure(s) (LRB):  EGD (ESOPHAGOGASTRODUODENOSCOPY) (N/A)  Pre-Operative Diagnosis: Rectal bleed [K62.5]  Proceduralist: Surgeons and Role:     * Lux Rome MD - Primary        SUBJECTIVE:   Fransico Montalvo is a 68 y.o. male who is here today for Procedure(s) (LRB):  EGD (ESOPHAGOGASTRODUODENOSCOPY) (N/A).   68-year-old man with history of alcohol use disorder, EtOH cirrhosis with esophageal varices s/p banding 2018, CAD, BPH, COPD, HTN, and HFrEF (35-40%) for whom GI was consulted for reports of hematochezia and hematemesis. He reports that about a week ago he began to have constipation which was followed by bloody bowel movements; red blood was mixed into the stool. According to him, the last episode of hematochezia was a week ago. He also reports dizziness, weakness, pre syncope, and abdominal pain.    Patient also reports he vomited blood x1; he cannot recall when this was. Patient is a poor historian and is having a lot of trouble accurately recalling the timeline of events.    In May he was having GIB and EGD showed a hiatal hernia, duodenal blebs, blood coming from the ampulla. The EGD prior in APRIL showed congestive gastropathy and APC to a non bleeding angioectasia in the jejunum. He underwent IR embolization of the bleeding vessel at that time with control of bleeding. Last colonoscopy was also in April and the colon was normal.     On admission, his Hgb was 4.4 and he received multiple transfusions. Blood was noted in his underpants. He has not had any further episodes of bleeding since admission.       Anticoagulants   Medication Route Frequency       he has a current medication list which includes the following long-term medication(s): aspirin, atorvastatin, escitalopram oxalate, fluticasone furoate-vilanterol, folic acid, furosemide, metoprolol succinate, pantoprazole, spironolactone, and valsartan.   ALLERGIES:   Review of patient's allergies indicates:  No Known Allergies  LDA:          Lines/Drains/Airways       Peripheral Intravenous Line  Duration                  Peripheral IV - Single Lumen 08/07/24 18 G Right Forearm 1 day         Peripheral IV - Single Lumen 08/07/24 1824 18 G Left Forearm <1 day         Peripheral IV - Single Lumen 08/07/24 1824 18 G Left Upper Arm <1 day         Peripheral IV - Single Lumen 08/07/24 2008 20 G Left Hand <1 day                   RELEVANT MEDICATIONS:     Antibiotics (From admission, onward)      Start     Stop Route Frequency Ordered    08/08/24 2100  mupirocin 2 % ointment         08/13/24 2059 Nasl 2 times daily 08/08/24 1113    08/08/24 1830  cefTRIAXone (Rocephin) 1 g in D5W 100 mL IVPB (MB+)         -- IV Every 24 hours (non-standard times) 08/07/24 2118          VTE Risk Mitigation (From admission, onward)           Ordered     IP VTE HIGH RISK PATIENT  Once         08/07/24 2053     Place sequential compression device  Until discontinued         08/07/24 2053                    History:     Active Hospital Problems    Diagnosis  POA    *Acute pancreatitis [K85.90]  Yes    HFrEF (heart failure with reduced ejection fraction) [I50.20]  Yes    CAD (coronary artery disease) [I25.10]  Yes    Presumed COPD (chronic obstructive pulmonary disease) [J44.9]  Yes    GIB (gastrointestinal bleeding) [K92.2]  Yes    Therapeutic opioid induced constipation [K59.03, T40.2X5A]  Yes    ISRAEL (acute kidney injury) [N17.9]  Yes    Essential hypertension [I10]  Yes     Chronic    Alcohol withdrawal [F10.939]  Yes    Alcoholic cirrhosis [K70.30]  Yes     Chronic    Tobacco abuse  [Z72.0]  Yes     Chronic    BPH (benign prostatic hyperplasia) [N40.0]  Yes     Chronic      Resolved Hospital Problems   No resolved problems to display.     Patient Active Problem List   Diagnosis    BPH (benign prostatic hyperplasia)    Elevated troponin    Tobacco abuse    Documented history of CKD Stage 3    Gastritis    Epigastric mass    Thrombocytopenia    Alcoholic cirrhosis    Fall    Coagulopathy    Fracture of femoral neck, right, closed    Anxiety and depression    Essential hypertension    Benign hypertensive heart and kidney disease with CKD    Esophageal varices without bleeding    Alcohol withdrawal    Hypomagnesemia    ISRAEL (acute kidney injury)    Transaminitis    Elevated bilirubin    Macrocytic anemia    Closed fracture of neck of right femur s/p screw fixation on 7/29 by Dr. Grossman    Multiple renal cysts    Cholelithiases    Chronic pain of right knee    Impaired functional mobility and endurance    Syncope    Closed fracture of multiple ribs of left side with routine healing    Closed fracture of left distal radius    Pancreatic lesion    Therapeutic opioid induced constipation    Closed fracture of multiple thoracic vertebrae with routine healing    GIB (gastrointestinal bleeding)    Cirrhosis    Hyponatremia    COPD exacerbation    Debility    Shortness of breath    Other emphysema    Normocytic anemia    Bilateral pleural effusion    Presumed COPD (chronic obstructive pulmonary disease)    Compressive atelectasis    HFrEF (heart failure with reduced ejection fraction)    Acute pancreatitis    CAD (coronary artery disease)    Acute hypoxic respiratory failure     Medical History   Past Medical History:   Diagnosis Date    Alcohol abuse     BPH (benign prostatic hyperplasia)     CAD (coronary artery disease)     On ASA/Lipitor    Cirrhosis 06/21/2018    pt states that he was diagnosed a week ago during his last hospital visit    Closed fracture of left distal radius 07/19/2023    COPD  "(chronic obstructive pulmonary disease)     presumed; needs PFTs    Gastritis     HFrEF (heart failure with reduced ejection fraction) 05/10/2024    Hypertension     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   Social History:    reports that he has been smoking cigarettes. He has never used smokeless tobacco. He reports current alcohol use. He reports that he does not use drugs.     OBJECTIVE:     Vital Signs (Most Recent):  Temp: 36.8 °C (98.3 °F) (08/08/24 1120)  Pulse: 77 (08/08/24 1301)  Resp: (!) 22 (08/08/24 1310)  BP: 107/66 (08/08/24 1301)  SpO2: 100 % (08/08/24 1200) Vital Signs Range (Last 24H):  Temp:  [36.5 °C (97.7 °F)-37.1 °C (98.8 °F)]   Pulse:  [77-96]   Resp:  [13-23]   BP: ()/(56-87)   SpO2:  [97 %-100 %]      Last 3 Vitals:       5/20/2024    12:26 PM 5/21/2024     1:49 AM 8/7/2024     4:33 PM   Vitals - 1 value per visit   SYSTOLIC 90  99   DIASTOLIC 57  56   Pulse 68  86   Temp 36.3 °C (97.3 °F)  36.5 °C (97.7 °F)   Resp 18  16   SPO2 98 %  99 %   Weight (lb)  179.45 200   Weight (kg)  81.4 90.719   Height  6' (1.829 m) 5' 11" (1.803 m)   BMI (Calculated)  24.3 27.9     Body mass index is 22.75 kg/m².   Wt Readings from Last 4 Encounters:   08/08/24 74 kg (163 lb 2.3 oz)   05/21/24 81.4 kg (179 lb 7.3 oz)   05/10/24 88.5 kg (195 lb)   05/01/24 88.5 kg (195 lb)     Significant Labs:  Lab Results   Component Value Date    WBC 3.38 (L) 08/08/2024    HGB 7.9 (L) 08/08/2024    HCT 24.5 (L) 08/08/2024     (L) 08/08/2024     (L) 08/08/2024    K 4.3 08/08/2024     08/08/2024 "    CREATININE 2.1 (H) 08/08/2024    BUN 52 (H) 08/08/2024    CO2 18 (L) 08/08/2024     (H) 08/08/2024    CALCIUM 8.1 (L) 08/08/2024    MG 1.9 08/08/2024    PHOS 3.8 08/08/2024    ALKPHOS 82 08/08/2024    ALT 12 08/08/2024    AST 25 08/08/2024    ALBUMIN 2.7 (L) 08/08/2024    INR 1.1 08/08/2024    APTT 22.0 10/24/2023    HGBA1C 5.0 05/11/2024    CPK 40 03/28/2024    TROPONINI 0.024 05/20/2024    BNP 3,099 (H) 05/20/2024     No LMP for male patient.      EKG:   Results for orders placed or performed during the hospital encounter of 08/07/24   EKG 12-lead    Collection Time: 08/07/24  4:41 PM   Result Value Ref Range    QRS Duration 98 ms    OHS QTC Calculation 428 ms    Narrative    Test Reason : K62.5,    Vent. Rate : 085 BPM     Atrial Rate : 085 BPM     P-R Int : 196 ms          QRS Dur : 098 ms      QT Int : 360 ms       P-R-T Axes : 034 -42 135 degrees     QTc Int : 428 ms    Sinus rhythm with Premature atrial complexes  Left axis deviation  ST and T wave abnormality, consider lateral ischemia  Abnormal ECG  When compared with ECG of 20-MAY-2024 12:41,  The axis Shifted left  T wave inversion no longer evident in Anterior leads  Confirmed by TIMOTHY MOSS MD ([id number]) on 8/8/2024 8:13:54 AM    Referred By: [email or web address]   SELF           Confirmed By:TIMOTHY MOSS MD       TTE:  Results for orders placed or performed during the hospital encounter of 08/07/24   Echo   Result Value Ref Range    Narrative      Technically inadequate study due to poor acoustic windows. IV contrast   was given.    Left Ventricle: The left ventricle is normal in size. Normal wall   thickness. There is concentric remodeling. Unable to assess wall motion.   There is mildly reduced systolic function with a visually estimated   ejection fraction of 40 - 45%. Unable to assess diastolic function due to   poor image quality.    Right Ventricle: Normal right ventricular cavity size. Wall thickness   is normal. Systolic function is normal.     IVC/SVC: Normal venous pressure at 3 mmHg.       EF   Date Value Ref Range Status   11/03/2022 60 % Final      Results for orders placed or performed during the hospital encounter of 07/29/18   2D echo with color flow doppler   Result Value Ref Range    EF + QEF 55 55 - 65    Mitral Valve Regurgitation TRIVIAL     Diastolic Dysfunction No     Est. PA Systolic Pressure 29.21     Tricuspid Valve Regurgitation TRIVIAL        ASSESSMENT/PLAN:         Pre-op Assessment    I have reviewed the Patient Summary Reports.     I have reviewed the Nursing Notes. I have reviewed the NPO Status.   I have reviewed the Medications.     Review of Systems  Cardiovascular:     Hypertension   CAD                     Shortness of Breath    Coronary Artery Disease:                            Hypertension         Pulmonary:   COPD Asthma  Shortness of breath     Asthma:   Chronic Obstructive Pulmonary Disease (COPD):                      Renal/:  Chronic Renal Disease        Kidney Function/Disease             Hepatic/GI:      Liver Disease,         Liver Disease        Psych:  Psychiatric History                  Physical Exam  General: Well nourished, Cooperative and Alert    Airway:  Mallampati: III / II  Mouth Opening: Normal  TM Distance: Normal  Tongue: Normal  Neck ROM: Normal ROM    Dental:  Intact    Chest/Lungs:  Normal Respiratory Rate    Heart:  Rate: Normal  Rhythm: Regular Rhythm        Anesthesia Plan  Type of Anesthesia, risks & benefits discussed:    Anesthesia Type: Gen Natural Airway, MAC  Intra-op Monitoring Plan: Standard ASA Monitors  Post Op Pain Control Plan: IV/PO Opioids PRN  Induction:  IV  ASA Score: 3  Day of Surgery Review of History & Physical: H&P Update referred to the surgeon/provider.    Ready For Surgery From Anesthesia Perspective.     .

## 2024-08-08 NOTE — ASSESSMENT & PLAN NOTE
On valsartan, metoprolol, Jardiance and furosemide at home.    - hold antihypertensives in setting of blood loss / hypovolemia

## 2024-08-08 NOTE — ASSESSMENT & PLAN NOTE
History of esophageal varices: banding approximately 6 years ago. No varices on most recent endoscopies.     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 Successful coil embolization of several superior pancreaticoduodenal branches and the gastroduodenal artery with cessation of flow to the pseudoaneurysm.    PLAN:  - s/p 3 units pRBCs  - GI consulted, appreciate recs  - on octreotide  - s/p pantoprazole 80 mg IV, then 40 mg IV BID  - CTA with unstable, IR if positive for active bleeding  - NPO for potential endoscopy in AM  - CBC q6h

## 2024-08-08 NOTE — PLAN OF CARE
Problem: Adult Inpatient Plan of Care  Goal: Plan of Care Review  Outcome: Progressing     Problem: Acute Kidney Injury/Impairment  Goal: Fluid and Electrolyte Balance  Outcome: Progressing     Problem: Fall Injury Risk  Goal: Absence of Fall and Fall-Related Injury  Outcome: Progressing     MICU DAILY GOALS     Family/Goals of care/Code Status   Code Status: Full Code    24H Vital Sign Range  Temp:  [97.7 °F (36.5 °C)-98.8 °F (37.1 °C)]   Pulse:  [82-96]   Resp:  [16-23]   BP: ()/(56-87)   SpO2:  [97 %-100 %]      Shift Events (include procedures and significant events)   Admitted to unit; Trending CBC/Lactic; I unit PRBC initiated.    AWAKE RASS: Goal - RASS Goal: 0-->alert and calm  Actual - RASS (Higgins Agitation-Sedation Scale): alert and calm    Restraint necessity: Not necessary   BREATHE SBT: Not intubated    Coordinate A & B, analgesics/sedatives Pain: managed   SAT: Not intubated   Delirium CAM-ICU:     Early(intubated/ Progressive (non-intubated) Mobility MOVE Screen (INTUBATED ONLY): Not intubated    Activity: Activity Management: Bridge - L1   Feeding/Nutrition Diet order: Diet/Nutrition Received: NPO,     Thrombus DVT prophylaxis: VTE Required Core Measure: Per order contraindicated for SCDs/Anticoagulants   HOB Elevation Head of Bed (HOB) Positioning: HOB at 30 degrees   Ulcer Prophylaxis GI: yes   Glucose control managed     Skin Skin assessed during: Admit    Sacrum intact/not altered? Yes  Heels intact/not altered? Yes  Surgical wound? No    CHECK ONE!   (no altered skin or altered skin) and sub boxes:  [x] No Altered Skin Integrity Present    [x]Prevention Measures Documented    [] Altered Skin Integrity Present or Discovered   [] LDA present in EPIC, daily doc completed              [] LDA added if not in EPIC (describe wound).                    When describing wound, do not stage, use descriptive words only.    [] Wound Image Taken (required on admit,                    transfer/discharge and every Tuesday)    Wound Care Consulted? No    4 EYES:  Attending Nurse (1st set of eyes): Jayla Erwin RN/Staff Member (2nd set of eyes): Magdalene   Bowel Function no issues    Indwelling Catheter Necessity         Urinal   De-escalation Antibiotics No        VS and assessment per flow sheet, patient progressing towards goals as tolerated, plan of care reviewed with  pt/team , all concerns addressed, will continue to monitor.

## 2024-08-08 NOTE — SUBJECTIVE & OBJECTIVE
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       Past Surgical History:   Procedure Laterality Date    CAPSULOTOMY OF JOINT Right 7/29/2019    Procedure: CAPSULOTOMY, JOINT;  Surgeon: Raj Grossman MD;  [facility];  Service: Orthopedics;  Laterality: Right;    COLONOSCOPY N/A 4/1/2024    Procedure: COLONOSCOPY;  Surgeon: Scarlett Whitt MD;  Location: [facility] ENDO (2ND FLR);  Service: Gastroenterology;  Laterality: N/A;    ESOPHAGOGASTRODUODENOSCOPY N/A 10/25/2023    Procedure: EGD (ESOPHAGOGASTRODUODENOSCOPY);  Surgeon: Lux Rome MD;  Location: [facility] (2ND FLR);  Service: Endoscopy;  Laterality: N/A;    ESOPHAGOGASTRODUODENOSCOPY N/A 4/19/2024    Procedure: EGD (ESOPHAGOGASTRODUODENOSCOPY);  Surgeon: Raj Roberts MD;  Location: [facility] (2ND FLR);  Service: Endoscopy;  Laterality: N/A;    ESOPHAGOGASTRODUODENOSCOPY N/A 5/10/2024    Procedure: EGD (ESOPHAGOGASTRODUODENOSCOPY);  Surgeon: Ashley Trent MD;  Location: [facility] ENDO (2ND FLR);  Service: Endoscopy;  Laterality: N/A;    PERCUTANEOUS PINNING OF HIP Right 7/29/2019    Procedure: PINNING, HIP, PERCUTANEOUS- synthes cannulated screws- hana table- C arm door side-;  Surgeon: Raj Grossman MD;  [facility];  Service: Orthopedics;  Laterality: Right;       Review of patient's allergies indicates:  No Known Allergies  Family History       Problem Relation  (Age of Onset)    Heart disease Father, Brother    Hypertension Father          Tobacco Use    Smoking status: Every Day     Current packs/day: 1.00     Types: Cigarettes    Smokeless tobacco: Never   Substance and Sexual Activity    Alcohol use: Yes     Comment: (former drinker; 2months sober) this visit admits drinking a couple beers today    Drug use: No    Sexual activity: Not on file     Review of Systems   Constitutional:  Positive for fatigue. Negative for chills and fever.   Respiratory:  Negative for shortness of breath.    Cardiovascular:  Negative for chest pain.   Gastrointestinal:  Positive for abdominal pain, blood in stool, constipation, nausea and vomiting. Negative for abdominal distention and rectal pain.   Genitourinary:  Negative for dysuria.   Neurological:  Positive for dizziness, weakness and light-headedness.     Objective:     Vital Signs (Most Recent):  Temp: 98.6 °F (37 °C) (08/08/24 0701)  Pulse: 79 (08/08/24 0701)  Resp: 14 (08/08/24 0701)  BP: 127/75 (08/08/24 0701)  SpO2: 100 % (08/08/24 0701) Vital Signs (24h Range):  Temp:  [97.7 °F (36.5 °C)-98.8 °F (37.1 °C)] 98.6 °F (37 °C)  Pulse:  [79-96] 79  Resp:  [14-23] 14  SpO2:  [97 %-100 %] 100 %  BP: ()/(56-87) 127/75     Weight: 72 kg (158 lb 11.7 oz) (08/08/24 0430)  Body mass index is 22.14 kg/m².      Intake/Output Summary (Last 24 hours) at 8/8/2024 0736  Last data filed at 8/8/2024 0707  Gross per 24 hour   Intake 1961.06 ml   Output --   Net 1961.06 ml       Lines/Drains/Airways       Peripheral Intravenous Line  Duration                  Peripheral IV - Single Lumen 08/07/24 18 G Right Forearm 1 day         Peripheral IV - Single Lumen 08/07/24 1824 18 G Left Forearm <1 day         Peripheral IV - Single Lumen 08/07/24 1824 18 G Left Upper Arm <1 day         Peripheral IV - Single Lumen 08/07/24 2008 20 G Left Hand <1 day                     Physical Exam  Exam conducted with a chaperone present.   Constitutional:        General: He is not in acute distress.     Appearance: He is ill-appearing.   HENT:      Head: Normocephalic and atraumatic.   Eyes:      General: No scleral icterus.  Cardiovascular:      Rate and Rhythm: Normal rate.      Pulses: Normal pulses.   Pulmonary:      Effort: Pulmonary effort is normal. No respiratory distress.   Abdominal:      General: Abdomen is flat. There is no distension.      Palpations: Abdomen is soft.      Tenderness: There is no abdominal tenderness. There is no guarding or rebound.   Genitourinary:     Comments: Brown stool on rectal exam  Musculoskeletal:         General: Normal range of motion.   Skin:     General: Skin is warm and dry.   Neurological:      Mental Status: He is alert and oriented to person, place, and time.   Psychiatric:         Mood and Affect: Mood normal.         Behavior: Behavior normal.          Significant Labs:  CBC:   Recent Labs   Lab 08/07/24  1822 08/08/24  0004 08/08/24  0331   WBC 2.52* 7.44 3.70*   HGB 4.4* 8.2* 7.2*   HCT 13.9* 26.6* 23.2*    183 164     CMP:   Recent Labs   Lab 08/08/24  0331   *   CALCIUM 8.1*   ALBUMIN 2.7*   PROT 6.2   *   K 4.3   CO2 18*      BUN 52*   CREATININE 2.1*   ALKPHOS 82   ALT 12   AST 25   BILITOT 1.4*     All pertinent lab results from the last 24 hours have been reviewed.    Significant Imaging:  Imaging results within the past 24 hours have been reviewed.

## 2024-08-08 NOTE — ASSESSMENT & PLAN NOTE
Mr. Fransico Montalvo is a 68-year-old man with history of alcohol use disorder, EtOH cirrhosis with esophageal varices s/p banding 2018, CAD, BPH, COPD, HTN, and HFrEF (35-40%)  for whom Giw as consulted for reports of hematochezia and hematemesis. Just voer a week ago pt began having constipation then bloody BMs. He has been having these intermittently for the last week. Also reports a single episode of hematemesis but unsure when. Patient is a poor historian and does not have a clear timeline of events. He has a hx of prior GI bleeds,o ne requiring IR embolization to stop bleeding from the ampulla this past May. Most recent EGD, in May, showed hiatal hernia with bleeding from the ampulla. EGD prior in April showed congestive gastropathy, and a non bleeding AVM in the jejunum. Last colonoscopy was this year and was normal.    On admission his Hgb was 4.4 and he has received multiple nits of pRBCs. He has remained HDS. He has not had any additional episdoes of bleeding since admission. He currently denies any abdominal pain, N/V.    - Plan for EGD today  - Okay to continue octreotide for now  - Trend Hgb q12hrs. Transfuse for Hgb > 7, unless otherwise indicated  - Bolus IV pantoprazole 80mg followed by 40mg BID  - Maintain IV access with 2 large bore Ivs  - Intravascular resuscitation/support with IVFs   - Keep NPO  - Hold all NSAIDs and anticoagulants, unless contraindicated  - Please correct any coagulopathy with platelets and FFP for goal of platelets >50K and INR <2.0  - Please notify GI team if there is significant change in patient's clinical status

## 2024-08-08 NOTE — CONSULTS
Consult to MICU received. Suspect pain is due to pancreatitis. Treating for GIB. Will admit to MICU, full H&P to follow.

## 2024-08-08 NOTE — H&P
Interventional Radiology  Consult Note    Consult Requested By: Kenya Cárdenas MD  Reason for Consult: S/p EGD. Pt has bleeding from his ampulla, previously underwent embolization of pancreaticoduodenal artery and gastroduodenal artery 5/2024.  Hoping to coordinat eanother intervention     SUBJECTIVE:     Chief Complaint:  Acute pancreatitis    History of Present Illness:  Fransico Montalvo is a 68 y.o. male with a PMHx of alcohol use disorder, EtOH cirrhosis with esophageal varices s/p banding in 2018 and HFrEF (30-45%) who was admitted on 8/8/24 for rectal bleeding.  Interventional Radiology has been consulted for IR angiogram with embolization for management of active hemorrhage from ampulla as seen on same day endoscopy .  The pt's H/H is currently 7.5 and is trending up, originally 4.4. The pt has received 4 u pRBC since admission. The pt's WBC is 2.97 and is stable. Pt is afebrile with hypotensive episodes. He denies a history of ELLE requiring nightly CPAP and difficulty breathing when lying flat. He does not take any anticoagulants. He has been NPO since midnight.    Review of Systems   Constitutional:  Positive for malaise/fatigue. Negative for chills and fever.   Cardiovascular:  Negative for chest pain and leg swelling.   Gastrointestinal:  Positive for abdominal pain and blood in stool. Negative for nausea and vomiting.   Neurological:  Negative for weakness.       Scheduled Meds:   cefTRIAXone (Rocephin) IV (PEDS and ADULTS)  1 g Intravenous Q24H    [START ON 8/9/2024] EScitalopram oxalate  10 mg Oral Daily    fluticasone furoate-vilanteroL  1 puff Inhalation Daily    folic acid  1 mg Oral Daily    multivitamin  1 tablet Oral Daily    mupirocin   Nasal BID    pantoprazole  40 mg Intravenous BID    [START ON 8/9/2024] thiamine  100 mg Oral Daily     Continuous Infusions:   octreotide (SANDOSTATIN) 500 mcg in 0.9% NaCl 100 mL infusion  50 mcg/hr Intravenous Continuous   Stopped at 08/08/24 1455     PRN  Meds:  Current Facility-Administered Medications:     0.9%  NaCl infusion (for blood administration), , Intravenous, Q24H PRN    HYDROmorphone, 0.5 mg, Intravenous, Q6H PRN    ondansetron, 8 mg, Oral, Q8H PRN    oxyCODONE, 5 mg, Oral, Q4H PRN    prochlorperazine, 5 mg, Intravenous, Q6H PRN    sodium chloride 0.9%, 10 mL, Intravenous, PRN    Review of patient's allergies indicates:  No Known Allergies    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     Past Surgical History:   Procedure Laterality Date    CAPSULOTOMY OF JOINT Right 7/29/2019    Procedure: CAPSULOTOMY, JOINT;  Surgeon: Raj Grossman MD;  Location: [facility] OR [address] [place] [address];  Service: Orthopedics;  Laterality: Right;    COLONOSCOPY N/A 4/1/2024    Procedure: COLONOSCOPY;  Surgeon: Scarlett Whitt MD;  Location: [facility] (2ND FLR);  Service: Gastroenterology;  Laterality: N/A;    ESOPHAGOGASTRODUODENOSCOPY N/A 10/25/2023    Procedure: EGD (ESOPHAGOGASTRODUODENOSCOPY);  Surgeon: Lux Rome MD;  Location: [facility] (2ND FLR);  Service: Endoscopy;  Laterality: N/A;    ESOPHAGOGASTRODUODENOSCOPY N/A 4/19/2024    Procedure: EGD (ESOPHAGOGASTRODUODENOSCOPY);  Surgeon: Raj Roberts MD;  Location: [facility] (2ND FLR);  Service: Endoscopy;  Laterality: N/A;    ESOPHAGOGASTRODUODENOSCOPY N/A 5/10/2024    Procedure: EGD (ESOPHAGOGASTRODUODENOSCOPY);  Surgeon: Ashley Trent MD;  Location: [facility]  (2ND FLR);  Service: Endoscopy;  Laterality: N/A;    PERCUTANEOUS PINNING OF HIP Right 7/29/2019    Procedure: PINNING, HIP, PERCUTANEOUS- synthes cannulated screws- hana table- C arm door side-;  Surgeon: Raj Grossman MD;  Location: [facility] OR 2ND FLR;  Service: Orthopedics;  Laterality: Right;     Family History   Problem Relation Name Age of Onset    Hypertension Father      Heart disease Father      Heart disease Brother       Social History     Tobacco Use    Smoking status: Every Day     Current packs/day: 1.00     Types: Cigarettes    Smokeless tobacco: Never   Substance Use Topics    Alcohol use: Yes     Comment: (former drinker; 2months sober) this visit admits drinking a couple beers today    Drug use: No       OBJECTIVE:     Vital Signs (Most Recent)  Temp: 98 °F (36.7 °C) (08/08/24 1505)  Pulse: 97 (08/08/24 1505)  Resp: (!) 26 (08/08/24 1505)  BP: (!) 91/54 (08/08/24 1505)  SpO2: 100 % (08/08/24 1505)    Physical Exam:  Physical Exam  Constitutional:       Appearance: He is ill-appearing.   HENT:      Mouth/Throat:      Mouth: Mucous membranes are dry.   Eyes:      Extraocular Movements: Extraocular movements intact.   Pulmonary:      Effort: Pulmonary effort is normal. No respiratory distress.   Abdominal:      General: There is distension.      Tenderness: There is abdominal tenderness.      Comments: generalized   Musculoskeletal:         General: No swelling.   Neurological:      General: No focal deficit present.      Mental Status: He is alert and oriented to person, place, and time.   Psychiatric:         Mood and Affect: Mood normal.         Laboratory  I have reviewed all pertinent lab results within the past 24 hours.    ASA/Mallampati  ASA: per anesthesia  Mallampati: per anesthesia    ASSESSMENT/PLAN:     Assessment:  68 y.o. male with a PMHx of alcohol use disorder, EtOH cirrhosis, esophageal varices s/p banding 2018, and HFrEF (35-40%) who has been referred to IR for angiogram with  embolization for management of active hemorrhage from ampulla as seen on same day endoscopy . The procedure was discussed in great detail with the patient including thorough explanations of the potential risks and benefits of angiogram with embolization. Risks include but are not limited to sepsis, severe infection, hemorrhage, damage to the artery, damage to surrounding organs, pseudoaneurysm, non target embolization, contrast allergy and death. The patient is a candidate for angiogram with embolization under anesthesia. Plan discussed with ordering physician and pt who verbalized understanding of the plan and would like to proceed.    Plan:  Will proceed with angiogram with embolization  with anesthesia  on 8/8/24.   Anticoagulation history reviewed. Patient not currently taking anticoagulation due to ongoing bleed.   Coagulation labs reviewed.  Thank you for the consult. Please contact with questions via [employer] secure chat or spectra link    Emily Rahman MD  Radiology, PGY IV  [facility]

## 2024-08-08 NOTE — ASSESSMENT & PLAN NOTE
Newly diagnosed HFrEF in 05/2024.    TTE from 5/10/24: LV mildly dilated. Moderate global hypokinesis and regional wall motion abnormalities present. EF 35 - 40%. RV Systolic function is mildly reduced. Left atrium is moderately dilated. Moderate aortic valve sclerosis; mildly restricted motion. Moderate tricuspid regurgitation. PASP 55 mmHg. Elevated venous pressure at 15 mmHg.    - careful with fluid repletion  - holding GDMT in setting of GIB with soft BP and ISRAEL  - TTE  - Telemetry

## 2024-08-08 NOTE — ASSESSMENT & PLAN NOTE
67yo M with PMHx of alcohol use disorder c/b cirrhosis and esophageal varices s/p banding 2018, CAD, BPH, COPD, HTN, and HFrEF (35-40%) who is [place] [facility] [place]itis and anemia due to blood loss. Lipase 402.    [place] score 1 on admission due to age > 55.    PLAN:  - Pain control with PRN morphine for severe pain and oxycodone for moderate pain  - careful with fluids given cocurrent HFrEF  - Daily CMP  - Further abdominal imaging as indicated

## 2024-08-08 NOTE — TREATMENT PLAN
GI Treatment Plan      EGD done today. No bleeding in the lumen of the GI tract, but did see blood coming from the ampulla of Vater.   - recommend IR consult given his history of aneurysm requiring coiling and suspicion for bleed from the pancreas.     GI will sign off at this time. Please reach out with any questions or concerns.    Rosana Haque MD  Gastroenterology and Hepatology Fellow, PGY-4  Pager # [phone]'

## 2024-08-08 NOTE — PLAN OF CARE
Pt arrived to   for GI bleed embolization. Pt oriented to unit and staff. Plan of care reviewed with patient, patient verbalizes understanding. Comfort measures utilized. Pt safely transferred from stretcher to procedural table. Fall risk reviewed with patient, fall risk interventions maintained. Positioner pillows utilized to minimize pressure points. Blankets applied. Pt prepped and draped utilizing standard sterile technique. Timeouts completed utilizing standard universal time-out, per department and facility policy.  Anesthesia at bedside; Refer to anesthesia record regarding sedation and vital signs.

## 2024-08-08 NOTE — ANESTHESIA PROCEDURE NOTES
Intubation    Date/Time: 8/8/2024 2:34 PM    Performed by: JAVON Romano MD  Authorized by: JAVON Romano MD    Intubation:     Induction:  Intravenous    Intubated:  Postinduction    Mask Ventilation:  Not attempted    Attempts:  1    Attempted By:  CRNA    Method of Intubation:  Video laryngoscopy    Blade:  Nita 3    Laryngeal View Grade: Grade I - full view of cords      Difficult Airway Encountered?: No      Complications:  None    Airway Device:  Oral endotracheal tube    Airway Device Size:  7.5    Style/Cuff Inflation:  Cuffed    Tube secured:  23    Secured at:  The lips    Placement Verified By:  Capnometry    Complicating Factors:  None    Findings Post-Intubation:  BS equal bilateral

## 2024-08-08 NOTE — ASSESSMENT & PLAN NOTE
Cr 2.4 on admission; baseline 1.3. Likely prerenal due to volume depletion. Received 1L IVF in the ED and 3 units pRBCs.    - daily CMP  - avoid nephrotoxic agents  - renally dose medications  - fluid repletion prn

## 2024-08-09 PROBLEM — I50.42 CHRONIC COMBINED SYSTOLIC AND DIASTOLIC CONGESTIVE HEART FAILURE: Status: ACTIVE | Noted: 2024-05-10

## 2024-08-09 PROBLEM — D62 ACUTE BLOOD LOSS ANEMIA: Status: ACTIVE | Noted: 2024-08-09

## 2024-08-09 PROBLEM — R57.8 HEMORRHAGIC SHOCK: Status: ACTIVE | Noted: 2024-08-09

## 2024-08-09 LAB
ALBUMIN SERPL BCP-MCNC: 2.6 G/DL (ref 3.5–5.2)
ALP SERPL-CCNC: 76 U/L (ref 55–135)
ALT SERPL W/O P-5'-P-CCNC: 15 U/L (ref 10–44)
ANION GAP SERPL CALC-SCNC: 11 MMOL/L (ref 8–16)
ANION GAP SERPL CALC-SCNC: 14 MMOL/L (ref 8–16)
APTT PPP: 25.1 SEC (ref 21–32)
AST SERPL-CCNC: 39 U/L (ref 10–40)
BASOPHILS # BLD AUTO: 0.01 K/UL (ref 0–0.2)
BASOPHILS # BLD AUTO: 0.02 K/UL (ref 0–0.2)
BASOPHILS NFR BLD: 0.2 % (ref 0–1.9)
BASOPHILS NFR BLD: 0.2 % (ref 0–1.9)
BASOPHILS NFR BLD: 0.3 % (ref 0–1.9)
BASOPHILS NFR BLD: 0.3 % (ref 0–1.9)
BILIRUB SERPL-MCNC: 0.9 MG/DL (ref 0.1–1)
BLD PROD TYP BPU: NORMAL
BLOOD UNIT EXPIRATION DATE: NORMAL
BLOOD UNIT TYPE CODE: 5100
BLOOD UNIT TYPE: NORMAL
BUN SERPL-MCNC: 38 MG/DL (ref 8–23)
BUN SERPL-MCNC: 41 MG/DL (ref 8–23)
CALCIUM SERPL-MCNC: 8.6 MG/DL (ref 8.7–10.5)
CALCIUM SERPL-MCNC: 8.9 MG/DL (ref 8.7–10.5)
CHLORIDE SERPL-SCNC: 102 MMOL/L (ref 95–110)
CHLORIDE SERPL-SCNC: 103 MMOL/L (ref 95–110)
CO2 SERPL-SCNC: 17 MMOL/L (ref 23–29)
CO2 SERPL-SCNC: 19 MMOL/L (ref 23–29)
CODING SYSTEM: NORMAL
CREAT SERPL-MCNC: 1.9 MG/DL (ref 0.5–1.4)
CREAT SERPL-MCNC: 2 MG/DL (ref 0.5–1.4)
CROSSMATCH INTERPRETATION: NORMAL
DIFFERENTIAL METHOD BLD: ABNORMAL
DISPENSE STATUS: NORMAL
EOSINOPHIL # BLD AUTO: 0 K/UL (ref 0–0.5)
EOSINOPHIL # BLD AUTO: 0.1 K/UL (ref 0–0.5)
EOSINOPHIL NFR BLD: 0.4 % (ref 0–8)
EOSINOPHIL NFR BLD: 0.7 % (ref 0–8)
EOSINOPHIL NFR BLD: 0.7 % (ref 0–8)
EOSINOPHIL NFR BLD: 1.8 % (ref 0–8)
ERYTHROCYTE [DISTWIDTH] IN BLOOD BY AUTOMATED COUNT: 14.9 % (ref 11.5–14.5)
ERYTHROCYTE [DISTWIDTH] IN BLOOD BY AUTOMATED COUNT: 15.1 % (ref 11.5–14.5)
ERYTHROCYTE [DISTWIDTH] IN BLOOD BY AUTOMATED COUNT: 15.5 % (ref 11.5–14.5)
ERYTHROCYTE [DISTWIDTH] IN BLOOD BY AUTOMATED COUNT: 15.6 % (ref 11.5–14.5)
EST. GFR  (NO RACE VARIABLE): 35.5 ML/MIN/1.73 M^2
EST. GFR  (NO RACE VARIABLE): 37.7 ML/MIN/1.73 M^2
GLUCOSE SERPL-MCNC: 127 MG/DL (ref 70–110)
GLUCOSE SERPL-MCNC: 134 MG/DL (ref 70–110)
HCT VFR BLD AUTO: 27.4 % (ref 40–54)
HCT VFR BLD AUTO: 28.2 % (ref 40–54)
HCT VFR BLD AUTO: 31.3 % (ref 40–54)
HCT VFR BLD AUTO: 31.7 % (ref 40–54)
HGB BLD-MCNC: 10 G/DL (ref 14–18)
HGB BLD-MCNC: 10.1 G/DL (ref 14–18)
HGB BLD-MCNC: 8.9 G/DL (ref 14–18)
HGB BLD-MCNC: 8.9 G/DL (ref 14–18)
IMM GRANULOCYTES # BLD AUTO: 0.02 K/UL (ref 0–0.04)
IMM GRANULOCYTES # BLD AUTO: 0.02 K/UL (ref 0–0.04)
IMM GRANULOCYTES # BLD AUTO: 0.03 K/UL (ref 0–0.04)
IMM GRANULOCYTES # BLD AUTO: 0.04 K/UL (ref 0–0.04)
IMM GRANULOCYTES NFR BLD AUTO: 0.4 % (ref 0–0.5)
IMM GRANULOCYTES NFR BLD AUTO: 0.5 % (ref 0–0.5)
IMM GRANULOCYTES NFR BLD AUTO: 0.5 % (ref 0–0.5)
IMM GRANULOCYTES NFR BLD AUTO: 0.7 % (ref 0–0.5)
INR PPP: 1.2 (ref 0.8–1.2)
LACTATE SERPL-SCNC: 2.9 MMOL/L (ref 0.5–2.2)
LYMPHOCYTES # BLD AUTO: 0.4 K/UL (ref 1–4.8)
LYMPHOCYTES # BLD AUTO: 0.7 K/UL (ref 1–4.8)
LYMPHOCYTES NFR BLD: 15.5 % (ref 18–48)
LYMPHOCYTES NFR BLD: 16.2 % (ref 18–48)
LYMPHOCYTES NFR BLD: 18.3 % (ref 18–48)
LYMPHOCYTES NFR BLD: 5.2 % (ref 18–48)
MAGNESIUM SERPL-MCNC: 1.9 MG/DL (ref 1.6–2.6)
MAGNESIUM SERPL-MCNC: 2 MG/DL (ref 1.6–2.6)
MCH RBC QN AUTO: 27.8 PG (ref 27–31)
MCH RBC QN AUTO: 28.3 PG (ref 27–31)
MCH RBC QN AUTO: 28.5 PG (ref 27–31)
MCH RBC QN AUTO: 28.5 PG (ref 27–31)
MCHC RBC AUTO-ENTMCNC: 31.6 G/DL (ref 32–36)
MCHC RBC AUTO-ENTMCNC: 31.9 G/DL (ref 32–36)
MCHC RBC AUTO-ENTMCNC: 31.9 G/DL (ref 32–36)
MCHC RBC AUTO-ENTMCNC: 32.5 G/DL (ref 32–36)
MCV RBC AUTO: 88 FL (ref 82–98)
MCV RBC AUTO: 88 FL (ref 82–98)
MCV RBC AUTO: 89 FL (ref 82–98)
MCV RBC AUTO: 90 FL (ref 82–98)
MONOCYTES # BLD AUTO: 0.3 K/UL (ref 0.3–1)
MONOCYTES # BLD AUTO: 0.3 K/UL (ref 0.3–1)
MONOCYTES # BLD AUTO: 0.4 K/UL (ref 0.3–1)
MONOCYTES # BLD AUTO: 0.5 K/UL (ref 0.3–1)
MONOCYTES NFR BLD: 11.1 % (ref 4–15)
MONOCYTES NFR BLD: 6.5 % (ref 4–15)
MONOCYTES NFR BLD: 6.8 % (ref 4–15)
MONOCYTES NFR BLD: 7.2 % (ref 4–15)
NEUTROPHILS # BLD AUTO: 2.6 K/UL (ref 1.8–7.7)
NEUTROPHILS # BLD AUTO: 3.4 K/UL (ref 1.8–7.7)
NEUTROPHILS # BLD AUTO: 3.5 K/UL (ref 1.8–7.7)
NEUTROPHILS # BLD AUTO: 6.7 K/UL (ref 1.8–7.7)
NEUTROPHILS NFR BLD: 68 % (ref 38–73)
NEUTROPHILS NFR BLD: 75 % (ref 38–73)
NEUTROPHILS NFR BLD: 76.4 % (ref 38–73)
NEUTROPHILS NFR BLD: 87.1 % (ref 38–73)
NRBC BLD-RTO: 0 /100 WBC
OHS QRS DURATION: 94 MS
OHS QTC CALCULATION: 399 MS
PHOSPHATE SERPL-MCNC: 4.2 MG/DL (ref 2.7–4.5)
PHOSPHATE SERPL-MCNC: 4.2 MG/DL (ref 2.7–4.5)
PLATELET # BLD AUTO: 122 K/UL (ref 150–450)
PLATELET # BLD AUTO: 144 K/UL (ref 150–450)
PLATELET # BLD AUTO: 144 K/UL (ref 150–450)
PLATELET # BLD AUTO: 152 K/UL (ref 150–450)
PMV BLD AUTO: 10 FL (ref 9.2–12.9)
PMV BLD AUTO: 10 FL (ref 9.2–12.9)
PMV BLD AUTO: 10.4 FL (ref 9.2–12.9)
PMV BLD AUTO: 10.7 FL (ref 9.2–12.9)
POTASSIUM SERPL-SCNC: 4.3 MMOL/L (ref 3.5–5.1)
POTASSIUM SERPL-SCNC: 4.4 MMOL/L (ref 3.5–5.1)
PROT SERPL-MCNC: 5.8 G/DL (ref 6–8.4)
PROTHROMBIN TIME: 12.6 SEC (ref 9–12.5)
RBC # BLD AUTO: 3.12 M/UL (ref 4.6–6.2)
RBC # BLD AUTO: 3.2 M/UL (ref 4.6–6.2)
RBC # BLD AUTO: 3.53 M/UL (ref 4.6–6.2)
RBC # BLD AUTO: 3.54 M/UL (ref 4.6–6.2)
SODIUM SERPL-SCNC: 132 MMOL/L (ref 136–145)
SODIUM SERPL-SCNC: 134 MMOL/L (ref 136–145)
TRANS ERYTHROCYTES VOL PATIENT: NORMAL ML
WBC # BLD AUTO: 3.88 K/UL (ref 3.9–12.7)
WBC # BLD AUTO: 4.57 K/UL (ref 3.9–12.7)
WBC # BLD AUTO: 4.57 K/UL (ref 3.9–12.7)
WBC # BLD AUTO: 7.68 K/UL (ref 3.9–12.7)

## 2024-08-09 PROCEDURE — 63600175 PHARM REV CODE 636 W HCPCS: Performed by: STUDENT IN AN ORGANIZED HEALTH CARE EDUCATION/TRAINING PROGRAM

## 2024-08-09 PROCEDURE — 83605 ASSAY OF LACTIC ACID: CPT

## 2024-08-09 PROCEDURE — 83735 ASSAY OF MAGNESIUM: CPT | Performed by: STUDENT IN AN ORGANIZED HEALTH CARE EDUCATION/TRAINING PROGRAM

## 2024-08-09 PROCEDURE — 25000003 PHARM REV CODE 250: Performed by: STUDENT IN AN ORGANIZED HEALTH CARE EDUCATION/TRAINING PROGRAM

## 2024-08-09 PROCEDURE — 84100 ASSAY OF PHOSPHORUS: CPT | Performed by: STUDENT IN AN ORGANIZED HEALTH CARE EDUCATION/TRAINING PROGRAM

## 2024-08-09 PROCEDURE — 85025 COMPLETE CBC W/AUTO DIFF WBC: CPT | Mod: 91 | Performed by: INTERNAL MEDICINE

## 2024-08-09 PROCEDURE — P9021 RED BLOOD CELLS UNIT: HCPCS

## 2024-08-09 PROCEDURE — 25000003 PHARM REV CODE 250

## 2024-08-09 PROCEDURE — 85025 COMPLETE CBC W/AUTO DIFF WBC: CPT | Mod: 91

## 2024-08-09 PROCEDURE — 25000003 PHARM REV CODE 250: Performed by: INTERNAL MEDICINE

## 2024-08-09 PROCEDURE — 99291 CRITICAL CARE FIRST HOUR: CPT | Mod: GC,,, | Performed by: INTERNAL MEDICINE

## 2024-08-09 PROCEDURE — 86920 COMPATIBILITY TEST SPIN: CPT

## 2024-08-09 PROCEDURE — 99900035 HC TECH TIME PER 15 MIN (STAT)

## 2024-08-09 PROCEDURE — 85610 PROTHROMBIN TIME: CPT

## 2024-08-09 PROCEDURE — 20600001 HC STEP DOWN PRIVATE ROOM

## 2024-08-09 PROCEDURE — 94640 AIRWAY INHALATION TREATMENT: CPT

## 2024-08-09 PROCEDURE — 94761 N-INVAS EAR/PLS OXIMETRY MLT: CPT

## 2024-08-09 PROCEDURE — 85730 THROMBOPLASTIN TIME PARTIAL: CPT

## 2024-08-09 PROCEDURE — 80053 COMPREHEN METABOLIC PANEL: CPT | Performed by: STUDENT IN AN ORGANIZED HEALTH CARE EDUCATION/TRAINING PROGRAM

## 2024-08-09 PROCEDURE — 63600175 PHARM REV CODE 636 W HCPCS

## 2024-08-09 PROCEDURE — 25000003 PHARM REV CODE 250: Mod: JZ,JG

## 2024-08-09 PROCEDURE — 36415 COLL VENOUS BLD VENIPUNCTURE: CPT | Performed by: INTERNAL MEDICINE

## 2024-08-09 RX ORDER — HYDROCODONE BITARTRATE AND ACETAMINOPHEN 500; 5 MG/1; MG/1
TABLET ORAL
Status: DISCONTINUED | OUTPATIENT
Start: 2024-08-09 | End: 2024-08-12 | Stop reason: HOSPADM

## 2024-08-09 RX ORDER — OXYCODONE HYDROCHLORIDE 5 MG/1
5 TABLET ORAL EVERY 6 HOURS PRN
Status: DISCONTINUED | OUTPATIENT
Start: 2024-08-09 | End: 2024-08-10

## 2024-08-09 RX ORDER — METOPROLOL SUCCINATE 25 MG/1
25 TABLET, EXTENDED RELEASE ORAL DAILY
Status: DISCONTINUED | OUTPATIENT
Start: 2024-08-09 | End: 2024-08-12 | Stop reason: HOSPADM

## 2024-08-09 RX ORDER — ONDANSETRON HYDROCHLORIDE 2 MG/ML
4 INJECTION, SOLUTION INTRAVENOUS EVERY 6 HOURS PRN
Status: DISCONTINUED | OUTPATIENT
Start: 2024-08-09 | End: 2024-08-12 | Stop reason: HOSPADM

## 2024-08-09 RX ORDER — ATORVASTATIN CALCIUM 40 MG/1
40 TABLET, FILM COATED ORAL NIGHTLY
Status: DISCONTINUED | OUTPATIENT
Start: 2024-08-09 | End: 2024-08-12 | Stop reason: HOSPADM

## 2024-08-09 RX ORDER — PANTOPRAZOLE SODIUM 40 MG/1
40 TABLET, DELAYED RELEASE ORAL DAILY
Status: DISCONTINUED | OUTPATIENT
Start: 2024-08-10 | End: 2024-08-12 | Stop reason: HOSPADM

## 2024-08-09 RX ADMIN — FLUTICASONE FUROATE AND VILANTEROL TRIFENATATE 1 PUFF: 100; 25 POWDER RESPIRATORY (INHALATION) at 10:08

## 2024-08-09 RX ADMIN — Medication 100 MG: at 08:08

## 2024-08-09 RX ADMIN — FOLIC ACID 1 MG: 1 TABLET ORAL at 08:08

## 2024-08-09 RX ADMIN — CALCIUM GLUCONATE 1 G: 20 INJECTION, SOLUTION INTRAVENOUS at 01:08

## 2024-08-09 RX ADMIN — MUPIROCIN: 20 OINTMENT TOPICAL at 08:08

## 2024-08-09 RX ADMIN — PANTOPRAZOLE SODIUM 40 MG: 40 INJECTION, POWDER, FOR SOLUTION INTRAVENOUS at 08:08

## 2024-08-09 RX ADMIN — HYDROMORPHONE HYDROCHLORIDE 0.5 MG: 1 INJECTION, SOLUTION INTRAMUSCULAR; INTRAVENOUS; SUBCUTANEOUS at 06:08

## 2024-08-09 RX ADMIN — ATORVASTATIN CALCIUM 40 MG: 40 TABLET, FILM COATED ORAL at 08:08

## 2024-08-09 RX ADMIN — ESCITALOPRAM OXALATE 10 MG: 5 TABLET, FILM COATED ORAL at 08:08

## 2024-08-09 RX ADMIN — METOPROLOL SUCCINATE 25 MG: 25 TABLET, EXTENDED RELEASE ORAL at 01:08

## 2024-08-09 RX ADMIN — ONDANSETRON 4 MG: 2 INJECTION INTRAMUSCULAR; INTRAVENOUS at 08:08

## 2024-08-09 RX ADMIN — ONDANSETRON 4 MG: 2 INJECTION INTRAMUSCULAR; INTRAVENOUS at 02:08

## 2024-08-09 RX ADMIN — THERA TABS 1 TABLET: TAB at 08:08

## 2024-08-09 RX ADMIN — OXYCODONE 5 MG: 5 TABLET ORAL at 07:08

## 2024-08-09 RX ADMIN — OXYCODONE 5 MG: 5 TABLET ORAL at 08:08

## 2024-08-09 RX ADMIN — OXYCODONE 5 MG: 5 TABLET ORAL at 01:08

## 2024-08-09 NOTE — ASSESSMENT & PLAN NOTE
MELD 3.0: 18 at 8/9/2024  2:13 AM  MELD-Na: 17 at 8/9/2024  2:13 AM  Calculated from:  Serum Creatinine: 2.0 mg/dL at 8/9/2024  2:13 AM  Serum Sodium: 134 mmol/L at 8/9/2024  2:13 AM  Total Bilirubin: 0.9 mg/dL (Using min of 1 mg/dL) at 8/9/2024  2:13 AM  Serum Albumin: 2.6 g/dL at 8/9/2024  2:13 AM  INR(ratio): 1.2 at 8/9/2024  2:13 AM  Age at listing (hypothetical): 68 years  Sex: Male at 8/9/2024  2:13 AM     - daily INR  - ocreatide x 72 hours and CTX for SBP ppx

## 2024-08-09 NOTE — PROGRESS NOTES
[facility] - Telemetry Stepdown  Critical Care Medicine  Progress Note    Patient Name: Fransico Montalvo  MRN: [id number]  Admission Date: 8/7/2024  Hospital Length of Stay: 2 days  Code Status: Full Code  Attending Provider: Ok Coombs MD  Primary Care Provider: [facility]   Principal Problem: Acute pancreatitis    Subjective:     HPI:  Mr. Fransico Montalvo is a 68-year-old man with history of alcohol use disorder, EtOH cirrhosis with esophageal varices s/p banding 2018, CAD, BPH, COPD, HTN, and HFrEF (35-40%) who [place] [facility] [place] his nursing facility due to severe epigastric pain. He reports 4 days of bloody bowel movement and 2 days of nausea and vomiting without hematemesis. He reports recent embolization in his pancreas and history of pain associated with constipation which is relieved with laxatives. His pain that began the night prior to arrival was far worse than his usual amount of pain, he is unable to provide further details.    In the ED, he was found to have Hgb 4.4 for which he received 3 units pRBCs. Per EMS, his initial SBP was in to 70's. He received 1L IVF and was placed on octreotide, pantoprazole 80mg IV and CTX 1g. GI was consulted, recommend CTA if unstable despite transfusion and likely will perform endoscopy in the AM if stable.    Of note, has had multiple recent hospital admissions for symptomatic anemia and GIB. 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 He was also admitted in 06/2024 for syncope thought due to blood loss and hypovolemia in setting of GIB and Lasix use.    Hospital/ICU Course:  Melena on presentation from nursing home. Plan for EGD with GI 8/8. Hemodynamically stable, on room air.     Interval History/Significant Events: Patient endorses two episodes of melena overnight and  "one potential episode of hematemesis. Resting comfortably in bed after administration of pain medication for abdominal pain. Satting well on room air, hemodynamically stable. GI plans to take patient for EGD.       Review of Systems   Constitutional:  Positive for fatigue. Negative for chills and fever.   HENT:  Negative for congestion, rhinorrhea and sore throat.    Cardiovascular:  Negative for chest pain, palpitations and leg swelling.   Gastrointestinal:  Positive for abdominal pain ("stabbing"), blood in stool, nausea and vomiting. Negative for abdominal distention.   Neurological:  Positive for dizziness.     Objective:     Vital Signs (Most Recent):  Temp: 98.2 °F (36.8 °C) (08/09/24 1141)  Pulse: 85 (08/09/24 1141)  Resp: 18 (08/09/24 1308)  BP: 129/79 (08/09/24 1308)  SpO2: (!) 92 % (08/09/24 1141) Vital Signs (24h Range):  Temp:  [95 °F (35 °C)-98.7 °F (37.1 °C)] 98.2 °F (36.8 °C)  Pulse:  [74-97] 85  Resp:  [8-28] 18  SpO2:  [92 %-100 %] 92 %  BP: ()/(54-88) 129/79   Weight: 74 kg (163 lb 2.3 oz)  Body mass index is 22.75 kg/m².      Intake/Output Summary (Last 24 hours) at 8/9/2024 1452  Last data filed at 8/9/2024 0815  Gross per 24 hour   Intake 3028.27 ml   Output 1095 ml   Net 1933.27 ml          Physical Exam  Constitutional:       General: He is not in acute distress.     Appearance: Normal appearance. He is ill-appearing. He is not diaphoretic.   HENT:      Head: Normocephalic and atraumatic.      Nose: Nose normal.      Mouth/Throat:      Mouth: Mucous membranes are moist.   Eyes:      Conjunctiva/sclera: Conjunctivae normal.   Cardiovascular:      Rate and Rhythm: Normal rate and regular rhythm.      Heart sounds: Normal heart sounds. No murmur heard.  Pulmonary:      Effort: Pulmonary effort is normal. No respiratory distress.      Breath sounds: Normal breath sounds. No wheezing.   Abdominal:      General: Bowel sounds are normal. There is no distension.      Palpations: Abdomen is soft. "      Tenderness: There is no abdominal tenderness.   Musculoskeletal:      Right lower leg: No edema.      Left lower leg: No edema.   Skin:     General: Skin is warm and dry.   Neurological:      Mental Status: He is alert and oriented to person, place, and time.   Psychiatric:         Mood and Affect: Mood normal.         Behavior: Behavior normal.         Thought Content: Thought content normal.         Judgment: Judgment normal.            Vents:     Lines/Drains/Airways       Peripheral Intravenous Line  Duration                  Peripheral IV - Single Lumen 08/07/24 18 G Right Forearm 2 days         Peripheral IV - Single Lumen 08/07/24 1824 18 G Left Forearm 1 day         Peripheral IV - Single Lumen 08/07/24 1824 18 G Left Upper Arm 1 day         Peripheral IV - Single Lumen 08/07/24 2008 20 G Left Hand 1 day                  Significant Labs:    CBC/Anemia Profile:  Recent Labs   Lab 08/08/24  2317 08/09/24  0213 08/09/24  0527   WBC 4.57 4.57 7.68   HGB 8.9* 8.9* 10.1*   HCT 27.4* 28.2* 31.7*   * 144* 144*   MCV 88 88 90   RDW 14.9* 15.1* 15.6*        Chemistries:  Recent Labs   Lab 08/08/24  0004 08/08/24  0331 08/08/24  2317 08/09/24  0213   * 129* 132* 134*   K 5.1 4.3 4.3 4.4    102 102 103   CO2 15* 18* 19* 17*   BUN 53* 52* 41* 38*   CREATININE 2.2* 2.1* 1.9* 2.0*   CALCIUM 8.4* 8.1* 8.6* 8.9   ALBUMIN 2.8* 2.7*  --  2.6*   PROT 6.8 6.2  --  5.8*   BILITOT 1.3* 1.4*  --  0.9   ALKPHOS 87 82  --  76   ALT 16 12  --  15   AST 37 25  --  39   MG  --  1.9 2.0 1.9   PHOS  --  3.8 4.2 4.2       All pertinent labs within the past 24 hours have been reviewed.    Significant Imaging:  I have reviewed all pertinent imaging results/findings within the past 24 hours.    ABG  Recent Labs   Lab 08/08/24 2001   PH 7.344*   PO2 63*   PCO2 35.9   HCO3 19.5*   BE -6*     Assessment/Plan:     Psychiatric  Alcohol withdrawal  History of EtOH use disorder. No signs of withdrawal on admission.    -  CIWA q4 hours  - thiamine IV for Wernicke's  - Folate and multivitamin  - will administer BZD if CIWA > 8  - seizure and fall precautions    Pulmonary  Presumed COPD (chronic obstructive pulmonary disease)  - tobacco cessation  - monitor respiratory status, albuterol and neb prn  - home Breo    Cardiac/Vascular  CAD (coronary artery disease)  Patient with known CAD (seen on CT imaging, no interventions have been done) which is controlled Will continue Statin and monitor for S/Sx of angina/ACS. Continue to monitor on telemetry.     Chronic combined systolic and diastolic congestive heart failure  Newly diagnosed HFrEF in 05/2024.    TTE from 5/10/24: LV mildly dilated. Moderate global hypokinesis and regional wall motion abnormalities present. EF 35 - 40%. RV Systolic function is mildly reduced. Left atrium is moderately dilated. Moderate aortic valve sclerosis; mildly restricted motion. Moderate tricuspid regurgitation. PASP 55 mmHg. Elevated venous pressure at 15 mmHg.    - careful with fluid repletion  - holding GDMT in setting of GIB with soft BP and ISRAEL  - TTE  - Telemetry    Essential hypertension  On valsartan, metoprolol, Jardiance and furosemide at home.    - hold antihypertensives in setting of blood loss / hypovolemia    Renal/  ISRAEL (acute kidney injury)  Cr 2.4 on admission; baseline 1.3. Likely prerenal due to volume depletion. Received 1L IVF in the ED and 3 units pRBCs.    - daily CMP  - avoid nephrotoxic agents  - renally dose medications  - fluid repletion prn    BPH (benign prostatic hyperplasia)  Stable. Hx of BPH. Was on Tamsulosin previously, but held on recent admission in 03/2024 where there were concerns for low BP     PLAN:  - Bladder scan PRN if urinary retention concerns    GI  * Acute pancreatitis  67yo M with PMHx of alcohol use disorder c/b cirrhosis and esophageal varices s/p banding 2018, CAD, BPH, COPD, HTN, and HFrEF (35-40%) who [place] [facility] due  to blood loss. Lipase 402.    [place] score 1 on admission due to age > 55.    PLAN:  - Pain control with PRN morphine for severe pain and oxycodone for moderate pain  - careful with fluids given cocurrent HFrEF  - Daily CMP  - Further abdominal imaging as indicated    GIB (gastrointestinal bleeding)  History of esophageal varices: banding approximately 6 years ago. No varices on most recent endoscopies.     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 Successful coil embolization of several superior pancreaticoduodenal branches and the gastroduodenal artery with cessation of flow to the pseudoaneurysm.    PLAN:  - s/p 4 units pRBCs  - GI consulted, appreciate recs  - on octreotide  - s/p pantoprazole 80 mg IV, then 40 mg IV BID  - CTA with unstable, IR if positive for active bleeding  - NPO for potential endoscopy in AM  - CBC q6h    Therapeutic opioid induced constipation  - bowel regimen if needed    Alcoholic cirrhosis  MELD 3.0: 18 at 8/9/2024  2:13 AM  MELD-Na: 17 at 8/9/2024  2:13 AM  Calculated from:  Serum Creatinine: 2.0 mg/dL at 8/9/2024  2:13 AM  Serum Sodium: 134 mmol/L at 8/9/2024  2:13 AM  Total Bilirubin: 0.9 mg/dL (Using min of 1 mg/dL) at 8/9/2024  2:13 AM  Serum Albumin: 2.6 g/dL at 8/9/2024  2:13 AM  INR(ratio): 1.2 at 8/9/2024  2:13 AM  Age at listing (hypothetical): 68 years  Sex: Male at 8/9/2024  2:13 AM     - daily INR  - ocreatide x 72 hours and CTX for SBP ppx    Other  Tobacco abuse  Quit smoking ~1 year ago per patient.     - smoking cessation education         Critical Care Daily Checklist:    A: Awake: RASS Goal/Actual Goal: RASS Goal: 0-->alert and calm  Actual:     B: Spontaneous Breathing Trial Performed?     C: SAT & SBT Coordinated?  N/a                      D: Delirium: CAM-ICU Overall CAM-ICU: Negative    E: Early Mobility Performed? No   F: Feeding Goal:    Status:     Current Diet Order   Procedures    Diet Adult Regular Standard Tray     Order Specific Question:   Tray type:     Answer:   Standard Tray      AS: Analgesia/Sedation Hydromorphone, oxycodone    T: Thromboembolic Prophylaxis Holding in setting of GIB   H: HOB > 300 Yes   U: Stress Ulcer Prophylaxis (if needed) Pantoprazole tx dose   G: Glucose Control None   B: Bowel Function Stool Occurrence: 1   I: Indwelling Catheter (Lines & Sauer) Necessity 4 PIV   D: De-escalation of Antimicrobials/Pharmacotherapies Ceftriaxone for SBP     Plan for the day/ETD EGD    Code Status:  Family/Goals of Care: Full Code         Critical secondary to Patient has a condition that poses threat to life and bodily function: GI Bleed       Critical care was time spent personally by me on the following activities: development of treatment plan with patient or surrogate and bedside caregivers, discussions with consultants, evaluation of patient's response to treatment, examination of patient, ordering and performing treatments and interventions, ordering and review of laboratory studies, ordering and review of radiographic studies, pulse oximetry, re-evaluation of patient's condition. This critical care time did not overlap with that of any other provider or involve time for any procedures.     Kenya Mascorro MD PGY1  Critical Care Medicine  [facility] - Telemetry Stepdown

## 2024-08-09 NOTE — PROGRESS NOTES
[facility] - Telemetry Stepdown  Critical Care Medicine  Progress Note    Patient Name: Fransico Montalvo  MRN: [id number]  Admission Date: 8/7/2024  Hospital Length of Stay: 2 days  Code Status: Full Code  Attending Provider: Ok Coombs MD  Primary Care Provider: [facility]   Principal Problem: Acute pancreatitis    Subjective:     HPI:  Mr. Fransico Montalvo is a 68-year-old man with history of alcohol use disorder, EtOH cirrhosis with esophageal varices s/p banding 2018, CAD, BPH, COPD, HTN, and HFrEF (35-40%) who presented to [facility] on 8/7/24 from his nursing facility due to severe epigastric pain. He reports 4 days of bloody bowel movement and 2 days of nausea and vomiting without hematemesis. He reports recent embolization in his pancreas and history of pain associated with constipation which is relieved with laxatives. His pain that began the night prior to arrival was far worse than his usual amount of pain, he is unable to provide further details.    In the ED, he was found to have Hgb 4.4 for which he received 3 units pRBCs. Per EMS, his initial SBP was in to 70's. He received 1L IVF and was placed on octreotide, pantoprazole 80mg IV and CTX 1g. GI was consulted, recommend CTA if unstable despite transfusion and likely will perform endoscopy in the AM if stable.    Of note, has had multiple recent hospital admissions for symptomatic anemia and GIB. 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 He was also admitted in 06/2024 for syncope thought due to blood loss and hypovolemia in setting of GIB and Lasix use.    Hospital/ICU Course:  Melena on presentation from nursing home. Plan for EGD with GI on 8/8 revealed no bleeding in the lumen of the GI tract but blood coming from the ampulla of Vater. IR consulted for intervention,  received GIB embolization procedure with Vascade closure device deployed in right femoral artery. Hemoglobin stable post procedure. Hemodynamically stable, on room air. No current ICU needs.     Interval History/Significant Events: Received GIB embolization procedure with IR yesterday, hemoglobin improved. Hemodynamically stable, on room air. Endorsing some pain at incision site. No current ICU needs, plan to step down.      Review of Systems   Constitutional:  Negative for chills and fever.   Respiratory:  Negative for shortness of breath.    Cardiovascular:  Negative for chest pain, palpitations and leg swelling.   Gastrointestinal:  Positive for abdominal pain (At site of incision), blood in stool (Overnight bloody stool, thought to be residual.) and nausea. Negative for constipation and vomiting.   Neurological:  Positive for light-headedness.     Objective:     Vital Signs (Most Recent):  Temp: 98.2 °F (36.8 °C) (08/09/24 1141)  Pulse: 75 (08/09/24 1507)  Resp: 18 (08/09/24 1308)  BP: 129/79 (08/09/24 1308)  SpO2: (!) 92 % (08/09/24 1141) Vital Signs (24h Range):  Temp:  [95 °F (35 °C)-98.7 °F (37.1 °C)] 98.2 °F (36.8 °C)  Pulse:  [74-90] 75  Resp:  [8-28] 18  SpO2:  [92 %-100 %] 92 %  BP: (105-154)/(65-88) 129/79   Weight: 74 kg (163 lb 2.3 oz)  Body mass index is 22.75 kg/m².      Intake/Output Summary (Last 24 hours) at 8/9/2024 1514  Last data filed at 8/9/2024 0815  Gross per 24 hour   Intake 3001.12 ml   Output 1095 ml   Net 1906.12 ml          Physical Exam  Constitutional:       General: He is not in acute distress.     Appearance: Normal appearance. He is not ill-appearing or diaphoretic.   HENT:      Head: Normocephalic and atraumatic.      Nose: Nose normal.      Mouth/Throat:      Mouth: Mucous membranes are moist.   Eyes:      Conjunctiva/sclera: Conjunctivae normal.   Cardiovascular:      Rate and Rhythm: Normal rate and regular rhythm.      Heart sounds: Normal heart sounds. No murmur  heard.  Pulmonary:      Effort: Pulmonary effort is normal. No respiratory distress.      Breath sounds: Normal breath sounds. No wheezing.   Abdominal:      General: There is no distension.      Palpations: Abdomen is soft.      Tenderness: There is no abdominal tenderness.   Musculoskeletal:      Right lower leg: No edema.      Left lower leg: No edema.   Skin:     General: Skin is warm and dry.   Neurological:      General: No focal deficit present.      Mental Status: He is alert and oriented to person, place, and time. Mental status is at baseline.   Psychiatric:         Mood and Affect: Mood normal.         Behavior: Behavior normal.         Thought Content: Thought content normal.         Judgment: Judgment normal.            Vents:     Lines/Drains/Airways       Peripheral Intravenous Line  Duration                  Peripheral IV - Single Lumen 08/07/24 18 G Right Forearm 2 days         Peripheral IV - Single Lumen 08/07/24 1824 18 G Left Forearm 1 day         Peripheral IV - Single Lumen 08/07/24 1824 18 G Left Upper Arm 1 day         Peripheral IV - Single Lumen 08/07/24 2008 20 G Left Hand 1 day                  Significant Labs:    CBC/Anemia Profile:  Recent Labs   Lab 08/08/24  2317 08/09/24  0213 08/09/24  0527   WBC 4.57 4.57 7.68   HGB 8.9* 8.9* 10.1*   HCT 27.4* 28.2* 31.7*   * 144* 144*   MCV 88 88 90   RDW 14.9* 15.1* 15.6*        Chemistries:  Recent Labs   Lab 08/08/24  0004 08/08/24  0331 08/08/24  2317 08/09/24  0213   * 129* 132* 134*   K 5.1 4.3 4.3 4.4    102 102 103   CO2 15* 18* 19* 17*   BUN 53* 52* 41* 38*   CREATININE 2.2* 2.1* 1.9* 2.0*   CALCIUM 8.4* 8.1* 8.6* 8.9   ALBUMIN 2.8* 2.7*  --  2.6*   PROT 6.8 6.2  --  5.8*   BILITOT 1.3* 1.4*  --  0.9   ALKPHOS 87 82  --  76   ALT 16 12  --  15   AST 37 25  --  39   MG  --  1.9 2.0 1.9   PHOS  --  3.8 4.2 4.2       All pertinent labs within the past 24 hours have been reviewed.    Significant Imaging:  I have reviewed  all pertinent imaging results/findings within the past 24 hours.    ABG  Recent Labs   Lab 08/08/24 2001   PH 7.344*   PO2 63*   PCO2 35.9   HCO3 19.5*   BE -6*     Assessment/Plan:     Psychiatric  Alcohol withdrawal  History of EtOH use disorder. Per patient, last drink >1 year ago. No signs of withdrawal on admission.    - No sign of alcohol withdrawal, d/c CIWA protocol.   - Thiamine, folate and multivitamin  - Fall precautions    Pulmonary  Presumed COPD (chronic obstructive pulmonary disease)  - Monitor respiratory status, albuterol and neb prn  - Home Breo    Cardiac/Vascular  Hemorrhagic shock  Not requiring pressors. Received total 7u pRBCS.    CAD (coronary artery disease)  Patient with known CAD (seen on CT imaging, no interventions have been done) which is controlled. Will continue Statin and monitor for S/Sx of angina/ACS. Continue to monitor on telemetry.     Chronic combined systolic and diastolic congestive heart failure  Newly diagnosed HFrEF in 05/2024.    TTE from 5/10/24: LV mildly dilated. Moderate global hypokinesis and regional wall motion abnormalities present. EF 35 - 40%. RV Systolic function is mildly reduced. Left atrium is moderately dilated. Moderate aortic valve sclerosis; mildly restricted motion. Moderate tricuspid regurgitation. PASP 55 mmHg. Elevated venous pressure at 15 mmHg.   Repeat Echo 8/8 Technically inadequate study due to poor acoustic windows. Left ventricle concentric remodeling. Unable to assess wall motion. Mildly reduced systolic function with a visually estimated ejection fraction of 40 - 45%. Unable to assess diastolic function due to poor image quality.    - Careful with fluid repletion  - Holding GDMT in setting of ISRAEL: restarted Metoprolol 8/9   - Restart GDMT as tolerated       Essential hypertension  On valsartan, metoprolol, Jardiance and furosemide at home.    - Restarting Metoprolol, holding rest of medication regimen in setting of ISRAEL.       Renal/  ISRAEL  (acute kidney injury)  Cr 2.4 on admission; baseline 1.3. Likely prerenal due to volume depletion. Received 1L IVF in the ED and 3 units pRBCs.    - Daily CMP  - Avoid nephrotoxic agents  - Renally dose medications  - Fluid repletion prn    BPH (benign prostatic hyperplasia)  Stable. Hx of BPH. Was on Tamsulosin previously, but held on recent admission in 03/2024 where there were concerns for low BP     PLAN:  - Bladder scan PRN if urinary retention concerns    Oncology  Acute blood loss anemia  Received total 7u pRBCS. Hemoglobin now stable.     -Trend CBC q12h    GI  * Acute pancreatitis  67yo M with PMHx of alcohol use disorder c/b cirrhosis and esophageal varices s/p banding 2018, CAD, BPH, COPD, HTN, and HFrEF (35-40%) who is [place] to [facility] [place] for pancreatitis and anemia due to blood loss. Lipase 402.    Annmarie score 1 on admission due to age > 55.    PLAN:  - Pain control with PRN oxycodone for severe pain  - Careful with fluids given cocurrent HFrEF  - Daily CMP  - Further abdominal imaging as indicated    GIB (gastrointestinal bleeding)  History of esophageal varices: banding approximately 6 years ago. No varices on most recent endoscopies.     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 Successful coil embolization of several superior pancreaticoduodenal branches and the gastroduodenal artery with cessation of flow to the pseudoaneurysm.    PLAN:  - S/p 7 units pRBCs  - EGD 8/8: Bleeding from Ampulla   - IR GIB embolization procedure 8/8  - Pantoprazole 40 mg daily   - CBC q12h    Therapeutic opioid induced constipation  - Bowel regimen if needed    Alcoholic cirrhosis  MELD 3.0: 18 at 8/9/2024  2:13 AM  MELD-Na: 17 at 8/9/2024  2:13 AM  Calculated from:  Serum Creatinine: 2.0 mg/dL at 8/9/2024  2:13 AM  Serum Sodium: 134 mmol/L  at 8/9/2024  2:13 AM  Total Bilirubin: 0.9 mg/dL (Using min of 1 mg/dL) at 8/9/2024  2:13 AM  Serum Albumin: 2.6 g/dL at 8/9/2024  2:13 AM  INR(ratio): 1.2 at 8/9/2024  2:13 AM  Age at listing (hypothetical): 68 years  Sex: Male at 8/9/2024  2:13 AM       Other  Tobacco abuse  Quit smoking ~1 year ago per patient.     - smoking cessation education         Critical Care Daily Checklist:    A: Awake: RASS Goal/Actual Goal: RASS Goal: 0-->alert and calm  Actual:     B: Spontaneous Breathing Trial Performed?     C: SAT & SBT Coordinated?  N/a                      D: Delirium: CAM-ICU Overall CAM-ICU: Negative   E: Early Mobility Performed? No   F: Feeding Goal:    Status:     Current Diet Order   Procedures    Diet Adult Regular Standard Tray     Order Specific Question:   Tray type:     Answer:   Standard Tray      AS: Analgesia/Sedation Oxycodone PRN   T: Thromboembolic Prophylaxis Holding in setting of recent GIB   H: HOB > 300 Yes   U: Stress Ulcer Prophylaxis (if needed) Pantoprazole daily    G: Glucose Control None   B: Bowel Function Stool Occurrence: 1   I: Indwelling Catheter (Lines & Sauer) Necessity 4 PIV   D: De-escalation of Antimicrobials/Pharmacotherapies D/c CTX     Plan for the day/ETD Stepdown     Code Status:  Family/Goals of Care: Full Code         Critical secondary to Patient has a condition that poses threat to life and bodily function: GI Bleed      Critical care was time spent personally by me on the following activities: development of treatment plan with patient or surrogate and bedside caregivers, discussions with consultants, evaluation of patient's response to treatment, examination of patient, ordering and performing treatments and interventions, ordering and review of laboratory studies, ordering and review of radiographic studies, pulse oximetry, re-evaluation of patient's condition. This critical care time did not overlap with that of any other provider or involve time for any  procedures.     Kenya Mascorro MD PGY1  Critical Care Medicine  [facility] - Telemetry Stepdown

## 2024-08-09 NOTE — CARE UPDATE
I have reviewed the chart of Fransico Montalvo and collaborated with Ok Coombs MD in the care of the patient who is hospitalized for the following:    Active Hospital Problems    Diagnosis    *Acute pancreatitis    Acute blood loss anemia    Hemorrhagic shock    Chronic combined systolic and diastolic congestive heart failure    CAD (coronary artery disease)    Presumed COPD (chronic obstructive pulmonary disease)    Hyponatremia    GIB (gastrointestinal bleeding)    Therapeutic opioid induced constipation    ISRAEL (acute kidney injury)    Essential hypertension    Alcohol withdrawal    Alcoholic cirrhosis    Thrombocytopenia    Tobacco abuse    BPH (benign prostatic hyperplasia)          I have reviewed Fransico Montalvo with the multidisciplinary team during discharge huddle.       Nikki Shah PA-C  [facility]

## 2024-08-09 NOTE — ASSESSMENT & PLAN NOTE
Patient with known CAD (seen on CT imaging, no interventions have been done) which is controlled. Will continue Statin and monitor for S/Sx of angina/ACS. Continue to monitor on telemetry.

## 2024-08-09 NOTE — SUBJECTIVE & OBJECTIVE
Care Transition Initial Assessment - RN    Patient currently receives the following services: [facility] (P:[phone]) (Fax:[phone])for RN 12 Hr coverage daily and also receives a 12 hr PCA adarsh/night through [facility] (currently 80.5 hours every 7 days) and TF with supplies through [facility].   Pt resides with his Mother Savannah.  She is also his Legal Guardian.    PCA lives with them.      I called and spoke to mother, Savannah, and she was anticipating discharge today.  Savannah requests a stretcher ride home, ramp available, she will coordinate with PCA services.      [facility] called at [phone] and I spoke to Reyna. S stretcher transport arranged for ride home to [facility]. [facility] [address] [facility] [id number]  was confirmed with Savannah. Stretcher ride time for today is 1300.      PCS and facesheet faxed to [employer] at [phone].     Bedside staff updated with ride time.      [facility] called at [phone] and left a message for on call staff to call back regarding discharge today. Orders, and Discharge Summary faxed to agency at  [phone].   Follow up appointment with MD is on Thursday 8/27 at 430 pm    Interval History/Significant Events: Received GIB embolization procedure with IR yesterday, hemoglobin improved. Hemodynamically stable, on room air. Endorsing some pain at incision site. No current ICU needs, plan to step down.      Review of Systems   Constitutional:  Negative for chills and fever.   Respiratory:  Negative for shortness of breath.    Cardiovascular:  Negative for chest pain, palpitations and leg swelling.   Gastrointestinal:  Positive for abdominal pain (At site of incision), blood in stool (Overnight bloody stool, thought to be residual.) and nausea. Negative for constipation and vomiting.   Neurological:  Positive for light-headedness.     Objective:     Vital Signs (Most Recent):  Temp: 98.2 °F (36.8 °C) (08/09/24 1141)  Pulse: 75 (08/09/24 1507)  Resp: 18 (08/09/24 1308)  BP: 129/79 (08/09/24 1308)  SpO2: (!) 92 % (08/09/24 1141) Vital Signs (24h Range):  Temp:  [95 °F (35 °C)-98.7 °F (37.1 °C)] 98.2 °F (36.8 °C)  Pulse:  [74-90] 75  Resp:  [8-28] 18  SpO2:  [92 %-100 %] 92 %  BP: (105-154)/(65-88) 129/79   Weight: 74 kg (163 lb 2.3 oz)  Body mass index is 22.75 kg/m².      Intake/Output Summary (Last 24 hours) at 8/9/2024 1514  Last data filed at 8/9/2024 0815  Gross per 24 hour   Intake 3001.12 ml   Output 1095 ml   Net 1906.12 ml          Physical Exam  Constitutional:       General: He is not in acute distress.     Appearance: Normal appearance. He is not ill-appearing or diaphoretic.   HENT:      Head: Normocephalic and atraumatic.      Nose: Nose normal.      Mouth/Throat:      Mouth: Mucous membranes are moist.   Eyes:      Conjunctiva/sclera: Conjunctivae normal.   Cardiovascular:      Rate and Rhythm: Normal rate and regular rhythm.      Heart sounds: Normal heart sounds. No murmur heard.  Pulmonary:      Effort: Pulmonary effort is normal. No respiratory distress.      Breath sounds: Normal breath sounds. No wheezing.   Abdominal:      General: There is no distension.       Palpations: Abdomen is soft.      Tenderness: There is no abdominal tenderness.   Musculoskeletal:      Right lower leg: No edema.      Left lower leg: No edema.   Skin:     General: Skin is warm and dry.   Neurological:      General: No focal deficit present.      Mental Status: He is alert and oriented to person, place, and time. Mental status is at baseline.   Psychiatric:         Mood and Affect: Mood normal.         Behavior: Behavior normal.         Thought Content: Thought content normal.         Judgment: Judgment normal.            Vents:     Lines/Drains/Airways       Peripheral Intravenous Line  Duration                  Peripheral IV - Single Lumen 08/07/24 18 G Right Forearm 2 days         Peripheral IV - Single Lumen 08/07/24 1824 18 G Left Forearm 1 day         Peripheral IV - Single Lumen 08/07/24 1824 18 G Left Upper Arm 1 day         Peripheral IV - Single Lumen 08/07/24 2008 20 G Left Hand 1 day                  Significant Labs:    CBC/Anemia Profile:  Recent Labs   Lab 08/08/24  2317 08/09/24  0213 08/09/24  0527   WBC 4.57 4.57 7.68   HGB 8.9* 8.9* 10.1*   HCT 27.4* 28.2* 31.7*   * 144* 144*   MCV 88 88 90   RDW 14.9* 15.1* 15.6*        Chemistries:  Recent Labs   Lab 08/08/24  0004 08/08/24  0331 08/08/24  2317 08/09/24  0213   * 129* 132* 134*   K 5.1 4.3 4.3 4.4    102 102 103   CO2 15* 18* 19* 17*   BUN 53* 52* 41* 38*   CREATININE 2.2* 2.1* 1.9* 2.0*   CALCIUM 8.4* 8.1* 8.6* 8.9   ALBUMIN 2.8* 2.7*  --  2.6*   PROT 6.8 6.2  --  5.8*   BILITOT 1.3* 1.4*  --  0.9   ALKPHOS 87 82  --  76   ALT 16 12  --  15   AST 37 25  --  39   MG  --  1.9 2.0 1.9   PHOS  --  3.8 4.2 4.2       All pertinent labs within the past 24 hours have been reviewed.    Significant Imaging:  I have reviewed all pertinent imaging results/findings within the past 24 hours.

## 2024-08-09 NOTE — PLAN OF CARE
[facility] - Telemetry Stepdown  Initial Discharge Assessment       Primary Care Provider: [facility]    Admission Diagnosis: Rectal bleed [K62.5]  HFrEF (heart failure with reduced ejection fraction) [I50.20]  Bleeding esophageal varices, unspecified esophageal varices type [I85.01]    Admission Date: 8/7/2024  Expected Discharge Date: 8/12/2024         Payor: [employer] MGD MCARE [facility] / Plan: [employer] SECURE SNP / Product Type: Medicare Advantage /     Extended Emergency Contact Information  Primary Emergency Contact: Ina Quintero   [place]  Mobile Phone: [phone]  Relation: Friend  Secondary Emergency Contact: Tonya Lewis   [facility]  Home Phone: [phone]  Relation: Friend    Discharge Plan A: Return to nursing home  Discharge Plan B: Return to Nursing Home (TBD)      [id number] [place] - [address]  [address]  [place] [address]  Phone: [phone] Fax: [phone]    [id number] - [place] - [address]  [address] [facility] [address]  [address] [place] [address]  Phone: [phone] Fax: [phone]    [facility]  [address]  [facility] [address]  Phone: [phone] Fax: [phone]      Initial Assessment (most recent)       Adult Discharge Assessment - 08/09/24 1209          Discharge Assessment    Assessment Type Discharge Planning Assessment     Confirmed/corrected address, phone number and insurance Yes     Confirmed Demographics Correct on Facesheet   Per pt a resident at [facility]    Source of Information patient     Does patient/caregiver understand observation status Yes     Communicated ARTURO with patient/caregiver Date not available/Unable to determine     Reason For Admission Acute pancreatitis     People in Home --   Pt a resident at [facility]    Do you expect to return to your current living situation? Yes     Do you have  help at home or someone to help you manage your care at home? Yes   NH Staff    Prior to hospitilization cognitive status: Alert/Oriented     Current cognitive status: Alert/Oriented     Walking or Climbing Stairs Difficulty yes     Walking or Climbing Stairs ambulation difficulty, requires equipment;ambulation difficulty, assistance 1 person;stair climbing difficulty, requires equipment;stair climbing difficulty, assistance 1 person     Mobility Management Per pt has a cane but use a wheelchair at NH     Dressing/Bathing Difficulty yes     Dressing/Bathing bathing difficulty, assistance 1 person     Home Layout --   Pt lives in a NH    Equipment Currently Used at Home wheelchair;cane, straight     Readmission within 30 days? No     Patient currently being followed by outpatient case management? No     Do you currently have service(s) that help you manage your care at home? No     Do you take prescription medications? Yes     Do you have prescription coverage? Yes     Coverage [facility] MGD MCARE [facility] SECURE SNP     Do you have any problems affording any of your prescribed medications? No     Is the patient taking medications as prescribed? yes     Who is going to help you get home at discharge? Pt will need transportation back to [facility]     How do you get to doctors appointments? health plan transportation     Are you on dialysis? No     Do you take coumadin? No     Discharge Plan A Return to nursing home     Discharge Plan B Return to Nursing Home   TBD    DME Needed Upon Discharge  --   TBD    Discharge Plan discussed with: Patient        Physical Activity    On average, how many days per week do you engage in moderate to strenuous exercise (like a brisk walk)? --   Per pt his physical activity has decrease and now use a wheelchair       Financial Resource Strain    How hard is it for you to pay for the very basics like food, housing, medical care, and heating? Patient declined   Pt  lives in NH       Housing Stability    In the last 12 months, was there a time when you were not able to pay the mortgage or rent on time? Patient declined     At any time in the past 12 months, were you homeless or living in a shelter (including now)? Patient declined        Transportation Needs    Has the lack of transportation kept you from medical appointments, meetings, work or from getting things needed for daily living? Patient declined        Food Insecurity    Within the past 12 months, you worried that your food would run out before you got the money to buy more. Patient declined     Within the past 12 months, the food you bought just didn't last and you didn't have money to get more. Patient declined        Stress    Do you feel stress - tense, restless, nervous, or anxious, or unable to sleep at night because your mind is troubled all the time - these days? --   Can varies       Social Isolation    How often do you feel lonely or isolated from those around you?  Rarely        Alcohol Use    Q1: How often do you have a drink containing alcohol? --   Pt has a hx of alcohol use       Utilities    In the past 12 months has the electric, gas, oil, or water company threatened to shut off services in your home? Patient declined        Health Literacy    How often do you need to have someone help you when you read instructions, pamphlets, or other written material from your doctor or pharmacy? Sometimes                 SW met with patient and pt two friends_ Ina and José Miguel in room to complete DPA. Questions answered / contact numbers provided.  Use PREFERRED PHARMACY / BEDSIDE DELIVERY for any necessary medications at time of discharge. Pt  is dependent  with ADLs - does use DME, In-home equipment (wheelchair), is not on HD, BTs or home oxygen. Pt [facility] will be assisting with help upon discharge. Pt will be need transportation back to NH upon d/c. Hospital follow up will be scheduled with PCP. Will  continue to follow for course of hospitalization. FERNANDO provided discharge booklet to pt.   FERNANDO mg/ Gina at [facility] [phone] to confirm if pt their as residential or SNF. She confirmed pt is a resident.     Discharge Plan A and Plan B have been determined by review of patient's clinical status, future medical and therapeutic needs, and coverage/benefits for post-acute care in coordination with multidisciplinary team members.    MEÑO Guzman   [facility]    Case Management Dept  [phone]

## 2024-08-09 NOTE — ED PROVIDER NOTES
Encounter Date: 8/7/2024       History     Chief Complaint   Patient presents with    Abdominal Pain    Rectal Bleeding     Pt arrives via EMS c/o feeling faint and dizzy this afternoon with associated n/v/d, 10/10 abdominal pain with dark red blood in stool, upon EMS arrival BP 71/43 received NS bolus; hx GI bleeds x 6 months     68-year-old male presents with rectal bleeding.  He has had intermittent rectal bleeds for several months.  He has a history of alcoholic cirrhosis and there was question of esophageal varices      Review of patient's allergies indicates:  No Known Allergies  Past Medical History:   Diagnosis Date    Alcohol abuse     BPH (benign prostatic hyperplasia)     CAD (coronary artery disease)     On ASA/Lipitor    Cirrhosis 06/21/2018    pt states that he was diagnosed a week ago during his last hospital visit    Closed fracture of left distal radius 07/19/2023    COPD (chronic obstructive pulmonary disease)     presumed; needs PFTs    Gastritis     HFrEF (heart failure with reduced ejection fraction) 05/10/2024    Hypertension     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 (2ND FLR);  Service: Gastroenterology;  Laterality: N/A;    ESOPHAGOGASTRODUODENOSCOPY N/A 10/25/2023    Procedure: EGD (ESOPHAGOGASTRODUODENOSCOPY);  Surgeon: Lux Rome MD;  Location: [facility] (2ND FLR);  Service: Endoscopy;  Laterality: N/A;    ESOPHAGOGASTRODUODENOSCOPY N/A 4/19/2024    Procedure: EGD (ESOPHAGOGASTRODUODENOSCOPY);  Surgeon: Raj Roberts MD;  [facility] (2ND FLR);  Service: Endoscopy;  Laterality: N/A;    ESOPHAGOGASTRODUODENOSCOPY N/A 5/10/2024    Procedure: EGD (ESOPHAGOGASTRODUODENOSCOPY);  Surgeon: Ashley Trent MD;  [facility]  Service: Endoscopy;  Laterality: N/A;    PERCUTANEOUS PINNING OF HIP Right 7/29/2019    Procedure: PINNING, HIP, PERCUTANEOUS- synthes cannulated screws- hana table- C arm door side-;  Surgeon: Raj Grossman MD;  [facility];  Service: Orthopedics;  Laterality: Right;     Family History   Problem Relation Name Age of Onset    Hypertension Father      Heart disease Father      Heart disease Brother       Social History     Tobacco Use    Smoking status: Every Day     Current packs/day: 1.00     Types: Cigarettes    Smokeless tobacco: Never   Substance Use Topics    Alcohol use: Yes     Comment: (former drinker; 2months sober) this visit admits drinking a couple beers today    Drug use: No     Review of Systems    Physical Exam     Initial Vitals [08/07/24 1633]   BP Pulse Resp Temp SpO2   (!) 99/56 86 16 97.7 °F (36.5 °C) 99 %      MAP       --         Physical Exam    Constitutional:   Patient appears uncomfortable and weak.  Initially hypotensive   Eyes: Conjunctivae are normal.   Neck: Neck supple.   Normal range of motion.  Cardiovascular:  Normal rate, regular rhythm and normal heart sounds.           Pulmonary/Chest: Breath sounds normal. No respiratory distress. He has no wheezes. He has no rales.   Abdominal: Abdomen is soft. Bowel sounds are normal. He exhibits no distension. There is no abdominal tenderness.   Genitourinary:    Genitourinary Comments: Maroon stool heme-positive     Musculoskeletal:      Cervical back: Normal range of motion and neck supple.         ED Course   Procedures  Labs Reviewed   CBC W/ AUTO DIFFERENTIAL - Abnormal       Result Value    WBC 2.52 (*)     RBC 1.61 (*)     Hemoglobin 4.4 (*)     Hematocrit 13.9 (*)     MCV 86      MCH 27.3      MCHC  31.7 (*)     RDW 15.9 (*)     Platelets 192      MPV 10.7      Immature Granulocytes 0.4      Gran # (ANC) 1.3 (*)     Immature Grans (Abs) 0.01      Lymph # 0.9 (*)     Mono # 0.3      Eos # 0.0      Baso # 0.00      nRBC 0      Gran % 49.6      Lymph % 36.5      Mono % 13.5      Eosinophil % 0.0      Basophil % 0.0      Platelet Estimate Appears normal      Aniso Slight      Poik Slight      Poly Occasional      Hypo Occasional      Ovalocytes Occasional      Target Cells Occasional      Tear Drop Cells Occasional      Spherocytes Occasional      Schistocytes Present      Dohle Bodies Present      Toxic Granulation Present      Smudge Cells Present      Large/Giant Platelets Present      Fragmented Cells Occasional      Differential Method Automated      Narrative:     Secured chat and seen by: JAYNE HUA RN. by [facility] 08/07/2024   19:17   COMPREHENSIVE METABOLIC PANEL - Abnormal    Sodium 131 (*)     Potassium 4.7      Chloride 101      CO2 19 (*)     Glucose 109      BUN 55 (*)     Creatinine 2.4 (*)     Calcium 8.5 (*)     Total Protein 6.9      Albumin 3.0 (*)     Total Bilirubin 0.4      Alkaline Phosphatase 91      AST 24      ALT 13      eGFR 28.5 (*)     Anion Gap 11     LIPASE - Abnormal    Lipase 402 (*)    LACTIC ACID, PLASMA - Abnormal    Lactate (Lactic Acid) 2.6 (*)    CBC W/ AUTO DIFFERENTIAL - Abnormal    WBC 7.44      RBC 2.95 (*)     Hemoglobin 8.2 (*)     Hematocrit 26.6 (*)     MCV 90      MCH 27.8      MCHC 30.8 (*)     RDW 14.9 (*)     Platelets 183      MPV 10.0      Immature Granulocytes 0.4      Gran # (ANC) 6.0      Immature Grans (Abs) 0.03      Lymph # 0.9 (*)     Mono # 0.5      Eos # 0.0      Baso # 0.02      nRBC 0      Gran % 80.9 (*)     Lymph % 12.1 (*)     Mono % 6.2      Eosinophil % 0.1      Basophil % 0.3      Differential Method Automated     COMPREHENSIVE METABOLIC PANEL - Abnormal    Sodium 129 (*)     Potassium 5.1      Chloride 103      CO2 15 (*)     Glucose 139  (*)     BUN 53 (*)     Creatinine 2.2 (*)     Calcium 8.4 (*)     Total Protein 6.8      Albumin 2.8 (*)     Total Bilirubin 1.3 (*)     Alkaline Phosphatase 87      AST 37      ALT 16      eGFR 31.6 (*)     Anion Gap 11     CBC W/ AUTO DIFFERENTIAL - Abnormal    WBC 3.70 (*)     RBC 2.55 (*)     Hemoglobin 7.2 (*)     Hematocrit 23.2 (*)     MCV 91      MCH 28.2      MCHC 31.0 (*)     RDW 14.8 (*)     Platelets 164      MPV 10.7      Immature Granulocytes 0.5      Gran # (ANC) 2.8      Immature Grans (Abs) 0.02      Lymph # 0.5 (*)     Mono # 0.3      Eos # 0.0      Baso # 0.01      nRBC 0      Gran % 76.7 (*)     Lymph % 14.1 (*)     Mono % 8.1      Eosinophil % 0.3      Basophil % 0.3      Differential Method Automated     PROTIME-INR    Prothrombin Time 11.6      INR 1.1     LACTIC ACID, PLASMA    Lactate (Lactic Acid) 1.6     AMMONIA    Ammonia 27     PROTIME-INR    Prothrombin Time 12.4      INR 1.1     TYPE & SCREEN    Group & Rh O POS      Indirect Jazz NEG      Specimen Outdate 08/10/2024 23:59     ISTAT CHEM8   PREPARE RBC SOFT    UNIT NUMBER [id number]      Product Code C0263Y95      DISPENSE STATUS TRANSFUSED      CODING SYSTEM KLIE508      Unit Blood Type Code 5100      Unit Blood Type O POS      Unit Expiration [id number]      CROSSMATCH INTERPRETATION Compatible      UNIT NUMBER [id number]      Product Code V2450P71      DISPENSE STATUS TRANSFUSED      CODING SYSTEM YNMQ699      Unit Blood Type Code 5100      Unit Blood Type O POS      Unit Expiration [id number]      CROSSMATCH INTERPRETATION Compatible      UNIT NUMBER [id number]      Product Code C4705U97      DISPENSE STATUS TRANSFUSED      CODING SYSTEM QCKX929      Unit Blood Type Code 5100      Unit Blood Type O POS      Unit Expiration [id number]      CROSSMATCH INTERPRETATION Compatible          ECG Results              EKG 12-lead (Final result)        Collection Time Result Time QRS Duration OHS QTC Calculation    08/07/24  16:41:45 08/08/24 08:13:57 98 428                     Final result by Interface, Lab In [facility] (08/08/24 08:14:08)                   Narrative:    Test Reason : K62.5,    Vent. Rate : 085 BPM     Atrial Rate : 085 BPM     P-R Int : 196 ms          QRS Dur : 098 ms      QT Int : 360 ms       P-R-T Axes : 034 -42 135 degrees     QTc Int : 428 ms    Sinus rhythm with Premature atrial complexes  Left axis deviation  ST and T wave abnormality, consider lateral ischemia  Abnormal ECG  When compared with ECG of 20-MAY-2024 12:41,  The axis Shifted left  T wave inversion no longer evident in Anterior leads  Confirmed by TIMOTHY MOSS MD ([id number]) on 8/8/2024 8:13:54 AM    Referred By: [email or web address]   SELF           Confirmed By:TIMOTHY MOSS MD                                  Imaging Results    None          Medications   sodium chloride 0.9% flush 10 mL (has no administration in time range)   oxyCODONE immediate release tablet 5 mg (has no administration in time range)   ondansetron disintegrating tablet 8 mg (has no administration in time range)   prochlorperazine injection Soln 5 mg (5 mg Intravenous Given 8/8/24 0200)   multivitamin tablet (1 tablet Oral Given 8/8/24 0804)   folic acid tablet 1 mg (1 mg Oral Given 8/8/24 0804)   pantoprazole injection 40 mg (40 mg Intravenous Given 8/8/24 0805)   cefTRIAXone (Rocephin) 1 g in D5W 100 mL IVPB (MB+) (has no administration in time range)   fluticasone furoate-vilanteroL 100-25 mcg/dose diskus inhaler 1 puff (1 puff Inhalation Given 8/8/24 0918)   0.9%  NaCl infusion (for blood administration) (has no administration in time range)   HYDROmorphone injection 0.5 mg (0.5 mg Intravenous Given 8/8/24 1310)   EScitalopram oxalate tablet 10 mg (has no administration in time range)   mupirocin 2 % ointment (has no administration in time range)   thiamine tablet 100 mg (has no administration in time range)   heparin infusion 1,000 units/500 ml in 0.9% NaCl (on sterile field) (1,000 mLs  Intra-Catheter Given 8/8/24 1748)   pantoprazole injection 80 mg (80 mg Intravenous Given 8/7/24 1821)   octreotide injection 50 mcg (50 mcg Intravenous Given 8/7/24 1824)   cefTRIAXone (Rocephin) 1 g in D5W 100 mL IVPB (MB+) (0 g Intravenous Stopped 8/7/24 1906)   sodium chloride 0.9% bolus 1,000 mL 1,000 mL (0 mLs Intravenous Stopped 8/7/24 1935)   perflutren protein-A microsphr 0.22 mg/mL IV susp (0.66 mg Intravenous Given 8/8/24 1200)   thrombin (bovine) kit 20,000 Units (400 Units Topical (Top) Given 8/8/24 2024)     Medical Decision Making  Patient with GI bleed and hypotension.  IV fluids given which improved his blood pressure.  Hemoglobin is 4.  Ordered 3 units of blood.  Consult discussed with both GI and ICU.  Will be admitted to critical care    Amount and/or Complexity of Data Reviewed  Labs: ordered.    Risk  Prescription drug management.  Decision regarding hospitalization.              Attending Attestation:         Attending Critical Care:   Critical Care Times:   Direct Patient Care (initial evaluation, reassessments, and time considering the case)................................................................34 minutes.   Additional History from reviewing old medical records or taking additional history from the family, EMS, PCP, etc.......................4 minutes.   Ordering, Reviewing, and Interpreting Diagnostic Studies...............................................................................................................4 minutes.   Documentation..................................................................................................................................................................................4 minutes.   Consultation with other Physicians. .................................................................................................................................................4 minutes.    ==============================================================  Total Critical Care Time - exclusive of procedural time: 50 minutes.  ==============================================================                               Clinical Impression:  Final diagnoses:  [K62.5] Rectal bleed (Primary)  [K92.2] Gastrointestinal hemorrhage, unspecified gastrointestinal hemorrhage type          ED Disposition Condition    Admit                 Mick Rodriguez MD  08/08/24 2150

## 2024-08-09 NOTE — PLAN OF CARE
Problem: Adult Inpatient Plan of Care  Goal: Plan of Care Review  Outcome: Progressing  Goal: Patient-Specific Goal (Individualized)  Outcome: Progressing  Goal: Absence of Hospital-Acquired Illness or Injury  Outcome: Progressing  Goal: Optimal Comfort and Wellbeing  Outcome: Progressing  Goal: Readiness for Transition of Care  Outcome: Progressing     Problem: Acute Kidney Injury/Impairment  Goal: Fluid and Electrolyte Balance  Outcome: Progressing  Goal: Improved Oral Intake  Outcome: Progressing  Goal: Effective Renal Function  Outcome: Progressing     Problem: Fall Injury Risk  Goal: Absence of Fall and Fall-Related Injury  Outcome: Progressing     Problem: Skin Injury Risk Increased  Goal: Skin Health and Integrity  Outcome: Progressing     Problem: Wound  Goal: Optimal Coping  Outcome: Progressing  Goal: Optimal Functional Ability  Outcome: Progressing  Goal: Absence of Infection Signs and Symptoms  Outcome: Progressing  Goal: Improved Oral Intake  Outcome: Progressing  Goal: Optimal Pain Control and Function  Outcome: Progressing  Goal: Skin Health and Integrity  Outcome: Progressing  Goal: Optimal Wound Healing  Outcome: Progressing

## 2024-08-09 NOTE — ASSESSMENT & PLAN NOTE
Newly diagnosed HFrEF in 05/2024.    TTE from 5/10/24: LV mildly dilated. Moderate global hypokinesis and regional wall motion abnormalities present. EF 35 - 40%. RV Systolic function is mildly reduced. Left atrium is moderately dilated. Moderate aortic valve sclerosis; mildly restricted motion. Moderate tricuspid regurgitation. PASP 55 mmHg. Elevated venous pressure at 15 mmHg.   Repeat Echo 8/8 Technically inadequate study due to poor acoustic windows. Left ventricle concentric remodeling. Unable to assess wall motion. Mildly reduced systolic function with a visually estimated ejection fraction of 40 - 45%. Unable to assess diastolic function due to poor image quality.    - Careful with fluid repletion  - Holding GDMT in setting of [place]: restarted Metoprolol 8/9   - Restart GDMT as tolerated

## 2024-08-09 NOTE — ASSESSMENT & PLAN NOTE
On valsartan, metoprolol, Jardiance and furosemide at home.    - Restarting Metoprolol, holding rest of medication regimen in setting of [place].

## 2024-08-09 NOTE — ASSESSMENT & PLAN NOTE
Stable. Hx of BPH. Was on Tamsulosin previously, but held on recent admission in 03/2024 where there were concerns for low BP     PLAN:  - Bladder scan PRN if urinary retention concerns

## 2024-08-09 NOTE — ASSESSMENT & PLAN NOTE
History of esophageal varices: banding approximately 6 years ago. No varices on most recent endoscopies.     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 Successful coil embolization of several superior pancreaticoduodenal branches and the gastroduodenal artery with cessation of flow to the pseudoaneurysm.    PLAN:  - S/p 7 units pRBCs  - EGD 8/8: Bleeding from Ampulla   - IR GIB embolization procedure 8/8  - Pantoprazole 40 mg daily   - CBC q12h

## 2024-08-09 NOTE — SUBJECTIVE & OBJECTIVE
"Interval History/Significant Events: Patient endorses two episodes of melena overnight and one potential episode of hematemesis. Resting comfortably in bed after administration of pain medication for abdominal pain. Satting well on room air, hemodynamically stable. GI plans to take patient for EGD.       Review of Systems   Constitutional:  Positive for fatigue. Negative for chills and fever.   HENT:  Negative for congestion, rhinorrhea and sore throat.    Cardiovascular:  Negative for chest pain, palpitations and leg swelling.   Gastrointestinal:  Positive for abdominal pain ("stabbing"), blood in stool, nausea and vomiting. Negative for abdominal distention.   Neurological:  Positive for dizziness.     Objective:     Vital Signs (Most Recent):  Temp: 98.2 °F (36.8 °C) (08/09/24 1141)  Pulse: 85 (08/09/24 1141)  Resp: 18 (08/09/24 1308)  BP: 129/79 (08/09/24 1308)  SpO2: (!) 92 % (08/09/24 1141) Vital Signs (24h Range):  Temp:  [95 °F (35 °C)-98.7 °F (37.1 °C)] 98.2 °F (36.8 °C)  Pulse:  [74-97] 85  Resp:  [8-28] 18  SpO2:  [92 %-100 %] 92 %  BP: ()/(54-88) 129/79   Weight: 74 kg (163 lb 2.3 oz)  Body mass index is 22.75 kg/m².      Intake/Output Summary (Last 24 hours) at 8/9/2024 1452  Last data filed at 8/9/2024 0815  Gross per 24 hour   Intake 3028.27 ml   Output 1095 ml   Net 1933.27 ml          Physical Exam  Constitutional:       General: He is not in acute distress.     Appearance: Normal appearance. He is ill-appearing. He is not diaphoretic.   HENT:      Head: Normocephalic and atraumatic.      Nose: Nose normal.      Mouth/Throat:      Mouth: Mucous membranes are moist.   Eyes:      Conjunctiva/sclera: Conjunctivae normal.   Cardiovascular:      Rate and Rhythm: Normal rate and regular rhythm.      Heart sounds: Normal heart sounds. No murmur heard.  Pulmonary:      Effort: Pulmonary effort is normal. No respiratory distress.      Breath sounds: Normal breath sounds. No wheezing.   Abdominal:      " General: Bowel sounds are normal. There is no distension.      Palpations: Abdomen is soft.      Tenderness: There is no abdominal tenderness.   Musculoskeletal:      Right lower leg: No edema.      Left lower leg: No edema.   Skin:     General: Skin is warm and dry.   Neurological:      Mental Status: He is alert and oriented to person, place, and time.   Psychiatric:         Mood and Affect: Mood normal.         Behavior: Behavior normal.         Thought Content: Thought content normal.         Judgment: Judgment normal.            Vents:     Lines/Drains/Airways       Peripheral Intravenous Line  Duration                  Peripheral IV - Single Lumen 08/07/24 18 G Right Forearm 2 days         Peripheral IV - Single Lumen 08/07/24 1824 18 G Left Forearm 1 day         Peripheral IV - Single Lumen 08/07/24 1824 18 G Left Upper Arm 1 day         Peripheral IV - Single Lumen 08/07/24 2008 20 G Left Hand 1 day                  Significant Labs:    CBC/Anemia Profile:  Recent Labs   Lab 08/08/24  2317 08/09/24  0213 08/09/24  0527   WBC 4.57 4.57 7.68   HGB 8.9* 8.9* 10.1*   HCT 27.4* 28.2* 31.7*   * 144* 144*   MCV 88 88 90   RDW 14.9* 15.1* 15.6*        Chemistries:  Recent Labs   Lab 08/08/24  0004 08/08/24  0331 08/08/24  2317 08/09/24  0213   * 129* 132* 134*   K 5.1 4.3 4.3 4.4    102 102 103   CO2 15* 18* 19* 17*   BUN 53* 52* 41* 38*   CREATININE 2.2* 2.1* 1.9* 2.0*   CALCIUM 8.4* 8.1* 8.6* 8.9   ALBUMIN 2.8* 2.7*  --  2.6*   PROT 6.8 6.2  --  5.8*   BILITOT 1.3* 1.4*  --  0.9   ALKPHOS 87 82  --  76   ALT 16 12  --  15   AST 37 25  --  39   MG  --  1.9 2.0 1.9   PHOS  --  3.8 4.2 4.2       All pertinent labs within the past 24 hours have been reviewed.    Significant Imaging:  I have reviewed all pertinent imaging results/findings within the past 24 hours.

## 2024-08-09 NOTE — HOSPITAL COURSE
Melena on presentation from nursing home. Plan for EGD with GI on 8/8 revealed no bleeding in the lumen of the GI tract but blood coming from the ampulla of Vater. IR consulted for intervention, received GIB embolization procedure with Vascade closure device deployed in right femoral artery. Hemoglobin stable post procedure. Hemodynamically stable, on room air. No current ICU needs.

## 2024-08-09 NOTE — PROCEDURES
IR Post-Procedure Note    Pre Op Diagnosis: GI bleed    Post Op Diagnosis: same    Procedure: SMA branch pseudoaneurysm angiography with percutaneous embolization    Procedure Performed by: Danica Ponce MD; Abida Weaver MD    Written Informed Consent Obtained: Yes    Specimen Removed: None    Estimated Blood Loss:  Minimal    Findings:     RCFA access and celiac/SMA angiography for evaluation of pseudoaneurysm.    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    CBCT-guided direct injection of pseudoaneurysm with thrombin (400 units) and Gelfoam.    Thrombosis confirmed with injection of feeding vessel and SMA. Closure with Vascade.     The patient tolerated procedure well.  Please see Imaging report for further details.      Danica Ponce MD

## 2024-08-09 NOTE — PLAN OF CARE
GIB embolization procedure completed. vascade closure device deployed in right  femoral artery; hemostasis achieved at 2125. Patient to lay flat for 2 hours until 2325 on 08/08/24 per MD. Right groin site clean, dry, and intact; no bleeding or hematoma noted. Patient to be transferred to [facility] for post-procedural recovery per MD. Leg immobilizer can be removed once laying-flat time is complete. Report to be given at bedside to RN.

## 2024-08-09 NOTE — NURSING
Fentanyl gtt wasted from outside facility with Magdalene Shin RN.  190mls of 2500mcg/250ml fentanyl bag

## 2024-08-09 NOTE — RESIDENT HANDOFF
Critical Care Handoff     Primary Team: Networked reference to record [facility]  Room Number: 8073/8073 A     Patient Name: Fransico Montalvo MRN: [id number]     Date of Birth: [id number] Allergies: Patient has no known allergies.     Age: 68 y.o. Admit Date: 8/7/2024     Sex: male  BMI: Body mass index is 22.75 kg/m².     Code Status: Full Code        llness Level (current clinical status): Watcher - No    Reason for Admission: Acute pancreatitis    Brief HPI (pertinent PMH and diagnosis or differential diagnosis): Mr. Fransico Montalvo is a 68-year-old man with history of alcohol use disorder, EtOH cirrhosis with esophageal varices s/p banding 2018, CAD, BPH, COPD, HTN, and HFrEF (35-40%) who presented to [facility] ED on 8/7/24 from his nursing facility due to severe epigastric pain. He reports 4 days of bloody bowel movement and 2 days of nausea and vomiting without hematemesis. He reports recent embolization in his pancreas and history of pain associated with constipation which is relieved with laxatives. His pain that began the night prior to arrival was far worse than his usual amount of pain, he is unable to provide further details.    In the ED, he was found to have Hgb 4.4 for which he received 3 units pRBCs. Per EMS, his initial SBP was in to 70's. He received 1L IVF and was placed on octreotide, pantoprazole 80mg IV and CTX 1g. GI was consulted, recommend CTA if unstable despite transfusion and likely will perform endoscopy in the AM if stable.    Of note, has had multiple recent hospital admissions for symptomatic anemia and GIB. 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 He was also admitted in 06/2024 for syncope thought due to blood loss and hypovolemia in setting of GIB and Lasix use.     Procedure Date: IR embolization of feeding vessel of  pseudoaneurysm and SMA 8/8      Hospital Course (updated, brief assessment by system or problem, significant events): Melena on presentation from nursing home. Plan for EGD with GI on 8/8 revealed no bleeding in the lumen of the GI tract but blood coming from the ampulla of Vater. IR consulted for intervention, received GIB embolization procedure with Vascade closure device deployed in right femoral artery. Hemoglobin stable post procedure. Hemodynamically stable, on room air. No current ICU needs.      Tasks (specific, using if-then statements):   -If worsening pain in setting of pancreatitis, consider changing pain regimen.   -If hemoglobin acutely drops, consider stat CTA/GI consult     Contingency Plan (special circumstances anticipated and plan):   -In setting of [place], have not restarted GDMT of HFrEF. Please restart as tolerated.   -Holding DVT prophylaxis in post-procedural setting. Consider restarting at a later date.   -Q12h CBC to monitor hemoglobin. Hx of multiple GI bleeds in past.    Estimated Discharge Date: 8/12/2024    Discharge Disposition: Long Term Care    Mentored By: Dr. Rajinder Mascorro MD  [facility]

## 2024-08-09 NOTE — ASSESSMENT & PLAN NOTE
History of esophageal varices: banding approximately 6 years ago. No varices on most recent endoscopies.     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 Successful coil embolization of several superior pancreaticoduodenal branches and the gastroduodenal artery with cessation of flow to the pseudoaneurysm.    PLAN:  - s/p 4 units pRBCs  - GI consulted, appreciate recs  - on octreotide  - s/p pantoprazole 80 mg IV, then 40 mg IV BID  - CTA with unstable, IR if positive for active bleeding  - NPO for potential endoscopy in AM  - CBC q6h

## 2024-08-09 NOTE — ASSESSMENT & PLAN NOTE
History of EtOH use disorder. Per patient, last drink >1 year ago. No signs of withdrawal on admission.    - No sign of alcohol withdrawal, d/c [facility] protocol.   - Thiamine, folate and multivitamin  - Fall precautions

## 2024-08-09 NOTE — ASSESSMENT & PLAN NOTE
MELD 3.0: 18 at 8/9/2024  2:13 AM  MELD-Na: 17 at 8/9/2024  2:13 AM  Calculated from:  Serum Creatinine: 2.0 mg/dL at 8/9/2024  2:13 AM  Serum Sodium: 134 mmol/L at 8/9/2024  2:13 AM  Total Bilirubin: 0.9 mg/dL (Using min of 1 mg/dL) at 8/9/2024  2:13 AM  Serum Albumin: 2.6 g/dL at 8/9/2024  2:13 AM  INR(ratio): 1.2 at 8/9/2024  2:13 AM  Age at listing (hypothetical): 68 years  Sex: Male at 8/9/2024  2:13 AM

## 2024-08-09 NOTE — ASSESSMENT & PLAN NOTE
67yo M with PMHx of alcohol use disorder c/b cirrhosis and esophageal varices s/p banding 2018, CAD, BPH, COPD, HTN, and HFrEF (35-40%) who is admitted to [facility] [place] [place] for pancreatitis and anemia due to blood loss. Lipase 402.    [place] score 1 on admission due to age > 55.    PLAN:  - Pain control with PRN oxycodone for severe pain  - Careful with fluids given cocurrent HFrEF  - Daily CMP  - Further abdominal imaging as indicated

## 2024-08-09 NOTE — PLAN OF CARE
Problem: Adult Inpatient Plan of Care  Goal: Plan of Care Review  Outcome: Progressing     Problem: Fall Injury Risk  Goal: Absence of Fall and Fall-Related Injury  Outcome: Progressing     Problem: Wound  Goal: Optimal Coping  Outcome: Progressing     MICU DAILY GOALS     Family/Goals of care/Code Status   Code Status: Full Code    24H Vital Sign Range  Temp:  [95 °F (35 °C)-98.6 °F (37 °C)]   Pulse:  [74-97]   Resp:  [11-26]   BP: ()/(54-88)   SpO2:  [97 %-100 %]      Shift Events (include procedures and significant events)   Received pt from IR; leg immobilizer in place until 2325; bloody bms x 4; calcium given for ectopy; I unit prbcs infused.    AWAKE RASS: Goal - RASS Goal: 0-->alert and calm  Actual - RASS (Higgins Agitation-Sedation Scale): alert and calm    Restraint necessity: Not necessary   BREATHE SBT: Not intubated    Coordinate A & B, analgesics/sedatives Pain: managed   SAT: Not intubated   Delirium CAM-ICU: Overall CAM-ICU: Negative   Early(intubated/ Progressive (non-intubated) Mobility MOVE Screen (INTUBATED ONLY): Not intubated    Activity: Activity Management: Bridge - L1   Feeding/Nutrition Diet order: Diet/Nutrition Received: NPO,     Thrombus DVT prophylaxis: VTE Required Core Measure: Per order contraindicated for SCDs/Anticoagulants   HOB Elevation Head of Bed (HOB) Positioning: HOB at 30 degrees   Ulcer Prophylaxis GI: yes   Glucose control managed     Skin Skin assessed during: Q Shift Change    Sacrum intact/not altered? Yes  Heels intact/not altered? Yes  Surgical wound? Yes    CHECK ONE!   (no altered skin or altered skin) and sub boxes:  [] No Altered Skin Integrity Present    []Prevention Measures Documented    [x] Altered Skin Integrity Present or Discovered   [x] LDA present in EPIC, daily doc completed              [] LDA added if not in EPIC (describe wound).                    When describing wound, do not stage, use descriptive words only.    [] Wound Image Taken  (required on admit,                   transfer/discharge and every Tuesday)    Wound Care Consulted? No    4 EYES:  Attending Nurse (1st set of eyes): Jayla Erwin RN/Staff Member (2nd set of eyes): Emy   Bowel Function Bms x 4     Indwelling Catheter Necessity         Urinal   De-escalation Antibiotics No        VS and assessment per flow sheet, patient progressing towards goals as tolerated, plan of care reviewed with  pt , all concerns addressed, will continue to monitor.

## 2024-08-10 LAB
BASOPHILS # BLD AUTO: 0.01 K/UL (ref 0–0.2)
BASOPHILS NFR BLD: 0.2 % (ref 0–1.9)
DIFFERENTIAL METHOD BLD: ABNORMAL
EOSINOPHIL # BLD AUTO: 0.1 K/UL (ref 0–0.5)
EOSINOPHIL NFR BLD: 1.6 % (ref 0–8)
ERYTHROCYTE [DISTWIDTH] IN BLOOD BY AUTOMATED COUNT: 15.7 % (ref 11.5–14.5)
HCT VFR BLD AUTO: 32.6 % (ref 40–54)
HGB BLD-MCNC: 10.1 G/DL (ref 14–18)
IMM GRANULOCYTES # BLD AUTO: 0.02 K/UL (ref 0–0.04)
IMM GRANULOCYTES NFR BLD AUTO: 0.4 % (ref 0–0.5)
LACTATE SERPL-SCNC: 2 MMOL/L (ref 0.5–2.2)
LYMPHOCYTES # BLD AUTO: 0.8 K/UL (ref 1–4.8)
LYMPHOCYTES NFR BLD: 15.4 % (ref 18–48)
MCH RBC QN AUTO: 28.4 PG (ref 27–31)
MCHC RBC AUTO-ENTMCNC: 31 G/DL (ref 32–36)
MCV RBC AUTO: 92 FL (ref 82–98)
MONOCYTES # BLD AUTO: 0.5 K/UL (ref 0.3–1)
MONOCYTES NFR BLD: 10.7 % (ref 4–15)
NEUTROPHILS # BLD AUTO: 3.6 K/UL (ref 1.8–7.7)
NEUTROPHILS NFR BLD: 71.7 % (ref 38–73)
NRBC BLD-RTO: 0 /100 WBC
PLATELET # BLD AUTO: 163 K/UL (ref 150–450)
PMV BLD AUTO: 10 FL (ref 9.2–12.9)
RBC # BLD AUTO: 3.56 M/UL (ref 4.6–6.2)
WBC # BLD AUTO: 5.07 K/UL (ref 3.9–12.7)

## 2024-08-10 PROCEDURE — 94640 AIRWAY INHALATION TREATMENT: CPT

## 2024-08-10 PROCEDURE — 36415 COLL VENOUS BLD VENIPUNCTURE: CPT | Performed by: INTERNAL MEDICINE

## 2024-08-10 PROCEDURE — 20600001 HC STEP DOWN PRIVATE ROOM

## 2024-08-10 PROCEDURE — 99900035 HC TECH TIME PER 15 MIN (STAT)

## 2024-08-10 PROCEDURE — 94761 N-INVAS EAR/PLS OXIMETRY MLT: CPT

## 2024-08-10 PROCEDURE — 25000003 PHARM REV CODE 250: Performed by: INTERNAL MEDICINE

## 2024-08-10 PROCEDURE — 85025 COMPLETE CBC W/AUTO DIFF WBC: CPT | Performed by: INTERNAL MEDICINE

## 2024-08-10 PROCEDURE — 25000003 PHARM REV CODE 250

## 2024-08-10 PROCEDURE — 83605 ASSAY OF LACTIC ACID: CPT | Performed by: INTERNAL MEDICINE

## 2024-08-10 PROCEDURE — 25000003 PHARM REV CODE 250: Performed by: STUDENT IN AN ORGANIZED HEALTH CARE EDUCATION/TRAINING PROGRAM

## 2024-08-10 RX ORDER — OXYCODONE HYDROCHLORIDE 5 MG/1
5 TABLET ORAL EVERY 4 HOURS PRN
Status: DISCONTINUED | OUTPATIENT
Start: 2024-08-10 | End: 2024-08-12 | Stop reason: HOSPADM

## 2024-08-10 RX ORDER — OXYCODONE HYDROCHLORIDE 10 MG/1
10 TABLET ORAL EVERY 4 HOURS PRN
Status: DISCONTINUED | OUTPATIENT
Start: 2024-08-10 | End: 2024-08-12 | Stop reason: HOSPADM

## 2024-08-10 RX ADMIN — PANTOPRAZOLE SODIUM 40 MG: 40 TABLET, DELAYED RELEASE ORAL at 09:08

## 2024-08-10 RX ADMIN — OXYCODONE 5 MG: 5 TABLET ORAL at 03:08

## 2024-08-10 RX ADMIN — ESCITALOPRAM OXALATE 10 MG: 5 TABLET, FILM COATED ORAL at 09:08

## 2024-08-10 RX ADMIN — ATORVASTATIN CALCIUM 40 MG: 40 TABLET, FILM COATED ORAL at 08:08

## 2024-08-10 RX ADMIN — MUPIROCIN: 20 OINTMENT TOPICAL at 09:08

## 2024-08-10 RX ADMIN — Medication 100 MG: at 09:08

## 2024-08-10 RX ADMIN — OXYCODONE 5 MG: 5 TABLET ORAL at 09:08

## 2024-08-10 RX ADMIN — FOLIC ACID 1 MG: 1 TABLET ORAL at 09:08

## 2024-08-10 RX ADMIN — THERA TABS 1 TABLET: TAB at 09:08

## 2024-08-10 RX ADMIN — FLUTICASONE FUROATE AND VILANTEROL TRIFENATATE 1 PUFF: 100; 25 POWDER RESPIRATORY (INHALATION) at 08:08

## 2024-08-10 RX ADMIN — MUPIROCIN: 20 OINTMENT TOPICAL at 08:08

## 2024-08-10 RX ADMIN — OXYCODONE 5 MG: 5 TABLET ORAL at 01:08

## 2024-08-10 RX ADMIN — METOPROLOL SUCCINATE 25 MG: 25 TABLET, EXTENDED RELEASE ORAL at 09:08

## 2024-08-10 RX ADMIN — OXYCODONE HYDROCHLORIDE 10 MG: 10 TABLET ORAL at 08:08

## 2024-08-10 NOTE — HOSPITAL COURSE
Melena on presentation from nursing home. Plan for EGD with GI on 8/8 revealed no bleeding in the lumen of the GI tract but blood coming from the ampulla of Vater. IR consulted for intervention, received GIB embolization procedure with Vascade closure device deployed in right femoral artery. Hemoglobin stable post procedure. Hemodynamically stable, on room air. No current ICU needs. H/H has remained stable with no further bleeding reported. Repeat CBC in 1 week and follow up with hepatology outpatient.

## 2024-08-10 NOTE — ASSESSMENT & PLAN NOTE
History of esophageal varices: banding approximately 6 years ago. No varices on most recent endoscopies.      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 Successful coil embolization of several superior pancreaticoduodenal branches and the gastroduodenal artery with cessation of flow to the pseudoaneurysm.     PLAN:  - S/p 7 units pRBCs  - EGD 8/8: Bleeding from Ampulla   - s/p IR GIB embolization procedure 8/8  - Pantoprazole 40 mg daily   - CBC daily

## 2024-08-10 NOTE — ASSESSMENT & PLAN NOTE
67yo M with PMHx of alcohol use disorder c/b cirrhosis and esophageal varices s/p banding 2018, CAD, BPH, COPD, HTN, and HFrEF (35-40%) who is [place] [facility] [place] and anemia due to blood loss. Lipase 402.     [place] score 1 on admission due to age > 55.     PLAN:  - Pain control with PRN oxycodone for severe pain  - Careful with fluids given cocurrent HFrEF  - Daily CMP  - Further abdominal imaging as indicated  - Diet advanced

## 2024-08-10 NOTE — ASSESSMENT & PLAN NOTE
The likely etiology of thrombocytopenia is liver disease. The patients 3 most recent labs are listed below.  Recent Labs     08/09/24  0527 08/09/24  1622 08/10/24  1157   * 152 163     Plan  - Will transfuse if platelet count is <10k.

## 2024-08-10 NOTE — SUBJECTIVE & OBJECTIVE
Interval History/Significant Events: NAEON. H/H stable with no further bleeding reported. Still c/o abd pain, although improving.     Review of Systems   Constitutional:  Negative for chills and fever.   Respiratory:  Negative for shortness of breath.    Cardiovascular:  Negative for chest pain, palpitations and leg swelling.   Gastrointestinal:  Positive for abdominal pain (At site of incision). Negative for blood in stool, constipation, nausea and vomiting.   Neurological:  Positive for light-headedness.     Objective:     Vital Signs (Most Recent):  Temp: 98.5 °F (36.9 °C) (08/10/24 1525)  Pulse: (!) 54 (08/10/24 1525)  Resp: 19 (08/10/24 1525)  BP: (!) 143/82 (08/10/24 1525)  SpO2: (!) 92 % (08/10/24 1525) Vital Signs (24h Range):  Temp:  [97.7 °F (36.5 °C)-99.3 °F (37.4 °C)] 98.5 °F (36.9 °C)  Pulse:  [54-79] 54  Resp:  [16-19] 19  SpO2:  [92 %-98 %] 92 %  BP: (112-155)/(69-85) 143/82   Weight: 74 kg (163 lb 2.3 oz)  Body mass index is 22.75 kg/m².      Intake/Output Summary (Last 24 hours) at 8/10/2024 1532  Last data filed at 8/10/2024 0820  Gross per 24 hour   Intake 120 ml   Output 1400 ml   Net -1280 ml          Physical Exam  Constitutional:       General: He is not in acute distress.     Appearance: Normal appearance. He is not ill-appearing or diaphoretic.   HENT:      Head: Normocephalic and atraumatic.      Nose: Nose normal.      Mouth/Throat:      Mouth: Mucous membranes are moist.   Eyes:      Conjunctiva/sclera: Conjunctivae normal.   Cardiovascular:      Rate and Rhythm: Normal rate and regular rhythm.      Heart sounds: Normal heart sounds. No murmur heard.  Pulmonary:      Effort: Pulmonary effort is normal. No respiratory distress.      Breath sounds: Normal breath sounds. No wheezing.   Abdominal:      General: There is no distension.      Palpations: Abdomen is soft.      Tenderness: There is no abdominal tenderness.   Musculoskeletal:      Right lower leg: No edema.      Left lower leg: No  edema.   Skin:     General: Skin is warm and dry.   Neurological:      General: No focal deficit present.      Mental Status: He is alert and oriented to person, place, and time. Mental status is at baseline.   Psychiatric:         Mood and Affect: Mood normal.         Behavior: Behavior normal.         Thought Content: Thought content normal.         Judgment: Judgment normal.            Vents:     Lines/Drains/Airways       Peripheral Intravenous Line  Duration                  Peripheral IV - Single Lumen 08/07/24 1824 18 G Left Forearm 2 days         Peripheral IV - Single Lumen 08/07/24 1824 18 G Left Upper Arm 2 days                  Significant Labs:    CBC/Anemia Profile:  Recent Labs   Lab 08/09/24  0527 08/09/24  1622 08/10/24  1157   WBC 7.68 3.88* 5.07   HGB 10.1* 10.0* 10.1*   HCT 31.7* 31.3* 32.6*   * 152 163   MCV 90 89 92   RDW 15.6* 15.5* 15.7*        Chemistries:  Recent Labs   Lab 08/08/24  2317 08/09/24  0213   * 134*   K 4.3 4.4    103   CO2 19* 17*   BUN 41* 38*   CREATININE 1.9* 2.0*   CALCIUM 8.6* 8.9   ALBUMIN  --  2.6*   PROT  --  5.8*   BILITOT  --  0.9   ALKPHOS  --  76   ALT  --  15   AST  --  39   MG 2.0 1.9   PHOS 4.2 4.2       All pertinent labs within the past 24 hours have been reviewed.    Significant Imaging:  I have reviewed all pertinent imaging results/findings within the past 24 hours.

## 2024-08-10 NOTE — ASSESSMENT & PLAN NOTE
"Patient is identified as having Combined Systolic and Diastolic heart failure that is Chronic. CHF is currently controlled. Latest ECHO performed and demonstrates- Results for orders placed during the hospital encounter of 08/07/24    Echo    Interpretation Summary    Technically inadequate study due to poor acoustic windows. IV contrast was given.    Left Ventricle: The left ventricle is normal in size. Normal wall thickness. There is concentric remodeling. Unable to assess wall motion. There is mildly reduced systolic function with a visually estimated ejection fraction of 40 - 45%. Unable to assess diastolic function due to poor image quality.    Right Ventricle: Normal right ventricular cavity size. Wall thickness is normal. Systolic function is normal.    IVC/SVC: Normal venous pressure at 3 mmHg.  . Continue Beta Blocker and monitor clinical status closely. Monitor on telemetry. Patient is off CHF pathway.  Monitor strict Is&Os and daily weights.  Place on fluid restriction of 1.5 L. Cardiology has not been consulted. Continue to stress to patient importance of self efficacy and  on diet for CHF. Last BNP reviewed- and noted below No results for input(s): "BNP", "BNPTRIAGEBLO" in the last 168 hours.  "

## 2024-08-10 NOTE — PLAN OF CARE
Problem: Fall Injury Risk  Goal: Absence of Fall and Fall-Related Injury  Outcome: Progressing  Intervention: Identify and Manage Contributors  Flowsheets (Taken 8/10/2024 0302)  Self-Care Promotion: independence encouraged  Medication Review/Management: medications reviewed  Intervention: Promote Injury-Free Environment  Flowsheets (Taken 8/10/2024 0302)  Safety Promotion/Fall Prevention:   assistive device/personal item within reach   bed alarm set   commode/urinal/bedpan at bedside   Fall Risk reviewed with patient/family   instructed to call staff for mobility   lighting adjusted   medications reviewed   nonskid shoes/socks when out of bed   patient expresses understanding of fall risk and prevention   room near unit station   side rails raised x 2   side rails raised x 3     Patient awake, alert , oriented.  Pain assessed, pt complains of abdominal pain.  PRN pain medication administered as ordered. pt educated on safety, and medication use. Pt verbalized understanding.  Plan of care discussed with patient, patient verbalized understanding.  Call light and urinal within reach.  Bed alarm on.  Bed  low and locked.

## 2024-08-10 NOTE — ASSESSMENT & PLAN NOTE
MELD-Na score calculated; MELD 3.0: 18 at 8/9/2024  2:13 AM  MELD-Na: 17 at 8/9/2024  2:13 AM  Calculated from:  Serum Creatinine: 2.0 mg/dL at 8/9/2024  2:13 AM  Serum Sodium: 134 mmol/L at 8/9/2024  2:13 AM  Total Bilirubin: 0.9 mg/dL (Using min of 1 mg/dL) at 8/9/2024  2:13 AM  Serum Albumin: 2.6 g/dL at 8/9/2024  2:13 AM  INR(ratio): 1.2 at 8/9/2024  2:13 AM  Age at listing (hypothetical): 68 years  Sex: Male at 8/9/2024  2:13 AM      Continue chronic meds. Etiology likely ETOH. Will avoid any hepatotoxic meds, and monitor CBC/CMP/INR for synthetic function.

## 2024-08-10 NOTE — PLAN OF CARE
Problem: Adult Inpatient Plan of Care  Goal: Plan of Care Review  Outcome: Progressing  Goal: Patient-Specific Goal (Individualized)  Outcome: Progressing  Goal: Absence of Hospital-Acquired Illness or Injury  Outcome: Progressing  Goal: Optimal Comfort and Wellbeing  Outcome: Progressing  Goal: Readiness for Transition of Care  Outcome: Progressing     Problem: Acute Kidney Injury/Impairment  Goal: Fluid and Electrolyte Balance  Outcome: Progressing  Goal: Improved Oral Intake  Outcome: Progressing  Goal: Effective Renal Function  Outcome: Progressing     Problem: Fall Injury Risk  Goal: Absence of Fall and Fall-Related Injury  Outcome: Progressing     Problem: Skin Injury Risk Increased  Goal: Skin Health and Integrity  Outcome: Progressing     Problem: Wound  Goal: Optimal Coping  Outcome: Progressing  Goal: Optimal Functional Ability  Outcome: Progressing  Goal: Absence of Infection Signs and Symptoms  Outcome: Progressing  Goal: Improved Oral Intake  Outcome: Progressing  Goal: Optimal Pain Control and Function  Outcome: Progressing  Goal: Skin Health and Integrity  Outcome: Progressing  Goal: Optimal Wound Healing  Outcome: Progressing     Patient remains free from falls and injury. NADN. VSS. Safety maintained; bed low and locked, call light in reach.  No complaint of pain, n/v, diarrhea, or SOB. Questions encouraged and answered. Plan of care reviewed with patient. Will continue to monitor, will continue with plan of care.

## 2024-08-10 NOTE — ASSESSMENT & PLAN NOTE
History of EtOH use disorder. Per patient, last drink >1 year ago. No signs of withdrawal on admission.     - No sign of alcohol withdrawal, d/c [facility] protocol.   - Thiamine, folate and multivitamin  - Fall precautions

## 2024-08-10 NOTE — HPI
Mr. Fransico Montalvo is a 68-year-old man with history of alcohol use disorder, EtOH cirrhosis with esophageal varices s/p banding 2018, CAD, BPH, COPD, HTN, and HFrEF (35-40%) who [place] [facility] [place] 8/7/24 from his nursing facility due to severe epigastric pain. He reports 4 days of bloody bowel movement and 2 days of nausea and vomiting without hematemesis. He reports recent embolization in his pancreas and history of pain associated with constipation which is relieved with laxatives. His pain that began the night prior to arrival was far worse than his usual amount of pain, he is unable to provide further details.     In the ED, he was found to have Hgb 4.4 for which he received 3 units pRBCs. Per EMS, his initial SBP was in to 70's. He received 1L IVF and was placed on octreotide, pantoprazole 80mg IV and CTX 1g. GI was consulted, recommend CTA if unstable despite transfusion and likely will perform endoscopy in the AM if stable.     Of note, has had multiple recent hospital admissions for symptomatic anemia and GIB. 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 He was also admitted in 06/2024 for syncope thought due to blood loss and hypovolemia in setting of GIB and Lasix use.

## 2024-08-10 NOTE — ASSESSMENT & PLAN NOTE
Hyponatremia is likely due to Cirrhosis. The patient's most recent sodium results are listed below.  Recent Labs     08/08/24  0331 08/08/24  2317 08/09/24  0213   * 132* 134*     Plan  - Correct the sodium by 4-6mEq in 24 hours.   - Obtain the following studies: Urine sodium, urine osmolality, serum osmolality.  - Will treat the hyponatremia with Fluid restriction of:  1.5 liter per day  - Monitor sodium Daily.   - Patient hyponatremia is improving

## 2024-08-10 NOTE — PROGRESS NOTES
[facility] - Telemetry [facility] Medicine  Progress Note    Patient Name: Fransico Montalvo  MRN: [id number]  Patient Class: IP- Inpatient   Admission Date: 8/7/2024  Length of Stay: 3 days  Attending Physician: Ok Coombs MD  Primary Care Provider: [facility]        [facility]:     Principal Problem:Acute pancreatitis        HPI:  Mr. Fransico Montalvo is a 68-year-old man with history of alcohol use disorder, EtOH cirrhosis with esophageal varices s/p banding 2018, CAD, BPH, COPD, HTN, and HFrEF (35-40%) who presented to [facility] [place] ED on 8/7/24 from his nursing facility due to severe epigastric pain. He reports 4 days of bloody bowel movement and 2 days of nausea and vomiting without hematemesis. He reports recent embolization in his pancreas and history of pain associated with constipation which is relieved with laxatives. His pain that began the night prior to arrival was far worse than his usual amount of pain, he is unable to provide further details.     In the ED, he was found to have Hgb 4.4 for which he received 3 units pRBCs. Per EMS, his initial SBP was in to 70's. He received 1L IVF and was placed on octreotide, pantoprazole 80mg IV and CTX 1g. GI was consulted, recommend CTA if unstable despite transfusion and likely will perform endoscopy in the AM if stable.     Of note, has had multiple recent hospital admissions for symptomatic anemia and GIB. 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 He was also admitted in 06/2024 for syncope thought due to blood loss and hypovolemia in setting of GIB and Lasix use.    Overview/Hospital Course:  Melena on presentation from nursing home. Plan for EGD with GI on 8/8 revealed no bleeding in the lumen of the GI tract but blood coming from the ampulla of Vater. IR consulted for  intervention, received GIB embolization procedure with Vascade closure device deployed in right femoral artery. Hemoglobin stable post procedure. Hemodynamically stable, on room air. No current ICU needs.     Interval History/Significant Events: NAEON. H/H stable with no further bleeding reported. Still c/o abd pain, although improving.     Review of Systems   Constitutional:  Negative for chills and fever.   Respiratory:  Negative for shortness of breath.    Cardiovascular:  Negative for chest pain, palpitations and leg swelling.   Gastrointestinal:  Positive for abdominal pain (At site of incision). Negative for blood in stool, constipation, nausea and vomiting.   Neurological:  Positive for light-headedness.     Objective:     Vital Signs (Most Recent):  Temp: 98.5 °F (36.9 °C) (08/10/24 1525)  Pulse: (!) 54 (08/10/24 1525)  Resp: 19 (08/10/24 1525)  BP: (!) 143/82 (08/10/24 1525)  SpO2: (!) 92 % (08/10/24 1525) Vital Signs (24h Range):  Temp:  [97.7 °F (36.5 °C)-99.3 °F (37.4 °C)] 98.5 °F (36.9 °C)  Pulse:  [54-79] 54  Resp:  [16-19] 19  SpO2:  [92 %-98 %] 92 %  BP: (112-155)/(69-85) 143/82   Weight: 74 kg (163 lb 2.3 oz)  Body mass index is 22.75 kg/m².      Intake/Output Summary (Last 24 hours) at 8/10/2024 1532  Last data filed at 8/10/2024 0820  Gross per 24 hour   Intake 120 ml   Output 1400 ml   Net -1280 ml          Physical Exam  Constitutional:       General: He is not in acute distress.     Appearance: Normal appearance. He is not ill-appearing or diaphoretic.   HENT:      Head: Normocephalic and atraumatic.      Nose: Nose normal.      Mouth/Throat:      Mouth: Mucous membranes are moist.   Eyes:      Conjunctiva/sclera: Conjunctivae normal.   Cardiovascular:      Rate and Rhythm: Normal rate and regular rhythm.      Heart sounds: Normal heart sounds. No murmur heard.  Pulmonary:      Effort: Pulmonary effort is normal. No respiratory distress.      Breath sounds: Normal breath sounds. No wheezing.    Abdominal:      General: There is no distension.      Palpations: Abdomen is soft.      Tenderness: There is no abdominal tenderness.   Musculoskeletal:      Right lower leg: No edema.      Left lower leg: No edema.   Skin:     General: Skin is warm and dry.   Neurological:      General: No focal deficit present.      Mental Status: He is alert and oriented to person, place, and time. Mental status is at baseline.   Psychiatric:         Mood and Affect: Mood normal.         Behavior: Behavior normal.         Thought Content: Thought content normal.         Judgment: Judgment normal.            Vents:     Lines/Drains/Airways       Peripheral Intravenous Line  Duration                  Peripheral IV - Single Lumen 08/07/24 1824 18 G Left Forearm 2 days         Peripheral IV - Single Lumen 08/07/24 1824 18 G Left Upper Arm 2 days                  Significant Labs:    CBC/Anemia Profile:  Recent Labs   Lab 08/09/24  0527 08/09/24  1622 08/10/24  1157   WBC 7.68 3.88* 5.07   HGB 10.1* 10.0* 10.1*   HCT 31.7* 31.3* 32.6*   * 152 163   MCV 90 89 92   RDW 15.6* 15.5* 15.7*        Chemistries:  Recent Labs   Lab 08/08/24  2317 08/09/24  0213   * 134*   K 4.3 4.4    103   CO2 19* 17*   BUN 41* 38*   CREATININE 1.9* 2.0*   CALCIUM 8.6* 8.9   ALBUMIN  --  2.6*   PROT  --  5.8*   BILITOT  --  0.9   ALKPHOS  --  76   ALT  --  15   AST  --  39   MG 2.0 1.9   PHOS 4.2 4.2       All pertinent labs within the past 24 hours have been reviewed.    Significant Imaging:  I have reviewed all pertinent imaging results/findings within the past 24 hours.    Assessment/Plan:      * Acute pancreatitis  69yo M with PMHx of alcohol use disorder c/b cirrhosis and esophageal varices s/p banding 2018, CAD, BPH, COPD, HTN, and HFrEF (35-40%) who is admitted to [facility] for pancreatitis and anemia due to blood loss. Lipase 402.     Annmarie score 1 on admission due to age > 55.     PLAN:  - Pain control with PRN oxycodone for severe  "pain  - Careful with fluids given cocurrent HFrEF  - Daily CMP  - Further abdominal imaging as indicated  - Diet advanced      Hemorrhagic shock  Not requiring pressors. Received total 7u pRBCS.    CAD (coronary artery disease)  Patient with known CAD (seen on CT imaging, no interventions have been done) which is controlled. Will continue Statin and monitor for S/Sx of angina/ACS. Continue to monitor on telemetry.     Chronic combined systolic and diastolic congestive heart failure  Patient is identified as having Combined Systolic and Diastolic heart failure that is Chronic. CHF is currently controlled. Latest ECHO performed and demonstrates- Results for orders placed during the hospital encounter of 08/07/24    Echo    Interpretation Summary    Technically inadequate study due to poor acoustic windows. IV contrast was given.    Left Ventricle: The left ventricle is normal in size. Normal wall thickness. There is concentric remodeling. Unable to assess wall motion. There is mildly reduced systolic function with a visually estimated ejection fraction of 40 - 45%. Unable to assess diastolic function due to poor image quality.    Right Ventricle: Normal right ventricular cavity size. Wall thickness is normal. Systolic function is normal.    IVC/SVC: Normal venous pressure at 3 mmHg.  . Continue Beta Blocker and monitor clinical status closely. Monitor on telemetry. Patient is off CHF pathway.  Monitor strict Is&Os and daily weights.  Place on fluid restriction of 1.5 L. Cardiology has not been consulted. Continue to stress to patient importance of self efficacy and  on diet for CHF. Last BNP reviewed- and noted below No results for input(s): "BNP", "BNPTRIAGEBLO" in the last 168 hours.    Presumed COPD (chronic obstructive pulmonary disease)  - Monitor respiratory status, albuterol and neb prn  - Home Breo    Hyponatremia  Hyponatremia is likely due to Cirrhosis. The patient's most recent sodium results are " listed below.  Recent Labs     08/08/24  0331 08/08/24  2317 08/09/24  0213   * 132* 134*     Plan  - Correct the sodium by 4-6mEq in 24 hours.   - Obtain the following studies: Urine sodium, urine osmolality, serum osmolality.  - Will treat the hyponatremia with Fluid restriction of:  1.5 liter per day  - Monitor sodium Daily.   - Patient hyponatremia is improving    GIB (gastrointestinal bleeding)  History of esophageal varices: banding approximately 6 years ago. No varices on most recent endoscopies.      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 Successful coil embolization of several superior pancreaticoduodenal branches and the gastroduodenal artery with cessation of flow to the pseudoaneurysm.     PLAN:  - S/p 7 units pRBCs  - EGD 8/8: Bleeding from Ampulla   - s/p IR GIB embolization procedure 8/8  - Pantoprazole 40 mg daily   - CBC daily    Therapeutic opioid induced constipation  - Bowel regimen if needed      ISRAEL (acute kidney injury)  Cr 2.4 on admission; baseline 1.3. Likely prerenal due to volume depletion. Received 1L IVF in the ED and 3 units pRBCs.     - Daily CMP  - Avoid nephrotoxic agents  - Renally dose medications  - Fluid repletion prn    Alcohol withdrawal  History of EtOH use disorder. Per patient, last drink >1 year ago. No signs of withdrawal on admission.     - No sign of alcohol withdrawal, d/c [facility] protocol.   - Thiamine, folate and multivitamin  - Fall precautions      Essential hypertension  On valsartan, metoprolol, Jardiance and furosemide at home.     - Restarting Metoprolol, holding rest of medication regimen in setting of ISRAEL.     Alcoholic cirrhosis  MELD-Na score calculated; MELD 3.0: 18 at 8/9/2024  2:13 AM  MELD-Na: 17 at 8/9/2024  2:13 AM  Calculated from:  Serum Creatinine: 2.0 mg/dL at  8/9/2024  2:13 AM  Serum Sodium: 134 mmol/L at 8/9/2024  2:13 AM  Total Bilirubin: 0.9 mg/dL (Using min of 1 mg/dL) at 8/9/2024  2:13 AM  Serum Albumin: 2.6 g/dL at 8/9/2024  2:13 AM  INR(ratio): 1.2 at 8/9/2024  2:13 AM  Age at listing (hypothetical): 68 years  Sex: Male at 8/9/2024  2:13 AM      Continue chronic meds. Etiology likely ETOH. Will avoid any hepatotoxic meds, and monitor CBC/CMP/INR for synthetic function.     Thrombocytopenia  The likely etiology of thrombocytopenia is liver disease. The patients 3 most recent labs are listed below.  Recent Labs     08/09/24  0527 08/09/24  1622 08/10/24  1157   * 152 163     Plan  - Will transfuse if platelet count is <10k.      Tobacco abuse  Quit smoking ~1 year ago per patient.      - smoking cessation education      BPH (benign prostatic hyperplasia)  Stable. Hx of BPH. Was on Tamsulosin previously, but held on recent admission in 03/2024 where there were concerns for low BP     PLAN:  - Bladder scan PRN if urinary retention concerns        VTE Risk Mitigation (From admission, onward)           Ordered     IP VTE HIGH RISK PATIENT  Once         08/07/24 2053     Place sequential compression device  Until discontinued         08/07/24 2053                    Discharge Planning   ARTURO: 8/12/2024     Code Status: Full Code   Is the patient medically ready for discharge?:     Reason for patient still in hospital (select all that apply): Treatment  Discharge Plan A: Return to nursing home                  Ok Coombs MD  Department of Hospital Medicine   [facility] - Telemetry Stepdown

## 2024-08-11 VITALS
RESPIRATION RATE: 17 BRPM | BODY MASS INDEX: 22.84 KG/M2 | WEIGHT: 163.13 LBS | HEIGHT: 71 IN | TEMPERATURE: 98 F | HEART RATE: 68 BPM | DIASTOLIC BLOOD PRESSURE: 65 MMHG | SYSTOLIC BLOOD PRESSURE: 121 MMHG | OXYGEN SATURATION: 100 %

## 2024-08-11 LAB
ABO + RH BLD: NORMAL
ALBUMIN SERPL BCP-MCNC: 2.8 G/DL (ref 3.5–5.2)
ALP SERPL-CCNC: 102 U/L (ref 55–135)
ALT SERPL W/O P-5'-P-CCNC: 10 U/L (ref 10–44)
ANION GAP SERPL CALC-SCNC: 10 MMOL/L (ref 8–16)
AST SERPL-CCNC: 23 U/L (ref 10–40)
BASOPHILS # BLD AUTO: 0.01 K/UL (ref 0–0.2)
BASOPHILS # BLD AUTO: 0.02 K/UL (ref 0–0.2)
BASOPHILS NFR BLD: 0.2 % (ref 0–1.9)
BASOPHILS NFR BLD: 0.4 % (ref 0–1.9)
BILIRUB SERPL-MCNC: 0.9 MG/DL (ref 0.1–1)
BLD GP AB SCN CELLS X3 SERPL QL: NORMAL
BUN SERPL-MCNC: 38 MG/DL (ref 8–23)
CALCIUM SERPL-MCNC: 8.3 MG/DL (ref 8.7–10.5)
CHLORIDE SERPL-SCNC: 98 MMOL/L (ref 95–110)
CO2 SERPL-SCNC: 20 MMOL/L (ref 23–29)
CREAT SERPL-MCNC: 2 MG/DL (ref 0.5–1.4)
DIFFERENTIAL METHOD BLD: ABNORMAL
DIFFERENTIAL METHOD BLD: ABNORMAL
EOSINOPHIL # BLD AUTO: 0.1 K/UL (ref 0–0.5)
EOSINOPHIL # BLD AUTO: 0.1 K/UL (ref 0–0.5)
EOSINOPHIL NFR BLD: 0.9 % (ref 0–8)
EOSINOPHIL NFR BLD: 1.7 % (ref 0–8)
ERYTHROCYTE [DISTWIDTH] IN BLOOD BY AUTOMATED COUNT: 15.7 % (ref 11.5–14.5)
ERYTHROCYTE [DISTWIDTH] IN BLOOD BY AUTOMATED COUNT: 15.9 % (ref 11.5–14.5)
EST. GFR  (NO RACE VARIABLE): 35.5 ML/MIN/1.73 M^2
GLUCOSE SERPL-MCNC: 83 MG/DL (ref 70–110)
HCT VFR BLD AUTO: 31.4 % (ref 40–54)
HCT VFR BLD AUTO: 34.4 % (ref 40–54)
HGB BLD-MCNC: 10.1 G/DL (ref 14–18)
HGB BLD-MCNC: 10.7 G/DL (ref 14–18)
IMM GRANULOCYTES # BLD AUTO: 0.01 K/UL (ref 0–0.04)
IMM GRANULOCYTES # BLD AUTO: 0.04 K/UL (ref 0–0.04)
IMM GRANULOCYTES NFR BLD AUTO: 0.2 % (ref 0–0.5)
IMM GRANULOCYTES NFR BLD AUTO: 0.8 % (ref 0–0.5)
LYMPHOCYTES # BLD AUTO: 0.9 K/UL (ref 1–4.8)
LYMPHOCYTES # BLD AUTO: 1.1 K/UL (ref 1–4.8)
LYMPHOCYTES NFR BLD: 16.7 % (ref 18–48)
LYMPHOCYTES NFR BLD: 24.1 % (ref 18–48)
MAGNESIUM SERPL-MCNC: 1.6 MG/DL (ref 1.6–2.6)
MCH RBC QN AUTO: 28.3 PG (ref 27–31)
MCH RBC QN AUTO: 28.6 PG (ref 27–31)
MCHC RBC AUTO-ENTMCNC: 31.1 G/DL (ref 32–36)
MCHC RBC AUTO-ENTMCNC: 32.2 G/DL (ref 32–36)
MCV RBC AUTO: 88 FL (ref 82–98)
MCV RBC AUTO: 92 FL (ref 82–98)
MONOCYTES # BLD AUTO: 0.5 K/UL (ref 0.3–1)
MONOCYTES # BLD AUTO: 0.8 K/UL (ref 0.3–1)
MONOCYTES NFR BLD: 11 % (ref 4–15)
MONOCYTES NFR BLD: 14.2 % (ref 4–15)
NEUTROPHILS # BLD AUTO: 2.9 K/UL (ref 1.8–7.7)
NEUTROPHILS # BLD AUTO: 3.5 K/UL (ref 1.8–7.7)
NEUTROPHILS NFR BLD: 62.6 % (ref 38–73)
NEUTROPHILS NFR BLD: 67.2 % (ref 38–73)
NRBC BLD-RTO: 0 /100 WBC
NRBC BLD-RTO: 0 /100 WBC
PHOSPHATE SERPL-MCNC: 3.4 MG/DL (ref 2.7–4.5)
PLATELET # BLD AUTO: 108 K/UL (ref 150–450)
PLATELET # BLD AUTO: 181 K/UL (ref 150–450)
PMV BLD AUTO: 10.3 FL (ref 9.2–12.9)
PMV BLD AUTO: 9.4 FL (ref 9.2–12.9)
POTASSIUM SERPL-SCNC: 4.6 MMOL/L (ref 3.5–5.1)
PROT SERPL-MCNC: 6.3 G/DL (ref 6–8.4)
RBC # BLD AUTO: 3.57 M/UL (ref 4.6–6.2)
RBC # BLD AUTO: 3.74 M/UL (ref 4.6–6.2)
SODIUM SERPL-SCNC: 128 MMOL/L (ref 136–145)
SPECIMEN OUTDATE: NORMAL
WBC # BLD AUTO: 4.65 K/UL (ref 3.9–12.7)
WBC # BLD AUTO: 5.27 K/UL (ref 3.9–12.7)

## 2024-08-11 PROCEDURE — 99900035 HC TECH TIME PER 15 MIN (STAT)

## 2024-08-11 PROCEDURE — 63600175 PHARM REV CODE 636 W HCPCS

## 2024-08-11 PROCEDURE — 25000003 PHARM REV CODE 250: Performed by: STUDENT IN AN ORGANIZED HEALTH CARE EDUCATION/TRAINING PROGRAM

## 2024-08-11 PROCEDURE — 36415 COLL VENOUS BLD VENIPUNCTURE: CPT | Performed by: INTERNAL MEDICINE

## 2024-08-11 PROCEDURE — 86850 RBC ANTIBODY SCREEN: CPT | Performed by: INTERNAL MEDICINE

## 2024-08-11 PROCEDURE — 85025 COMPLETE CBC W/AUTO DIFF WBC: CPT | Performed by: INTERNAL MEDICINE

## 2024-08-11 PROCEDURE — 25000003 PHARM REV CODE 250

## 2024-08-11 PROCEDURE — 86901 BLOOD TYPING SEROLOGIC RH(D): CPT | Performed by: INTERNAL MEDICINE

## 2024-08-11 PROCEDURE — 83735 ASSAY OF MAGNESIUM: CPT | Performed by: INTERNAL MEDICINE

## 2024-08-11 PROCEDURE — 84100 ASSAY OF PHOSPHORUS: CPT | Performed by: INTERNAL MEDICINE

## 2024-08-11 PROCEDURE — 94761 N-INVAS EAR/PLS OXIMETRY MLT: CPT

## 2024-08-11 PROCEDURE — 25000003 PHARM REV CODE 250: Performed by: INTERNAL MEDICINE

## 2024-08-11 PROCEDURE — 86900 BLOOD TYPING SEROLOGIC ABO: CPT | Performed by: INTERNAL MEDICINE

## 2024-08-11 PROCEDURE — 80053 COMPREHEN METABOLIC PANEL: CPT | Performed by: INTERNAL MEDICINE

## 2024-08-11 RX ORDER — FLUTICASONE FUROATE AND VILANTEROL 100; 25 UG/1; UG/1
1 POWDER RESPIRATORY (INHALATION) DAILY
Qty: 60 EACH | Refills: 0 | Status: SHIPPED | OUTPATIENT
Start: 2024-08-11

## 2024-08-11 RX ORDER — SODIUM CHLORIDE 1 G/1
2000 TABLET ORAL ONCE
Status: DISCONTINUED | OUTPATIENT
Start: 2024-08-11 | End: 2024-08-11

## 2024-08-11 RX ORDER — SODIUM CHLORIDE 9 MG/ML
INJECTION, SOLUTION INTRAVENOUS CONTINUOUS
Status: DISCONTINUED | OUTPATIENT
Start: 2024-08-11 | End: 2024-08-11

## 2024-08-11 RX ORDER — FOLIC ACID 1 MG/1
1 TABLET ORAL DAILY
Qty: 30 TABLET | Refills: 0 | Status: SHIPPED | OUTPATIENT
Start: 2024-08-11 | End: 2025-08-11

## 2024-08-11 RX ADMIN — OXYCODONE HYDROCHLORIDE 10 MG: 10 TABLET ORAL at 04:08

## 2024-08-11 RX ADMIN — METOPROLOL SUCCINATE 25 MG: 25 TABLET, EXTENDED RELEASE ORAL at 09:08

## 2024-08-11 RX ADMIN — PANTOPRAZOLE SODIUM 40 MG: 40 TABLET, DELAYED RELEASE ORAL at 09:08

## 2024-08-11 RX ADMIN — OXYCODONE 5 MG: 5 TABLET ORAL at 10:08

## 2024-08-11 RX ADMIN — FOLIC ACID 1 MG: 1 TABLET ORAL at 09:08

## 2024-08-11 RX ADMIN — ESCITALOPRAM OXALATE 10 MG: 5 TABLET, FILM COATED ORAL at 09:08

## 2024-08-11 RX ADMIN — MUPIROCIN: 20 OINTMENT TOPICAL at 09:08

## 2024-08-11 RX ADMIN — ATORVASTATIN CALCIUM 40 MG: 40 TABLET, FILM COATED ORAL at 08:08

## 2024-08-11 RX ADMIN — ONDANSETRON 4 MG: 2 INJECTION INTRAMUSCULAR; INTRAVENOUS at 03:08

## 2024-08-11 RX ADMIN — OXYCODONE 5 MG: 5 TABLET ORAL at 01:08

## 2024-08-11 RX ADMIN — OXYCODONE 5 MG: 5 TABLET ORAL at 05:08

## 2024-08-11 RX ADMIN — MUPIROCIN: 20 OINTMENT TOPICAL at 08:08

## 2024-08-11 RX ADMIN — THERA TABS 1 TABLET: TAB at 09:08

## 2024-08-11 RX ADMIN — ONDANSETRON 4 MG: 2 INJECTION INTRAMUSCULAR; INTRAVENOUS at 05:08

## 2024-08-11 RX ADMIN — OXYCODONE 5 MG: 5 TABLET ORAL at 08:08

## 2024-08-11 RX ADMIN — Medication 100 MG: at 09:08

## 2024-08-11 NOTE — PLAN OF CARE
FERNANDO called [facility] and spoke with Nurse Regalado to facilitate patient return.    FERNANDO faxed orders to [facility] [phone] for patient to return today.     Your fax has been successfully sent to [phone] at [phone].  ------------------------------------------------------------  From: [id number]  ------------------------------------------------------------  8/11/2024 1:51:12 PM Transmission Record          Sent to [phone] with remote ID "          Result: (0/339;4/24) Receive failure (Phase B)          Page record: NONE SENT          Elapsed time: 00:32 on channel 35    8/11/2024 1:56:45 PM Transmission Record          Sent to [phone] with remote ID "          Result: (0/339;4/24) Receive failure (Phase B)          Page record: NONE SENT          Elapsed time: 00:47 on channel 22    8/11/2024 2:02:37 PM Transmission Record          Sent to [phone] with remote ID "          Result: (0/339;0/0) Success          Page record: 1 - 5          Elapsed time: 03:21 on channel 51    2:40PM--Patient approved by nurse Regalado to return to [facility], FERNANDO will set up transport.

## 2024-08-11 NOTE — PLAN OF CARE
Patient will discharge back to [facility] as a nursing home resident. Patient will transport via  van. All CM needs have been met.    08/11/24 1501   Final Note   Assessment Type Final Discharge Note   Anticipated Discharge Disposition [facility]   Hospital Resources/Appts/Education Provided Provided patient/caregiver with written discharge plan information   Post-Acute Status   Post-Acute Authorization Placement   Post-Acute Placement Status Set-up Complete/Auth obtained   Discharge Delays None known at this time     [facility] - Telemetry Stepdown  Discharge Final Note    Primary Care Provider: [facility]    Expected Discharge Date: 8/11/2024    Final Discharge Note (most recent)       Final Note - 08/11/24 1501          Final Note    Assessment Type Final Discharge Note (P)      Anticipated Discharge Disposition [facility] (P)      Hospital Resources/Appts/Education Provided Provided patient/caregiver with written discharge plan information (P)         Post-Acute Status    Post-Acute Authorization Placement (P)      Post-Acute Placement Status Set-up Complete/Auth obtained (P)      Discharge Delays None known at this time (P)                      Important Message from Medicare

## 2024-08-11 NOTE — DISCHARGE INSTRUCTIONS
Follow up with cardiology and hepatology outpatient  Monitor for bleeding  Repeat CBC and CMP in 1 week

## 2024-08-11 NOTE — PLAN OF CARE
Problem: Adult Inpatient Plan of Care  Goal: Plan of Care Review  Outcome: Progressing  Goal: Patient-Specific Goal (Individualized)  Outcome: Progressing  Goal: Absence of Hospital-Acquired Illness or Injury  Outcome: Progressing  Intervention: Identify and Manage Fall Risk  Flowsheets (Taken 8/11/2024 0301)  Safety Promotion/Fall Prevention:   assistive device/personal item within reach   bed alarm set   commode/urinal/bedpan at bedside   Fall Risk reviewed with patient/family   instructed to call staff for mobility   lighting adjusted   medications reviewed   nonskid shoes/socks when out of bed   patient expresses understanding of fall risk and prevention   room near unit station   side rails raised x 2   side rails raised x 3  Intervention: Prevent Skin Injury  Flowsheets (Taken 8/11/2024 0301)  Body Position: position changed independently  Skin Protection: incontinence pads utilized  Device Skin Pressure Protection:   absorbent pad utilized/changed   adhesive use limited  Intervention: Prevent and Manage VTE (Venous Thromboembolism) Risk  Flowsheets (Taken 8/11/2024 0301)  VTE Prevention/Management:   bleeding risk assessed   bleeding precations maintained  Intervention: Prevent Infection  Flowsheets (Taken 8/11/2024 0301)  Infection Prevention:   environmental surveillance performed   hand hygiene promoted   rest/sleep promoted   single patient room provided     Patient awake, alert , oriented.  Pain assessed, pt complains of abdominal pain.  PRN pain medication administered as ordered.  pt educated on safety, and medication use. Pt verbalized understanding.  Plan of care discussed with patient, patient verbalized understanding.  Call light and urinal within reach.  Bed alarm on.  Bed  low and locked.

## 2024-08-11 NOTE — PLAN OF CARE
[facility]    FACILITY TRANSFER ORDERS      Patient Name: Fransico Montalvo  YOB: 1955    PCP: [facility]   PCP Address: [address]  / [place] [address]  PCP Phone Number: [phone]  PCP Fax: [phone]    Encounter Date: 08/11/2024    Admit to: NH    Vital Signs:  Routine    Diagnoses:   Active Hospital Problems    Diagnosis  POA    *Acute pancreatitis [K85.90]  Yes    Acute blood loss anemia [D62]  Yes    Hemorrhagic shock [R57.8]  Yes    Chronic combined systolic and diastolic congestive heart failure [I50.42]  Yes    CAD (coronary artery disease) [I25.10]  Yes    Presumed COPD (chronic obstructive pulmonary disease) [J44.9]  Yes    Hyponatremia [E87.1]  Yes    GIB (gastrointestinal bleeding) [K92.2]  Yes    Therapeutic opioid induced constipation [K59.03, T40.2X5A]  Yes    ISRAEL (acute kidney injury) [N17.9]  Yes    Essential hypertension [I10]  Yes     Chronic    Alcohol withdrawal [F10.939]  Yes    Alcoholic cirrhosis [K70.30]  Yes     Chronic    Thrombocytopenia [D69.6]  Yes     Chronic    Tobacco abuse [Z72.0]  Yes     Chronic    BPH (benign prostatic hyperplasia) [N40.0]  Yes     Chronic      Resolved Hospital Problems   No resolved problems to display.       Allergies:Review of patient's allergies indicates:  No Known Allergies    Diet: 2 gram sodium diet fluid restriction 1.5L daily    Activities: Activity as tolerated    Goals of Care Treatment Preferences:  Code Status: Full Code      Nursing: n/a     Labs: CBC and CMP in 1 week    CONSULTS:    Physical Therapy to evaluate and treat.  and Occupational Therapy to evaluate and treat.    MISCELLANEOUS CARE:  Routine Skin for Bedridden Patients: Apply moisture barrier cream to all skin folds and wet areas in perineal area daily and after baths and all bowel movements.    WOUND CARE ORDERS  None    Medications: Review discharge medications with patient and family and provide education.         Medication List         CONTINUE taking these medications      aspirin 81 MG EC tablet  Commonly known as: ECOTRIN  Take 1 tablet (81 mg total) by mouth once daily.     atorvastatin 40 MG tablet  Commonly known as: LIPITOR  Take 1 tablet (40 mg total) by mouth every evening.     empagliflozin 10 mg tablet  Commonly known as: Jardiance  Take 1 tablet (10 mg total) by mouth once daily.     EScitalopram oxalate 10 MG tablet  Commonly known as: LEXAPRO  Take 10 mg by mouth once daily.     fluticasone furoate-vilanteroL 100-25 mcg/dose diskus inhaler  Commonly known as: BREO  Inhale 1 puff into the lungs once daily. Controller     folic acid 1 MG tablet  Commonly known as: FOLVITE  Take 1 tablet (1 mg total) by mouth once daily.     furosemide 20 MG tablet  Commonly known as: LASIX  Take 1 tablet (20 mg total) by mouth once daily.     lactulose 10 gram/15 mL solution  Commonly known as: CHRONULAC  Take 30 mLs (20 g total) by mouth 3 (three) times daily as needed (Titrate to 2-3 bowel movements a day).     metoprolol succinate 25 MG 24 hr tablet  Commonly known as: TOPROL-XL  Take 1 tablet (25 mg total) by mouth once daily.     pantoprazole 40 MG tablet  Commonly known as: PROTONIX  Take 1 tablet (40 mg total) by mouth once daily.     spironolactone 25 MG tablet  Commonly known as: ALDACTONE  Take 0.5 tablets (12.5 mg total) by mouth once daily.     thiamine 100 MG tablet  Take 100 mg by mouth once daily.     valsartan 40 MG tablet  Commonly known as: DIOVAN  Take 0.5 tablets (20 mg total) by mouth 2 (two) times daily.                Immunizations Administered as of 8/11/2024       No immunizations on file.              Some patients may experience side effects after vaccination.  These may include fever, headache, muscle or joint aches.  Most symptoms resolve with 24-48 hours and do not require urgent medical evaluation unless they persist for more than 72 hours or symptoms are concerning for an unrelated medical condition.           _________________________________  Ok Coombs MD  08/11/2024

## 2024-08-11 NOTE — DISCHARGE SUMMARY
Zain Blas - Telemetry [facility] Medicine  Discharge Summary      Patient Name: Fransico Montalvo  MRN: [id number]  ILIANA: [id number]  Patient Class: IP- Inpatient  Admission Date: 8/7/2024  Hospital Length of Stay: 4 days  Discharge Date and Time:  08/11/2024 3:30 PM  Attending Physician: Ok Coombs MD   Discharging Provider: Ok Coombs MD  Primary Care Provider: [facility] Medicine Team: [facility]  Ok Coombs MD  Primary Care Team: [facility]    HPI:   Mr. Fransico Montalvo is a 68-year-old man with history of alcohol use disorder, EtOH cirrhosis with esophageal varices s/p banding 2018, CAD, BPH, COPD, HTN, and HFrEF (35-40%) who presented to [facility] on 8/7/24 from his nursing facility due to severe epigastric pain. He reports 4 days of bloody bowel movement and 2 days of nausea and vomiting without hematemesis. He reports recent embolization in his pancreas and history of pain associated with constipation which is relieved with laxatives. His pain that began the night prior to arrival was far worse than his usual amount of pain, he is unable to provide further details.     In the ED, he was found to have Hgb 4.4 for which he received 3 units pRBCs. Per EMS, his initial SBP was in to 70's. He received 1L IVF and was placed on octreotide, pantoprazole 80mg IV and CTX 1g. GI was consulted, recommend CTA if unstable despite transfusion and likely will perform endoscopy in the AM if stable.     Of note, has had multiple recent hospital admissions for symptomatic anemia and GIB. 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 He was also admitted in 06/2024 for syncope thought due to blood loss and hypovolemia in setting of GIB and Lasix use.    Procedure(s) (LRB):  EGD (ESOPHAGOGASTRODUODENOSCOPY) (N/A)       Hospital Course:   Melena on presentation from nursing home. Plan for EGD with GI on 8/8 revealed no bleeding in the lumen of the GI tract but blood coming from the ampulla of Vater. IR consulted for intervention, received GIB embolization procedure with Vascade closure device deployed in right femoral artery. Hemoglobin stable post procedure. Hemodynamically stable, on room air. No current ICU needs. H/H has remained stable with no further bleeding reported. Repeat CBC in 1 week and follow up with hepatology outpatient.     Goals of Care Treatment Preferences:  Code Status: Full Code      SDOH Screening:  The patient declined to be screened for utility difficulties, food insecurity, transport difficulties, housing insecurity, and interpersonal safety, so no concerns could be identified this admission.     Consults:   Consults (From admission, onward)          Status Ordering Provider     Inpatient consult to Interventional Radiology  Once        Provider:  (Not yet assigned)    Completed BELLA SPRINGER     Inpatient consult to Critical Care Medicine  Once        Provider:  (Not yet assigned)    Completed ULICES ESPARZA     Inpatient consult to Gastroenterology  Once        Provider:  (Not yet assigned)    Completed ULICES ESPARZA          Physical Exam  Constitutional:       General: He is not in acute distress.     Appearance: Normal appearance. He is not ill-appearing or diaphoretic.   HENT:      Head: Normocephalic and atraumatic.      Nose: Nose normal.      Mouth/Throat:      Mouth: Mucous membranes are moist.   Eyes:      Conjunctiva/sclera: Conjunctivae normal.   Cardiovascular:      Rate and Rhythm: Normal rate and regular rhythm.      Heart sounds: Normal heart sounds. No murmur heard.  Pulmonary:      Effort: Pulmonary effort is normal. No respiratory distress.      Breath sounds: Normal breath sounds. No wheezing.   Abdominal:      General: There is no distension.      Palpations: Abdomen is  soft.      Tenderness: There is no abdominal tenderness.   Musculoskeletal:      Right lower leg: No edema.      Left lower leg: No edema.   Skin:     General: Skin is warm and dry.   Neurological:      General: No focal deficit present.      Mental Status: He is alert and oriented to person, place, and time. Mental status is at baseline.   Psychiatric:         Mood and Affect: Mood normal.         Behavior: Behavior normal.         Thought Content: Thought content normal.         Judgment: Judgment normal.     No new Assessment & Plan notes have been filed under this hospital service since the last note was generated.  Service: Hospital Medicine    Final Active Diagnoses:    Diagnosis Date Noted POA    PRINCIPAL PROBLEM:  Acute pancreatitis [K85.90] 05/10/2024 Yes    Acute blood loss anemia [D62] 08/09/2024 Yes    Hemorrhagic shock [R57.8] 08/09/2024 Yes    Chronic combined systolic and diastolic congestive heart failure [I50.42] 05/10/2024 Yes    CAD (coronary artery disease) [I25.10] 05/10/2024 Yes    Presumed COPD (chronic obstructive pulmonary disease) [J44.9] 05/09/2024 Yes    Hyponatremia [E87.1] 04/01/2024 Yes    GIB (gastrointestinal bleeding) [K92.2] 03/29/2024 Yes    Therapeutic opioid induced constipation [K59.03, T40.2X5A] 10/27/2023 Yes    ISRAEL (acute kidney injury) [N17.9] 07/29/2019 Yes    Essential hypertension [I10] 07/28/2019 Yes     Chronic    Alcohol withdrawal [F10.939] 07/28/2019 Yes    Alcoholic cirrhosis [K70.30] 07/31/2018 Yes     Chronic    Thrombocytopenia [D69.6] 07/31/2018 Yes     Chronic    Tobacco abuse [Z72.0] 07/30/2018 Yes     Chronic    BPH (benign prostatic hyperplasia) [N40.0]  Yes     Chronic      Problems Resolved During this Admission:       Discharged Condition: good    [facility]    Follow Up:    Patient Instructions:      CBC auto differential   Standing Status: Future Standing Exp. Date: 11/09/25     Comprehensive metabolic panel   Standing  Status: Future Standing Exp. Date: 11/09/25     Ambulatory referral/consult to Hepatology   Standing Status: Future   Referral Priority: Routine Referral Type: Consultation   Referral Reason: Specialty Services Required   Requested Specialty: Hepatology   Number of Visits Requested: 1     ACCEPT - Ambulatory referral/consult to [facility] Congest[facility] Failure [facility]   Standing Status: Future   Referral Priority: Routine Referral Type: Consultation   Referral Reason: Specialty Services Required   Requested Specialty: Cardiology   Number of Visits Requested: 1     Ambulatory referral/consult to Cardiology   Standing Status: Future   Referral Priority: Routine Referral Type: Consultation   Referral Reason: Specialty Services Required   Requested Specialty: Cardiology   Number of Visits Requested: 1     Diet Cardiac     Notify your health care provider if you experience any of the following:  temperature >100.4     Notify your health care provider if you experience any of the following:  persistent nausea and vomiting or diarrhea     Notify your health care provider if you experience any of the following:  redness, tenderness, or signs of infection (pain, swelling, redness, odor or green/yellow discharge around incision site)     Notify your health care provider if you experience any of the following:  persistent dizziness, light-headedness, or visual disturbances     Notify your health care provider if you experience any of the following:  increased confusion or weakness     Activity as tolerated       Significant Diagnostic Studies: Labs: All labs within the past 24 hours have been reviewed    Pending Diagnostic Studies:       Procedure Component Value Units Date/Time    IR Embolization Arterial or Venous for Hemorrhage [[id number]] Resulted: 08/08/24 1720    Order Status: Sent Lab Status: In process Updated: 08/08/24 2146           Medications:  Reconciled Home Medications:      Medication List        CONTINUE  taking these medications      aspirin 81 MG EC tablet  Commonly known as: ECOTRIN  Take 1 tablet (81 mg total) by mouth once daily.     atorvastatin 40 MG tablet  Commonly known as: LIPITOR  Take 1 tablet (40 mg total) by mouth every evening.     empagliflozin 10 mg tablet  Commonly known as: Jardiance  Take 1 tablet (10 mg total) by mouth once daily.     EScitalopram oxalate 10 MG tablet  Commonly known as: LEXAPRO  Take 10 mg by mouth once daily.     fluticasone furoate-vilanteroL 100-25 mcg/dose diskus inhaler  Commonly known as: BREO  Inhale 1 puff into the lungs once daily. Controller     folic acid 1 MG tablet  Commonly known as: FOLVITE  Take 1 tablet (1 mg total) by mouth once daily.     furosemide 20 MG tablet  Commonly known as: LASIX  Take 1 tablet (20 mg total) by mouth once daily.     lactulose 10 gram/15 mL solution  Commonly known as: CHRONULAC  Take 30 mLs (20 g total) by mouth 3 (three) times daily as needed (Titrate to 2-3 bowel movements a day).     metoprolol succinate 25 MG 24 hr tablet  Commonly known as: TOPROL-XL  Take 1 tablet (25 mg total) by mouth once daily.     pantoprazole 40 MG tablet  Commonly known as: PROTONIX  Take 1 tablet (40 mg total) by mouth once daily.     spironolactone 25 MG tablet  Commonly known as: ALDACTONE  Take 0.5 tablets (12.5 mg total) by mouth once daily.     thiamine 100 MG tablet  Take 100 mg by mouth once daily.     valsartan 40 MG tablet  Commonly known as: DIOVAN  Take 0.5 tablets (20 mg total) by mouth 2 (two) times daily.              Indwelling Lines/Drains at time of discharge:   Lines/Drains/Airways       None                   Time spent on the discharge of patient: 35 minutes         Ok Coombs MD  Department of Hospital Medicine  [facility] - Telemetry Stepdown

## 2024-08-12 NOTE — NURSING
[facility] are here to get pt and bring him to nursing home.  Pt put safely in wheelchair.  All personal belongings including phone, , and leg brace with pt.  No concerns verbalized, no distress noted.

## 2024-08-12 NOTE — PLAN OF CARE
Problem: Adult Inpatient Plan of Care  Goal: Plan of Care Review  8/11/2024 1905 by Geno Mcadams RN  Outcome: Progressing  8/11/2024 1904 by Geno Mcadams RN  Outcome: Progressing  Goal: Patient-Specific Goal (Individualized)  8/11/2024 1905 by Geno Mcadams RN  Outcome: Progressing  8/11/2024 1904 by Geno Mcadams RN  Outcome: Progressing  Goal: Absence of Hospital-Acquired Illness or Injury  8/11/2024 1905 by Geno Mcadams RN  Outcome: Progressing  8/11/2024 1904 by Geno Mcadams RN  Outcome: Progressing  Goal: Optimal Comfort and Wellbeing  8/11/2024 1905 by Geno Mcadams RN  Outcome: Progressing  8/11/2024 1904 by Geno Mcadams RN  Outcome: Progressing  Goal: Readiness for Transition of Care  8/11/2024 1905 by Geno Mcadams RN  Outcome: Progressing  8/11/2024 1904 by Geno Mcadams RN  Outcome: Progressing     Problem: Acute Kidney Injury/Impairment  Goal: Fluid and Electrolyte Balance  8/11/2024 1905 by Geno Mcadams RN  Outcome: Progressing  8/11/2024 1904 by Geno Mcadams RN  Outcome: Progressing  Goal: Improved Oral Intake  8/11/2024 1905 by Geno Mcadams RN  Outcome: Progressing  8/11/2024 1904 by Geno Mcadams RN  Outcome: Progressing  Goal: Effective Renal Function  8/11/2024 1905 by Geno Mcadams RN  Outcome: Progressing  8/11/2024 1904 by Geno Mcadams RN  Outcome: Progressing     Problem: Fall Injury Risk  Goal: Absence of Fall and Fall-Related Injury  8/11/2024 1905 by Geno Mcadams RN  Outcome: Progressing  8/11/2024 1904 by Geno Mcadams RN  Outcome: Progressing     Problem: Skin Injury Risk Increased  Goal: Skin Health and Integrity  8/11/2024 1905 by Geno Mcadams RN  Outcome: Progressing  8/11/2024 1904 by Geno Mcadams RN  Outcome: Progressing     Problem: Wound  Goal: Optimal Coping  8/11/2024 1905 by Geno Mcadams RN  Outcome: Progressing  8/11/2024 1904 by Geno Mcadams, RN  Outcome:  Progressing  Goal: Optimal Functional Ability  8/11/2024 1905 by Geno Mcadams RN  Outcome: Progressing  8/11/2024 1904 by Geno Mcadams RN  Outcome: Progressing  Goal: Absence of Infection Signs and Symptoms  8/11/2024 1905 by Geno Mcadams RN  Outcome: Progressing  8/11/2024 1904 by Geno Mcadams RN  Outcome: Progressing  Goal: Improved Oral Intake  8/11/2024 1905 by Geno Mcadams RN  Outcome: Progressing  8/11/2024 1904 by Geno Mcadams RN  Outcome: Progressing  Goal: Optimal Pain Control and Function  8/11/2024 1905 by Geno Mcadams RN  Outcome: Progressing  8/11/2024 1904 by Geno Mcadams RN  Outcome: Progressing  Goal: Skin Health and Integrity  8/11/2024 1905 by Geno Mcadams RN  Outcome: Progressing  8/11/2024 1904 by Geno Mcadams RN  Outcome: Progressing  Goal: Optimal Wound Healing  8/11/2024 1905 by Geno Mcadams RN  Outcome: Progressing  8/11/2024 1904 by Geno Mcadams RN  Outcome: Progressing    Patient remains free from falls and injury. NADN. VSS. Safety maintained; bed low and locked, call light in reach.  No complaint of pain, n/v, diarrhea, or SOB. Questions encouraged and answered. Plan of care reviewed with patient. Will continue to monitor, will continue with plan of care.

## 2024-08-14 ENCOUNTER — PATIENT OUTREACH (OUTPATIENT)
Dept: ADMINISTRATIVE | Facility: CLINIC | Age: 69
End: 2024-08-14
Payer: MEDICARE

## 2024-08-15 ENCOUNTER — TELEPHONE (OUTPATIENT)
Dept: GASTROENTEROLOGY | Facility: CLINIC | Age: 69
End: 2024-08-15
Payer: MEDICARE

## 2024-08-15 NOTE — TELEPHONE ENCOUNTER
"----- Message from Sherice Archer sent at 8/15/2024 10:17 AM CDT -----  Regarding: pt advice  Contact: [phone]  .Name Of Caller:Delvis Jones      Contact Preference?:[phone]     What is the nature of the call?:in regards to pt appt 8/15/24, pls call    Additional Notes:  "Thank you for all that you do for our patients"  "

## 2024-09-18 NOTE — DISCHARGE INSTRUCTIONS
Please take new medications as directed. Please follow up with the [facility] and your PCP to discuss today's Emergency Department visit and for further evaluation and management. Please return to the Emergency Department if your symptoms persist, worsen or you develop any additional concerning symptoms.   4 = No assist / stand by assistance

## (undated) DEVICE — GAUZE SPONGE 4X4 12PLY

## (undated) DEVICE — SUT VICRYL PLUS 2-0

## (undated) DEVICE — DRAPE C-ARMOR EQUIPMENT COVER

## (undated) DEVICE — SUT 0 VICRYL / CT-1

## (undated) DEVICE — APPLICATOR CHLORAPREP ORN 26ML

## (undated) DEVICE — DRAPE INCISE IOBAN 2 23X17IN

## (undated) DEVICE — DRAPE STERI U-SHAPED 47X51IN

## (undated) DEVICE — DRAPE IOBAN 2 STERI

## (undated) DEVICE — SPONGE LAP 18X18 PREWASHED

## (undated) DEVICE — DRESSING TRANS 4X4 TEGADERM

## (undated) DEVICE — DRAPE C ARM 42 X 120 10/BX

## (undated) DEVICE — TAPE SURG DURAPORE 2 X10YD

## (undated) DEVICE — DRESSING AQUACEL AG ADV 3.5X6

## (undated) DEVICE — SEE MEDLINE ITEM 157150

## (undated) DEVICE — TRAY MINOR ORTHO

## (undated) DEVICE — SUT ETHILON 3/0 18IN PS-1